# Patient Record
Sex: FEMALE | Race: WHITE
[De-identification: names, ages, dates, MRNs, and addresses within clinical notes are randomized per-mention and may not be internally consistent; named-entity substitution may affect disease eponyms.]

---

## 2017-01-24 ENCOUNTER — HOSPITAL ENCOUNTER (INPATIENT)
Dept: HOSPITAL 45 - C.EDB | Age: 52
LOS: 3 days | Discharge: HOME | DRG: 640 | End: 2017-01-27
Attending: HOSPITALIST | Admitting: HOSPITALIST
Payer: COMMERCIAL

## 2017-01-24 VITALS
HEIGHT: 65 IN | BODY MASS INDEX: 39.12 KG/M2 | BODY MASS INDEX: 39.12 KG/M2 | BODY MASS INDEX: 39.12 KG/M2 | WEIGHT: 234.79 LBS | WEIGHT: 234.79 LBS | HEIGHT: 65 IN | BODY MASS INDEX: 39.12 KG/M2

## 2017-01-24 VITALS — SYSTOLIC BLOOD PRESSURE: 196 MMHG | DIASTOLIC BLOOD PRESSURE: 119 MMHG

## 2017-01-24 VITALS
HEART RATE: 84 BPM | TEMPERATURE: 98.24 F | DIASTOLIC BLOOD PRESSURE: 80 MMHG | OXYGEN SATURATION: 93 % | SYSTOLIC BLOOD PRESSURE: 149 MMHG

## 2017-01-24 VITALS
DIASTOLIC BLOOD PRESSURE: 128 MMHG | OXYGEN SATURATION: 98 % | SYSTOLIC BLOOD PRESSURE: 219 MMHG | HEART RATE: 96 BPM | TEMPERATURE: 97.52 F

## 2017-01-24 DIAGNOSIS — D63.1: ICD-10-CM

## 2017-01-24 DIAGNOSIS — I65.22: ICD-10-CM

## 2017-01-24 DIAGNOSIS — F32.9: ICD-10-CM

## 2017-01-24 DIAGNOSIS — E66.01: ICD-10-CM

## 2017-01-24 DIAGNOSIS — E11.9: ICD-10-CM

## 2017-01-24 DIAGNOSIS — Z87.891: ICD-10-CM

## 2017-01-24 DIAGNOSIS — I96: ICD-10-CM

## 2017-01-24 DIAGNOSIS — Z82.49: ICD-10-CM

## 2017-01-24 DIAGNOSIS — Z79.4: ICD-10-CM

## 2017-01-24 DIAGNOSIS — R06.02: ICD-10-CM

## 2017-01-24 DIAGNOSIS — I12.0: ICD-10-CM

## 2017-01-24 DIAGNOSIS — E78.5: ICD-10-CM

## 2017-01-24 DIAGNOSIS — N18.6: ICD-10-CM

## 2017-01-24 DIAGNOSIS — J30.9: ICD-10-CM

## 2017-01-24 DIAGNOSIS — R79.89: ICD-10-CM

## 2017-01-24 DIAGNOSIS — Z83.3: ICD-10-CM

## 2017-01-24 DIAGNOSIS — Z87.01: ICD-10-CM

## 2017-01-24 DIAGNOSIS — I25.10: ICD-10-CM

## 2017-01-24 DIAGNOSIS — Z79.82: ICD-10-CM

## 2017-01-24 DIAGNOSIS — R19.7: ICD-10-CM

## 2017-01-24 DIAGNOSIS — E87.5: Primary | ICD-10-CM

## 2017-01-24 DIAGNOSIS — I16.0: ICD-10-CM

## 2017-01-24 DIAGNOSIS — R07.9: ICD-10-CM

## 2017-01-24 DIAGNOSIS — K21.9: ICD-10-CM

## 2017-01-24 DIAGNOSIS — Z99.2: ICD-10-CM

## 2017-01-24 LAB
ALBUMIN/GLOB SERPL: 0.6 {RATIO} (ref 0.9–2)
ALP SERPL-CCNC: 75 U/L (ref 45–117)
ALT SERPL-CCNC: 14 U/L (ref 12–78)
ANION GAP SERPL CALC-SCNC: 20 MMOL/L (ref 3–11)
AST SERPL-CCNC: 6 U/L (ref 15–37)
BASOPHILS # BLD: 0.03 K/UL (ref 0–0.2)
BASOPHILS NFR BLD: 0.3 %
BUN SERPL-MCNC: 137 MG/DL (ref 7–18)
BUN/CREAT SERPL: 8.1 (ref 10–20)
CALCIUM SERPL-MCNC: 9 MG/DL (ref 8.5–10.1)
CHLORIDE SERPL-SCNC: 99 MMOL/L (ref 98–107)
CKMB/CK RATIO: 7.2 (ref 0–3)
CO2 SERPL-SCNC: 16 MMOL/L (ref 21–32)
COMPLETE: YES
CREAT CL PREDICTED SERPL C-G-VRATE: 5 ML/MIN
CREAT SERPL-MCNC: 17 MG/DL (ref 0.6–1.2)
EOSINOPHIL NFR BLD AUTO: 338 K/UL (ref 130–400)
GLOBULIN SER-MCNC: 4.9 GM/DL (ref 2.5–4)
GLUCOSE SERPL-MCNC: 117 MG/DL (ref 70–99)
HCT VFR BLD CALC: 29.3 % (ref 37–47)
IG%: 0.3 %
IMM GRANULOCYTES NFR BLD AUTO: 17 %
INR PPP: 1.1 (ref 0.9–1.1)
LYMPHOCYTES # BLD: 1.89 K/UL (ref 1.2–3.4)
MCH RBC QN AUTO: 30.5 PG (ref 25–34)
MCHC RBC AUTO-ENTMCNC: 34.1 G/DL (ref 32–36)
MCV RBC AUTO: 89.3 FL (ref 80–100)
MONOCYTES NFR BLD: 7.8 %
NEUTROPHILS # BLD AUTO: 2.3 %
NEUTROPHILS NFR BLD AUTO: 72.3 %
PARTIAL THROMBOPLASTIN RATIO: 1.2
PMV BLD AUTO: 10.4 FL (ref 7.4–10.4)
POTASSIUM SERPL-SCNC: 5.9 MMOL/L (ref 3.5–5.1)
PROTHROMBIN TIME: 11.3 SECONDS (ref 9–12)
RBC # BLD AUTO: 3.28 M/UL (ref 4.2–5.4)
SODIUM SERPL-SCNC: 135 MMOL/L (ref 136–145)
WBC # BLD AUTO: 11.11 K/UL (ref 4.8–10.8)

## 2017-01-24 RX ADMIN — HEPARIN SODIUM SCH UNIT: 10000 INJECTION, SOLUTION INTRAVENOUS; SUBCUTANEOUS at 22:09

## 2017-01-24 NOTE — HISTORY AND PHYSICAL
History & Physical


Date & Time of Service:


Jan 24, 2017 at 21:18


Chief Complaint:


Sob, Chest Discomfort


Primary Care Physician:


Lurdes Smith D.O.


History of Present Illness


Source:  patient, clinic records, hospital records


Patient seen and examined. 51 year old female with PMHx of ESRD on HD, CAD s/p 

bare metal stent in 8/16, DM2, HTN, HLD, Depression,h/o noncompliance, and 

other problems listed below presents to the ED complaining of SOB prior to 

arrival. Patient reports that she missed her last three dialysis sessions. She 

reports that she stopped going because she was having nausea and diarrhea for 

the past 4-5 days. She states that she has at least 5 episodes of vomiting per 

day. She states she didn't want to have to be unhooked from dialysis to go to 

the bathroom so she just didn't go. She states today she started noticing that 

she was getting SOB with minimal exertion. She also states she had some chest 

discomfort in the left side of her chest that she rated as a 2-3/10 and 

described as dull. She also reports that she feels bloated and has been having 

orthopnea at night. Patient has been taking Keflex for a right second finger 

infection. She states that she was told at the pharmacy that she was not to 

take any of her other medications while on the Keflex so she has been only 

taking aspirin d/t her stents. She then however states she has not been taking 

her medications for at least a month. She denies fevers, chills, URI symptoms, 

palpitations, vomiting, dysuria, calf pain. In the ED patient is hypertensive, 

crea is 17, potassium is 5.9, and bicarb is 16. Troponin is 0.236, and there 

are ST changes on EKG. She was given nitropaste which alleviated her chest 

pain. She will be admitted for further workup and treatment.





Past Medical/Surgical History


Medical Problems:


(1) Allergic rhinitis


Status: Chronic  





(2) DM type 2 (diabetes mellitus, type 2)


Status: Chronic  





(3) Dyslipidemia


Status: Chronic  





(4) ESRD (end stage renal disease) on dialysis


Status: Chronic  





(5) GERD (gastroesophageal reflux disease)


Status: Chronic  





(6) HTN (hypertension)


Status: Chronic  





(7) HX OF PAST NONCOMPLIANCE


Status: Chronic  





(8) Morbid obesity


Status: Chronic  





(9) Tobacco use disorder


Status: Chronic  





Surgical Problems:


(1) Hemodialysis access, AV graft


Permanent Comment: [facility] Dr. Roberts 1/4/13


Status: Chronic  








Family History





Diabetes mellitus


  FATHER


  MOTHER


Heart disease


  FATHER


  MOTHER


Hypertension


  FATHER


  MOTHER


Kidney disease


  GRANDMOTHER





Social History


Smoking Status:  Former Smoker


Alcohol Use:  none


Drug Use:  none


Marital Status:  


Housing status:  lives with family


Occupational Status:  unemployed





Immunizations


History of Influenza Vaccine:  N/A


Influenza Vaccine Date:  Dec 1, 2012


History of Tetanus Vaccine?:  Yes


Tetanus Immunization Date:  Aug 20, 2011


History of Pneumococcal:  Yes


Pneumococcal Date:  Dec 1, 2012


History of Hepatitis B Vaccine:  Yes





Multi-Drug Resistant Organisms


History of MDRO:  No





Allergies


Coded Allergies:  


     Adhesives (Verified  Allergy, Mild, RASH, SORES, 1/24/17)


     Pollen Extract (Unverified  Allergy, Unknown, rash, 1/24/17)





Home Medications


Scheduled


Aspirin (Aspirin Ec), 81 MG PO DAILY


Atorvastatin (Lipitor), 40 MG PO DAILY


Calcium Acetate (Phoslo 667 Mg), 1 CAP PO WM


Cephalexin Monohydrate (Cephalexin), 1 CAP PO BID


Clopidogrel Bisulfate (Plavix), 1 TAB PO DAILY


Insulin Glargine (Lantus Solostar), 10 UNITS SC QPM


Isosorbide Mononitrate (Isosorbide Mononitrate ER), 60 MG PO QAM


Isosorbide Mononitrate Ext Rel (Imdur Ext Rel), 30 MG PO QAM


Metoprolol Tartrate (Lopressor), 50 MG PO BID


Ranitidine Hcl (Zantac), 300 MG PO HS


Sertraline HCl (Sertraline HCl), 2 TAB PO DAILY





Scheduled PRN


Lorazepam (Ativan), 0.5 MG PO DAILY PRN for Anxiety


Methocarbamol (Methocarbamol), 500 MG PO BID PRN for Muscle Spasms


Tramadol (Ultram), 1 TAB PO TID PRN for Pain





Review of Systems


Constitutional:  No chills, No fever


Eyes:  No worsening of vision


ENT:  No nasal symptoms


Respiratory:  + dyspnea on exertion, + shortness of breath, No cough, No 

dyspnea at rest


Cardiovascular:  + chest pain, + edema, + orthopnea, No palpitations


Abdomen:  + diarrhea, + nausea, No constipation, No pain, No vomiting


Musculoskeletal:  + swelling, No calf pain


Genitourinary - Female:  No dysuria


Neurologic:  No numbness/tingling, No vertigo


Psychiatric:  No anxiety


Endocrine:  No excessive thirst


Hematologic / Lymphatic:  No abnormal bleeding/bruising, No clotting problems


Integumentary:  No itch, No rash


Allergic / Immunologic:  No environmental allergies





Physical Exam


Vital Signs











  Date Time  Temp Pulse Resp B/P Pulse Ox O2 Delivery O2 Flow Rate FiO2


 


1/24/17 20:48  97 22 149/102 92 Room Air  


 


1/24/17 19:12  93 22 181/109 95 Room Air  


 


1/24/17 18:54  97      


 


1/24/17 18:53     93 Room Air  


 


1/24/17 18:44     93 Room Air  


 


1/24/17 18:44     93 Room Air  


 


1/24/17 18:13 36.5 98 20 192/113 93 Room Air  








General Appearance:  + pertinent finding (WD/WN 51 year old female lying in bed 

in NAD )


Head:  normocephalic, atraumatic


Eyes:  PERRL, EOMI, sclerae normal


ENT:  hearing grossly normal, pharynx normal


Neck:  supple, no JVD


Respiratory/Chest:  chest non-tender, lungs clear, normal breath sounds, no 

respiratory distress, no accessory muscle use


Cardiovascular:  regular rate, rhythm, no gallop, no JVD, no murmur, normal 

peripheral pulses


Abdomen/GI:  normal bowel sounds, non tender, soft


Back:  normal inspection, no muscle spasm


Extremities/Musculoskelatal:  no calf tenderness, normal capillary refill, + 

pedal edema (trace to +1 BL ), + pertinent finding (dressing to right index 

finger I/C/D )


Neurologic/Psych:  alert, oriented x 3, + pertinent finding (no motor or 

sensory deficits noted on gross exam )


Skin:  normal color, warm/dry, no rash


Lymphatic:  no adenopathy





Diagnostics


Laboratory Results





Results Past 24 Hours








Test


  1/24/17


19:08 Range/Units


 


 


White Blood Count 11.11 4.8-10.8  K/uL


 


Red Blood Count 3.28 4.2-5.4  M/uL


 


Hemoglobin 10.0 12.0-16.0  g/dL


 


Hematocrit 29.3 37-47  %


 


Mean Corpuscular Volume 89.3   fL


 


Mean Corpuscular Hemoglobin 30.5 25-34  pg


 


Mean Corpuscular Hemoglobin


Concent 34.1


  32-36  g/dl


 


 


Platelet Count 338 130-400  K/uL


 


Mean Platelet Volume 10.4 7.4-10.4  fL


 


Neutrophils (%) (Auto) 72.3  %


 


Lymphocytes (%) (Auto) 17.0  %


 


Monocytes (%) (Auto) 7.8  %


 


Eosinophils (%) (Auto) 2.3  %


 


Basophils (%) (Auto) 0.3  %


 


Neutrophils # (Auto) 8.03 1.4-6.5  K/uL


 


Lymphocytes # (Auto) 1.89 1.2-3.4  K/uL


 


Monocytes # (Auto) 0.87 0.11-0.59  K/uL


 


Eosinophils # (Auto) 0.26 0-0.5  K/uL


 


Basophils # (Auto) 0.03 0-0.2  K/uL


 


RDW Standard Deviation 46.9 36.4-46.3  fL


 


RDW Coefficient of Variation 14.3 11.5-14.5  %


 


Immature Granulocyte % (Auto) 0.3  %


 


Immature Granulocyte # (Auto) 0.03 0.00-0.02  K/uL


 


Prothrombin Time


  11.3


  9.0-12.0


SECONDS


 


Prothromb Time International


Ratio 1.1


  0.9-1.1  


 


 


Activated Partial


Thromboplast Time 30.3


  21.0-31.0


SECONDS


 


Partial Thromboplastin Ratio 1.2  


 


Sodium Level 135 136-145  mmol/L


 


Potassium Level 5.9 3.5-5.1  mmol/L


 


Chloride Level 99   mmol/L


 


Carbon Dioxide Level 16 21-32  mmol/L


 


Anion Gap 20.0 3-11  mmol/L


 


Blood Urea Nitrogen 137 7-18  mg/dl


 


Creatinine


  17.00


  0.60-1.20


mg/dl


 


Est Creatinine Clear Calc


Drug Dose 5.0


   ml/min


 


 


Estimated GFR (


American) 2.5


   


 


 


Estimated GFR (Non-


American 2.2


   


 


 


BUN/Creatinine Ratio 8.1 10-20  


 


Random Glucose 117 70-99  mg/dl


 


Calcium Level 9.0 8.5-10.1  mg/dl


 


Total Bilirubin 0.3 0.2-1  mg/dl


 


Aspartate Amino Transf


(AST/SGOT) 6


  15-37  U/L


 


 


Alanine Aminotransferase


(ALT/SGPT) 14


  12-78  U/L


 


 


Alkaline Phosphatase 75   U/L


 


Total Creatine Kinase 88   U/L


 


Creatine Kinase MB 6.3 0.5-3.6  ng/ml


 


Creatine Kinase MB Ratio 7.2 0-3.0  


 


Troponin I 0.236 0-0.045  ng/ml


 


Pro-B-Type Natriuretic Peptide > 04770 0-450  pg/ml


 


Total Protein 7.9 6.4-8.2  gm/dl


 


Albumin 3.0 3.4-5.0  gm/dl


 


Globulin 4.9 2.5-4.0  gm/dl


 


Albumin/Globulin Ratio 0.6 0.9-2  











Diagnostic Radiology


CXR


Per radiologist read:





IMPRESSION: Stable cardiomegaly and pulmonary vascular congestion.





EKG


NSR 90 BPm, QTc 467, mild ST elevated in lead III only





Impression


Assessment and Plan


51 year old female with history of noncompliance presents to the ED complaining 

of SOB. Patient has not gone to Dialysis in 9 days. 





HYPERKALEMIA IN ESRD


-Admit to tele 


-Crea 17, K 5.9 - EKG without peaked T waves


-Patient noncompliant with dialysis and medications 


-Last dialysis was 1/16/17 is scheduled MWF 


-Follows with Dr. Davison


-Nephrology consult placed for dialysis management 


-CBC, PRP, Mg in AM 


-Low potassium renal diet 





SHORTNESS OF BREATH 


-likely secondary to volume overload in setting of ESRD with noncompliance with 

dialysis 


-R/O ACS as below


-resume home meds


-Nephrology consulted for fluid management 





CHEST PAIN/ELEVATED TROPONIN - history of CAD 


-had bare metal stent placed August 2016 


-states she has been noncompliant with all meds except aspirin 


-Troponin 0.236 - however missed multiple days of dialysis 


-Lead III of EKG concerning for ST changes


-Currently chest pain free


-Continue Nitropaste, Aspirin


-Resume, BB, Statin, Plavix 


-Serial Julian, EKGs


-follows with Dr. Duarte as outpatient may need to consider consult in AM 

depending on cardiac testing results 





HYPERTENSIVE URGENCY 


-SBP >190s


-Has been noncompliant with home BP meds for weeks to months


-Improved BP with nitropaste, will continue 


-resume BB


-hold Imdur until nitropaste is discontinued


-monitor closely in tele 





RIGHT 2ND FINGER INFECTION 


-[facility]


-Mild leukocytosis -11K


-Patient reports finger getting better, however appears gangrenous at tip - 

please see Dr. Reese' addendum physical exam 


-Change Keflex to Ancef for now, may need broad spectrum Abx if finger does not 

improve 


-pharmacy onboard for renal dosing 


-Defer additional consultations to attending physicians 


-repeat CBC in AM 





DIARRHEA


-has history of c.diff and recently on Abx 


-check C. diff toxin 





IDDM


-recent A1c 7.2 


-has been noncompliant with insulin however random serum glucose tonight 112


-SSI coverage  


-decrease Lantus to 5 units HS to avoid hypoglycemia


-BSG AC HS 


-consistent carb diet 





GERD


-continue Zantac 





HLD


-continue Statin 





DEPRESSION


-continue Zoloft 





ANEMIA in ESRD


-Hgb stable at 10 


-repeat CBC in AM 





DVT PROPHYLAXIS: Sq heparin 





CODE STATUS: FULL CODE





DISPO:In my clinical judgment this beneficiary meets acute admission criteria, 

established by CMS, that includes being hospitalized through two midnights.





Patient seen in collaboration with Dr. Reese





VTE Prophylaxis


VTE Risk Assessment Done? Y/N:  Yes


Risk Level:  Moderate





Note





ATTENDING ADDENDUM





Record reviewed.


Patient interviewed and examined.


Care coordinated with Jazmyn Torres PA-C.


Please refer to her documentation for patient's history.


Briefly, 50 YO female with history of coronary artery disease s/p PCI, CKD 5 on 

hemodialysis, DM, and other problems as noted.


Has not gone to dialysis unit for > 1 week because she wasn't feeling well.


Apparently has not been taking some / most of her prescribed meds for some time.


Presented to ED with SOB.


No exertional chest pain, but noted some chest pressure when she rolled over in 

bed.


Recent diarrhea. Taking cephalexin for cellulitis right index finger.





EXAM:





General-  no acute distress


VS- as noted


Neck-  + JVD


Lungs-  basilar rales


Heart-  RRR, S4, no rub appreciated


Abdomen-  + BS, soft, nontender


Extremities- trace pretibial edema; no calf tenderness; erythema right index 

finger involving distal phalanx with apparent dry gangrene at tip of finger, no 

drainage


Neuro-  alert, oriented








DATA:





K 5.9.


, creatinine 17.


Troponin 0.236.


Other lab studies as noted.





CXR- cardiomegaly, pulmonary edema.





EKG performed at 18:40 reviewed and demonstrated NSR at 95 / minute, isolated 1 

mm ST elevation III, slight ST depression I, aVL, inverted T-waves III, aVF, V3-

V4.


.











ASSESSMENT AND PLAN:





PULMONARY EDEMA 


Secondary to missed hemodialysis treatments.


Oxygenation adequate on RA.


Nephrology consulted to manage hemodialysis.





HYPERKALEMIA


Serum K 5.9.


Secondary to missed hemodialysis treatments.


No peaked T-waves.


Discussed with Nephrology.


Insulin + D50 tonight, hemodialysis in a.m.





HYPERTENSION


BP elevated in ED.


Fluid overload, noncompliance with meds contributing factors.


Resume antihypertensive meds and hemodialysis.


Follow and titrate Rx.





CAD


Serum troponin slightly elevated, probably nonspecific elevation due to CKD.


EKG changes as noted- nonspecific, does not meet criteria for STEMI.


Doubt acute coronary syndrome- has mild positional chest discomfort, no anginal 

symptoms.


Elevated BP may be contributing to EKG changes.


Follow serial cardiac markers.


Consult Cardiology if markers rise or there are other concerning changes in her 

status.





Please refer to JANEY Torres's documentation for discussion of other issues.





Alfred Reese MD


.

## 2017-01-24 NOTE — DIAGNOSTIC IMAGING REPORT
CHEST ONE VIEW PORTABLE



CLINICAL HISTORY: Respiratory distress. Dyspnea.    



COMPARISON STUDY:  Chest radiograph October 11, 2016.



FINDINGS: Lung volumes are normal. There is no pneumothorax or pleural effusion.

Moderate cardiomegaly is unchanged. There is pulmonary vascular congestion

without overt pulmonary edema. There are findings suggestive of calcific

tendinitis of the right rotator cuff. 



IMPRESSION: Stable cardiomegaly and pulmonary vascular congestion.









Electronically signed by:  Giovanni Clemens M.D.

1/24/2017 6:45 PM



Dictated Date/Time:  1/24/2017 6:42 PM

## 2017-01-24 NOTE — EMERGENCY ROOM VISIT NOTE
History


Report prepared by Eda:  Moose Hendricks


Under the Supervision of:  Dr. Joshua Tobar D.O.


First contact with patient:  18:20


Chief Complaint:  SHORTNESS OF BREATH


Stated Complaint:  SOB, CHEST DISCOMFORT





History of Present Illness


The patient is a 51 year old female who presents to the Emergency Room with 

complaints of persistent shortness of breath that began 8 days prior to 

arrival. Her shortness of breath is worsened when laying flat, and she feels as 

though she cannot take a deep breath. The patient is on routine dialysis, but 

missed her last three appointments due to an upset stomach and diarrhea. The 

patient's last appointment was Monday, 8 days ago. She also complains of fevers

, chills, and coughing. She does feel like she is carrying a lot of extra fluid 

in her abdomen, but denies excess fluid buildup in her lower extremities.





   Source of History:  patient


   Onset:  8 days PTA


   Position:  chest


   Quality:  other (Difficulty breathing)


   Timing:  other (Persistent)


   Associated Symptoms:  + chills, + cough, + fevers


Note:


Patient notes fluid build up in her abdomen.





Review of Systems


See HPI for pertinent positives & negatives. A total of 10 systems reviewed and 

were otherwise negative.





Past Medical & Surgical


Medical Problems:


(1) Allergic rhinitis


(2) Coronary arteriosclerosis in native artery


(3) DM type 2 (diabetes mellitus, type 2)


(4) Dyslipidemia


(5) ESRD (end stage renal disease) on dialysis


(6) ESRD (end stage renal disease) on dialysis


(7) GERD (gastroesophageal reflux disease)


(8) HTN (hypertension)


(9) HX OF PAST NONCOMPLIANCE


(10) Hyperkalemia


(11) Morbid obesity


(12) Tobacco use disorder


Surgical Problems:


(1) Hemodialysis access, AV graft








Family History





Diabetes mellitus


  FATHER


  MOTHER


Heart disease


  FATHER


  MOTHER


Hypertension


  FATHER


  MOTHER


Kidney disease


  GRANDMOTHER





Social History


Smoking Status:  Former Smoker


Alcohol Use:  none


Drug Use:  none


Marital Status:  


Housing Status:  lives with family


Occupation Status:  unemployed





Current/Historical Medications


Scheduled


Aspirin (Aspirin Ec), 81 MG PO DAILY


Atorvastatin (Lipitor), 40 MG PO DAILY


Calcium Acetate (Phoslo 667 Mg), 1 CAP PO WM


Cephalexin Monohydrate (Cephalexin), 1 CAP PO BID


Clopidogrel Bisulfate (Plavix), 1 TAB PO DAILY


Insulin Glargine (Lantus Solostar), 10 UNITS SC QPM


Isosorbide Mononitrate (Isosorbide Mononitrate ER), 60 MG PO QAM


Isosorbide Mononitrate Ext Rel (Imdur Ext Rel), 30 MG PO QAM


Metoprolol Tartrate (Lopressor), 50 MG PO BID


Ranitidine Hcl (Zantac), 300 MG PO HS


Sertraline HCl (Sertraline HCl), 2 TAB PO DAILY





Scheduled PRN


Lorazepam (Ativan), 0.5 MG PO DAILY PRN for Anxiety


Methocarbamol (Methocarbamol), 500 MG PO BID PRN for Muscle Spasms


Tramadol (Ultram), 1 TAB PO TID PRN for Pain





Allergies


Coded Allergies:  


     Adhesives (Verified  Allergy, Mild, RASH, SORES, 1/24/17)


     Pollen Extract (Unverified  Allergy, Unknown, rash, 1/24/17)





Physical Exam


Vital Signs











  Date Time  Temp Pulse Resp B/P Pulse Ox O2 Delivery O2 Flow Rate FiO2


 


1/24/17 19:12  93 22 181/109 95 Room Air  


 


1/24/17 18:54  97      


 


1/24/17 18:53     93 Room Air  


 


1/24/17 18:44     93 Room Air  


 


1/24/17 18:44     93 Room Air  


 


1/24/17 18:13 36.5 98 20 192/113 93 Room Air  











Physical Exam


GENERAL:  Patient is awake, alert, and anxious appearing. She is comfortable, 

does not appear to be in pain. 


EYES: The conjunctivae are clear.  The pupils are round and reactive. 


EARS, NOSE, MOUTH AND THROAT: The nose is without any evidence of any 

deformity. Mucous membranes are moist tongue is midline 


NECK: The neck is nontender and supple.


RESPIRATORY:  Lung sounds are diminished throughout all lung fields. Rales are 

present in all lung fields. Tachypnea and conversational dyspnea are noted 

bilaterally. 


CARDIOVASCULAR:  Regular rate and rhythm noted there no murmurs rubs or gallops 

normal S1 normal S2 


GASTROINTESTINAL: The abdomen is soft. Bowel sounds are present in all 

quadrants. Abdomen is nontender


MUSCULOSKELETAL/EXTREMITIES: Pedal edema bilaterally. There is no evidence of 

gross deformity full range of motion is noted in the hips and shoulders


SKIN: There is no obvious evidence of any rash. There are no petechiae, pallor 

or cyanosis noted. There was a blackened area at the tip of the right index 

finger. This is nontender. There is a dialysis fistula present in the right 

upper extremity. A bruit was noted to osculation as well as a palpable thrill. 


NEUROLOGIC:  Patient is awake alert and oriented x3 strength is symmetric 

patellar reflexes are 2+ bilaterally





Medical Decision & Procedures


ER Provider


Diagnostic Interpretation:


X ray results and stated below per my interpretation and radiology 

interpretation. Other radiology results per my review and radiologist 

interpretation:





CHEST ONE VIEW PORTABLE





CLINICAL HISTORY: Respiratory distress. Dyspnea.    





COMPARISON STUDY:  Chest radiograph October 11, 2016.





FINDINGS: Lung volumes are normal. There is no pneumothorax or pleural effusion.


Moderate cardiomegaly is unchanged. There is pulmonary vascular congestion


without overt pulmonary edema. There are findings suggestive of calcific


tendinitis of the right rotator cuff. 





IMPRESSION: Stable cardiomegaly and pulmonary vascular congestion.














Electronically signed by:  Giovanni Clemens M.D.


1/24/2017 6:45 PM





Dictated Date/Time:  1/24/2017 6:42 PM





Laboratory Results











Test


  1/24/17


19:08


 


Immature Granulocyte % (Auto) 0.3 % 


 


White Blood Count


  11.11 K/uL


(4.8-10.8)


 


Red Blood Count


  3.28 M/uL


(4.2-5.4)


 


Hemoglobin


  10.0 g/dL


(12.0-16.0)


 


Hematocrit 29.3 % (37-47) 


 


Mean Corpuscular Volume


  89.3 fL


()


 


Mean Corpuscular Hemoglobin


  30.5 pg


(25-34)


 


Mean Corpuscular Hemoglobin


Concent 34.1 g/dl


(32-36)


 


Platelet Count


  338 K/uL


(130-400)


 


Mean Platelet Volume


  10.4 fL


(7.4-10.4)


 


Neutrophils (%) (Auto) 72.3 % 


 


Lymphocytes (%) (Auto) 17.0 % 


 


Monocytes (%) (Auto) 7.8 % 


 


Eosinophils (%) (Auto) 2.3 % 


 


Basophils (%) (Auto) 0.3 % 


 


Neutrophils # (Auto)


  8.03 K/uL


(1.4-6.5)


 


Lymphocytes # (Auto)


  1.89 K/uL


(1.2-3.4)


 


Monocytes # (Auto)


  0.87 K/uL


(0.11-0.59)


 


Eosinophils # (Auto)


  0.26 K/uL


(0-0.5)


 


Basophils # (Auto)


  0.03 K/uL


(0-0.2)


 


Immature Granulocyte # (Auto)


  0.03 K/uL


(0.00-0.02)


 


Prothrombin Time


  11.3 SECONDS


(9.0-12.0)


 


Prothromb Time International


Ratio 1.1 (0.9-1.1) 


 


 


Activated Partial


Thromboplast Time 30.3 SECONDS


(21.0-31.0)


 


Partial Thromboplastin Ratio 1.2 


 


Total Bilirubin


  0.3 mg/dl


(0.2-1)


 


Aspartate Amino Transf


(AST/SGOT) 6 U/L (15-37) 


 


 


Alanine Aminotransferase


(ALT/SGPT) 14 U/L (12-78) 


 


 


Alkaline Phosphatase


  75 U/L


()


 


Pro-B-Type Natriuretic Peptide


  > 96893 pg/ml


(0-450)


 


Total Protein


  7.9 gm/dl


(6.4-8.2)


 


Albumin


  3.0 gm/dl


(3.4-5.0)


 


Globulin


  4.9 gm/dl


(2.5-4.0)


 


Albumin/Globulin Ratio 0.6 (0.9-2) 





Laboratory results per my review.





Medications Administered











 Medications


  (Trade)  Dose


 Ordered  Sig/Willy


 Route  Start Time


 Stop Time Status Last Admin


Dose Admin


 


 Nitroglycerin


  (Nitroglycerin


 2% Oint)  1 inch  NOW  ONCE


 EXT  1/24/17 18:30


 1/24/17 18:31 DC 1/24/17 18:45


1 INCH











ECG


Indication:  SOB/dyspnea


Rate (beats per minute):  95


Rhythm:  normal sinus


Findings:  Q waves (Inferior), T-wave inversion (Inferior), no ectopy


Comparison ECG Date:  10/12/2016


Change:


T-wave inversions are new.





ED Course


1821: The patient was evaluated in room C2A. A complete history and physical 

examination were performed. 





1830: Ordered Nitroglycerin 1 inch EXT. 





1921: I reevaluated the patient at this time. She was resting comfortably. 





2010: I discussed the case with Dr. Hugh Jj Hospitalist, he will 

evaluate the patient for further treatment. 





2012: I spoke with the patient at this time and informed her of her admission. 

She agrees to the treatment plan.





Medical Decision


The patient's history was concerning for respiratory difficulties.





Differential diagnosis:


Etiologies such as infections, reactive airway disease, pneumonia, pneumothorax

, COPD, CHF, cardiac ischemia, pulmonary embolism, musculoskeletal, 

gastrointestinal, as well as others were entertained.





Nursing notes reviewed.





The patient is a 51-year-old female who presented to the emergency department 

for an evaluation of shortness of breath. The patient was expressing 

significant shortness of breath and orthopnea. She has a history of renal 

failure and has not gone to dialysis. She missed her last 3 appointments with 

dialysis. The patient's EKG did not show significant abnormality I would 

associate with hyperkalemia however there were some ischemic changes. Her 

troponin was mildly elevated however I think this is secondary to her renal 

failure and not secondary to ischemia. The patient was treated with Nitropaste. 

She was also placed on supplement oxygen. On subsequent reevaluation she was 

feeling much better and had no chest pain. I discussed the patient's laboratory 

and radiographic studies with her. I also discussed her case with the on-call 

[facility] hospitalist group. They have agreed to evaluate the patient in the 

emergency department for further management and disposition.





Consults


Time Called:  2002


Consulting Physician:  Dr. Hugh Lang


Returned Call:  2010


I discussed the case with Dr. Hugh Lang, he will evaluate 

the patient for further treatment.





Impression





 Primary Impression:  


 Pulmonary edema


 Additional Impressions:  


 Renal failure


 Elevated troponin


 Hyperkalemia





Scribe Attestation


The scribe's documentation has been prepared under my direction and personally 

reviewed by me in its entirety. I confirm that the note above accurately 

reflects all work, treatment, procedures, and medical decision making performed 

by me.





Departure Information


Dispostion


Being Evaluated By Hospitalist





Referrals


Lurdes Smith D.O. (PCP)





Patient Instructions


My [facility]





Problem Qualifiers

## 2017-01-25 VITALS
DIASTOLIC BLOOD PRESSURE: 94 MMHG | OXYGEN SATURATION: 95 % | HEART RATE: 85 BPM | TEMPERATURE: 98.78 F | SYSTOLIC BLOOD PRESSURE: 165 MMHG

## 2017-01-25 VITALS — SYSTOLIC BLOOD PRESSURE: 148 MMHG | HEART RATE: 81 BPM | DIASTOLIC BLOOD PRESSURE: 87 MMHG

## 2017-01-25 VITALS — HEART RATE: 82 BPM | DIASTOLIC BLOOD PRESSURE: 102 MMHG | TEMPERATURE: 98.24 F | SYSTOLIC BLOOD PRESSURE: 135 MMHG

## 2017-01-25 VITALS — HEART RATE: 76 BPM | SYSTOLIC BLOOD PRESSURE: 159 MMHG | DIASTOLIC BLOOD PRESSURE: 96 MMHG

## 2017-01-25 VITALS — SYSTOLIC BLOOD PRESSURE: 150 MMHG | DIASTOLIC BLOOD PRESSURE: 96 MMHG | HEART RATE: 78 BPM

## 2017-01-25 VITALS — SYSTOLIC BLOOD PRESSURE: 163 MMHG | DIASTOLIC BLOOD PRESSURE: 97 MMHG | HEART RATE: 73 BPM | TEMPERATURE: 98.78 F

## 2017-01-25 VITALS — SYSTOLIC BLOOD PRESSURE: 150 MMHG | HEART RATE: 77 BPM | DIASTOLIC BLOOD PRESSURE: 77 MMHG

## 2017-01-25 VITALS — DIASTOLIC BLOOD PRESSURE: 100 MMHG | HEART RATE: 79 BPM | SYSTOLIC BLOOD PRESSURE: 159 MMHG

## 2017-01-25 VITALS
HEART RATE: 73 BPM | SYSTOLIC BLOOD PRESSURE: 137 MMHG | TEMPERATURE: 98.42 F | DIASTOLIC BLOOD PRESSURE: 89 MMHG | OXYGEN SATURATION: 97 %

## 2017-01-25 VITALS — HEART RATE: 78 BPM | SYSTOLIC BLOOD PRESSURE: 159 MMHG | DIASTOLIC BLOOD PRESSURE: 94 MMHG

## 2017-01-25 VITALS — DIASTOLIC BLOOD PRESSURE: 85 MMHG | SYSTOLIC BLOOD PRESSURE: 147 MMHG | HEART RATE: 80 BPM

## 2017-01-25 VITALS
DIASTOLIC BLOOD PRESSURE: 97 MMHG | HEART RATE: 79 BPM | TEMPERATURE: 97.52 F | OXYGEN SATURATION: 98 % | SYSTOLIC BLOOD PRESSURE: 163 MMHG

## 2017-01-25 VITALS
TEMPERATURE: 97.52 F | HEART RATE: 73 BPM | DIASTOLIC BLOOD PRESSURE: 109 MMHG | OXYGEN SATURATION: 97 % | SYSTOLIC BLOOD PRESSURE: 176 MMHG

## 2017-01-25 VITALS — DIASTOLIC BLOOD PRESSURE: 101 MMHG | HEART RATE: 78 BPM | SYSTOLIC BLOOD PRESSURE: 159 MMHG

## 2017-01-25 VITALS — DIASTOLIC BLOOD PRESSURE: 82 MMHG | SYSTOLIC BLOOD PRESSURE: 139 MMHG | HEART RATE: 80 BPM

## 2017-01-25 VITALS — SYSTOLIC BLOOD PRESSURE: 142 MMHG | DIASTOLIC BLOOD PRESSURE: 96 MMHG

## 2017-01-25 VITALS — DIASTOLIC BLOOD PRESSURE: 98 MMHG | SYSTOLIC BLOOD PRESSURE: 167 MMHG | HEART RATE: 80 BPM

## 2017-01-25 VITALS
OXYGEN SATURATION: 92 % | DIASTOLIC BLOOD PRESSURE: 69 MMHG | SYSTOLIC BLOOD PRESSURE: 141 MMHG | HEART RATE: 77 BPM | TEMPERATURE: 98.06 F

## 2017-01-25 VITALS — HEART RATE: 75 BPM | SYSTOLIC BLOOD PRESSURE: 154 MMHG | DIASTOLIC BLOOD PRESSURE: 100 MMHG

## 2017-01-25 VITALS — DIASTOLIC BLOOD PRESSURE: 74 MMHG | HEART RATE: 79 BPM | SYSTOLIC BLOOD PRESSURE: 147 MMHG

## 2017-01-25 VITALS — HEART RATE: 79 BPM | DIASTOLIC BLOOD PRESSURE: 76 MMHG | SYSTOLIC BLOOD PRESSURE: 145 MMHG

## 2017-01-25 VITALS — SYSTOLIC BLOOD PRESSURE: 161 MMHG | DIASTOLIC BLOOD PRESSURE: 96 MMHG | HEART RATE: 80 BPM

## 2017-01-25 LAB
ANION GAP SERPL CALC-SCNC: 15 MMOL/L (ref 3–11)
ANION GAP SERPL CALC-SCNC: 20 MMOL/L (ref 3–11)
APPEARANCE UR: CLEAR
BILIRUB UR-MCNC: (no result) MG/DL
BUN SERPL-MCNC: 146 MG/DL (ref 7–18)
BUN SERPL-MCNC: 63 MG/DL (ref 7–18)
BUN/CREAT SERPL: 6.5 (ref 10–20)
BUN/CREAT SERPL: 8.6 (ref 10–20)
CALCIUM SERPL-MCNC: 8.5 MG/DL (ref 8.5–10.1)
CALCIUM SERPL-MCNC: 9.1 MG/DL (ref 8.5–10.1)
CHLORIDE SERPL-SCNC: 94 MMOL/L (ref 98–107)
CHLORIDE SERPL-SCNC: 97 MMOL/L (ref 98–107)
CKMB/CK RATIO: 7.3 (ref 0–3)
CKMB/CK RATIO: 7.6 (ref 0–3)
CO2 SERPL-SCNC: 17 MMOL/L (ref 21–32)
CO2 SERPL-SCNC: 26 MMOL/L (ref 21–32)
COLOR UR: YELLOW
CREAT CL PREDICTED SERPL C-G-VRATE: 5 ML/MIN
CREAT CL PREDICTED SERPL C-G-VRATE: 8.7 ML/MIN
CREAT SERPL-MCNC: 17 MG/DL (ref 0.6–1.2)
CREAT SERPL-MCNC: 9.7 MG/DL (ref 0.6–1.2)
EOSINOPHIL NFR BLD AUTO: 316 K/UL (ref 130–400)
GLUCOSE SERPL-MCNC: 131 MG/DL (ref 70–99)
GLUCOSE SERPL-MCNC: 191 MG/DL (ref 70–99)
HCT VFR BLD CALC: 29.3 % (ref 37–47)
HEPATITIS B AB: (no result)
MAGNESIUM SERPL-MCNC: 2.2 MG/DL (ref 1.8–2.4)
MAGNESIUM SERPL-MCNC: 3 MG/DL (ref 1.8–2.4)
MANUAL MICROSCOPIC REQUIRED?: NO
MCH RBC QN AUTO: 30 PG (ref 25–34)
MCHC RBC AUTO-ENTMCNC: 33.4 G/DL (ref 32–36)
MCV RBC AUTO: 89.6 FL (ref 80–100)
NITRITE UR QL STRIP: (no result)
PH UR STRIP: 5.5 [PH] (ref 4.5–7.5)
PMV BLD AUTO: 10.6 FL (ref 7.4–10.4)
POTASSIUM SERPL-SCNC: 3.7 MMOL/L (ref 3.5–5.1)
POTASSIUM SERPL-SCNC: 6.9 MMOL/L (ref 3.5–5.1)
RBC # BLD AUTO: 3.27 M/UL (ref 4.2–5.4)
REVIEW REQ?: NO
SODIUM SERPL-SCNC: 134 MMOL/L (ref 136–145)
SODIUM SERPL-SCNC: 135 MMOL/L (ref 136–145)
SP GR UR STRIP: 1.01 (ref 1–1.03)
URINE BILL WITH OR WITHOUT MIC: (no result)
URINE EPITHELIAL CELL AUTO: >30 /LPF (ref 0–5)
UROBILINOGEN UR-MCNC: (no result) MG/DL
WBC # BLD AUTO: 9.61 K/UL (ref 4.8–10.8)

## 2017-01-25 RX ADMIN — ATORVASTATIN CALCIUM SCH MG: 40 TABLET, FILM COATED ORAL at 13:17

## 2017-01-25 RX ADMIN — HEPARIN SODIUM SCH UNIT: 1000 INJECTION, SOLUTION INTRAVENOUS; SUBCUTANEOUS at 07:30

## 2017-01-25 RX ADMIN — Medication SCH MG: at 09:00

## 2017-01-25 RX ADMIN — NITROGLYCERIN SCH INCH: 20 OINTMENT TOPICAL at 07:00

## 2017-01-25 RX ADMIN — INSULIN ASPART SCH UNITS: 100 INJECTION, SOLUTION INTRAVENOUS; SUBCUTANEOUS at 08:56

## 2017-01-25 RX ADMIN — Medication SCH MG: at 13:15

## 2017-01-25 RX ADMIN — ONDANSETRON PRN MG: 2 INJECTION INTRAMUSCULAR; INTRAVENOUS at 21:51

## 2017-01-25 RX ADMIN — HEPARIN SODIUM SCH UNIT: 1000 INJECTION, SOLUTION INTRAVENOUS; SUBCUTANEOUS at 09:30

## 2017-01-25 RX ADMIN — INSULIN ASPART SCH UNITS: 100 INJECTION, SOLUTION INTRAVENOUS; SUBCUTANEOUS at 21:37

## 2017-01-25 RX ADMIN — CLOPIDOGREL BISULFATE SCH MG: 75 TABLET, FILM COATED ORAL at 13:16

## 2017-01-25 RX ADMIN — METOPROLOL TARTRATE SCH MG: 50 TABLET, FILM COATED ORAL at 22:07

## 2017-01-25 RX ADMIN — INSULIN GLARGINE SCH UNIT: 100 INJECTION, SOLUTION SUBCUTANEOUS at 21:37

## 2017-01-25 RX ADMIN — HEPARIN SODIUM SCH UNIT: 10000 INJECTION, SOLUTION INTRAVENOUS; SUBCUTANEOUS at 06:23

## 2017-01-25 RX ADMIN — CLOPIDOGREL BISULFATE SCH MG: 75 TABLET, FILM COATED ORAL at 09:00

## 2017-01-25 RX ADMIN — HEPARIN SODIUM SCH UNIT: 1000 INJECTION, SOLUTION INTRAVENOUS; SUBCUTANEOUS at 11:30

## 2017-01-25 RX ADMIN — INSULIN ASPART SCH UNITS: 100 INJECTION, SOLUTION INTRAVENOUS; SUBCUTANEOUS at 11:00

## 2017-01-25 RX ADMIN — ATORVASTATIN CALCIUM SCH MG: 40 TABLET, FILM COATED ORAL at 09:00

## 2017-01-25 RX ADMIN — CALCIUM ACETATE SCH MG: 667 CAPSULE ORAL at 07:30

## 2017-01-25 RX ADMIN — INSULIN ASPART SCH UNITS: 100 INJECTION, SOLUTION INTRAVENOUS; SUBCUTANEOUS at 17:12

## 2017-01-25 RX ADMIN — HEPARIN SODIUM SCH UNIT: 1000 INJECTION, SOLUTION INTRAVENOUS; SUBCUTANEOUS at 10:30

## 2017-01-25 RX ADMIN — Medication ONE MLS/HR: at 09:00

## 2017-01-25 RX ADMIN — HEPARIN SODIUM SCH UNIT: 1000 INJECTION, SOLUTION INTRAVENOUS; SUBCUTANEOUS at 16:30

## 2017-01-25 RX ADMIN — HEPARIN SODIUM SCH UNIT: 1000 INJECTION, SOLUTION INTRAVENOUS; SUBCUTANEOUS at 13:27

## 2017-01-25 RX ADMIN — CEFAZOLIN SCH MLS/HR: 10 INJECTION, POWDER, FOR SOLUTION INTRAVENOUS at 17:56

## 2017-01-25 RX ADMIN — HEPARIN SODIUM SCH UNIT: 1000 INJECTION, SOLUTION INTRAVENOUS; SUBCUTANEOUS at 17:25

## 2017-01-25 RX ADMIN — HEPARIN SODIUM SCH UNIT: 1000 INJECTION, SOLUTION INTRAVENOUS; SUBCUTANEOUS at 08:30

## 2017-01-25 RX ADMIN — METOPROLOL TARTRATE SCH MG: 50 TABLET, FILM COATED ORAL at 13:17

## 2017-01-25 RX ADMIN — HEPARIN SODIUM SCH UNIT: 10000 INJECTION, SOLUTION INTRAVENOUS; SUBCUTANEOUS at 21:38

## 2017-01-25 RX ADMIN — HEPARIN SODIUM SCH UNIT: 10000 INJECTION, SOLUTION INTRAVENOUS; SUBCUTANEOUS at 13:26

## 2017-01-25 RX ADMIN — METOPROLOL TARTRATE SCH MG: 50 TABLET, FILM COATED ORAL at 09:00

## 2017-01-25 RX ADMIN — NITROGLYCERIN SCH INCH: 20 OINTMENT TOPICAL at 13:16

## 2017-01-25 RX ADMIN — SERTRALINE HYDROCHLORIDE SCH MG: 50 TABLET, FILM COATED ORAL at 13:16

## 2017-01-25 RX ADMIN — ONDANSETRON PRN MG: 2 INJECTION INTRAMUSCULAR; INTRAVENOUS at 15:34

## 2017-01-25 RX ADMIN — CALCIUM ACETATE SCH MG: 667 CAPSULE ORAL at 13:15

## 2017-01-25 RX ADMIN — NITROGLYCERIN SCH INCH: 20 OINTMENT TOPICAL at 18:26

## 2017-01-25 RX ADMIN — HEPARIN SODIUM SCH UNIT: 1000 INJECTION, SOLUTION INTRAVENOUS; SUBCUTANEOUS at 13:28

## 2017-01-25 RX ADMIN — HEPARIN SODIUM SCH UNIT: 1000 INJECTION, SOLUTION INTRAVENOUS; SUBCUTANEOUS at 12:30

## 2017-01-25 RX ADMIN — RANITIDINE SCH MG: 150 TABLET ORAL at 22:07

## 2017-01-25 RX ADMIN — CALCIUM ACETATE SCH MG: 667 CAPSULE ORAL at 16:45

## 2017-01-25 RX ADMIN — NITROGLYCERIN SCH INCH: 20 OINTMENT TOPICAL at 01:03

## 2017-01-25 NOTE — DIALYSIS PROGRESS NOTE
Nephrology Dialysis Note


Date of Service:


Jan 25, 2017.


Subjective


no n/v; chest pressure resolved; not dyspneic; no pain; tolerated breakfast; 

states she was not supposed to be on plavix any longer





Objective











  Date Time  Temp Pulse Resp B/P Pulse Ox O2 Delivery O2 Flow Rate FiO2


 


1/25/17 08:00      Room Air  


 


1/25/17 07:39 36.4 73 18 176/109 97 Room Air  


 


1/25/17 04:00      Room Air  


 


1/25/17 03:31 36.9 73 18 137/89 97 Room Air  


 


1/25/17 01:02    142/96    


 


1/24/17 23:59      Room Air  


 


1/24/17 23:26 36.8 84 20 149/80 93 Room Air  


 


1/24/17 22:55  93      


 


1/24/17 22:13    196/119    


 


1/24/17 21:49 36.4 96 18 219/128 98 Room Air  


 


1/24/17 20:48  97 22 149/102 92 Room Air  


 


1/24/17 19:12  93 22 181/109 95 Room Air  


 


1/24/17 18:54  97      


 


1/24/17 18:53     93 Room Air  


 


1/24/17 18:44     93 Room Air  


 


1/24/17 18:44     93 Room Air  


 


1/24/17 18:13 36.5 98 20 192/113 93 Room Air  








Physical Exam:


General-a&o x 3, on ra


Eyes-eomi


ENT-dry mm


Neck-supple


Lungs-diminished but clear


Heart-RRR, no edema


Abdomen-soft NT +BS no park


Extremities-L prox AVF + t/b; no c/c; R second digit tip red/dry gangrenous 

lesion about 1 cm diameter


Neuro-cneteno, fluent speech





 Current Inpatient Medications








 Medications


  (Trade)  Dose


 Ordered  Sig/Willy


 Route  Start Time


 Stop Time Status Last Admin


Dose Admin


 


 Heparin Sodium


  (Porcine)


  (Heparin Sq 5000


 Unit/0.5ml)  5,000 unit  Q8


 SQ  1/24/17 22:00


 2/23/17 21:59  1/25/17 06:23


5,000 UNIT


 


 Acetaminophen


  (Tylenol Tab)  650 mg  Q4H  PRN


 PO  1/24/17 21:00


 2/23/17 20:59   


 


 


 Ondansetron HCl


  (Zofran Inj)  4 mg  Q6H  PRN


 IV  1/24/17 21:00


 2/23/17 20:59   


 


 


 Nitroglycerin


  (Nitroglycerin


 2% Oint)  1 inch  Q6H


 EXT  1/25/17 01:00


 2/24/17 00:59  1/25/17 01:03


1 INCH


 


 Aspirin


  (Ecotrin Tab)  81 mg  DAILY


 PO  1/25/17 09:00


     


 


 


 Atorvastatin


 Calcium


  (Lipitor Tab)  40 mg  DAILY


 PO  1/25/17 09:00


 2/24/17 08:59   


 


 


 Calcium Acetate


  (Phoslo Cap)  667 mg  TIDM


 PO  1/25/17 07:30


 2/24/17 07:59   


 


 


 Clopidogrel


 Bisulfate


  (plAVix TAB)  75 mg  DAILY


 PO  1/25/17 09:00


     


 


 


 Lorazepam


  (Ativan Tab)  0.5 mg  DAILY  PRN


 PO  1/24/17 21:15


 2/23/17 21:14   


 


 


 Methocarbamol


  (Robaxin Tab)  500 mg  BID  PRN


 PO  1/24/17 21:15


 2/23/17 21:14   


 


 


 Metoprolol


 Tartrate


  (Lopressor Tab)  50 mg  BID


 PO  1/25/17 09:00


     


 


 


 Ranitidine HCl


  (zANTac TAB)  300 mg  HS


 PO  1/25/17 21:00


 2/24/17 20:59   


 


 


 Sertraline HCl


  (Zoloft Tab)  100 mg  DAILY


 PO  1/25/17 09:00


 2/24/17 08:59   


 


 


 Tramadol HCl


  (Ultram Tab)  50 mg  TID  PRN


 PO  1/24/17 21:15


 2/23/17 21:14   


 


 


 Insulin Aspart


  (novoLOG ASPART)  **SLIDING


 SCALE**


 **If C...  ACHS


 SC  1/25/17 07:00


 2/24/17 06:59  1/25/17 08:56


3 UNITS


 


 Glucose


  (Glucose 40% Gel)  15-30


 GRAMS 15


 GRAMS...  UD  PRN


 PO  1/24/17 21:30


 2/23/17 21:29   


 


 


 Glucose


  (Glucose Chew


 Tab)  4-8


 Tablets 4


 Tabl...  UD  PRN


 PO  1/24/17 21:30


 2/23/17 21:29   


 


 


 Dextrose


  (Dextrose 50%


 50ML Syringe)  25-50ML OF


 50% DW IV


 FOR...  UD  PRN


 IV  1/24/17 21:30


 2/23/17 21:29   


 


 


 Glucagon


  (Glucagon Inj)  1 mg  UD  PRN


 SQ  1/24/17 21:30


 2/23/17 21:29   


 


 


 Insulin Glargine


  (Lantus Solostar


 Pen)  5 unit  HS


 SC  1/25/17 21:00


 2/24/17 20:59   


 


 


 Miscellaneous


 Information  1 ea  UD  PRN


 N/A  1/24/17 21:45


 2/23/17 21:44   


 


 


 Metoprolol


 Tartrate


  (Lopressor Iv)  5 mg  Q4  PRN


 IV  1/24/17 22:30


 2/23/17 22:29   


 


 


 Heparin Sodium


  (Porcine)


  (Heparin Iv


 Bolus)  1,000 unit  ONE


 IV  1/25/17 07:30


 1/25/17 18:00   


 


 


 Heparin Sodium


  (Porcine) 400 unit  400 unit  Q1H


 IV  1/25/17 07:30


 1/25/17 18:00   


 


 


 Epoetin Narayan/


 Syringe


  (Procrit Inj/


 Syringe)  0.3 ml @ 1


 mls/min  0800


 IV.  1/25/17 08:00


 1/25/17 18:00   


 











Last 24 Hours








Test


  1/24/17


19:08 1/24/17


21:45 1/24/17


23:03 1/25/17


00:01


 


White Blood Count 11.11 K/uL    


 


Red Blood Count 3.28 M/uL    


 


Hemoglobin 10.0 g/dL    


 


Hematocrit 29.3 %    


 


Mean Corpuscular Volume 89.3 fL    


 


Mean Corpuscular Hemoglobin 30.5 pg    


 


Mean Corpuscular Hemoglobin


Concent 34.1 g/dl 


  


  


  


 


 


Platelet Count 338 K/uL    


 


Mean Platelet Volume 10.4 fL    


 


Neutrophils (%) (Auto) 72.3 %    


 


Lymphocytes (%) (Auto) 17.0 %    


 


Monocytes (%) (Auto) 7.8 %    


 


Eosinophils (%) (Auto) 2.3 %    


 


Basophils (%) (Auto) 0.3 %    


 


Neutrophils # (Auto) 8.03 K/uL    


 


Lymphocytes # (Auto) 1.89 K/uL    


 


Monocytes # (Auto) 0.87 K/uL    


 


Eosinophils # (Auto) 0.26 K/uL    


 


Basophils # (Auto) 0.03 K/uL    


 


RDW Standard Deviation 46.9 fL    


 


RDW Coefficient of Variation 14.3 %    


 


Immature Granulocyte % (Auto) 0.3 %    


 


Immature Granulocyte # (Auto) 0.03 K/uL    


 


Prothrombin Time 11.3 SECONDS    


 


Prothromb Time International


Ratio 1.1 


  


  


  


 


 


Activated Partial


Thromboplast Time 30.3 SECONDS 


  


  


  


 


 


Partial Thromboplastin Ratio 1.2    


 


Sodium Level 135 mmol/L    


 


Potassium Level 5.9 mmol/L    


 


Chloride Level 99 mmol/L    


 


Carbon Dioxide Level 16 mmol/L    


 


Anion Gap 20.0 mmol/L    


 


Blood Urea Nitrogen 137 mg/dl    


 


Creatinine 17.00 mg/dl    


 


Est Creatinine Clear Calc


Drug Dose 5.0 ml/min 


  


  


  


 


 


Estimated GFR (


American) 2.5 


  


  


  


 


 


Estimated GFR (Non-


American 2.2 


  


  


  


 


 


BUN/Creatinine Ratio 8.1    


 


Random Glucose 117 mg/dl    


 


Calcium Level 9.0 mg/dl    


 


Total Bilirubin 0.3 mg/dl    


 


Aspartate Amino Transf


(AST/SGOT) 6 U/L 


  


  


  


 


 


Alanine Aminotransferase


(ALT/SGPT) 14 U/L 


  


  


  


 


 


Alkaline Phosphatase 75 U/L    


 


Total Creatine Kinase 88 U/L    


 


Creatine Kinase MB 6.3 ng/ml    


 


Creatine Kinase MB Ratio 7.2    


 


Troponin I 0.236 ng/ml    


 


Pro-B-Type Natriuretic Peptide > 39728 pg/ml    


 


Total Protein 7.9 gm/dl    


 


Albumin 3.0 gm/dl    


 


Globulin 4.9 gm/dl    


 


Albumin/Globulin Ratio 0.6    


 


Bedside Glucose  112 mg/dl  215 mg/dl  63 mg/dl 














Test


  1/25/17


00:28 1/25/17


00:36 1/25/17


01:00 1/25/17


01:30


 


Bedside Glucose 72 mg/dl   90 mg/dl  


 


Total Creatine Kinase  85 U/L   


 


Creatine Kinase MB  6.2 ng/ml   


 


Creatine Kinase MB Ratio  7.3   


 


Troponin I  0.262 ng/ml   


 


Urine Color    YELLOW 


 


Urine Appearance    CLEAR 


 


Urine pH    5.5 


 


Urine Specific Gravity    1.010 


 


Urine Protein    3+ 


 


Urine Glucose (UA)    2+ 


 


Urine Ketones    NEG 


 


Urine Occult Blood    1+ 


 


Urine Nitrite    NEG 


 


Urine Bilirubin    NEG 


 


Urine Urobilinogen    NEG 


 


Urine Leukocyte Esterase    TRACE 


 


Urine WBC (Auto)    10-30 /hpf 


 


Urine RBC (Auto)    0-4 /hpf 


 


Urine Hyaline Casts (Auto)    1-5 /lpf 


 


Urine Epithelial Cells (Auto)    >30 /lpf 


 


Urine Bacteria (Auto)    2+ 














Test


  1/25/17


01:57 1/25/17


03:02 1/25/17


04:09 1/25/17


06:43


 


Bedside Glucose 113 mg/dl  169 mg/dl  186 mg/dl  


 


White Blood Count    9.61 K/uL 


 


Red Blood Count    3.27 M/uL 


 


Hemoglobin    9.8 g/dL 


 


Hematocrit    29.3 % 


 


Mean Corpuscular Volume    89.6 fL 


 


Mean Corpuscular Hemoglobin    30.0 pg 


 


Mean Corpuscular Hemoglobin


Concent 


  


  


  33.4 g/dl 


 


 


RDW Standard Deviation    47.0 fL 


 


RDW Coefficient of Variation    14.3 % 


 


Platelet Count    316 K/uL 


 


Mean Platelet Volume    10.6 fL 


 


Sodium Level    134 mmol/L 


 


Potassium Level    6.9 mmol/L 


 


Chloride Level    97 mmol/L 


 


Carbon Dioxide Level    17 mmol/L 


 


Anion Gap    20.0 mmol/L 


 


Blood Urea Nitrogen    146 mg/dl 


 


Creatinine    17.00 mg/dl 


 


Est Creatinine Clear Calc


Drug Dose 


  


  


  5.0 ml/min 


 


 


Estimated GFR (


American) 


  


  


  2.5 


 


 


Estimated GFR (Non-


American 


  


  


  2.2 


 


 


BUN/Creatinine Ratio    8.6 


 


Random Glucose    131 mg/dl 


 


Calcium Level    9.1 mg/dl 


 


Magnesium Level    3.0 mg/dl 


 


Total Creatine Kinase    76 U/L 


 


Creatine Kinase MB    5.8 ng/ml 


 


Creatine Kinase MB Ratio    7.6 


 


Troponin I    0.283 ng/ml 














Test


  1/25/17


06:51 1/25/17


07:52 


  


 


 


Bedside Glucose 130 mg/dl    








 








 Date/Time


Source Procedure


Growth Status


 


 


 1/25/17 01:30


Stool C.difficile Toxin B Gene (PCR) - Final


No C. difficile toxin B gene detected Complete











Assessment & Plan


50 y/o F w/ ESRD, nonadherence, R 2nd finger lesion admitted for mgt of HTN 

urgency and hyperkalemia after missing several dialysis treatments





ESRD and hyperkalemia


K 6.9 today (nonhemolyzed) > will do HD first 2 hrs on 1 K bath then 2 K for 

rest of tx


as aggressive uf as tolerated


will look to do tx again tomorrow and possibly again 1/27 as clinical status 

dictates





anemia of esrd


will give moderate procrit dose w/ HD and add iron studies





CAD; hx of BM stent 8/2016 and HTN urgency


-pt states was instructed by Dr Duarte/ cardiology to stop plavix adn take 

aspirin only; this goes against guidelines and doubt it is true; defer to 

primary service to reinforce indications w/ pt


-troponins mildly elevated > could well be leak in this pt, though values are 

higher than previous admits recently





finger lesion


per primary service





appreciate c/s; will follow with you.

## 2017-01-25 NOTE — NEPHROLOGY CONSULTATION
Nephrology Consultation


Date of Consultation:


Jan 25, 2017.


Attending Physician:  Dr Merida


Requesting Physician:  Dr Reese


Reason for Consultation:  ESRD w/ HTN, hyperkalemia


History of Present Illness


51 year old female w/ ESRD on MWF HD missed the last 3 outpt dialysis 

treatments due to emesis and diarrhea concerns and came to ER w/ shortness of 

breath, orthopnea, and chest discomfort.  Other PMH as below.  Her presenting 

potassium was 5.9 and was managed medically overnight under my direction; she 

did unfortunately have some symptomatic hypoglycemia (BG 63) w/ therapy despite 

dextrose but BG improved this am.  Her blood pressure and chest pain were 

suitably managed with nitropaste.  She also stated she has not had most of her 

medications for the past month except for aspirin and keflex which was 

prescribed for R 2nd finger infection--so she had a bare metal stent in August 

and has not been taking plavix.  She is being evaluated for acute coronary 

syndrome and troponins are very mildly elevated in mid/high 0.2's.  She also is 

being followed by vascular and ortho as an outpt for L second finger lesion.  

She dialyzes under the care of Dr Davison at [facility] using a RUE AVF.

  this morning her K was 6.9 non hemolyzed > no changes on cardiac monitor and 

she was already on dialysis when this posted.  I adjusted her bath accordingly.





Past Medical/Surgical History


Medical Problems:


(1) Abdominal pain


Status: Acute  





(2) Acute electrocardiogram changes


Status: Acute  





(3) Elevated troponin


Status: Acute  





(4) End stage renal disease


Status: Acute  





(5) Hyperkalemia


Status: Acute  





(6) Joint effusion


Status: Acute  





(7) Left elbow pain


Status: Acute  





(8) Left sided chest pain


Status: Acute  





(9) Medical non-compliance


Status: Acute  





(10) Pneumonia


Status: Acute  





(11) Pulmonary edema


Status: Acute  





(12) Renal failure


Status: Acute  





(13) Sinusitis


Status: Acute  





(14) Substernal chest pain


Status: Acute  





(15) Urinary tract infection


Status: Acute  





(16) Vomiting


Status: Acute  








-ESRD on in center HD via AVF as above


-C diff in past


-CAD s/p BMS 8/2016; follows w/ Dr Gerald POWELLG


-DM2


-HTN


-anemia 


-HL


-GERD


-h/o nonadherence to medical treatment


-R second finger tip lesion


-states she has L carotid artery stenosis which will need stenting at some point


-s/p TDC, AVF placements





Family History





Diabetes mellitus


  FATHER


  MOTHER


Heart disease


  FATHER


  MOTHER


Hypertension


  FATHER


  MOTHER


Kidney disease


  GRANDMOTHER





Social History


Smoking Status:  Former Smoker


Alcohol Use:  none


Drug Use:  none


Marital Status:  


Housing Status:  lives with family


Occupation Status:  unemployed





Allergies


Coded Allergies:  


     Adhesives (Verified  Allergy, Mild, RASH, SORES, 1/24/17)


     Pollen Extract (Unverified  Allergy, Unknown, rash, 1/24/17)





Medications





 Current Inpatient Medications








 Medications


  (Trade)  Dose


 Ordered  Sig/Willy


 Route  Start Time


 Stop Time Status Last Admin


Dose Admin


 


 Heparin Sodium


  (Porcine)


  (Heparin Sq 5000


 Unit/0.5ml)  5,000 unit  Q8


 SQ  1/24/17 22:00


 2/23/17 21:59  1/25/17 06:23


5,000 UNIT


 


 Acetaminophen


  (Tylenol Tab)  650 mg  Q4H  PRN


 PO  1/24/17 21:00


 2/23/17 20:59   


 


 


 Ondansetron HCl


  (Zofran Inj)  4 mg  Q6H  PRN


 IV  1/24/17 21:00


 2/23/17 20:59   


 


 


 Nitroglycerin


  (Nitroglycerin


 2% Oint)  1 inch  Q6H


 EXT  1/25/17 01:00


 2/24/17 00:59  1/25/17 01:03


1 INCH


 


 Aspirin


  (Ecotrin Tab)  81 mg  DAILY


 PO  1/25/17 09:00


     


 


 


 Atorvastatin


 Calcium


  (Lipitor Tab)  40 mg  DAILY


 PO  1/25/17 09:00


 2/24/17 08:59   


 


 


 Calcium Acetate


  (Phoslo Cap)  667 mg  TIDM


 PO  1/25/17 07:30


 2/24/17 07:59   


 


 


 Clopidogrel


 Bisulfate


  (plAVix TAB)  75 mg  DAILY


 PO  1/25/17 09:00


     


 


 


 Lorazepam


  (Ativan Tab)  0.5 mg  DAILY  PRN


 PO  1/24/17 21:15


 2/23/17 21:14   


 


 


 Methocarbamol


  (Robaxin Tab)  500 mg  BID  PRN


 PO  1/24/17 21:15


 2/23/17 21:14   


 


 


 Metoprolol


 Tartrate


  (Lopressor Tab)  50 mg  BID


 PO  1/25/17 09:00


     


 


 


 Ranitidine HCl


  (zANTac TAB)  300 mg  HS


 PO  1/25/17 21:00


 2/24/17 20:59   


 


 


 Sertraline HCl


  (Zoloft Tab)  100 mg  DAILY


 PO  1/25/17 09:00


 2/24/17 08:59   


 


 


 Tramadol HCl


  (Ultram Tab)  50 mg  TID  PRN


 PO  1/24/17 21:15


 2/23/17 21:14   


 


 


 Insulin Aspart


  (novoLOG ASPART)  **SLIDING


 SCALE**


 **If C...  ACHS


 SC  1/25/17 07:00


 2/24/17 06:59   


 


 


 Glucose


  (Glucose 40% Gel)  15-30


 GRAMS 15


 GRAMS...  UD  PRN


 PO  1/24/17 21:30


 2/23/17 21:29   


 


 


 Glucose


  (Glucose Chew


 Tab)  4-8


 Tablets 4


 Tabl...  UD  PRN


 PO  1/24/17 21:30


 2/23/17 21:29   


 


 


 Dextrose


  (Dextrose 50%


 50ML Syringe)  25-50ML OF


 50% DW IV


 FOR...  UD  PRN


 IV  1/24/17 21:30


 2/23/17 21:29   


 


 


 Glucagon


  (Glucagon Inj)  1 mg  UD  PRN


 SQ  1/24/17 21:30


 2/23/17 21:29   


 


 


 Insulin Glargine


  (Lantus Solostar


 Pen)  5 unit  HS


 SC  1/25/17 21:00


 2/24/17 20:59   


 


 


 Miscellaneous


 Information  1 ea  UD  PRN


 N/A  1/24/17 21:45


 2/23/17 21:44   


 


 


 Metoprolol


 Tartrate


  (Lopressor Iv)  5 mg  Q4  PRN


 IV  1/24/17 22:30


 2/23/17 22:29   


 











Home Meds and Scripts








 Medications  Dose


 Route/Sig


 Max Daily Dose Days Date Category Dose


Instructions


 


 Sertraline HCl 50


 Mg Tab  2 Tab


 PO DAILY


   30 1/24/17 Reported 


 


 Phoslo 667 Mg


  (Calcium Acetate)


 667 Mg Cap  1 Cap


 PO WM


    1/24/17 Reported 


 


 Lipitor


  (Atorvastatin) 20


 Mg Tab  40 Mg


 PO DAILY


    1/24/17 Reported 


 


 Lopressor


  (Metoprolol


 Tartrate) 25 Mg


 Tab  50 Mg


 PO BID


    1/24/17 Reported 


 


 Plavix


  (Clopidogrel


 Bisulfate) 75 Mg


 Tab  1 Tab


 PO DAILY


   90 1/24/17 Reported 


 


 Isosorbide


 Mononitrate ER


  (Isosorbide


 Mononitrate) 30


 Mg Tabcr  60 Mg


 PO QAM


    1/24/17 Reported 


 


 Imdur Ext Rel


  (Isosorbide


 Mononitrate) 30


 Mg Ertab  30 Mg


 PO QAM


    1/24/17 Reported 


 


 Lantus Solostar


  (Insulin


 Glargine) 100


 Unit/Ml Inj  10 Units


 SC QPM


    1/24/17 Reported 


 


 Ativan


  (Lorazepam) 1 Mg


 Tab  0.5 Mg


 PO DAILY PRN


    1/24/17 Reported 


 


 Zantac


  (Ranitidine HCl)


 150 Mg Tab  300 Mg


 PO HS


    1/24/17 Reported 


 


 Methocarbamol 500


 Mg Tab  500 Mg


 PO BID PRN


    1/24/17 Reported 


 


 Ultram (Tramadol


 HCl) 50 Mg Tab  1 Tab


 PO TID PRN


   30 1/24/17 Reported 


 


 Cephalexin


  (Cephalexin


 Monohydrate) 1


 Homepack Ea  1 Cap


 PO BID


    1/24/17 Reported  PT IS NOT TAKING THE OTHER MEDS UNTIL SHE FINISHED TAKING 

THIS


 


 Aspirin Ec


  (Aspirin) 81 Mg


 Tab  81 Mg


 PO DAILY


    8/16/16 Reported 











Review of Systems


Constitutional:  + fatigue, No fever, No weakness


Eyes:  No worsening of vision


ENT:  No unusual epistaxis


Respiratory:  + see HPI, No cough, No dyspnea at rest, No shortness of breath


Cardiac:  No chest pain, No edema, No palpitations


Abdomen:  + problem reported (states fluid build up occurs for her in abdomen), 

+ see HPI, No diarrhea, No nausea, No pain, No vomiting


Musculoskeletal:  No joint pain, No muscle pain


Female :  No dysuria


Neuro:  No balance problems, No memory loss, No weakness


Psych:  No anxiety, No depression symptoms


Heme:  No abnormal bleeding/bruising


Endo:  No fatigue


Skin:  No rash





Physical Exam











  Date Time  Temp Pulse Resp B/P Pulse Ox O2 Delivery O2 Flow Rate FiO2


 


1/25/17 04:00      Room Air  


 


1/25/17 03:31 36.9 73 18 137/89 97 Room Air  


 


1/25/17 01:02    142/96    


 


1/24/17 23:59      Room Air  


 


1/24/17 23:26 36.8 84 20 149/80 93 Room Air  


 


1/24/17 22:55  93      


 


1/24/17 22:13    196/119    


 


1/24/17 21:49 36.4 96 18 219/128 98 Room Air  


 


1/24/17 20:48  97 22 149/102 92 Room Air  


 


1/24/17 19:12  93 22 181/109 95 Room Air  


 


1/24/17 18:54  97      


 


1/24/17 18:53     93 Room Air  


 


1/24/17 18:44     93 Room Air  


 


1/24/17 18:44     93 Room Air  


 


1/24/17 18:13 36.5 98 20 192/113 93 Room Air  














 24-Hour Column 


 


 1/25/17





 08:00


 


Intake Total 200 ml


 


Output Total 275 ml


 


Balance -75 ml








General Appearance:  WD/WN, no apparent distress, + obese


Eyes:  EOMI


ENT:  hearing grossly normal


Neck:  supple


Respiratory/Chest:  normal breath sounds, no respiratory distress


Cardiovascular:  regular rate, rhythm, no edema


Abdomen:  normal bowel sounds, non tender, soft (no park)


Extremities:  no pedal edema, + pertinent finding (L AVF + t/b; R 2nd dry 

gangrenous appearing lesion 1 cm diameter)


Neurologic/Psych:  no motor/sensory deficits, alert, normal mood/affect, 

oriented x 3


Skin:  warm/dry, no rash, + pallor





Diagnostics





Last 24 Hours








Test


  1/24/17


19:08 1/24/17


21:45 1/24/17


23:03 1/25/17


00:01


 


White Blood Count 11.11 K/uL    


 


Red Blood Count 3.28 M/uL    


 


Hemoglobin 10.0 g/dL    


 


Hematocrit 29.3 %    


 


Mean Corpuscular Volume 89.3 fL    


 


Mean Corpuscular Hemoglobin 30.5 pg    


 


Mean Corpuscular Hemoglobin


Concent 34.1 g/dl 


  


  


  


 


 


Platelet Count 338 K/uL    


 


Mean Platelet Volume 10.4 fL    


 


Neutrophils (%) (Auto) 72.3 %    


 


Lymphocytes (%) (Auto) 17.0 %    


 


Monocytes (%) (Auto) 7.8 %    


 


Eosinophils (%) (Auto) 2.3 %    


 


Basophils (%) (Auto) 0.3 %    


 


Neutrophils # (Auto) 8.03 K/uL    


 


Lymphocytes # (Auto) 1.89 K/uL    


 


Monocytes # (Auto) 0.87 K/uL    


 


Eosinophils # (Auto) 0.26 K/uL    


 


Basophils # (Auto) 0.03 K/uL    


 


RDW Standard Deviation 46.9 fL    


 


RDW Coefficient of Variation 14.3 %    


 


Immature Granulocyte % (Auto) 0.3 %    


 


Immature Granulocyte # (Auto) 0.03 K/uL    


 


Prothrombin Time 11.3 SECONDS    


 


Prothromb Time International


Ratio 1.1 


  


  


  


 


 


Activated Partial


Thromboplast Time 30.3 SECONDS 


  


  


  


 


 


Partial Thromboplastin Ratio 1.2    


 


Sodium Level 135 mmol/L    


 


Potassium Level 5.9 mmol/L    


 


Chloride Level 99 mmol/L    


 


Carbon Dioxide Level 16 mmol/L    


 


Anion Gap 20.0 mmol/L    


 


Blood Urea Nitrogen 137 mg/dl    


 


Creatinine 17.00 mg/dl    


 


Est Creatinine Clear Calc


Drug Dose 5.0 ml/min 


  


  


  


 


 


Estimated GFR (


American) 2.5 


  


  


  


 


 


Estimated GFR (Non-


American 2.2 


  


  


  


 


 


BUN/Creatinine Ratio 8.1    


 


Random Glucose 117 mg/dl    


 


Calcium Level 9.0 mg/dl    


 


Total Bilirubin 0.3 mg/dl    


 


Aspartate Amino Transf


(AST/SGOT) 6 U/L 


  


  


  


 


 


Alanine Aminotransferase


(ALT/SGPT) 14 U/L 


  


  


  


 


 


Alkaline Phosphatase 75 U/L    


 


Total Creatine Kinase 88 U/L    


 


Creatine Kinase MB 6.3 ng/ml    


 


Creatine Kinase MB Ratio 7.2    


 


Troponin I 0.236 ng/ml    


 


Pro-B-Type Natriuretic Peptide > 48569 pg/ml    


 


Total Protein 7.9 gm/dl    


 


Albumin 3.0 gm/dl    


 


Globulin 4.9 gm/dl    


 


Albumin/Globulin Ratio 0.6    


 


Bedside Glucose  112 mg/dl  215 mg/dl  63 mg/dl 














Test


  1/25/17


00:28 1/25/17


00:36 1/25/17


01:00 1/25/17


01:30


 


Bedside Glucose 72 mg/dl   90 mg/dl  


 


Total Creatine Kinase  85 U/L   


 


Creatine Kinase MB  6.2 ng/ml   


 


Creatine Kinase MB Ratio  7.3   


 


Troponin I  0.262 ng/ml   


 


Urine Color    YELLOW 


 


Urine Appearance    CLEAR 


 


Urine pH    5.5 


 


Urine Specific Gravity    1.010 


 


Urine Protein    3+ 


 


Urine Glucose (UA)    2+ 


 


Urine Ketones    NEG 


 


Urine Occult Blood    1+ 


 


Urine Nitrite    NEG 


 


Urine Bilirubin    NEG 


 


Urine Urobilinogen    NEG 


 


Urine Leukocyte Esterase    TRACE 


 


Urine WBC (Auto)    10-30 /hpf 


 


Urine RBC (Auto)    0-4 /hpf 


 


Urine Hyaline Casts (Auto)    1-5 /lpf 


 


Urine Epithelial Cells (Auto)    >30 /lpf 


 


Urine Bacteria (Auto)    2+ 














Test


  1/25/17


01:57 1/25/17


03:02 1/25/17


04:09 1/25/17


04:44


 


Bedside Glucose 113 mg/dl  169 mg/dl  186 mg/dl  


 


Creatine Kinase MB Ratio     














Test


  1/25/17


06:43 1/25/17


06:51 1/25/17


07:18 


 


 


White Blood Count 9.61 K/uL    


 


Red Blood Count 3.27 M/uL    


 


Hemoglobin 9.8 g/dL    


 


Hematocrit 29.3 %    


 


Mean Corpuscular Volume 89.6 fL    


 


Mean Corpuscular Hemoglobin 30.0 pg    


 


Mean Corpuscular Hemoglobin


Concent 33.4 g/dl 


  


  


  


 


 


RDW Standard Deviation 47.0 fL    


 


RDW Coefficient of Variation 14.3 %    


 


Platelet Count 316 K/uL    


 


Mean Platelet Volume 10.6 fL    


 


Bedside Glucose  130 mg/dl   








Diagnostic Radiology:


CXR last evening which I reviewed personally >> pulmonary vascular congestion


EKG:


NSR; prior MI changes/stable; new/nonspecific T wave inversions inferior leads 

on admission >> this am new right axis and more prominent nonspecific T wave 

anomaly, possible MI


C diff negative





Assessment & Plan


50 y/o F w/ ESRD and HTN and medical nonadherence admitted for management of 

hypertensive urgency after missing several dialysis treatments due to diarrhea; 

also being evaluated for dry gangrene of L finger.





ESRD w/ hypertension and hyperkalemia


Had IV insulin overnight; labs this am pending >> K 6.9 and baths adjusted


-HD one of first txs this am bedside with UF goal 2.5-3.5L fluid removal


-plan HD tomorrow most likely and again 1/27


-follow up troponins, symptoms, serial ecgs





Anemia of ESRD


low dose epo w/ HD





CAD and ECG changes, minimally elevated troponins


-needs education about indications for plavix which she states she was 

instructed by cardiology to stop (cannot imagine this is correct)


-ECG changes noted and if persistent after HD may need further intervention


-agree w/ calcium gluconate





L finger lesion


-ischemia versus dry gangrene versus embolism versus local infection


-vascular following; ortho aware; per these teams and primary service


-not her AVF arm





Diarrhea/ emesis


-no sx currently


-C diff assay negative





Appreciate consult; will follow with you.  Will discuss with Dr. Merida.

## 2017-01-26 VITALS
SYSTOLIC BLOOD PRESSURE: 110 MMHG | HEART RATE: 72 BPM | DIASTOLIC BLOOD PRESSURE: 70 MMHG | OXYGEN SATURATION: 91 % | TEMPERATURE: 98.06 F

## 2017-01-26 VITALS — SYSTOLIC BLOOD PRESSURE: 138 MMHG | DIASTOLIC BLOOD PRESSURE: 74 MMHG | HEART RATE: 81 BPM

## 2017-01-26 VITALS
HEART RATE: 56 BPM | SYSTOLIC BLOOD PRESSURE: 147 MMHG | TEMPERATURE: 98.24 F | OXYGEN SATURATION: 99 % | DIASTOLIC BLOOD PRESSURE: 105 MMHG

## 2017-01-26 VITALS — DIASTOLIC BLOOD PRESSURE: 67 MMHG | HEART RATE: 70 BPM | SYSTOLIC BLOOD PRESSURE: 135 MMHG

## 2017-01-26 VITALS — DIASTOLIC BLOOD PRESSURE: 59 MMHG | SYSTOLIC BLOOD PRESSURE: 109 MMHG | HEART RATE: 80 BPM

## 2017-01-26 VITALS — DIASTOLIC BLOOD PRESSURE: 68 MMHG | OXYGEN SATURATION: 99 % | HEART RATE: 80 BPM | SYSTOLIC BLOOD PRESSURE: 147 MMHG

## 2017-01-26 VITALS
SYSTOLIC BLOOD PRESSURE: 111 MMHG | DIASTOLIC BLOOD PRESSURE: 63 MMHG | OXYGEN SATURATION: 95 % | HEART RATE: 58 BPM | TEMPERATURE: 98.42 F

## 2017-01-26 VITALS — DIASTOLIC BLOOD PRESSURE: 72 MMHG | SYSTOLIC BLOOD PRESSURE: 110 MMHG | HEART RATE: 56 BPM

## 2017-01-26 VITALS — TEMPERATURE: 98.42 F | HEART RATE: 56 BPM | SYSTOLIC BLOOD PRESSURE: 131 MMHG | DIASTOLIC BLOOD PRESSURE: 72 MMHG

## 2017-01-26 VITALS — DIASTOLIC BLOOD PRESSURE: 70 MMHG | SYSTOLIC BLOOD PRESSURE: 113 MMHG | HEART RATE: 71 BPM

## 2017-01-26 VITALS
TEMPERATURE: 98.6 F | SYSTOLIC BLOOD PRESSURE: 114 MMHG | DIASTOLIC BLOOD PRESSURE: 63 MMHG | HEART RATE: 56 BPM | OXYGEN SATURATION: 90 %

## 2017-01-26 VITALS
TEMPERATURE: 98.42 F | SYSTOLIC BLOOD PRESSURE: 118 MMHG | HEART RATE: 66 BPM | OXYGEN SATURATION: 93 % | DIASTOLIC BLOOD PRESSURE: 79 MMHG

## 2017-01-26 VITALS
HEART RATE: 68 BPM | DIASTOLIC BLOOD PRESSURE: 69 MMHG | TEMPERATURE: 97.52 F | OXYGEN SATURATION: 91 % | SYSTOLIC BLOOD PRESSURE: 124 MMHG

## 2017-01-26 VITALS — SYSTOLIC BLOOD PRESSURE: 161 MMHG | HEART RATE: 83 BPM | DIASTOLIC BLOOD PRESSURE: 71 MMHG

## 2017-01-26 VITALS — DIASTOLIC BLOOD PRESSURE: 71 MMHG | HEART RATE: 80 BPM | SYSTOLIC BLOOD PRESSURE: 142 MMHG

## 2017-01-26 VITALS — DIASTOLIC BLOOD PRESSURE: 72 MMHG | HEART RATE: 76 BPM | SYSTOLIC BLOOD PRESSURE: 160 MMHG

## 2017-01-26 VITALS — SYSTOLIC BLOOD PRESSURE: 122 MMHG | DIASTOLIC BLOOD PRESSURE: 62 MMHG | HEART RATE: 80 BPM

## 2017-01-26 VITALS — DIASTOLIC BLOOD PRESSURE: 51 MMHG | SYSTOLIC BLOOD PRESSURE: 116 MMHG | HEART RATE: 76 BPM

## 2017-01-26 VITALS — HEART RATE: 81 BPM | SYSTOLIC BLOOD PRESSURE: 135 MMHG | DIASTOLIC BLOOD PRESSURE: 70 MMHG

## 2017-01-26 VITALS — DIASTOLIC BLOOD PRESSURE: 75 MMHG | HEART RATE: 80 BPM | TEMPERATURE: 97.88 F | SYSTOLIC BLOOD PRESSURE: 145 MMHG

## 2017-01-26 VITALS — HEART RATE: 73 BPM | SYSTOLIC BLOOD PRESSURE: 127 MMHG | DIASTOLIC BLOOD PRESSURE: 58 MMHG

## 2017-01-26 VITALS — DIASTOLIC BLOOD PRESSURE: 68 MMHG | HEART RATE: 72 BPM | SYSTOLIC BLOOD PRESSURE: 135 MMHG

## 2017-01-26 VITALS — DIASTOLIC BLOOD PRESSURE: 74 MMHG | HEART RATE: 80 BPM | SYSTOLIC BLOOD PRESSURE: 141 MMHG

## 2017-01-26 VITALS — DIASTOLIC BLOOD PRESSURE: 66 MMHG | SYSTOLIC BLOOD PRESSURE: 142 MMHG | HEART RATE: 74 BPM

## 2017-01-26 LAB
ANION GAP SERPL CALC-SCNC: 12 MMOL/L (ref 3–11)
BUN SERPL-MCNC: 38 MG/DL (ref 7–18)
BUN/CREAT SERPL: 5.2 (ref 10–20)
CALCIUM SERPL-MCNC: 9.2 MG/DL (ref 8.5–10.1)
CHLORIDE SERPL-SCNC: 97 MMOL/L (ref 98–107)
CO2 SERPL-SCNC: 26 MMOL/L (ref 21–32)
CREAT CL PREDICTED SERPL C-G-VRATE: 11.7 ML/MIN
CREAT SERPL-MCNC: 7.2 MG/DL (ref 0.6–1.2)
EOSINOPHIL NFR BLD AUTO: 331 K/UL (ref 130–400)
GLUCOSE SERPL-MCNC: 123 MG/DL (ref 70–99)
HCT VFR BLD CALC: 37 % (ref 37–47)
MAGNESIUM SERPL-MCNC: 2.2 MG/DL (ref 1.8–2.4)
MCH RBC QN AUTO: 30.5 PG (ref 25–34)
MCHC RBC AUTO-ENTMCNC: 33.5 G/DL (ref 32–36)
MCV RBC AUTO: 90.9 FL (ref 80–100)
PMV BLD AUTO: 10.7 FL (ref 7.4–10.4)
POTASSIUM SERPL-SCNC: 4.2 MMOL/L (ref 3.5–5.1)
RBC # BLD AUTO: 4.07 M/UL (ref 4.2–5.4)
SODIUM SERPL-SCNC: 135 MMOL/L (ref 136–145)
WBC # BLD AUTO: 8.52 K/UL (ref 4.8–10.8)

## 2017-01-26 RX ADMIN — HEPARIN SODIUM SCH UNIT: 1000 INJECTION, SOLUTION INTRAVENOUS; SUBCUTANEOUS at 11:44

## 2017-01-26 RX ADMIN — METOPROLOL TARTRATE SCH MG: 50 TABLET, FILM COATED ORAL at 10:28

## 2017-01-26 RX ADMIN — HEPARIN SODIUM SCH UNIT: 10000 INJECTION, SOLUTION INTRAVENOUS; SUBCUTANEOUS at 05:34

## 2017-01-26 RX ADMIN — SERTRALINE HYDROCHLORIDE SCH MG: 50 TABLET, FILM COATED ORAL at 10:30

## 2017-01-26 RX ADMIN — NITROGLYCERIN SCH INCH: 20 OINTMENT TOPICAL at 20:15

## 2017-01-26 RX ADMIN — CLOPIDOGREL BISULFATE SCH MG: 75 TABLET, FILM COATED ORAL at 10:29

## 2017-01-26 RX ADMIN — CALCIUM ACETATE SCH MG: 667 CAPSULE ORAL at 16:43

## 2017-01-26 RX ADMIN — HEPARIN SODIUM SCH UNIT: 10000 INJECTION, SOLUTION INTRAVENOUS; SUBCUTANEOUS at 13:48

## 2017-01-26 RX ADMIN — RANITIDINE SCH MG: 150 TABLET ORAL at 20:16

## 2017-01-26 RX ADMIN — NITROGLYCERIN SCH INCH: 20 OINTMENT TOPICAL at 07:00

## 2017-01-26 RX ADMIN — METOPROLOL TARTRATE SCH MG: 50 TABLET, FILM COATED ORAL at 20:14

## 2017-01-26 RX ADMIN — CALCIUM ACETATE SCH MG: 667 CAPSULE ORAL at 11:30

## 2017-01-26 RX ADMIN — NITROGLYCERIN SCH INCH: 20 OINTMENT TOPICAL at 00:51

## 2017-01-26 RX ADMIN — ATORVASTATIN CALCIUM SCH MG: 40 TABLET, FILM COATED ORAL at 10:29

## 2017-01-26 RX ADMIN — INSULIN ASPART SCH UNITS: 100 INJECTION, SOLUTION INTRAVENOUS; SUBCUTANEOUS at 20:09

## 2017-01-26 RX ADMIN — HEPARIN SODIUM SCH UNIT: 10000 INJECTION, SOLUTION INTRAVENOUS; SUBCUTANEOUS at 20:50

## 2017-01-26 RX ADMIN — INSULIN ASPART SCH UNITS: 100 INJECTION, SOLUTION INTRAVENOUS; SUBCUTANEOUS at 07:00

## 2017-01-26 RX ADMIN — ONDANSETRON PRN MG: 2 INJECTION INTRAMUSCULAR; INTRAVENOUS at 10:58

## 2017-01-26 RX ADMIN — CALCIUM ACETATE SCH MG: 667 CAPSULE ORAL at 07:12

## 2017-01-26 RX ADMIN — INSULIN GLARGINE SCH UNIT: 100 INJECTION, SOLUTION SUBCUTANEOUS at 20:50

## 2017-01-26 RX ADMIN — INSULIN ASPART SCH UNITS: 100 INJECTION, SOLUTION INTRAVENOUS; SUBCUTANEOUS at 16:47

## 2017-01-26 RX ADMIN — CEFAZOLIN SCH MLS/HR: 10 INJECTION, POWDER, FOR SOLUTION INTRAVENOUS at 16:44

## 2017-01-26 RX ADMIN — NITROGLYCERIN SCH INCH: 20 OINTMENT TOPICAL at 13:00

## 2017-01-26 RX ADMIN — INSULIN ASPART SCH UNITS: 100 INJECTION, SOLUTION INTRAVENOUS; SUBCUTANEOUS at 12:04

## 2017-01-26 RX ADMIN — Medication SCH MG: at 10:29

## 2017-01-26 NOTE — SURGERY CONSULTATION
Consultation


Date of Service


Jan 26, 2017.





Chief Complaint


Right index finger gangrene





History of Present Illness


The patient is a 51 year old female who was admitted with SOB due to missing 

dialysis treatments.  She did not go to dialysis because of diarrhea and 

vomiting.  She also has cellulitis of the right index finger and gangrene of 

the tip.  She was seen by U for this problem.  We had seen her in the past 

for carotid disease and for her dialysis access.  She was to follow up with 

[employer] [facility] [employer] for her carotid being she had [employer] insurance at that time.





Vitals





 Vital Signs Past 12 Hours








  Date Time  Temp Pulse Resp B/P Pulse Ox O2 Delivery O2 Flow Rate FiO2


 


1/26/17 12:00      Room Air  


 


1/26/17 11:32 36.4 68 22 124/69 91 Room Air  


 


1/26/17 10:28  71  113/70    


 


1/26/17 10:12 36.9 56  131/72    


 


1/26/17 10:00  56  110/72    


 


1/26/17 09:45  80  109/59    


 


1/26/17 09:30  76  116/51    


 


1/26/17 09:15  80  122/62    


 


1/26/17 09:00  73  127/58    


 


1/26/17 08:45  70  135/67    


 


1/26/17 08:30  74  142/66    


 


1/26/17 08:15  76  160/72    


 


1/26/17 08:00     99 Room Air  


 


1/26/17 08:00  80  147/68    


 


1/26/17 07:48 36.8 56 22 147/105 99 Room Air  


 


1/26/17 07:45  81  138/74    


 


1/26/17 07:30  83  161/71    


 


1/26/17 07:15  80  141/74    


 


1/26/17 07:00  72  135/68    


 


1/26/17 06:45  80  142/71    


 


1/26/17 06:30  81  135/70    


 


1/26/17 06:15 36.6 80  145/75    


 


1/26/17 04:37 36.9 66 18 118/79 93 Room Air  


 


1/26/17 04:00      Room Air  











Allergies


Coded Allergies:  


     Adhesives (Verified  Allergy, Mild, RASH, SORES, 1/24/17)


     Pollen Extract (Unverified  Allergy, Unknown, rash, 1/24/17)





Home Medications


Scheduled


Aspirin (Aspirin Ec), 81 MG PO DAILY


Atorvastatin (Lipitor), 40 MG PO DAILY


Calcium Acetate (Phoslo 667 Mg), 1 CAP PO WM


Cephalexin Monohydrate (Cephalexin), 1 CAP PO BID


Clopidogrel Bisulfate (Plavix), 1 TAB PO DAILY


Insulin Glargine (Lantus Solostar), 10 UNITS SC QPM


Isosorbide Mononitrate (Isosorbide Mononitrate ER), 60 MG PO QAM


Isosorbide Mononitrate Ext Rel (Imdur Ext Rel), 30 MG PO QAM


Metoprolol Tartrate (Lopressor), 50 MG PO BID


Ranitidine Hcl (Zantac), 300 MG PO HS


Sertraline HCl (Sertraline HCl), 2 TAB PO DAILY





Scheduled PRN


Lorazepam (Ativan), 0.5 MG PO DAILY PRN for Anxiety


Methocarbamol (Methocarbamol), 500 MG PO BID PRN for Muscle Spasms


Tramadol (Ultram), 1 TAB PO TID PRN for Pain





Problem List


Medical Problems:


(1) Allergic rhinitis


(2) Coronary arteriosclerosis in native artery


(3) DM type 2 (diabetes mellitus, type 2)


(4) Dyslipidemia


(5) ESRD (end stage renal disease) on dialysis


(6) ESRD (end stage renal disease) on dialysis


(7) GERD (gastroesophageal reflux disease)


(8) HTN (hypertension)


(9) HX OF PAST NONCOMPLIANCE


(10) Hyperkalemia


(11) Morbid obesity


(12) Tobacco use disorder


Surgical Problems:


(1) Hemodialysis access, AV graft








Surgical / Medical History


Hx Cardiac Surgery:  Yes (angioplasty with stenting)


Hx Abdominal Surgery:  No


Hx Cancer Surgery:  No


Hx Thoracic Surgery:  No


Hx Orthopedic:  No


Hx Urinary Tract Surgery:  No


HX Other Surgery:  No (oral surgeries)


Past Medical/Surgical History:  Diabetes, Hypertension, Kidney Disease





Family History





Diabetes mellitus


  FATHER


  MOTHER


Heart disease


  FATHER


  MOTHER


Hypertension


  FATHER


  MOTHER


Kidney disease


  GRANDMOTHER





Social History


Smoking Status:  Former Smoker


Hx Tobacco Use In Past Year?:  No


Hx Alcohol Use - Type & Amnt:  No


Hx Substance Use -Type & Amnt:  No





Review of Systems


Constitutional:  No chills, No diaphoresis, No fatigue, No fever, No malaise, 

No problem reported, No sweats, No weakness, No weight gain, No weight loss


Respiratory:  No SOUSA, No PND, No cough, No cyanosis, No dyspnea, No hemoptysis, 

No orthopnea, No problem reported, No short of breath, No sputum production, No 

stridor, No wheezing


Cardiovascular:  No chest pain, No chest pressure, No chest tightness, No 

cyanosis, No diaphoresis, No edema, No intermittent claudication, No 

lightheadedness, No mumur, No orthopnea, No palpitations, No paroxysmal 

nocturnal dyspnea, No problem reported, No syncope


Gastrointestinal:  No abdominal pain, No anorexia, No appetite changes, No 

belching, No constipation, No diarrhea, No dysphagia, No flatulence, No food 

intolerance, No heartburn, No hematemesis, No hematochezia, No hemorrhoids, No 

indigestion, No nausea, No problem reported, No rectal bleeding, No stool 

changes, No vomiting


Musculoskeletal:  No back pain, No gout, No joint pain, No joint swelling, No 

muscle pain, No muscle stiffness, No muscle weakness, No neck pain, No problem 

reported


Neurologic:  No LOC, No dizziness, No headache, No lethargy, No memory loss, No 

numbness, No paresthesia, No pre-existing deficit, No problem reported, No 

seizures, No tics, No tingling, No tremors, No vertigo, No weakness


Psychiatric:  No alcohol abuse, No anxiety, No auditory hallucinations, No 

depression, No drug abuse, No homicidal ideation, No mood changes, No problem 

reported, No suicidal ideation, No visual hallucinations





Physical Exam


Constitutional:  


   General Apperance:  overweight


   Level of Distress:  NAD


   Ambulation:  ambulating normally


Psychiatric:  


   Mental Status:  active & alert, normal mood, normal affect


   Orientation:  oriented except where noted, to time, to place, to person


   Memory:  recent memory normal, remote memory normal


Neck:  supple, pertinent finding (bilat bruits worse on left)


Lungs:  


   Auscultation:  breath sounds normal


Cardiovascular:  


   Heart Auscultation:  RRR


Peripheral Pulses:  


   Bruits:  carotid bruit on the left, carotid bruit on the right


   Carotid Pulse:  normal on the left, normal on the right


   Radial Pulse:  normal on the left, normal on the right


   Ulnar Pulse:  normal on the left, normal on the right


   Femoral Pulse:  normal on the left, normal on the right


Abdomen:  


   Inspection & Palpation:  soft, non-distended, no tenderness, guarding & 

rebound


Musculoskeletal:  normal


Extremities:  


   Upper Right:  gangrene (tip of right index finger)


   Upper Left:  no cyanosis, no edema, no palpable cord, no clubbing, no ulcers

, no mottling, pertinent finding (good thrill in fistula)


   Lower Right:  no cyanosis, no edema, no varicosities, no palpable cord, no 

clubbing, no ulcers, no mottling


   Lower Left:  no cyanosis, no edema, no varicosities, no palpable cord, no 

clubbing, no ulcers, no mottling


Neurologic:  


   Cranial Nerves:  grossly intact


   Sensation:  grossly intact





Assessment and Plan


Imp:


   Severe left internal carotid artery stenosis


   Gangrene tip of right index finger





Plan:


   This patient had arterial non invasives done in our office prior to her 

admission here.  She had no evidence of arterial occlusions of her right upper 

extremity.  The palmar arch and digital arteries to the index finger are patent.


   She does however have an 80 - 90% left internal carotid artery stenosis.  

She is asx from this lesion.  Would recommend CEA for this lesion.  She is 

agreeable to have it done now that she has changed her insurance.  We will see 

her two weeks after d/c being that she is asymptomatic and schedule her for a 

left CEA with patch.  I have discussed the risks options and benefits of the 

procedure with the patient.  The patient understands the risks options and 

benefits and agrees to the procedure.





Thank you very much for letting me participate in the care of this patient.

## 2017-01-26 NOTE — PROGRESS NOTE
DATE: 01/26/2017

 

The patient was seen during dialysis, she tolerated it very well.  She had

3.5 kilos of ultrafiltration done.  Her blood pressure remained good.  At the

end of dialysis, her blood pressure was 130s systolic.  Did not have any

cramp.  AV fistula work well.

 

PHYSICAL EXAMINATION:

VITAL SIGNS:  Most recent vital signs:  Blood pressure 113/70, 71 per minute

pulse rate, temperature 36.9, oxygen 99% on room air.

CHEST:  Bilateral decreased breath sounds, but clear.

CARDIOVASCULAR:  Regular rate and rhythm, trace edema.

ABDOMEN:  Soft, nontender.

EXTREMITIES:  Shows left upper arm proximal AV fistula with good bruit and

thrill.  Trace edema noted.

NEUROLOGIC:  Awake, alert, oriented x3, moving all 4 extremities.

 

Laboratory tests from this morning was pending at the time of my examination.

 

ASSESSMENT AND PLAN:  A 51-year-old female with end-stage renal disease on

hemodialysis Monday, Wednesday, Friday.  Has history of nonadherence.  Was

admitted for management of hypertension urgency, hyperkalemia and fluid

overload after missing dialysis for a week.  At this time she appears

significantly better, after 2 back-to-back dialyses her blood pressure is

normal.  She has had significant amount of fluid removal in the last 2

dialyses, and with that her volume status is better.  Her potassium is

expected to be within normal range by now.  Her normal dialysis days are

Monday, Wednesday, Friday, so we will do dialysis again tomorrow as per her

schedule.  Tomorrow will do for 3 hours on a 2K bath and take about 2-3 kilo

off as tolerated by her blood pressure.  She was again instructed not to miss

dialysis for any reason.

 

 

 

 

LOUISE

## 2017-01-26 NOTE — PROGRESS NOTE
Internal Med Progress Note


Date of Service:


Jan 26, 2017.


Provider Documentation:





SUBJECTIVE:





The patient was sen and examined 


Did not receive any dialysis since Monday the 16th Weak and SOB 


Has had nausea last night


Much better today 











OBJECTIVE:





Vital Signs-as noted below





Exam:


General-Generally weak and lethargic


Eyes-Normal


ENT-normal


Neck-Supple


Lungs-Decreased breath sound bilaterally 


Crackles bilaterally at the bases


Heart-Regular,no murmur


Abdomen-Benign,no masses 


Extremities-Trace edema bilaterally 


very small dry gangrene like lesion right index tip and left big toe tip


No cellulitis


Neuro-AAox3





Lab data as noted below.


ASSESSMENT & PLAN:





HYPERKALEMIA  with ESRD 


Missed Dialysis since 16th Jan


Generally weak and lethargic


EKG changes-resolved  


Received Insulin and Dextrose


Ongoing Dialysis


Appreciate Nephrology input 


Hyperkalemia resolved 


Will have Dialysis tomorrow








SHORTNESS OF BREATH 


-likely secondary to volume overload in setting of ESRD with noncompliance with 

dialysis 


-Will need dialysis 


-resume home meds


-Nephrology consulted for fluid management 


-Denies any symptoms





CHEST PAIN- history of CAD 


Has had bare metal stent placed August 2016 


ELEVATED TROPONIN -renal failure contributing 


EKG changes secondary to Increased K


Currently chest pain free


Continue Nitropaste, Aspirin


Resume, BB, Statin, Plavix 


No more episode of CP 





HYPERTENSIVE URGENCY 


-SBP >190s


-Has been noncompliant with home BP meds for weeks to months


-Improved BP with nitro paste, will continue 


BP is controlled 








RIGHT 2ND FINGER INFECTION 


-has [facility] and [facility]


-no spreading cellulitis 


-continue current antibiotic


-appreciate Vascular surgery input





DIARRHEA


-has history of c.diff and recently on Abx 


-check C. diff toxin -Negative 





IDDM


-recent A1c 7.2 


-has been noncompliant with insulin however random serum glucose tonight 112


-SSI coverage  











GERD


-continue Zantac 





HLD


-continue Statin 





DEPRESSION


-continue Zoloft 








DVT PROPHYLAXIS: Sq heparin 





CODE STATUS: FULL CODE





DISPO:


Likely Discharge tomorrow after dialysis


Vital Signs:











  Date Time  Temp Pulse Resp B/P Pulse Ox O2 Delivery O2 Flow Rate FiO2


 


1/26/17 16:00      Room Air  


 


1/26/17 12:00      Room Air  


 


1/26/17 11:32 36.4 68 22 124/69 91 Room Air  


 


1/26/17 10:28  71  113/70    


 


1/26/17 10:12 36.9 56  131/72    


 


1/26/17 10:00  56  110/72    


 


1/26/17 09:45  80  109/59    


 


1/26/17 09:30  76  116/51    


 


1/26/17 09:15  80  122/62    


 


1/26/17 09:00  73  127/58    


 


1/26/17 08:45  70  135/67    


 


1/26/17 08:30  74  142/66    


 


1/26/17 08:15  76  160/72    


 


1/26/17 08:00     99 Room Air  


 


1/26/17 08:00  80  147/68    


 


1/26/17 07:48 36.8 56 22 147/105 99 Room Air  


 


1/26/17 07:45  81  138/74    


 


1/26/17 07:30  83  161/71    


 


1/26/17 07:15  80  141/74    


 


1/26/17 07:00  72  135/68    


 


1/26/17 06:45  80  142/71    


 


1/26/17 06:30  81  135/70    


 


1/26/17 06:15 36.6 80  145/75    


 


1/26/17 04:37 36.9 66 18 118/79 93 Room Air  


 


1/26/17 04:00      Room Air  


 


1/26/17 00:21 36.7 72 18 110/70 91 Room Air  


 


1/25/17 23:59      Room Air  


 


1/25/17 20:00      Room Air  


 


1/25/17 19:44 36.7 77 18 141/69 92 Room Air  


 


1/25/17 16:47      Room Air  








Lab Results:





Results Past 24 Hours








Test


  1/25/17


20:38 1/26/17


06:56 1/26/17


11:28 1/26/17


11:30 Range/Units


 


 


Bedside Glucose 142 89 112  70-90  mg/dl


 


White Blood Count    8.52 4.8-10.8  K/uL


 


Red Blood Count    4.07 4.2-5.4  M/uL


 


Hemoglobin    12.4 12.0-16.0  g/dL


 


Hematocrit    37.0 37-47  %


 


Mean Corpuscular Volume    90.9   fL


 


Mean Corpuscular Hemoglobin    30.5 25-34  pg


 


Mean Corpuscular Hemoglobin


Concent 


  


  


  33.5


  32-36  g/dl


 


 


RDW Standard Deviation    48.2 36.4-46.3  fL


 


RDW Coefficient of Variation    14.4 11.5-14.5  %


 


Platelet Count    331 130-400  K/uL


 


Mean Platelet Volume    10.7 7.4-10.4  fL


 


Sodium Level    135 136-145  mmol/L


 


Potassium Level    4.2 3.5-5.1  mmol/L


 


Chloride Level    97   mmol/L


 


Carbon Dioxide Level    26 21-32  mmol/L


 


Anion Gap    12.0 3-11  mmol/L


 


Blood Urea Nitrogen    38 7-18  mg/dl


 


Creatinine


  


  


  


  7.20


  0.60-1.20


mg/dl


 


Est Creatinine Clear Calc


Drug Dose 


  


  


  11.7


   ml/min


 


 


Estimated GFR (


American) 


  


  


  7.0


   


 


 


Estimated GFR (Non-


American 


  


  


  6.1


   


 


 


BUN/Creatinine Ratio    5.2 10-20  


 


Random Glucose    123 70-99  mg/dl


 


Calcium Level    9.2 8.5-10.1  mg/dl


 


Magnesium Level    2.2 1.8-2.4  mg/dl

## 2017-01-27 VITALS — HEART RATE: 72 BPM | SYSTOLIC BLOOD PRESSURE: 131 MMHG | DIASTOLIC BLOOD PRESSURE: 84 MMHG

## 2017-01-27 VITALS
HEART RATE: 55 BPM | SYSTOLIC BLOOD PRESSURE: 97 MMHG | DIASTOLIC BLOOD PRESSURE: 39 MMHG | OXYGEN SATURATION: 96 % | TEMPERATURE: 98.42 F

## 2017-01-27 VITALS
OXYGEN SATURATION: 98 % | DIASTOLIC BLOOD PRESSURE: 58 MMHG | HEART RATE: 76 BPM | SYSTOLIC BLOOD PRESSURE: 94 MMHG | TEMPERATURE: 98.24 F

## 2017-01-27 VITALS — DIASTOLIC BLOOD PRESSURE: 59 MMHG | SYSTOLIC BLOOD PRESSURE: 141 MMHG | HEART RATE: 80 BPM

## 2017-01-27 VITALS — HEART RATE: 71 BPM | SYSTOLIC BLOOD PRESSURE: 116 MMHG | DIASTOLIC BLOOD PRESSURE: 62 MMHG

## 2017-01-27 VITALS — HEART RATE: 69 BPM | SYSTOLIC BLOOD PRESSURE: 119 MMHG | DIASTOLIC BLOOD PRESSURE: 68 MMHG

## 2017-01-27 VITALS — SYSTOLIC BLOOD PRESSURE: 152 MMHG | HEART RATE: 69 BPM | TEMPERATURE: 97.7 F | DIASTOLIC BLOOD PRESSURE: 68 MMHG

## 2017-01-27 VITALS — DIASTOLIC BLOOD PRESSURE: 79 MMHG | HEART RATE: 69 BPM | SYSTOLIC BLOOD PRESSURE: 121 MMHG

## 2017-01-27 VITALS
DIASTOLIC BLOOD PRESSURE: 60 MMHG | SYSTOLIC BLOOD PRESSURE: 93 MMHG | TEMPERATURE: 98.24 F | OXYGEN SATURATION: 95 % | HEART RATE: 68 BPM

## 2017-01-27 VITALS — HEART RATE: 81 BPM | SYSTOLIC BLOOD PRESSURE: 124 MMHG | TEMPERATURE: 98.06 F | DIASTOLIC BLOOD PRESSURE: 72 MMHG

## 2017-01-27 VITALS — SYSTOLIC BLOOD PRESSURE: 119 MMHG | DIASTOLIC BLOOD PRESSURE: 60 MMHG | HEART RATE: 76 BPM

## 2017-01-27 VITALS — DIASTOLIC BLOOD PRESSURE: 71 MMHG | HEART RATE: 82 BPM | SYSTOLIC BLOOD PRESSURE: 124 MMHG

## 2017-01-27 VITALS — SYSTOLIC BLOOD PRESSURE: 116 MMHG | DIASTOLIC BLOOD PRESSURE: 71 MMHG | HEART RATE: 74 BPM

## 2017-01-27 VITALS
TEMPERATURE: 98.42 F | DIASTOLIC BLOOD PRESSURE: 53 MMHG | SYSTOLIC BLOOD PRESSURE: 93 MMHG | HEART RATE: 56 BPM | OXYGEN SATURATION: 94 %

## 2017-01-27 VITALS
HEART RATE: 68 BPM | TEMPERATURE: 98.24 F | SYSTOLIC BLOOD PRESSURE: 93 MMHG | OXYGEN SATURATION: 95 % | DIASTOLIC BLOOD PRESSURE: 60 MMHG

## 2017-01-27 VITALS — DIASTOLIC BLOOD PRESSURE: 61 MMHG | SYSTOLIC BLOOD PRESSURE: 106 MMHG | HEART RATE: 69 BPM

## 2017-01-27 VITALS — DIASTOLIC BLOOD PRESSURE: 59 MMHG | HEART RATE: 68 BPM | SYSTOLIC BLOOD PRESSURE: 112 MMHG

## 2017-01-27 VITALS — DIASTOLIC BLOOD PRESSURE: 61 MMHG | SYSTOLIC BLOOD PRESSURE: 124 MMHG | HEART RATE: 67 BPM

## 2017-01-27 VITALS — HEART RATE: 68 BPM | DIASTOLIC BLOOD PRESSURE: 61 MMHG | SYSTOLIC BLOOD PRESSURE: 110 MMHG

## 2017-01-27 VITALS — DIASTOLIC BLOOD PRESSURE: 40 MMHG | HEART RATE: 68 BPM | SYSTOLIC BLOOD PRESSURE: 82 MMHG

## 2017-01-27 LAB
ANION GAP SERPL CALC-SCNC: 11 MMOL/L (ref 3–11)
BUN SERPL-MCNC: 50 MG/DL (ref 7–18)
BUN/CREAT SERPL: 5.4 (ref 10–20)
CALCIUM SERPL-MCNC: 9.1 MG/DL (ref 8.5–10.1)
CHLORIDE SERPL-SCNC: 98 MMOL/L (ref 98–107)
CO2 SERPL-SCNC: 25 MMOL/L (ref 21–32)
CREAT CL PREDICTED SERPL C-G-VRATE: 8.9 ML/MIN
CREAT SERPL-MCNC: 9.2 MG/DL (ref 0.6–1.2)
EOSINOPHIL NFR BLD AUTO: 329 K/UL (ref 130–400)
GLUCOSE SERPL-MCNC: 178 MG/DL (ref 70–99)
HCT VFR BLD CALC: 35.9 % (ref 37–47)
MCH RBC QN AUTO: 30 PG (ref 25–34)
MCHC RBC AUTO-ENTMCNC: 32.6 G/DL (ref 32–36)
MCV RBC AUTO: 92.1 FL (ref 80–100)
PMV BLD AUTO: 10.9 FL (ref 7.4–10.4)
POTASSIUM SERPL-SCNC: 4.5 MMOL/L (ref 3.5–5.1)
RBC # BLD AUTO: 3.9 M/UL (ref 4.2–5.4)
SODIUM SERPL-SCNC: 134 MMOL/L (ref 136–145)
WBC # BLD AUTO: 8.21 K/UL (ref 4.8–10.8)

## 2017-01-27 RX ADMIN — CALCIUM ACETATE SCH MG: 667 CAPSULE ORAL at 11:32

## 2017-01-27 RX ADMIN — CLOPIDOGREL BISULFATE SCH MG: 75 TABLET, FILM COATED ORAL at 10:14

## 2017-01-27 RX ADMIN — HEPARIN SODIUM SCH UNIT: 10000 INJECTION, SOLUTION INTRAVENOUS; SUBCUTANEOUS at 05:45

## 2017-01-27 RX ADMIN — NITROGLYCERIN SCH INCH: 20 OINTMENT TOPICAL at 13:00

## 2017-01-27 RX ADMIN — NITROGLYCERIN SCH INCH: 20 OINTMENT TOPICAL at 01:30

## 2017-01-27 RX ADMIN — Medication SCH MG: at 10:13

## 2017-01-27 RX ADMIN — INSULIN ASPART SCH UNITS: 100 INJECTION, SOLUTION INTRAVENOUS; SUBCUTANEOUS at 11:03

## 2017-01-27 RX ADMIN — CALCIUM ACETATE SCH MG: 667 CAPSULE ORAL at 10:13

## 2017-01-27 RX ADMIN — METOPROLOL TARTRATE SCH MG: 50 TABLET, FILM COATED ORAL at 10:14

## 2017-01-27 RX ADMIN — ATORVASTATIN CALCIUM SCH MG: 40 TABLET, FILM COATED ORAL at 10:13

## 2017-01-27 RX ADMIN — INSULIN ASPART SCH UNITS: 100 INJECTION, SOLUTION INTRAVENOUS; SUBCUTANEOUS at 07:00

## 2017-01-27 RX ADMIN — NITROGLYCERIN SCH INCH: 20 OINTMENT TOPICAL at 07:00

## 2017-01-27 NOTE — DIALYSIS PROGRESS NOTE
Nephrology Dialysis Note


Date of Service:


Jan 27, 2017.


Subjective


52 yo female who came in with volume overload after not have dialysis for over 

a week. pt missed dialysis secondary to n/v/d.  had dialysis for the last two 

days.  volume status much improved.  pt seen on dialysis today and bp started 

to drop with uf removal.  had to stop uf and give fluids back. pt beginning to 

feel better.





Objective











  Date Time  Temp Pulse Resp B/P Pulse Ox O2 Delivery O2 Flow Rate FiO2


 


1/27/17 08:30  72  131/84    


 


1/27/17 08:15  67  124/61    


 


1/27/17 08:00  76  119/60    


 


1/27/17 08:00      Room Air  


 


1/27/17 07:45  71  116/62    


 


1/27/17 07:43 36.8 76 18 94/58 98 Room Air  


 


1/27/17 07:30  68  110/61    


 


1/27/17 07:15  69  121/79    


 


1/27/17 07:00  69  119/68    


 


1/27/17 06:45  82  124/71    


 


1/27/17 06:30  80  141/59    


 


1/27/17 06:15 36.5 69  152/68    


 


1/27/17 04:00      Room Air  


 


1/27/17 03:55 36.9 55 18 97/39 96 Room Air  


 


1/27/17 00:04 36.9 56 18 93/53 94 Room Air  


 


1/26/17 23:59      Room Air  


 


1/26/17 20:00      Room Air  


 


1/26/17 19:20 36.9 58 16 111/63 95   


 


1/26/17 16:31 37.0 56 18 114/63 90 Room Air  


 


1/26/17 16:00      Room Air  


 


1/26/17 12:00      Room Air  


 


1/26/17 11:32 36.4 68 22 124/69 91 Room Air  


 


1/26/17 10:28  71  113/70    


 


1/26/17 10:12 36.9 56  131/72    


 


1/26/17 10:00  56  110/72    


 


1/26/17 09:45  80  109/59    








Physical Exam:


General-aaox3, obese


Eyes-no scleral icterus


ENT-mmm


Neck-supple


Lungs-cta


Heart-rrr


Abdomen-bs+ s/nt/nd


Extremities-no edema


Neuro-nonfocal





 Current Inpatient Medications








 Medications


  (Trade)  Dose


 Ordered  Sig/Willy


 Route  Start Time


 Stop Time Status Last Admin


Dose Admin


 


 Heparin Sodium


  (Porcine)


  (Heparin Sq 5000


 Unit/0.5ml)  5,000 unit  Q8


 SQ  1/24/17 22:00


 2/23/17 21:59  1/26/17 20:50


5,000 UNIT


 


 Acetaminophen


  (Tylenol Tab)  650 mg  Q4H  PRN


 PO  1/24/17 21:00


 2/23/17 20:59   


 


 


 Ondansetron HCl


  (Zofran Inj)  4 mg  Q6H  PRN


 IV  1/24/17 21:00


 2/23/17 20:59  1/26/17 10:58


4 MG


 


 Nitroglycerin


  (Nitroglycerin


 2% Oint)  1 inch  Q6H


 EXT  1/25/17 01:00


 2/24/17 00:59  1/27/17 01:30


1 INCH


 


 Aspirin


  (Ecotrin Tab)  81 mg  DAILY


 PO  1/25/17 09:00


    1/26/17 10:29


81 MG


 


 Atorvastatin


 Calcium


  (Lipitor Tab)  40 mg  DAILY


 PO  1/25/17 09:00


 2/24/17 08:59  1/26/17 10:29


40 MG


 


 Calcium Acetate


  (Phoslo Cap)  667 mg  TIDM


 PO  1/25/17 07:30


 2/24/17 07:59  1/26/17 16:43


667 MG


 


 Clopidogrel


 Bisulfate


  (plAVix TAB)  75 mg  DAILY


 PO  1/25/17 09:00


    1/26/17 10:29


75 MG


 


 Lorazepam


  (Ativan Tab)  0.5 mg  DAILY  PRN


 PO  1/24/17 21:15


 2/23/17 21:14   


 


 


 Methocarbamol


  (Robaxin Tab)  500 mg  BID  PRN


 PO  1/24/17 21:15


 2/23/17 21:14  1/25/17 22:07


500 MG


 


 Metoprolol


 Tartrate


  (Lopressor Tab)  50 mg  BID


 PO  1/25/17 09:00


    1/26/17 20:14


50 MG


 


 Ranitidine HCl


  (zANTac TAB)  300 mg  HS


 PO  1/25/17 21:00


 2/24/17 20:59  1/26/17 20:16


300 MG


 


 Tramadol HCl


  (Ultram Tab)  50 mg  TID  PRN


 PO  1/24/17 21:15


 2/23/17 21:14  1/25/17 22:07


50 MG


 


 Insulin Aspart


  (novoLOG ASPART)  **SLIDING


 SCALE**


 **If C...  ACHS


 SC  1/25/17 07:00


 2/24/17 06:59  1/26/17 16:47


6 UNITS


 


 Glucose


  (Glucose 40% Gel)  15-30


 GRAMS 15


 GRAMS...  UD  PRN


 PO  1/24/17 21:30


 2/23/17 21:29   


 


 


 Glucose


  (Glucose Chew


 Tab)  4-8


 Tablets 4


 Tabl...  UD  PRN


 PO  1/24/17 21:30


 2/23/17 21:29   


 


 


 Dextrose


  (Dextrose 50%


 50ML Syringe)  25-50ML OF


 50% DW IV


 FOR...  UD  PRN


 IV  1/24/17 21:30


 2/23/17 21:29   


 


 


 Glucagon


  (Glucagon Inj)  1 mg  UD  PRN


 SQ  1/24/17 21:30


 2/23/17 21:29   


 


 


 Insulin Glargine


  (Lantus Solostar


 Pen)  5 unit  HS


 SC  1/25/17 21:00


 2/24/17 20:59  1/26/17 20:50


5 UNIT


 


 Miscellaneous


 Information  1 ea  UD  PRN


 N/A  1/24/17 21:45


 2/23/17 21:44   


 


 


 Metoprolol


 Tartrate 5 mg  5 mg  Q4  PRN


 IV  1/24/17 22:30


 2/23/17 22:29  1/25/17 15:34


5 MG


 


 Cefazolin Sodium


 2000 mg/Dextrose  60 ml @ 


 110 mls/hr  DAILY@1700


 IV  1/25/17 17:00


 2/2/17 17:33  1/26/17 16:44


110 MLS/HR


 


 Promethazine HCl/


 Sodium Chloride


  (Phenergan Inj/


 Nss 50ml)  50.5 ml @ 


 204 mls/hr  Q6H  PRN


 IV  1/25/17 18:45


 2/24/17 18:44   


 


 


 Sertraline HCl


  (Zoloft Tab)  100 mg  HS


 PO  1/26/17 21:00


 2/24/17 08:59  1/26/17 20:15


100 MG











Last 24 Hours








Test


  1/26/17


11:28 1/26/17


11:30 1/26/17


16:14 1/26/17


20:05


 


Bedside Glucose 112 mg/dl   159 mg/dl  114 mg/dl 


 


White Blood Count  8.52 K/uL   


 


Red Blood Count  4.07 M/uL   


 


Hemoglobin  12.4 g/dL   


 


Hematocrit  37.0 %   


 


Mean Corpuscular Volume  90.9 fL   


 


Mean Corpuscular Hemoglobin  30.5 pg   


 


Mean Corpuscular Hemoglobin


Concent 


  33.5 g/dl 


  


  


 


 


RDW Standard Deviation  48.2 fL   


 


RDW Coefficient of Variation  14.4 %   


 


Platelet Count  331 K/uL   


 


Mean Platelet Volume  10.7 fL   


 


Sodium Level  135 mmol/L   


 


Potassium Level  4.2 mmol/L   


 


Chloride Level  97 mmol/L   


 


Carbon Dioxide Level  26 mmol/L   


 


Anion Gap  12.0 mmol/L   


 


Blood Urea Nitrogen  38 mg/dl   


 


Creatinine  7.20 mg/dl   


 


Est Creatinine Clear Calc


Drug Dose 


  11.7 ml/min 


  


  


 


 


Estimated GFR (


American) 


  7.0 


  


  


 


 


Estimated GFR (Non-


American 


  6.1 


  


  


 


 


BUN/Creatinine Ratio  5.2   


 


Random Glucose  123 mg/dl   


 


Calcium Level  9.2 mg/dl   


 


Magnesium Level  2.2 mg/dl   














Test


  1/27/17


05:12 1/27/17


06:34 


  


 


 


White Blood Count 8.21 K/uL    


 


Red Blood Count 3.90 M/uL    


 


Hemoglobin 11.7 g/dL    


 


Hematocrit 35.9 %    


 


Mean Corpuscular Volume 92.1 fL    


 


Mean Corpuscular Hemoglobin 30.0 pg    


 


Mean Corpuscular Hemoglobin


Concent 32.6 g/dl 


  


  


  


 


 


RDW Standard Deviation 48.1 fL    


 


RDW Coefficient of Variation 14.4 %    


 


Platelet Count 329 K/uL    


 


Mean Platelet Volume 10.9 fL    


 


Sodium Level 134 mmol/L    


 


Potassium Level 4.5 mmol/L    


 


Chloride Level 98 mmol/L    


 


Carbon Dioxide Level 25 mmol/L    


 


Anion Gap 11.0 mmol/L    


 


Blood Urea Nitrogen 50 mg/dl    


 


Creatinine 9.20 mg/dl    


 


Est Creatinine Clear Calc


Drug Dose 8.9 ml/min 


  


  


  


 


 


Estimated GFR (


American) 5.2 


  


  


  


 


 


Estimated GFR (Non-


American 4.5 


  


  


  


 


 


BUN/Creatinine Ratio 5.4    


 


Random Glucose 178 mg/dl    


 


Calcium Level 9.1 mg/dl    


 


Bedside Glucose  126 mg/dl   











Assessment & Plan


ESRD-seen on dialysis. appropriate fluid removal.  volume status much improved. 

access working well. bp is low and stopped uf and giving fluids back. pt 

clotted with twenty minutes left and took off early.  ok from renal perspective 

to go home once medically cleared by primary hospitalist. 





Anemia of renal failure-hg of 11.7 and no procrit given today. 





IMANI-given zemplar today since missed hectoral dosing last week.

## 2017-01-27 NOTE — DISCHARGE INSTRUCTIONS
Discharge Instructions


Admission


Reason for Admission:  Esrd, Hyperkalemia





Discharge


Discharge Diagnosis / Problem:  Hyperkalemia





Discharge Goals


Goal(s):  Decrease discomfort, Improve function





Activity Recommendations


Activity Limitations:  resume your previous activity





.





Instructions / Follow-Up


Instructions / Follow-Up


FOLLOWUP WITH FAMILY DOCTOR  ON JAN 31ST AT 10:50AM





FOLLOWUP WITH ORTHOPEDICS AS SCHEDULED.





FOLLOWUP WITH VASCULAR SURGERY  IN 2 WEEKS.





Current Hospital Diet


Patient's current hospital diet: AHA Diet (Heart Healthy), Diabetes Type 2 Diet

, Renal Diet, Low Potassium Diet (2g K)





Discharge Diet


Recommended Diet:  Diabetes Type 2 Diet, Renal Diet





Pending Studies


Studies pending at discharge:  no





Medical Emergencies








.


Who to Call and When:





Medical Emergencies:  If at any time you feel your situation is an emergency, 

please call 911 immediately.





.





Non-Emergent Contact


Non-Emergency issues call your:  Primary Care Provider





.


.





"Provider Documentation" section prepared by Abhishek Drake.





VTE Core Measure


Inpt VTE Proph given/why not?:  Unfractionated heparin SQ (DECLINED)

## 2017-01-27 NOTE — DISCHARGE SUMMARY
Discharge Summary


Admission Date:


Jan 24, 2017 at 20:25


Discharge Date:  Jan 27, 2017


Discharge Disposition:  Home


Principal Diagnosis:


hyperkalemia


sob-from volume overload


Secondary Diagnoses/Problems:


(1) Allergic rhinitis


Status: Chronic  





(2) DM type 2 (diabetes mellitus, type 2)


Status: Chronic  





(3) Dyslipidemia


Status: Chronic  





(4) ESRD (end stage renal disease) on dialysis


Status: Chronic  





(5) GERD (gastroesophageal reflux disease)


Status: Chronic  





(6) HTN (hypertension)


Status: Chronic  





(7) HX OF PAST NONCOMPLIANCE


Status: Chronic  





(8) Morbid obesity


Status: Chronic  





(9) Tobacco use disorder


Status: Chronic


Procedures:


CXR:Stable cardiomegaly and pulmonary vascular congestion.


Consultations:


NEPHROLOGY


Medication Reconciliation


Continued Medications:  


Aspirin (Aspirin Ec) 81 Mg Tab


81 MG PO DAILY





Atorvastatin (Lipitor) 20 Mg Tab


40 MG PO DAILY, TAB





Calcium Acetate (Phoslo 667 Mg) 667 Mg Cap


1 CAP PO WM, CAP





Cephalexin Monohydrate (Cephalexin) 1 Homepack Ea


1 CAP PO BID, #20


PT IS NOT TAKING THE OTHER MEDS UNTIL SHE FINISHED TAKING THIS


Clopidogrel Bisulfate (Plavix) 75 Mg Tab


1 TAB PO DAILY for 90 Days, #90 TAB 1 Refill





Insulin Glargine (Lantus Solostar) 100 Unit/Ml Inj


10 UNITS SC QPM, PEN





Isosorbide Mononitrate (Isosorbide Mononitrate ER) 30 Mg Tabcr


60 MG PO QAM





Isosorbide Mononitrate Ext Rel (Imdur Ext Rel) 30 Mg Ertab


30 MG PO QAM, TAB





Lorazepam (Ativan) 1 Mg Tab


0.5 MG PO DAILY PRN for Anxiety, TAB





Methocarbamol (Methocarbamol) 500 Mg Tab


500 MG PO BID PRN for Muscle Spasms





Metoprolol Tartrate (Lopressor) 25 Mg Tab


50 MG PO BID, TAB





Ranitidine Hcl (Zantac) 150 Mg Tab


300 MG PO HS, TAB





Sertraline HCl (Sertraline HCl) 50 Mg Tab


2 TAB PO DAILY for 30 Days, #60 TAB 5 Refills





Tramadol (Ultram) 50 Mg Tab


1 TAB PO TID PRN for Pain for 30 Days, #90 TAB











Admission Information


HPI (per Admitting provider):


Patient seen and examined. 51 year old female with PMHx of ESRD on HD, CAD s/p 

bare metal stent in 8/16, DM2, HTN, HLD, Depression,h/o noncompliance, and 

other problems listed below presents to the ED complaining of SOB prior to 

arrival. Patient reports that she missed her last three dialysis sessions. She 

reports that she stopped going because she was having nausea and diarrhea for 

the past 4-5 days. She states that she has at least 5 episodes of vomiting per 

day. She states she didn't want to have to be unhooked from dialysis to go to 

the bathroom so she just didn't go. She states today she started noticing that 

she was getting SOB with minimal exertion. She also states she had some chest 

discomfort in the left side of her chest that she rated as a 2-3/10 and 

described as dull. She also reports that she feels bloated and has been having 

orthopnea at night. Patient has been taking Keflex for a right second finger 

infection. She states that she was told at the pharmacy that she was not to 

take any of her other medications while on the Keflex so she has been only 

taking aspirin d/t her stents. She then however states she has not been taking 

her medications for at least a month. She denies fevers, chills, URI symptoms, 

palpitations, vomiting, dysuria, calf pain. In the ED patient is hypertensive, 

crea is 17, potassium is 5.9, and bicarb is 16. Troponin is 0.236, and there 

are ST changes on EKG. She was given nitropaste which alleviated her chest 

pain. She will be admitted for further workup and treatment.


Physical Exam (per Admitting):  


   General Appearance:  + pertinent finding (WD/WN 51 year old female lying in 

bed in NAD )


   Head:  normocephalic, atraumatic


   Eyes:  PERRL, EOMI, sclerae normal


   ENT:  hearing grossly normal, pharynx normal


   Neck:  supple, no JVD


   Respiratory/Chest:  chest non-tender, lungs clear, normal breath sounds, no 

respiratory distress, no accessory muscle use


   Cardiovascular:  regular rate, rhythm, no gallop, no JVD, no murmur, normal 

peripheral pulses


   Abdomen/GI:  normal bowel sounds, non tender, soft


   Back:  normal inspection, no muscle spasm


   Extremities/Musculoskelatal:  no calf tenderness, normal capillary refill, + 

pedal edema (trace to +1 BL ), + pertinent finding (dressing to right index 

finger I/C/D )


   Neurologic/Psych:  alert, oriented x 3, + pertinent finding (no motor or 

sensory deficits noted on gross exam )


   Skin:  normal color, warm/dry, no rash


   Lymphatic:  no adenopathy


Physical Exam (per Admitting):


General Appearance:  + pertinent finding (WD/WN 51 year old female lying in bed 

in NAD )


Head:  normocephalic, atraumatic


Eyes:  PERRL, EOMI, sclerae normal


ENT:  hearing grossly normal, pharynx normal


Neck:  supple, no JVD


Respiratory/Chest:  chest non-tender, lungs clear, normal breath sounds, no 

respiratory distress, no accessory muscle use


Cardiovascular:  regular rate, rhythm, no gallop, no JVD, no murmur, normal 

peripheral pulses


Abdomen/GI:  normal bowel sounds, non tender, soft


Back:  normal inspection, no muscle spasm


Extremities/Musculoskelatal:  no calf tenderness, normal capillary refill, + 

pedal edema (trace to +1 BL ), + pertinent finding (dressing to right index 

finger I/C/D )


Neurologic/Psych:  alert, oriented x 3, + pertinent finding (no motor or 

sensory deficits noted on gross exam )


Skin:  normal color, warm/dry, no rash


Lymphatic:  no adenopathy





Hospital Course


HYPERKALEMIA  with ESRD 


Missed Dialysis since 16th Jan


Generally weak and lethargic


EKG changes-resolved  


Received Insulin and Dextrose


had dialysis


resolved


f/u with nephrology








SHORTNESS OF BREATH 


Most likely secondary to volume overload in setting of ESRD with noncompliance 

with dialysis 


s/p dialysis


improved





Left Internal Carotid artery stenosis


vascular recommends surgery


f/u with vascular surgery in 2 weeks





CHEST PAIN- history of CAD 


Has had bare metal stent placed August 2016 


ELEVATED TROPONIN -renal failure contributing 


EKG changes secondary to Increased K


Currently chest pain free


continuing home meds


stable





HYPERTENSIVE URGENCY 


-SBP >190s


Has been noncompliant with home BP meds for weeks to months


Improved BP with nitro paste,and home meds


BP is controlled 


discharged on home meds


followup with PCP.








RIGHT 2ND FINGER INFECTION 


has seen by [facility] and [facility]


no spreading cellulitis 


to continue current antibiotic


appreciate Vascular surgery input


has appointment with orthopedics





DIARRHEA


c diff negative





IDDM


recent A1c 7.2 


d/c on home meds











GERD


on  Zantac 





HLD


 Statin 





DEPRESSION


on Zoloft 








discharged home


Total time spent on discharge = 35MINUTES


This includes examination of the patient, discharge planning, medication 

reconciliation, and communication with other providers.





Discharge Instructions


Discharge Instructions


Admission


Reason for Admission:  Esrd, Hyperkalemia





Discharge


Discharge Diagnosis / Problem:  Hyperkalemia





Discharge Goals


Goal(s):  Decrease discomfort, Improve function





Activity Recommendations


Activity Limitations:  resume your previous activity





.





Instructions / Follow-Up


Instructions / Follow-Up


FOLLOWUP WITH FAMILY DOCTOR  ON JAN 31ST AT 10:50AM





FOLLOWUP WITH ORTHOPEDICS AS SCHEDULED.





FOLLOWUP WITH VASCULAR SURGERY  IN 2 WEEKS.





Current Hospital Diet


Patient's current hospital diet: AHA Diet (Heart Healthy), Diabetes Type 2 Diet

, Renal Diet, Low Potassium Diet (2g K)





Discharge Diet


Recommended Diet:  Diabetes Type 2 Diet, Renal Diet





Pending Studies


Studies pending at discharge:  no





Medical Emergencies








.


Who to Call and When:





Medical Emergencies:  If at any time you feel your situation is an emergency, 

please call 911 immediately.





.





Non-Emergent Contact


Non-Emergency issues call your:  Primary Care Provider





.


.





"Provider Documentation" section prepared by Abhishek Drake.





VTE Core Measure


Inpt VTE Proph given/why not?:  Unfractionated heparin SQ (DECLINED)

## 2017-01-31 ENCOUNTER — HOSPITAL ENCOUNTER (INPATIENT)
Dept: HOSPITAL 45 - C.EDC | Age: 52
LOS: 2 days | Discharge: HOME | DRG: 314 | End: 2017-02-02
Attending: HOSPITALIST | Admitting: HOSPITALIST
Payer: COMMERCIAL

## 2017-01-31 VITALS — HEART RATE: 59 BPM | SYSTOLIC BLOOD PRESSURE: 142 MMHG | DIASTOLIC BLOOD PRESSURE: 73 MMHG

## 2017-01-31 VITALS
TEMPERATURE: 97.7 F | SYSTOLIC BLOOD PRESSURE: 119 MMHG | DIASTOLIC BLOOD PRESSURE: 63 MMHG | OXYGEN SATURATION: 92 % | HEART RATE: 55 BPM

## 2017-01-31 VITALS
WEIGHT: 230.82 LBS | HEIGHT: 66 IN | WEIGHT: 230.82 LBS | HEIGHT: 66 IN | BODY MASS INDEX: 37.1 KG/M2 | BODY MASS INDEX: 37.1 KG/M2

## 2017-01-31 VITALS — SYSTOLIC BLOOD PRESSURE: 121 MMHG | DIASTOLIC BLOOD PRESSURE: 47 MMHG | HEART RATE: 55 BPM

## 2017-01-31 VITALS — DIASTOLIC BLOOD PRESSURE: 56 MMHG | SYSTOLIC BLOOD PRESSURE: 194 MMHG | HEART RATE: 62 BPM

## 2017-01-31 VITALS — TEMPERATURE: 97.7 F | DIASTOLIC BLOOD PRESSURE: 111 MMHG | HEART RATE: 73 BPM | SYSTOLIC BLOOD PRESSURE: 219 MMHG

## 2017-01-31 VITALS — SYSTOLIC BLOOD PRESSURE: 97 MMHG | DIASTOLIC BLOOD PRESSURE: 81 MMHG | HEART RATE: 62 BPM

## 2017-01-31 VITALS — SYSTOLIC BLOOD PRESSURE: 156 MMHG | HEART RATE: 66 BPM | DIASTOLIC BLOOD PRESSURE: 118 MMHG

## 2017-01-31 VITALS — SYSTOLIC BLOOD PRESSURE: 144 MMHG | HEART RATE: 56 BPM | DIASTOLIC BLOOD PRESSURE: 123 MMHG | TEMPERATURE: 97.7 F

## 2017-01-31 VITALS — DIASTOLIC BLOOD PRESSURE: 82 MMHG | SYSTOLIC BLOOD PRESSURE: 177 MMHG | HEART RATE: 61 BPM

## 2017-01-31 VITALS — SYSTOLIC BLOOD PRESSURE: 131 MMHG | HEART RATE: 53 BPM | DIASTOLIC BLOOD PRESSURE: 107 MMHG

## 2017-01-31 VITALS — DIASTOLIC BLOOD PRESSURE: 63 MMHG | SYSTOLIC BLOOD PRESSURE: 119 MMHG | HEART RATE: 55 BPM

## 2017-01-31 VITALS — HEART RATE: 58 BPM | SYSTOLIC BLOOD PRESSURE: 155 MMHG | DIASTOLIC BLOOD PRESSURE: 80 MMHG

## 2017-01-31 VITALS — DIASTOLIC BLOOD PRESSURE: 92 MMHG | SYSTOLIC BLOOD PRESSURE: 180 MMHG | HEART RATE: 76 BPM

## 2017-01-31 DIAGNOSIS — E78.5: ICD-10-CM

## 2017-01-31 DIAGNOSIS — I25.10: ICD-10-CM

## 2017-01-31 DIAGNOSIS — I25.5: ICD-10-CM

## 2017-01-31 DIAGNOSIS — R20.2: ICD-10-CM

## 2017-01-31 DIAGNOSIS — R79.89: ICD-10-CM

## 2017-01-31 DIAGNOSIS — R11.2: ICD-10-CM

## 2017-01-31 DIAGNOSIS — Z79.4: ICD-10-CM

## 2017-01-31 DIAGNOSIS — I65.22: ICD-10-CM

## 2017-01-31 DIAGNOSIS — Z79.02: ICD-10-CM

## 2017-01-31 DIAGNOSIS — E11.22: ICD-10-CM

## 2017-01-31 DIAGNOSIS — I25.2: ICD-10-CM

## 2017-01-31 DIAGNOSIS — Z79.82: ICD-10-CM

## 2017-01-31 DIAGNOSIS — Z79.891: ICD-10-CM

## 2017-01-31 DIAGNOSIS — K21.9: ICD-10-CM

## 2017-01-31 DIAGNOSIS — Z87.891: ICD-10-CM

## 2017-01-31 DIAGNOSIS — I16.0: ICD-10-CM

## 2017-01-31 DIAGNOSIS — D72.829: ICD-10-CM

## 2017-01-31 DIAGNOSIS — E78.00: ICD-10-CM
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
## 2017-01-31 DIAGNOSIS — M79.642: ICD-10-CM

## 2017-01-31 DIAGNOSIS — I73.9: ICD-10-CM

## 2017-01-31 DIAGNOSIS — N18.6: ICD-10-CM

## 2017-01-31 DIAGNOSIS — I12.0: ICD-10-CM

## 2017-01-31 DIAGNOSIS — E87.5: ICD-10-CM

## 2017-01-31 DIAGNOSIS — Z95.5: ICD-10-CM

## 2017-01-31 DIAGNOSIS — Z99.2: ICD-10-CM

## 2017-01-31 DIAGNOSIS — R07.9: ICD-10-CM

## 2017-01-31 DIAGNOSIS — E11.52: ICD-10-CM

## 2017-01-31 DIAGNOSIS — E66.01: ICD-10-CM

## 2017-01-31 DIAGNOSIS — T82.858A: Primary | ICD-10-CM

## 2017-01-31 DIAGNOSIS — Z79.899: ICD-10-CM

## 2017-01-31 DIAGNOSIS — Z91.19: ICD-10-CM

## 2017-01-31 DIAGNOSIS — D63.1: ICD-10-CM

## 2017-01-31 DIAGNOSIS — Z91.15: ICD-10-CM

## 2017-01-31 LAB
ALP SERPL-CCNC: 81 U/L (ref 45–117)
ALT SERPL-CCNC: 6 U/L (ref 12–78)
ANION GAP SERPL CALC-SCNC: 16 MMOL/L (ref 3–11)
AST SERPL-CCNC: 11 U/L (ref 15–37)
BASOPHILS # BLD: 0.03 K/UL (ref 0–0.2)
BASOPHILS NFR BLD: 0.2 %
BUN SERPL-MCNC: 71 MG/DL (ref 7–18)
BUN/CREAT SERPL: 5 (ref 10–20)
CALCIUM SERPL-MCNC: 9.6 MG/DL (ref 8.5–10.1)
CHLORIDE SERPL-SCNC: 96 MMOL/L (ref 98–107)
CKMB/CK RATIO: 5.5 (ref 0–3)
CO2 SERPL-SCNC: 21 MMOL/L (ref 21–32)
COMPLETE: YES
CREAT CL PREDICTED SERPL C-G-VRATE: 6.1 ML/MIN
CREAT SERPL-MCNC: 14 MG/DL (ref 0.6–1.2)
EOSINOPHIL NFR BLD AUTO: 344 K/UL (ref 130–400)
GLUCOSE SERPL-MCNC: 259 MG/DL (ref 70–99)
HCT VFR BLD CALC: 35.8 % (ref 37–47)
IG%: 0.3 %
IMM GRANULOCYTES NFR BLD AUTO: 8.3 %
INR PPP: 1.1 (ref 0.9–1.1)
LYMPHOCYTES # BLD: 1.43 K/UL (ref 1.2–3.4)
MAGNESIUM SERPL-MCNC: 2.6 MG/DL (ref 1.8–2.4)
MCH RBC QN AUTO: 30.4 PG (ref 25–34)
MCHC RBC AUTO-ENTMCNC: 33.5 G/DL (ref 32–36)
MCV RBC AUTO: 90.6 FL (ref 80–100)
MONOCYTES NFR BLD: 4.5 %
NEUTROPHILS # BLD AUTO: 0.9 %
NEUTROPHILS NFR BLD AUTO: 85.8 %
PARTIAL THROMBOPLASTIN RATIO: 1.2
PMV BLD AUTO: 11.1 FL (ref 7.4–10.4)
POTASSIUM SERPL-SCNC: 5.5 MMOL/L (ref 3.5–5.1)
PROTHROMBIN TIME: 12.1 SECONDS (ref 9–12)
RBC # BLD AUTO: 3.95 M/UL (ref 4.2–5.4)
SODIUM SERPL-SCNC: 133 MMOL/L (ref 136–145)
WBC # BLD AUTO: 17.26 K/UL (ref 4.8–10.8)

## 2017-01-31 RX ADMIN — METOPROLOL TARTRATE SCH MG: 50 TABLET, FILM COATED ORAL at 20:16

## 2017-01-31 RX ADMIN — INSULIN ASPART SCH UNITS: 100 INJECTION, SOLUTION INTRAVENOUS; SUBCUTANEOUS at 20:46

## 2017-01-31 RX ADMIN — RANITIDINE SCH MG: 150 TABLET ORAL at 20:15

## 2017-01-31 RX ADMIN — HEPARIN SODIUM SCH UNIT: 10000 INJECTION, SOLUTION INTRAVENOUS; SUBCUTANEOUS at 20:46

## 2017-01-31 RX ADMIN — CEPHALEXIN SCH MG: 500 CAPSULE ORAL at 20:16

## 2017-01-31 RX ADMIN — CALCIUM ACETATE SCH MG: 667 CAPSULE ORAL at 20:15

## 2017-01-31 NOTE — PROGRESS NOTE
Progress Note


ATTENDING ADDENDUM





care coordinated with ASHLEY Rodríguez


please refer to her notes for full details, I agree with her notes


patient seen and examined, records reviewed by myself as well


on exam, patient seen resting in bed, comfortable


states her left hand pain is improving, pain mostly on the hypothenar area, and 

lateral aspect of the hand


chest pain has resolved, no dyspnea/dizziness/nausea





no other symptoms








VS noted and reviewed


oriented x 3, not in distress, speaks in sentences with no effort nor 


               accessory muscle use


normal rate, regular rhythm, no murmurs


clear breath sounds bilaterally


non distended, soft, nontender


left hand: no swelling, (+) scattered areas of erythema on the dorsal and 

palmar aspect of the hand (MTP joint areas, hypothenar areas)


   warm to touch, good radial pulses


left arm fistula: good thrill


right index finger: (+)eschar on the tip, mild erythema on the distal aspect


no other neuro deficits





WBC 17k


Crea 14


K 5.5





ASSESSMENT/PLAN>\


51/F withDM, ESRD, CAD, HTN presenting with left hand numbness and chest pain 

which started this AM.





LEFT HAND PARESTHESIAS


- related to moderate stenosis of brachial artery, radial artery distribution


- symptoms improving


- ED discussed with Vascular Sx, will be consulted 





CHEST PAIN R/O ACS


serial cardiac markers


echo





ESRD


due for HD today


will consult Nephro





other diagnoses and plan of care as per ASHLEY Rodríguez's notes





Bridger Barrera MD

## 2017-01-31 NOTE — DIAGNOSTIC IMAGING REPORT
LEFT FOOT MIN 3 VIEWS ROUTINE



CLINICAL HISTORY: evaluate left great toe for possible osteomyelitis pain.

Infection.



COMPARISON: None.



DISCUSSION: Moderate degenerative change all major osseous structures. No lytic

or blastic process. No bony destructive process. Extensive soft tissue vascular

calcification. Heel spur. Old healed fracture base fifth metatarsal.    



IMPRESSION: 

1. No evidence for osteomyelitis.

2. Extensive soft tissue vascular calcification.







Electronically signed by:  Dipak Lopez M.D.

1/31/2017 3:01 PM



Dictated Date/Time:  1/31/2017 3:00 PM

## 2017-01-31 NOTE — DIAGNOSTIC IMAGING REPORT
Arterial Doppler left arm LEFT ART DOP DUP UPPER EXT UNI



CLINICAL HISTORY: cyanotic L middle finger, painful.  ESRD dialysis cyanotic and

painful fingers



TECHNIQUE: Arterial Doppler



COMPARISON STUDY:  None



FINDINGS: Patient is status post left brachial arterial fistula. The proximal

arterial structures of the a left arm appear unremarkable in terms of arterial

flow



Appear to be somewhat dampened flow within the fistula with velocities

decreasing to  43 cm/s.

Arterial flow within the arterial structures of the forearm appear somewhat

dampened.. Distal radial arterial velocities diminished to 16 cm/s.

IMPRESSION:  

1. No significant stenotic process of the arm.





2. The patient's left brachial arterial fistula shows normal antegrade flow,

although velocity characteristics are diminished consistent with at least a

moderate stenotic process.





3. Dampened flow within the arterial structures of the distal forearm and palm,

suggesting the possibility of significant stenotic change at the level of the

fistula, as well as within the distal radial distribution at the level of the

palm and wrist









Electronically signed by:  Dipak Lopez M.D.

1/31/2017 1:12 PM



Dictated Date/Time:  1/31/2017 1:08 PM

## 2017-01-31 NOTE — HISTORY AND PHYSICAL
History & Physical


Date & Time of Service:


Jan 31, 2017 at 16:53


Chief Complaint:


Chest Pain


Primary Care Physician:


Lurdes Smith D.O.


History of Present Illness


51 year old female who presents to the ER with chest pain, left hand pain, and 

left hand numbness. Patient was recently admitted to [facility] 1/24 - 1/27 for 

volume overload which was felt to be due to missing dialysis treatments. 

Patient reports that while she was in the shower today she developed left hand 

pain and her fingers were numb. She also noted her left middle finger was 

white. She then developed mid sternal chest pain which she describes as a 

pressure. She also had associated nausea and one episode of vomiting. She 

reports the hand pain was more severe than the chest pain. She then called EMS. 

She reports the chest pain started to ease up after she burped a couple of 

times. She reports complete resolution of the chest pain after receiving Zofran 

by EMS. She denies shortness of breath. No lightheadedness, dizziness, or 

diaphoresis. She denies abdominal pain or diarrhea. No fever or chills. She 

continues to make urine. She denies any urinary symptoms. Patient has necrotic 

tissue noted to right index finger and left great toe. She reports both are 

unchanged. In the ER, patient's left hand symptoms are improving. She is 

currently chest pain free. Trop is 0.180, EKG is unchanged from prior. She had 

an arterial doppler of the LUE that did not show any acute thrombosis. BP on 

presentation noted to be 217/97.





Past Medical/Surgical History


Medical Problems:


(1) Allergic rhinitis


Status: Chronic  





(2) CAD (coronary artery disease)


Permanent Comment: s/p PCI and BMS to left circumflex 8/2016


Status: Chronic  





(3) Carotid stenosis


Permanent Comment: left ICA


Status: Chronic  





(4) DM type 2 (diabetes mellitus, type 2)


Status: Chronic  





(5) Dyslipidemia


Status: Chronic  





(6) ESRD (end stage renal disease) on dialysis


Status: Chronic  





(7) GERD (gastroesophageal reflux disease)


Status: Chronic  





(8) HTN (hypertension)


Status: Chronic  





(9) HX OF PAST NONCOMPLIANCE


Status: Chronic  





(10) Ischemic cardiomyopathy


Permanent Comment: echo 10/2016 - EF 40-45%, grade I diastolic dysfunction


Status: Chronic  





(11) Morbid obesity


Status: Chronic  





(12) Tobacco use disorder


Status: Chronic  





Surgical Problems:


(1) Hemodialysis access, AV graft


Permanent Comment: [facility] Dr. Roberts 1/4/13


Status: Chronic  











Family History





Diabetes mellitus


  FATHER


  MOTHER


Heart disease


  FATHER


  MOTHER


Hypertension


  FATHER


  MOTHER


Kidney disease


  GRANDMOTHER





Social History


Smoking Status:  Former Smoker


Alcohol Use:  occasionally





Immunizations


History of Influenza Vaccine:  Yes


Influenza Vaccine Date:  Sep 1, 2016


History of Tetanus Vaccine?:  Yes


History of Pneumococcal:  Yes


Pneumococcal Date:  Jul 24, 2013


History of Hepatitis B Vaccine:  Yes


Hepatitis Immunization Date:  Dec 11, 2013





Multi-Drug Resistant Organisms


History of MDRO:  No





Allergies


Coded Allergies:  


     Adhesives (Verified  Allergy, Mild, RASH, SORES, 1/31/17)


     Pollen Extract (Unverified  Allergy, Unknown, rash, 1/31/17)





Home Medications


Scheduled


Aspirin (Aspirin Ec), 81 MG PO DAILY


Atorvastatin (Lipitor), 40 MG PO DAILY


Calcium Acetate (Phoslo 667 Mg), 1 CAP PO WM


Cephalexin Monohydrate (Cephalexin), 1 CAP PO BID


Clopidogrel Bisulfate (Plavix), 1 TAB PO DAILY


Insulin Glargine (Lantus Solostar), 10 UNITS SC QPM


Isosorbide Mononitrate (Isosorbide Mononitrate ER), 60 MG PO QAM


Isosorbide Mononitrate Ext Rel (Imdur Ext Rel), 30 MG PO QAM


Metoprolol Tartrate (Lopressor), 50 MG PO BID


Ranitidine Hcl (Zantac), 300 MG PO HS


Sertraline HCl (Sertraline HCl), 2 TAB PO DAILY





Scheduled PRN


Lorazepam (Ativan), 0.5 MG PO DAILY PRN for Anxiety


Methocarbamol (Methocarbamol), 500 MG PO BID PRN for Muscle Spasms


Tramadol (Ultram), 1 TAB PO TID PRN for Pain





Review of Systems


10 point review of systems was completed with the pertinent positives and 

negatives noted per the HPI





Physical Exam


Vital Signs











  Date Time  Temp Pulse Resp B/P Pulse Ox O2 Delivery O2 Flow Rate FiO2


 


1/31/17 15:25  77 20 168/69 92 Room Air  


 


1/31/17 14:53  75 20 153/135 99 Room Air  


 


1/31/17 14:29    /87    


 


1/31/17 14:23  78 21  100   


 


1/31/17 13:59    105/63    


 


1/31/17 13:53  75 17  95   


 


1/31/17 13:29    141/105    


 


1/31/17 13:23  76 16  99   


 


1/31/17 13:04    163/92    


 


1/31/17 13:04  76 20 163/92 100   


 


1/31/17 13:03    /51    


 


1/31/17 13:00    130/111    


 


1/31/17 12:00     100 Room Air  


 


1/31/17 11:59    146/66    


 


1/31/17 11:53  74 16  96   


 


1/31/17 11:48    170/78    


 


1/31/17 11:41  76      


 


1/31/17 11:29     100 Room Air  


 


1/31/17 11:26    216/97    


 


1/31/17 11:23 36.5 77 20 216/97 100 Room Air  








General Appearance:  no apparent distress


Head:  normocephalic


Eyes:  normal inspection


ENT:  hearing grossly normal


Neck:  supple, no JVD


Respiratory/Chest:  lungs clear, normal breath sounds, no respiratory distress


Cardiovascular:  regular rate, rhythm, no edema


Abdomen/GI:  normal bowel sounds, non tender, soft


Extremities/Musculoskelatal:  + pertinent finding (posterior aspect of left 

hand noted to be mild mottling, sensation intact, + radial pulse; dialysis 

fistula notes to BG, + thrill)


Neurologic/Psych:  no motor/sensory deficits, alert, normal mood/affect, 

oriented x 3


Skin:  + pertinent finding (right index finger necrotic tissue noted, left 

great toe necrotic tissue noted)





Diagnostics


Laboratory Results





Results Past 24 Hours








Test


  1/31/17


11:30 1/31/17


11:40 1/31/17


12:07 1/31/17


14:20 Range/Units


 


 


White Blood Count 17.26    4.8-10.8  K/uL


 


Red Blood Count 3.95    4.2-5.4  M/uL


 


Hemoglobin 12.0    12.0-16.0  g/dL


 


Hematocrit 35.8    37-47  %


 


Mean Corpuscular Volume 90.6      fL


 


Mean Corpuscular Hemoglobin 30.4    25-34  pg


 


Mean Corpuscular Hemoglobin


Concent 33.5


  


  


  


  32-36  g/dl


 


 


Platelet Count 344    130-400  K/uL


 


Mean Platelet Volume 11.1    7.4-10.4  fL


 


Neutrophils (%) (Auto) 85.8     %


 


Lymphocytes (%) (Auto) 8.3     %


 


Monocytes (%) (Auto) 4.5     %


 


Eosinophils (%) (Auto) 0.9     %


 


Basophils (%) (Auto) 0.2     %


 


Neutrophils # (Auto) 14.82    1.4-6.5  K/uL


 


Lymphocytes # (Auto) 1.43    1.2-3.4  K/uL


 


Monocytes # (Auto) 0.77    0.11-0.59  K/uL


 


Eosinophils # (Auto) 0.16    0-0.5  K/uL


 


Basophils # (Auto) 0.03    0-0.2  K/uL


 


RDW Standard Deviation 46.9    36.4-46.3  fL


 


RDW Coefficient of Variation 14.3    11.5-14.5  %


 


Immature Granulocyte % (Auto) 0.3     %


 


Immature Granulocyte # (Auto) 0.05    0.00-0.02  K/uL


 


Prothrombin Time


  12.1


  


  


  


  9.0-12.0


SECONDS


 


Prothromb Time International


Ratio 1.1


  


  


  


  0.9-1.1  


 


 


Activated Partial


Thromboplast Time 30.2


  


  


  


  21.0-31.0


SECONDS


 


Partial Thromboplastin Ratio 1.2     


 


Sodium Level 133    136-145  mmol/L


 


Potassium Level 5.5    3.5-5.1  mmol/L


 


Chloride Level 96      mmol/L


 


Carbon Dioxide Level 21    21-32  mmol/L


 


Anion Gap 16.0    3-11  mmol/L


 


Blood Urea Nitrogen 71    7-18  mg/dl


 


Creatinine


  14.00


  


  


  


  0.60-1.20


mg/dl


 


Est Creatinine Clear Calc


Drug Dose 6.1


  


  


  


   ml/min


 


 


Estimated GFR (


American) 3.2


  


  


  


   


 


 


Estimated GFR (Non-


American 2.7


  


  


  


   


 


 


BUN/Creatinine Ratio 5.0    10-20  


 


Random Glucose 259    70-99  mg/dl


 


Calcium Level 9.6    8.5-10.1  mg/dl


 


Magnesium Level 2.6    1.8-2.4  mg/dl


 


Total Bilirubin 0.4    0.2-1  mg/dl


 


Direct Bilirubin 0.1    0-0.2  mg/dl


 


Aspartate Amino Transf


(AST/SGOT) 11


  


  


  


  15-37  U/L


 


 


Alanine Aminotransferase


(ALT/SGPT) 6


  


  


  


  12-78  U/L


 


 


Alkaline Phosphatase 81      U/L


 


Total Creatine Kinase 44      U/L


 


Creatine Kinase MB 2.4    0.5-3.6  ng/ml


 


Creatine Kinase MB Ratio 5.5    0-3.0  


 


Total Protein 8.5    6.4-8.2  gm/dl


 


Albumin 3.2    3.4-5.0  gm/dl


 


Lipase 237      U/L


 


Bedside Lactic Acid Venous


  


  2.67


  


  


  0.90-1.70


mmol/L


 


Bedside Troponin I   0.180  0-0.045  ng/ml


 


Lactic Acid Level    1.7 0.4-2.0  mmol/L








 Microbiology Results


1/31/17 Blood Culture, Received


          Pending


1/31/17 Blood Culture, Received


          Pending





Diagnostic Radiology


BG ARTERIAL DOPPLER IMPRESSION:  


1. No significant stenotic process of the arm.


2. The patient's left brachial arterial fistula shows normal antegrade flow,


although velocity characteristics are diminished consistent with at least a


moderate stenotic process.


3. Dampened flow within the arterial structures of the distal forearm and palm,


suggesting the possibility of significant stenotic change at the level of the


fistula, as well as within the distal radial distribution at the level of the


palm and wrist





CXR IMPRESSION:  Negative chest. 





LEFT FOOT XR IMPRESSION: 


1. No evidence for osteomyelitis.


2. Extensive soft tissue vascular calcification.





Impression


Assessment and Plan


CHEST PAIN, MILDLY ELEVATED TROP, HX CAD


- admit to tele


- s/p BMS to circumflex 8/2016


- initial trop mildly elevated, will continue to trend; EKG unchanged from prior


- check resting echo 


- ESRD likely contributing to troponin elevation 


- ? due to hypertensive urgency and/or GERD


- continue ASA, Plavix, beta blocker, statin, and nitrate





LEFT HAND PAIN/PARESTHESIA


- ? due to moderate stenosis of brachial artery, radial artery distribution 

noted on arterial US


- symptoms improving


- vascular checks q4h


- vascular surgery consult - notified by ED





HYPERTENSIVE URGENCY


- patient did not take home BP meds today


- will give doses now and reassess BP





LEUKOCYTOSIS


- ? stress response from pain/vomiting


- no obvious source of infection currently


- blood cultures, U/A, urine culture pending


- no infiltrate on CXR


- has necrotic tissue to right index finger/left great toe - improving/

unchanged per patient


- afebrile 


- POC lactic acid elevated, however serum WNL


- patient currently taking cephalexin for right index finger - will continue 

with for now





ISCHEMIC CARDIOMYOPATHY


- EF 40-45% 


- volume managed with dialysis





HYPERKALEMIA, ESRD ON HD


- no EKG changes


- nephro consulted for HD orders





DM 


- on Lantus at home however well known to our service and due to forced diet 

compliance while hospitalized usually only requires SSI





DVT PROPHYLAXIS


- SQ Heparin 





DISPO


- In my clinical judgment this beneficiary meets acute admission criteria, 

established by CMS, that includes being hospitalized through two midnights.





VTE Prophylaxis


VTE Risk Assessment Done? Y/N:  Yes


Risk Level:  Moderate

## 2017-01-31 NOTE — NEPHROLOGY CONSULTATION
Nephrology Consultation


Date of Consultation:


Jan 31, 2017.


Attending Physician:  Dr Bolanos


Requesting Physician:  Dr Barrera


Reason for Consultation:  ESRD


History of Present Illness


51 year old female w/ esrd admitted to hospital today after presenting with 

chest pain and htn; also noted to have new cyanotic/white L middle finger lesion

/ pain.  Other medical hx as below.  Missed her dialysis treatment yesterday.  

Recently admitted here 1/24-1/27 for hyperkalemia and volume overload after 

missing 3 outpt dialysis treatments d/t GI symptoms per her report; had also 

stopped taking her meds for about a month before that admission.  Seen today 

just after dialysis which she had at my orders and chest pain improved w/ 2L 

fluid removal.  Admitting team notes necrotic tissue on L great toe, L 

midfinger white/cyanotic on eval this am; R index finger w/ necrotic 1.5 cm 

lesion.  Vascular surgery aware of these lesions, has been following pt and 

will reassess her in am.





Past Medical/Surgical History


Medical Problems:


(1) Abdominal pain


Status: Acute  





(2) Acute electrocardiogram changes


Status: Acute  





(3) Elevated troponin


Status: Acute  





(4) End stage renal disease


Status: Acute  





(5) Hyperkalemia


Status: Acute  





(6) Joint effusion


Status: Acute  





(7) Left elbow pain


Status: Acute  





(8) Left sided chest pain


Status: Acute  





(9) Medical non-compliance


Status: Acute  





(10) Pneumonia


Status: Acute  





(11) Pulmonary edema


Status: Acute  





(12) Renal failure


Status: Acute  





(13) Sinusitis


Status: Acute  





(14) Substernal chest pain


Status: Acute  





(15) Urinary tract infection


Status: Acute  





(16) Vomiting


Status: Acute  








-ESRD on in center HD via AVF as above


-C diff in past


-CAD s/p BMS 8/2016; follows w/ Dr Gerald POWELLG


-DM2


-HTN


-anemia 


-HL


-GERD


-h/o nonadherence to medical treatment


-1/24-1/27 [facility] admission for volume overload and hyperkalemia


-R second finger tip lesion


-states she has L carotid artery stenosis which will need stenting at some point


-s/p TDC, AVF placements





Family History





Diabetes mellitus


  FATHER


  MOTHER


Heart disease


  FATHER


  MOTHER


Hypertension


  FATHER


  MOTHER


Kidney disease


  GRANDMOTHER





Social History


Smoking Status:  Former Smoker


Alcohol Use:  none


Housing Status:  lives with family





Allergies


Coded Allergies:  


     Adhesives (Verified  Allergy, Mild, RASH, SORES, 1/31/17)


     Pollen Extract (Unverified  Allergy, Unknown, rash, 1/31/17)





Medications





 Current Inpatient Medications








 Medications


  (Trade)  Dose


 Ordered  Sig/Willy


 Route  Start Time


 Stop Time Status Last Admin


Dose Admin


 


 Heparin Sodium


  (Porcine)


  (Heparin Sq 5000


 Unit/0.5ml)  5,000 unit  Q12


 SQ  1/31/17 21:00


 3/2/17 20:59   


 


 


 Acetaminophen


  (Tylenol Tab)  650 mg  Q4H  PRN


 PO  1/31/17 14:30


 3/2/17 14:29   


 


 


 Ondansetron HCl


  (Zofran Inj)  4 mg  Q6H  PRN


 IV  1/31/17 14:30


 3/2/17 14:29   


 


 


 Aspirin


  (Ecotrin Tab)  81 mg  DAILY


 PO  2/1/17 09:00


 3/3/17 08:59   


 


 


 Atorvastatin


 Calcium


  (Lipitor Tab)  40 mg  DAILY


 PO  2/1/17 09:00


 3/3/17 08:59   


 


 


 Calcium Acetate


  (Phoslo Cap)  667 mg  TIDM


 PO  1/31/17 18:00


 3/2/17 17:59   


 


 


 Cephalexin


 Monohydrate


  (Keflex Cap)  500 mg  BID


 PO  1/31/17 21:00


 2/10/17 20:59   


 


 


 Clopidogrel


 Bisulfate


  (plAVix TAB)  75 mg  DAILY


 PO  2/1/17 09:00


 3/3/17 08:59   


 


 


 Isosorbide


 Mononitrate


  (Imdur Ext Rel


 Tab)  60 mg  QAM


 PO  2/1/17 09:00


 3/3/17 08:59   


 


 


 Isosorbide


 Mononitrate


  (Imdur Ext Rel


 Tab)  30 mg  QAM


 PO  2/1/17 09:00


 3/3/17 08:59   


 


 


 Lorazepam


  (Ativan Tab)  0.5 mg  DAILY  PRN


 PO  1/31/17 14:45


 3/2/17 14:44   


 


 


 Metoprolol


 Tartrate


  (Lopressor Tab)  50 mg  BID


 PO  1/31/17 21:00


 3/2/17 20:59   


 


 


 Ranitidine HCl


  (zANTac TAB)  300 mg  HS


 PO  1/31/17 21:00


 3/2/17 20:59   


 


 


 Sertraline HCl


  (Zoloft Tab)  100 mg  DAILY


 PO  2/1/17 09:00


 3/3/17 08:59   


 


 


 Tramadol HCl


  (Ultram Tab)  50 mg  TID  PRN


 PO  1/31/17 14:45


 3/2/17 14:44   


 


 


 Insulin Aspart


  (novoLOG ASPART)  SLIDING


 SCALE  ACHS


 SC  1/31/17 21:00


 3/2/17 20:59   


 











Home Meds and Scripts








 Medications  Dose


 Route/Sig


 Max Daily Dose Days Date Category


 


 Sertraline HCl 50


 Mg Tab  2 Tab


 PO DAILY


   30 1/24/17 Reported


 


 Phoslo 667 Mg


  (Calcium Acetate)


 667 Mg Cap  1 Cap


 PO WM


    1/24/17 Reported


 


 Lipitor


  (Atorvastatin) 20


 Mg Tab  40 Mg


 PO DAILY


    1/24/17 Reported


 


 Lopressor


  (Metoprolol


 Tartrate) 25 Mg


 Tab  50 Mg


 PO BID


    1/24/17 Reported


 


 Plavix


  (Clopidogrel


 Bisulfate) 75 Mg


 Tab  1 Tab


 PO DAILY


   90 1/24/17 Reported


 


 Isosorbide


 Mononitrate ER


  (Isosorbide


 Mononitrate) 30


 Mg Tabcr  60 Mg


 PO QAM


    1/24/17 Reported


 


 Imdur Ext Rel


  (Isosorbide


 Mononitrate) 30


 Mg Ertab  30 Mg


 PO QAM


    1/24/17 Reported


 


 Lantus Solostar


  (Insulin


 Glargine) 100


 Unit/Ml Inj  10 Units


 SC QPM


    1/24/17 Reported


 


 Ativan


  (Lorazepam) 1 Mg


 Tab  0.5 Mg


 PO DAILY PRN


    1/24/17 Reported


 


 Zantac


  (Ranitidine HCl)


 150 Mg Tab  300 Mg


 PO HS


    1/24/17 Reported


 


 Methocarbamol 500


 Mg Tab  500 Mg


 PO BID PRN


    1/24/17 Reported


 


 Ultram (Tramadol


 HCl) 50 Mg Tab  1 Tab


 PO TID PRN


   30 1/24/17 Reported


 


 Cephalexin


  (Cephalexin


 Monohydrate) 1


 Homepack Ea  1 Cap


 PO BID


    1/24/17 Reported


 


 Aspirin Ec


  (Aspirin) 81 Mg


 Tab  81 Mg


 PO DAILY


    8/16/16 Reported











Review of Systems


Constitutional:  No fatigue, No fever, No weakness


Eyes:  No worsening of vision


ENT:  No hearing loss


Respiratory:  No shortness of breath


Cardiac:  + see HPI, No edema, No orthopnea


Abdomen:  No diarrhea, No nausea, No pain, No vomiting


Musculoskeletal:  + joint pain, + see HPI


Female :  + problem reported (no change in voiding habits)


Psych:  No anxiety, No depression symptoms


Heme:  No abnormal bleeding/bruising


Endo:  No fatigue


Skin:  + new/changing skin lesions, + see HPI





Physical Exam











  Date Time  Temp Pulse Resp B/P Pulse Ox O2 Delivery O2 Flow Rate FiO2


 


1/31/17 18:15  55  119/63    


 


1/31/17 18:00  55  121/47    


 


1/31/17 17:45  76  180/92    


 


1/31/17 17:30  58  155/80    


 


1/31/17 17:15  53  131/107    


 


1/31/17 17:00  61  177/82    


 


1/31/17 16:55  62  194/56    


 


1/31/17 16:31 36.5 73  219/111    


 


1/31/17 15:25  77 20 168/69 92 Room Air  


 


1/31/17 14:53  75 20 153/135 99 Room Air  


 


1/31/17 14:29    /87    


 


1/31/17 14:23  78 21  100   


 


1/31/17 13:59    105/63    


 


1/31/17 13:53  75 17  95   


 


1/31/17 13:29    141/105    


 


1/31/17 13:23  76 16  99   


 


1/31/17 13:04    163/92    


 


1/31/17 13:04  76 20 163/92 100   


 


1/31/17 13:03    /51    


 


1/31/17 13:00    130/111    


 


1/31/17 12:00     100 Room Air  


 


1/31/17 11:59    146/66    


 


1/31/17 11:53  74 16  96   


 


1/31/17 11:48    170/78    


 


1/31/17 11:41  76      


 


1/31/17 11:29     100 Room Air  


 


1/31/17 11:26    216/97    


 


1/31/17 11:23 36.5 77 20 216/97 100 Room Air  








General Appearance:  WD/WN, no apparent distress (on ra in nad)


Eyes:  EOMI


ENT:  hearing grossly normal


Neck:  supple


Respiratory/Chest:  lungs clear, no respiratory distress


Cardiovascular:  regular rate, rhythm, no edema


Abdomen:  normal bowel sounds, non tender, soft


Extremities:  + pertinent finding (avf L prox + t/b; L hand faint lacy purple 

cyanotic lesions; L middle finger not obviously injured; large necrotic lesion 

tip of R index)


Neurologic/Psych:  alert, normal mood/affect, oriented x 3


Skin:  no jaundice, warm/dry, no rash, + pertinent finding (see extremity pe)





Diagnostics





Last 24 Hours








Test


  1/31/17


11:30 1/31/17


11:40 1/31/17


12:07 1/31/17


14:20


 


White Blood Count 17.26 K/uL    


 


Red Blood Count 3.95 M/uL    


 


Hemoglobin 12.0 g/dL    


 


Hematocrit 35.8 %    


 


Mean Corpuscular Volume 90.6 fL    


 


Mean Corpuscular Hemoglobin 30.4 pg    


 


Mean Corpuscular Hemoglobin


Concent 33.5 g/dl 


  


  


  


 


 


Platelet Count 344 K/uL    


 


Mean Platelet Volume 11.1 fL    


 


Neutrophils (%) (Auto) 85.8 %    


 


Lymphocytes (%) (Auto) 8.3 %    


 


Monocytes (%) (Auto) 4.5 %    


 


Eosinophils (%) (Auto) 0.9 %    


 


Basophils (%) (Auto) 0.2 %    


 


Neutrophils # (Auto) 14.82 K/uL    


 


Lymphocytes # (Auto) 1.43 K/uL    


 


Monocytes # (Auto) 0.77 K/uL    


 


Eosinophils # (Auto) 0.16 K/uL    


 


Basophils # (Auto) 0.03 K/uL    


 


RDW Standard Deviation 46.9 fL    


 


RDW Coefficient of Variation 14.3 %    


 


Immature Granulocyte % (Auto) 0.3 %    


 


Immature Granulocyte # (Auto) 0.05 K/uL    


 


Prothrombin Time 12.1 SECONDS    


 


Prothromb Time International


Ratio 1.1 


  


  


  


 


 


Activated Partial


Thromboplast Time 30.2 SECONDS 


  


  


  


 


 


Partial Thromboplastin Ratio 1.2    


 


Sodium Level 133 mmol/L    


 


Potassium Level 5.5 mmol/L    


 


Chloride Level 96 mmol/L    


 


Carbon Dioxide Level 21 mmol/L    


 


Anion Gap 16.0 mmol/L    


 


Blood Urea Nitrogen 71 mg/dl    


 


Creatinine 14.00 mg/dl    


 


Est Creatinine Clear Calc


Drug Dose 6.1 ml/min 


  


  


  


 


 


Estimated GFR (


American) 3.2 


  


  


  


 


 


Estimated GFR (Non-


American 2.7 


  


  


  


 


 


BUN/Creatinine Ratio 5.0    


 


Random Glucose 259 mg/dl    


 


Calcium Level 9.6 mg/dl    


 


Magnesium Level 2.6 mg/dl    


 


Total Bilirubin 0.4 mg/dl    


 


Direct Bilirubin 0.1 mg/dl    


 


Aspartate Amino Transf


(AST/SGOT) 11 U/L 


  


  


  


 


 


Alanine Aminotransferase


(ALT/SGPT) 6 U/L 


  


  


  


 


 


Alkaline Phosphatase 81 U/L    


 


Total Creatine Kinase 44 U/L    


 


Creatine Kinase MB 2.4 ng/ml    


 


Creatine Kinase MB Ratio 5.5    


 


Total Protein 8.5 gm/dl    


 


Albumin 3.2 gm/dl    


 


Lipase 237 U/L    


 


Bedside Lactic Acid Venous  2.67 mmol/L   


 


Bedside Troponin I   0.180 ng/ml  


 


Lactic Acid Level    1.7 mmol/L 














Test


  1/31/17


17:00 


  


  


 


 


Creatine Kinase MB Ratio     








Diagnostic Radiology:


DOPPLER LUE 


-at least moderate AVF stenosis; no stenosis in arm





Foot XR


 no acute process





CXR


no acute cardiopulmonary process


EKG:


ECG > NSR unchanged since 1/24 ECG





Assessment & Plan


52 y/o F w/ ESRD and missed dialysis admitted 1/31 w/ chest pain





ESRD w/ hyperkalemia, HTN 


-2hr HD today


-plan 4 hr tx tomorrow w/ aggressive UF as tolerated





possible AV fistula malfunction on imaging


-vascular to evaluate pt in am; ran ok today





Anemia of ESRD


-no meds indicated currently; monitor daily





Digital Lesions


-vascular and orthopedics following





Appreciate consultation; will follow with you

## 2017-01-31 NOTE — EMERGENCY ROOM VISIT NOTE
History


Report prepared by Eda:  Fide Reyes


Under the Supervision of:  Dr. Donna Cheng M.D.


First contact with patient:  11:45


Chief Complaint:  CHEST PAIN


Stated Complaint:  CHEST PAIN





History of Present Illness


The patient is a 51 year old female who presents to the Emergency Room with 

complaints of persistent left hand numbness and pain that began prior to 

arrival.  She currently rates her discomfort as a 10/10 in severity.  The 

patient states that while she was taking a shower this morning she developed 

severe left hand numbness and pain.  She states that after that she developed 

the chest pain.  The patient states that she vomited after all the pain.  She 

notes an improving area on her right hand that was purple.  The patient states 

that she had a previous MI and had a stent placed.  The patient states that she 

had a fistula placed by Dr. Roberts, Vascular Surgery.  She states that he is 

supposed to do a surgery on her carotid artery.





   Source of History:  patient


   Onset:  prior to arrival


   Position:  hand (left)


   Symptom Intensity:  10/10


   Quality:  numbness


   Timing:  other (persistent)


   Associated Symptoms:  + chest pain, + vomiting





Review of Systems


See HPI for pertinent positives & negatives. A total of 10 systems reviewed and 

were otherwise negative.





Past Medical & Surgical


Medical Problems:


(1) Allergic rhinitis


(2) CAD (coronary artery disease)


(3) Carotid stenosis


(4) DM type 2 (diabetes mellitus, type 2)


(5) Dyslipidemia


(6) ESRD (end stage renal disease) on dialysis


(7) GERD (gastroesophageal reflux disease)


(8) HTN (hypertension)


(9) HX OF PAST NONCOMPLIANCE


(10) Ischemic cardiomyopathy


(11) Morbid obesity


(12) Tobacco use disorder


Surgical Problems:


(1) Hemodialysis access, AV graft








Family History





Diabetes mellitus


  FATHER


  MOTHER


Heart disease


  FATHER


  MOTHER


Hypertension


  FATHER


  MOTHER


Kidney disease


  GRANDMOTHER





Social History


Smoking Status:  Former Smoker


Alcohol Use:  none


Drug Use:  none


Marital Status:  


Housing Status:  lives with family


Occupation Status:  unemployed





Current/Historical Medications


Scheduled


Aspirin (Aspirin Ec), 81 MG PO DAILY


Atorvastatin (Lipitor), 40 MG PO DAILY


Calcium Acetate (Phoslo 667 Mg), 1 CAP PO WM


Cephalexin Monohydrate (Cephalexin), 1 CAP PO BID


Clopidogrel Bisulfate (Plavix), 1 TAB PO DAILY


Insulin Glargine (Lantus Solostar), 10 UNITS SC QPM


Isosorbide Mononitrate (Isosorbide Mononitrate ER), 60 MG PO QAM


Isosorbide Mononitrate Ext Rel (Imdur Ext Rel), 30 MG PO QAM


Metoprolol Tartrate (Lopressor), 50 MG PO BID


Ranitidine Hcl (Zantac), 300 MG PO HS


Sertraline HCl (Sertraline HCl), 2 TAB PO DAILY





Scheduled PRN


Lorazepam (Ativan), 0.5 MG PO DAILY PRN for Anxiety


Methocarbamol (Methocarbamol), 500 MG PO BID PRN for Muscle Spasms


Tramadol (Ultram), 1 TAB PO TID PRN for Pain





Allergies


Coded Allergies:  


     Adhesives (Verified  Allergy, Mild, RASH, SORES, 1/31/17)


     Pollen Extract (Unverified  Allergy, Unknown, rash, 1/31/17)





Physical Exam


Vital Signs











  Date Time  Temp Pulse Resp B/P Pulse Ox O2 Delivery O2 Flow Rate FiO2


 


1/31/17 14:23  78 21  100   


 


1/31/17 13:59    105/63    


 


1/31/17 13:53  75 17  95   


 


1/31/17 13:29    141/105    


 


1/31/17 13:23  76 16  99   


 


1/31/17 13:04    163/92    


 


1/31/17 13:04  76 20 163/92 100   


 


1/31/17 13:03    /51    


 


1/31/17 13:00    130/111    


 


1/31/17 12:00     100 Room Air  


 


1/31/17 11:59    146/66    


 


1/31/17 11:53  74 16  96   


 


1/31/17 11:48    170/78    


 


1/31/17 11:41  76      


 


1/31/17 11:29     100 Room Air  


 


1/31/17 11:26    216/97    


 


1/31/17 11:23 36.5 77 20 216/97 100 Room Air  











Physical Exam


Vital signs reviewed.





General: Chronically ill-appearing female, in no significant distress.





HEENT: No scleral icterus, PERRLA, neck supple.  Atraumatic.





Cardiovascular: Regular rate and rhythm, no extra sounds.





Pulmonary: Clear to auscultation bilaterally, normal work of breathing.





Abdomen: Soft, nontender, nondistended, positive bowel sounds.





Musculoskeletal: Pale and cool middle finger on the left hand from the MCP 

joint more distally.  Necrotic area to the tip of the right index finger with 

no sign of infection





Neurologic: Patient awake alert and oriented x 3, full strength in all 4 

extremities.  Cranial nerves 2 through 12 grossly intact.





Skin: Warm, dry, no rash





Medical Decision & Procedures


ER Provider


Diagnostic Interpretation:


X-ray results as stated below per my interpretation and radiologist 

interpretation. Other radiology results as stated below per my review and 

radiologist interpretation:





Arterial Doppler left arm LEFT ART DOP DUP UPPER EXT UNI





CLINICAL HISTORY: cyanotic L middle finger, painful.  ESRD dialysis cyanotic and


painful fingers





TECHNIQUE: Arterial Doppler





COMPARISON STUDY:  None





FINDINGS: Patient is status post left brachial arterial fistula. The proximal


arterial structures of the a left arm appear unremarkable in terms of arterial


flow





Appear to be somewhat dampened flow within the fistula with velocities


decreasing to  43 cm/s.


Arterial flow within the arterial structures of the forearm appear somewhat


dampened.. Distal radial arterial velocities diminished to 16 cm/s.


IMPRESSION:  


1. No significant stenotic process of the arm.








2. The patient's left brachial arterial fistula shows normal antegrade flow,


although velocity characteristics are diminished consistent with at least a


moderate stenotic process.








3. Dampened flow within the arterial structures of the distal forearm and palm,


suggesting the possibility of significant stenotic change at the level of the


fistula, as well as within the distal radial distribution at the level of the


palm and wrist





Electronically signed by:  Dipak Lopez M.D.


1/31/2017 1:12 PM





Dictated Date/Time:  1/31/2017 1:08 PM





Laboratory Results











Test


  1/31/17


11:30 1/31/17


11:40 1/31/17


12:07


 


Prothrombin Time


  12.1 SECONDS


(9.0-12.0) 


  


 


 


Prothromb Time International


Ratio 1.1 (0.9-1.1) 


  


  


 


 


Activated Partial


Thromboplast Time 30.2 SECONDS


(21.0-31.0) 


  


 


 


Partial Thromboplastin Ratio 1.2   


 


Total Bilirubin


  0.4 mg/dl


(0.2-1) 


  


 


 


Direct Bilirubin


  0.1 mg/dl


(0-0.2) 


  


 


 


Aspartate Amino Transf


(AST/SGOT) 11 U/L (15-37) 


  


  


 


 


Alanine Aminotransferase


(ALT/SGPT) 6 U/L (12-78) 


  


  


 


 


Alkaline Phosphatase


  81 U/L


() 


  


 


 


Total Creatine Kinase


  44 U/L


() 


  


 


 


Total Protein


  8.5 gm/dl


(6.4-8.2) 


  


 


 


Albumin


  3.2 gm/dl


(3.4-5.0) 


  


 


 


Lipase


  237 U/L


() 


  


 


 


Bedside Lactic Acid Venous


  


  2.67 mmol/L


(0.90-1.70) 


 


 


Bedside Troponin I


  


  


  0.180 ng/ml


(0-0.045)








Laboratory results per my review.





ECG


Indication:  chest pain


Rate (beats per minute):  78


Rhythm:  normal sinus


Findings:  no acute ischemic change, no ectopy





ED Course


1156: Past medical records reviewed. The patient was evaluated in room C5. A 

complete history and physical examination was performed. 





1320: I reevaluated the patient and she is resting comfortably.  I discussed 

the exam findings with her and I discussed the treatment plan.  She verbalized 

complete understanding and agreement.  She is going to be evaluated for further 

treatment.





1330: I discussed the patients case with Trang Lizarraga PA-C.  

She states that they will see the patient tomorrow and consult.





1344: I discussed the patient's case with Анна Guzmán.  She is 

going to evaluate the patient for further treatment.





Medical Decision


The patient is a 51 year old female who presents to the ED with complaints of 

left hand pain and numbness.  Differentials include arterial clot, cellulitis, 

DVT, Raynaud's, vasculitis.





This patient was evaluated and appeared to be in no distress.  IV access was 

obtained and laboratory work was drawn.  The patient was placed on the cardiac 

monitor.  Laboratory work reveals an elevated troponin of 0.2 however the 

patient's troponin was elevated on her last visit as well.  This may be 

secondary to her end-stage renal disease.  EKG reveals a sinus rhythm with 

previous inferior infarct and no evidence of acute ischemia.  Ultrasound of the 

left upper extremity reveals stenosis of the fistula however no evidence of 

arterial emboli.  Case was discussed with the vascular surgery staff, Stephanie Tse PA-C.  Due to the patient's elevated troponin, chest pain and 

vomiting, she will be admitted to the hospitalist service and vascular surgery 

will consult.  Patient was made aware of the plan and agrees.





Consults


Time Called:  1319


Consulting Physician:  Trang Lizarraga PA-C


Returned Call:  1330


I discussed the patients case with Trang Lizarraga PA-C.  She 

states that they will see the patient tomorrow and consult.


Additional Consults:  


   Time Called:  1327


   Consulted Physician:  Анна Guzmán


   Returned Call:  1344


Additional Comments:


I discussed the patient's case with Анна Guzmán.  She is going 

to evaluate the patient for further treatment.





Impression





 Primary Impression:  


 Left sided chest pain


 Additional Impressions:  


 Vomiting


 Limb ischemia


 Elevated troponin





Scribe Attestation


The scribe's documentation has been prepared under my direction and personally 

reviewed by me in its entirety. I confirm that the note above accurately 

reflects all work, treatment, procedures, and medical decision making performed 

by me.





Departure Information


Dispostion


Being Evaluated By Hospitalist





Referrals


Lurdes Smith D.O. (PCP)





Problem Qualifiers








 Additional Impressions:  


 Vomiting


 Vomiting type:  unspecified  Vomiting Intractability:  non-intractable  Nausea 

presence:  with nausea  Qualified Codes:  R11.2 - Nausea with vomiting, 

unspecified

## 2017-01-31 NOTE — DIAGNOSTIC IMAGING REPORT
CHEST ONE VIEW PORTABLE



CLINICAL HISTORY: chest pain dyspnea



COMPARISON STUDY:  1/24/2017



FINDINGS: The bones soft tissues and hemidiaphragms are normal. The

cardiomediastinal silhouette is normal. The lungs are clear. The pulmonary

vasculature is normal. 



IMPRESSION:  Negative chest. 









Electronically signed by:  Dipak Lopez M.D.

1/31/2017 2:39 PM



Dictated Date/Time:  1/31/2017 2:39 PM

## 2017-02-01 VITALS
OXYGEN SATURATION: 99 % | DIASTOLIC BLOOD PRESSURE: 72 MMHG | TEMPERATURE: 97.88 F | SYSTOLIC BLOOD PRESSURE: 145 MMHG | HEART RATE: 66 BPM

## 2017-02-01 VITALS
OXYGEN SATURATION: 95 % | DIASTOLIC BLOOD PRESSURE: 40 MMHG | SYSTOLIC BLOOD PRESSURE: 81 MMHG | TEMPERATURE: 97.88 F | HEART RATE: 55 BPM

## 2017-02-01 VITALS — SYSTOLIC BLOOD PRESSURE: 104 MMHG | DIASTOLIC BLOOD PRESSURE: 47 MMHG | HEART RATE: 66 BPM

## 2017-02-01 VITALS — SYSTOLIC BLOOD PRESSURE: 92 MMHG | DIASTOLIC BLOOD PRESSURE: 44 MMHG | HEART RATE: 60 BPM

## 2017-02-01 VITALS — DIASTOLIC BLOOD PRESSURE: 62 MMHG | HEART RATE: 58 BPM | SYSTOLIC BLOOD PRESSURE: 106 MMHG

## 2017-02-01 VITALS
TEMPERATURE: 97.7 F | DIASTOLIC BLOOD PRESSURE: 33 MMHG | SYSTOLIC BLOOD PRESSURE: 72 MMHG | OXYGEN SATURATION: 98 % | HEART RATE: 76 BPM

## 2017-02-01 VITALS — TEMPERATURE: 97.88 F | HEART RATE: 71 BPM | DIASTOLIC BLOOD PRESSURE: 26 MMHG | SYSTOLIC BLOOD PRESSURE: 104 MMHG

## 2017-02-01 VITALS — HEART RATE: 63 BPM | SYSTOLIC BLOOD PRESSURE: 106 MMHG | DIASTOLIC BLOOD PRESSURE: 48 MMHG

## 2017-02-01 VITALS — HEART RATE: 63 BPM | DIASTOLIC BLOOD PRESSURE: 36 MMHG | SYSTOLIC BLOOD PRESSURE: 108 MMHG

## 2017-02-01 VITALS — HEART RATE: 78 BPM | SYSTOLIC BLOOD PRESSURE: 99 MMHG | DIASTOLIC BLOOD PRESSURE: 80 MMHG

## 2017-02-01 VITALS — HEART RATE: 63 BPM | DIASTOLIC BLOOD PRESSURE: 47 MMHG | SYSTOLIC BLOOD PRESSURE: 112 MMHG

## 2017-02-01 VITALS — OXYGEN SATURATION: 99 %

## 2017-02-01 VITALS — HEART RATE: 71 BPM | SYSTOLIC BLOOD PRESSURE: 105 MMHG | DIASTOLIC BLOOD PRESSURE: 74 MMHG

## 2017-02-01 VITALS
TEMPERATURE: 98.06 F | HEART RATE: 58 BPM | OXYGEN SATURATION: 97 % | DIASTOLIC BLOOD PRESSURE: 65 MMHG | SYSTOLIC BLOOD PRESSURE: 99 MMHG

## 2017-02-01 VITALS — HEART RATE: 60 BPM | SYSTOLIC BLOOD PRESSURE: 116 MMHG | DIASTOLIC BLOOD PRESSURE: 53 MMHG

## 2017-02-01 VITALS — DIASTOLIC BLOOD PRESSURE: 50 MMHG | HEART RATE: 61 BPM | SYSTOLIC BLOOD PRESSURE: 119 MMHG

## 2017-02-01 VITALS
OXYGEN SATURATION: 97 % | DIASTOLIC BLOOD PRESSURE: 62 MMHG | HEART RATE: 68 BPM | SYSTOLIC BLOOD PRESSURE: 102 MMHG | TEMPERATURE: 97.88 F

## 2017-02-01 VITALS
HEART RATE: 73 BPM | TEMPERATURE: 98.42 F | SYSTOLIC BLOOD PRESSURE: 108 MMHG | OXYGEN SATURATION: 97 % | DIASTOLIC BLOOD PRESSURE: 54 MMHG

## 2017-02-01 VITALS — HEART RATE: 61 BPM | SYSTOLIC BLOOD PRESSURE: 107 MMHG | DIASTOLIC BLOOD PRESSURE: 40 MMHG

## 2017-02-01 VITALS — DIASTOLIC BLOOD PRESSURE: 56 MMHG | HEART RATE: 64 BPM | SYSTOLIC BLOOD PRESSURE: 98 MMHG

## 2017-02-01 VITALS — SYSTOLIC BLOOD PRESSURE: 133 MMHG | HEART RATE: 65 BPM | DIASTOLIC BLOOD PRESSURE: 43 MMHG

## 2017-02-01 VITALS — SYSTOLIC BLOOD PRESSURE: 118 MMHG | HEART RATE: 66 BPM | DIASTOLIC BLOOD PRESSURE: 47 MMHG

## 2017-02-01 LAB
ANION GAP SERPL CALC-SCNC: 14 MMOL/L (ref 3–11)
APPEARANCE UR: (no result)
BASOPHILS # BLD: 0.03 K/UL (ref 0–0.2)
BASOPHILS NFR BLD: 0.2 %
BILIRUB UR-MCNC: (no result) MG/DL
BUN SERPL-MCNC: 49 MG/DL (ref 7–18)
BUN/CREAT SERPL: 4.1 (ref 10–20)
CALCIUM SERPL-MCNC: 9.2 MG/DL (ref 8.5–10.1)
CHLORIDE SERPL-SCNC: 97 MMOL/L (ref 98–107)
CO2 SERPL-SCNC: 25 MMOL/L (ref 21–32)
COLOR UR: YELLOW
COMPLETE: YES
CREAT CL PREDICTED SERPL C-G-VRATE: 7 ML/MIN
CREAT SERPL-MCNC: 12 MG/DL (ref 0.6–1.2)
EOSINOPHIL NFR BLD AUTO: 318 K/UL (ref 130–400)
GLUCOSE SERPL-MCNC: 97 MG/DL (ref 70–99)
HCT VFR BLD CALC: 32.6 % (ref 37–47)
HCT VFR BLD CALC: 35.8 % (ref 37–47)
IG%: 0.3 %
IMM GRANULOCYTES NFR BLD AUTO: 23.9 %
LYMPHOCYTES # BLD: 3.2 K/UL (ref 1.2–3.4)
MAGNESIUM SERPL-MCNC: 2.5 MG/DL (ref 1.8–2.4)
MANUAL MICROSCOPIC REQUIRED?: NO
MCH RBC QN AUTO: 30.2 PG (ref 25–34)
MCHC RBC AUTO-ENTMCNC: 32.2 G/DL (ref 32–36)
MCV RBC AUTO: 93.7 FL (ref 80–100)
MONOCYTES NFR BLD: 8.9 %
NEUTROPHILS # BLD AUTO: 2.1 %
NEUTROPHILS NFR BLD AUTO: 64.6 %
NITRITE UR QL STRIP: (no result)
PH UR STRIP: 6.5 [PH] (ref 4.5–7.5)
PMV BLD AUTO: 10.8 FL (ref 7.4–10.4)
POTASSIUM SERPL-SCNC: 4.7 MMOL/L (ref 3.5–5.1)
RBC # BLD AUTO: 3.48 M/UL (ref 4.2–5.4)
REVIEW REQ?: YES
SODIUM SERPL-SCNC: 136 MMOL/L (ref 136–145)
SP GR UR STRIP: 1.01 (ref 1–1.03)
URINE BILL WITH OR WITHOUT MIC: (no result)
URINE EPITHELIAL CELL AUTO: >30 /LPF (ref 0–5)
UROBILINOGEN UR-MCNC: (no result) MG/DL
WBC # BLD AUTO: 13.38 K/UL (ref 4.8–10.8)

## 2017-02-01 RX ADMIN — ISOSORBIDE MONONITRATE SCH MG: 30 TABLET, EXTENDED RELEASE ORAL at 15:07

## 2017-02-01 RX ADMIN — SERTRALINE HYDROCHLORIDE SCH MG: 100 TABLET, FILM COATED ORAL at 15:07

## 2017-02-01 RX ADMIN — METOPROLOL TARTRATE SCH MG: 50 TABLET, FILM COATED ORAL at 08:38

## 2017-02-01 RX ADMIN — INSULIN ASPART SCH UNITS: 100 INJECTION, SOLUTION INTRAVENOUS; SUBCUTANEOUS at 06:30

## 2017-02-01 RX ADMIN — SERTRALINE HYDROCHLORIDE SCH MG: 100 TABLET, FILM COATED ORAL at 08:39

## 2017-02-01 RX ADMIN — ISOSORBIDE MONONITRATE SCH MG: 60 TABLET ORAL at 08:38

## 2017-02-01 RX ADMIN — INSULIN ASPART SCH UNITS: 100 INJECTION, SOLUTION INTRAVENOUS; SUBCUTANEOUS at 17:44

## 2017-02-01 RX ADMIN — CLOPIDOGREL BISULFATE SCH MG: 75 TABLET, FILM COATED ORAL at 08:37

## 2017-02-01 RX ADMIN — CALCIUM ACETATE SCH MG: 667 CAPSULE ORAL at 08:39

## 2017-02-01 RX ADMIN — INSULIN ASPART SCH UNITS: 100 INJECTION, SOLUTION INTRAVENOUS; SUBCUTANEOUS at 11:00

## 2017-02-01 RX ADMIN — CALCIUM ACETATE SCH MG: 667 CAPSULE ORAL at 13:35

## 2017-02-01 RX ADMIN — ISOSORBIDE MONONITRATE SCH MG: 60 TABLET ORAL at 15:07

## 2017-02-01 RX ADMIN — HEPARIN SODIUM SCH UNIT: 10000 INJECTION, SOLUTION INTRAVENOUS; SUBCUTANEOUS at 08:38

## 2017-02-01 RX ADMIN — RANITIDINE SCH MG: 150 TABLET ORAL at 20:49

## 2017-02-01 RX ADMIN — METOPROLOL TARTRATE SCH MG: 50 TABLET, FILM COATED ORAL at 15:06

## 2017-02-01 RX ADMIN — Medication SCH MG: at 08:39

## 2017-02-01 RX ADMIN — ATORVASTATIN CALCIUM SCH MG: 40 TABLET, FILM COATED ORAL at 08:39

## 2017-02-01 RX ADMIN — CEPHALEXIN SCH MG: 500 CAPSULE ORAL at 20:49

## 2017-02-01 RX ADMIN — ISOSORBIDE MONONITRATE SCH MG: 30 TABLET, EXTENDED RELEASE ORAL at 08:39

## 2017-02-01 RX ADMIN — SERTRALINE HYDROCHLORIDE SCH MG: 100 TABLET, FILM COATED ORAL at 20:53

## 2017-02-01 RX ADMIN — HEPARIN SODIUM SCH UNIT: 10000 INJECTION, SOLUTION INTRAVENOUS; SUBCUTANEOUS at 20:53

## 2017-02-01 RX ADMIN — CEPHALEXIN SCH MG: 500 CAPSULE ORAL at 08:39

## 2017-02-01 RX ADMIN — CALCIUM ACETATE SCH MG: 667 CAPSULE ORAL at 17:42

## 2017-02-01 RX ADMIN — INSULIN ASPART SCH UNITS: 100 INJECTION, SOLUTION INTRAVENOUS; SUBCUTANEOUS at 20:50

## 2017-02-01 NOTE — HOSPITALIST PROGRESS NOTE
Hospitalist Progress Note


Date of Service


Feb 1, 2017.





Subjective


Patient seen and examined after dialysis. 


According to RN, patient became disconnected during treatment and had an EBL of 

300ml 


Patient currently reports feeling tired and "wiped out"


She reports a brief episode of chest pain when she became disconnected from 

dialysis. She denies shortness of breath. 


No lightheadedness or dizziness. 


Left hand pain and numbness is improving. 


No fever or chills. Denies abdominal pain, nausea, and vomiting.





Objective


Vital Signs











  Date Time  Temp Pulse Resp B/P Pulse Ox O2 Delivery O2 Flow Rate FiO2


 


2/1/17 11:45  71  97/37    


 


2/1/17 11:30  63  108/36    


 


2/1/17 11:15  78  99/80    


 


2/1/17 11:00  66  104/47    


 


2/1/17 10:45  71  105/74    


 


2/1/17 10:30  63  112/47    


 


2/1/17 10:15  63  106/48    


 


2/1/17 10:00  64  98/56    


 


2/1/17 09:45  61  119/50    


 


2/1/17 09:30  61  107/40    


 


2/1/17 09:15  58  106/62    


 


2/1/17 09:00  60  92/44    


 


2/1/17 08:45  66  118/47    


 


2/1/17 08:35  65  133/43    


 


2/1/17 08:28 36.6 71  104/26    


 


2/1/17 08:14 36.6 66 20 145/72 99 Room Air  


 


2/1/17 08:00     99 Room Air  


 


2/1/17 04:48 36.6 55 20 81/40 95 Room Air  


 


2/1/17 04:00      Room Air  


 


2/1/17 00:27 36.7 58 18 99/65 97 Room Air  


 


2/1/17 00:00      Room Air  


 


1/31/17 21:51  59  142/73    


 


1/31/17 19:00 36.5 56  144/123    


 


1/31/17 18:46 36.5 55 20 119/63 92 Room Air  


 


1/31/17 18:45  62  97/81    


 


1/31/17 18:30  66  156/118    


 


1/31/17 18:15  55  119/63    


 


1/31/17 18:00  55  121/47    


 


1/31/17 17:45  76  180/92    


 


1/31/17 17:30  58  155/80    


 


1/31/17 17:15  53  131/107    


 


1/31/17 17:00  61  177/82    


 


1/31/17 16:55  62  194/56    


 


1/31/17 16:31 36.5 73  219/111    


 


1/31/17 15:25  77 20 168/69 92 Room Air  


 


1/31/17 14:53  75 20 153/135 99 Room Air  


 


1/31/17 14:29    /87    


 


1/31/17 14:23  78 21  100   


 


1/31/17 13:59    105/63    


 


1/31/17 13:53  75 17  95   











Physical Exam


General Appearance:  no apparent distress


Eyes:  normal inspection


ENT:  hearing grossly normal


Neck:  supple, no JVD


Respiratory/Chest:  lungs clear, normal breath sounds, no respiratory distress


Cardiovascular:  regular rate, rhythm, no edema


Abdomen:  normal bowel sounds, non tender, soft


Extremities:  + pertinent finding (left hand color improved from yesterday, + 

left radial pulse; dialysis fistula noted to LUE, + thrill )


Neurologic/Psychiatric:  no motor/sensory deficits, alert, normal mood/affect, 

oriented x 3


Skin:  normal color, warm/dry





Laboratory Results





Last 24 Hours








Test


  1/31/17


14:20 1/31/17


17:00 1/31/17


20:06 1/31/17


20:32


 


Lactic Acid Level 1.7 mmol/L    


 


Creatine Kinase MB Ratio     


 


Creatine Kinase MB   2.8 ng/ml  


 


Troponin I   0.268 ng/ml  


 


Bedside Glucose    151 mg/dl 














Test


  1/31/17


23:00 1/31/17


23:13 2/1/17


00:00 2/1/17


05:52


 


Creatine Kinase MB Ratio     


 


Creatine Kinase MB  3.2 ng/ml   


 


Troponin I  0.249 ng/ml   


 


Urine Color   YELLOW  


 


Urine Appearance   CLOUDY  


 


Urine pH   6.5  


 


Urine Specific Gravity   1.012  


 


Urine Protein   3+  


 


Urine Glucose (UA)   2+  


 


Urine Ketones   NEG  


 


Urine Occult Blood   1+  


 


Urine Nitrite   NEG  


 


Urine Bilirubin   NEG  


 


Urine Urobilinogen   NEG  


 


Urine Leukocyte Esterase   SMALL  


 


Urine WBC (Auto)   >30 /hpf  


 


Urine RBC (Auto)   0-4 /hpf  


 


Urine Hyaline Casts (Auto)   0 /lpf  


 


Urine Epithelial Cells (Auto)   >30 /lpf  


 


Urine Bacteria (Auto)   NEG  


 


Urine Pathogenic Casts    /lpf  


 


White Blood Count    13.38 K/uL 


 


Red Blood Count    3.48 M/uL 


 


Hemoglobin    10.5 g/dL 


 


Hematocrit    32.6 % 


 


Mean Corpuscular Volume    93.7 fL 


 


Mean Corpuscular Hemoglobin    30.2 pg 


 


Mean Corpuscular Hemoglobin


Concent 


  


  


  32.2 g/dl 


 


 


Platelet Count    318 K/uL 


 


Mean Platelet Volume    10.8 fL 


 


Neutrophils (%) (Auto)    64.6 % 


 


Lymphocytes (%) (Auto)    23.9 % 


 


Monocytes (%) (Auto)    8.9 % 


 


Eosinophils (%) (Auto)    2.1 % 


 


Basophils (%) (Auto)    0.2 % 


 


Neutrophils # (Auto)    8.64 K/uL 


 


Lymphocytes # (Auto)    3.20 K/uL 


 


Monocytes # (Auto)    1.19 K/uL 


 


Eosinophils # (Auto)    0.28 K/uL 


 


Basophils # (Auto)    0.03 K/uL 


 


RDW Standard Deviation    49.3 fL 


 


RDW Coefficient of Variation    14.8 % 


 


Immature Granulocyte % (Auto)    0.3 % 


 


Immature Granulocyte # (Auto)    0.04 K/uL 


 


Sodium Level    136 mmol/L 


 


Potassium Level    4.7 mmol/L 


 


Chloride Level    97 mmol/L 


 


Carbon Dioxide Level    25 mmol/L 


 


Anion Gap    14.0 mmol/L 


 


Blood Urea Nitrogen    49 mg/dl 


 


Creatinine    12.00 mg/dl 


 


Est Creatinine Clear Calc


Drug Dose 


  


  


  7.0 ml/min 


 


 


Estimated GFR (


American) 


  


  


  3.8 


 


 


Estimated GFR (Non-


American 


  


  


  3.3 


 


 


BUN/Creatinine Ratio    4.1 


 


Random Glucose    97 mg/dl 


 


Calcium Level    9.2 mg/dl 


 


Magnesium Level    2.5 mg/dl 














Test


  2/1/17


06:28 2/1/17


07:44 2/1/17


12:55 2/1/17


13:18


 


Lactic Acid Level 1.0 mmol/L    


 


Bedside Glucose  124 mg/dl  128 mg/dl  135 mg/dl 














Test


  2/1/17


13:42 


  


  


 











Assessment and Plan


CHEST PAIN, MILDLY ELEVATED TROP, HX CAD


- admit to tele


- s/p BMS to circumflex 8/2016


- trop 0.180 -> 0.268 -> 0.249


- resting echo pending


- ESRD likely contributing to troponin elevation 


- ? due to hypertensive urgency and/or GERD


- continue ASA, Plavix, beta blocker, statin, and nitrate


- cardio consult placed with Dr. Duarte - case discussed with him 





LEFT HAND PAIN/PARESTHESIA


- improving


- ? due to moderate stenosis of brachial artery, radial artery distribution 

noted on arterial US


- vascular checks q4h


- vascular surgery consult 





HYPERTENSIVE URGENCY


- resolved


- BP improved with home doses of BP meds





LEUKOCYTOSIS


- WBC 17K -> 13K today


- ? stress response from pain/vomiting


- no obvious source of infection currently


- blood and urine cultures pending


- no infiltrate on CXR


- has necrotic tissue to right index finger/left great toe - improving/

unchanged per patient


- afebrile 


- POC lactic acid elevated, however serum WNL


- patient currently taking cephalexin for right index finger - will continue 

with for now





ISCHEMIC CARDIOMYOPATHY


- EF 40-45% 


- volume managed with dialysis





HYPERKALEMIA, ESRD ON HD


- K+ normalized


- no EKG changes


- nephro consulted for HD orders


- dialysis needle became dislodged during treatment and patient had EBL 300ml, 

will check H/H





DM 


- on Lantus at home however well known to our service and due to forced diet 

compliance while hospitalized usually only requires SSI





DVT PROPHYLAXIS


- SQ Heparin

## 2017-02-01 NOTE — DIALYSIS PROGRESS NOTE
Nephrology Dialysis Note


Date of Service:


Feb 1, 2017.


Subjective


no acute interval events.  vascular evaluating pt.  some low abd distension/

malaise.  still hand pain.





Objective











  Date Time  Temp Pulse Resp B/P Pulse Ox O2 Delivery O2 Flow Rate FiO2


 


2/1/17 10:00  64  98/56    


 


2/1/17 09:45  61  119/50    


 


2/1/17 09:30  61  107/40    


 


2/1/17 09:15  58  106/62    


 


2/1/17 09:00  60  92/44    


 


2/1/17 08:45  66  118/47    


 


2/1/17 08:35  65  133/43    


 


2/1/17 08:28 36.6 71  104/26    


 


2/1/17 08:14 36.6 66 20 145/72 99 Room Air  


 


2/1/17 04:48 36.6 55 20 81/40 95 Room Air  


 


2/1/17 04:00      Room Air  


 


2/1/17 00:27 36.7 58 18 99/65 97 Room Air  


 


2/1/17 00:00      Room Air  


 


1/31/17 21:51  59  142/73    


 


1/31/17 19:00 36.5 56  144/123    


 


1/31/17 18:46 36.5 55 20 119/63 92 Room Air  


 


1/31/17 18:45  62  97/81    


 


1/31/17 18:30  66  156/118    


 


1/31/17 18:15  55  119/63    


 


1/31/17 18:00  55  121/47    


 


1/31/17 17:45  76  180/92    


 


1/31/17 17:30  58  155/80    


 


1/31/17 17:15  53  131/107    


 


1/31/17 17:00  61  177/82    


 


1/31/17 16:55  62  194/56    


 


1/31/17 16:31 36.5 73  219/111    


 


1/31/17 15:25  77 20 168/69 92 Room Air  


 


1/31/17 14:53  75 20 153/135 99 Room Air  


 


1/31/17 14:29    /87    


 


1/31/17 14:23  78 21  100   


 


1/31/17 13:59    105/63    


 


1/31/17 13:53  75 17  95   


 


1/31/17 13:29    141/105    


 


1/31/17 13:23  76 16  99   


 


1/31/17 13:04    163/92    


 


1/31/17 13:04  76 20 163/92 100   


 


1/31/17 13:03    /51    


 


1/31/17 13:00    130/111    


 


1/31/17 12:00     100 Room Air  


 


1/31/17 11:59    146/66    


 


1/31/17 11:53  74 16  96   


 


1/31/17 11:48    170/78    


 


1/31/17 11:41  76      


 


1/31/17 11:29     100 Room Air  


 


1/31/17 11:26    216/97    


 


1/31/17 11:23 36.5 77 20 216/97 100 Room Air  








Physical Exam:


General-on ra, oriented x 3


Eyes-eomi


ENT-mmm


Neck-supple


Lungs-diminished, clear


Heart- RRR no edema


Abdomen- nt + bs soft


Extremities-R index black eschar lesion; no obvious lesion L hand w/ faint 

pulse and slight coolness and less lacy 


Neuro-centeno, fluent speech





 Current Inpatient Medications








 Medications


  (Trade)  Dose


 Ordered  Sig/Willy


 Route  Start Time


 Stop Time Status Last Admin


Dose Admin


 


 Heparin Sodium


  (Porcine)


  (Heparin Sq 5000


 Unit/0.5ml)  5,000 unit  Q12


 SQ  1/31/17 21:00


 3/2/17 20:59  2/1/17 08:38


5,000 UNIT


 


 Acetaminophen


  (Tylenol Tab)  650 mg  Q4H  PRN


 PO  1/31/17 14:30


 3/2/17 14:29   


 


 


 Ondansetron HCl


  (Zofran Inj)  4 mg  Q6H  PRN


 IV  1/31/17 14:30


 3/2/17 14:29   


 


 


 Aspirin


  (Ecotrin Tab)  81 mg  DAILY


 PO  2/1/17 09:00


 3/3/17 08:59  2/1/17 08:39


81 MG


 


 Atorvastatin


 Calcium


  (Lipitor Tab)  40 mg  DAILY


 PO  2/1/17 09:00


 3/3/17 08:59  2/1/17 08:39


40 MG


 


 Calcium Acetate


  (Phoslo Cap)  667 mg  TIDM


 PO  1/31/17 18:00


 3/2/17 17:59  2/1/17 08:39


667 MG


 


 Cephalexin


 Monohydrate


  (Keflex Cap)  500 mg  BID


 PO  1/31/17 21:00


 2/10/17 20:59  2/1/17 08:39


500 MG


 


 Clopidogrel


 Bisulfate


  (plAVix TAB)  75 mg  DAILY


 PO  2/1/17 09:00


 3/3/17 08:59  2/1/17 08:37


75 MG


 


 Isosorbide


 Mononitrate


  (Imdur Ext Rel


 Tab)  60 mg  QAM


 PO  2/1/17 09:00


 3/3/17 08:59  2/1/17 08:38


60 MG


 


 Isosorbide


 Mononitrate


  (Imdur Ext Rel


 Tab)  30 mg  QAM


 PO  2/1/17 09:00


 3/3/17 08:59  2/1/17 08:39


30 MG


 


 Lorazepam


  (Ativan Tab)  0.5 mg  DAILY  PRN


 PO  1/31/17 14:45


 3/2/17 14:44   


 


 


 Metoprolol


 Tartrate


  (Lopressor Tab)  50 mg  BID


 PO  1/31/17 21:00


 3/2/17 20:59  2/1/17 08:38


50 MG


 


 Ranitidine HCl


  (zANTac TAB)  300 mg  HS


 PO  1/31/17 21:00


 3/2/17 20:59  1/31/17 20:15


300 MG


 


 Sertraline HCl


  (Zoloft Tab)  100 mg  DAILY


 PO  2/1/17 09:00


 3/3/17 08:59  2/1/17 08:39


100 MG


 


 Tramadol HCl


  (Ultram Tab)  50 mg  TID  PRN


 PO  1/31/17 14:45


 3/2/17 14:44   


 


 


 Insulin Aspart


  (novoLOG ASPART)  SLIDING


 SCALE  ACHS


 SC  1/31/17 21:00


 3/2/17 20:59  1/31/17 20:46


2 UNITS


 


 Glucose


  (Glucose 40% Gel)  15-30


 GRAMS 15


 GRAMS...  UD  PRN


 PO  1/31/17 21:00


 3/2/17 20:59   


 


 


 Glucose


  (Glucose Chew


 Tab)  4-8


 Tablets 4


 Tabl...  UD  PRN


 PO  1/31/17 21:00


 3/2/17 20:59   


 


 


 Dextrose


  (Dextrose 50%


 50ML Syringe)  25-50ML OF


 50% DW IV


 FOR...  UD  PRN


 IV  1/31/17 21:00


 3/2/17 20:59   


 


 


 Glucagon


  (Glucagon Inj)  1 mg  UD  PRN


 SQ  1/31/17 21:00


 3/2/17 20:59   


 











Last 24 Hours








Test


  1/31/17


11:30 1/31/17


11:40 1/31/17


12:07 1/31/17


14:20


 


White Blood Count 17.26 K/uL    


 


Red Blood Count 3.95 M/uL    


 


Hemoglobin 12.0 g/dL    


 


Hematocrit 35.8 %    


 


Mean Corpuscular Volume 90.6 fL    


 


Mean Corpuscular Hemoglobin 30.4 pg    


 


Mean Corpuscular Hemoglobin


Concent 33.5 g/dl 


  


  


  


 


 


Platelet Count 344 K/uL    


 


Mean Platelet Volume 11.1 fL    


 


Neutrophils (%) (Auto) 85.8 %    


 


Lymphocytes (%) (Auto) 8.3 %    


 


Monocytes (%) (Auto) 4.5 %    


 


Eosinophils (%) (Auto) 0.9 %    


 


Basophils (%) (Auto) 0.2 %    


 


Neutrophils # (Auto) 14.82 K/uL    


 


Lymphocytes # (Auto) 1.43 K/uL    


 


Monocytes # (Auto) 0.77 K/uL    


 


Eosinophils # (Auto) 0.16 K/uL    


 


Basophils # (Auto) 0.03 K/uL    


 


RDW Standard Deviation 46.9 fL    


 


RDW Coefficient of Variation 14.3 %    


 


Immature Granulocyte % (Auto) 0.3 %    


 


Immature Granulocyte # (Auto) 0.05 K/uL    


 


Prothrombin Time 12.1 SECONDS    


 


Prothromb Time International


Ratio 1.1 


  


  


  


 


 


Activated Partial


Thromboplast Time 30.2 SECONDS 


  


  


  


 


 


Partial Thromboplastin Ratio 1.2    


 


Sodium Level 133 mmol/L    


 


Potassium Level 5.5 mmol/L    


 


Chloride Level 96 mmol/L    


 


Carbon Dioxide Level 21 mmol/L    


 


Anion Gap 16.0 mmol/L    


 


Blood Urea Nitrogen 71 mg/dl    


 


Creatinine 14.00 mg/dl    


 


Est Creatinine Clear Calc


Drug Dose 6.1 ml/min 


  


  


  


 


 


Estimated GFR (


American) 3.2 


  


  


  


 


 


Estimated GFR (Non-


American 2.7 


  


  


  


 


 


BUN/Creatinine Ratio 5.0    


 


Random Glucose 259 mg/dl    


 


Calcium Level 9.6 mg/dl    


 


Magnesium Level 2.6 mg/dl    


 


Total Bilirubin 0.4 mg/dl    


 


Direct Bilirubin 0.1 mg/dl    


 


Aspartate Amino Transf


(AST/SGOT) 11 U/L 


  


  


  


 


 


Alanine Aminotransferase


(ALT/SGPT) 6 U/L 


  


  


  


 


 


Alkaline Phosphatase 81 U/L    


 


Total Creatine Kinase 44 U/L    


 


Creatine Kinase MB 2.4 ng/ml    


 


Creatine Kinase MB Ratio 5.5    


 


Total Protein 8.5 gm/dl    


 


Albumin 3.2 gm/dl    


 


Lipase 237 U/L    


 


Bedside Lactic Acid Venous  2.67 mmol/L   


 


Bedside Troponin I   0.180 ng/ml  


 


Lactic Acid Level    1.7 mmol/L 














Test


  1/31/17


17:00 1/31/17


20:06 1/31/17


20:32 1/31/17


23:00


 


Creatine Kinase MB Ratio      


 


Creatine Kinase MB  2.8 ng/ml   


 


Troponin I  0.268 ng/ml   


 


Bedside Glucose   151 mg/dl  














Test


  1/31/17


23:13 2/1/17


00:00 2/1/17


05:52 2/1/17


06:28


 


Creatine Kinase MB 3.2 ng/ml    


 


Troponin I 0.249 ng/ml    


 


Urine Color  YELLOW   


 


Urine Appearance  CLOUDY   


 


Urine pH  6.5   


 


Urine Specific Gravity  1.012   


 


Urine Protein  3+   


 


Urine Glucose (UA)  2+   


 


Urine Ketones  NEG   


 


Urine Occult Blood  1+   


 


Urine Nitrite  NEG   


 


Urine Bilirubin  NEG   


 


Urine Urobilinogen  NEG   


 


Urine Leukocyte Esterase  SMALL   


 


Urine WBC (Auto)  >30 /hpf   


 


Urine RBC (Auto)  0-4 /hpf   


 


Urine Hyaline Casts (Auto)  0 /lpf   


 


Urine Epithelial Cells (Auto)  >30 /lpf   


 


Urine Bacteria (Auto)  NEG   


 


Urine Pathogenic Casts   /lpf   


 


White Blood Count   13.38 K/uL  


 


Red Blood Count   3.48 M/uL  


 


Hemoglobin   10.5 g/dL  


 


Hematocrit   32.6 %  


 


Mean Corpuscular Volume   93.7 fL  


 


Mean Corpuscular Hemoglobin   30.2 pg  


 


Mean Corpuscular Hemoglobin


Concent 


  


  32.2 g/dl 


  


 


 


Platelet Count   318 K/uL  


 


Mean Platelet Volume   10.8 fL  


 


Neutrophils (%) (Auto)   64.6 %  


 


Lymphocytes (%) (Auto)   23.9 %  


 


Monocytes (%) (Auto)   8.9 %  


 


Eosinophils (%) (Auto)   2.1 %  


 


Basophils (%) (Auto)   0.2 %  


 


Neutrophils # (Auto)   8.64 K/uL  


 


Lymphocytes # (Auto)   3.20 K/uL  


 


Monocytes # (Auto)   1.19 K/uL  


 


Eosinophils # (Auto)   0.28 K/uL  


 


Basophils # (Auto)   0.03 K/uL  


 


RDW Standard Deviation   49.3 fL  


 


RDW Coefficient of Variation   14.8 %  


 


Immature Granulocyte % (Auto)   0.3 %  


 


Immature Granulocyte # (Auto)   0.04 K/uL  


 


Sodium Level   136 mmol/L  


 


Potassium Level   4.7 mmol/L  


 


Chloride Level   97 mmol/L  


 


Carbon Dioxide Level   25 mmol/L  


 


Anion Gap   14.0 mmol/L  


 


Blood Urea Nitrogen   49 mg/dl  


 


Creatinine   12.00 mg/dl  


 


Est Creatinine Clear Calc


Drug Dose 


  


  7.0 ml/min 


  


 


 


Estimated GFR (


American) 


  


  3.8 


  


 


 


Estimated GFR (Non-


American 


  


  3.3 


  


 


 


BUN/Creatinine Ratio   4.1  


 


Random Glucose   97 mg/dl  


 


Calcium Level   9.2 mg/dl  


 


Magnesium Level   2.5 mg/dl  


 


Lactic Acid Level    1.0 mmol/L 














Test


  2/1/17


07:44 


  


  


 


 


Bedside Glucose 124 mg/dl    








 








 Date/Time


Source Procedure


Growth Status


 


 


 1/31/17 14:49


Blood Blood Culture


Pending Received


 


 1/31/17 14:20


Blood Blood Culture


Pending Received


 


 1/31/17 20:00


Nasal MRSA DNA Surveillance Screen - Final


Specimen Negative for MRSA by DNA Probe Complete











Assessment & Plan


50 y/o F w/ ESRD and missed dialysis admitted 1/31 w/ chest pain





ESRD w/ hyperkalemia, HTN 


-4 hr tx today w/ aggressive UF as tolerated; next HD tentatively for 2/3 as 

inpt/outpt





possible AV fistula malfunction on imaging


-vascular evaluating pt now





Anemia of ESRD


-no meds indicated currently; monitor daily





Digital Lesions


-vascular and orthopedics following





Appreciate consultation; will follow with you

## 2017-02-01 NOTE — ECHOCARDIOGRAM REPORT
*NOTICE TO RECEIVING PARTY AGENCY**  This information is strictly Confidential and protected under 
[place] law.  [place] law prohibits you from making any further disclosure of this 
information unless further disclosure is expressly permitted by the written consent of the person to 
whom it pertains or is authorized by law.  A general authorization for the release of medical or 
other information is not sufficient for this purpose.  Hospital accepts no responsibility if the 
information is made available to any other person, INCLUDING THE PATIENT.



Interpretation Summary

  *  Name: RHEA MCLEAN  Study Date: 2017 02:19 PM  BP: 81/40 mmHg

  *  MRN: [id number]  Patient Location: .[facility]\S\N287\S\[id number]  HR: 55

  *  : 1965 (M/d/yyyy)  Gender: Female  Height: 66 in

  *  Age: 51 yrs  Ethnicity: [place]  Weight: 240 lb

  *  Ordering Physician: Gaby Rodríguez

  *  Performed By: Kellie Klein RDCS

  *  Accession# [id number]  Account# [id number]

  *  Reason For Study: Chest pain

  *  BSA: 2.2 m2

  *  -- Conclusions --

  *  1. Normal LV size, borderline concentric LVH.

  *  2. Normal LV systolic function.  LVEF 55-60%.  Base to mid inferior. inferoseptal severe 
hypokinesis.

  *  3. Grade I diastolic dysfunction

  *  4. Normal RV size, mild RV dysfunction.

  *  5. Aortic valve sclerosis without stenosis.

  *  6. Compared with prior study on 10/12/2016: No significant changes.

Procedure Details

  *  A complete two-dimensional transthoracic echocardiogram was performed (2D, M-mode, Doppler and 
color flow Doppler).

Left Ventricle

  *  The left ventricle is grossly normal size.

  *  There is borderline concentric left ventricular hypertrophy.

  *  Ejection Fraction = 55-60%.

  *  There is severe inferior wall hypokinesis.

Right Ventricle

  *  The right ventricle is grossly normal size.

  *  The right ventricular systolic function is mildly reduced.

Atria

  *  Borderline left atrial enlargement.

  *  Right atrial size is normal.

  *  No ASD detected; PFO is not assessed.

Mitral Valve

  *  The mitral valve is grossly normal.

  *  There is mild mitral annular calcification.

  *  There is no mitral valve stenosis.

  *  Significant mitral regurgitation is absent.

Tricuspid Valve

  *  The tricuspid valve is not well visualized, but is grossly normal.

  *  There is no tricuspid stenosis.

  *  Significant tricuspid regurgitation is absent.

Aortic Valve

  *  The aortic valve opens well.

  *  Aortic valve sclerosis mild, without significant aortic valvular stenosis.

  *  The aortic valve is trileaflet.

  *  No hemodynamically significant valvular aortic stenosis.

  *  There is no significant aortic regurgitation.

Pulmonic Valve

  *  The pulmonary valve is inadequately visualized, but the Doppler data is adequate for 
interpretation.

  *  Pulmonic stenosis is absent.

  *  There is no significant pulmonary regurgitation.

Great Vessels

  *  The aortic root and proximal ascending aorta are normal sized.

Pericardium/Pleural

  *  There is no pericardial effusion.

Great Vessels

  *  IVC < 2.1 cm, minimal change with respiration.

Left Ventricular Diastolic Function

  *  Grade I diastolic dysfunction, (abnormal relaxation pattern).



MMode 2D Measurements and Calculations

IVSd 1.1 cm



LVIDd 5.2 cm

LVIDs 3.7 cm

LVPWd 1.0 cm



IVS/LVPW 1.1 

FS 28.2 %

EDV(Teich) 127.5 ml

ESV(Teich) 58.4 ml

EF(Teich) 54.2 %



EDV(cubed) 137.8 ml

ESV(cubed) 50.9 ml

EF(cubed) 63.1 %





LV mass(C)d 208.0 grams

LV mass(C)dI 96.2 grams/m\S\2



CO(Teich) 4.7 l/min

CI(Teich) 2.2 l/min/m\S\2

SV(Teich) 69.2 ml

SI(Teich) 32.0 ml/m\S\2

CO(cubed) 5.9 l/min

CI(cubed) 2.7 l/min/m\S\2

SV(cubed) 86.9 ml

SI(cubed) 40.2 ml/m\S\2



Ao root diam 2.6 cm

Ao root area 5.2 cm\S\2

ACS 1.9 cm

LA dimension 4.0 cm



asc Aorta Diam 3.2 cm





LA/Ao 1.5 

LVOT diam 2.0 cm

LVOT area 3.0 cm\S\2



LVAd ap4 30.3 cm\S\2

LVLd ap4 8.3 cm

EDV(MOD-sp4) 91.0 ml

LVAs ap4 18.1 cm\S\2

LVLs ap4 7.3 cm

ESV(MOD-sp4) 39.0 ml

EF(MOD-sp4) 57.1 %



LVAd ap2 16.1 cm\S\2

LVLd ap2 5.8 cm

EDV(MOD-sp2) 39.0 ml

LVAs ap2 10.4 cm\S\2

LVLs ap2 5.2 cm

ESV(MOD-sp2) 18.0 ml

EF(MOD-sp2) 53.8 %



CO(MOD-sp4) 3.5 l/min

CI(MOD-sp4) 1.6 l/min/m\S\2

SV(MOD-sp4) 52.0 ml

SI(MOD-sp4) 24.1 ml/m\S\2





CO(MOD-sp2) 1.4 l/min

CI(MOD-sp2) 0.66 l/min/m\S\2

SV(MOD-sp2) 21.0 ml

SI(MOD-sp2) 9.7 ml/m\S\2













Doppler Measurements and Calculations

MV E max conchis 46.3 cm/sec

MV A max conchis 59.0 cm/sec



MV E/A 0.79 



MV dec time 0.32 sec



Ao V2 max 133.3 cm/sec

Ao max PG 7.1 mmHg

Ao max PG (full) 4.0 mmHg

JENNIFER(V,A) 2.0 cm\S\2

JENNIFER(V,D) 2.0 cm\S\2





LV V1 max PG 3.1 mmHg



LV V1 max 88.4 cm/sec



PA V2 max 116.7 cm/sec

PA max PG 5.5 mmHg

PA acc slope 668.9 cm/sec\S\2

PA acc time 0.12 sec



PA pr(Accel) 23.5 mmHg

## 2017-02-01 NOTE — CARDIOLOGY CONSULTATION
DATE OF CONSULTATION:  02/01/2017

 

CONSULTATION REQUESTED BY:  Dr. Bolanos

 

REASON FOR CONSULTATION:  Chest pain, mildly elevated troponin.

 

HISTORY OF PRESENT ILLNESS:  Ms. Mckeon is a very pleasant 51-year-old woman

with a history of coronary artery disease status post prior bare metal stent

to her obtuse marginal, known to me from prior hospital and outpatient care,

who was admitted in the setting of left arm/hand numbness and pain with

associated mild chest discomfort.  Cardiology was consulted for chest

discomfort and mildly elevated troponin.

 

The patient had recently been admitted one week prior in the setting of

shortness of breath and chest fullness after missing dialysis.  The symptoms

resolved after receiving dialysis while admitted.  She returned home and the day

of admission noticed that the third digit on her left hand was

cool, white and numb.  She also noticed pain in her left hand.  While

experiencing that pain, she then developed some mild chest tightness.  She

states that after belching several times the pain began to resolve.  She then

received some Zofran by EMS and the pain completely resolved.  Since that

time, her hand numbness and tingling has also improved.  

Upon arrival here, she was initially hypertensive with systolic blood pressures 
in the

200s.  She had initial point-of-care troponin that was positive at 0.1. 

Subsequent troponins went up to 0.27 and down to 0.25.  Initial EKG showed

sinus rhythm with inferior Q-waves, and new lateral

T-wave inversions in I and aVL.  Since admission, she has had no events on

telemetry and has remained chest pain free.  She is being evaluated by

vascular surgery for potential vascular steal or issues with her current AV

fistula in her left upper extremity.

 

PAST MEDICAL HISTORY:

1.  Coronary artery disease status post bare metal stent to OM in August 2016; at that time was noted to have a completely occluded right coronary

artery.

2.  End-stage renal disease, on hemodialysis.

3.  Diabetes.

4.  Hyperlipidemia.

5.  Peripheral artery disease with known severe carotid artery disease,

awaiting carotid endarterectomy.

6.  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

 

FAMILY HISTORY:  Denies any history of premature coronary artery disease or

sudden cardiac death.

 

SOCIAL HISTORY:  She is a former smoker.  She is  and currently lives

with her family.  She is currently on disability.

 

ALLERGIES:  TO ADHESIVES AND POLLEN EXTRACT.

 

HOME MEDICATIONS:  Include aspirin 81, atorvastatin 20, PhosLo, Keflex,

Plavix 75, insulin Glargine, isosorbide mononitrate 90 mg total, lorazepam,

methocarbamol, metoprolol 25 mg b.i.d., ranitidine, sertraline and tramadol.

 

REVIEW OF SYSTEMS:  Complete and otherwise negative as listed in the HPI.

 

PHYSICAL EXAMINATION:

VITAL SIGNS:  Temperature 36.5, pulse 76, blood pressure 116/53, she is

satting 99% on room air.

GENERAL:  The patient appears well, in no acute distress.

HEENT:  Sclerae anicteric.  Oropharynx is clear.  Mucous membranes are moist.

NECK:  Supple.  She has no lymphadenopathy.  She has no palpable carotid

pulse on the left.

LUNGS:  She has normal breath sounds with few crackles at the right base.

CARDIAC:  She has distant heart sounds, but is regular with no appreciable

murmurs, rubs or gallops.

ABDOMEN:  Soft, obese, nontender with positive bowel sounds.

EXTREMITIES:  Cool bilaterally.  She has barely palpable DP pulses

bilaterally.  PT pulses were not palpable.  She has a 1+ radial pulse on the

right, has an intact fistula on the left with palpable thrill.

SKIN:  Shows no significant rashes or lesions.

NEUROLOGIC:  Cranial nerves II-XII grossly intact.  Remainder of exam

nonfocal.

PSYCHIATRIC:  She is alert and oriented x3 with appropriate affect.

 

LABORATORY DATA:  Sodium 133, potassium 5.5, BUN 71, creatinine of 14.  She

had troponins of 0.18, 0.27 and 0.25.  INR of 1.2.  Hemoglobin 10.5,

platelets of 318.  White blood cell count was 13.4.

 

IMAGING:  Upper extremity ultrasound showed normal airway flow through the

brachial arterial fistula with diminished velocity characteristics consistent

with least moderate stenosis.  There was dampened flow within the arterial

structures of the distal forearm and palm suggestive of possible stenosis. 

Chest x-ray showed no acute cardiopulmonary process.

 

EKG showed a sinus rhythm with inferior Q-waves and lateral T-wave inversions

in I and aVL.  Telemetry sinus rhythm, occasional PACs.  No complex

arrhythmias.

 

Echo from today reviewed shows low normal LV function with inferior base to

mid wall motion abnormality, unchanged from prior.  No significant new

valvular abnormalities.  No significant regional wall motion abnormalities

other than those previously noted.

 

IMPRESSION AND PLAN:

1.  Mild troponin elevation.

2.  End-stage renal disease, on dialysis.

3.  Possible upper extremity arterial insufficiency.

4.  History of coronary artery disease status post prior stent to the obtuse

marginal.

5.  Anemia.

6.  Ischemic cardiomyopathy with ejection fraction of approximately 45%.

 

The patient is here in the setting of upper extremity pain and numbness,

potentially secondary to arterial insufficiency being evaluated by vascular

surgery.  In the setting of this, she was noted to have some mild chest

tightness.  This was associated with minimally elevated troponin, unchanged

significantly from prior admission in the setting of missed dialysis.  My

suspicion that this represents a true ACS event is low and I do not feel that

needs full dose anticoagulation or more urgent risk stratification at this

time.  The patient does have a history of CAD and does have some new

T-wave inversions on ECG and as such would consider stress test as an 

outpatient prior to planned possible carotid endarterectomy.  



Otherwise, would continue on her home medications.  From a cardiac standpoint, 

does not need dual antiplatelet therapy, but it being used for peripheral 
artery 

disease can continue.  Otherwise, continue

on home statin and blood pressure medicines per nephrology.

 

We will continue to follow while patient is in the hospital.

 

Thank you for allowing us to participate in the care of this patient.  Please

contact with any questions.

 

 

 

LOUISE

## 2017-02-01 NOTE — SURGERY CONSULTATION
Consultation


Date of Service


Feb 1, 2017.





Chief Complaint


L hand pain, numbness.  Also malfunctioning AVF





History of Present Illness


The patient is a 51 year old female with multiple medical problems, including 

ESRD on HD, HTN, DMII, CAD, morbid obesity, known to vascular service for HD 

access and carotid stenosis, seen today d/t sudden onset L hand pain and pallor 

associated with chest pains that started while in the shower yesterday.  Pt 

states the pain is much better today, but her hand is still sensitive.  No 

lesions to L hand/fingers.  US performed in ED yesterday demonstrates diffuse 

arterial disease.  R 2nd finger with distal gangrene, unchanged from previous.  

Denies any other new complaints, although dialysis staff state she has been 

difficult to run d/t low arterial flows.  Denies HA, fever, chills, chest pain 

today, SOB, abd pain, N/V, rest pain, claudication, other complaints.





Vitals





 Vital Signs Past 12 Hours








  Date Time  Temp Pulse Resp B/P Pulse Ox O2 Delivery O2 Flow Rate FiO2


 


2/1/17 12:00     99 Room Air  


 


2/1/17 11:45  60  116/53    


 


2/1/17 11:45  71  97/37    


 


2/1/17 11:30  63  108/36    


 


2/1/17 11:15  78  99/80    


 


2/1/17 11:00  66  104/47    


 


2/1/17 10:45  71  105/74    


 


2/1/17 10:30  63  112/47    


 


2/1/17 10:15  63  106/48    


 


2/1/17 10:00  64  98/56    


 


2/1/17 09:45  61  119/50    


 


2/1/17 09:30  61  107/40    


 


2/1/17 09:15  58  106/62    


 


2/1/17 09:00  60  92/44    


 


2/1/17 08:45  66  118/47    


 


2/1/17 08:35  65  133/43    


 


2/1/17 08:28 36.6 71  104/26    


 


2/1/17 08:14 36.6 66 20 145/72 99 Room Air  


 


2/1/17 08:00     99 Room Air  


 


2/1/17 04:48 36.6 55 20 81/40 95 Room Air  


 


2/1/17 04:00      Room Air  











Allergies


Coded Allergies:  


     Adhesives (Verified  Allergy, Mild, RASH, SORES, 1/31/17)


     Pollen Extract (Unverified  Allergy, Unknown, rash, 1/31/17)





Home Medications


Scheduled


Aspirin (Aspirin Ec), 81 MG PO DAILY


Atorvastatin (Lipitor), 40 MG PO DAILY


Calcium Acetate (Phoslo 667 Mg), 1 CAP PO WM


Cephalexin Monohydrate (Cephalexin), 1 CAP PO BID


Clopidogrel Bisulfate (Plavix), 1 TAB PO DAILY


Insulin Glargine (Lantus Solostar), 10 UNITS SC QPM


Isosorbide Mononitrate (Isosorbide Mononitrate ER), 60 MG PO QAM


Isosorbide Mononitrate Ext Rel (Imdur Ext Rel), 30 MG PO QAM


Metoprolol Tartrate (Lopressor), 50 MG PO BID


Ranitidine Hcl (Zantac), 300 MG PO HS


Sertraline HCl (Sertraline HCl), 2 TAB PO DAILY





Scheduled PRN


Lorazepam (Ativan), 0.5 MG PO DAILY PRN for Anxiety


Methocarbamol (Methocarbamol), 500 MG PO BID PRN for Muscle Spasms


Tramadol (Ultram), 1 TAB PO TID PRN for Pain





Problem List


Medical Problems:


(1) Allergic rhinitis


(2) CAD (coronary artery disease)


(3) Carotid stenosis


(4) DM type 2 (diabetes mellitus, type 2)


(5) Dyslipidemia


(6) ESRD (end stage renal disease) on dialysis


(7) GERD (gastroesophageal reflux disease)


(8) HTN (hypertension)


(9) HX OF PAST NONCOMPLIANCE


(10) Ischemic cardiomyopathy


(11) Morbid obesity


(12) Tobacco use disorder


Surgical Problems:


(1) Hemodialysis access, AV graft








Surgical / Medical History


Hx Cardiac Surgery:  Yes (angioplasty with stenting)


Hx Abdominal Surgery:  No


Hx Cancer Surgery:  No


Hx Thoracic Surgery:  No


Hx Orthopedic:  No


Hx Urinary Tract Surgery:  No


Past Medical/Surgical History:  Diabetes, Heart Disease, High Cholesterol, 

Hypertension, Kidney Disease





Family History





Diabetes mellitus


  FATHER


  MOTHER


Heart disease


  FATHER


  MOTHER


Hypertension


  FATHER


  MOTHER


Kidney disease


  GRANDMOTHER





Social History


Smoking Status:  Former Smoker


Hx Tobacco Use In Past Year?:  No


Hx Alcohol Use - Type & Amnt:  No


Hx Substance Use -Type & Amnt:  No





Review of Systems


Constitutional:  No chills, No malaise


Eyes:  No visual changes


ENMT:  No sore throat


Respiratory:  No SOUSA, No cough, No hemoptysis, No short of breath


Cardiovascular:  + edema (occasional, chronic), No chest pain, No intermittent 

claudication, No palpitations, No syncope


Gastrointestinal:  No abdominal pain, No nausea, No vomiting


Neurologic:  + numbness (L hand), No dizziness, No headache, No lethargy, No 

tingling





Physical Exam


Constitutional:  


   General Apperance:  well-nourished, well-developed, obese


   Level of Distress:  NAD, chronically ill


Psychiatric:  


   Mental Status:  active & alert, normal mood, normal affect


   Orientation:  oriented except where noted, to time, to place, to person


   Memory:  recent memory normal, remote memory normal


Head:  normocephalic, atraumatic


Eyes:  


   EOM:  EOMI


ENMT:  normal ENT inspection, hearing grossly normal


Neck:  supple, trachea midline


Lungs:  


   Respiratory effort:  no dyspnea


   Auscultation:  no rales/crackles, no rhonchi, decreased breath sounds


Cardiovascular:  


   Apical Impulse:  not displaced


   Heart Auscultation:  RRR, no murmurs, no rubs, no gallops


Peripheral Pulses:  


   Pulses:  full and equal, in all extremities except if noted


   Bruits:  none appreciated


   Carotid Pulse:  normal on the left, normal on the right


   Brachial Pulses:  normal on the left, normal on the right


   Radial Pulse:  decreased on the left, decreased on the right


   Ulnar Pulse:  decreased on the left, decreased on the right


   Femoral Pulse:  normal on the left, normal on the right


   Posterior Tibialis Pulse:  decreased on the left, decreased on the right


   Dorsalis Pedis Pulse:  decreased on the left, decreased on the right


Abdomen:  


   Bowel Sounds:  normal


   Inspection & Palpation:  soft, non-distended, no tenderness, guarding & 

rebound


Musculoskeletal:  normal strength (5/5 throughout), normal tone


Extremities:  


   Upper Right:  no edema, no varicosities, gangrene (2nd finger tip)


   Upper Left:  no cyanosis, no edema, no varicosities, no mottling, pertinent 

finding (Both hands cool, + cap refill at 4sec BUE)


   Lower Right:  no edema, no varicosities


   Lower Left:  no cyanosis, no edema, no varicosities, gangrene (toe)


Neurologic:  


   Cranial Nerves:  grossly intact


   Sensation:  grossly intact





Assessment and Plan


ASSESSMENT and PLAN:


   L hand numbness


   Malfunctioning LUE AVF


      Discussed with Dr Simoni.  Will obtain US of LUE HD AVF for flow rate and 

possible stenosis, as well as rule out vascular steal syndrome.  Per outpt HD 

unit, they have had some flow rate problems as well.  Will consider fistulagram 

if US demonstrates stenotic area.  L hand pain, pallor, and numbness resolving.

  Likely related to PAD and small vessel disease, as no arterial occlusion 

noted on US.  


   Also planning on possible oupt L CEA d/t severe stenosis, may order CTA of 

neck while inpt.

## 2017-02-02 VITALS
HEART RATE: 56 BPM | SYSTOLIC BLOOD PRESSURE: 122 MMHG | TEMPERATURE: 97.88 F | DIASTOLIC BLOOD PRESSURE: 73 MMHG | OXYGEN SATURATION: 98 %

## 2017-02-02 VITALS
OXYGEN SATURATION: 96 % | HEART RATE: 60 BPM | SYSTOLIC BLOOD PRESSURE: 124 MMHG | DIASTOLIC BLOOD PRESSURE: 81 MMHG | TEMPERATURE: 98.24 F

## 2017-02-02 VITALS
HEART RATE: 66 BPM | DIASTOLIC BLOOD PRESSURE: 83 MMHG | OXYGEN SATURATION: 95 % | SYSTOLIC BLOOD PRESSURE: 132 MMHG | TEMPERATURE: 97.52 F

## 2017-02-02 VITALS
TEMPERATURE: 98.24 F | HEART RATE: 73 BPM | DIASTOLIC BLOOD PRESSURE: 84 MMHG | SYSTOLIC BLOOD PRESSURE: 144 MMHG | OXYGEN SATURATION: 98 %

## 2017-02-02 VITALS
DIASTOLIC BLOOD PRESSURE: 81 MMHG | OXYGEN SATURATION: 96 % | HEART RATE: 60 BPM | TEMPERATURE: 98.24 F | SYSTOLIC BLOOD PRESSURE: 124 MMHG

## 2017-02-02 VITALS — OXYGEN SATURATION: 98 %

## 2017-02-02 LAB
ANION GAP SERPL CALC-SCNC: 12 MMOL/L (ref 3–11)
BUN SERPL-MCNC: 39 MG/DL (ref 7–18)
BUN/CREAT SERPL: 4.4 (ref 10–20)
CALCIUM SERPL-MCNC: 9.4 MG/DL (ref 8.5–10.1)
CHLORIDE SERPL-SCNC: 97 MMOL/L (ref 98–107)
CO2 SERPL-SCNC: 26 MMOL/L (ref 21–32)
CREAT CL PREDICTED SERPL C-G-VRATE: 9.4 ML/MIN
CREAT SERPL-MCNC: 8.9 MG/DL (ref 0.6–1.2)
EOSINOPHIL NFR BLD AUTO: 282 K/UL (ref 130–400)
GLUCOSE SERPL-MCNC: 121 MG/DL (ref 70–99)
HCT VFR BLD CALC: 35.6 % (ref 37–47)
MCH RBC QN AUTO: 30.2 PG (ref 25–34)
MCHC RBC AUTO-ENTMCNC: 32 G/DL (ref 32–36)
MCV RBC AUTO: 94.2 FL (ref 80–100)
PMV BLD AUTO: 11 FL (ref 7.4–10.4)
POTASSIUM SERPL-SCNC: 4.5 MMOL/L (ref 3.5–5.1)
RBC # BLD AUTO: 3.78 M/UL (ref 4.2–5.4)
SODIUM SERPL-SCNC: 135 MMOL/L (ref 136–145)
WBC # BLD AUTO: 8.79 K/UL (ref 4.8–10.8)

## 2017-02-02 RX ADMIN — CEPHALEXIN SCH MG: 500 CAPSULE ORAL at 08:26

## 2017-02-02 RX ADMIN — ATORVASTATIN CALCIUM SCH MG: 40 TABLET, FILM COATED ORAL at 08:26

## 2017-02-02 RX ADMIN — INSULIN ASPART SCH UNITS: 100 INJECTION, SOLUTION INTRAVENOUS; SUBCUTANEOUS at 12:18

## 2017-02-02 RX ADMIN — Medication SCH MG: at 08:26

## 2017-02-02 RX ADMIN — METOPROLOL TARTRATE SCH MG: 50 TABLET, FILM COATED ORAL at 08:26

## 2017-02-02 RX ADMIN — CALCIUM ACETATE SCH MG: 667 CAPSULE ORAL at 12:16

## 2017-02-02 RX ADMIN — INSULIN ASPART SCH UNITS: 100 INJECTION, SOLUTION INTRAVENOUS; SUBCUTANEOUS at 08:30

## 2017-02-02 RX ADMIN — CALCIUM ACETATE SCH MG: 667 CAPSULE ORAL at 08:25

## 2017-02-02 RX ADMIN — HEPARIN SODIUM SCH UNIT: 10000 INJECTION, SOLUTION INTRAVENOUS; SUBCUTANEOUS at 08:30

## 2017-02-02 RX ADMIN — CLOPIDOGREL BISULFATE SCH MG: 75 TABLET, FILM COATED ORAL at 08:26

## 2017-02-02 NOTE — DIAGNOSTIC IMAGING REPORT
CT ANGIOGRAM OF THE NECK COMBO



CLINICAL HISTORY: Carotid stenosis.



COMPARISON STUDY:  CT scan of the neck dated 3/6/2016.



TECHNIQUE: Before and following the IV administration of 119 of Optiray 320, CT

angiogram of the neck was performed from the aortic arch to the skull base.

Images are reviewed in the axial, sagittal, and coronal planes. 3-D MIPS images

are created and assessed. IV contrast was administered without complication. All

measurements were calculated based on NASCET criteria.



CT DOSE: 777.08 mGy.cm



FINDINGS:



Thoracic aorta: There is mild atherosclerotic calcification of the thoracic

aorta. Visualized portions of the thoracic aorta are normal in caliber. The

aortic arch demonstrates bovine variant anatomy.



Subclavian arteries: Widely patent bilaterally.



Right carotid arterial system: The right common carotid artery is widely patent.

There is atherosclerotic plaque and irregularity in the carotid bulb. This

causes only mild (less than 50%) narrowing at the origin of the right internal

carotid artery. The remainder of the internal carotid artery as well as the

right external carotid artery are widely patent.



Left carotid arterial system: The left common carotid artery is widely patent.

There is extensive atherosclerotic plaque and calcification seen in the left

carotid bulb. There is high-grade stenosis at the origin of the left internal

carotid artery (greater than 90%). The minimum patent luminal diameter measures

1 mm. The length of stenosis extends approximately 5 mm. The remainder of the

left internal carotid artery as well as the left external carotid artery are

widely patent.



Vertebral arteries: The vertebral arteries are widely patent and codominant.



Intracranial vasculature: There is advanced atherosclerotic calcification

present in the carotid bulbs. The visualized intracranial vessels appear patent.

There is fetal origin of the right posterior cerebral artery.



Jugular veins: Widely patent bilaterally.



Brain parenchyma: The visualized brain parenchyma the skull base is within

normal limits.



Lung apices: Mild emphysematous change is noted. Partially visualized upper lobe

lung parenchyma is otherwise clear.



Soft tissues: The visualized pharyngeal soft tissues are normal in appearance

noting angiographic phase technique. The oropharyngeal airway appears widely

patent. Subcentimeter thyroid nodules are present in both lobes. The thyroid

gland is otherwise normal in appearance. The salivary glands are within normal

limits. No cervical lymphadenopathy is seen. Small calcified tonsilliths are

observed.



Orbits: The bony orbits appear intact. Orbital contents are within normal

limits.



Sinuses and mastoids: Clear.



Skeletal structures: The visualized calvarium at the skull base appears intact.

The imaged cervical spine is within normal limits.





IMPRESSION:



1. There is focal high-grade stenosis (greater than 90%) at the origin of the

left internal carotid artery.



2. The remainder of the left carotid arterial system is widely patent.



3. There is atherosclerotic plaque with mild luminal narrowing (less than 50%)

at the origin of the right internal carotid artery. The right carotid arterial

system is otherwise widely patent.



4. The vertebral arteries are widely patent.



5. Mild emphysema.



6. Additional changes as above.







Electronically signed by:  Nguyễn Nielson M.D.

2/2/2017 9:41 AM



Dictated Date/Time:  2/2/2017 9:31 AM

## 2017-02-02 NOTE — DISCHARGE SUMMARY
Discharge Summary


Admission Date:


Jan 31, 2017 at 14:26


Discharge Date:  Feb 2, 2017


Discharge Disposition:  Home


Principal Diagnosis:


CHEST PAIN


LEFT HAND PAIN/PARESTHESIA


HYPERTENSIVE URGENCY


Secondary Diagnoses/Problems:


LEUKOCYTOSIS


LEFT CAROTID STENOSIS


ISCHEMIC CARDIOMYOPATHY


HYPERKALEMIA, ESRD ON HD


DM


Procedures:


LUE ARTERIAL US IMPRESSION:  


1. No significant stenotic process of the arm.


2. The patient's left brachial arterial fistula shows normal antegrade flow,


although velocity characteristics are diminished consistent with at least a


moderate stenotic process.


3. Dampened flow within the arterial structures of the distal forearm and palm,


suggesting the possibility of significant stenotic change at the level of the


fistula, as well as within the distal radial distribution at the level of the


palm and wrist





CXR IMPRESSION:  Negative chest. 





LEFT FOOT XR IMPRESSION: 


1. No evidence for osteomyelitis.


2. Extensive soft tissue vascular calcification.





CTA NECK IMPRESSION:


1. There is focal high-grade stenosis (greater than 90%) at the origin of the


left internal carotid artery.


2. The remainder of the left carotid arterial system is widely patent.


3. There is atherosclerotic plaque with mild luminal narrowing (less than 50%)


at the origin of the right internal carotid artery. The right carotid arterial


system is otherwise widely patent.


4. The vertebral arteries are widely patent.


5. Mild emphysema.


6. Additional changes as above.





ECHO:


1. Normal LV size, borderline concentric LVH.


2. Normal LV systolic function.  LVEF 55-60%.  Base to mid inferior. 

inferoseptal severe hypokinesis.


3. Grade I diastolic dysfunction


4. Normal RV size, mild RV dysfunction.


5. Aortic valve sclerosis without stenosis.


6. Compared with prior study on 10/12/2016: No significant changes.


Consultations:


Dr. Wing, nephrology


Dr. Duarte, cardiology


Dr. Roberts, vascular surgery


Pending Studies/Follow-Up:


Patient needs to follow up with Dr. Roberts for US of her dialysis fistula - 

pending the results of that, she may need a fistulogram; also following with 

Dr. Roberts for possible left CEA.


If patient does undergo CEA, will need stress test prior to per cardiology.


Medication Reconciliation


Continued Medications:  


Aspirin (Aspirin Ec) 81 Mg Tab


81 MG PO DAILY





Atorvastatin (Lipitor) 20 Mg Tab


40 MG PO DAILY, TAB





Calcium Acetate (Phoslo 667 Mg) 667 Mg Cap


1 CAP PO WM, CAP





Clopidogrel Bisulfate (Plavix) 75 Mg Tab


1 TAB PO DAILY for 90 Days, #90 TAB 1 Refill





Insulin Glargine (Lantus Solostar) 100 Unit/Ml Inj


10 UNITS SC QPM, PEN





Isosorbide Mononitrate (Isosorbide Mononitrate ER) 30 Mg Tabcr


60 MG PO QAM





Isosorbide Mononitrate Ext Rel (Imdur Ext Rel) 30 Mg Ertab


30 MG PO QAM, TAB





Lorazepam (Ativan) 1 Mg Tab


0.5 MG PO DAILY PRN for Anxiety, TAB





Methocarbamol (Methocarbamol) 500 Mg Tab


500 MG PO BID PRN for Muscle Spasms





Metoprolol Tartrate (Lopressor) 25 Mg Tab


50 MG PO BID, TAB





Ranitidine Hcl (Zantac) 150 Mg Tab


300 MG PO HS, TAB





Sertraline HCl (Sertraline HCl) 50 Mg Tab


2 TAB PO DAILY for 30 Days, #60 TAB 5 Refills





Tramadol (Ultram) 50 Mg Tab


1 TAB PO TID PRN for Pain for 30 Days, #90 TAB





 


Discontinued Medications:  


Cephalexin Monohydrate (Cephalexin) 1 Homepack Ea


1 CAP PO BID, #20











Admission Information


HPI (per Admitting provider):


51 year old female who presents to the ER with chest pain, left hand pain, and 

left hand numbness. Patient was recently admitted to [facility] 1/24 - 1/27 for 

volume overload which was felt to be due to missing dialysis treatments. 

Patient reports that while she was in the shower today she developed left hand 

pain and her fingers were numb. She also noted her left middle finger was 

white. She then developed mid sternal chest pain which she describes as a 

pressure. She also had associated nausea and one episode of vomiting. She 

reports the hand pain was more severe than the chest pain. She then called EMS. 

She reports the chest pain started to ease up after she burped a couple of 

times. She reports complete resolution of the chest pain after receiving Zofran 

by EMS. She denies shortness of breath. No lightheadedness, dizziness, or 

diaphoresis. She denies abdominal pain or diarrhea. No fever or chills. She 

continues to make urine. She denies any urinary symptoms. Patient has necrotic 

tissue noted to right index finger and left great toe. She reports both are 

unchanged. In the ER, patient's left hand symptoms are improving. She is 

currently chest pain free. Trop is 0.180, EKG is unchanged from prior. She had 

an arterial doppler of the LUE that did not show any acute thrombosis. BP on 

presentation noted to be 217/97.


Physical Exam (per Admitting):  


   General Appearance:  no apparent distress


   Head:  normocephalic


   Eyes:  normal inspection


   ENT:  hearing grossly normal


   Neck:  supple, no JVD


   Respiratory/Chest:  lungs clear, normal breath sounds, no respiratory 

distress


   Cardiovascular:  regular rate, rhythm, no edema


   Abdomen/GI:  normal bowel sounds, non tender, soft


   Extremities/Musculoskelatal:  + pertinent finding (posterior aspect of left 

hand noted to be mild mottling, sensation intact, + radial pulse; dialysis 

fistula notes to E, + thrill)


   Neurologic/Psych:  no motor/sensory deficits, alert, normal mood/affect, 

oriented x 3


   Skin:  + pertinent finding (right index finger necrotic tissue noted, left 

great toe necrotic tissue noted)





Hospital Course


CHEST PAIN, MILDLY ELEVATED TROP, HX CAD


- admitted to tele


- trop 0.180 -> 0.268 -> 0.249


- resting echo unchanged from prior


- ESRD and hypertensive urgency likely contributing to troponin elevation 


- continue ASA, Plavix, beta blocker, statin, and nitrate


- cardio consult placed with Dr. Duarte - low suspicion of ACS  





LEFT HAND PAIN/PARESTHESIA


- improving


- ? due to moderate stenosis of brachial artery, radial artery distribution 

noted on arterial US


- vascular checks q4h


- vascular surgery consult - will need outpatient follow up at vascular office 

to have US of fistula completed 





HYPERTENSIVE URGENCY


- resolved


- BP improved with home doses of BP meds





LEUKOCYTOSIS


- WBC 17K -> 13K -> 8K


- ? stress response from pain/vomiting


- no obvious source of infection identified


- blood and urine cultures negative


- no infiltrate on CXR


- has necrotic tissue to right index finger/left great toe - improving/

unchanged per patient


- afebrile 


- POC lactic acid elevated, however serum WNL


- patient currently taking cephalexin for right index finger which was 

prescribed on 1/19 by PCP - patient has completed course therefore will d/c at 

discharge





LEFT CAROTID STENOSIS


- had CTA neck done - shows 90% left ICA stenosis


- following with vascular for possible repair


- per cardiology, will need stress test before surgery





ISCHEMIC CARDIOMYOPATHY


- EF normalized on echo 


- volume managed with dialysis





HYPERKALEMIA, ESRD ON HD


- K+ normalized


- no EKG changes


- nephro consulted for HD orders





DM 


- on Lantus at home however well known to our service and due to forced diet 

compliance while hospitalized usually only requires SSI; resume home dose of 

Lantus at discharge


Total time spent on discharge = 


This includes examination of the patient, discharge planning, medication 

reconciliation, and communication with other providers.





Discharge Instructions


Discharge Instructions


Admission


Reason for Admission:  Chest Pain, Left Hand Pain and Numbness





Discharge


Discharge Diagnosis / Problem:  Chest Pain, Left Hand Pain and Numbness





Discharge Goals


Goal(s):  Decrease discomfort, Improve function





Activity Recommendations


Activity Limitations:  resume your previous activity





.





Instructions / Follow-Up


Instructions / Follow-Up


IT IS VERY IMPORTANT FOR YOU TO KEEP YOUR DIALYSIS SCHEDULE - YOU ARE SCHEDULED 

TO GO TOMORROW (FRIDAY 2/3) AT YOUR REGULAR CLINIC 





Follow up with Dr. Smith Tuesday February 7th at 10:50


Call and make an appointment with Dr. Roberts for next week


Follow up with orthopedics as previously scheduled 





You do not need to take the anabiotic anymore - Keflex (cephalexin)





Continue to take your other medicines as prescribed





Current Hospital Diet


Patient's current hospital diet: Renal Diet





Discharge Diet


Recommended Diet:  AHA Diet (Heart Healthy), Diabetes Type 2 Diet, Renal Diet





Pending Studies


Studies pending at discharge:  no





Medical Emergencies








.


Who to Call and When:





Medical Emergencies:  If at any time you feel your situation is an emergency, 

please call 911 immediately.





.





Non-Emergent Contact


Non-Emergency issues call your:  Primary Care Provider





.


.





"Provider Documentation" section prepared by Gaby Rodríguez.





VTE Core Measure


Inpt VTE Proph given/why not?:  Unfractionated heparin SQ





Additional Copies To


Lurdes Smith D.O.

## 2017-02-02 NOTE — PROGRESS NOTE
Progress Note


Vascular lab here not able to do blood flow volumes.


May need to send her to my office to have the study done when she is stable 

enough per her primary service.


Blood flow volume may determine whether we need to revise the fistula.

## 2017-02-02 NOTE — HOSPITALIST PROGRESS NOTE
Hospitalist Progress Note


Date of Service


Feb 2, 2017.





Subjective


Patient seen and examined. 


Feeling little better today. 


No further episodes of chest pain or left hand numbness/cyanosis. Pain 

continues to improve. 


Reports constipation. 


No abdominal pain or nausea. 


Denies lightheadedness, dizziness, and shortness of breath.





Objective


Vital Signs











  Date Time  Temp Pulse Resp B/P Pulse Ox O2 Delivery O2 Flow Rate FiO2


 


2/2/17 16:28 36.8 60 20  96 Room Air  


 


2/2/17 15:19 36.8 60 20 124/81 96 Room Air  


 


2/2/17 12:15      Room Air  


 


2/2/17 12:12 36.6 56 20 122/73 98 Room Air  


 


2/2/17 08:10      Room Air  


 


2/2/17 08:10     98 Room Air  


 


2/2/17 07:42 36.8 73 20 144/84 98 Room Air  


 


2/2/17 04:15 36.4 66 18 132/83 95 Room Air  


 


2/2/17 04:00      Room Air  


 


2/2/17 00:00      Room Air  


 


2/1/17 23:35 36.6 68 18 102/62 97 Room Air  


 


2/1/17 20:25 36.9 73 18 108/54 97 Room Air  


 


2/1/17 20:00      Room Air  











Physical Exam


General Appearance:  no apparent distress


Eyes:  normal inspection


ENT:  hearing grossly normal


Neck:  supple, no JVD


Respiratory/Chest:  lungs clear, normal breath sounds, no respiratory distress


Cardiovascular:  regular rate, rhythm, no edema


Abdomen:  normal bowel sounds, non tender, soft


Extremities:  + pertinent finding (dialysis fistula noted to LUE, + thrill; CSM 

checks intact to LUE)


Neurologic/Psychiatric:  no motor/sensory deficits, alert, normal mood/affect, 

oriented x 3


Skin:  normal color, warm/dry





Laboratory Results





Last 24 Hours








Test


  2/1/17


20:41 2/2/17


06:50 2/2/17


07:56 2/2/17


11:48


 


Bedside Glucose 94 mg/dl   110 mg/dl  163 mg/dl 


 


White Blood Count  8.79 K/uL   


 


Red Blood Count  3.78 M/uL   


 


Hemoglobin  11.4 g/dL   


 


Hematocrit  35.6 %   


 


Mean Corpuscular Volume  94.2 fL   


 


Mean Corpuscular Hemoglobin  30.2 pg   


 


Mean Corpuscular Hemoglobin


Concent 


  32.0 g/dl 


  


  


 


 


RDW Standard Deviation  50.3 fL   


 


RDW Coefficient of Variation  14.6 %   


 


Platelet Count  282 K/uL   


 


Mean Platelet Volume  11.0 fL   


 


Sodium Level  135 mmol/L   


 


Potassium Level  4.5 mmol/L   


 


Chloride Level  97 mmol/L   


 


Carbon Dioxide Level  26 mmol/L   


 


Anion Gap  12.0 mmol/L   


 


Blood Urea Nitrogen  39 mg/dl   


 


Creatinine  8.90 mg/dl   


 


Est Creatinine Clear Calc


Drug Dose 


  9.4 ml/min 


  


  


 


 


Estimated GFR (


American) 


  5.5 


  


  


 


 


Estimated GFR (Non-


American 


  4.7 


  


  


 


 


BUN/Creatinine Ratio  4.4   


 


Random Glucose  121 mg/dl   


 


Calcium Level  9.4 mg/dl   











Diagnostic Results


NECK CTA IMPRESSION:


1. There is focal high-grade stenosis (greater than 90%) at the origin of the


left internal carotid artery.


2. The remainder of the left carotid arterial system is widely patent.


3. There is atherosclerotic plaque with mild luminal narrowing (less than 50%)


at the origin of the right internal carotid artery. The right carotid arterial


system is otherwise widely patent.


4. The vertebral arteries are widely patent.


5. Mild emphysema.


6. Additional changes as above.





Assessment and Plan


CHEST PAIN, MILDLY ELEVATED TROP, HX CAD


- admit to tele


- s/p BMS to circumflex 8/2016


- trop 0.180 -> 0.268 -> 0.249


- resting echo unchanged from prior


- ESRD and hypertensive urgency likely contributing to troponin elevation 


- continue ASA, Plavix, beta blocker, statin, and nitrate


- cardio consult placed with Dr. Duarte - low suspicion of ACS  





LEFT HAND PAIN/PARESTHESIA


- improving


- ? due to moderate stenosis of brachial artery, radial artery distribution 

noted on arterial US


- vascular checks q4h


- vascular surgery consult - will need outpatient follow up at vascular office 

to have US of fistula completed 





HYPERTENSIVE URGENCY


- resolved


- BP improved with home doses of BP meds





LEUKOCYTOSIS


- WBC 17K -> 13K -> 8K


- ? stress response from pain/vomiting


- no obvious source of infection currently


- blood and urine cultures negative


- no infiltrate on CXR


- has necrotic tissue to right index finger/left great toe - improving/

unchanged per patient


- afebrile 


- POC lactic acid elevated, however serum WNL


- patient currently taking cephalexin for right index finger which was 

prescribed on 1/19 by PCP - patient has completed course therefore will d/c at 

discharge





LEFT CAROTID STENOSIS


- had CTA neck done - shows 90% left ICA stenosis


- following with vascular for possible repair


- per cardiology, will  need stress test before surgery





ISCHEMIC CARDIOMYOPATHY


- EF normalized on echo 


- volume managed with dialysis





HYPERKALEMIA, ESRD ON HD


- K+ normalized


- no EKG changes


- nephro consulted for HD orders





DM 


- on Lantus at home however well known to our service and due to forced diet 

compliance while hospitalized usually only requires SSI





DVT PROPHYLAXIS


- SQ Heparin

## 2017-02-02 NOTE — DISCHARGE INSTRUCTIONS
Discharge Instructions


Admission


Reason for Admission:  Chest Pain, Left Hand Pain and Numbness





Discharge


Discharge Diagnosis / Problem:  Chest Pain, Left Hand Pain and Numbness





Discharge Goals


Goal(s):  Decrease discomfort, Improve function





Activity Recommendations


Activity Limitations:  resume your previous activity





.





Instructions / Follow-Up


Instructions / Follow-Up


IT IS VERY IMPORTANT FOR YOU TO KEEP YOUR DIALYSIS SCHEDULE - YOU ARE SCHEDULED 

TO GO TOMORROW (FRIDAY 2/3) AT YOUR REGULAR CLINIC 





Follow up with Dr. Smith Tuesday February 7th at 10:50


Call and make an appointment with Dr. Roberts for next week


Follow up with orthopedics as previously scheduled 





You do not need to take the anabiotic anymore - Keflex (cephalexin)





Continue to take your other medicines as prescribed





Current Hospital Diet


Patient's current hospital diet: Renal Diet





Discharge Diet


Recommended Diet:  AHA Diet (Heart Healthy), Diabetes Type 2 Diet, Renal Diet





Pending Studies


Studies pending at discharge:  no





Medical Emergencies








.


Who to Call and When:





Medical Emergencies:  If at any time you feel your situation is an emergency, 

please call 911 immediately.





.





Non-Emergent Contact


Non-Emergency issues call your:  Primary Care Provider





.


.





"Provider Documentation" section prepared by Gaby Rodríguez.





VTE Core Measure


Inpt VTE Proph given/why not?:  Unfractionated heparin SQ

## 2017-02-06 ENCOUNTER — HOSPITAL ENCOUNTER (OUTPATIENT)
Dept: HOSPITAL 45 - C.EDB | Age: 52
Setting detail: OBSERVATION
LOS: 4 days | Discharge: HOME | End: 2017-02-10
Attending: HOSPITALIST | Admitting: HOSPITALIST
Payer: COMMERCIAL

## 2017-02-06 VITALS
HEIGHT: 66 IN | WEIGHT: 243.61 LBS | BODY MASS INDEX: 39.15 KG/M2 | HEIGHT: 66 IN | WEIGHT: 243.61 LBS | BODY MASS INDEX: 39.15 KG/M2

## 2017-02-06 DIAGNOSIS — E78.5: ICD-10-CM

## 2017-02-06 DIAGNOSIS — Z79.4: ICD-10-CM

## 2017-02-06 DIAGNOSIS — I13.2: ICD-10-CM

## 2017-02-06 DIAGNOSIS — N25.81: ICD-10-CM

## 2017-02-06 DIAGNOSIS — K92.0: ICD-10-CM

## 2017-02-06 DIAGNOSIS — Z79.82: ICD-10-CM

## 2017-02-06 DIAGNOSIS — Z87.891: ICD-10-CM

## 2017-02-06 DIAGNOSIS — E11.51: ICD-10-CM

## 2017-02-06 DIAGNOSIS — Z82.49: ICD-10-CM

## 2017-02-06 DIAGNOSIS — Z83.3: ICD-10-CM

## 2017-02-06 DIAGNOSIS — K92.2: Primary | ICD-10-CM

## 2017-02-06 DIAGNOSIS — I25.5: ICD-10-CM

## 2017-02-06 DIAGNOSIS — Z91.19: ICD-10-CM

## 2017-02-06 DIAGNOSIS — I50.32: ICD-10-CM

## 2017-02-06 DIAGNOSIS — Z84.1: ICD-10-CM

## 2017-02-06 DIAGNOSIS — E11.9: ICD-10-CM

## 2017-02-06 DIAGNOSIS — E66.01: ICD-10-CM

## 2017-02-06 DIAGNOSIS — K21.9: ICD-10-CM

## 2017-02-06 DIAGNOSIS — Z79.899: ICD-10-CM

## 2017-02-06 DIAGNOSIS — Z99.2: ICD-10-CM

## 2017-02-06 DIAGNOSIS — D63.1: ICD-10-CM

## 2017-02-06 DIAGNOSIS — N18.6: ICD-10-CM

## 2017-02-06 DIAGNOSIS — I25.10: ICD-10-CM

## 2017-02-06 LAB
ALP SERPL-CCNC: 100 U/L (ref 45–117)
ALT SERPL-CCNC: 7 U/L (ref 12–78)
ANION GAP SERPL CALC-SCNC: 15 MMOL/L (ref 3–11)
AST SERPL-CCNC: 17 U/L (ref 15–37)
BASOPHILS # BLD: 0.06 K/UL (ref 0–0.2)
BASOPHILS NFR BLD: 0.4 %
BUN SERPL-MCNC: 20 MG/DL (ref 7–18)
BUN/CREAT SERPL: 3.3 (ref 10–20)
CALCIUM SERPL-MCNC: 9.8 MG/DL (ref 8.5–10.1)
CHLORIDE SERPL-SCNC: 88 MMOL/L (ref 98–107)
CO2 SERPL-SCNC: 28 MMOL/L (ref 21–32)
COMPLETE: YES
CREAT CL PREDICTED SERPL C-G-VRATE: 13.7 ML/MIN
CREAT SERPL-MCNC: 6.1 MG/DL (ref 0.6–1.2)
EOSINOPHIL NFR BLD AUTO: 372 K/UL (ref 130–400)
GLUCOSE SERPL-MCNC: 195 MG/DL (ref 70–99)
HCT VFR BLD CALC: 38.3 % (ref 37–47)
IG%: 0.3 %
IMM GRANULOCYTES NFR BLD AUTO: 11.9 %
INR PPP: 1 (ref 0.9–1.1)
LYMPHOCYTES # BLD: 1.84 K/UL (ref 1.2–3.4)
MCH RBC QN AUTO: 30.5 PG (ref 25–34)
MCHC RBC AUTO-ENTMCNC: 32.6 G/DL (ref 32–36)
MCV RBC AUTO: 93.4 FL (ref 80–100)
MONOCYTES NFR BLD: 8.8 %
NEUTROPHILS # BLD AUTO: 0.8 %
NEUTROPHILS NFR BLD AUTO: 77.8 %
PARTIAL THROMBOPLASTIN RATIO: 1
PMV BLD AUTO: 10.6 FL (ref 7.4–10.4)
POTASSIUM SERPL-SCNC: 3.5 MMOL/L (ref 3.5–5.1)
PREG INTERNAL NEGATIVE QC: (no result)
PREG INTERNAL POSITIVE QC: (no result)
PROTHROMBIN TIME: 10.9 SECONDS (ref 9–12)
RBC # BLD AUTO: 4.1 M/UL (ref 4.2–5.4)
SODIUM SERPL-SCNC: 131 MMOL/L (ref 136–145)
WBC # BLD AUTO: 15.41 K/UL (ref 4.8–10.8)

## 2017-02-06 NOTE — DIAGNOSTIC IMAGING REPORT
CHEST ONE VIEW PORTABLE



CLINICAL HISTORY: Hematemesis    



COMPARISON STUDY:  1/31/2017



FINDINGS: The heart is borderline enlarged. There is no failure. There is no

focal pulmonary consolidation. There is no pneumothorax. There is no free

intraperitoneal air. There is no pneumomediastinum. No pleural effusions are

visualized.[ 



IMPRESSION: No active disease in the chest.







Electronically signed by:  Won Richey M.D.

2/6/2017 9:47 PM



Dictated Date/Time:  2/6/2017 9:46 PM

## 2017-02-06 NOTE — DIAGNOSTIC IMAGING REPORT
CT SCAN OF THE ABDOMEN AND PELVIS WITHOUT CONTRAST



CLINICAL HISTORY: Upper abdominal pain, pancreatitis, vomiting. History of

dialysis.    



COMPARISON STUDY:  1/18/2016 



TECHNIQUE: CT scan of the abdomen and pelvis was performed from the lung bases

to the proximal femurs. Images are reviewed in the axial, sagittal, and coronal

planes. IV contrast was not administered for this examination.



CT DOSE: 1440.82 mGy.cm

FINDINGS:



Lower chest: There are coronary artery calcifications present. There are no

pleural effusions. There is a small hiatal hernia.



Liver: The unenhanced liver is normal in size, contour, and attenuation. There

is no intrahepatic biliary ductal dilatation.



Gallbladder: The bowel is slightly hyperdense. Calculi cannot be excluded.



Spleen: Normal in size and attenuation.



Pancreas: Unremarkable.



Adrenal glands: There is mild adrenal gland thickening similar to the prior

study



Kidneys: There are extensive vascular calcifications. There is no

hydronephrosis. No calculi are visualized.



Bowel: There are no transition zones indicate bowel obstruction. There is

colonic diverticulosis. There are no acute peridiverticular inflammatory

changes. There is no evidence of acute appendicitis.



Peritoneum: There is no intraperitoneal free air or abdominal ascites.



Vasculature: The abdominal aorta is normal in course and caliber.



Adenopathy: None.



Pelvic viscera: The bladder, and pelvic viscera are unremarkable.



Skeletal structures: No destructive osseous lesions are seen.



IMPRESSION:  

1. No evidence of bowel obstruction. No evidence of free air

2. Diverticulosis. No evidence of acute diverticulitis

3. No evidence of acute appendicitis

4. Small hiatal hernia

5. Hyperdense bile within the gallbladder versus calculi

6. Extensive vascular calcifications







Electronically signed by:  Won Richey M.D.

2/6/2017 11:00 PM



Dictated Date/Time:  2/6/2017 10:57 PM

## 2017-02-07 VITALS — SYSTOLIC BLOOD PRESSURE: 126 MMHG | DIASTOLIC BLOOD PRESSURE: 73 MMHG

## 2017-02-07 VITALS
TEMPERATURE: 98.06 F | DIASTOLIC BLOOD PRESSURE: 68 MMHG | OXYGEN SATURATION: 100 % | SYSTOLIC BLOOD PRESSURE: 125 MMHG | HEART RATE: 73 BPM

## 2017-02-07 VITALS
SYSTOLIC BLOOD PRESSURE: 140 MMHG | TEMPERATURE: 98.06 F | HEART RATE: 70 BPM | DIASTOLIC BLOOD PRESSURE: 71 MMHG | OXYGEN SATURATION: 98 %

## 2017-02-07 VITALS
TEMPERATURE: 97.88 F | SYSTOLIC BLOOD PRESSURE: 117 MMHG | OXYGEN SATURATION: 98 % | DIASTOLIC BLOOD PRESSURE: 73 MMHG | HEART RATE: 70 BPM

## 2017-02-07 VITALS
DIASTOLIC BLOOD PRESSURE: 63 MMHG | OXYGEN SATURATION: 96 % | HEART RATE: 64 BPM | TEMPERATURE: 97.88 F | SYSTOLIC BLOOD PRESSURE: 110 MMHG

## 2017-02-07 VITALS
SYSTOLIC BLOOD PRESSURE: 160 MMHG | TEMPERATURE: 97.7 F | OXYGEN SATURATION: 100 % | HEART RATE: 80 BPM | DIASTOLIC BLOOD PRESSURE: 91 MMHG

## 2017-02-07 VITALS
OXYGEN SATURATION: 99 % | DIASTOLIC BLOOD PRESSURE: 80 MMHG | TEMPERATURE: 98.06 F | HEART RATE: 67 BPM | SYSTOLIC BLOOD PRESSURE: 114 MMHG

## 2017-02-07 LAB
ANION GAP SERPL CALC-SCNC: 13 MMOL/L (ref 3–11)
BASOPHILS # BLD: 0.04 K/UL (ref 0–0.2)
BASOPHILS NFR BLD: 0.3 %
BUN SERPL-MCNC: 24 MG/DL (ref 7–18)
BUN/CREAT SERPL: 3.4 (ref 10–20)
CALCIUM SERPL-MCNC: 9.1 MG/DL (ref 8.5–10.1)
CHLORIDE SERPL-SCNC: 89 MMOL/L (ref 98–107)
CO2 SERPL-SCNC: 30 MMOL/L (ref 21–32)
COMPLETE: YES
CREAT CL PREDICTED SERPL C-G-VRATE: 12 ML/MIN
CREAT SERPL-MCNC: 7 MG/DL (ref 0.6–1.2)
EOSINOPHIL NFR BLD AUTO: 374 K/UL (ref 130–400)
GASTRIC OCCULT BLOOD PH: 2
GASTROCULT GAST QL: (no result)
GLUCOSE SERPL-MCNC: 132 MG/DL (ref 70–99)
HCT VFR BLD CALC: 31.4 % (ref 37–47)
HCT VFR BLD CALC: 32.2 % (ref 37–47)
HCT VFR BLD CALC: 34.4 % (ref 37–47)
HCT VFR BLD CALC: 35.2 % (ref 37–47)
IG%: 0.2 %
IMM GRANULOCYTES NFR BLD AUTO: 19.3 %
LYMPHOCYTES # BLD: 2.69 K/UL (ref 1.2–3.4)
MAGNESIUM SERPL-MCNC: 2 MG/DL (ref 1.8–2.4)
MCH RBC QN AUTO: 31 PG (ref 25–34)
MCHC RBC AUTO-ENTMCNC: 32.8 G/DL (ref 32–36)
MCV RBC AUTO: 94.2 FL (ref 80–100)
MONOCYTES NFR BLD: 6.2 %
NEUTROPHILS # BLD AUTO: 0.5 %
NEUTROPHILS NFR BLD AUTO: 73.5 %
PMV BLD AUTO: 10.6 FL (ref 7.4–10.4)
POTASSIUM SERPL-SCNC: 3.6 MMOL/L (ref 3.5–5.1)
RBC # BLD AUTO: 3.65 M/UL (ref 4.2–5.4)
SODIUM SERPL-SCNC: 132 MMOL/L (ref 136–145)
SODIUM SERPL-SCNC: 132 MMOL/L (ref 136–145)
TSH SERPL-ACNC: 3.65 UIU/ML (ref 0.3–4.5)
WBC # BLD AUTO: 13.97 K/UL (ref 4.8–10.8)

## 2017-02-07 RX ADMIN — INSULIN ASPART SCH UNITS: 100 INJECTION, SOLUTION INTRAVENOUS; SUBCUTANEOUS at 07:58

## 2017-02-07 RX ADMIN — TRAMADOL HYDROCHLORIDE PRN MG: 50 TABLET, COATED ORAL at 09:11

## 2017-02-07 RX ADMIN — PANTOPRAZOLE SODIUM SCH MLS/HR: 40 INJECTION, POWDER, FOR SOLUTION INTRAVENOUS at 08:00

## 2017-02-07 RX ADMIN — SODIUM CHLORIDE AND POTASSIUM CHLORIDE SCH MLS/HR: 9; 1.49 INJECTION, SOLUTION INTRAVENOUS at 03:40

## 2017-02-07 RX ADMIN — PANTOPRAZOLE SODIUM SCH MLS/HR: 40 INJECTION, POWDER, FOR SOLUTION INTRAVENOUS at 13:30

## 2017-02-07 RX ADMIN — HYDROMORPHONE HYDROCHLORIDE PRN MG: 1 INJECTION, SOLUTION INTRAMUSCULAR; INTRAVENOUS; SUBCUTANEOUS at 13:08

## 2017-02-07 RX ADMIN — PANTOPRAZOLE SODIUM SCH MLS/HR: 40 INJECTION, POWDER, FOR SOLUTION INTRAVENOUS at 03:40

## 2017-02-07 RX ADMIN — PANTOPRAZOLE SODIUM SCH MLS/HR: 40 INJECTION, POWDER, FOR SOLUTION INTRAVENOUS at 23:26

## 2017-02-07 RX ADMIN — ISOSORBIDE MONONITRATE SCH MG: 30 TABLET, EXTENDED RELEASE ORAL at 09:11

## 2017-02-07 RX ADMIN — PANTOPRAZOLE SODIUM SCH MLS/HR: 40 INJECTION, POWDER, FOR SOLUTION INTRAVENOUS at 18:37

## 2017-02-07 RX ADMIN — INSULIN ASPART SCH UNITS: 100 INJECTION, SOLUTION INTRAVENOUS; SUBCUTANEOUS at 17:06

## 2017-02-07 RX ADMIN — INSULIN ASPART SCH UNITS: 100 INJECTION, SOLUTION INTRAVENOUS; SUBCUTANEOUS at 21:00

## 2017-02-07 RX ADMIN — CALCIUM ACETATE SCH MG: 667 CAPSULE ORAL at 17:02

## 2017-02-07 RX ADMIN — METOPROLOL TARTRATE SCH MG: 25 TABLET, FILM COATED ORAL at 09:10

## 2017-02-07 RX ADMIN — ONDANSETRON PRN MG: 2 INJECTION INTRAMUSCULAR; INTRAVENOUS at 09:10

## 2017-02-07 RX ADMIN — CALCIUM ACETATE SCH MG: 667 CAPSULE ORAL at 13:08

## 2017-02-07 RX ADMIN — ONDANSETRON PRN MG: 2 INJECTION INTRAMUSCULAR; INTRAVENOUS at 17:03

## 2017-02-07 RX ADMIN — CALCIUM ACETATE SCH MG: 667 CAPSULE ORAL at 07:30

## 2017-02-07 RX ADMIN — METOPROLOL TARTRATE SCH MG: 25 TABLET, FILM COATED ORAL at 21:28

## 2017-02-07 RX ADMIN — SERTRALINE HYDROCHLORIDE SCH MG: 50 TABLET, FILM COATED ORAL at 21:27

## 2017-02-07 RX ADMIN — HYDROMORPHONE HYDROCHLORIDE PRN MG: 1 INJECTION, SOLUTION INTRAMUSCULAR; INTRAVENOUS; SUBCUTANEOUS at 23:26

## 2017-02-07 RX ADMIN — INSULIN ASPART SCH UNITS: 100 INJECTION, SOLUTION INTRAVENOUS; SUBCUTANEOUS at 13:03

## 2017-02-07 NOTE — DIAGNOSTIC IMAGING REPORT
ABDOMINAL ULTRASOUND, RIGHT UPPER QUADRANT



HISTORY:     Abdominal pain and vomiting..



COMPARISON:  CT of the abdomen and pelvis February 6, 2017 and right upper

quadrant ultrasound December 26, 2014.



FINDINGS: Liver is sonographically normal. There is no biliary ductal

dilatation. No gallstones are identified. Minimal gallbladder wall thickening is

noted. There is sludge within the gallbladder. No sonographic Duarte sign was

elicited. The pancreatic body is normal. The head and tail are obscured. There

is no right hydronephrosis. There is marked right renal atrophy.



IMPRESSION: 





1. No gallstones or biliary ductal dilatation. Mild gallbladder wall thickening

and sludge within the gallbladder.



2. Right renal atrophy.







Electronically signed by:  Giovanni Clemens M.D.

2/7/2017 7:55 AM



Dictated Date/Time:  2/7/2017 7:53 AM

## 2017-02-07 NOTE — HISTORY & PHYSICAL EXAMINATION
DATE OF ADMISSION:  02/07/2017

 

PRIMARY CARE PHYSICIAN:  Dr. Smith.

 

CHIEF COMPLAINT:  Hematemesis.

 

HISTORY OF PRESENT ILLNESS:  



Medical history significant for chronic diastolic heart failure, EF 55%, 

CAD sp stenting, PVD as per records, past tobacco abuse, 

DM2, insulin requiring, end-stage renal disease on hemodialysis, 

chronic anemia (baseline hemoglobin 10-11). 



Recent confinement last week for chest pain, possibly from

hypertensive urgency. 



Last night, after arriving home from dialysis, px felt sick after eating

a  sandwich. Bilious emesis followed by 3 episodes of

dark brown/bloody emesis.  

No chest pain, no shortness of breath, no fever, no chills.  No black, no 
bloody stools.

Patient noted some epigastric pain.

Good BM.



MEDICAL HISTORY:  As above.

 

SURGERIES:  She has had vascular procedures

 

FAMILY HISTORY:  Heart disease, diabetes.

 

PERSONAL AND SOCIAL HISTORY:  Past tobacco abuse.  No chronic intake of

alcoholic beverages.  Disabled.

 

REVIEW OF SYSTEMS:  As per HPI.  all other ROS negative.

 

PHYSICAL EXAMINATION:

VITAL SIGNS:  Blood pressure was noted to be 115/79, pulse rate 92, RR 20,

temperature 36.6, sats 98 on room air.

GENERAL:  Noted to be slightly anxious, obese, no respiratory distress. 

Uncomfortable.Looks older than stated age.

SKIN:  Pallor.

HEENT:  Pale palpebral conjunctivae.  Dry mucosa.

NECK:  Short neck.

LUNGS:  Decreased breath sounds.

HEART:  Regular rate and rhythm.

ABDOMEN:  Epigastric tenderness.

EXTREMITIES:  No edema. no tenderness

NEUROLOGIC:  No gross focality.

 

LABS:  Hemoglobin was 12.5,  white cell count 15.4, platelets

272.  Sodium 131, potassium 3.5, chloride 88, CO2 28, BUN 20, creatinine

7, glucose 195.  Troponin 0.69. Lipase 227. 



CT of the abdomen and pelvis initial read showed hyperdense bile in the 
gallbladder, 

small hiatal hernia, extensive vascular calcifications.  



Chest x-ray showed no active disease.  



EKG showed sinus rhythm, 80 with PVCs, Q-waves in inferior leads

 

ASSESSMENT:

1.  Upper gastrointestinal bleed/hematemesis

ddx include esophagitis, Velia-Talley tear, gastritis, gastric ulcer

px currently hemodynamically stable

2.  Hypertension, slightly elevated.

3.  hx chronic diastolic HF (EF 55%), px on the dry side 

4.  History of coronary artery disease sp stenting/ PVD as per records

5.  DM2, insulin requiring

reasonable control as of recent HgA1c, 7.2 as of November 2016.

6.  Past tobacco abuse.

7.  ESRD on HD

 

PLAN:  

Observation

PCU. 

Follow HH, transfuse prbc if Hg less than 8. 

Appropriate to hold home antiplatelet tx

IV PPI

GI consult.  RE UGIB

Nephrology consult if px still admitted by tomorrow 2/8 (px known to [facility]; 
dialysis sked MW)

ISS BG goal 140-180

DVT prophylaxis, SCDs RE GI bleed.  

Full code.

 

 

 

MTDD

## 2017-02-07 NOTE — GASTROINTESTINAL CONSULTATION
Gastrointestinal Consultation


Date of Consultation:  Feb 7, 2017


Attending Physician:  Dr. Bautista


Consulting Physician:  Dr. Escalona


Reason for Consultation:  Hematemesis


History of Present Illness


Patient is a 51 year old female patient of Dr. Smith with a hx of ERSD on 

dialysis, CAD, PVD, DM-2 insulin requiring, chronic anemia with Hb 10 - 11. She 

presented to the ED yesterday for dark brown emesis. GI is consulted for 

hematemesis.





Yesterday, after dialysis, she ate a 6 inch hoagie. Soon after she had a sudden 

onset of nausea/vomiting, initially the food, followed by dark brown liquid 

that she though may have been blood tinged. She cycled through episodes of 

nausea, vomiting, then slight improvement in symptoms then nausea/vomiting 

again 4-5 times yesterday and presented to the ED because a subsequent emesis 

appeared to be a dark brown liquid. She does not believe that it appears like 

coffee grounds or fresh blood. It was not black. Her vomiting and dry heaving 

were somewhat forceful. 





On arrival, Hb at 12, today 11 both of which are above her baseline. She had 

one episode of emesis in the ED, then no further. US on arrival with mild 

gallbladder wall thickening and sludge in the gallbladder. Non contrast CT 

without any acute changes though there was hyperdense bile in the gallbladder. 


She currently denies any abdominal pain, nausea. She asks to eat lunch. She 

denies any previous similar episodes of pain or vomiting.





Past Medical/Surgical History


Medical Problems:


(1) Abdominal pain


Status: Acute  





(2) Acute electrocardiogram changes


Status: Acute  





(3) Elevated troponin


Status: Acute  





(4) Elevated troponin


Status: Acute  





(5) End stage renal disease


Status: Acute  





(6) GI bleed


Status: Acute  





(7) Hyperkalemia


Status: Acute  





(8) Joint effusion


Status: Acute  





(9) Left elbow pain


Status: Acute  





(10) Left sided chest pain


Status: Acute  





(11) Left sided chest pain


Status: Acute  





(12) Limb ischemia


Status: Acute  





(13) Medical non-compliance


Status: Acute  





(14) Pneumonia


Status: Acute  





(15) Pulmonary edema


Status: Acute  





(16) Renal failure


Status: Acute  





(17) Sinusitis


Status: Acute  





(18) Substernal chest pain


Status: Acute  





(19) Upper abdominal pain


Status: Acute  





(20) Urinary tract infection


Status: Acute  





(21) Vomiting


Status: Acute  





(22) Vomiting


Status: Acute  





(23) Vomiting


Status: Acute  





Past Medical History:


1. ESRD, on dialysis


2. CAD


3. PVD


4. DM-2


5. Chronic anemia


Past Surgical History:


Surgical placements of TDC and AVF. 


Pt denies any prior endoscopy.





Family History





Diabetes mellitus


  FATHER


  MOTHER


Heart disease


  FATHER


  MOTHER


Hypertension


  FATHER


  MOTHER


Kidney disease


  GRANDMOTHER





Social History


Smoking Status:  Former Smoker


Alcohol Use:  none


Housing Status:  lives with family





Allergies


Coded Allergies:  


     Adhesives (Verified  Allergy, Mild, RASH, SORES, 1/31/17)


     Pollen Extract (Unverified  Allergy, Unknown, rash, 1/31/17)





Current Medications





Home Meds and Scripts








 Medications  Dose


 Route/Sig


 Max Daily Dose Days Date Category


 


 Sertraline HCl 50


 Mg Tab  2 Tab


 PO DAILY


   30 1/24/17 Reported


 


 Phoslo 667 Mg


  (Calcium Acetate)


 667 Mg Cap  1 Cap


 PO WM


    1/24/17 Reported


 


 Lipitor


  (Atorvastatin) 20


 Mg Tab  40 Mg


 PO DAILY


    1/24/17 Reported


 


 Lopressor


  (Metoprolol


 Tartrate) 25 Mg


 Tab  50 Mg


 PO BID


    1/24/17 Reported


 


 Plavix


  (Clopidogrel


 Bisulfate) 75 Mg


 Tab  1 Tab


 PO DAILY


   90 1/24/17 Reported


 


 Isosorbide


 Mononitrate ER


  (Isosorbide


 Mononitrate) 30


 Mg Tabcr  60 Mg


 PO QAM


    1/24/17 Reported


 


 Imdur Ext Rel


  (Isosorbide


 Mononitrate) 30


 Mg Ertab  30 Mg


 PO QAM


    1/24/17 Reported


 


 Lantus Solostar


  (Insulin


 Glargine) 100


 Unit/Ml Inj  10 Units


 SC QPM


    1/24/17 Reported


 


 Ativan


  (Lorazepam) 1 Mg


 Tab  0.5 Mg


 PO DAILY PRN


    1/24/17 Reported


 


 Zantac


  (Ranitidine HCl)


 150 Mg Tab  300 Mg


 PO HS


    1/24/17 Reported


 


 Methocarbamol 500


 Mg Tab  500 Mg


 PO BID PRN


    1/24/17 Reported


 


 Ultram (Tramadol


 HCl) 50 Mg Tab  1 Tab


 PO TID PRN


   30 1/24/17 Reported


 


 Aspirin Ec


  (Aspirin) 81 Mg


 Tab  81 Mg


 PO DAILY


    8/16/16 Reported











Review of Systems


Constitutional:  No chills, No fever, No sweats, No weakness, No weight loss


Eyes:  No eye pain, No redness


ENT:  No pain on swallowing, No sore throat, No trouble swallowing


Respiratory:  No cough, No dyspnea on exertion, No shortness of breath, No 

wheezing


Cardiac:  No chest pain, No edema, No palpitations


Abdomen:  + see HPI


Neuro:  No balance problems, No memory loss, No numbness/tingling, No vertigo, 

No weakness


Psych:  No anxiety, No depression symptoms, No insomnia


Heme:  No abnormal bleeding/bruising, No night sweats


Endo:  No excessive thirst, No excessive urination


Skin:  No itch, No jaundice, No new/changing skin lesions, No rash





Physical Exam











  Date Time  Temp Pulse Resp B/P Pulse Ox O2 Delivery O2 Flow Rate FiO2


 


2/7/17 12:05 36.7 67 16 114/80 99 Room Air  


 


2/7/17 08:12 36.7 73 16 125/68 100   


 


2/7/17 08:00      Room Air  


 


2/7/17 04:42    126/73    


 


2/7/17 04:00      Room Air  


 


2/7/17 02:20 36.5 80 18 160/91 100 Room Air  


 


2/7/17 02:13  82 18 151/84 100 Nasal Cannula 2.0 


 


2/7/17 00:19  78 16 166/90 99 Room Air  


 


2/6/17 22:15  78 18 174/89 100 Nasal Cannula 2.0 


 


2/6/17 20:02 36.0 92 20 115/79 100 Room Air  








General Appearance:  no apparent distress


Eyes:  normal inspection, EOMI


Neck:  supple, no adenopathy, thyroid normal


Respiratory/Chest:  chest non-tender, lungs clear, normal breath sounds, no 

accessory muscle use


Cardiovascular:  regular rate, rhythm, no JVD, no murmur


Abdomen:  normal bowel sounds, non tender (non tender on entire abdomen at the 

time of the exam), soft, no organomegaly


Extremities:  normal inspection, no pedal edema, normal capillary refill


Neurologic/Psych:  alert, normal mood/affect, oriented x 3


Skin:  normal color, no jaundice, warm/dry, no rash





Laboratory Results





Last 24 Hours








Test


  2/6/17


21:00 2/7/17


00:58 2/7/17


01:55 2/7/17


07:09


 


White Blood Count 15.41 K/uL    


 


Red Blood Count 4.10 M/uL    


 


Hemoglobin 12.5 g/dL   11.7 g/dL  


 


Hematocrit 38.3 %   35.2 %  


 


Mean Corpuscular Volume 93.4 fL    


 


Mean Corpuscular Hemoglobin 30.5 pg    


 


Mean Corpuscular Hemoglobin


Concent 32.6 g/dl 


  


  


  


 


 


Platelet Count 372 K/uL    


 


Mean Platelet Volume 10.6 fL    


 


Neutrophils (%) (Auto) 77.8 %    


 


Lymphocytes (%) (Auto) 11.9 %    


 


Monocytes (%) (Auto) 8.8 %    


 


Eosinophils (%) (Auto) 0.8 %    


 


Basophils (%) (Auto) 0.4 %    


 


Neutrophils # (Auto) 11.98 K/uL    


 


Lymphocytes # (Auto) 1.84 K/uL    


 


Monocytes # (Auto) 1.36 K/uL    


 


Eosinophils # (Auto) 0.12 K/uL    


 


Basophils # (Auto) 0.06 K/uL    


 


RDW Standard Deviation 48.3 fL    


 


RDW Coefficient of Variation 14.4 %    


 


Immature Granulocyte % (Auto) 0.3 %    


 


Immature Granulocyte # (Auto) 0.05 K/uL    


 


Prothrombin Time 10.9 SECONDS    


 


Prothromb Time International


Ratio 1.0 


  


  


  


 


 


Activated Partial


Thromboplast Time 25.3 SECONDS 


  


  


  


 


 


Partial Thromboplastin Ratio 1.0    


 


Sodium Level 131 mmol/L   132 mmol/L  


 


Potassium Level 3.5 mmol/L    


 


Chloride Level 88 mmol/L    


 


Carbon Dioxide Level 28 mmol/L    


 


Anion Gap 15.0 mmol/L    


 


Blood Urea Nitrogen 20 mg/dl    


 


Creatinine 6.10 mg/dl    


 


Est Creatinine Clear Calc


Drug Dose 13.7 ml/min 


  


  


  


 


 


Estimated GFR (


American) 8.6 


  


  


  


 


 


Estimated GFR (Non-


American 7.4 


  


  


  


 


 


BUN/Creatinine Ratio 3.3    


 


Random Glucose 195 mg/dl    


 


Calcium Level 9.8 mg/dl    


 


Total Bilirubin 0.3 mg/dl    


 


Direct Bilirubin < 0.1 mg/dl    


 


Aspartate Amino Transf


(AST/SGOT) 17 U/L 


  


  


  


 


 


Alanine Aminotransferase


(ALT/SGPT) 7 U/L 


  


  


  


 


 


Alkaline Phosphatase 100 U/L    


 


Troponin I 0.699 ng/ml   0.679 ng/ml  


 


Total Protein 9.0 gm/dl    


 


Albumin 3.3 gm/dl    


 


Lipase 227 U/L    


 


Human Chorionic Gonadotropin,


Qual NEG 


  


  


  


 


 


Gastric Fluid pH  2   


 


Gastric Fluid Occult Blood  POS   


 


Osmolality   296 mOsm/kg  


 


Magnesium Level   2.0 mg/dl  


 


Thyroid Stimulating Hormone


(TSH) 


  


  3.650 uIu/ml 


  


 


 


Bedside Glucose    142 mg/dl 














Test


  2/7/17


08:25 2/7/17


12:00 


  


 


 


White Blood Count 13.97 K/uL    


 


Red Blood Count 3.65 M/uL    


 


Hemoglobin 11.3 g/dL    


 


Hematocrit 34.4 %    


 


Mean Corpuscular Volume 94.2 fL    


 


Mean Corpuscular Hemoglobin 31.0 pg    


 


Mean Corpuscular Hemoglobin


Concent 32.8 g/dl 


  


  


  


 


 


Platelet Count 374 K/uL    


 


Mean Platelet Volume 10.6 fL    


 


Neutrophils (%) (Auto) 73.5 %    


 


Lymphocytes (%) (Auto) 19.3 %    


 


Monocytes (%) (Auto) 6.2 %    


 


Eosinophils (%) (Auto) 0.5 %    


 


Basophils (%) (Auto) 0.3 %    


 


Neutrophils # (Auto) 10.27 K/uL    


 


Lymphocytes # (Auto) 2.69 K/uL    


 


Monocytes # (Auto) 0.87 K/uL    


 


Eosinophils # (Auto) 0.07 K/uL    


 


Basophils # (Auto) 0.04 K/uL    


 


RDW Standard Deviation 49.3 fL    


 


RDW Coefficient of Variation 14.6 %    


 


Immature Granulocyte % (Auto) 0.2 %    


 


Immature Granulocyte # (Auto) 0.03 K/uL    


 


Sodium Level 132 mmol/L    


 


Potassium Level 3.6 mmol/L    


 


Chloride Level 89 mmol/L    


 


Carbon Dioxide Level 30 mmol/L    


 


Anion Gap 13.0 mmol/L    


 


Blood Urea Nitrogen 24 mg/dl    


 


Creatinine 7.00 mg/dl    


 


Est Creatinine Clear Calc


Drug Dose 12.0 ml/min 


  


  


  


 


 


Estimated GFR (


American) 7.3 


  


  


  


 


 


Estimated GFR (Non-


American 6.3 


  


  


  


 


 


BUN/Creatinine Ratio 3.4    


 


Random Glucose 132 mg/dl    


 


Calcium Level 9.1 mg/dl    











Impression


Patient is a 51 year old female ESRD patient who experienced several episodes 

of post prandial nausea/vomiting with upper abdomen pain yesterday. She has not 

had further episodes since soon after arrival in the ED. Differentials 

considered are:gastritis, duodenitis, ulcer disease, gallbladder disease. Her 

symptoms seem a bit more consistent with biliary colic than an upper GI bleed.





Plan








Attg addendum: Pt admit with abrupt onset of intractable n/v.  Vomitus appeared 

dark.  She has a h/o occasional mild nausea and rare vomiting in the past, 

which she has attributed to her ESRD or dialysis.  Admission labs unremarkable.

  Imaging shows GB sludge without juan carlos dil.  Her exam at present is without abd 

tenderness.





DDx includes gastroenteritis, acid-peptic disease, biliary disease, 

gastroparesis.


She does not appear to have had evdience of significant GI blood loss, as Hgb 

and BUN have remained stable.  


We offered pt EGD today, but she refused.  Her symptoms appear resolved - She 

ate her entire dinner tray with only mild recurrent nausea.


Will defer any further in pt GI w/u.  Please ask pt to f/u with PCP, and refer 

for endoscopy or GI clinic apptment if symptoms are persistent or recurrent.


Will sign off.

## 2017-02-07 NOTE — EMERGENCY ROOM VISIT NOTE
History


Report prepared by Eda:  Essence Matias


Under the Supervision of:  Dr. Alfrde Matta M.D.


First contact with patient:  20:47


Chief Complaint:  VOMITING


Stated Complaint:  THROWING UP BLOOD


Nursing Triage Summary:  


pt c/o vomiting since tonight. "I was throwing up regular, then foamy stuff and 


now blood. My throat is on fire. they told me it was my pancreas before".





History of Present Illness


The patient is a 51 year old female who presents to the Emergency Room with 

complaints of intermittent vomiting beginning PTA. The patient states that she 

came home after dialysis today and ate a hoagie. She reports that she threw it 

up and has been continuing to throw up since. She notes that her vomit is now 

bloody. The patient complains of shortness of breath, chest pain and abdominal 

pain that she rates as a 10/10 in severity. She reports that she has had this 

before and was told that it was her pancreas. The patient states that she is on 

Percocet and tramadol for finger and toe because she wasn't sleeping. Pt denies 

LOC, headache, fevers, chills, diaphoresis, visual changes, neck pain, back pain

, melena, hematochezia, urinary symptoms, numbness, weakness, lymphadenopathy, 

rash, or other complaints.





   Source of History:  patient


   Onset:  PTA


   Position:  other (global)


   Symptom Intensity:  10/10


   Timing:  intermittent





Review of Systems


See HPI for pertinent positives and negatives.  A total of ten systems were 

reviewed and were otherwise negative.





Past Medical & Surgical


Medical Problems:


(1) Allergic rhinitis


(2) CAD (coronary artery disease)


(3) Carotid stenosis


(4) DM type 2 (diabetes mellitus, type 2)


(5) Dyslipidemia


(6) ESRD (end stage renal disease) on dialysis


(7) GERD (gastroesophageal reflux disease)


(8) HTN (hypertension)


(9) HX OF PAST NONCOMPLIANCE


(10) Ischemic cardiomyopathy


(11) Morbid obesity


(12) Tobacco use disorder


(13) UGIB (upper gastrointestinal bleed)


Surgical Problems:


(1) Hemodialysis access, AV graft








Family History





Diabetes mellitus


  FATHER


  MOTHER


Heart disease


  FATHER


  MOTHER


Hypertension


  FATHER


  MOTHER


Kidney disease


  GRANDMOTHER





Social History


Smoking Status:  Former Smoker


Alcohol Use:  none


Housing Status:  lives with family





Current/Historical Medications


Scheduled


Aspirin (Aspirin Ec), 81 MG PO DAILY


Atorvastatin (Lipitor), 40 MG PO DAILY


Calcium Acetate (Phoslo 667 Mg), 1 CAP PO WM


Clopidogrel Bisulfate (Plavix), 1 TAB PO DAILY


Insulin Glargine (Lantus Solostar), 10 UNITS SC QPM


Isosorbide Mononitrate (Isosorbide Mononitrate ER), 60 MG PO QAM


Isosorbide Mononitrate Ext Rel (Imdur Ext Rel), 30 MG PO QAM


Metoprolol Tartrate (Lopressor), 50 MG PO BID


Ranitidine Hcl (Zantac), 300 MG PO HS


Sertraline HCl (Sertraline HCl), 2 TAB PO DAILY





Scheduled PRN


Lorazepam (Ativan), 0.5 MG PO DAILY PRN for Anxiety


Methocarbamol (Methocarbamol), 500 MG PO BID PRN for Muscle Spasms


Tramadol (Ultram), 1 TAB PO TID PRN for Pain





Allergies


Coded Allergies:  


     Adhesives (Verified  Allergy, Mild, RASH, SORES, 1/31/17)


     Pollen Extract (Unverified  Allergy, Unknown, rash, 1/31/17)





Physical Exam


Vital Signs











  Date Time  Temp Pulse Resp B/P Pulse Ox O2 Delivery O2 Flow Rate FiO2


 


2/7/17 00:19  78 16 166/90 99 Room Air  


 


2/6/17 22:15  78 18 174/89 100 Nasal Cannula 2.0 


 


2/6/17 20:02 36.0 92 20 115/79 100 Room Air  











Physical Exam


GENERAL: Awake, alert, uncomfortable, actively vomiting coffee ground emesis.


HENT: Normocephalic, atraumatic. Oropharynx unremarkable.


EYES: Normal conjunctiva. Sclera non-icteric.


NECK: Supple. No nuchal rigidity. FROM. No JVD.


RESPIRATORY: Clear to auscultation.


CARDIAC: Borderline tachycardic rate, normal rhythm. Extremities warm and well 

perfused. Pulses equal.


ABDOMEN: Soft, non-distended. Tenderness in the epigastrium. No rebound or 

guarding. No masses.


RECTAL: Deferred.


MUSCULOSKELETAL: Chest examination reveals no tenderness. The back is 

symmetrical on inspection without obvious abnormality. There is no CVA 

tenderness to palpation. No joint edema. 


LOWER EXTREMITIES: Calves are equal size bilaterally and non-tender. No edema. 

No discoloration. 


NEURO: Normal sensorium. No sensory or motor deficits noted. 


SKIN: No rash or jaundice noted.





Medical Decision & Procedures


ER Provider


Diagnostic Interpretation:


X ray results as stated below per my interpretation and radiologist 

interpretation. Other radiology results as stated below per my review and 

radiologist interpretation





CT SCAN OF THE ABDOMEN AND PELVIS WITHOUT CONTRAST





CT DOSE: 1440.82 mGy.cm


FINDINGS:





Lower chest: There are coronary artery calcifications present. There are no


pleural effusions. There is a small hiatal hernia.





Liver: The unenhanced liver is normal in size, contour, and attenuation. There


is no intrahepatic biliary ductal dilatation.





Gallbladder: The bowel is slightly hyperdense. Calculi cannot be excluded.





Spleen: Normal in size and attenuation.





Pancreas: Unremarkable.





Adrenal glands: There is mild adrenal gland thickening similar to the prior


study





Kidneys: There are extensive vascular calcifications. There is no


hydronephrosis. No calculi are visualized.





Bowel: There are no transition zones indicate bowel obstruction. There is


colonic diverticulosis. There are no acute peridiverticular inflammatory


changes. There is no evidence of acute appendicitis.





Peritoneum: There is no intraperitoneal free air or abdominal ascites.





Vasculature: The abdominal aorta is normal in course and caliber.





Adenopathy: None.





Pelvic viscera: The bladder, and pelvic viscera are unremarkable.





Skeletal structures: No destructive osseous lesions are seen.





IMPRESSION:  


1. No evidence of bowel obstruction. No evidence of free air


2. Diverticulosis. No evidence of acute diverticulitis


3. No evidence of acute appendicitis


4. Small hiatal hernia


5. Hyperdense bile within the gallbladder versus calculi


6. Extensive vascular calcifications





Electronically signed by:  Won Richey M.D.


2/6/2017 11:00 PM





Dictated Date/Time:  2/6/2017 10:57 PM





CHEST ONE VIEW PORTABLE





FINDINGS: The heart is borderline enlarged. There is no failure. There is no


focal pulmonary consolidation. There is no pneumothorax. There is no free


intraperitoneal air. There is no pneumomediastinum. No pleural effusions are


visualized.[ 





IMPRESSION: No active disease in the chest.





Electronically signed by:  Won Richey M.D.


2/6/2017 9:47 PM





Dictated Date/Time:  2/6/2017 9:46 PM





Laboratory Results


2/6/17 21:00








Red Blood Count 4.10, Mean Corpuscular Volume 93.4, Mean Corpuscular Hemoglobin 

30.5, Mean Corpuscular Hemoglobin Concent 32.6, Mean Platelet Volume 10.6, 

Neutrophils (%) (Auto) 77.8, Lymphocytes (%) (Auto) 11.9, Monocytes (%) (Auto) 

8.8, Eosinophils (%) (Auto) 0.8, Basophils (%) (Auto) 0.4, Neutrophils # (Auto) 

11.98, Lymphocytes # (Auto) 1.84, Monocytes # (Auto) 1.36, Eosinophils # (Auto) 

0.12, Basophils # (Auto) 0.06





2/6/17 21:00

















Test


  2/6/17


21:00 2/7/17


00:58


 


White Blood Count


  15.41 K/uL


(4.8-10.8) 


 


 


Red Blood Count


  4.10 M/uL


(4.2-5.4) 


 


 


Hemoglobin


  12.5 g/dL


(12.0-16.0) 


 


 


Hematocrit 38.3 % (37-47)  


 


Mean Corpuscular Volume


  93.4 fL


() 


 


 


Mean Corpuscular Hemoglobin


  30.5 pg


(25-34) 


 


 


Mean Corpuscular Hemoglobin


Concent 32.6 g/dl


(32-36) 


 


 


Platelet Count


  372 K/uL


(130-400) 


 


 


Mean Platelet Volume


  10.6 fL


(7.4-10.4) 


 


 


Neutrophils (%) (Auto) 77.8 %  


 


Lymphocytes (%) (Auto) 11.9 %  


 


Monocytes (%) (Auto) 8.8 %  


 


Eosinophils (%) (Auto) 0.8 %  


 


Basophils (%) (Auto) 0.4 %  


 


Neutrophils # (Auto)


  11.98 K/uL


(1.4-6.5) 


 


 


Lymphocytes # (Auto)


  1.84 K/uL


(1.2-3.4) 


 


 


Monocytes # (Auto)


  1.36 K/uL


(0.11-0.59) 


 


 


Eosinophils # (Auto)


  0.12 K/uL


(0-0.5) 


 


 


Basophils # (Auto)


  0.06 K/uL


(0-0.2) 


 


 


RDW Standard Deviation


  48.3 fL


(36.4-46.3) 


 


 


RDW Coefficient of Variation


  14.4 %


(11.5-14.5) 


 


 


Immature Granulocyte % (Auto) 0.3 %  


 


Immature Granulocyte # (Auto)


  0.05 K/uL


(0.00-0.02) 


 


 


Prothrombin Time


  10.9 SECONDS


(9.0-12.0) 


 


 


Prothromb Time International


Ratio 1.0 (0.9-1.1) 


  


 


 


Activated Partial


Thromboplast Time 25.3 SECONDS


(21.0-31.0) 


 


 


Partial Thromboplastin Ratio 1.0  


 


Anion Gap


  15.0 mmol/L


(3-11) 


 


 


Est Creatinine Clear Calc


Drug Dose 13.7 ml/min 


  


 


 


Estimated GFR (


American) 8.6 


  


 


 


Estimated GFR (Non-


American 7.4 


  


 


 


BUN/Creatinine Ratio 3.3 (10-20)  


 


Calcium Level


  9.8 mg/dl


(8.5-10.1) 


 


 


Total Bilirubin


  0.3 mg/dl


(0.2-1) 


 


 


Direct Bilirubin


  < 0.1 mg/dl


(0-0.2) 


 


 


Aspartate Amino Transf


(AST/SGOT) 17 U/L (15-37) 


  


 


 


Alanine Aminotransferase


(ALT/SGPT) 7 U/L (12-78) 


  


 


 


Alkaline Phosphatase


  100 U/L


() 


 


 


Total Protein


  9.0 gm/dl


(6.4-8.2) 


 


 


Albumin


  3.3 gm/dl


(3.4-5.0) 


 


 


Lipase


  227 U/L


() 


 


 


Human Chorionic Gonadotropin,


Qual NEG (NEG) 


  


 


 


Gastric Fluid pH  2 


 


Gastric Fluid Occult Blood  POS (NEG) 





Laboratory results reviewed by me





Medications Administered











 Medications


  (Trade)  Dose


 Ordered  Sig/Willy


 Route  Start Time


 Stop Time Status Last Admin


Dose Admin


 


 Sodium Chloride  1,000 ml @ 


 125 mls/hr  Q8H STAT


 IV  2/6/17 20:47


 2/7/17 03:14 DC 2/6/17 20:47


125 MLS/HR


 


 Sodium Chloride  1,000 ml @ 


 999 mls/hr  Q1H1M STAT


 IV  2/6/17 20:47


 2/6/17 21:29 DC 2/6/17 20:47


999 MLS/HR


 


 Sodium Chloride


  (Nss 500ml)  500 ml @ 


 999 mls/hr  Q31M STAT


 IV  2/6/17 21:25


 2/6/17 21:55 DC 2/7/17 00:14


999 MLS/HR


 


 Ondansetron HCl


  (Zofran 8mg Iv)  8 mg  NOW  ONCE


 IV  2/6/17 21:30


 2/6/17 21:31 DC 2/6/17 21:44


8 MG


 


 Famotidine


  (Pepcid 20mg/100


 ml)  20 mg  ONE  STAT


 IV  2/6/17 21:25


 2/6/17 21:29 DC 2/6/17 21:45


20 MG


 


 Hydromorphone HCl


 1 mg  1 mg  NOW  STAT


 IV  2/6/17 21:25


 2/6/17 21:29 DC 2/6/17 21:45


1 MG


 


 Pantoprazole


 Sodium 80 mg/


 Dextrose  120 ml @ 


 480 mls/hr  NOW  ONCE


 IV  2/6/17 21:45


 2/6/17 21:59 DC 2/6/17 21:45


480 MLS/HR


 


 Pantoprazole


 Sodium/Dextrose


  (Protonix Inj/D5


 100ml)  100 ml @ 


 20 mls/hr  Q5H  ONCE


 IV  2/6/17 22:00


 2/7/17 02:59 DC 2/6/17 22:00


20 MLS/HR


 


 Hydromorphone HCl


  (Dilaudid Inj)  1 mg  Q15M  PRN


 IV  2/7/17 00:00


 2/7/17 01:39 DC 2/7/17 00:36


1 MG


 


 Metoclopramide HCl


  (Reglan Inj)  10 mg  NOW  STAT


 IV  2/6/17 23:46


 2/6/17 23:47 DC 2/7/17 00:14


10 MG











ECG


Indication:  vomiting


Rate (beats per minute):  80


Rhythm:  sinus rhythm


Findings:  PVC, Q waves (Inferior), no acute ischemic change





ED Course


2047: Sodium Chloride 1000 ml @ 999 mls/hr IV, Sodium Chloride 1000 ml @ 125 mls

/hr IV.





2125: Dilaudid Inj 1mg IV, Famotidine 20mg IV, Pantoprazole Sodium 1ea IV, 

Sodium Chloride 500 ml @ 999 mls/hr IV. 





2127: The patient was evaluated in room C7. A complete history and physical 

exam was performed.





2130: Zofran 8mg IV. 





2145: Pantoprazole Sodium 80mg/Dextrose 120ml @ 480mls/hr IV. 





2200: Pantoprazole Sodium 40mg/Dextrose 100ml @ 20mls/hr IV. 





2346: Reglan Inj 10mg IV. 





0000: Dilaudid Inj 1mg PRN IV pain. 





0011: I reevaluated the patient. She is getting additional pain and nausea 

medication. 





0059: Discussed the patient's case with Dr. Bautista of [facility]. The patient 

will be evaluated for further treatment and disposition.





0112: Upon reexamination, the patient was hemodynamically stable. I discussed 

the test results and treatment plan with the patient. The patient will be 

evaluated for further management.





Medical Decision


Triage Nursing notes reviewed.


The patient's presentation and history were concerning for vomiting and 

abdominal pain.  





Etiologies such as GI bleed, diverticulitis,  obstruction, inflammatory bowel 

disease, renal colic, PUD, biliary pathology, pancreatitis, mesenteric ischemia

, aortic pathology, infections, genitourinary, UTI, perforated viscus, 

appendicitis cardiac sources, as well as others were entertained.   





The patient was evaluated.  She had an IV established and was hydrated.  She 

had blood work obtained which revealed an elevated creatinine consistent with 

her renal disease.  Patient had a slight elevation of her troponin.  HCG and 

chemistry panels were unremarkable otherwise.  The patient did have a slight 

leukocytosis of 15,000.  She was vomiting actively during the initial 

evaluation.  This was coffee-ground in nature.  It was sent for Gastroccult.  

The patient was started on IV Protonix and IV Pepcid.  She did require several 

doses of Dilaudid.  The patient was also given IV Reglan.  On reassessment the 

patient was doing better.  CT imaging was performed.  This did not show any 

evidence of bowel obstruction.  There was some hyperdense material in the 

gallbladder.  Ultrasound was ordered.  Consultation was made with internal 

medicine.  The patient was evaluated at the [facility] for further 

treatment.








Th  E chart was completed utilizing Dragon Speech voice recognition software.   

Grammatical errors, random word insertions, pronoun errors, and incomplete 

sentences are an occasional consequence of this system due to software 

limitations, ambient noise, and hardware issues.  Any formal questions or 

concerns about the content, text, or information contained within the body of 

this dictation should be directly addressed to the physician for clarification.





Consults


Time Called:  0050


Consulting Physician:  Dr. Bautista - [facility]


Returned Call:  [id number]


Discussed the patient's case with Dr. Bautista of [facility]. The patient will be 

evaluated for further treatment and disposition.





Impression





 Primary Impression:  


 Upper abdominal pain


 Additional Impressions:  


 Vomiting


 GI bleed





Scribe Attestation


The scribe's documentation has been prepared under my direction and personally 

reviewed by me in its entirety. I confirm that the note above accurately 

reflects all work, treatment, procedures, and medical decision making performed 

by me.





Departure Information


Dispostion


Being Evaluated By Hospitalist





Referrals


No Doctor, Assigned (PCP)





Patient Instructions


My [facility]





Problem Qualifiers

## 2017-02-07 NOTE — PROGRESS NOTE
Internal Med Progress Note


Date of Service:


Feb 7, 2017.


Provider Documentation:





SUBJECTIVE:





The patient was seen and examined 


No more hematemesis 


No Melena 


Denies any other symptoms








OBJECTIVE:





Vital Signs-as noted below





Exam:


General-No distress at rest


Eyes-normal


ENT-normal


Neck-supple


Lungs-Clear to auscultate bilaterally 


Heart-Regular


Abdomen-Benign,no masses 


Extremities-Trace edema bilaterally 


Neuro-AAOx3





Lab data as noted below.


ASSESSMENT & PLAN:





Upper gastrointestinal bleed/hematemesis


Differential includes esophagitis, Velia-Talley tear, gastritis, gastric ulcer


Remained hemodynamically stable


Has been on IV Protonix


Follow HH, transfuse prbc if Hg less than 8. 


No more episodes


Hb stable 


Appreciate GI input-no EGD if remains stable  





ESRD on HD


Nephrology consult





Hypertension, slightly elevated.








Chronic diastolic HF (EF 55%), px on the dry side 


History of coronary artery disease sp stenting/ PVD as per records








DM2, insulin requiring


Reasonable control as of recent HgA1c, 7.2 as of November 2016.


ISS BG goal 140-180





Past tobacco abuse.








DVT prophylaxis, SCDs RE GI bleed.  





Full code.





Vital Signs:











  Date Time  Temp Pulse Resp B/P Pulse Ox O2 Delivery O2 Flow Rate FiO2


 


2/7/17 16:38 36.6 70 16 117/73 98 Room Air  


 


2/7/17 12:05 36.7 67 16 114/80 99 Room Air  


 


2/7/17 12:00      Room Air  


 


2/7/17 08:12 36.7 73 16 125/68 100   


 


2/7/17 08:00      Room Air  


 


2/7/17 04:42    126/73    


 


2/7/17 04:00      Room Air  


 


2/7/17 02:20 36.5 80 18 160/91 100 Room Air  


 


2/7/17 02:13  82 18 151/84 100 Nasal Cannula 2.0 


 


2/7/17 00:19  78 16 166/90 99 Room Air  


 


2/6/17 22:15  78 18 174/89 100 Nasal Cannula 2.0 


 


2/6/17 20:02 36.0 92 20 115/79 100 Room Air  








Lab Results:





Results Past 24 Hours








Test


  2/6/17


21:00 2/7/17


00:58 2/7/17


01:55 2/7/17


07:09 Range/Units


 


 


White Blood Count 15.41    4.8-10.8  K/uL


 


Red Blood Count 4.10    4.2-5.4  M/uL


 


Hemoglobin 12.5  11.7  12.0-16.0  g/dL


 


Hematocrit 38.3  35.2  37-47  %


 


Mean Corpuscular Volume 93.4      fL


 


Mean Corpuscular Hemoglobin 30.5    25-34  pg


 


Mean Corpuscular Hemoglobin


Concent 32.6


  


  


  


  32-36  g/dl


 


 


Platelet Count 372    130-400  K/uL


 


Mean Platelet Volume 10.6    7.4-10.4  fL


 


Neutrophils (%) (Auto) 77.8     %


 


Lymphocytes (%) (Auto) 11.9     %


 


Monocytes (%) (Auto) 8.8     %


 


Eosinophils (%) (Auto) 0.8     %


 


Basophils (%) (Auto) 0.4     %


 


Neutrophils # (Auto) 11.98    1.4-6.5  K/uL


 


Lymphocytes # (Auto) 1.84    1.2-3.4  K/uL


 


Monocytes # (Auto) 1.36    0.11-0.59  K/uL


 


Eosinophils # (Auto) 0.12    0-0.5  K/uL


 


Basophils # (Auto) 0.06    0-0.2  K/uL


 


RDW Standard Deviation 48.3    36.4-46.3  fL


 


RDW Coefficient of Variation 14.4    11.5-14.5  %


 


Immature Granulocyte % (Auto) 0.3     %


 


Immature Granulocyte # (Auto) 0.05    0.00-0.02  K/uL


 


Prothrombin Time


  10.9


  


  


  


  9.0-12.0


SECONDS


 


Prothromb Time International


Ratio 1.0


  


  


  


  0.9-1.1  


 


 


Activated Partial


Thromboplast Time 25.3


  


  


  


  21.0-31.0


SECONDS


 


Partial Thromboplastin Ratio 1.0     


 


Sodium Level 131  132  136-145  mmol/L


 


Potassium Level 3.5    3.5-5.1  mmol/L


 


Chloride Level 88      mmol/L


 


Carbon Dioxide Level 28    21-32  mmol/L


 


Anion Gap 15.0    3-11  mmol/L


 


Blood Urea Nitrogen 20    7-18  mg/dl


 


Creatinine


  6.10


  


  


  


  0.60-1.20


mg/dl


 


Est Creatinine Clear Calc


Drug Dose 13.7


  


  


  


   ml/min


 


 


Estimated GFR (


American) 8.6


  


  


  


   


 


 


Estimated GFR (Non-


American 7.4


  


  


  


   


 


 


BUN/Creatinine Ratio 3.3    10-20  


 


Random Glucose 195    70-99  mg/dl


 


Calcium Level 9.8    8.5-10.1  mg/dl


 


Total Bilirubin 0.3    0.2-1  mg/dl


 


Direct Bilirubin < 0.1    0-0.2  mg/dl


 


Aspartate Amino Transf


(AST/SGOT) 17


  


  


  


  15-37  U/L


 


 


Alanine Aminotransferase


(ALT/SGPT) 7


  


  


  


  12-78  U/L


 


 


Alkaline Phosphatase 100      U/L


 


Troponin I 0.699  0.679  0-0.045  ng/ml


 


Total Protein 9.0    6.4-8.2  gm/dl


 


Albumin 3.3    3.4-5.0  gm/dl


 


Lipase 227      U/L


 


Human Chorionic Gonadotropin,


Qual NEG


  


  


  


  NEG  


 


 


Gastric Fluid pH  2    


 


Gastric Fluid Occult Blood  POS   NEG  


 


Osmolality


  


  


  296


  


  280-300


mOsm/kg


 


Magnesium Level   2.0  1.8-2.4  mg/dl


 


Thyroid Stimulating Hormone


(TSH) 


  


  3.650


  


  0.300-4.500


uIu/ml


 


Bedside Glucose    142 70-90  mg/dl














Test


  2/7/17


08:25 2/7/17


12:31 2/7/17


16:17 


  Range/Units


 


 


White Blood Count 13.97    4.8-10.8  K/uL


 


Red Blood Count 3.65    4.2-5.4  M/uL


 


Hemoglobin 11.3 10.3   12.0-16.0  g/dL


 


Hematocrit 34.4 32.2   37-47  %


 


Mean Corpuscular Volume 94.2      fL


 


Mean Corpuscular Hemoglobin 31.0    25-34  pg


 


Mean Corpuscular Hemoglobin


Concent 32.8


  


  


  


  32-36  g/dl


 


 


Platelet Count 374    130-400  K/uL


 


Mean Platelet Volume 10.6    7.4-10.4  fL


 


Neutrophils (%) (Auto) 73.5     %


 


Lymphocytes (%) (Auto) 19.3     %


 


Monocytes (%) (Auto) 6.2     %


 


Eosinophils (%) (Auto) 0.5     %


 


Basophils (%) (Auto) 0.3     %


 


Neutrophils # (Auto) 10.27    1.4-6.5  K/uL


 


Lymphocytes # (Auto) 2.69    1.2-3.4  K/uL


 


Monocytes # (Auto) 0.87    0.11-0.59  K/uL


 


Eosinophils # (Auto) 0.07    0-0.5  K/uL


 


Basophils # (Auto) 0.04    0-0.2  K/uL


 


RDW Standard Deviation 49.3    36.4-46.3  fL


 


RDW Coefficient of Variation 14.6    11.5-14.5  %


 


Immature Granulocyte % (Auto) 0.2     %


 


Immature Granulocyte # (Auto) 0.03    0.00-0.02  K/uL


 


Sodium Level 132    136-145  mmol/L


 


Potassium Level 3.6    3.5-5.1  mmol/L


 


Chloride Level 89      mmol/L


 


Carbon Dioxide Level 30    21-32  mmol/L


 


Anion Gap 13.0    3-11  mmol/L


 


Blood Urea Nitrogen 24    7-18  mg/dl


 


Creatinine


  7.00


  


  


  


  0.60-1.20


mg/dl


 


Est Creatinine Clear Calc


Drug Dose 12.0


  


  


  


   ml/min


 


 


Estimated GFR (


American) 7.3


  


  


  


   


 


 


Estimated GFR (Non-


American 6.3


  


  


  


   


 


 


BUN/Creatinine Ratio 3.4    10-20  


 


Random Glucose 132    70-99  mg/dl


 


Calcium Level 9.1    8.5-10.1  mg/dl


 


Bedside Glucose   175  70-90  mg/dl

## 2017-02-08 VITALS
HEART RATE: 68 BPM | OXYGEN SATURATION: 92 % | SYSTOLIC BLOOD PRESSURE: 125 MMHG | DIASTOLIC BLOOD PRESSURE: 73 MMHG | TEMPERATURE: 98.42 F

## 2017-02-08 VITALS
OXYGEN SATURATION: 96 % | TEMPERATURE: 97.88 F | DIASTOLIC BLOOD PRESSURE: 80 MMHG | HEART RATE: 64 BPM | SYSTOLIC BLOOD PRESSURE: 143 MMHG

## 2017-02-08 VITALS
SYSTOLIC BLOOD PRESSURE: 121 MMHG | TEMPERATURE: 98.06 F | OXYGEN SATURATION: 93 % | HEART RATE: 72 BPM | DIASTOLIC BLOOD PRESSURE: 75 MMHG

## 2017-02-08 VITALS — HEART RATE: 64 BPM | DIASTOLIC BLOOD PRESSURE: 77 MMHG | SYSTOLIC BLOOD PRESSURE: 151 MMHG

## 2017-02-08 VITALS
OXYGEN SATURATION: 97 % | SYSTOLIC BLOOD PRESSURE: 143 MMHG | HEART RATE: 66 BPM | DIASTOLIC BLOOD PRESSURE: 77 MMHG | TEMPERATURE: 97.88 F

## 2017-02-08 VITALS — SYSTOLIC BLOOD PRESSURE: 109 MMHG | DIASTOLIC BLOOD PRESSURE: 93 MMHG | HEART RATE: 74 BPM

## 2017-02-08 VITALS
DIASTOLIC BLOOD PRESSURE: 62 MMHG | SYSTOLIC BLOOD PRESSURE: 108 MMHG | HEART RATE: 61 BPM | TEMPERATURE: 98.42 F | OXYGEN SATURATION: 92 %

## 2017-02-08 LAB
ANION GAP SERPL CALC-SCNC: 12 MMOL/L (ref 3–11)
BASOPHILS # BLD: 0.05 K/UL (ref 0–0.2)
BASOPHILS NFR BLD: 0.5 %
BUN SERPL-MCNC: 39 MG/DL (ref 7–18)
BUN/CREAT SERPL: 4.6 (ref 10–20)
CALCIUM SERPL-MCNC: 8.8 MG/DL (ref 8.5–10.1)
CHLORIDE SERPL-SCNC: 91 MMOL/L (ref 98–107)
CO2 SERPL-SCNC: 30 MMOL/L (ref 21–32)
COMPLETE: YES
CREAT CL PREDICTED SERPL C-G-VRATE: 9.9 ML/MIN
CREAT SERPL-MCNC: 8.5 MG/DL (ref 0.6–1.2)
EOSINOPHIL NFR BLD AUTO: 346 K/UL (ref 130–400)
GLUCOSE SERPL-MCNC: 96 MG/DL (ref 70–99)
HCT VFR BLD CALC: 31.8 % (ref 37–47)
IG%: 0.2 %
IMM GRANULOCYTES NFR BLD AUTO: 35.1 %
LYMPHOCYTES # BLD: 3.3 K/UL (ref 1.2–3.4)
MCH RBC QN AUTO: 30 PG (ref 25–34)
MCHC RBC AUTO-ENTMCNC: 31.4 G/DL (ref 32–36)
MCV RBC AUTO: 95.5 FL (ref 80–100)
MONOCYTES NFR BLD: 13.6 %
NEUTROPHILS # BLD AUTO: 3.3 %
NEUTROPHILS NFR BLD AUTO: 47.3 %
PMV BLD AUTO: 10.4 FL (ref 7.4–10.4)
POTASSIUM SERPL-SCNC: 4.5 MMOL/L (ref 3.5–5.1)
RBC # BLD AUTO: 3.33 M/UL (ref 4.2–5.4)
SODIUM SERPL-SCNC: 133 MMOL/L (ref 136–145)
WBC # BLD AUTO: 9.4 K/UL (ref 4.8–10.8)

## 2017-02-08 RX ADMIN — ISOSORBIDE MONONITRATE SCH MG: 30 TABLET, EXTENDED RELEASE ORAL at 08:47

## 2017-02-08 RX ADMIN — CALCIUM ACETATE SCH MG: 667 CAPSULE ORAL at 08:46

## 2017-02-08 RX ADMIN — SODIUM CHLORIDE AND POTASSIUM CHLORIDE SCH MLS/HR: 9; 1.49 INJECTION, SOLUTION INTRAVENOUS at 02:57

## 2017-02-08 RX ADMIN — INSULIN ASPART SCH UNITS: 100 INJECTION, SOLUTION INTRAVENOUS; SUBCUTANEOUS at 16:15

## 2017-02-08 RX ADMIN — INSULIN ASPART SCH UNITS: 100 INJECTION, SOLUTION INTRAVENOUS; SUBCUTANEOUS at 20:25

## 2017-02-08 RX ADMIN — CALCIUM ACETATE SCH MG: 667 CAPSULE ORAL at 17:16

## 2017-02-08 RX ADMIN — HYDROMORPHONE HYDROCHLORIDE PRN MG: 1 INJECTION, SOLUTION INTRAMUSCULAR; INTRAVENOUS; SUBCUTANEOUS at 20:34

## 2017-02-08 RX ADMIN — HYDROMORPHONE HYDROCHLORIDE PRN MG: 1 INJECTION, SOLUTION INTRAMUSCULAR; INTRAVENOUS; SUBCUTANEOUS at 14:56

## 2017-02-08 RX ADMIN — PANTOPRAZOLE SCH MG: 40 TABLET, DELAYED RELEASE ORAL at 20:22

## 2017-02-08 RX ADMIN — INSULIN GLARGINE SCH UNIT: 100 INJECTION, SOLUTION SUBCUTANEOUS at 08:59

## 2017-02-08 RX ADMIN — SERTRALINE HYDROCHLORIDE SCH MG: 50 TABLET, FILM COATED ORAL at 20:23

## 2017-02-08 RX ADMIN — METOPROLOL TARTRATE SCH MG: 25 TABLET, FILM COATED ORAL at 08:47

## 2017-02-08 RX ADMIN — ONDANSETRON PRN MG: 2 INJECTION INTRAMUSCULAR; INTRAVENOUS at 14:57

## 2017-02-08 RX ADMIN — INSULIN ASPART SCH UNITS: 100 INJECTION, SOLUTION INTRAVENOUS; SUBCUTANEOUS at 07:00

## 2017-02-08 RX ADMIN — INSULIN ASPART SCH UNITS: 100 INJECTION, SOLUTION INTRAVENOUS; SUBCUTANEOUS at 11:00

## 2017-02-08 RX ADMIN — PANTOPRAZOLE SODIUM SCH MLS/HR: 40 INJECTION, POWDER, FOR SOLUTION INTRAVENOUS at 08:58

## 2017-02-08 RX ADMIN — METOPROLOL TARTRATE SCH MG: 25 TABLET, FILM COATED ORAL at 20:23

## 2017-02-08 NOTE — DIAGNOSTIC IMAGING REPORT
LEFT HAND 3 VIEWS



CLINICAL HISTORY: Left hand pain.



FINDINGS: 3 views of the left hand are obtained. No prior studies are available

for comparison at the time of dictation. The skeletal structures are well

mineralized. No fracture is seen. There is mild degenerative narrowing at the

radiocarpal articulation. Mild osteoarthritic change is also seen involving the

first metacarpophalangeal joint and the interphalangeal joints. Erosive change

is present in the head of the fifth middle phalanx. Large soft tissues

calcifications are identified in the second and fourth fingers. Advanced

atherosclerotic calcification is present in the regional arteries..



IMPRESSION:



1. No acute bony abnormality is seen in the left hand.



2. Osteopenia with mild osteoarthritic change as above. A bony erosion is

present in the head of the fifth middle phalanx.



3. Large and nonspecific soft tissue calcifications are present within the

second and fourth digits. This could be seen in setting of an autoimmune

process, gouty arthropathy, or possible tumoral calcinosis. Clinical correlation

will be essential.







Electronically signed by:  Nguyễn Nielson M.D.

2/8/2017 2:46 PM



Dictated Date/Time:  2/8/2017 2:40 PM

## 2017-02-08 NOTE — PROGRESS NOTE
Internal Med Progress Note


Date of Service:


Feb 8, 2017.


Provider Documentation:





SUBJECTIVE:





The patient was seen and examined 


No more hematemesis 


No Melena 


Minimal Epigastric discomfort 


Complains left hand pain with swelling and bruising








OBJECTIVE:





Vital Signs-as noted below





Exam:


General-No distress at rest


Eyes-normal


ENT-normal


Neck-supple


Lungs-Clear to auscultate bilaterally 


Heart-Regular


Abdomen-Benign,no masses 


Extremities-Trace edema bilaterally 


Left hand is swollen,and bruised over the 2nd Metacarpal area


Neuro-AAOx3





Lab data as noted below.


ASSESSMENT & PLAN:





Upper gastrointestinal bleed/hematemesis


Differential includes esophagitis, Velia-Talley tear, gastritis, gastric ulcer


Remained hemodynamically stable


Has been on IV Protonix


Follow HH, transfuse prbc if Hg less than 8. 


No more episodes


Hb stable >10


Appreciate GI input-no EGD if remains stable  


Change Protonix to oral 





ESRD on HD


Nephrology consult-appreciate input


Dialysis tomorrow





Hypertension, slightly elevated.








Chronic diastolic HF (EF 55%), px on the dry side 


History of coronary artery disease sp stenting/ PVD as per records








DM2, insulin requiring


Reasonable control as of recent HgA1c, 7.2 as of November 2016.


ISS BG goal 140-180





Past tobacco abuse.








DVT prophylaxis, SCDs RE GI bleed.  





Full code.


Likely discharge home tomorrow





Vital Signs:











  Date Time  Temp Pulse Resp B/P Pulse Ox O2 Delivery O2 Flow Rate FiO2


 


2/8/17 10:17  64  151/77    


 


2/8/17 10:08  74  109/93    


 


2/8/17 07:27 36.6 66 18 143/77 97 Room Air  


 


2/8/17 04:00      Room Air  


 


2/8/17 03:52 36.6 64 17 143/80 96 Room Air  


 


2/8/17 00:00      Room Air  


 


2/7/17 23:53 36.6 64 20 110/63 96 Room Air  


 


2/7/17 20:15 36.7 70 20 140/71 98 Room Air  


 


2/7/17 20:00      Room Air  


 


2/7/17 16:38 36.6 70 16 117/73 98 Room Air  


 


2/7/17 16:00      Room Air  








Lab Results:





Results Past 24 Hours








Test


  2/7/17


16:17 2/7/17


18:35 2/7/17


20:36 2/8/17


05:23 Range/Units


 


 


Bedside Glucose 175  156  70-90  mg/dl


 


Hemoglobin  10.1  10.0 12.0-16.0  g/dL


 


Hematocrit  31.4  31.8 37-47  %


 


White Blood Count    9.40 4.8-10.8  K/uL


 


Red Blood Count    3.33 4.2-5.4  M/uL


 


Mean Corpuscular Volume    95.5   fL


 


Mean Corpuscular Hemoglobin    30.0 25-34  pg


 


Mean Corpuscular Hemoglobin


Concent 


  


  


  31.4


  32-36  g/dl


 


 


Platelet Count    346 130-400  K/uL


 


Mean Platelet Volume    10.4 7.4-10.4  fL


 


Neutrophils (%) (Auto)    47.3  %


 


Lymphocytes (%) (Auto)    35.1  %


 


Monocytes (%) (Auto)    13.6  %


 


Eosinophils (%) (Auto)    3.3  %


 


Basophils (%) (Auto)    0.5  %


 


Neutrophils # (Auto)    4.44 1.4-6.5  K/uL


 


Lymphocytes # (Auto)    3.30 1.2-3.4  K/uL


 


Monocytes # (Auto)    1.28 0.11-0.59  K/uL


 


Eosinophils # (Auto)    0.31 0-0.5  K/uL


 


Basophils # (Auto)    0.05 0-0.2  K/uL


 


RDW Standard Deviation    50.6 36.4-46.3  fL


 


RDW Coefficient of Variation    14.7 11.5-14.5  %


 


Immature Granulocyte % (Auto)    0.2  %


 


Immature Granulocyte # (Auto)    0.02 0.00-0.02  K/uL


 


Sodium Level    133 136-145  mmol/L


 


Potassium Level    4.5 3.5-5.1  mmol/L


 


Chloride Level    91   mmol/L


 


Carbon Dioxide Level    30 21-32  mmol/L


 


Anion Gap    12.0 3-11  mmol/L


 


Blood Urea Nitrogen    39 7-18  mg/dl


 


Creatinine


  


  


  


  8.50


  0.60-1.20


mg/dl


 


Est Creatinine Clear Calc


Drug Dose 


  


  


  9.9


   ml/min


 


 


Estimated GFR (


American) 


  


  


  5.8


   


 


 


Estimated GFR (Non-


American 


  


  


  5.0


   


 


 


BUN/Creatinine Ratio    4.6 10-20  


 


Random Glucose    96 70-99  mg/dl


 


Calcium Level    8.8 8.5-10.1  mg/dl














Test


  2/8/17


07:01 2/8/17


10:59 


  


  Range/Units


 


 


Bedside Glucose 102 147   70-90  mg/dl

## 2017-02-08 NOTE — NEPHROLOGY CONSULTATION
DATE OF CONSULTATION:  02/08/2017

 

DATE OF CONSULTATION:  02/08/2017.  

 

ATTENDING OF RECORD:  Dr. Merida.  

 

REASON FOR CONSULTATION:  ESRD.  

 

HISTORY OF PRESENT ILLNESS:  This is a 51-year-old female who dialyses

Monday, Wednesday, Fridays at the [facility].  She has a history of

noncompliance and misses quite frequently.  The patient also does not take

phosphate binders despite working with her and encouraging her the last

several years to make sure that she has appropriate insurance and adequate

medications as she continues to not take appropriate medications.  She

routinely does not show up to dialysis on a periodic basis missing weeks on

end.  

GI evaluated the patient for the nausea and vomiting with positive blood. 

They thought differential included gastroenteritis, peptic acid disease,

biliary disease, gastroparesis.  No significant GI blood loss.  They did

offer the patient EGD but patient refused and her symptoms have appeared to

resolve.  The patient states that she ate 2 bits of her pork chop and then

threw it up last night, however was able to eat breakfast this morning and is

feeling better.  Abdominal pelvis CT shows no evidence of bowel obstruction,

positive diverticulosis, small hiatal hernia, extensive vascular

calcifications, hyperdense bile within the gallbladder.  Gallbladder

ultrasound showed no gallstones or biliary ductal dilatation.  Chest x-ray

shows no active disease in the chest.  



 

REVIEW OF SYSTEMS:  Positive fatigue, positive nausea and vomiting.  No

diarrhea.  No shortness of breath.  No chest pain.  No headaches.  No blurry

vision.  Does have some mild abdominal pain.  No rash or itching.  All other

review of systems otherwise negative. 

 

PAST MEDICAL HISTORY:  End-stage renal disease, dialyzing Monday, Wednesday,

and Fridays at the [facility], coronary artery disease with history

of MI and bare-metal stent placed in August 2016, type 2 diabetes,

hypertension, anemia of end stage renal failure, GERD.  

 

PAST SURGICAL HISTORY:  AV fistula placement, bare-metal stent to the heart,

tunneled dialysis catheter placements. 

 

SOCIAL HISTORY:  Former smoker.  No alcohol.  No drugs.  Is  and lives

alone.

 

FAMILY HISTORY:  Significant for diabetes in both parents. 

 

CURRENT MEDICATIONS:  Lantus 5 units subQ daily, Zoloft 100 mg at night,

Imdur 90 mg daily, Lopressor 50 mg p.o. b.i.d., PhosLo 1 p.o. t.i.d. with

meals, insulin with meals and at night, Protonix drip, normal saline with

potassium at 40 mL/hour.  

 

PHYSICAL EXAMINATION: 

VITAL SIGNS:  Temperature 36.6, pulse 64, respiratory rate 17, blood pressure

143/80, satting 96% on room air. 

GENERAL:  Awake, alert, and oriented x3, obese. 

EYES:  No scleral icterus. 

EARS, NOSE, THROAT:  Moist mucous membranes. 

NECK:  Supple. 

PULMONARY:  Clear to auscultation. 

CARDIAC:  Regular rate and rhythm. 

ABDOMEN:  Mild tenderness, soft, nondistended.  

EXTREMITIES:  No significant clubbing, cyanosis or edema. 

NEUROLOGICALLY:  Nonfocal. 

DERMATOLOGIC:  No rash or ulcers noted. 

 

LABORATORY DATA:  White count 9.4, H\T\H 10 and 32, platelet count is 346. 

Sodium level is 133, potassium 4.5, chloride is 91, bicarb is 30, BUN at last

check was 24, creatinine is 8.5, glucose 96, calcium is 8.8.  INR is 1. 

Gastric occult blood is positive.  Gastric fluid pH of 2.   

 



 

ASSESSMENT AND PLAN:  

1.  End-stage renal disease:  The patient's blood pressure is stable.  She

appears to have tolerated her breakfast.  Will stop the IV fluids and plan on

dialysis today with no fluid removal.  

2.  Hypokalemia.  Potassium levels initially were 3.5, now have trended up to

4.5.  I have stopped the IV fluids with potassium in it and we will dialyze

her on a 2K bath to help lower the potassium.  

3.  Anemia of renal failure.  Hemoglobin levels are in the 10 range and will

dose Procrit again today.  

4.  Renal osteodystrophy.  The patient states that she has not been taking her

phosphate binders at home.    We will go ahead and give her 3

phosphate binders with each meal to try to help control the phosphorus

levels.  She has severe secondary hyperparathyroidism from noncompliance with

binders and dialysis and has noted extensive vascular calcifications on CAT

scan like as a consequence of her noncompliance stressing the importance of

taking binders and going to dialysis religiously.  

 

I appreciate consultation.

 

 

 

LOUISE

## 2017-02-09 VITALS — HEART RATE: 63 BPM | DIASTOLIC BLOOD PRESSURE: 61 MMHG | SYSTOLIC BLOOD PRESSURE: 111 MMHG

## 2017-02-09 VITALS — DIASTOLIC BLOOD PRESSURE: 61 MMHG | SYSTOLIC BLOOD PRESSURE: 114 MMHG | HEART RATE: 63 BPM

## 2017-02-09 VITALS — OXYGEN SATURATION: 95 %

## 2017-02-09 VITALS — DIASTOLIC BLOOD PRESSURE: 74 MMHG | SYSTOLIC BLOOD PRESSURE: 118 MMHG | HEART RATE: 63 BPM

## 2017-02-09 VITALS — DIASTOLIC BLOOD PRESSURE: 57 MMHG | SYSTOLIC BLOOD PRESSURE: 110 MMHG | HEART RATE: 63 BPM

## 2017-02-09 VITALS — SYSTOLIC BLOOD PRESSURE: 121 MMHG | HEART RATE: 64 BPM | DIASTOLIC BLOOD PRESSURE: 66 MMHG

## 2017-02-09 VITALS
HEART RATE: 72 BPM | TEMPERATURE: 98.24 F | DIASTOLIC BLOOD PRESSURE: 76 MMHG | SYSTOLIC BLOOD PRESSURE: 123 MMHG | OXYGEN SATURATION: 95 %

## 2017-02-09 VITALS — HEART RATE: 67 BPM | DIASTOLIC BLOOD PRESSURE: 72 MMHG | SYSTOLIC BLOOD PRESSURE: 132 MMHG

## 2017-02-09 VITALS
SYSTOLIC BLOOD PRESSURE: 188 MMHG | TEMPERATURE: 99.14 F | HEART RATE: 75 BPM | DIASTOLIC BLOOD PRESSURE: 88 MMHG | OXYGEN SATURATION: 96 %

## 2017-02-09 VITALS
DIASTOLIC BLOOD PRESSURE: 73 MMHG | TEMPERATURE: 99.5 F | OXYGEN SATURATION: 94 % | HEART RATE: 75 BPM | SYSTOLIC BLOOD PRESSURE: 153 MMHG

## 2017-02-09 VITALS — SYSTOLIC BLOOD PRESSURE: 104 MMHG | DIASTOLIC BLOOD PRESSURE: 70 MMHG | HEART RATE: 75 BPM

## 2017-02-09 VITALS
SYSTOLIC BLOOD PRESSURE: 136 MMHG | HEART RATE: 64 BPM | DIASTOLIC BLOOD PRESSURE: 79 MMHG | TEMPERATURE: 98.6 F | OXYGEN SATURATION: 94 %

## 2017-02-09 VITALS — TEMPERATURE: 98.42 F | DIASTOLIC BLOOD PRESSURE: 79 MMHG | SYSTOLIC BLOOD PRESSURE: 149 MMHG | HEART RATE: 69 BPM

## 2017-02-09 VITALS — HEART RATE: 63 BPM | DIASTOLIC BLOOD PRESSURE: 68 MMHG | SYSTOLIC BLOOD PRESSURE: 125 MMHG

## 2017-02-09 VITALS — SYSTOLIC BLOOD PRESSURE: 120 MMHG | DIASTOLIC BLOOD PRESSURE: 69 MMHG | HEART RATE: 64 BPM

## 2017-02-09 VITALS — DIASTOLIC BLOOD PRESSURE: 67 MMHG | SYSTOLIC BLOOD PRESSURE: 126 MMHG | HEART RATE: 63 BPM

## 2017-02-09 VITALS
SYSTOLIC BLOOD PRESSURE: 104 MMHG | OXYGEN SATURATION: 96 % | HEART RATE: 75 BPM | TEMPERATURE: 99.14 F | DIASTOLIC BLOOD PRESSURE: 70 MMHG

## 2017-02-09 VITALS
SYSTOLIC BLOOD PRESSURE: 132 MMHG | HEART RATE: 72 BPM | DIASTOLIC BLOOD PRESSURE: 74 MMHG | OXYGEN SATURATION: 92 % | TEMPERATURE: 98.06 F

## 2017-02-09 VITALS
HEART RATE: 65 BPM | SYSTOLIC BLOOD PRESSURE: 133 MMHG | DIASTOLIC BLOOD PRESSURE: 70 MMHG | OXYGEN SATURATION: 96 % | TEMPERATURE: 98.42 F

## 2017-02-09 VITALS — DIASTOLIC BLOOD PRESSURE: 71 MMHG | SYSTOLIC BLOOD PRESSURE: 130 MMHG | TEMPERATURE: 98.06 F | HEART RATE: 66 BPM

## 2017-02-09 VITALS — DIASTOLIC BLOOD PRESSURE: 59 MMHG | SYSTOLIC BLOOD PRESSURE: 100 MMHG | HEART RATE: 63 BPM

## 2017-02-09 VITALS — DIASTOLIC BLOOD PRESSURE: 65 MMHG | SYSTOLIC BLOOD PRESSURE: 124 MMHG | HEART RATE: 66 BPM

## 2017-02-09 VITALS — DIASTOLIC BLOOD PRESSURE: 62 MMHG | HEART RATE: 64 BPM | SYSTOLIC BLOOD PRESSURE: 113 MMHG

## 2017-02-09 VITALS — OXYGEN SATURATION: 96 %

## 2017-02-09 VITALS — DIASTOLIC BLOOD PRESSURE: 60 MMHG | SYSTOLIC BLOOD PRESSURE: 127 MMHG | HEART RATE: 63 BPM

## 2017-02-09 LAB
ANION GAP SERPL CALC-SCNC: 13 MMOL/L (ref 3–11)
BASOPHILS # BLD: 0.04 K/UL (ref 0–0.2)
BASOPHILS NFR BLD: 0.4 %
BUN SERPL-MCNC: 56 MG/DL (ref 7–18)
BUN/CREAT SERPL: 5.6 (ref 10–20)
CALCIUM SERPL-MCNC: 8.4 MG/DL (ref 8.5–10.1)
CHLORIDE SERPL-SCNC: 91 MMOL/L (ref 98–107)
CO2 SERPL-SCNC: 29 MMOL/L (ref 21–32)
COMPLETE: YES
CREAT CL PREDICTED SERPL C-G-VRATE: 8.5 ML/MIN
CREAT SERPL-MCNC: 10 MG/DL (ref 0.6–1.2)
EOSINOPHIL NFR BLD AUTO: 302 K/UL (ref 130–400)
GLUCOSE SERPL-MCNC: 106 MG/DL (ref 70–99)
HCT VFR BLD CALC: 28.9 % (ref 37–47)
HCT VFR BLD CALC: 29.2 % (ref 37–47)
IG%: 0.1 %
IMM GRANULOCYTES NFR BLD AUTO: 26.5 %
LYMPHOCYTES # BLD: 2.47 K/UL (ref 1.2–3.4)
MCH RBC QN AUTO: 30.3 PG (ref 25–34)
MCHC RBC AUTO-ENTMCNC: 31.8 G/DL (ref 32–36)
MCV RBC AUTO: 95.1 FL (ref 80–100)
MONOCYTES NFR BLD: 8.2 %
NEUTROPHILS # BLD AUTO: 4.6 %
NEUTROPHILS NFR BLD AUTO: 60.2 %
PMV BLD AUTO: 9.9 FL (ref 7.4–10.4)
POTASSIUM SERPL-SCNC: 4.3 MMOL/L (ref 3.5–5.1)
RBC # BLD AUTO: 3.04 M/UL (ref 4.2–5.4)
SODIUM SERPL-SCNC: 133 MMOL/L (ref 136–145)
WBC # BLD AUTO: 9.31 K/UL (ref 4.8–10.8)

## 2017-02-09 RX ADMIN — PANTOPRAZOLE SCH MG: 40 TABLET, DELAYED RELEASE ORAL at 20:28

## 2017-02-09 RX ADMIN — SERTRALINE HYDROCHLORIDE SCH MG: 50 TABLET, FILM COATED ORAL at 20:28

## 2017-02-09 RX ADMIN — CALCIUM ACETATE SCH MG: 667 CAPSULE ORAL at 16:54

## 2017-02-09 RX ADMIN — INSULIN ASPART SCH UNITS: 100 INJECTION, SOLUTION INTRAVENOUS; SUBCUTANEOUS at 16:15

## 2017-02-09 RX ADMIN — HYDROMORPHONE HYDROCHLORIDE PRN MG: 1 INJECTION, SOLUTION INTRAMUSCULAR; INTRAVENOUS; SUBCUTANEOUS at 22:01

## 2017-02-09 RX ADMIN — TRAMADOL HYDROCHLORIDE PRN MG: 50 TABLET, COATED ORAL at 00:40

## 2017-02-09 RX ADMIN — INSULIN GLARGINE SCH UNIT: 100 INJECTION, SOLUTION SUBCUTANEOUS at 07:47

## 2017-02-09 RX ADMIN — METOPROLOL TARTRATE SCH MG: 25 TABLET, FILM COATED ORAL at 09:00

## 2017-02-09 RX ADMIN — HYDROMORPHONE HYDROCHLORIDE PRN MG: 1 INJECTION, SOLUTION INTRAMUSCULAR; INTRAVENOUS; SUBCUTANEOUS at 00:41

## 2017-02-09 RX ADMIN — METOPROLOL TARTRATE SCH MG: 25 TABLET, FILM COATED ORAL at 20:28

## 2017-02-09 RX ADMIN — CALCIUM ACETATE SCH MG: 667 CAPSULE ORAL at 07:56

## 2017-02-09 RX ADMIN — INSULIN ASPART SCH UNITS: 100 INJECTION, SOLUTION INTRAVENOUS; SUBCUTANEOUS at 11:00

## 2017-02-09 RX ADMIN — PANTOPRAZOLE SCH MG: 40 TABLET, DELAYED RELEASE ORAL at 07:56

## 2017-02-09 RX ADMIN — CALCIUM ACETATE SCH MG: 667 CAPSULE ORAL at 14:05

## 2017-02-09 RX ADMIN — ISOSORBIDE MONONITRATE SCH MG: 30 TABLET, EXTENDED RELEASE ORAL at 09:00

## 2017-02-09 RX ADMIN — HYDROMORPHONE HYDROCHLORIDE PRN MG: 1 INJECTION, SOLUTION INTRAMUSCULAR; INTRAVENOUS; SUBCUTANEOUS at 16:54

## 2017-02-09 RX ADMIN — ONDANSETRON PRN MG: 2 INJECTION INTRAMUSCULAR; INTRAVENOUS at 14:05

## 2017-02-09 RX ADMIN — INSULIN ASPART SCH UNITS: 100 INJECTION, SOLUTION INTRAVENOUS; SUBCUTANEOUS at 07:45

## 2017-02-09 RX ADMIN — INSULIN ASPART SCH UNITS: 100 INJECTION, SOLUTION INTRAVENOUS; SUBCUTANEOUS at 21:00

## 2017-02-09 NOTE — NEPHROLOGY PROGRESS NOTE
Nephrology Progress Note


Date of Service:


Feb 9, 2017.


Subjective


50 yo female who underwent dialysis today. pt had unsuccessful cannulation 

yesterday so was unable to do dialysis.  pt continues to have nausea. no 

vomiting. no diarrhea.





Objective











  Date Time  Temp Pulse Resp B/P Pulse Ox O2 Delivery O2 Flow Rate FiO2


 


2/9/17 12:35 36.9 69  149/79    


 


2/9/17 12:30  64  113/62    


 


2/9/17 12:15  66  124/65    


 


2/9/17 12:00      Room Air  


 


2/9/17 12:00  67  132/72    


 


2/9/17 11:45  63  127/60    


 


2/9/17 11:30  63  125/68    


 


2/9/17 11:15  64  120/69    


 


2/9/17 11:01  64  121/66    


 


2/9/17 10:45  63  126/67    


 


2/9/17 10:30  63  114/61    


 


2/9/17 10:15  63  111/61    


 


2/9/17 10:00  63  100/59    


 


2/9/17 09:45  63  110/57    


 


2/9/17 09:30  63  118/74    


 


2/9/17 09:20 36.7 66  130/71    


 


2/9/17 08:00      Room Air  


 


2/9/17 07:59 37.0 64 16 136/79 94 Room Air  


 


2/9/17 04:00      Room Air  


 


2/9/17 04:00 36.7 72 18 132/74 92 Room Air  


 


2/8/17 23:59      Room Air  


 


2/8/17 23:51 36.9 68 18 125/73 92 Room Air  


 


2/8/17 20:15      Room Air  


 


2/8/17 19:36 36.7 72 16 121/75 93 Room Air  


 


2/8/17 16:00      Room Air  


 


2/8/17 15:44 36.9 61 16 108/62 92 Room Air  








Physical Exam:


General-aaox3, obese


Eyes-no scleral icterus


ENT-mmm


Neck-supple


Lungs-cta


Heart-rrr


Abdomen-bs+ s/nt


Extremities-no c/c/e


Neuro-nonfocal





 Current Inpatient Medications








 Medications


  (Trade)  Dose


 Ordered  Sig/Willy


 Route  Start Time


 Stop Time Status Last Admin


Dose Admin


 


 Hydromorphone HCl


  (Dilaudid Inj)  0.5 mg  Q3H  PRN


 IV  2/7/17 01:45


 2/21/17 01:44  2/9/17 00:41


0.5 MG


 


 Tramadol HCl


  (Ultram Tab)  25 mg  Q6H  PRN


 PO  2/7/17 01:45


 3/9/17 01:44  2/9/17 00:40


25 MG


 


 Ondansetron HCl 4


 mg  4 mg  Q6H  PRN


 IV  2/7/17 01:45


 3/9/17 01:44  2/9/17 14:05


4 MG


 


 Promethazine HCl/


 Sodium Chloride


  (Phenergan Inj/


 Nss 50ml)  50.5 ml @ 


 204 mls/hr  Q6H  PRN


 IV  2/7/17 01:45


 3/9/17 01:44   


 


 


 Lorazepam


  (Ativan Inj)  0.5 mg  Q4H  PRN


 IV  2/7/17 01:45


 3/9/17 01:44   


 


 


 Isosorbide


 Mononitrate


  (Imdur Ext Rel


 Tab)  90 mg  QAM


 PO  2/7/17 09:00


 3/9/17 08:59  2/8/17 08:47


90 MG


 


 Metoprolol


 Tartrate


  (Lopressor Tab)  50 mg  BID


 PO  2/7/17 09:00


 3/9/17 08:59  2/8/17 20:23


50 MG


 


 Insulin Aspart


  (novoLOG ASPART)  **SLIDING


 SCALE**


 **If C...  ACHS


 SC  2/7/17 07:00


 3/9/17 06:59  2/8/17 20:25


1 UNITS


 


 Glucose


  (Glucose 40% Gel)  15-30


 GRAMS 15


 GRAMS...  UD  PRN


 PO  2/7/17 01:45


 3/9/17 01:44   


 


 


 Glucose


  (Glucose Chew


 Tab)  4-8


 Tablets 4


 Tabl...  UD  PRN


 PO  2/7/17 01:45


 3/9/17 01:44   


 


 


 Dextrose


  (Dextrose 50%


 50ML Syringe)  25-50ML OF


 50% DW IV


 FOR...  UD  PRN


 IV  2/7/17 01:45


 3/9/17 01:44   


 


 


 Glucagon


  (Glucagon Inj)  1 mg  UD  PRN


 SQ  2/7/17 01:45


 3/9/17 01:44   


 


 


 Miscellaneous


  (Iv Fluids


 Completed)  1 ea  PRN  PRN


 N/A  2/7/17 02:30


 2/7/18 02:29   


 


 


 Insulin Glargine


  (Lantus Solostar


 Pen)  5 unit  DAILY


 SC  2/8/17 09:00


 3/10/17 08:59  2/9/17 07:47


5 UNIT


 


 Sertraline HCl


  (Zoloft Tab)  100 mg  HS


 PO  2/7/17 21:00


 3/9/17 20:59  2/8/17 20:23


100 MG


 


 Calcium Acetate


  (Phoslo Cap)  2,001 mg  TIDM


 PO  2/8/17 11:30


 3/10/17 11:29  2/9/17 14:05


2,001 MG


 


 Pantoprazole


 Sodium


  (Protonix Tab)  40 mg  BID


 PO  2/8/17 21:00


 3/10/17 20:59  2/9/17 07:56


40 MG


 


 Epoetin Narayan


  (Procrit Inj)  10,000 units  TODAY@0800


 IV.  2/9/17 08:00


 2/9/17 16:00  2/9/17 10:42


10,000 UNITS











Last 24 Hours








Test


  2/8/17


16:14 2/8/17


20:15 2/9/17


05:35 2/9/17


06:56


 


Bedside Glucose 114 mg/dl  193 mg/dl   112 mg/dl 


 


White Blood Count   9.31 K/uL  


 


Red Blood Count   3.04 M/uL  


 


Hemoglobin   9.2 g/dL  


 


Hematocrit   28.9 %  


 


Mean Corpuscular Volume   95.1 fL  


 


Mean Corpuscular Hemoglobin   30.3 pg  


 


Mean Corpuscular Hemoglobin


Concent 


  


  31.8 g/dl 


  


 


 


Platelet Count   302 K/uL  


 


Mean Platelet Volume   9.9 fL  


 


Neutrophils (%) (Auto)   60.2 %  


 


Lymphocytes (%) (Auto)   26.5 %  


 


Monocytes (%) (Auto)   8.2 %  


 


Eosinophils (%) (Auto)   4.6 %  


 


Basophils (%) (Auto)   0.4 %  


 


Neutrophils # (Auto)   5.60 K/uL  


 


Lymphocytes # (Auto)   2.47 K/uL  


 


Monocytes # (Auto)   0.76 K/uL  


 


Eosinophils # (Auto)   0.43 K/uL  


 


Basophils # (Auto)   0.04 K/uL  


 


RDW Standard Deviation   50.0 fL  


 


RDW Coefficient of Variation   14.7 %  


 


Immature Granulocyte % (Auto)   0.1 %  


 


Immature Granulocyte # (Auto)   0.01 K/uL  


 


Sodium Level   133 mmol/L  


 


Potassium Level   4.3 mmol/L  


 


Chloride Level   91 mmol/L  


 


Carbon Dioxide Level   29 mmol/L  


 


Anion Gap   13.0 mmol/L  


 


Blood Urea Nitrogen   56 mg/dl  


 


Creatinine   10.00 mg/dl  


 


Est Creatinine Clear Calc


Drug Dose 


  


  8.5 ml/min 


  


 


 


Estimated GFR (


American) 


  


  4.7 


  


 


 


Estimated GFR (Non-


American 


  


  4.1 


  


 


 


BUN/Creatinine Ratio   5.6  


 


Random Glucose   106 mg/dl  


 


Calcium Level   8.4 mg/dl  














Test


  2/9/17


11:13 


  


  


 


 


Bedside Glucose 137 mg/dl    











Assessment & Plan


ESRD-pt underwent dialysis today and will dialyze again tomorrow to keep on m-w-

f schedule. no uf secondary to poor appetite. 





Anemia of renal failure-continue procrit to keep hg levels between 10 to 11





IMANI-phos levels chronically leevated and has severe secondary 

hyperparathyroidism-on phoslo.

## 2017-02-09 NOTE — PROGRESS NOTE
Internal Med Progress Note


Date of Service:


Feb 9, 2017.


Provider Documentation:





SUBJECTIVE:





The patient was seen and examined 


No more hematemesis ,No Melena 


Minimal Epigastric discomfort 


Complains left hand pain with swelling and bruising-no fracture 


Remains stable 








OBJECTIVE:





Vital Signs-as noted below





Exam:


General-No distress at rest


Eyes-normal


ENT-normal


Neck-supple


Lungs-Clear to auscultate bilaterally 


Heart-Regular


Abdomen-Benign,no masses 


Extremities-Trace edema bilaterally 


Left hand is swollen,and bruised over the 2nd Metacarpal area


Neuro-AAOx3





Lab data as noted below.


ASSESSMENT & PLAN:





Upper gastrointestinal bleed/hematemesis


Differential includes esophagitis, Velia-Talley tear, gastritis, gastric ulcer


Remained hemodynamically stable


Has been on IV Protonix


Follow HH, transfuse prbc if Hg less than 8. 


Hb stable >9


Appreciate GI input-no EGD if remains stable  


Change IV Protonix to oral 


Recheck Hb after Dialysis -d/c if stable 





ESRD on HD


Nephrology consult-appreciate input


Dialysis today 





Hypertension, slightly elevated.


No acute issue





Chronic diastolic HF (EF 55%), px on the dry side 


History of coronary artery disease sp stenting/ PVD as per records








DM2, insulin requiring


Reasonable control as of recent HgA1c, 7.2 as of November 2016.


ISS BG goal 140-180





Past tobacco abuse.








DVT prophylaxis, SCDs RE GI bleed.  





Full code.


Likely discharge home today after HD and if Hb remains stakle





Vital Signs:











  Date Time  Temp Pulse Resp B/P Pulse Ox O2 Delivery O2 Flow Rate FiO2


 


2/9/17 11:45  63  127/60    


 


2/9/17 11:30  63  125/68    


 


2/9/17 11:15  64  120/69    


 


2/9/17 11:01  64  121/66    


 


2/9/17 10:45  63  126/67    


 


2/9/17 10:30  63  114/61    


 


2/9/17 10:15  63  111/61    


 


2/9/17 10:00  63  100/59    


 


2/9/17 09:45  63  110/57    


 


2/9/17 09:30  63  118/74    


 


2/9/17 09:20 36.7 66  130/71    


 


2/9/17 08:00      Room Air  


 


2/9/17 07:59 37.0 64 16 136/79 94 Room Air  


 


2/9/17 04:00      Room Air  


 


2/9/17 04:00 36.7 72 18 132/74 92 Room Air  


 


2/8/17 23:59      Room Air  


 


2/8/17 23:51 36.9 68 18 125/73 92 Room Air  


 


2/8/17 20:15      Room Air  


 


2/8/17 19:36 36.7 72 16 121/75 93 Room Air  


 


2/8/17 16:00      Room Air  


 


2/8/17 15:44 36.9 61 16 108/62 92 Room Air  








Lab Results:





Results Past 24 Hours








Test


  2/8/17


16:14 2/8/17


20:15 2/9/17


05:35 2/9/17


06:56 Range/Units


 


 


Bedside Glucose 114 193  112 70-90  mg/dl


 


White Blood Count   9.31  4.8-10.8  K/uL


 


Red Blood Count   3.04  4.2-5.4  M/uL


 


Hemoglobin   9.2  12.0-16.0  g/dL


 


Hematocrit   28.9  37-47  %


 


Mean Corpuscular Volume   95.1    fL


 


Mean Corpuscular Hemoglobin   30.3  25-34  pg


 


Mean Corpuscular Hemoglobin


Concent 


  


  31.8


  


  32-36  g/dl


 


 


Platelet Count   302  130-400  K/uL


 


Mean Platelet Volume   9.9  7.4-10.4  fL


 


Neutrophils (%) (Auto)   60.2   %


 


Lymphocytes (%) (Auto)   26.5   %


 


Monocytes (%) (Auto)   8.2   %


 


Eosinophils (%) (Auto)   4.6   %


 


Basophils (%) (Auto)   0.4   %


 


Neutrophils # (Auto)   5.60  1.4-6.5  K/uL


 


Lymphocytes # (Auto)   2.47  1.2-3.4  K/uL


 


Monocytes # (Auto)   0.76  0.11-0.59  K/uL


 


Eosinophils # (Auto)   0.43  0-0.5  K/uL


 


Basophils # (Auto)   0.04  0-0.2  K/uL


 


RDW Standard Deviation   50.0  36.4-46.3  fL


 


RDW Coefficient of Variation   14.7  11.5-14.5  %


 


Immature Granulocyte % (Auto)   0.1   %


 


Immature Granulocyte # (Auto)   0.01  0.00-0.02  K/uL


 


Sodium Level   133  136-145  mmol/L


 


Potassium Level   4.3  3.5-5.1  mmol/L


 


Chloride Level   91    mmol/L


 


Carbon Dioxide Level   29  21-32  mmol/L


 


Anion Gap   13.0  3-11  mmol/L


 


Blood Urea Nitrogen   56  7-18  mg/dl


 


Creatinine


  


  


  10.00


  


  0.60-1.20


mg/dl


 


Est Creatinine Clear Calc


Drug Dose 


  


  8.5


  


   ml/min


 


 


Estimated GFR (


American) 


  


  4.7


  


   


 


 


Estimated GFR (Non-


American 


  


  4.1


  


   


 


 


BUN/Creatinine Ratio   5.6  10-20  


 


Random Glucose   106  70-99  mg/dl


 


Calcium Level   8.4  8.5-10.1  mg/dl














Test


  2/9/17


11:13 


  


  


  Range/Units


 


 


Bedside Glucose 137    70-90  mg/dl

## 2017-02-10 VITALS — TEMPERATURE: 98.78 F | HEART RATE: 67 BPM | DIASTOLIC BLOOD PRESSURE: 57 MMHG | SYSTOLIC BLOOD PRESSURE: 117 MMHG

## 2017-02-10 VITALS — SYSTOLIC BLOOD PRESSURE: 147 MMHG | DIASTOLIC BLOOD PRESSURE: 84 MMHG | HEART RATE: 66 BPM

## 2017-02-10 VITALS — HEART RATE: 69 BPM | SYSTOLIC BLOOD PRESSURE: 145 MMHG | DIASTOLIC BLOOD PRESSURE: 78 MMHG

## 2017-02-10 VITALS — DIASTOLIC BLOOD PRESSURE: 74 MMHG | SYSTOLIC BLOOD PRESSURE: 141 MMHG | HEART RATE: 67 BPM

## 2017-02-10 VITALS — SYSTOLIC BLOOD PRESSURE: 136 MMHG | HEART RATE: 65 BPM | DIASTOLIC BLOOD PRESSURE: 69 MMHG

## 2017-02-10 VITALS
SYSTOLIC BLOOD PRESSURE: 135 MMHG | HEART RATE: 72 BPM | DIASTOLIC BLOOD PRESSURE: 83 MMHG | TEMPERATURE: 98.78 F | OXYGEN SATURATION: 98 %

## 2017-02-10 VITALS — SYSTOLIC BLOOD PRESSURE: 136 MMHG | HEART RATE: 70 BPM | DIASTOLIC BLOOD PRESSURE: 78 MMHG

## 2017-02-10 VITALS
HEART RATE: 69 BPM | DIASTOLIC BLOOD PRESSURE: 77 MMHG | SYSTOLIC BLOOD PRESSURE: 145 MMHG | OXYGEN SATURATION: 98 % | TEMPERATURE: 97.88 F

## 2017-02-10 VITALS
HEART RATE: 72 BPM | TEMPERATURE: 98.78 F | DIASTOLIC BLOOD PRESSURE: 83 MMHG | OXYGEN SATURATION: 98 % | SYSTOLIC BLOOD PRESSURE: 135 MMHG

## 2017-02-10 VITALS — HEART RATE: 69 BPM | SYSTOLIC BLOOD PRESSURE: 133 MMHG | DIASTOLIC BLOOD PRESSURE: 69 MMHG

## 2017-02-10 VITALS — SYSTOLIC BLOOD PRESSURE: 128 MMHG | DIASTOLIC BLOOD PRESSURE: 75 MMHG | HEART RATE: 65 BPM

## 2017-02-10 VITALS — SYSTOLIC BLOOD PRESSURE: 134 MMHG | DIASTOLIC BLOOD PRESSURE: 62 MMHG | HEART RATE: 69 BPM

## 2017-02-10 VITALS — SYSTOLIC BLOOD PRESSURE: 144 MMHG | HEART RATE: 65 BPM | DIASTOLIC BLOOD PRESSURE: 75 MMHG

## 2017-02-10 VITALS — HEART RATE: 68 BPM | DIASTOLIC BLOOD PRESSURE: 64 MMHG | SYSTOLIC BLOOD PRESSURE: 138 MMHG

## 2017-02-10 VITALS — TEMPERATURE: 98.6 F | SYSTOLIC BLOOD PRESSURE: 140 MMHG | HEART RATE: 65 BPM | DIASTOLIC BLOOD PRESSURE: 73 MMHG

## 2017-02-10 VITALS — HEART RATE: 65 BPM | SYSTOLIC BLOOD PRESSURE: 141 MMHG | DIASTOLIC BLOOD PRESSURE: 77 MMHG

## 2017-02-10 VITALS — HEART RATE: 68 BPM | SYSTOLIC BLOOD PRESSURE: 97 MMHG | DIASTOLIC BLOOD PRESSURE: 63 MMHG

## 2017-02-10 VITALS — OXYGEN SATURATION: 95 %

## 2017-02-10 LAB
ANION GAP SERPL CALC-SCNC: 10 MMOL/L (ref 3–11)
BASOPHILS # BLD: 0.02 K/UL (ref 0–0.2)
BASOPHILS NFR BLD: 0.2 %
BUN SERPL-MCNC: 38 MG/DL (ref 7–18)
BUN/CREAT SERPL: 5 (ref 10–20)
CALCIUM SERPL-MCNC: 8.9 MG/DL (ref 8.5–10.1)
CHLORIDE SERPL-SCNC: 96 MMOL/L (ref 98–107)
CO2 SERPL-SCNC: 26 MMOL/L (ref 21–32)
COMPLETE: YES
CREAT CL PREDICTED SERPL C-G-VRATE: 11 ML/MIN
CREAT SERPL-MCNC: 7.7 MG/DL (ref 0.6–1.2)
EOSINOPHIL NFR BLD AUTO: 336 K/UL (ref 130–400)
GLUCOSE SERPL-MCNC: 173 MG/DL (ref 70–99)
HCT VFR BLD CALC: 33.6 % (ref 37–47)
IG%: 0.3 %
IMM GRANULOCYTES NFR BLD AUTO: 7.1 %
LYMPHOCYTES # BLD: 0.84 K/UL (ref 1.2–3.4)
MCH RBC QN AUTO: 30.1 PG (ref 25–34)
MCHC RBC AUTO-ENTMCNC: 31.8 G/DL (ref 32–36)
MCV RBC AUTO: 94.6 FL (ref 80–100)
MONOCYTES NFR BLD: 1.6 %
NEUTROPHILS # BLD AUTO: 0.2 %
NEUTROPHILS NFR BLD AUTO: 90.6 %
PMV BLD AUTO: 10.5 FL (ref 7.4–10.4)
POTASSIUM SERPL-SCNC: 5.2 MMOL/L (ref 3.5–5.1)
RBC # BLD AUTO: 3.55 M/UL (ref 4.2–5.4)
SODIUM SERPL-SCNC: 132 MMOL/L (ref 136–145)
WBC # BLD AUTO: 11.9 K/UL (ref 4.8–10.8)

## 2017-02-10 RX ADMIN — METOPROLOL TARTRATE SCH MG: 25 TABLET, FILM COATED ORAL at 08:00

## 2017-02-10 RX ADMIN — CALCIUM ACETATE SCH MG: 667 CAPSULE ORAL at 08:16

## 2017-02-10 RX ADMIN — CALCIUM ACETATE SCH MG: 667 CAPSULE ORAL at 16:48

## 2017-02-10 RX ADMIN — INSULIN GLARGINE SCH UNIT: 100 INJECTION, SOLUTION SUBCUTANEOUS at 08:23

## 2017-02-10 RX ADMIN — INSULIN ASPART SCH UNITS: 100 INJECTION, SOLUTION INTRAVENOUS; SUBCUTANEOUS at 13:17

## 2017-02-10 RX ADMIN — ISOSORBIDE MONONITRATE SCH MG: 30 TABLET, EXTENDED RELEASE ORAL at 08:00

## 2017-02-10 RX ADMIN — INSULIN ASPART SCH UNITS: 100 INJECTION, SOLUTION INTRAVENOUS; SUBCUTANEOUS at 16:30

## 2017-02-10 RX ADMIN — CALCIUM ACETATE SCH MG: 667 CAPSULE ORAL at 13:18

## 2017-02-10 RX ADMIN — PANTOPRAZOLE SCH MG: 40 TABLET, DELAYED RELEASE ORAL at 08:16

## 2017-02-10 RX ADMIN — INSULIN ASPART SCH UNITS: 100 INJECTION, SOLUTION INTRAVENOUS; SUBCUTANEOUS at 08:24

## 2017-02-10 NOTE — DISCHARGE SUMMARY
Discharge Summary


Admission Date:


Feb 7, 2017 at 01:19


Discharge Date:  Feb 10, 2017


Discharge Disposition:  Home


Principal Diagnosis:


UGI bleed-No drop in H/H,ESRD on HD


Secondary Diagnoses/Problems:


Please see H&P


Consultations:


Nephrology


Medication Reconciliation


New Medications:  


Prednisone Tab (Prednisone) 10 Mg Tab


10 MG PO UD for 7 Days, #10 TAB


2 po daily for 3 days and then 1 po daily for 4


 


Continued Medications:  


Aspirin (Aspirin Ec) 81 Mg Tab


81 MG PO DAILY





Atorvastatin (Lipitor) 20 Mg Tab


40 MG PO DAILY, TAB





Calcium Acetate (Phoslo 667 Mg) 667 Mg Cap


1 CAP PO WM, CAP





Clopidogrel Bisulfate (Plavix) 75 Mg Tab


1 TAB PO DAILY for 90 Days, #90 TAB 1 Refill





Insulin Glargine (Lantus Solostar) 100 Unit/Ml Inj


10 UNITS SC QPM, PEN





Isosorbide Mononitrate (Isosorbide Mononitrate ER) 30 Mg Tabcr


60 MG PO QAM





Isosorbide Mononitrate Ext Rel (Imdur Ext Rel) 30 Mg Ertab


30 MG PO QAM, TAB





Lorazepam (Ativan) 1 Mg Tab


0.5 MG PO DAILY PRN for Anxiety, TAB





Methocarbamol (Methocarbamol) 500 Mg Tab


500 MG PO BID PRN for Muscle Spasms





Metoprolol Tartrate (Lopressor) 25 Mg Tab


50 MG PO BID, TAB





Ranitidine Hcl (Zantac) 150 Mg Tab


300 MG PO HS, TAB





Sertraline HCl (Sertraline HCl) 50 Mg Tab


2 TAB PO DAILY for 30 Days, #60 TAB 5 Refills





Tramadol (Ultram) 50 Mg Tab


1 TAB PO TID PRN for Pain for 30 Days, #90 TAB











Admission Information


HPI (per Admitting provider):


DATE OF ADMISSION:  02/07/2017


 


PRIMARY CARE PHYSICIAN:  Dr. Smith.


 


CHIEF COMPLAINT:  Hematemesis.


 


HISTORY OF PRESENT ILLNESS:  





Medical history significant for chronic diastolic heart failure, EF 55%, 


CAD sp stenting, PVD as per records, past tobacco abuse, 


DM2, insulin requiring, end-stage renal disease on hemodialysis, 


chronic anemia (baseline hemoglobin 10-11). 





Recent confinement last week for chest pain, possibly from


hypertensive urgency. 





Last night, after arriving home from dialysis, px felt sick after eating


a  sandwich. Bilious emesis followed by 3 episodes of


dark brown/bloody emesis.  


No chest pain, no shortness of breath, no fever, no chills.  No black, no 

bloody stools.


Patient noted some epigastric pain.


Good BM.





MEDICAL HISTORY:  As above.


 


SURGERIES:  She has had vascular procedures


 


FAMILY HISTORY:  Heart disease, diabetes.


 


PERSONAL AND SOCIAL HISTORY:  Past tobacco abuse.  No chronic intake of


alcoholic beverages.  Disabled.


 


REVIEW OF SYSTEMS:  As per HPI.  all other ROS negative.


 


PHYSICAL EXAMINATION:


VITAL SIGNS:  Blood pressure was noted to be 115/79, pulse rate 92, RR 20,


temperature 36.6, sats 98 on room air.


GENERAL:  Noted to be slightly anxious, obese, no respiratory distress. 


Uncomfortable.Looks older than stated age.


SKIN:  Pallor.


HEENT:  Pale palpebral conjunctivae.  Dry mucosa.


NECK:  Short neck.


LUNGS:  Decreased breath sounds.


HEART:  Regular rate and rhythm.


ABDOMEN:  Epigastric tenderness.


EXTREMITIES:  No edema. no tenderness


NEUROLOGIC:  No gross focality.


 


LABS:  Hemoglobin was 12.5,  white cell count 15.4, platelets


272.  Sodium 131, potassium 3.5, chloride 88, CO2 28, BUN 20, creatinine


7, glucose 195.  Troponin 0.69. Lipase 227. 





CT of the abdomen and pelvis initial read showed hyperdense bile in the 

gallbladder, 


small hiatal hernia, extensive vascular calcifications.  





Chest x-ray showed no active disease.  





EKG showed sinus rhythm, 80 with PVCs, Q-waves in inferior leads


 


ASSESSMENT:


1.  Upper gastrointestinal bleed/hematemesis


ddx include esophagitis, Velia-Talley tear, gastritis, gastric ulcer


px currently hemodynamically stable


2.  Hypertension, slightly elevated.


3.  hx chronic diastolic HF (EF 55%), px on the dry side 


4.  History of coronary artery disease sp stenting/ PVD as per records


5.  DM2, insulin requiring


reasonable control as of recent HgA1c, 7.2 as of November 2016.


6.  Past tobacco abuse.


7.  ESRD on HD


 


PLAN:  


Observation


PCU. 


Follow HH, transfuse prbc if Hg less than 8. 


Appropriate to hold home antiplatelet tx


IV PPI


GI consult.  RE UGIB


Nephrology consult if px still admitted by tomorrow 2/8 (px known to [facility]; 

dialysis sked MW)


ISS BG goal 140-180


DVT prophylaxis, SCDs RE GI bleed.  


Full code.


 


 


 











 Dictated:  02/07/17 0157 


 


Transcribed:  02/07/17 0506 <Electronically signed by Lui Bautista M.D.>


 


Signed:  02/07/17 1455 _________________________________________











Hospital Course


Left Hand Pain


May have Gout


Will give short course of Prednisone








Upper gastrointestinal bleed/hematemesis


Differential includes esophagitis, Velia-Talley tear, gastritis, gastric ulcer


Remained hemodynamically stable


Has been on IV Protonix


Follow HH, transfuse prbc if Hg less than 8. 


Appreciate GI input-no EGD if remains stable  


Change IV Protonix to oral 


Hb >10  on 2/10/17


Discharge home today 





ESRD on HD


Nephrology consult-appreciate input


Dialysis done today 2/10/17





Hypertension, slightly elevated.


No acute issue





Chronic diastolic HF (EF 55%), px on the dry side 


History of coronary artery disease sp stenting/ PVD as per records








DM2, insulin requiring


Reasonable control as of recent HgA1c, 7.2 as of November 2016.


ISS BG goal 140-180





Past tobacco abuse.








DVT prophylaxis, SCDs RE GI bleed.  





Full code.


[id number] today 





Total time spent on discharge = 35 minutes


This includes examination of the patient, discharge planning, medication 

reconciliation, and communication with other providers.





Discharge Instructions


Admission


Reason for Admission:  UGIB





Discharge


Discharge Diagnosis / Problem:  UGI bleed-No drop in H/H,ESRD on HD





Discharge Goals


Goal(s):  Prevent Disease Progression





Activity Recommendations


Activity Limitations:  resume your previous activity





.





Instructions / Follow-Up


Instructions / Follow-Up


Dr Jiang on 2/14/17 at 10:50AM.Keep appointment with Dialysis





Current Hospital Diet


Patient's current hospital diet: Diabetes Type 2 Diet, Renal Diet





Discharge Diet


Recommended Diet:  Diabetes Type 2 Diet, Renal Diet





Pending Studies


Studies pending at discharge:  no





Medical Emergencies








.


Who to Call and When:





Medical Emergencies:  If at any time you feel your situation is an emergency, 

please call 911 immediately.





.





Non-Emergent Contact


Non-Emergency issues call your:  Primary Care Provider





.





Past History


Medical & Surgical History:  


(1) UGIB (upper gastrointestinal bleed)


(2) Vomiting


(3) CAD (coronary artery disease)


(4) Carotid stenosis


(5) Ischemic cardiomyopathy


(6) ESRD (end stage renal disease) on dialysis


(7) DM type 2 (diabetes mellitus, type 2)


(8) Morbid obesity


(9) Dyslipidemia


(10) HTN (hypertension)


.





"Provider Documentation" section prepared by Radha Merida.





VTE Core Measure


Inpt VTE Proph given/why not?:  SCD's (UGIB)











<Electronically signed by Radha Merida M.D.>





Additional Copies To


Lurdes Smith D.O.

## 2017-02-10 NOTE — PROGRESS NOTE
Internal Med Progress Note


Date of Service:


Feb 10, 2017.


Provider Documentation:





SUBJECTIVE:





The patient was seen and examined 


No more hematemesis ,No Melena 


Minimal Epigastric discomfort 


Complains left hand pain with swelling and bruising-no fracture 


Left hand pain is better  








OBJECTIVE:





Vital Signs-as noted below





Exam:


General-No distress at rest


Eyes-normal


ENT-normal


Neck-supple


Lungs-Clear to auscultate bilaterally 


Heart-Regular


Abdomen-Benign,no masses 


Extremities-Trace edema bilaterally 


Left hand is swollen,and bruised over the 2nd Metacarpal area


Neuro-AAOx3





Lab data as noted below.


ASSESSMENT & PLAN:


Left Hand Pain


May have Gout


Will give short course of Prednisone








Upper gastrointestinal bleed/hematemesis


Differential includes esophagitis, Velia-Talley tear, gastritis, gastric ulcer


Remained hemodynamically stable


Has been on IV Protonix


Follow HH, transfuse prbc if Hg less than 8. 


Appreciate GI input-no EGD if remains stable  


Change IV Protonix to oral 


Hb >10  on 2/10/17


Discharge home today 





ESRD on HD


Nephrology consult-appreciate input


Dialysis done today 2/10/17





Hypertension, slightly elevated.


No acute issue





Chronic diastolic HF (EF 55%), px on the dry side 


History of coronary artery disease sp stenting/ PVD as per records








DM2, insulin requiring


Reasonable control as of recent HgA1c, 7.2 as of November 2016.


ISS BG goal 140-180





Past tobacco abuse.








DVT prophylaxis, SCDs RE GI bleed.  





Full code.


[id number] today 





Vital Signs:











  Date Time  Temp Pulse Resp B/P Pulse Ox O2 Delivery O2 Flow Rate FiO2


 


2/10/17 15:29 37.1 72 18 135/83 98 Room Air  





  72      


 


2/10/17 12:50 37.1 67  117/57    


 


2/10/17 12:15  67  141/74    


 


2/10/17 12:00  68  97/63    


 


2/10/17 11:45  65  136/69    


 


2/10/17 11:30  70  136/78    


 


2/10/17 11:15  68  138/64    


 


2/10/17 11:00  69  133/69    


 


2/10/17 10:45  69  145/78    


 


2/10/17 10:30  69  134/62    


 


2/10/17 10:15  66  147/84    


 


2/10/17 10:00  65  128/75    


 


2/10/17 09:45  65  141/77    


 


2/10/17 09:30  65  144/75    


 


2/10/17 09:25 37.0 65  140/73    


 


2/10/17 08:22 36.6 69 25 145/77 98 Room Air  


 


2/10/17 08:00      Room Air  


 


2/10/17 00:00     95 Room Air 0.0 


 


2/9/17 23:59 36.9 65 20 133/70 96 Room Air  


 


2/9/17 21:06     95 Room Air  


 


2/9/17 21:00 36.8 72 20 123/76 95 Room Air 0.0 


 


2/9/17 20:43 37.3 75 16  96  2.0 


 


2/9/17 20:30  75  104/70    


 


2/9/17 20:00     96 Room Air  


 


2/9/17 19:52 37.3 75 16 188/88 96   


 


2/9/17 16:00     95 Room Air  








Lab Results:





Results Past 24 Hours








Test


  2/9/17


16:27 2/9/17


18:12 2/9/17


21:14 2/10/17


06:00 Range/Units


 


 


Bedside Glucose 131  121  70-90  mg/dl


 


Hemoglobin  9.4  10.7 12.0-16.0  g/dL


 


Hematocrit  29.2  33.6 37-47  %


 


White Blood Count    11.90 4.8-10.8  K/uL


 


Red Blood Count    3.55 4.2-5.4  M/uL


 


Mean Corpuscular Volume    94.6   fL


 


Mean Corpuscular Hemoglobin    30.1 25-34  pg


 


Mean Corpuscular Hemoglobin


Concent 


  


  


  31.8


  32-36  g/dl


 


 


Platelet Count    336 130-400  K/uL


 


Mean Platelet Volume    10.5 7.4-10.4  fL


 


Neutrophils (%) (Auto)    90.6  %


 


Lymphocytes (%) (Auto)    7.1  %


 


Monocytes (%) (Auto)    1.6  %


 


Eosinophils (%) (Auto)    0.2  %


 


Basophils (%) (Auto)    0.2  %


 


Neutrophils # (Auto)    10.79 1.4-6.5  K/uL


 


Lymphocytes # (Auto)    0.84 1.2-3.4  K/uL


 


Monocytes # (Auto)    0.19 0.11-0.59  K/uL


 


Eosinophils # (Auto)    0.02 0-0.5  K/uL


 


Basophils # (Auto)    0.02 0-0.2  K/uL


 


RDW Standard Deviation    49.5 36.4-46.3  fL


 


RDW Coefficient of Variation    14.5 11.5-14.5  %


 


Immature Granulocyte % (Auto)    0.3  %


 


Immature Granulocyte # (Auto)    0.04 0.00-0.02  K/uL


 


Sodium Level    132 136-145  mmol/L


 


Potassium Level    5.2 3.5-5.1  mmol/L


 


Chloride Level    96   mmol/L


 


Carbon Dioxide Level    26 21-32  mmol/L


 


Anion Gap    10.0 3-11  mmol/L


 


Blood Urea Nitrogen    38 7-18  mg/dl


 


Creatinine


  


  


  


  7.70


  0.60-1.20


mg/dl


 


Est Creatinine Clear Calc


Drug Dose 


  


  


  11.0


   ml/min


 


 


Estimated GFR (


American) 


  


  


  6.5


   


 


 


Estimated GFR (Non-


American 


  


  


  5.6


   


 


 


BUN/Creatinine Ratio    5.0 10-20  


 


Random Glucose    173 70-99  mg/dl


 


Calcium Level    8.9 8.5-10.1  mg/dl














Test


  2/10/17


08:23 2/10/17


11:53 


  


  Range/Units


 


 


Bedside Glucose 208 135   70-90  mg/dl

## 2017-02-10 NOTE — DISCHARGE INSTRUCTIONS
Discharge Instructions


Admission


Reason for Admission:  UGIB





Discharge


Discharge Diagnosis / Problem:  UGI bleed-No drop in H/H,ESRD on HD





Discharge Goals


Goal(s):  Prevent Disease Progression





Activity Recommendations


Activity Limitations:  resume your previous activity





.





Instructions / Follow-Up


Instructions / Follow-Up


Dr Jiang on 2/14/17 at 10:50AM.Keep appointment with Dialysis





Current Hospital Diet


Patient's current hospital diet: Diabetes Type 2 Diet, Renal Diet





Discharge Diet


Recommended Diet:  Diabetes Type 2 Diet, Renal Diet





Pending Studies


Studies pending at discharge:  no





Medical Emergencies








.


Who to Call and When:





Medical Emergencies:  If at any time you feel your situation is an emergency, 

please call 911 immediately.





.





Non-Emergent Contact


Non-Emergency issues call your:  Primary Care Provider





.





Past History


Medical & Surgical History:  


(1) UGIB (upper gastrointestinal bleed)


(2) Vomiting


(3) CAD (coronary artery disease)


(4) Carotid stenosis


(5) Ischemic cardiomyopathy


(6) ESRD (end stage renal disease) on dialysis


(7) DM type 2 (diabetes mellitus, type 2)


(8) Morbid obesity


(9) Dyslipidemia


(10) HTN (hypertension)


.





"Provider Documentation" section prepared by Radha Merida.





VTE Core Measure


Inpt VTE Proph given/why not?:  SCD's (UGIB)

## 2017-02-10 NOTE — NEPHROLOGY PROGRESS NOTE
Nephrology Progress Note


Date of Service:


Feb 10, 2017.


Subjective


52 yo female with esrd with n/v.  pt moving bowels now. will stop the metamucil 

and dulcolax.  was able to eat breakfast well today.  pt would like to leave 

this afternoon if possible.





Objective











  Date Time  Temp Pulse Resp B/P Pulse Ox O2 Delivery O2 Flow Rate FiO2


 


2/10/17 08:22 36.6 69 25 145/77 98 Room Air  


 


2/10/17 00:00     95 Room Air 0.0 


 


2/9/17 23:59 36.9 65 20 133/70 96 Room Air  


 


2/9/17 21:06     95 Room Air  


 


2/9/17 21:00 36.8 72 20 123/76 95 Room Air 0.0 


 


2/9/17 20:43 37.3 75 16  96  2.0 


 


2/9/17 20:30  75  104/70    


 


2/9/17 20:00     96 Room Air  


 


2/9/17 19:52 37.3 75 16 188/88 96   


 


2/9/17 16:00     95 Room Air  


 


2/9/17 15:47 37.5 75 16 153/73 94   


 


2/9/17 12:35 36.9 69  149/79    


 


2/9/17 12:30  64  113/62    


 


2/9/17 12:15  66  124/65    


 


2/9/17 12:00      Room Air  


 


2/9/17 12:00  67  132/72    


 


2/9/17 11:45  63  127/60    


 


2/9/17 11:30  63  125/68    


 


2/9/17 11:15  64  120/69    


 


2/9/17 11:01  64  121/66    


 


2/9/17 10:45  63  126/67    


 


2/9/17 10:30  63  114/61    


 


2/9/17 10:15  63  111/61    


 


2/9/17 10:00  63  100/59    


 


2/9/17 09:45  63  110/57    








Physical Exam:


General-aaox3, obese


Eyes-no scleral icterus


ENT-mmm


Neck-supple


Lungs-clear


Heart-regular


Abdomen-bs+ s/nt


Extremities-no c/c/e


Neuro-nonfocal





 Current Inpatient Medications








 Medications


  (Trade)  Dose


 Ordered  Sig/Willy


 Route  Start Time


 Stop Time Status Last Admin


Dose Admin


 


 Hydromorphone HCl


  (Dilaudid Inj)  0.5 mg  Q3H  PRN


 IV  2/7/17 01:45


 2/21/17 01:44  2/9/17 22:01


0.5 MG


 


 Tramadol HCl


  (Ultram Tab)  25 mg  Q6H  PRN


 PO  2/7/17 01:45


 3/9/17 01:44  2/9/17 00:40


25 MG


 


 Ondansetron HCl 4


 mg  4 mg  Q6H  PRN


 IV  2/7/17 01:45


 3/9/17 01:44  2/9/17 14:05


4 MG


 


 Promethazine HCl/


 Sodium Chloride


  (Phenergan Inj/


 Nss 50ml)  50.5 ml @ 


 204 mls/hr  Q6H  PRN


 IV  2/7/17 01:45


 3/9/17 01:44   


 


 


 Lorazepam


  (Ativan Inj)  0.5 mg  Q4H  PRN


 IV  2/7/17 01:45


 3/9/17 01:44   


 


 


 Isosorbide


 Mononitrate


  (Imdur Ext Rel


 Tab)  90 mg  QAM


 PO  2/7/17 09:00


 3/9/17 08:59  2/8/17 08:47


90 MG


 


 Metoprolol


 Tartrate


  (Lopressor Tab)  50 mg  BID


 PO  2/7/17 09:00


 3/9/17 08:59  2/9/17 20:28


50 MG


 


 Insulin Aspart


  (novoLOG ASPART)  **SLIDING


 SCALE**


 **If C...  ACHS


 SC  2/7/17 07:00


 3/9/17 06:59  2/10/17 08:24


1 UNITS


 


 Glucose


  (Glucose 40% Gel)  15-30


 GRAMS 15


 GRAMS...  UD  PRN


 PO  2/7/17 01:45


 3/9/17 01:44   


 


 


 Glucose


  (Glucose Chew


 Tab)  4-8


 Tablets 4


 Tabl...  UD  PRN


 PO  2/7/17 01:45


 3/9/17 01:44   


 


 


 Dextrose


  (Dextrose 50%


 50ML Syringe)  25-50ML OF


 50% DW IV


 FOR...  UD  PRN


 IV  2/7/17 01:45


 3/9/17 01:44   


 


 


 Glucagon


  (Glucagon Inj)  1 mg  UD  PRN


 SQ  2/7/17 01:45


 3/9/17 01:44   


 


 


 Miscellaneous


  (Iv Fluids


 Completed)  1 ea  PRN  PRN


 N/A  2/7/17 02:30


 2/7/18 02:29   


 


 


 Insulin Glargine


  (Lantus Solostar


 Pen)  5 unit  DAILY


 SC  2/8/17 09:00


 3/10/17 08:59  2/10/17 08:23


5 UNIT


 


 Sertraline HCl


  (Zoloft Tab)  100 mg  HS


 PO  2/7/17 21:00


 3/9/17 20:59  2/9/17 20:28


100 MG


 


 Calcium Acetate


  (Phoslo Cap)  2,001 mg  TIDM


 PO  2/8/17 11:30


 3/10/17 11:29  2/10/17 08:16


2,001 MG


 


 Pantoprazole


 Sodium


  (Protonix Tab)  40 mg  BID


 PO  2/8/17 21:00


 3/10/17 20:59  2/10/17 08:16


40 MG


 


 Psyllium


 Hydrophilic


 Mucilloid


  (Metamucil


 Powder)  1 pkt  QAM


 PO  2/10/17 08:00


 3/12/17 08:59  2/10/17 08:16


1 PKT


 


 Epoetin Anrayan


  (Procrit Inj)  10,000 units  TODAY@0900


 IV.  2/10/17 09:00


 2/10/17 18:00   


 











Last 24 Hours








Test


  2/9/17


11:13 2/9/17


16:27 2/9/17


18:12 2/9/17


21:14


 


Bedside Glucose 137 mg/dl  131 mg/dl   121 mg/dl 


 


Hemoglobin   9.4 g/dL  


 


Hematocrit   29.2 %  














Test


  2/10/17


06:00 


  


  


 


 


White Blood Count 11.90 K/uL    


 


Red Blood Count 3.55 M/uL    


 


Hemoglobin 10.7 g/dL    


 


Hematocrit 33.6 %    


 


Mean Corpuscular Volume 94.6 fL    


 


Mean Corpuscular Hemoglobin 30.1 pg    


 


Mean Corpuscular Hemoglobin


Concent 31.8 g/dl 


  


  


  


 


 


Platelet Count 336 K/uL    


 


Mean Platelet Volume 10.5 fL    


 


Neutrophils (%) (Auto) 90.6 %    


 


Lymphocytes (%) (Auto) 7.1 %    


 


Monocytes (%) (Auto) 1.6 %    


 


Eosinophils (%) (Auto) 0.2 %    


 


Basophils (%) (Auto) 0.2 %    


 


Neutrophils # (Auto) 10.79 K/uL    


 


Lymphocytes # (Auto) 0.84 K/uL    


 


Monocytes # (Auto) 0.19 K/uL    


 


Eosinophils # (Auto) 0.02 K/uL    


 


Basophils # (Auto) 0.02 K/uL    


 


RDW Standard Deviation 49.5 fL    


 


RDW Coefficient of Variation 14.5 %    


 


Immature Granulocyte % (Auto) 0.3 %    


 


Immature Granulocyte # (Auto) 0.04 K/uL    


 


Sodium Level 132 mmol/L    


 


Potassium Level 5.2 mmol/L    


 


Chloride Level 96 mmol/L    


 


Carbon Dioxide Level 26 mmol/L    


 


Anion Gap 10.0 mmol/L    


 


Blood Urea Nitrogen 38 mg/dl    


 


Creatinine 7.70 mg/dl    


 


Est Creatinine Clear Calc


Drug Dose 11.0 ml/min 


  


  


  


 


 


Estimated GFR (


American) 6.5 


  


  


  


 


 


Estimated GFR (Non-


American 5.6 


  


  


  


 


 


BUN/Creatinine Ratio 5.0    


 


Random Glucose 173 mg/dl    


 


Calcium Level 8.9 mg/dl    











Assessment & Plan


ESRD-pt for dialysis today. did have issues with cannulation on wednesday. 

tolerated dialysis well yesterday. today is her regular dialysis treatment.  

plan on one liter uf since starting to eat better. 





Anemia of renal failure-continue procrit to keep hg levels between 10 to 11. hg 

of 10.7.  





IMANI-continue phosphate binders and encourage compliance when she goes home. 





hyperkalemia-will do her on a 2k bath to help with her rising k levels.

## 2017-02-14 ENCOUNTER — HOSPITAL ENCOUNTER (INPATIENT)
Dept: HOSPITAL 45 - C.EDB | Age: 52
LOS: 10 days | Discharge: HOME | DRG: 377 | End: 2017-02-24
Attending: HOSPITALIST | Admitting: HOSPITALIST
Payer: COMMERCIAL

## 2017-02-14 VITALS
BODY MASS INDEX: 41.44 KG/M2 | BODY MASS INDEX: 41.44 KG/M2 | WEIGHT: 242.73 LBS | WEIGHT: 242.73 LBS | HEIGHT: 64 IN | HEIGHT: 64 IN

## 2017-02-14 DIAGNOSIS — I65.22: ICD-10-CM

## 2017-02-14 DIAGNOSIS — Z99.2: ICD-10-CM

## 2017-02-14 DIAGNOSIS — Z87.891: ICD-10-CM

## 2017-02-14 DIAGNOSIS — E66.01: ICD-10-CM

## 2017-02-14 DIAGNOSIS — I12.0: ICD-10-CM

## 2017-02-14 DIAGNOSIS — Z79.899: ICD-10-CM

## 2017-02-14 DIAGNOSIS — D63.1: ICD-10-CM

## 2017-02-14 DIAGNOSIS — I25.5: ICD-10-CM
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
## 2017-02-14 DIAGNOSIS — I50.32: ICD-10-CM

## 2017-02-14 DIAGNOSIS — T82.590A: ICD-10-CM

## 2017-02-14 DIAGNOSIS — Z95.5: ICD-10-CM

## 2017-02-14 DIAGNOSIS — N18.6: ICD-10-CM

## 2017-02-14 DIAGNOSIS — K92.2: Primary | ICD-10-CM

## 2017-02-14 DIAGNOSIS — Z79.4: ICD-10-CM

## 2017-02-14 DIAGNOSIS — K92.0: ICD-10-CM

## 2017-02-14 DIAGNOSIS — Z79.82: ICD-10-CM

## 2017-02-14 DIAGNOSIS — N25.0: ICD-10-CM

## 2017-02-14 DIAGNOSIS — I96: ICD-10-CM

## 2017-02-14 DIAGNOSIS — E11.22: ICD-10-CM

## 2017-02-14 DIAGNOSIS — K21.9: ICD-10-CM

## 2017-02-14 DIAGNOSIS — Z82.49: ICD-10-CM

## 2017-02-14 DIAGNOSIS — I25.10: ICD-10-CM

## 2017-02-14 DIAGNOSIS — Z83.3: ICD-10-CM

## 2017-02-14 DIAGNOSIS — M70.61: ICD-10-CM

## 2017-02-14 DIAGNOSIS — Z91.19: ICD-10-CM

## 2017-02-14 LAB
ALBUMIN/GLOB SERPL: 0.6 {RATIO} (ref 0.9–2)
ALP SERPL-CCNC: 96 U/L (ref 45–117)
ALT SERPL-CCNC: 14 U/L (ref 12–78)
ANION GAP SERPL CALC-SCNC: 15 MMOL/L (ref 3–11)
AST SERPL-CCNC: 13 U/L (ref 15–37)
BASOPHILS # BLD: 0.03 K/UL (ref 0–0.2)
BASOPHILS NFR BLD: 0.2 %
BUN SERPL-MCNC: 48 MG/DL (ref 7–18)
BUN/CREAT SERPL: 6 (ref 10–20)
CALCIUM SERPL-MCNC: 9.8 MG/DL (ref 8.5–10.1)
CHLORIDE SERPL-SCNC: 96 MMOL/L (ref 98–107)
CO2 SERPL-SCNC: 22 MMOL/L (ref 21–32)
COMPLETE: YES
CREAT SERPL-MCNC: 7.8 MG/DL (ref 0.6–1.2)
EOSINOPHIL NFR BLD AUTO: 383 K/UL (ref 130–400)
GASTRIC OCCULT BLOOD PH: 3
GASTROCULT GAST QL: (no result)
GLOBULIN SER-MCNC: 5.2 GM/DL (ref 2.5–4)
GLUCOSE SERPL-MCNC: 231 MG/DL (ref 70–99)
HCT VFR BLD CALC: 37.8 % (ref 37–47)
IG%: 0.4 %
IMM GRANULOCYTES NFR BLD AUTO: 13.9 %
INR PPP: 1 (ref 0.9–1.1)
LYMPHOCYTES # BLD: 1.67 K/UL (ref 1.2–3.4)
MCH RBC QN AUTO: 31.2 PG (ref 25–34)
MCHC RBC AUTO-ENTMCNC: 32.8 G/DL (ref 32–36)
MCV RBC AUTO: 95 FL (ref 80–100)
MONOCYTES NFR BLD: 8.6 %
NEUTROPHILS # BLD AUTO: 0.4 %
NEUTROPHILS NFR BLD AUTO: 76.5 %
PARTIAL THROMBOPLASTIN RATIO: 0.9
PMV BLD AUTO: 10.5 FL (ref 7.4–10.4)
POTASSIUM SERPL-SCNC: 5.1 MMOL/L (ref 3.5–5.1)
PROTHROMBIN TIME: 10.7 SECONDS (ref 9–12)
RBC # BLD AUTO: 3.98 M/UL (ref 4.2–5.4)
SODIUM SERPL-SCNC: 133 MMOL/L (ref 136–145)
WBC # BLD AUTO: 12.05 K/UL (ref 4.8–10.8)

## 2017-02-14 NOTE — EMERGENCY ROOM VISIT NOTE
History


Report prepared by Eda:  Kamlesh Kaur


Under the Supervision of:  Dr. Nguyễn Bethea M.D.


First contact with patient:  20:17


Chief Complaint:  NAUSEA


Stated Complaint:  NAUSEA, VOMITING


Nursing Triage Summary:  


arrived via amb to triage with c/o n/v bloody enesis. hx of acis reflux.





History of Present Illness


The patient is a 51 year old female with acid reflux who presents to the 

Emergency Room via ambulance with complaints of persistent vomiting beginning 

three hours prior to arrival. She currently rates her discomfort as a 10/10 in 

severity. The patient associates nausea, chest pain, burning in the throat, and 

upper abdominal pain with today's symptoms. She notes her vomiting started 

without blood and then later, she had blood in the emesis. The chest pain and 

upper abdominal pain began after the vomiting. The patient states she was 

discharged from the hospital four days ago for similar symptoms but did not 

have an endoscopy. She notes she was eating popsicles over the past few days 

and tried to eat macaroni and cheese with fish sticks and ketchup today. The 

patient states her symptoms began following this meal. She notes she receives 

dialysis on Monday, Wednesday, and Friday but still produces some of her own 

urine. The patient denies tarry black stool.





   Source of History:  patient


   Onset:  three hours PTA


   Position:  other (global)


   Symptom Intensity:  10/10


   Quality:  other (vomiting)


   Timing:  other (persistent)


   Associated Symptoms:  + abdominal pain, + chest pain, + nausea, + vomiting, 

No melena


Note:


Associated symptoms: burning in the throat.





Review of Systems


See HPI for pertinent positives & negatives. A total of 10 systems reviewed and 

were otherwise negative.





Past Medical & Surgical


Medical Problems:


(1) Allergic rhinitis


(2) CAD (coronary artery disease)


(3) Carotid stenosis


(4) DM type 2 (diabetes mellitus, type 2)


(5) Dyslipidemia


(6) ESRD (end stage renal disease) on dialysis


(7) GERD (gastroesophageal reflux disease)


(8) HTN (hypertension)


(9) HX OF PAST NONCOMPLIANCE


(10) Ischemic cardiomyopathy


(11) Morbid obesity


(12) Tobacco use disorder


(13) UGIB (upper gastrointestinal bleed)


Surgical Problems:


(1) Hemodialysis access, AV graft








Family History





Diabetes mellitus


  FATHER


  MOTHER


Heart disease


  FATHER


  MOTHER


Hypertension


  FATHER


  MOTHER


Kidney disease


  GRANDMOTHER





Social History


Smoking Status:  Former Smoker


Alcohol Use:  none


Housing Status:  lives with family





Current/Historical Medications


Scheduled


Aspirin (Aspirin Ec), 81 MG PO DAILY


Atorvastatin (Lipitor), 40 MG PO DAILY


Calcium Acetate (Phoslo 667 Mg), 1 CAP PO WM


Clopidogrel Bisulfate (Plavix), 1 TAB PO DAILY


Insulin Glargine (Lantus Solostar), 10 UNITS SC QPM


Isosorbide Mononitrate (Isosorbide Mononitrate ER), 60 MG PO QAM


Isosorbide Mononitrate Ext Rel (Imdur Ext Rel), 30 MG PO QAM


Metoprolol Tartrate (Lopressor), 50 MG PO BID


Prednisone Tab (Prednisone), 10 MG PO UD


Ranitidine Hcl (Zantac), 300 MG PO HS


Sertraline HCl (Sertraline HCl), 2 TAB PO DAILY





Scheduled PRN


Lorazepam (Ativan), 0.5 MG PO DAILY PRN for Anxiety


Methocarbamol (Methocarbamol), 500 MG PO BID PRN for Muscle Spasms


Tramadol (Ultram), 1 TAB PO TID PRN for Pain





Allergies


Coded Allergies:  


     Adhesives (Verified  Allergy, Mild, RASH, SORES, 1/31/17)


     Pollen Extract (Unverified  Allergy, Unknown, rash, 1/31/17)





Physical Exam


Vital Signs











  Date Time  Temp Pulse Resp B/P Pulse Ox O2 Delivery O2 Flow Rate FiO2


 


2/14/17 22:10    166/102    


 


2/14/17 22:06  83      


 


2/14/17 22:04    190/101    


 


2/14/17 21:45  107 19 224/145 98 Room Air  


 


2/14/17 20:39      Room Air  


 


2/14/17 20:12 36.9 109 20 199/106 100 Room Air  











Physical Exam


GENERAL: Patient is in mild distress secondary to pain. Coffee ground emesis in 

vomit bag at bed side.


HEENT: No acute trauma, normocephalic atraumatic, mucous membranes moist, no 

nasal congestion, no scleral icterus.


NECK: No stridor, no adenopathy, no meningismus, trachea is midline.


LUNGS: Clear to auscultation bilaterally, no wheeze, no rhonchi, breath sounds 

equal.


HEART: Without murmurs gallops or rubs, regular rate and rhythm.


ABDOMEN: Tenderness in the epigastrium. Soft, bowel sounds positive, no hernias

, no peritonitis.


EXTREMITIES: No cyanosis or edema, full range of motion of all the joints 

without pain or difficulty, no signs for acute trauma.


NEUROLOGIC: Oriented x 3, no acute motor or sensory deficits, no focal weakness.


SKIN: No rash, no jaundice, no diaphoresis.





Medical Decision & Procedures


ER Provider


Diagnostic Interpretation:


X-ray results as stated below per interpretation by me and the radiologist: 





CHEST ONE VIEW PORTABLE





CLINICAL HISTORY: GI bleed.    





COMPARISON STUDY:  Chest radiograph February 6, 2017.





FINDINGS: No pneumothorax or pleural effusion is present. There is no evidence


of pulmonary edema. Mild cardiomegaly is unchanged. No consolidation is


identified. There are suspected surgical clips within the left arm.





IMPRESSION:  





1. No acute findings.





2. Stable cardiomegaly. 








Electronically signed by:  Giovanni Clemens M.D.


2/14/2017 8:49 PM





Laboratory Results


2/14/17 21:20








Red Blood Count 3.98, Mean Corpuscular Volume 95.0, Mean Corpuscular Hemoglobin 

31.2, Mean Corpuscular Hemoglobin Concent 32.8, Mean Platelet Volume 10.5, 

Neutrophils (%) (Auto) 76.5, Lymphocytes (%) (Auto) 13.9, Monocytes (%) (Auto) 

8.6, Eosinophils (%) (Auto) 0.4, Basophils (%) (Auto) 0.2, Neutrophils # (Auto) 

9.21, Lymphocytes # (Auto) 1.67, Monocytes # (Auto) 1.04, Eosinophils # (Auto) 

0.05, Basophils # (Auto) 0.03





2/14/17 21:20

















Test


  2/14/17


20:50 2/14/17


21:20 2/14/17


22:14


 


Gastric Fluid pH 3   


 


Gastric Fluid Occult Blood POS (NEG)   


 


White Blood Count


  


  12.05 K/uL


(4.8-10.8) 


 


 


Red Blood Count


  


  3.98 M/uL


(4.2-5.4) 


 


 


Hemoglobin


  


  12.4 g/dL


(12.0-16.0) 


 


 


Hematocrit  37.8 % (37-47)  


 


Mean Corpuscular Volume


  


  95.0 fL


() 


 


 


Mean Corpuscular Hemoglobin


  


  31.2 pg


(25-34) 


 


 


Mean Corpuscular Hemoglobin


Concent 


  32.8 g/dl


(32-36) 


 


 


Platelet Count


  


  383 K/uL


(130-400) 


 


 


Mean Platelet Volume


  


  10.5 fL


(7.4-10.4) 


 


 


Neutrophils (%) (Auto)  76.5 %  


 


Lymphocytes (%) (Auto)  13.9 %  


 


Monocytes (%) (Auto)  8.6 %  


 


Eosinophils (%) (Auto)  0.4 %  


 


Basophils (%) (Auto)  0.2 %  


 


Neutrophils # (Auto)


  


  9.21 K/uL


(1.4-6.5) 


 


 


Lymphocytes # (Auto)


  


  1.67 K/uL


(1.2-3.4) 


 


 


Monocytes # (Auto)


  


  1.04 K/uL


(0.11-0.59) 


 


 


Eosinophils # (Auto)


  


  0.05 K/uL


(0-0.5) 


 


 


Basophils # (Auto)


  


  0.03 K/uL


(0-0.2) 


 


 


RDW Standard Deviation


  


  53.0 fL


(36.4-46.3) 


 


 


RDW Coefficient of Variation


  


  15.5 %


(11.5-14.5) 


 


 


Immature Granulocyte % (Auto)  0.4 %  


 


Immature Granulocyte # (Auto)


  


  0.05 K/uL


(0.00-0.02) 


 


 


Prothrombin Time


  


  10.7 SECONDS


(9.0-12.0) 


 


 


Prothromb Time International


Ratio 


  1.0 (0.9-1.1) 


  


 


 


Activated Partial


Thromboplast Time 


  24.3 SECONDS


(21.0-31.0) 


 


 


Partial Thromboplastin Ratio  0.9  


 


Anion Gap


  


  15.0 mmol/L


(3-11) 


 


 


Estimated GFR (


American) 


  6.4 


  


 


 


Estimated GFR (Non-


American 


  5.5 


  


 


 


BUN/Creatinine Ratio  6.0 (10-20)  


 


Calcium Level


  


  9.8 mg/dl


(8.5-10.1) 


 


 


Total Bilirubin


  


  0.4 mg/dl


(0.2-1) 


 


 


Aspartate Amino Transf


(AST/SGOT) 


  13 U/L (15-37) 


  


 


 


Alanine Aminotransferase


(ALT/SGPT) 


  14 U/L (12-78) 


  


 


 


Alkaline Phosphatase


  


  96 U/L


() 


 


 


Total Protein


  


  8.4 gm/dl


(6.4-8.2) 


 


 


Albumin


  


  3.2 gm/dl


(3.4-5.0) 


 


 


Globulin


  


  5.2 gm/dl


(2.5-4.0) 


 


 


Albumin/Globulin Ratio  0.6 (0.9-2)  


 


Lipase


  


  172 U/L


() 


 





Laboratory results reviewed by me.





Medications Administered











 Medications


  (Trade)  Dose


 Ordered  Sig/Willy


 Route  Start Time


 Stop Time Status Last Admin


Dose Admin


 


 Ondansetron HCl


  (Zofran Inj)  4 mg  NOW  STAT


 IV  2/14/17 20:21


 2/14/17 20:25 DC 2/14/17 21:39


4 MG


 


 Morphine Sulfate


 4 mg  4 mg  Q15M  PRN


 IV  2/14/17 20:30


 2/28/17 20:29  2/14/17 21:40


4 MG


 


 Promethazine HCl


 6.25 mg/Sodium


 Chloride  50.25 ml @ 


 204 mls/hr  NOW  STAT


 IV  2/14/17 20:21


 2/14/17 20:35 DC 2/14/17 21:44


204 MLS/HR


 


 Sodium Chloride


  (Nss 250ml)  250 ml @ 


 999 mls/hr  Q16M STAT


 IV  2/14/17 20:21


 2/14/17 20:36 DC 2/14/17 21:39


999 MLS/HR


 


 Labetalol HCl


  (Normodyne IV)  20 mg  NOW  STAT


 IV  2/14/17 21:50


 2/14/17 21:51 DC 2/14/17 22:05


20 MG











ECG


Indication:  vomiting


Rate (beats per minute):  102


Rhythm:  sinus tachycardia


Findings:  nonspecific-ST abn, no acute ischemic change





ED Course


2020: The patient was evaluated in room B3B. A complete history and physical 

exam was performed.





2021: Ordered Sodium Chloride 250 ml @ 999 mls/hr IV, Promethazine HCl 6.25 mg/

Sodium Chloride 50.25 ml @ 204 mls/hr IV, Pantoprazole Sodium 80 mg/Dextrose 

120 ml @ 400 mls/hr IV, Zofran Inj 4 mg IV.





2030: Ordered Morphine Sulfate 4 mg IV.





2150: Ordered Labetalol HCl 20 mg IV.





2215: Reevaluated the patient at this time, and she is feeling somewhat better.





2217: I spoke to Анна Caro (Hospitalist) about the patient's case, 

and he will follow the patient for further evaluation.





Medical Decision


The differential diagnoses include but are not limited to: upper GI bleeding, 

ulcer, gastritis, Velia-Talley tear, anemia, electrolyte imbalance, esophageal 

rupture, coagulopathy.





There is a mild leukocytosis at 12,000, this is likely consistent with her 

vomiting and pain.  No concerning anemia.  Renal panel testing shows renal 

failure and the need for dialysis.  No significant electrolyte abnormality that 

required correction.  There was no hepatitis or coagulopathy.  No evidence for 

pancreatitis.  Chest x-ray shows no free air, mediastinal widening or 

pneumonia.  EKG shows a sinus rhythm, there was no acute ischemia.  The cardiac 

troponin was elevated but looking back at previous testing, this appears 

baseline for the patient.  Gastroccult did confirm heme positive vomitus.





The patient was aggressively managed.  She was vomiting coffee ground material, 

she was quite hypertensive, she was uncomfortable.





The patient received IV Protonix, IV Phenergan, IV Zofran.  She was given a 

small amount of IV saline.  She did receive IV labetalol for the elevated blood 

pressure.  With this treatment, she feels improved and is much more 

comfortable.  Her blood pressure is improved.





The patient requires admission/observation.  She was just in the hospital for a 

very similar presentation.  I did speak with the patient and with case 

management.  The on-call hospitalist was consulted.





Consults


Time Called:  2209


Consulting Physician:  Анна Caro (Hospitalist)


Returned Call:  2217


I spoke to Анна Caro (Hospitalist) about the patient's case, and he 

will follow the patient for further evaluation.





Impression





 Primary Impression:  


 Upper GI bleed


 Additional Impressions:  


 Vomiting


 HTN (hypertension)





Critical Care


I have personally spent greater than 30 minutes of critical care time in the 

direct management of this patient.  This includes bedside care, interpretation 

of diagnostic studies and testing, discussion with consultants, the patient, 

and family members, and other required patient management activities.  This 30 

minutes is in excess of all separately billable procedures.





Scribe Attestation


The scribe's documentation has been prepared under my direction and personally 

reviewed by me in its entirety. I confirm that the note above accurately 

reflects all work, treatment, procedures, and medical decision making performed 

by me.





Departure Information


Dispostion


Being Evaluated By Hospitalist (Анна Caro (Hospitalist))





Referrals


No Doctor, Assigned (PCP)





Problem Qualifiers

## 2017-02-14 NOTE — DIAGNOSTIC IMAGING REPORT
CHEST ONE VIEW PORTABLE



CLINICAL HISTORY: GI bleed.    



COMPARISON STUDY:  Chest radiograph February 6, 2017.



FINDINGS: No pneumothorax or pleural effusion is present. There is no evidence

of pulmonary edema. Mild cardiomegaly is unchanged. No consolidation is

identified. There are suspected surgical clips within the left arm.



IMPRESSION:  



1. No acute findings.



2. Stable cardiomegaly. 









Electronically signed by:  Giovanni Clemens M.D.

2/14/2017 8:49 PM



Dictated Date/Time:  2/14/2017 8:48 PM

## 2017-02-15 VITALS
TEMPERATURE: 98.06 F | SYSTOLIC BLOOD PRESSURE: 135 MMHG | DIASTOLIC BLOOD PRESSURE: 51 MMHG | HEART RATE: 63 BPM | OXYGEN SATURATION: 98 %

## 2017-02-15 VITALS
SYSTOLIC BLOOD PRESSURE: 160 MMHG | TEMPERATURE: 97.52 F | OXYGEN SATURATION: 100 % | HEART RATE: 62 BPM | DIASTOLIC BLOOD PRESSURE: 93 MMHG

## 2017-02-15 VITALS — DIASTOLIC BLOOD PRESSURE: 84 MMHG | HEART RATE: 55 BPM | SYSTOLIC BLOOD PRESSURE: 125 MMHG

## 2017-02-15 VITALS — SYSTOLIC BLOOD PRESSURE: 83 MMHG | DIASTOLIC BLOOD PRESSURE: 51 MMHG | HEART RATE: 61 BPM

## 2017-02-15 VITALS — OXYGEN SATURATION: 100 %

## 2017-02-15 VITALS — SYSTOLIC BLOOD PRESSURE: 102 MMHG | HEART RATE: 51 BPM | DIASTOLIC BLOOD PRESSURE: 56 MMHG

## 2017-02-15 VITALS — HEART RATE: 54 BPM | DIASTOLIC BLOOD PRESSURE: 55 MMHG | SYSTOLIC BLOOD PRESSURE: 112 MMHG

## 2017-02-15 VITALS
SYSTOLIC BLOOD PRESSURE: 149 MMHG | TEMPERATURE: 98.6 F | DIASTOLIC BLOOD PRESSURE: 82 MMHG | HEART RATE: 72 BPM | OXYGEN SATURATION: 100 %

## 2017-02-15 VITALS — HEART RATE: 59 BPM | DIASTOLIC BLOOD PRESSURE: 72 MMHG | TEMPERATURE: 98.06 F | SYSTOLIC BLOOD PRESSURE: 135 MMHG

## 2017-02-15 VITALS — DIASTOLIC BLOOD PRESSURE: 58 MMHG | SYSTOLIC BLOOD PRESSURE: 132 MMHG | HEART RATE: 62 BPM

## 2017-02-15 VITALS — HEART RATE: 52 BPM | SYSTOLIC BLOOD PRESSURE: 108 MMHG | DIASTOLIC BLOOD PRESSURE: 40 MMHG

## 2017-02-15 VITALS — HEART RATE: 53 BPM | SYSTOLIC BLOOD PRESSURE: 120 MMHG | DIASTOLIC BLOOD PRESSURE: 71 MMHG

## 2017-02-15 VITALS
SYSTOLIC BLOOD PRESSURE: 132 MMHG | OXYGEN SATURATION: 100 % | HEART RATE: 60 BPM | TEMPERATURE: 98.06 F | DIASTOLIC BLOOD PRESSURE: 84 MMHG

## 2017-02-15 VITALS
HEART RATE: 82 BPM | TEMPERATURE: 97.7 F | SYSTOLIC BLOOD PRESSURE: 182 MMHG | DIASTOLIC BLOOD PRESSURE: 99 MMHG | OXYGEN SATURATION: 100 %

## 2017-02-15 VITALS — DIASTOLIC BLOOD PRESSURE: 52 MMHG | SYSTOLIC BLOOD PRESSURE: 128 MMHG | HEART RATE: 55 BPM

## 2017-02-15 VITALS — DIASTOLIC BLOOD PRESSURE: 65 MMHG | TEMPERATURE: 98.78 F | SYSTOLIC BLOOD PRESSURE: 142 MMHG | HEART RATE: 62 BPM

## 2017-02-15 VITALS — DIASTOLIC BLOOD PRESSURE: 87 MMHG | HEART RATE: 53 BPM | SYSTOLIC BLOOD PRESSURE: 86 MMHG

## 2017-02-15 VITALS
HEART RATE: 73 BPM | TEMPERATURE: 97.7 F | DIASTOLIC BLOOD PRESSURE: 93 MMHG | SYSTOLIC BLOOD PRESSURE: 179 MMHG | OXYGEN SATURATION: 100 %

## 2017-02-15 VITALS — DIASTOLIC BLOOD PRESSURE: 45 MMHG | SYSTOLIC BLOOD PRESSURE: 90 MMHG | HEART RATE: 64 BPM

## 2017-02-15 VITALS — SYSTOLIC BLOOD PRESSURE: 97 MMHG | HEART RATE: 52 BPM | DIASTOLIC BLOOD PRESSURE: 47 MMHG

## 2017-02-15 VITALS — DIASTOLIC BLOOD PRESSURE: 56 MMHG | SYSTOLIC BLOOD PRESSURE: 99 MMHG | HEART RATE: 59 BPM

## 2017-02-15 VITALS — HEART RATE: 52 BPM | DIASTOLIC BLOOD PRESSURE: 55 MMHG | SYSTOLIC BLOOD PRESSURE: 96 MMHG

## 2017-02-15 LAB
ANION GAP SERPL CALC-SCNC: 9 MMOL/L (ref 3–11)
BASOPHILS # BLD: 0.02 K/UL (ref 0–0.2)
BASOPHILS NFR BLD: 0.2 %
BUN SERPL-MCNC: 51 MG/DL (ref 7–18)
BUN/CREAT SERPL: 6.3 (ref 10–20)
CALCIUM SERPL-MCNC: 8.8 MG/DL (ref 8.5–10.1)
CHLORIDE SERPL-SCNC: 99 MMOL/L (ref 98–107)
CKMB/CK RATIO: 11.1 (ref 0–3)
CO2 SERPL-SCNC: 27 MMOL/L (ref 21–32)
COMPLETE: YES
CREAT CL PREDICTED SERPL C-G-VRATE: 10 ML/MIN
CREAT SERPL-MCNC: 8.2 MG/DL (ref 0.6–1.2)
EOSINOPHIL NFR BLD AUTO: 350 K/UL (ref 130–400)
EST. AVERAGE GLUCOSE BLD GHB EST-MCNC: 157 MG/DL
GLUCOSE SERPL-MCNC: 159 MG/DL (ref 70–99)
HCT VFR BLD CALC: 32.1 % (ref 37–47)
HCT VFR BLD CALC: 33.1 % (ref 37–47)
IG%: 0.4 %
IMM GRANULOCYTES NFR BLD AUTO: 15.2 %
LYMPHOCYTES # BLD: 1.95 K/UL (ref 1.2–3.4)
MAGNESIUM SERPL-MCNC: 2.4 MG/DL (ref 1.8–2.4)
MCH RBC QN AUTO: 30.2 PG (ref 25–34)
MCHC RBC AUTO-ENTMCNC: 32.3 G/DL (ref 32–36)
MCV RBC AUTO: 93.5 FL (ref 80–100)
MONOCYTES NFR BLD: 8.3 %
NEUTROPHILS # BLD AUTO: 0.5 %
NEUTROPHILS NFR BLD AUTO: 75.4 %
PMV BLD AUTO: 10.1 FL (ref 7.4–10.4)
POTASSIUM SERPL-SCNC: 5.4 MMOL/L (ref 3.5–5.1)
RBC # BLD AUTO: 3.54 M/UL (ref 4.2–5.4)
SODIUM SERPL-SCNC: 135 MMOL/L (ref 136–145)
WBC # BLD AUTO: 12.87 K/UL (ref 4.8–10.8)

## 2017-02-15 RX ADMIN — INSULIN ASPART SCH UNITS: 100 INJECTION, SOLUTION INTRAVENOUS; SUBCUTANEOUS at 16:15

## 2017-02-15 RX ADMIN — CALCIUM ACETATE SCH MG: 667 CAPSULE ORAL at 16:45

## 2017-02-15 RX ADMIN — PANTOPRAZOLE SODIUM SCH MLS/MIN: 40 INJECTION, POWDER, FOR SOLUTION INTRAVENOUS at 22:00

## 2017-02-15 RX ADMIN — INSULIN ASPART SCH UNITS: 100 INJECTION, SOLUTION INTRAVENOUS; SUBCUTANEOUS at 07:00

## 2017-02-15 RX ADMIN — INSULIN GLARGINE SCH UNIT: 100 INJECTION, SOLUTION SUBCUTANEOUS at 22:03

## 2017-02-15 RX ADMIN — CALCIUM ACETATE SCH MG: 667 CAPSULE ORAL at 09:50

## 2017-02-15 RX ADMIN — ISOSORBIDE MONONITRATE SCH MG: 60 TABLET ORAL at 09:52

## 2017-02-15 RX ADMIN — INSULIN GLARGINE SCH UNIT: 100 INJECTION, SOLUTION SUBCUTANEOUS at 09:56

## 2017-02-15 RX ADMIN — ONDANSETRON PRN MG: 2 INJECTION INTRAMUSCULAR; INTRAVENOUS at 10:43

## 2017-02-15 RX ADMIN — ISOSORBIDE MONONITRATE SCH MG: 30 TABLET, EXTENDED RELEASE ORAL at 09:51

## 2017-02-15 RX ADMIN — CALCIUM ACETATE SCH MG: 667 CAPSULE ORAL at 10:48

## 2017-02-15 RX ADMIN — METOPROLOL TARTRATE SCH MG: 25 TABLET, FILM COATED ORAL at 09:53

## 2017-02-15 RX ADMIN — ATORVASTATIN CALCIUM SCH MG: 40 TABLET, FILM COATED ORAL at 09:52

## 2017-02-15 RX ADMIN — INSULIN ASPART SCH UNITS: 100 INJECTION, SOLUTION INTRAVENOUS; SUBCUTANEOUS at 10:49

## 2017-02-15 RX ADMIN — PANTOPRAZOLE SODIUM SCH MLS/MIN: 40 INJECTION, POWDER, FOR SOLUTION INTRAVENOUS at 09:51

## 2017-02-15 RX ADMIN — ONDANSETRON PRN MG: 2 INJECTION INTRAMUSCULAR; INTRAVENOUS at 03:57

## 2017-02-15 RX ADMIN — INSULIN ASPART SCH UNITS: 100 INJECTION, SOLUTION INTRAVENOUS; SUBCUTANEOUS at 21:00

## 2017-02-15 RX ADMIN — METOPROLOL TARTRATE SCH MG: 25 TABLET, FILM COATED ORAL at 21:58

## 2017-02-15 NOTE — DIAGNOSTIC IMAGING REPORT
ULTRASOUND RIGHT UPPER QUADRANT ABDOMEN



CLINICAL HISTORY: Right upper quadrant abdominal pain.



COMPARISON STUDY: Abdominal ultrasound dated 2/7/17 and correlated with

abdominal CT dated 2/6/17.



TECHNIQUE: Real-time, grayscale, and color flow sonography of the right upper

quadrant of the abdomen was performed. Images are reviewed in the transverse and

longitudinal planes.



FINDINGS:



Liver: The liver is normal in size and echotexture. There is no intrahepatic

biliary ductal dilatation. The main portal vein is patent.



Gallbladder: The gallbladder is mildly distended. The gallbladder wall is top

normal to slightly thickened measuring up to 4 mm. Minimal sludge is noted. No

shadowing gallstones are identified. There is no pericholecystic fluid. A

sonographic Duarte's sign is reportedly absent. The common bile duct measures up

to 0.4 cm in diameter.



Pancreas: Visualized portions of the pancreatic head and body are normal in

appearance. The splenic vein is patent.



Right kidney: Survey images of the right kidney demonstrate cortical atrophy.

There is no hydronephrosis.



Ascites: None.





IMPRESSION: 



1. The gallbladder is mildly distended and there is trace sludge. There is

minimal nonspecific bladder wall thickening. No shadowing gallstones are

identified and there is no convincing sonographic evidence of acute

cholecystitis. These findings are unchanged from the study performed 8 days

previously. If there is strong clinical concern for acute cholecystitis then a

nuclear hepatobiliary scan would be appropriate.



2. Atrophic right kidney.







Electronically signed by:  Nguyễn Nielson M.D.

2/15/2017 7:14 AM



Dictated Date/Time:  2/15/2017 7:11 AM

## 2017-02-15 NOTE — PROGRESS NOTE
Medicine Progress Note


Date & Time of Visit:


Feb 15, 2017 at 11:40.


Subjective


patient seen resting in bed


states she had nausea with her pills and water this morning


no recurrence of hematemesis, no BM today


has occasional epigastric sharp pain


denies chest pain, dyspnea, palpitations, dizziness


no other symptoms





Objective





Last 8 Hrs








  Date Time  Temp Pulse Resp B/P Pulse Ox O2 Delivery O2 Flow Rate FiO2


 


2/15/17 08:00 36.4 62 18 160/93 100 Nasal Cannula 2.0 


 


2/15/17 08:00     100 Nasal Cannula 2.0 


 


2/15/17 04:00 37.0 72 16 149/82 100 Nasal Cannula 2.0 


 


2/15/17 04:00     100 Nasal Cannula 2.0 








Physical Exam:


General- oriented x 3, not in distress





Head-  atraumatic





Eyes- PERRL, EOMI, anicteric





ENT- oropharynx clear





Neck- supple, no JVD, no adenopathy, no thyromegaly





Lungs- clear to auscultation b/l





Heart- normal rate, regular rhythm; no murmurs 





Abdomen- normal bowel sounds, soft, nontender





Extremities- no pretibial edema, no calf tenderness





Neuro- alert, oriented x 3;no gross focal deficits





Skin- warm & dry


Laboratory Results:





Last 24 Hours








Test


  2/14/17


20:50 2/14/17


21:20 2/14/17


22:38 2/15/17


00:23


 


Gastric Fluid pH 3    


 


Gastric Fluid Occult Blood POS    


 


White Blood Count  12.05 K/uL   


 


Red Blood Count  3.98 M/uL   


 


Hemoglobin  12.4 g/dL   


 


Hematocrit  37.8 %   


 


Mean Corpuscular Volume  95.0 fL   


 


Mean Corpuscular Hemoglobin  31.2 pg   


 


Mean Corpuscular Hemoglobin


Concent 


  32.8 g/dl 


  


  


 


 


Platelet Count  383 K/uL   


 


Mean Platelet Volume  10.5 fL   


 


Neutrophils (%) (Auto)  76.5 %   


 


Lymphocytes (%) (Auto)  13.9 %   


 


Monocytes (%) (Auto)  8.6 %   


 


Eosinophils (%) (Auto)  0.4 %   


 


Basophils (%) (Auto)  0.2 %   


 


Neutrophils # (Auto)  9.21 K/uL   


 


Lymphocytes # (Auto)  1.67 K/uL   


 


Monocytes # (Auto)  1.04 K/uL   


 


Eosinophils # (Auto)  0.05 K/uL   


 


Basophils # (Auto)  0.03 K/uL   


 


RDW Standard Deviation  53.0 fL   


 


RDW Coefficient of Variation  15.5 %   


 


Immature Granulocyte % (Auto)  0.4 %   


 


Immature Granulocyte # (Auto)  0.05 K/uL   


 


Prothrombin Time  10.7 SECONDS   


 


Prothromb Time International


Ratio 


  1.0 


  


  


 


 


Activated Partial


Thromboplast Time 


  24.3 SECONDS 


  


  


 


 


Partial Thromboplastin Ratio  0.9   


 


Sodium Level  133 mmol/L   


 


Potassium Level  5.1 mmol/L   


 


Chloride Level  96 mmol/L   


 


Carbon Dioxide Level  22 mmol/L   


 


Anion Gap  15.0 mmol/L   


 


Blood Urea Nitrogen  48 mg/dl   


 


Creatinine  7.80 mg/dl   


 


Estimated GFR (


American) 


  6.4 


  


  


 


 


Estimated GFR (Non-


American 


  5.5 


  


  


 


 


BUN/Creatinine Ratio  6.0   


 


Random Glucose  231 mg/dl   


 


Estimated Average Glucose  157 mg/dl   


 


Hemoglobin A1c  7.1 %   


 


Calcium Level  9.8 mg/dl   


 


Total Bilirubin  0.4 mg/dl   


 


Aspartate Amino Transf


(AST/SGOT) 


  13 U/L 


  


  


 


 


Alanine Aminotransferase


(ALT/SGPT) 


  14 U/L 


  


  


 


 


Alkaline Phosphatase  96 U/L   


 


Troponin I  0.433 ng/ml   


 


Total Protein  8.4 gm/dl   


 


Albumin  3.2 gm/dl   


 


Globulin  5.2 gm/dl   


 


Albumin/Globulin Ratio  0.6   


 


Lipase  172 U/L   


 


Lactic Acid Level   2.3 mmol/L  


 


Bedside Glucose    231 mg/dl 














Test


  2/15/17


04:48 2/15/17


10:48 


  


 


 


White Blood Count 12.87 K/uL    


 


Red Blood Count 3.54 M/uL    


 


Hemoglobin 10.7 g/dL    


 


Hematocrit 33.1 %    


 


Mean Corpuscular Volume 93.5 fL    


 


Mean Corpuscular Hemoglobin 30.2 pg    


 


Mean Corpuscular Hemoglobin


Concent 32.3 g/dl 


  


  


  


 


 


Platelet Count 350 K/uL    


 


Mean Platelet Volume 10.1 fL    


 


Neutrophils (%) (Auto) 75.4 %    


 


Lymphocytes (%) (Auto) 15.2 %    


 


Monocytes (%) (Auto) 8.3 %    


 


Eosinophils (%) (Auto) 0.5 %    


 


Basophils (%) (Auto) 0.2 %    


 


Neutrophils # (Auto) 9.72 K/uL    


 


Lymphocytes # (Auto) 1.95 K/uL    


 


Monocytes # (Auto) 1.07 K/uL    


 


Eosinophils # (Auto) 0.06 K/uL    


 


Basophils # (Auto) 0.02 K/uL    


 


RDW Standard Deviation 53.2 fL    


 


RDW Coefficient of Variation 15.6 %    


 


Immature Granulocyte % (Auto) 0.4 %    


 


Immature Granulocyte # (Auto) 0.05 K/uL    


 


Sodium Level 135 mmol/L    


 


Potassium Level 5.4 mmol/L    


 


Chloride Level 99 mmol/L    


 


Carbon Dioxide Level 27 mmol/L    


 


Anion Gap 9.0 mmol/L    


 


Blood Urea Nitrogen 51 mg/dl    


 


Creatinine 8.20 mg/dl    


 


Est Creatinine Clear Calc


Drug Dose 10.0 ml/min 


  


  


  


 


 


Estimated GFR (


American) 6.0 


  


  


  


 


 


Estimated GFR (Non-


American 5.2 


  


  


  


 


 


BUN/Creatinine Ratio 6.3    


 


Random Glucose 159 mg/dl    


 


Lactic Acid Level 2.2 mmol/L    


 


Calcium Level 8.8 mg/dl    


 


Magnesium Level 2.4 mg/dl    


 


Total Creatine Kinase 81 U/L    


 


Creatine Kinase MB 9.0 ng/ml    


 


Creatine Kinase MB Ratio 11.1    


 


Troponin I 1.840 ng/ml    


 


Bedside Glucose  98 mg/dl   








 








 Date/Time


Source Procedure


Growth Status


 


 


 2/14/17 22:38


Blood Blood Culture


Pending Received


 


 2/14/17 22:30


Blood Blood Culture


Pending Received


 


 2/15/17 00:00


Nasal MRSA DNA Surveillance Screen - Final


Specimen Negative for MRSA by DNA Probe Complete











Assessment & Plan


51 year old female with history of CHF Diastolic type, CAD s/p Stent, 


DM 2, ESRD presenting with hematemesis.





HEMATEMESIS, R/O UPPER GI BLEED


- no recurrence so far


- Hg 10.7


  repeat Hg today


- continue Protonix 20mg BID


  clears today


  EGD tomorrow


  GI on board


- hold Aspirin,Plavix





ELEVATED CARDIAC MARKERS


HISTORY OF CAD, STENT


- no cardiac symptoms, but levels higher than baseline


- EKG no signs of acute ischemia


- Cardiology consulted


- Aspirin, Plavix on hold


  continue Imdur, Metoprolol





ELEVATED LACTIC ACID


- re check at noon





CHF DIASTOLIC TYPE


euvolemic





DM 2


decrease Lantus


ISS





ESRD


Dr. Davison consulted for HD








DVT prophylaxis


SCDS





Disposition


pending


Current Inpatient Medications:





 Current Inpatient Medications








 Medications


  (Trade)  Dose


 Ordered  Sig/Willy


 Route  Start Time


 Stop Time Status Last Admin


Dose Admin


 


 Insulin Glargine


  (Lantus Solostar


 Pen)  5 unit  BID


 SC  2/15/17 09:00


    2/15/17 09:56


5 UNIT


 


 Glucose


  (Glucose 40% Gel)  15-30


 GRAMS 15


 GRAMS...  UD  PRN


 PO  2/14/17 23:45


 3/16/17 23:44   


 


 


 Glucose


  (Glucose Chew


 Tab)  4-8


 Tablets 4


 Tabl...  UD  PRN


 PO  2/14/17 23:45


 3/16/17 23:44   


 


 


 Dextrose


  (Dextrose 50%


 50ML Syringe)  25-50ML OF


 50% DW IV


 FOR...  UD  PRN


 IV  2/14/17 23:45


 3/16/17 23:44   


 


 


 Glucagon


  (Glucagon Inj)  1 mg  UD  PRN


 SQ  2/14/17 23:45


 3/16/17 23:44   


 


 


 Hydromorphone HCl


  (Dilaudid Inj)  0.5 mg  Q3H  PRN


 IV  2/15/17 00:15


 3/1/17 00:14  2/15/17 00:20


0.5 MG


 


 Ondansetron HCl 4


 mg  4 mg  Q6H  PRN


 IV  2/15/17 00:15


 3/17/17 00:14  2/15/17 10:43


4 MG


 


 Promethazine HCl/


 Sodium Chloride


  (Phenergan Inj/


 Nss 50ml)  50.5 ml @ 


 204 mls/hr  Q6H  PRN


 IV  2/15/17 00:15


 3/17/17 00:14   


 


 


 Lorazepam


  (Ativan Inj)  0.5 mg  Q4H  PRN


 IV  2/15/17 00:15


 3/17/17 00:14   


 


 


 Miscellaneous


  (Iv Fluids


 Completed)  1 ea  PRN  PRN


 N/A  2/15/17 00:15


 2/15/18 00:14   


 


 


 Acetaminophen


  (Tylenol Tab)  650 mg  Q4H  PRN


 PO  2/15/17 00:15


 3/17/17 00:14   


 


 


 Nitroglycerin


  (Nitrostat Tab)  0.4 mg  UD  PRN


 SL  2/15/17 00:15


 3/17/17 00:14   


 


 


 Insulin Aspart


  (novoLOG ASPART)  **SLIDING


 SCALE**


 **If C...  ACHS


 SC  2/15/17 07:00


 3/17/17 06:59   


 


 


 Atorvastatin


 Calcium


  (Lipitor Tab)  40 mg  DAILY


 PO  2/15/17 09:00


 3/17/17 08:59  2/15/17 09:52


40 MG


 


 Calcium Acetate


  (Phoslo Cap)  667 mg  TIDM


 PO  2/15/17 07:30


 3/17/17 07:29  2/15/17 10:48


667 MG


 


 Isosorbide


 Mononitrate


  (Imdur Ext Rel


 Tab)  60 mg  QAM


 PO  2/15/17 09:00


 3/17/17 08:59  2/15/17 09:52


60 MG


 


 Isosorbide


 Mononitrate


  (Imdur Ext Rel


 Tab)  30 mg  QAM


 PO  2/15/17 09:00


 3/17/17 08:59  2/15/17 09:51


30 MG


 


 Prednisone


  (PredniSONE TAB)  10 mg  DAILY


 PO  2/15/17 09:00


 2/17/17 09:01  2/15/17 09:53


10 MG


 


 Sertraline HCl


  (Zoloft Tab)  100 mg  DAILY


 PO  2/15/17 09:00


 3/17/17 08:59  2/15/17 09:54


100 MG


 


 Tramadol HCl   not


 relieved


 by tylenol @   Q6H  PRN


 PO  2/15/17 00:15


 3/17/17 00:14   


 


 


 Pantoprazole


 Sodium/Syringe


  (Protonix Inj/


 Syringe)  20 ml @ 5


 mls/min  BID@0900,2100


 IV  2/15/17 09:00


 3/17/17 08:59  2/15/17 09:51


5 MLS/MIN


 


 Metoprolol


 Tartrate


  (Lopressor Tab)  50 mg  BID


 PO  2/15/17 09:00


 3/17/17 08:59  2/15/17 09:53


50 MG


 


 Menthol


  (Nice Jerad)  1 jerad  PRN  PRN


 PO  2/15/17 05:15


 3/17/17 05:14   


 


 


 Epoetin Narayan


  (Procrit Inj)  10,000 units  0900


 IV.  2/15/17 09:00


 2/15/17 17:00

## 2017-02-15 NOTE — GASTROINTESTINAL CONSULTATION
Gastrointestinal Consultation


Date of Consultation:  Feb 15, 2017


Attending Physician:  Dr. Barrera


Consulting Physician:  Dr. Hein


Reason for Consultation:  UGI bleed


History of Present Illness


Patient is a 51 year old female with CAD s/p stent, ESRD on HD MWF, DM2, 

hyperlipidemia, ischemic cardiomyopathy, obesity, GERD and HTN who presents 

with repeated episodes of N/V (approx 20 times) since yesterday after she ate 

her luch meal consisting of 6 fish sticks and macaroni and cheese.  Pt 

developed abdominal cramping, took a nap and it resolved initially, only to 

return later in the evening with epigastric and RUQ pain and vomiting.  ER 

notes gastroccult heme +.  Pt describes some brown/bilious emesis no jaye BRB.

  Pt denies long hx of heartburn or GERD sx.  She's on ASA 81mg but tells me she

's not currently taking Plavix.  Also on short course of prednisone for gout.  

Pt evaluated last week for similar sx after she ate a 6 inch sub, sx thought to 

be secondary to biliary colic, she did not undergo EGD last admission.  US at 

that time showed sludge and mild gb wall thickening.  US repeated this 

admission shows a mildly distended gb with wall thickening and sludge, no duct 

dilation.  


Pt notes bowels are at baseline, she denies other GI c/o.  Pt still c/o pain 

this am but no further vomiting.  Pt on ice chips, asking for something to eat.

  Hgb at 10.7 currently which is her baseline. WBC mildly elevated but she has 

also been on prednisone.





Past Medical/Surgical History


Medical Problems:


(1) Abdominal pain


Status: Acute  





(2) Acute electrocardiogram changes


Status: Acute  





(3) Elevated troponin


Status: Acute  





(4) Elevated troponin


Status: Acute  





(5) End stage renal disease


Status: Acute  





(6) GI bleed


Status: Acute  





(7) HTN (hypertension)


Status: Chronic  





(8) Hyperkalemia


Status: Acute  





(9) Joint effusion


Status: Acute  





(10) Left elbow pain


Status: Acute  





(11) Left sided chest pain


Status: Acute  





(12) Left sided chest pain


Status: Acute  





(13) Limb ischemia


Status: Acute  





(14) Medical non-compliance


Status: Acute  





(15) Pneumonia


Status: Acute  





(16) Pulmonary edema


Status: Acute  





(17) Renal failure


Status: Acute  





(18) Sinusitis


Status: Acute  





(19) Substernal chest pain


Status: Acute  





(20) Upper abdominal pain


Status: Acute  





(21) Upper GI bleed


Status: Acute  





(22) Urinary tract infection


Status: Acute  





(23) Vomiting


Status: Acute  





(24) Vomiting


Status: Acute  





(25) Vomiting


Status: Acute  





(26) Vomiting


Status: Acute  





Past Medical History:


CAD


hx MI with stent 


ESRD on HD MWF


DM 2


hyperlipidemia


obesity


HTN


GERD


Past Surgical History:


cardiac stent placement


dialysis AV graft placement





Family History





Diabetes mellitus


  FATHER


  MOTHER


Heart disease


  FATHER


  MOTHER


Hypertension


  FATHER


  MOTHER


Kidney disease


  GRANDMOTHER


father  from pancreatic cancer





Social History


Smoking Status:  Former Smoker


Alcohol Use:  none


Housing Status:  lives with family





Allergies


Coded Allergies:  


     Adhesives (Verified  Allergy, Mild, RASH, SORES, 17)


     Pollen Extract (Unverified  Allergy, Unknown, rash, 17)





Current Medications





Home Meds and Scripts








 Medications  Dose


 Route/Sig


 Max Daily Dose Days Date Category Dose


Instructions


 


 Prednisone 10 Mg


 Tab  10 Mg


 PO UD


   7 2/10/17 Rx  2 po daily for 3 days and then 1 po daily for 4


 


 Sertraline HCl 50


 Mg Tab  2 Tab


 PO DAILY


   30 17 Reported 


 


 Phoslo 667 Mg


  (Calcium Acetate)


 667 Mg Cap  1 Cap


 PO WM


    17 Reported 


 


 Lipitor


  (Atorvastatin) 20


 Mg Tab  40 Mg


 PO DAILY


    17 Reported 


 


 Lopressor


  (Metoprolol


 Tartrate) 25 Mg


 Tab  50 Mg


 PO BID


    17 Reported 


 


 Plavix


  (Clopidogrel


 Bisulfate) 75 Mg


 Tab  1 Tab


 PO DAILY


   90 17 Reported 


 


 Isosorbide


 Mononitrate ER


  (Isosorbide


 Mononitrate) 30


 Mg Tabcr  60 Mg


 PO QAM


    17 Reported 


 


 Imdur Ext Rel


  (Isosorbide


 Mononitrate) 30


 Mg Ertab  30 Mg


 PO QAM


    17 Reported 


 


 Lantus Solostar


  (Insulin


 Glargine) 100


 Unit/Ml Inj  10 Units


 SC QPM


    17 Reported 


 


 Ativan


  (Lorazepam) 1 Mg


 Tab  0.5 Mg


 PO DAILY PRN


    17 Reported 


 


 Zantac


  (Ranitidine HCl)


 150 Mg Tab  300 Mg


 PO HS


    17 Reported 


 


 Methocarbamol 500


 Mg Tab  500 Mg


 PO BID PRN


    17 Reported 


 


 Ultram (Tramadol


 HCl) 50 Mg Tab  1 Tab


 PO TID PRN


   30 17 Reported 


 


 Aspirin Ec


  (Aspirin) 81 Mg


 Tab  81 Mg


 PO DAILY


    16 Reported 











Review of Systems


Constitutional:  No chills, No fever


Eyes:  No problem reported


ENT:  No problem reported


Respiratory:  No problem reported


Cardiac:  No chest pain


Abdomen:  + see HPI


Musculoskeletal:  No problem reported


Female :  + problem reported (ESRD on HD)


Neuro:  No problem reported


Psych:  No depression symptoms


Heme:  No abnormal bleeding/bruising


Endo:  No problem reported


Skin:  No rash





Physical Exam











  Date Time  Temp Pulse Resp B/P Pulse Ox O2 Delivery O2 Flow Rate FiO2


 


2/15/17 08:00 36.4 62 18 160/93 100 Nasal Cannula 2.0 


 


2/15/17 08:00     100 Nasal Cannula 2.0 


 


2/15/17 04:00 37.0 72 16 149/82 100 Nasal Cannula 2.0 


 


2/15/17 04:00     100 Nasal Cannula 2.0 


 


2/15/17 01:48 36.5 82 18 182/99 100 Nasal Cannula 1.5 


 


2/15/17 00:53  64 19 150/88 100 Nasal Cannula 2.0 


 


2/15/17 00:25  79 19 178/106 91   


 


17 22:10    166/102    


 


17 22:06  83      


 


17 22:04    190/101    


 


17 21:45  107 19 224/145 98 Room Air  


 


17 20:39      Room Air  


 


17 20:12 36.9 109 20 199/106 100 Room Air  








General Appearance:  no apparent distress, + obese


Eyes:  PERRL


ENT:  normal ENT inspection, hearing grossly normal


Neck:  supple


Respiratory/Chest:  lungs clear, normal breath sounds, no respiratory distress


Cardiovascular:  regular rate, rhythm


Abdomen:  normal bowel sounds, soft, + tenderness (epigastric and RUQ 

tenderness to palpation, no rebound or guarding, no other tenderness noted)


Extremities:  non-tender


Neurologic/Psych:  alert, normal mood/affect, oriented x 3


Skin:  normal color





Laboratory Results





Last 24 Hours








Test


  17


20:50 17


21:20 17


22:38 2/15/17


00:23


 


Gastric Fluid pH 3    


 


Gastric Fluid Occult Blood POS    


 


White Blood Count  12.05 K/uL   


 


Red Blood Count  3.98 M/uL   


 


Hemoglobin  12.4 g/dL   


 


Hematocrit  37.8 %   


 


Mean Corpuscular Volume  95.0 fL   


 


Mean Corpuscular Hemoglobin  31.2 pg   


 


Mean Corpuscular Hemoglobin


Concent 


  32.8 g/dl 


  


  


 


 


Platelet Count  383 K/uL   


 


Mean Platelet Volume  10.5 fL   


 


Neutrophils (%) (Auto)  76.5 %   


 


Lymphocytes (%) (Auto)  13.9 %   


 


Monocytes (%) (Auto)  8.6 %   


 


Eosinophils (%) (Auto)  0.4 %   


 


Basophils (%) (Auto)  0.2 %   


 


Neutrophils # (Auto)  9.21 K/uL   


 


Lymphocytes # (Auto)  1.67 K/uL   


 


Monocytes # (Auto)  1.04 K/uL   


 


Eosinophils # (Auto)  0.05 K/uL   


 


Basophils # (Auto)  0.03 K/uL   


 


RDW Standard Deviation  53.0 fL   


 


RDW Coefficient of Variation  15.5 %   


 


Immature Granulocyte % (Auto)  0.4 %   


 


Immature Granulocyte # (Auto)  0.05 K/uL   


 


Prothrombin Time  10.7 SECONDS   


 


Prothromb Time International


Ratio 


  1.0 


  


  


 


 


Activated Partial


Thromboplast Time 


  24.3 SECONDS 


  


  


 


 


Partial Thromboplastin Ratio  0.9   


 


Sodium Level  133 mmol/L   


 


Potassium Level  5.1 mmol/L   


 


Chloride Level  96 mmol/L   


 


Carbon Dioxide Level  22 mmol/L   


 


Anion Gap  15.0 mmol/L   


 


Blood Urea Nitrogen  48 mg/dl   


 


Creatinine  7.80 mg/dl   


 


Estimated GFR (


American) 


  6.4 


  


  


 


 


Estimated GFR (Non-


American 


  5.5 


  


  


 


 


BUN/Creatinine Ratio  6.0   


 


Random Glucose  231 mg/dl   


 


Estimated Average Glucose  157 mg/dl   


 


Hemoglobin A1c  7.1 %   


 


Calcium Level  9.8 mg/dl   


 


Total Bilirubin  0.4 mg/dl   


 


Aspartate Amino Transf


(AST/SGOT) 


  13 U/L 


  


  


 


 


Alanine Aminotransferase


(ALT/SGPT) 


  14 U/L 


  


  


 


 


Alkaline Phosphatase  96 U/L   


 


Troponin I  0.433 ng/ml   


 


Total Protein  8.4 gm/dl   


 


Albumin  3.2 gm/dl   


 


Globulin  5.2 gm/dl   


 


Albumin/Globulin Ratio  0.6   


 


Lipase  172 U/L   


 


Lactic Acid Level   2.3 mmol/L  


 


Bedside Glucose    231 mg/dl 














Test


  2/15/17


04:48 2/15/17


10:48 


  


 


 


White Blood Count 12.87 K/uL    


 


Red Blood Count 3.54 M/uL    


 


Hemoglobin 10.7 g/dL    


 


Hematocrit 33.1 %    


 


Mean Corpuscular Volume 93.5 fL    


 


Mean Corpuscular Hemoglobin 30.2 pg    


 


Mean Corpuscular Hemoglobin


Concent 32.3 g/dl 


  


  


  


 


 


Platelet Count 350 K/uL    


 


Mean Platelet Volume 10.1 fL    


 


Neutrophils (%) (Auto) 75.4 %    


 


Lymphocytes (%) (Auto) 15.2 %    


 


Monocytes (%) (Auto) 8.3 %    


 


Eosinophils (%) (Auto) 0.5 %    


 


Basophils (%) (Auto) 0.2 %    


 


Neutrophils # (Auto) 9.72 K/uL    


 


Lymphocytes # (Auto) 1.95 K/uL    


 


Monocytes # (Auto) 1.07 K/uL    


 


Eosinophils # (Auto) 0.06 K/uL    


 


Basophils # (Auto) 0.02 K/uL    


 


RDW Standard Deviation 53.2 fL    


 


RDW Coefficient of Variation 15.6 %    


 


Immature Granulocyte % (Auto) 0.4 %    


 


Immature Granulocyte # (Auto) 0.05 K/uL    


 


Sodium Level 135 mmol/L    


 


Potassium Level 5.4 mmol/L    


 


Chloride Level 99 mmol/L    


 


Carbon Dioxide Level 27 mmol/L    


 


Anion Gap 9.0 mmol/L    


 


Blood Urea Nitrogen 51 mg/dl    


 


Creatinine 8.20 mg/dl    


 


Est Creatinine Clear Calc


Drug Dose 10.0 ml/min 


  


  


  


 


 


Estimated GFR (


American) 6.0 


  


  


  


 


 


Estimated GFR (Non-


American 5.2 


  


  


  


 


 


BUN/Creatinine Ratio 6.3    


 


Random Glucose 159 mg/dl    


 


Lactic Acid Level 2.2 mmol/L    


 


Calcium Level 8.8 mg/dl    


 


Magnesium Level 2.4 mg/dl    


 


Total Creatine Kinase 81 U/L    


 


Creatine Kinase MB 9.0 ng/ml    


 


Creatine Kinase MB Ratio 11.1    


 


Troponin I 1.840 ng/ml    


 


Bedside Glucose  98 mg/dl   











Impression


Patient is a 51 year old female with recurrent emesis after eating macaroni and 

cheese and fish sticks





Plan


Ddx: PUD, biliary colic, gastroparesis, gastritis


Hgb and BUN/Creat. at baseline


continue PPI - may convert to IV daily or BID after drip complete


Pt on ASA and short course of prednisone - agrees to EGD to r/o PUD as a cause 

of her sx.


given her hx, suspect this is secondary to gallbladder disease and she may 

require surgical evaluation to consider cholecystectomy.  Meanwhile, encouraged 

to eat bland, low fat diet.


Pt may have clears-low residue diet as tolerated today


EGD tomorrow - additional recommendations after EGD.





ATTESTATION:


I have performed a history and physical examination of this patient and 

reviewed the electronic record. Specifically, on physical examination there is 

no tenderness presently. I have discussed the case with Zee Perez PA-C. The 

above note reflects my findings, conclusions, and recommendations.


Joshua Hein MD

## 2017-02-15 NOTE — CARDIOLOGY CONSULTATION
Cardiology Consultation


Date of Consultation:


Feb 15, 2017.


Requesting Physician:


Dr. Barrera


Reason for Consultation:


Chest pain, elevated troponin


Pt evaluation today including:  conversation w/ patient, physical exam, lab 

review, review of studies, review of inpatient medication list


History of Present Illness


This is a 51-year-old patient with diabetes mellitus, end-stage renal disease 

on hemodialysis, hypertension and hypercholesterolemia, followed by Dr. Duarte 

for her coronary artery disease, who presented with recurrent upper GI 

bleeding. She also has peripheral artery disease and carotid disease. Her 

cardiac history is notable for a bare metal stent to her obtuse marginal in 

August 2016 at which time she had an occluded right coronary artery as well. 

She was admitted about 2 weeks ago with an upper GI bleed but refused endoscopy 

at that time. She now returns with ongoing hematemesis.





She also describes some chest discomfort, she describes it as a sharp sensation 

which occurred while she was vomiting followed by a dull sensation lasting an 

hour or 2 afterwards. She has not been having exertional symptoms, does not 

have shortness of breath or palpitations. Of note it is not clear that she had 

angina prior to the documentation of her coronary artery disease. Her troponin 

has risen this admission (as it had last, although they are higher this time at 

1.8 so far).





Past Medical/Surgical History


Past medical history:


Diabetes mellitus


Hypertension


Hypercholesterolemia


Coronary artery disease


Chronic kidney disease on dialysis


Peripheral vascular disease


Cerebrovascular disease





Family History





Diabetes mellitus


  FATHER


  MOTHER


Heart disease


  FATHER


  MOTHER


Hypertension


  FATHER


  MOTHER


Kidney disease


  GRANDMOTHER





Social History


Smoking Status:  Former Smoker


History of Alcohol Use:  No





Review of Systems


Constitutional:  No fever, No weakness, No weight loss


Respiratory:  No problem reported


Cardiac:  + chest pain, + see HPI


Abdomen:  + nausea, + see HPI, + vomiting, No GI bleeding, No diarrhea, No pain


Female :  No problem reported


Neurologic:  No balance problems, No numbness/tingling, No paralysis, No 

weakness


Heme:  No abnormal bleeding/bruising, No clotting problems


Endo:  No fatigue


Skin:  No problem reported


All Other Systems:  Reviewed and Negative





Allergies


Coded Allergies:  


     Adhesives (Verified  Allergy, Mild, RASH, SORES, 1/31/17)


     Pollen Extract (Unverified  Allergy, Unknown, rash, 1/31/17)





Medications





 Current Inpatient Medications








 Medications


  (Trade)  Dose


 Ordered  Sig/Willy


 Route  Start Time


 Stop Time Status Last Admin


Dose Admin


 


 Insulin Glargine


  (Lantus Solostar


 Pen)  5 unit  BID


 SC  2/15/17 09:00


    2/15/17 09:56


5 UNIT


 


 Glucose


  (Glucose 40% Gel)  15-30


 GRAMS 15


 GRAMS...  UD  PRN


 PO  2/14/17 23:45


 3/16/17 23:44   


 


 


 Glucose


  (Glucose Chew


 Tab)  4-8


 Tablets 4


 Tabl...  UD  PRN


 PO  2/14/17 23:45


 3/16/17 23:44   


 


 


 Dextrose


  (Dextrose 50%


 50ML Syringe)  25-50ML OF


 50% DW IV


 FOR...  UD  PRN


 IV  2/14/17 23:45


 3/16/17 23:44   


 


 


 Glucagon


  (Glucagon Inj)  1 mg  UD  PRN


 SQ  2/14/17 23:45


 3/16/17 23:44   


 


 


 Hydromorphone HCl


  (Dilaudid Inj)  0.5 mg  Q3H  PRN


 IV  2/15/17 00:15


 3/1/17 00:14  2/15/17 00:20


0.5 MG


 


 Ondansetron HCl 4


 mg  4 mg  Q6H  PRN


 IV  2/15/17 00:15


 3/17/17 00:14  2/15/17 10:43


4 MG


 


 Promethazine HCl/


 Sodium Chloride


  (Phenergan Inj/


 Nss 50ml)  50.5 ml @ 


 204 mls/hr  Q6H  PRN


 IV  2/15/17 00:15


 3/17/17 00:14   


 


 


 Lorazepam


  (Ativan Inj)  0.5 mg  Q4H  PRN


 IV  2/15/17 00:15


 3/17/17 00:14   


 


 


 Miscellaneous


  (Iv Fluids


 Completed)  1 ea  PRN  PRN


 N/A  2/15/17 00:15


 2/15/18 00:14   


 


 


 Acetaminophen


  (Tylenol Tab)  650 mg  Q4H  PRN


 PO  2/15/17 00:15


 3/17/17 00:14   


 


 


 Nitroglycerin


  (Nitrostat Tab)  0.4 mg  UD  PRN


 SL  2/15/17 00:15


 3/17/17 00:14   


 


 


 Insulin Aspart


  (novoLOG ASPART)  **SLIDING


 SCALE**


 **If C...  ACHS


 SC  2/15/17 07:00


 3/17/17 06:59   


 


 


 Atorvastatin


 Calcium


  (Lipitor Tab)  40 mg  DAILY


 PO  2/15/17 09:00


 3/17/17 08:59  2/15/17 09:52


40 MG


 


 Calcium Acetate


  (Phoslo Cap)  667 mg  TIDM


 PO  2/15/17 07:30


 3/17/17 07:29  2/15/17 10:48


667 MG


 


 Isosorbide


 Mononitrate


  (Imdur Ext Rel


 Tab)  60 mg  QAM


 PO  2/15/17 09:00


 3/17/17 08:59  2/15/17 09:52


60 MG


 


 Isosorbide


 Mononitrate


  (Imdur Ext Rel


 Tab)  30 mg  QAM


 PO  2/15/17 09:00


 3/17/17 08:59  2/15/17 09:51


30 MG


 


 Prednisone


  (PredniSONE TAB)  10 mg  DAILY


 PO  2/15/17 09:00


 2/17/17 09:01  2/15/17 09:53


10 MG


 


 Sertraline HCl


  (Zoloft Tab)  100 mg  DAILY


 PO  2/15/17 09:00


 3/17/17 08:59  2/15/17 09:54


100 MG


 


 Tramadol HCl   not


 relieved


 by tylenol @   Q6H  PRN


 PO  2/15/17 00:15


 3/17/17 00:14   


 


 


 Pantoprazole


 Sodium/Syringe


  (Protonix Inj/


 Syringe)  20 ml @ 5


 mls/min  BID@0900,2100


 IV  2/15/17 09:00


 3/17/17 08:59  2/15/17 09:51


5 MLS/MIN


 


 Metoprolol


 Tartrate


  (Lopressor Tab)  50 mg  BID


 PO  2/15/17 09:00


 3/17/17 08:59  2/15/17 09:53


50 MG


 


 Menthol


  (Nice Jerad)  1 jerad  PRN  PRN


 PO  2/15/17 05:15


 3/17/17 05:14   


 


 


 Epoetin Narayan


  (Procrit Inj)  10,000 units  0900


 IV.  2/15/17 09:00


 2/15/17 17:00   


 











Physical Exam





 Vital Signs Past 12 Hours








  Date Time  Temp Pulse Resp B/P Pulse Ox O2 Delivery O2 Flow Rate FiO2


 


2/15/17 10:50 36.5 73 20 179/93 100 Nasal Cannula 2.0 


 


2/15/17 08:00 36.4 62 18 160/93 100 Nasal Cannula 2.0 


 


2/15/17 08:00     100 Nasal Cannula 2.0 


 


2/15/17 04:00 37.0 72 16 149/82 100 Nasal Cannula 2.0 


 


2/15/17 04:00     100 Nasal Cannula 2.0 


 


2/15/17 01:48 36.5 82 18 182/99 100 Nasal Cannula 1.5 


 


2/15/17 00:53  64 19 150/88 100 Nasal Cannula 2.0 








Constitutional:  


   General Apperance:  overweight


   Level of Distress:  mild distress


Psychiatric:  


   Mental Status:  active & alert


Head:  normocephalic


Eyes:  


   EOM:  EOMI


ENMT:  normal ENT inspection, hearing grossly normal


Neck:  supple, no masses


Lungs:  


   Respiratory effort:  no dyspnea, good air movement


   Auscultation:  breath sounds normal, no wheezing


Cardiovascular:  


   Heart Auscultation:  RRR, no murmurs, no rubs, no gallops


Peripheral Pulses:  


   Bruits:  none appreciated


Abdomen:  


   Bowel Sounds:  normal


   Inspection & Palpation:  soft, no tenderness, guarding & rebound, no masses


Musculoskeletal:  normal strength (5/5 throughout)


Extremities:  no edema


Neurologic:  


   Cranial Nerves:  grossly intact


   Sensation:  grossly intact





Data


Laboratory Results:





Last 24 Hours








Test


  2/14/17


20:50 2/14/17


21:20 2/14/17


22:38 2/15/17


00:23


 


Gastric Fluid pH 3    


 


Gastric Fluid Occult Blood POS    


 


White Blood Count  12.05 K/uL   


 


Red Blood Count  3.98 M/uL   


 


Hemoglobin  12.4 g/dL   


 


Hematocrit  37.8 %   


 


Mean Corpuscular Volume  95.0 fL   


 


Mean Corpuscular Hemoglobin  31.2 pg   


 


Mean Corpuscular Hemoglobin


Concent 


  32.8 g/dl 


  


  


 


 


Platelet Count  383 K/uL   


 


Mean Platelet Volume  10.5 fL   


 


Neutrophils (%) (Auto)  76.5 %   


 


Lymphocytes (%) (Auto)  13.9 %   


 


Monocytes (%) (Auto)  8.6 %   


 


Eosinophils (%) (Auto)  0.4 %   


 


Basophils (%) (Auto)  0.2 %   


 


Neutrophils # (Auto)  9.21 K/uL   


 


Lymphocytes # (Auto)  1.67 K/uL   


 


Monocytes # (Auto)  1.04 K/uL   


 


Eosinophils # (Auto)  0.05 K/uL   


 


Basophils # (Auto)  0.03 K/uL   


 


RDW Standard Deviation  53.0 fL   


 


RDW Coefficient of Variation  15.5 %   


 


Immature Granulocyte % (Auto)  0.4 %   


 


Immature Granulocyte # (Auto)  0.05 K/uL   


 


Prothrombin Time  10.7 SECONDS   


 


Prothromb Time International


Ratio 


  1.0 


  


  


 


 


Activated Partial


Thromboplast Time 


  24.3 SECONDS 


  


  


 


 


Partial Thromboplastin Ratio  0.9   


 


Sodium Level  133 mmol/L   


 


Potassium Level  5.1 mmol/L   


 


Chloride Level  96 mmol/L   


 


Carbon Dioxide Level  22 mmol/L   


 


Anion Gap  15.0 mmol/L   


 


Blood Urea Nitrogen  48 mg/dl   


 


Creatinine  7.80 mg/dl   


 


Estimated GFR (


American) 


  6.4 


  


  


 


 


Estimated GFR (Non-


American 


  5.5 


  


  


 


 


BUN/Creatinine Ratio  6.0   


 


Random Glucose  231 mg/dl   


 


Estimated Average Glucose  157 mg/dl   


 


Hemoglobin A1c  7.1 %   


 


Calcium Level  9.8 mg/dl   


 


Total Bilirubin  0.4 mg/dl   


 


Aspartate Amino Transf


(AST/SGOT) 


  13 U/L 


  


  


 


 


Alanine Aminotransferase


(ALT/SGPT) 


  14 U/L 


  


  


 


 


Alkaline Phosphatase  96 U/L   


 


Troponin I  0.433 ng/ml   


 


Total Protein  8.4 gm/dl   


 


Albumin  3.2 gm/dl   


 


Globulin  5.2 gm/dl   


 


Albumin/Globulin Ratio  0.6   


 


Lipase  172 U/L   


 


Lactic Acid Level   2.3 mmol/L  


 


Bedside Glucose    231 mg/dl 














Test


  2/15/17


04:48 2/15/17


10:48 2/15/17


12:20 


 


 


White Blood Count 12.87 K/uL    


 


Red Blood Count 3.54 M/uL    


 


Hemoglobin 10.7 g/dL   10.3 g/dL  


 


Hematocrit 33.1 %   32.1 %  


 


Mean Corpuscular Volume 93.5 fL    


 


Mean Corpuscular Hemoglobin 30.2 pg    


 


Mean Corpuscular Hemoglobin


Concent 32.3 g/dl 


  


  


  


 


 


Platelet Count 350 K/uL    


 


Mean Platelet Volume 10.1 fL    


 


Neutrophils (%) (Auto) 75.4 %    


 


Lymphocytes (%) (Auto) 15.2 %    


 


Monocytes (%) (Auto) 8.3 %    


 


Eosinophils (%) (Auto) 0.5 %    


 


Basophils (%) (Auto) 0.2 %    


 


Neutrophils # (Auto) 9.72 K/uL    


 


Lymphocytes # (Auto) 1.95 K/uL    


 


Monocytes # (Auto) 1.07 K/uL    


 


Eosinophils # (Auto) 0.06 K/uL    


 


Basophils # (Auto) 0.02 K/uL    


 


RDW Standard Deviation 53.2 fL    


 


RDW Coefficient of Variation 15.6 %    


 


Immature Granulocyte % (Auto) 0.4 %    


 


Immature Granulocyte # (Auto) 0.05 K/uL    


 


Sodium Level 135 mmol/L    


 


Potassium Level 5.4 mmol/L    


 


Chloride Level 99 mmol/L    


 


Carbon Dioxide Level 27 mmol/L    


 


Anion Gap 9.0 mmol/L    


 


Blood Urea Nitrogen 51 mg/dl    


 


Creatinine 8.20 mg/dl    


 


Est Creatinine Clear Calc


Drug Dose 10.0 ml/min 


  


  


  


 


 


Estimated GFR (


American) 6.0 


  


  


  


 


 


Estimated GFR (Non-


American 5.2 


  


  


  


 


 


BUN/Creatinine Ratio 6.3    


 


Random Glucose 159 mg/dl    


 


Lactic Acid Level 2.2 mmol/L   2.0 mmol/L  


 


Calcium Level 8.8 mg/dl    


 


Magnesium Level 2.4 mg/dl    


 


Total Creatine Kinase 81 U/L    


 


Creatine Kinase MB 9.0 ng/ml    


 


Creatine Kinase MB Ratio 11.1    


 


Troponin I 1.840 ng/ml    


 


Bedside Glucose  98 mg/dl   








Imaging: Echo pending





EKG: On arrival sinus tachycardia rate 102 with slight ST elevation in lead 3 

and slight ST depression in 1 and aVL without T-wave inversion. An 

electrocardiogram this morning and prior electrocardiograms show sinus rhythm 

with slight inferior ST elevation, T-wave inversion in 1 and aVL. Her 

electrocardiogram on arrival could represent pseudonormalization.





Telemetry reviewed:  Sinus rhythm, no significant arrhythmia.





Assessment & Plan


#1. Chest discomfort: The character of her chest discomfort does not suggest 

myocardial ischemia and in the past she has not had chest discomfort despite 

significant coronary artery disease. I suspect it is not due to coronary artery 

disease, however she has other evidence of having possible myocardial injury. 

This may be due to her long-standing coronary disease not in acute event 

however.





#2. Elevated troponin: She has an elevated troponin to 1.8, with others 

pending. This is higher than on her last visit. This is still consistent with 

demand ischemia, her initial electrocardiogram suggested pseudonormalization in 

the lateral leads and would go along with demand ischemia as those changes have 

now resolved. Although she has slight inferior ST elevation does not look like 

an acute infarction. At this point I doubt invasive evaluation is warranted but 

I will discuss with Dr. uDarte. With her acute GI bleed it seems unlikely that 

we could intervene with the requirement for antiplatelet agents if we did.





Thank you for allowing me to participate in her care.

## 2017-02-15 NOTE — NEPHROLOGY CONSULTATION
DATE OF CONSULTATION:  02/15/2017

 

ATTENDING OF RECORD:  Dr. Bautista.

 

REASON FOR CONSULTATION:  ESRD.

 

This is a 51-year-old female who dialyzes Mondays, Wednesdays, and Fridays at

the [facility], last dialysis treatment was Monday. 

The patient was recently discharged on Friday with resolving nausea,

vomiting, abdominal pain.  The patient was feeling better.  Tolerated

dialysis well on Monday.  The patient was technically 7 kilos above her dry

weight on Monday and we managed to get off 5.5 kilos without difficulty on

Monday.  Tuesday morning, the patient was feeling very good, ate well;

however, Tuesday night with dinner the patient had some ketchup, which she

states causes her belly to feel uncomfortable and then started having nausea

and vomiting which would not improve.  The patient had bloody vomitus and is

currently comfortable on IV fluids.  Currently n.p.o.

 

PAST MEDICAL HISTORY:  End-stage renal disease, diastolic heart failure, type

2 diabetes, anemia of renal failure, GERD, coronary artery disease with

history of a bare metal stents.

 

PAST SURGICAL HISTORY:  AV fistula placement, bare metal stent to the heart,

tunneled dialysis catheter placements.

 

SOCIAL HISTORY:  Former smoker, no alcohol, no drugs.   and lives

alone.

 

FAMILY HISTORY:  Significant for diabetes in both parents.

 

REVIEW OF SYSTEMS:  Positive nausea, vomiting.  No shortness of breath.  No

chest pain.  Decreased appetite.  No blurry vision.  No headaches.  No rash. 

No itching.  All other review of systems otherwise negative.



MEDS: REVIEWED

 

PHYSICAL EXAMINATION:

VITAL SIGNS:  Temperature 36.4, pulse 62, respiratory rate 18, blood pressure

160/93, satting 100% on 2 liters.

GENERAL:  Awake, alert, oriented x3, obese.

EYES:  No scleral icterus.

ENT:  Moist mucous membranes.

NECK:  Supple.

PULMONARY:  Clear to auscultation.

CARDIAC:  Regular rate and rhythm.

ABDOMEN:  Bowel sounds positive, mild tenderness diffusely, nondistended.

EXTREMITIES:  No significant clubbing, cyanosis or edema.

NEUROLOGICALLY:  Nonfocal.

DERMATOLOGIC:  No rash or ulcers noted.

 

LABORATORIES:  White count is 12, H\T\H 10.7 and 33.1, platelet count is 350. 

Sodium level is 135, potassium 5.4, chloride is 99, bicarbonate is 27, BUN is

51, creatinine is 8.2, glucose 159.  Lactic acid was elevated at 2.3 and was

2.2 this morning.  Troponin of 1.840 and trending up.  Calcium is 8.8.  INR

is 1.

 

Gallbladder ultrasound shows gallbladder mildly distended, trace sludge,

minimal nonspecific bladder wall thickening, atrophic right kidney.

 

Chest x-ray shows no acute findings.  Stable cardiomegaly.

 

IMPRESSION:

1.  End-stage renal disease:  Plan on dialysis today with no fluid removal. 

The patient's blood pressures are good and patient is technically still

several kilos above her dry weight.  Will stop the IV fluids for now. 

However, no fluid removal currently on dialysis.

2.  Anemia of renal failure:  Hemoglobin levels are at the 10.7 range and

will continue Procrit on dialysis.

3.  Renal osteodystrophy:  The patient is on PhosLo 1 p.o. t.i.d. with meals;

however, not eating well at this point.

4.  Cardiac:  The patient's troponin is trending up and appears to be having

a non-STEMI event with elevated lactic acid as well.  Defer to primary

hospitalist, as well as Cardiology.

 

Appreciate consultation.

 

 

 

LOUISE

## 2017-02-16 VITALS — OXYGEN SATURATION: 100 %

## 2017-02-16 VITALS
SYSTOLIC BLOOD PRESSURE: 138 MMHG | HEART RATE: 61 BPM | OXYGEN SATURATION: 99 % | DIASTOLIC BLOOD PRESSURE: 63 MMHG | TEMPERATURE: 97.52 F

## 2017-02-16 VITALS
TEMPERATURE: 97.88 F | OXYGEN SATURATION: 95 % | HEART RATE: 70 BPM | SYSTOLIC BLOOD PRESSURE: 150 MMHG | DIASTOLIC BLOOD PRESSURE: 77 MMHG

## 2017-02-16 VITALS — OXYGEN SATURATION: 100 % | DIASTOLIC BLOOD PRESSURE: 72 MMHG | SYSTOLIC BLOOD PRESSURE: 161 MMHG | TEMPERATURE: 97.7 F

## 2017-02-16 VITALS
DIASTOLIC BLOOD PRESSURE: 72 MMHG | OXYGEN SATURATION: 100 % | SYSTOLIC BLOOD PRESSURE: 161 MMHG | HEART RATE: 59 BPM | TEMPERATURE: 97.7 F

## 2017-02-16 VITALS
OXYGEN SATURATION: 96 % | DIASTOLIC BLOOD PRESSURE: 68 MMHG | SYSTOLIC BLOOD PRESSURE: 136 MMHG | HEART RATE: 69 BPM | TEMPERATURE: 98.42 F

## 2017-02-16 LAB
ANION GAP SERPL CALC-SCNC: 9 MMOL/L (ref 3–11)
BASOPHILS # BLD: 0.03 K/UL (ref 0–0.2)
BASOPHILS NFR BLD: 0.3 %
BUN SERPL-MCNC: 29 MG/DL (ref 7–18)
BUN/CREAT SERPL: 5 (ref 10–20)
CALCIUM SERPL-MCNC: 8.1 MG/DL (ref 8.5–10.1)
CHLORIDE SERPL-SCNC: 98 MMOL/L (ref 98–107)
CO2 SERPL-SCNC: 30 MMOL/L (ref 21–32)
COMPLETE: YES
CREAT CL PREDICTED SERPL C-G-VRATE: 14 ML/MIN
CREAT SERPL-MCNC: 5.9 MG/DL (ref 0.6–1.2)
EOSINOPHIL NFR BLD AUTO: 266 K/UL (ref 130–400)
GLUCOSE SERPL-MCNC: 80 MG/DL (ref 70–99)
HCT VFR BLD CALC: 30.2 % (ref 37–47)
IG%: 0.4 %
IMM GRANULOCYTES NFR BLD AUTO: 34.8 %
LYMPHOCYTES # BLD: 3.68 K/UL (ref 1.2–3.4)
MCH RBC QN AUTO: 31.1 PG (ref 25–34)
MCHC RBC AUTO-ENTMCNC: 31.8 G/DL (ref 32–36)
MCV RBC AUTO: 97.7 FL (ref 80–100)
MONOCYTES NFR BLD: 8.1 %
NEUTROPHILS # BLD AUTO: 4 %
NEUTROPHILS NFR BLD AUTO: 52.4 %
PMV BLD AUTO: 10.3 FL (ref 7.4–10.4)
POTASSIUM SERPL-SCNC: 4.8 MMOL/L (ref 3.5–5.1)
RBC # BLD AUTO: 3.09 M/UL (ref 4.2–5.4)
SODIUM SERPL-SCNC: 137 MMOL/L (ref 136–145)
WBC # BLD AUTO: 10.58 K/UL (ref 4.8–10.8)

## 2017-02-16 PROCEDURE — 0DB98ZX EXCISION OF DUODENUM, VIA NATURAL OR ARTIFICIAL OPENING ENDOSCOPIC, DIAGNOSTIC: ICD-10-PCS | Performed by: INTERNAL MEDICINE

## 2017-02-16 RX ADMIN — ISOSORBIDE MONONITRATE SCH MG: 30 TABLET, EXTENDED RELEASE ORAL at 08:21

## 2017-02-16 RX ADMIN — INSULIN ASPART SCH UNITS: 100 INJECTION, SOLUTION INTRAVENOUS; SUBCUTANEOUS at 11:00

## 2017-02-16 RX ADMIN — INSULIN ASPART SCH UNITS: 100 INJECTION, SOLUTION INTRAVENOUS; SUBCUTANEOUS at 07:00

## 2017-02-16 RX ADMIN — METOPROLOL TARTRATE SCH MG: 25 TABLET, FILM COATED ORAL at 08:21

## 2017-02-16 RX ADMIN — INSULIN GLARGINE SCH UNIT: 100 INJECTION, SOLUTION SUBCUTANEOUS at 22:05

## 2017-02-16 RX ADMIN — PANTOPRAZOLE SODIUM SCH MLS/MIN: 40 INJECTION, POWDER, FOR SOLUTION INTRAVENOUS at 08:21

## 2017-02-16 RX ADMIN — ATORVASTATIN CALCIUM SCH MG: 40 TABLET, FILM COATED ORAL at 08:22

## 2017-02-16 RX ADMIN — METOPROLOL TARTRATE SCH MG: 25 TABLET, FILM COATED ORAL at 22:14

## 2017-02-16 RX ADMIN — INSULIN ASPART SCH UNITS: 100 INJECTION, SOLUTION INTRAVENOUS; SUBCUTANEOUS at 21:00

## 2017-02-16 RX ADMIN — CALCIUM ACETATE SCH MG: 667 CAPSULE ORAL at 07:23

## 2017-02-16 RX ADMIN — CALCIUM ACETATE SCH MG: 667 CAPSULE ORAL at 11:19

## 2017-02-16 RX ADMIN — PANTOPRAZOLE SODIUM SCH MLS/MIN: 40 INJECTION, POWDER, FOR SOLUTION INTRAVENOUS at 22:13

## 2017-02-16 RX ADMIN — CALCIUM ACETATE SCH MG: 667 CAPSULE ORAL at 17:37

## 2017-02-16 RX ADMIN — INSULIN ASPART SCH UNITS: 100 INJECTION, SOLUTION INTRAVENOUS; SUBCUTANEOUS at 17:39

## 2017-02-16 RX ADMIN — INSULIN GLARGINE SCH UNIT: 100 INJECTION, SOLUTION SUBCUTANEOUS at 08:25

## 2017-02-16 RX ADMIN — ISOSORBIDE MONONITRATE SCH MG: 60 TABLET ORAL at 08:22

## 2017-02-16 RX ADMIN — SERTRALINE HYDROCHLORIDE SCH MG: 100 TABLET, FILM COATED ORAL at 22:13

## 2017-02-16 NOTE — GI REPORT
Procedure Date: 2/16/2017 9:57 AM

Procedure:            Upper GI endoscopy

Indications:          Nausea with vomiting

Medicines:            Monitored Anesthesia Care

Complications:        No immediate complications. Estimated blood loss: None.

Estimated Blood Loss: Estimated blood loss: none.

Procedure:            Pre-Anesthesia Assessment:

                      - Prior to the procedure, a History and Physical was 

                      performed, and patient medications, allergies and 

                      sensitivities were reviewed. The patient's tolerance of 

                      previous anesthesia was reviewed.

                      - ASA Grade Assessment: III - A patient with severe 

                      systemic disease.

                      After obtaining informed consent, the endoscope was 

                      passed under direct vision. Throughout the procedure, 

                      the patient's blood pressure, pulse, and oxygen 

                      saturations were monitored continuously. The On-site 

                      loaner was introduced through the mouth, and advanced 

                      to the third part of the duodenum. Small bowel 

                      enteroscopy was deemed necessary. The upper GI 

                      endoscopy was accomplished with ease. The patient 

                      tolerated the procedure well.

Findings:

     The upper third of the esophagus, middle third of the esophagus and 

     lower third of the esophagus were normal.

     The Z-line was regular and was found 40 cm from the incisors. Biopsies 

     were taken with a cold forceps for histology.

     The entire examined stomach was normal. Biopsies were taken with a cold 

     forceps for Helicobacter pylori testing.

     The examined duodenum was normal. Biopsies for histology were taken with 

     a cold forceps for evaluation of celiac disease.

     Verification of patient identification for the specimens was done by the 

     physician and nurse using the patient's name, birth date and medical 

     record number.

Impression:           - Normal upper third of esophagus, middle third of 

                      esophagus and lower third of esophagus.

                      - Z-line regular, 40 cm from the incisors. Biopsied.

                      - Normal stomach. Biopsied.

                      - Normal examined duodenum. Biopsied.

Recommendation:       - Await pathology results.

                      - Return patient to hospital bill for ongoing care.

Joshua Hein M.D. 

Joshua Hein MD

2/16/2017 10:12:44 AM

This report has been signed electronically.

Note Initiated On: 2/16/2017 9:57 AM

     I attest to the content of the Intraoperative Record and orders 

     documented therein, exceptions below

## 2017-02-16 NOTE — PROGRESS NOTE
Medicine Progress Note


Date & Time of Visit:


Feb 16, 2017 at 20:15.


Subjective


s/p EG today


denies nausea, vomiting


tolerating diet well


reports left thumb numbness and discoloration- erythema, since last night


no other symptoms





Objective





Last 8 Hrs








  Date Time  Temp Pulse Resp B/P Pulse Ox O2 Delivery O2 Flow Rate FiO2


 


2/16/17 19:56 36.6 70 18 150/77 95 Room Air  


 


2/16/17 16:00     100 Nasal Cannula 2.0 


 


2/16/17 15:54 36.9 69 18 136/68 96 Room Air  








Physical Exam:


General- oriented x 3, not in distress





Head-  atraumatic





Eyes- anicteric





Neck- supple, no JVD





Lungs- clear to auscultation bilaterally, no rales/wheezes





Heart- normal rate, regular rhythm; no murmurs 





Abdomen- normal bowel sounds, soft, nontender





Extremities- right great toe: (+) scaling, eschar on the nail border and dorsal 

aspect, no tenderness 


no pretibial edema, no calf tenderness





Neuro- alert, oriented x 3;no gross focal deficits





Skin- warm & dry


Laboratory Results:





Last 24 Hours








Test


  2/15/17


20:19 2/16/17


05:42 2/16/17


06:42 2/16/17


11:16


 


Bedside Glucose 127 mg/dl   83 mg/dl  101 mg/dl 


 


White Blood Count  10.58 K/uL   


 


Red Blood Count  3.09 M/uL   


 


Hemoglobin  9.6 g/dL   


 


Hematocrit  30.2 %   


 


Mean Corpuscular Volume  97.7 fL   


 


Mean Corpuscular Hemoglobin  31.1 pg   


 


Mean Corpuscular Hemoglobin


Concent 


  31.8 g/dl 


  


  


 


 


Platelet Count  266 K/uL   


 


Mean Platelet Volume  10.3 fL   


 


Neutrophils (%) (Auto)  52.4 %   


 


Lymphocytes (%) (Auto)  34.8 %   


 


Monocytes (%) (Auto)  8.1 %   


 


Eosinophils (%) (Auto)  4.0 %   


 


Basophils (%) (Auto)  0.3 %   


 


Neutrophils # (Auto)  5.55 K/uL   


 


Lymphocytes # (Auto)  3.68 K/uL   


 


Monocytes # (Auto)  0.86 K/uL   


 


Eosinophils # (Auto)  0.42 K/uL   


 


Basophils # (Auto)  0.03 K/uL   


 


RDW Standard Deviation  57.2 fL   


 


RDW Coefficient of Variation  16.1 %   


 


Immature Granulocyte % (Auto)  0.4 %   


 


Immature Granulocyte # (Auto)  0.04 K/uL   


 


Sodium Level  137 mmol/L   


 


Potassium Level  4.8 mmol/L   


 


Chloride Level  98 mmol/L   


 


Carbon Dioxide Level  30 mmol/L   


 


Anion Gap  9.0 mmol/L   


 


Blood Urea Nitrogen  29 mg/dl   


 


Creatinine  5.90 mg/dl   


 


Est Creatinine Clear Calc


Drug Dose 


  14.0 ml/min 


  


  


 


 


Estimated GFR (


American) 


  9.0 


  


  


 


 


Estimated GFR (Non-


American 


  7.7 


  


  


 


 


BUN/Creatinine Ratio  5.0   


 


Random Glucose  80 mg/dl   


 


Calcium Level  8.1 mg/dl   














Test


  2/16/17


17:35 


  


  


 


 


Bedside Glucose 205 mg/dl    











Assessment & Plan


51 year old female with history of CHF Diastolic type, CAD s/p Stent, 


DM 2, ESRD presenting with hematemesis.





HEMATEMESIS, R/O UPPER GI BLEED


- no recurrence so far


- Hg 12--> 10.7--> 9.8


- s/p EGD: no source of bleeding found


- continue Protonix BID


  diet resumed


- hold Aspirin,Plavix for now--> possible resumption tomorrow if no signs of gi 

bleed





ELEVATED CARDIAC MARKERS


HISTORY OF CAD, STENT


- no cardiac symptoms, but levels higher than baseline


- EKG no signs of acute ischemia


- Cardiology consulted


- Aspirin, Plavix on hold--> possible re-start tomorrow


  continue Imdur, Metoprolol





ELEVATED LACTIC ACID


- resolved





CHF DIASTOLIC TYPE


euvolemic





DM 2


Lantus


ISS


- monitor





ESRD


Dr. Davison consulted for HD





LEFT THUMB NUMBNESS, DISCOLORATION


will re-consult vascular surgery





RIGHT GREAT TOE ESCHAR, SCALING


Wound care consulted





DVT prophylaxis


SCDS





Disposition


pending


Current Inpatient Medications:





 Current Inpatient Medications








 Medications


  (Trade)  Dose


 Ordered  Sig/Willy


 Route  Start Time


 Stop Time Status Last Admin


Dose Admin


 


 Insulin Glargine


  (Lantus Solostar


 Pen)  5 unit  BID


 SC  2/15/17 09:00


    2/15/17 22:03


5 UNIT


 


 Glucose


  (Glucose 40% Gel)  15-30


 GRAMS 15


 GRAMS...  UD  PRN


 PO  2/14/17 23:45


 3/16/17 23:44   


 


 


 Glucose


  (Glucose Chew


 Tab)  4-8


 Tablets 4


 Tabl...  UD  PRN


 PO  2/14/17 23:45


 3/16/17 23:44   


 


 


 Dextrose


  (Dextrose 50%


 50ML Syringe)  25-50ML OF


 50% DW IV


 FOR...  UD  PRN


 IV  2/14/17 23:45


 3/16/17 23:44   


 


 


 Glucagon


  (Glucagon Inj)  1 mg  UD  PRN


 SQ  2/14/17 23:45


 3/16/17 23:44   


 


 


 Hydromorphone HCl


  (Dilaudid Inj)  0.5 mg  Q3H  PRN


 IV  2/15/17 00:15


 3/1/17 00:14  2/15/17 00:20


0.5 MG


 


 Ondansetron HCl 4


 mg  4 mg  Q6H  PRN


 IV  2/15/17 00:15


 3/17/17 00:14  2/15/17 10:43


4 MG


 


 Promethazine HCl/


 Sodium Chloride


  (Phenergan Inj/


 Nss 50ml)  50.5 ml @ 


 204 mls/hr  Q6H  PRN


 IV  2/15/17 00:15


 3/17/17 00:14   


 


 


 Lorazepam


  (Ativan Inj)  0.5 mg  Q4H  PRN


 IV  2/15/17 00:15


 3/17/17 00:14   


 


 


 Miscellaneous


  (Iv Fluids


 Completed)  1 ea  PRN  PRN


 N/A  2/15/17 00:15


 2/15/18 00:14   


 


 


 Acetaminophen


  (Tylenol Tab)  650 mg  Q4H  PRN


 PO  2/15/17 00:15


 3/17/17 00:14   


 


 


 Nitroglycerin


  (Nitrostat Tab)  0.4 mg  UD  PRN


 SL  2/15/17 00:15


 3/17/17 00:14   


 


 


 Insulin Aspart


  (novoLOG ASPART)  **SLIDING


 SCALE**


 **If C...  ACHS


 SC  2/15/17 07:00


 3/17/17 06:59  2/16/17 17:39


6 UNITS


 


 Atorvastatin


 Calcium


  (Lipitor Tab)  40 mg  DAILY


 PO  2/15/17 09:00


 3/17/17 08:59  2/16/17 08:22


40 MG


 


 Calcium Acetate


  (Phoslo Cap)  667 mg  TIDM


 PO  2/15/17 07:30


 3/17/17 07:29  2/16/17 17:37


667 MG


 


 Isosorbide


 Mononitrate


  (Imdur Ext Rel


 Tab)  60 mg  QAM


 PO  2/15/17 09:00


 3/17/17 08:59  2/16/17 08:22


60 MG


 


 Isosorbide


 Mononitrate


  (Imdur Ext Rel


 Tab)  30 mg  QAM


 PO  2/15/17 09:00


 3/17/17 08:59  2/16/17 08:21


30 MG


 


 Prednisone


  (PredniSONE TAB)  10 mg  DAILY


 PO  2/15/17 09:00


 2/17/17 09:01  2/16/17 08:21


10 MG


 


 Tramadol HCl   not


 relieved


 by tylenol @   Q6H  PRN


 PO  2/15/17 00:15


 3/17/17 00:14   


 


 


 Pantoprazole


 Sodium/Syringe


  (Protonix Inj/


 Syringe)  20 ml @ 5


 mls/min  BID@0900,2100


 IV  2/15/17 09:00


 3/17/17 08:59  2/16/17 08:21


5 MLS/MIN


 


 Metoprolol


 Tartrate


  (Lopressor Tab)  50 mg  BID


 PO  2/15/17 09:00


 3/17/17 08:59  2/16/17 08:21


50 MG


 


 Menthol


  (Nice Jerad)  1 jerad  PRN  PRN


 PO  2/15/17 05:15


 3/17/17 05:14   


 


 


 Sertraline HCl


  (Zoloft Tab)  100 mg  HS


 PO  2/16/17 21:00


 3/18/17 20:59

## 2017-02-16 NOTE — ECHOCARDIOGRAM REPORT
*NOTICE TO RECEIVING PARTY AGENCY**  This information is strictly Confidential and protected under 
[place] law.  [place] law prohibits you from making any further disclosure of this 
information unless further disclosure is expressly permitted by the written consent of the person to 
whom it pertains or is authorized by law.  A general authorization for the release of medical or 
other information is not sufficient for this purpose.  Hospital accepts no responsibility if the 
information is made available to any other person, INCLUDING THE PATIENT.



Interpretation Summary

  *  Name: RHEA MCLEAN  Study Date: 2017 09:40 AM  BP: 161/72 mmHg

  *  MRN: [id number]  Patient Location: C.2T\S\S234\S\1  HR: 58

  *  : 1965 (M/d/yyyy)  Gender: Female  Height: 64 in

  *  Age: 51 yrs  Ethnicity: [place]  Weight: 237 lb

  *  Ordering Physician: Lui Bautista

  *  Referring Physician: Self, Referred

  *  Performed By: Marie Luque RCS

  *  Accession# [id number]  Account# [id number]

  *  Reason For Study: TROPONINEMIA

  *  BSA: 2.1 m2

  *  -- Conclusions --

  *  1. Normal left ventricular size with low-normal systolic function. Estimated EF 50%.  
Hypokinesis of the inferolateral, mid inferior, base to mid septal wall.  Akinesis of the inferior 
base.  No left ventricular hypertrophy.  Type 2 diastolic dysfunction.

  *  2. The left atrium is mildly dilated.

  *  3. There is mild mitral regurgitation.

  *  4. Technically difficult study; IV echo-contrast may enhance image quality to better evaluate 
LV systolic function.

  *  5. Compared to prior study on 2017, LV systolic function has declined.

Procedure Details

  *  A complete two-dimensional transthoracic echocardiogram was performed (2D, M-mode, Doppler and 
color flow Doppler).

Left Ventricle

  *  Normal left ventricular size with low-normal systolic function. Estimated EF 50%.  Hypokinesis 
of the inferolateral, mid inferior, base to mid septal wall.  Akinesis of the inferior base.  No 
left ventricular hypertrophy.  Type 2 diastolic dysfunction.

Right Ventricle

  *  The right ventricle is normal in size and function.

Atria

  *  The left atrium is mildly dilated.

  *  Right atrial size is normal.

  *  There is no evidence of atrial septal defect, but resolution does not allow assessment for a 
patent foramen ovale.

Mitral Valve

  *  There is mild to moderate mitral annular calcification.

  *  There is no mitral valve stenosis.

  *  There is mild mitral regurgitation.

Tricuspid Valve

  *  The tricuspid valve is not well visualized, but is grossly normal.

  *  There is no tricuspid stenosis.

  *  Significant tricuspid regurgitation is absent.

Aortic Valve

  *  The aortic valve is trileaflet.

  *  No hemodynamically significant valvular aortic stenosis.

  *  There is no significant aortic regurgitation.

Pulmonic Valve

  *  The pulmonary valve is inadequately visualized, but the Doppler data is adequate for 
interpretation.

  *  There is no pulmonic valvular stenosis.

  *  Trace pulmonic valvular regurgitation.

Great Vessels

  *  The aortic root is normal size.

  *  Normal pulmonary venous flow pattern.

Pericardium/Pleural

  *  There is no pericardial effusion.

Great Vessels

  *  Normal inferior vena cava size and collapsability with sniff indicates a normal right atrial 
pressure of 3 mmHg



MMode 2D Measurements and Calculations

IVSd 1.1 cm

IVSs 1.6 cm



LVIDd 5.6 cm

LVIDs 4.2 cm

LVPWd 1.1 cm

LVPWs 1.5 cm



IVS/LVPW 1.0 

FS 26.2 %

EDV(Teich) 156.0 ml

ESV(Teich) 76.8 ml

EF(Teich) 50.8 %



EDV(cubed) 179.2 ml

ESV(cubed) 71.9 ml

EF(cubed) 59.9 %

% IVS thick 45.0 %

% LVPW thick 37.0 %





LV mass(C)d 248.6 grams

LV mass(C)dI 118.3 grams/m\S\2

LV mass(C)s 255.2 grams

LV mass(C)sI 121.4 grams/m\S\2



SV(Teich) 79.3 ml

SI(Teich) 37.7 ml/m\S\2

SV(cubed) 107.2 ml

SI(cubed) 51.0 ml/m\S\2



Ao root diam 3.1 cm

Ao root area 7.7 cm\S\2

LA dimension 4.6 cm



LA/Ao 1.5 

LVOT diam 2.0 cm

LVOT area 3.1 cm\S\2







Doppler Measurements and Calculations

MV E max conchis 103.5 cm/sec

MV A max conchis 72.3 cm/sec



MV E/A 1.4 



MV P1/2t max conchis 109.5 cm/sec

MV P1/2t 126.1 msec

MVA(P1/2t) 1.7 cm\S\2

MV dec slope 254.2 cm/sec\S\2

MV dec time 0.22 sec



Ao V2 max 152.8 cm/sec

Ao max PG 9.3 mmHg

Ao max PG (full) 5.1 mmHg

JENNIFER(V,A) 2.1 cm\S\2

JENNIFER(V,D) 2.1 cm\S\2





LV V1 max PG 4.2 mmHg



LV V1 max 103.0 cm/sec

## 2017-02-17 VITALS — SYSTOLIC BLOOD PRESSURE: 104 MMHG | HEART RATE: 54 BPM | DIASTOLIC BLOOD PRESSURE: 68 MMHG

## 2017-02-17 VITALS — DIASTOLIC BLOOD PRESSURE: 67 MMHG | SYSTOLIC BLOOD PRESSURE: 125 MMHG | OXYGEN SATURATION: 98 % | HEART RATE: 55 BPM

## 2017-02-17 VITALS — HEART RATE: 56 BPM | SYSTOLIC BLOOD PRESSURE: 102 MMHG | DIASTOLIC BLOOD PRESSURE: 56 MMHG

## 2017-02-17 VITALS
SYSTOLIC BLOOD PRESSURE: 158 MMHG | DIASTOLIC BLOOD PRESSURE: 79 MMHG | HEART RATE: 72 BPM | OXYGEN SATURATION: 96 % | TEMPERATURE: 98.42 F

## 2017-02-17 VITALS
TEMPERATURE: 99.86 F | DIASTOLIC BLOOD PRESSURE: 79 MMHG | SYSTOLIC BLOOD PRESSURE: 145 MMHG | HEART RATE: 72 BPM | OXYGEN SATURATION: 97 %

## 2017-02-17 VITALS — SYSTOLIC BLOOD PRESSURE: 97 MMHG | DIASTOLIC BLOOD PRESSURE: 44 MMHG | HEART RATE: 52 BPM

## 2017-02-17 VITALS — SYSTOLIC BLOOD PRESSURE: 120 MMHG | DIASTOLIC BLOOD PRESSURE: 65 MMHG | HEART RATE: 57 BPM

## 2017-02-17 VITALS — HEART RATE: 56 BPM | DIASTOLIC BLOOD PRESSURE: 63 MMHG | SYSTOLIC BLOOD PRESSURE: 114 MMHG

## 2017-02-17 VITALS — DIASTOLIC BLOOD PRESSURE: 52 MMHG | SYSTOLIC BLOOD PRESSURE: 108 MMHG | HEART RATE: 57 BPM

## 2017-02-17 VITALS
DIASTOLIC BLOOD PRESSURE: 80 MMHG | TEMPERATURE: 98.42 F | SYSTOLIC BLOOD PRESSURE: 143 MMHG | HEART RATE: 57 BPM | OXYGEN SATURATION: 99 %

## 2017-02-17 VITALS
DIASTOLIC BLOOD PRESSURE: 63 MMHG | OXYGEN SATURATION: 97 % | SYSTOLIC BLOOD PRESSURE: 119 MMHG | TEMPERATURE: 98.96 F | HEART RATE: 71 BPM

## 2017-02-17 VITALS — SYSTOLIC BLOOD PRESSURE: 108 MMHG | DIASTOLIC BLOOD PRESSURE: 62 MMHG | HEART RATE: 61 BPM

## 2017-02-17 VITALS — DIASTOLIC BLOOD PRESSURE: 66 MMHG | HEART RATE: 60 BPM | SYSTOLIC BLOOD PRESSURE: 116 MMHG

## 2017-02-17 VITALS
OXYGEN SATURATION: 100 % | TEMPERATURE: 97.88 F | HEART RATE: 60 BPM | SYSTOLIC BLOOD PRESSURE: 148 MMHG | DIASTOLIC BLOOD PRESSURE: 83 MMHG

## 2017-02-17 VITALS — SYSTOLIC BLOOD PRESSURE: 97 MMHG | DIASTOLIC BLOOD PRESSURE: 51 MMHG | HEART RATE: 54 BPM

## 2017-02-17 VITALS — HEART RATE: 66 BPM | SYSTOLIC BLOOD PRESSURE: 130 MMHG | DIASTOLIC BLOOD PRESSURE: 64 MMHG | TEMPERATURE: 98.42 F

## 2017-02-17 VITALS — HEART RATE: 57 BPM | DIASTOLIC BLOOD PRESSURE: 66 MMHG | SYSTOLIC BLOOD PRESSURE: 118 MMHG

## 2017-02-17 VITALS — TEMPERATURE: 97.52 F | DIASTOLIC BLOOD PRESSURE: 52 MMHG | SYSTOLIC BLOOD PRESSURE: 106 MMHG | HEART RATE: 54 BPM

## 2017-02-17 VITALS — OXYGEN SATURATION: 98 %

## 2017-02-17 VITALS — SYSTOLIC BLOOD PRESSURE: 126 MMHG | HEART RATE: 61 BPM | DIASTOLIC BLOOD PRESSURE: 64 MMHG

## 2017-02-17 LAB
ANION GAP SERPL CALC-SCNC: 10 MMOL/L (ref 3–11)
BASOPHILS # BLD: 0.02 K/UL (ref 0–0.2)
BASOPHILS NFR BLD: 0.2 %
BUN SERPL-MCNC: 43 MG/DL (ref 7–18)
BUN/CREAT SERPL: 5.3 (ref 10–20)
CALCIUM SERPL-MCNC: 7.9 MG/DL (ref 8.5–10.1)
CHLORIDE SERPL-SCNC: 98 MMOL/L (ref 98–107)
CO2 SERPL-SCNC: 28 MMOL/L (ref 21–32)
COMPLETE: YES
CREAT CL PREDICTED SERPL C-G-VRATE: 10.2 ML/MIN
CREAT SERPL-MCNC: 8.1 MG/DL (ref 0.6–1.2)
EOSINOPHIL NFR BLD AUTO: 265 K/UL (ref 130–400)
GLUCOSE SERPL-MCNC: 139 MG/DL (ref 70–99)
HCT VFR BLD CALC: 28.3 % (ref 37–47)
IG%: 0.5 %
IMM GRANULOCYTES NFR BLD AUTO: 39.9 %
LYMPHOCYTES # BLD: 3.73 K/UL (ref 1.2–3.4)
MCH RBC QN AUTO: 30.2 PG (ref 25–34)
MCHC RBC AUTO-ENTMCNC: 31.4 G/DL (ref 32–36)
MCV RBC AUTO: 95.9 FL (ref 80–100)
MONOCYTES NFR BLD: 9 %
NEUTROPHILS # BLD AUTO: 3.3 %
NEUTROPHILS NFR BLD AUTO: 47.1 %
PMV BLD AUTO: 10.2 FL (ref 7.4–10.4)
POTASSIUM SERPL-SCNC: 4.2 MMOL/L (ref 3.5–5.1)
RBC # BLD AUTO: 2.95 M/UL (ref 4.2–5.4)
SODIUM SERPL-SCNC: 136 MMOL/L (ref 136–145)
WBC # BLD AUTO: 9.35 K/UL (ref 4.8–10.8)

## 2017-02-17 RX ADMIN — INSULIN ASPART SCH UNITS: 100 INJECTION, SOLUTION INTRAVENOUS; SUBCUTANEOUS at 21:00

## 2017-02-17 RX ADMIN — ISOSORBIDE MONONITRATE SCH MG: 60 TABLET ORAL at 08:12

## 2017-02-17 RX ADMIN — PANTOPRAZOLE SODIUM SCH MLS/MIN: 40 INJECTION, POWDER, FOR SOLUTION INTRAVENOUS at 21:09

## 2017-02-17 RX ADMIN — PANTOPRAZOLE SODIUM SCH MLS/MIN: 40 INJECTION, POWDER, FOR SOLUTION INTRAVENOUS at 08:15

## 2017-02-17 RX ADMIN — INSULIN GLARGINE SCH UNIT: 100 INJECTION, SOLUTION SUBCUTANEOUS at 08:10

## 2017-02-17 RX ADMIN — SERTRALINE HYDROCHLORIDE SCH MG: 100 TABLET, FILM COATED ORAL at 21:10

## 2017-02-17 RX ADMIN — METOPROLOL TARTRATE SCH MG: 25 TABLET, FILM COATED ORAL at 21:10

## 2017-02-17 RX ADMIN — ATORVASTATIN CALCIUM SCH MG: 40 TABLET, FILM COATED ORAL at 08:11

## 2017-02-17 RX ADMIN — INSULIN ASPART SCH UNITS: 100 INJECTION, SOLUTION INTRAVENOUS; SUBCUTANEOUS at 08:09

## 2017-02-17 RX ADMIN — INSULIN ASPART SCH UNITS: 100 INJECTION, SOLUTION INTRAVENOUS; SUBCUTANEOUS at 11:00

## 2017-02-17 RX ADMIN — CALCIUM ACETATE SCH MG: 667 CAPSULE ORAL at 17:04

## 2017-02-17 RX ADMIN — CALCIUM ACETATE SCH MG: 667 CAPSULE ORAL at 11:30

## 2017-02-17 RX ADMIN — METOPROLOL TARTRATE SCH MG: 25 TABLET, FILM COATED ORAL at 08:11

## 2017-02-17 RX ADMIN — INSULIN ASPART SCH UNITS: 100 INJECTION, SOLUTION INTRAVENOUS; SUBCUTANEOUS at 17:04

## 2017-02-17 RX ADMIN — CALCIUM ACETATE SCH MG: 667 CAPSULE ORAL at 08:11

## 2017-02-17 RX ADMIN — INSULIN GLARGINE SCH UNIT: 100 INJECTION, SOLUTION SUBCUTANEOUS at 21:16

## 2017-02-17 RX ADMIN — ISOSORBIDE MONONITRATE SCH MG: 30 TABLET, EXTENDED RELEASE ORAL at 08:12

## 2017-02-17 NOTE — NEPHROLOGY PROGRESS NOTE
Nephrology Progress Note


Date of Service:


Feb 17, 2017.


Subjective


pt underwent EGD yesterday.  52 yo female with esrd who is now eating better. 

did have significant nausea and vomiting and vomited up blood on presentation.  

pt starting to feel fluid overloaded.





Objective











  Date Time  Temp Pulse Resp B/P Pulse Ox O2 Delivery O2 Flow Rate FiO2


 


2/17/17 07:43 36.6 60 18 148/83 100 Nasal Cannula 2.0 


 


2/17/17 04:18 36.9 57 18 143/80 99 Room Air  


 


2/17/17 04:00      Room Air  


 


2/17/17 00:12 36.9 72 22 158/79 96 Room Air  


 


2/16/17 23:59      Room Air  


 


2/16/17 20:00      Room Air  


 


2/16/17 19:56 36.6 70 18 150/77 95 Room Air  


 


2/16/17 16:00     100 Nasal Cannula 2.0 


 


2/16/17 15:54 36.9 69 18 136/68 96 Room Air  


 


2/16/17 12:00     100 Nasal Cannula 2.0 


 


2/16/17 10:44  59 18 145/70 100 Room Air  


 


2/16/17 10:29  58 18 105/55 97 Room Air  


 


2/16/17 10:14  55 16 101/49 95 Room Air  


 


2/16/17 09:35 36.3 60 18 178/81 100 Room Air  


 


2/16/17 08:36 36.5  18 161/72 100 Nasal Cannula 2.0 








Physical Exam:


General-aaox3, obese


Eyes-no scleral icterus, puffy


ENT-mmm


Neck-supple


Lungs-cta


Heart-rrr


Abdomen-bs+ s/nt/nd


Extremities-no c/c/e


Neuro-nonfocal





 Current Inpatient Medications








 Medications


  (Trade)  Dose


 Ordered  Sig/Willy


 Route  Start Time


 Stop Time Status Last Admin


Dose Admin


 


 Insulin Glargine


  (Lantus Solostar


 Pen)  5 unit  BID


 SC  2/15/17 09:00


    2/16/17 22:05


5 UNIT


 


 Glucose


  (Glucose 40% Gel)  15-30


 GRAMS 15


 GRAMS...  UD  PRN


 PO  2/14/17 23:45


 3/16/17 23:44   


 


 


 Glucose


  (Glucose Chew


 Tab)  4-8


 Tablets 4


 Tabl...  UD  PRN


 PO  2/14/17 23:45


 3/16/17 23:44   


 


 


 Dextrose


  (Dextrose 50%


 50ML Syringe)  25-50ML OF


 50% DW IV


 FOR...  UD  PRN


 IV  2/14/17 23:45


 3/16/17 23:44   


 


 


 Glucagon


  (Glucagon Inj)  1 mg  UD  PRN


 SQ  2/14/17 23:45


 3/16/17 23:44   


 


 


 Hydromorphone HCl


  (Dilaudid Inj)  0.5 mg  Q3H  PRN


 IV  2/15/17 00:15


 3/1/17 00:14  2/15/17 00:20


0.5 MG


 


 Ondansetron HCl 4


 mg  4 mg  Q6H  PRN


 IV  2/15/17 00:15


 3/17/17 00:14  2/15/17 10:43


4 MG


 


 Promethazine HCl/


 Sodium Chloride


  (Phenergan Inj/


 Nss 50ml)  50.5 ml @ 


 204 mls/hr  Q6H  PRN


 IV  2/15/17 00:15


 3/17/17 00:14   


 


 


 Lorazepam


  (Ativan Inj)  0.5 mg  Q4H  PRN


 IV  2/15/17 00:15


 3/17/17 00:14   


 


 


 Miscellaneous


  (Iv Fluids


 Completed)  1 ea  PRN  PRN


 N/A  2/15/17 00:15


 2/15/18 00:14   


 


 


 Acetaminophen


  (Tylenol Tab)  650 mg  Q4H  PRN


 PO  2/15/17 00:15


 3/17/17 00:14   


 


 


 Nitroglycerin


  (Nitrostat Tab)  0.4 mg  UD  PRN


 SL  2/15/17 00:15


 3/17/17 00:14   


 


 


 Insulin Aspart


  (novoLOG ASPART)  **SLIDING


 SCALE**


 **If C...  ACHS


 SC  2/15/17 07:00


 3/17/17 06:59  2/16/17 17:39


6 UNITS


 


 Atorvastatin


 Calcium


  (Lipitor Tab)  40 mg  DAILY


 PO  2/15/17 09:00


 3/17/17 08:59  2/16/17 08:22


40 MG


 


 Calcium Acetate


  (Phoslo Cap)  667 mg  TIDM


 PO  2/15/17 07:30


 3/17/17 07:29  2/16/17 17:37


667 MG


 


 Isosorbide


 Mononitrate


  (Imdur Ext Rel


 Tab)  60 mg  QAM


 PO  2/15/17 09:00


 3/17/17 08:59  2/16/17 08:22


60 MG


 


 Isosorbide


 Mononitrate


  (Imdur Ext Rel


 Tab)  30 mg  QAM


 PO  2/15/17 09:00


 3/17/17 08:59  2/16/17 08:21


30 MG


 


 Prednisone


  (PredniSONE TAB)  10 mg  DAILY


 PO  2/15/17 09:00


 2/17/17 09:01  2/16/17 08:21


10 MG


 


 Tramadol HCl   not


 relieved


 by tylenol @   Q6H  PRN


 PO  2/15/17 00:15


 3/17/17 00:14   


 


 


 Pantoprazole


 Sodium/Syringe


  (Protonix Inj/


 Syringe)  20 ml @ 5


 mls/min  BID@0900,2100


 IV  2/15/17 09:00


 3/17/17 08:59  2/16/17 22:13


5 MLS/MIN


 


 Metoprolol


 Tartrate


  (Lopressor Tab)  50 mg  BID


 PO  2/15/17 09:00


 3/17/17 08:59  2/16/17 22:14


50 MG


 


 Menthol


  (Nice Jerad)  1 jerad  PRN  PRN


 PO  2/15/17 05:15


 3/17/17 05:14   


 


 


 Sertraline HCl


  (Zoloft Tab)  100 mg  HS


 PO  2/16/17 21:00


 3/18/17 20:59  2/16/17 22:13


100 MG











Last 24 Hours








Test


  2/16/17


11:16 2/16/17


17:35 2/16/17


20:24 2/17/17


06:51


 


Bedside Glucose 101 mg/dl  205 mg/dl  162 mg/dl  


 


White Blood Count    9.35 K/uL 


 


Red Blood Count    2.95 M/uL 


 


Hemoglobin    8.9 g/dL 


 


Hematocrit    28.3 % 


 


Mean Corpuscular Volume    95.9 fL 


 


Mean Corpuscular Hemoglobin    30.2 pg 


 


Mean Corpuscular Hemoglobin


Concent 


  


  


  31.4 g/dl 


 


 


Platelet Count    265 K/uL 


 


Mean Platelet Volume    10.2 fL 


 


Neutrophils (%) (Auto)    47.1 % 


 


Lymphocytes (%) (Auto)    39.9 % 


 


Monocytes (%) (Auto)    9.0 % 


 


Eosinophils (%) (Auto)    3.3 % 


 


Basophils (%) (Auto)    0.2 % 


 


Neutrophils # (Auto)    4.40 K/uL 


 


Lymphocytes # (Auto)    3.73 K/uL 


 


Monocytes # (Auto)    0.84 K/uL 


 


Eosinophils # (Auto)    0.31 K/uL 


 


Basophils # (Auto)    0.02 K/uL 


 


RDW Standard Deviation    54.4 fL 


 


RDW Coefficient of Variation    15.7 % 


 


Immature Granulocyte % (Auto)    0.5 % 


 


Immature Granulocyte # (Auto)    0.05 K/uL 


 


Red Blood Cell Morphology    Unremarkable 


 


Sodium Level    136 mmol/L 


 


Potassium Level    4.2 mmol/L 


 


Chloride Level    98 mmol/L 


 


Carbon Dioxide Level    28 mmol/L 


 


Anion Gap    10.0 mmol/L 


 


Blood Urea Nitrogen    43 mg/dl 


 


Creatinine    8.10 mg/dl 


 


Est Creatinine Clear Calc


Drug Dose 


  


  


  10.2 ml/min 


 


 


Estimated GFR (


American) 


  


  


  6.1 


 


 


Estimated GFR (Non-


American 


  


  


  5.3 


 


 


BUN/Creatinine Ratio    5.3 


 


Random Glucose    139 mg/dl 


 


Calcium Level    7.9 mg/dl 











Assessment & Plan


ESRD-for dialysis today.  last dialysis treatment-no fluid removal with low 

blood pressures. bp is better today and pt feels puffy. will plan on 3 liters 

uf.  





Anemia of renal Failure-hg levels are trending down. on procrit and will redose 

again today. perhaps may have a dilutional effect as well.

## 2017-02-17 NOTE — PROGRESS NOTE
Medicine Progress Note


Date & Time of Visit:


Feb 17, 2017 at 14:47.


Subjective


s/p HD today


feels tired after


denies nausea/vomiting, hematemesis


no melena


left thumb and right great toe about the same


no chest pain, dyspnea, palpitations, dizziness


no other symptoms





Objective





Last 8 Hrs








  Date Time  Temp Pulse Resp B/P Pulse Ox O2 Delivery O2 Flow Rate FiO2


 


2/17/17 14:24 36.9 66  130/64    


 


2/17/17 12:00     98 Room Air  


 


2/17/17 11:15  56  114/63    


 


2/17/17 11:00  61  108/62    


 


2/17/17 10:45  54  97/51    


 


2/17/17 10:30  52  97/44    


 


2/17/17 10:15  54  104/68    


 


2/17/17 10:00  57  108/52    


 


2/17/17 09:45  56  102/56    


 


2/17/17 09:30  61  126/64    


 


2/17/17 09:20 36.4 54  106/52    


 


2/17/17 08:00     98 Room Air  


 


2/17/17 07:43 36.6 60 18 148/83 100 Nasal Cannula 2.0 








Physical Exam:


General- oriented x 3, not in distress





Eyes- anicteric





Neck- no JVD





Lungs- clear breath sounds bilaterally, no rales/wheezes





Heart- normal rate, regular rhythm; no murmurs 





Abdomen- normal bowel sounds, soft, nontender





Extremities- right great toe: (+) scaling, eschar on the nail border and dorsal 

aspect, no tenderness 


left thumb: mild erythema on the distal aspect


no pretibial edema, no calf tenderness





Neuro- alert, oriented x 3;no gross focal deficits





Skin- warm & dry


Laboratory Results:





Last 24 Hours








Test


  2/16/17


17:35 2/16/17


20:24 2/17/17


06:51 2/17/17


08:06


 


Bedside Glucose 205 mg/dl  162 mg/dl   157 mg/dl 


 


White Blood Count   9.35 K/uL  


 


Red Blood Count   2.95 M/uL  


 


Hemoglobin   8.9 g/dL  


 


Hematocrit   28.3 %  


 


Mean Corpuscular Volume   95.9 fL  


 


Mean Corpuscular Hemoglobin   30.2 pg  


 


Mean Corpuscular Hemoglobin


Concent 


  


  31.4 g/dl 


  


 


 


Platelet Count   265 K/uL  


 


Mean Platelet Volume   10.2 fL  


 


Neutrophils (%) (Auto)   47.1 %  


 


Lymphocytes (%) (Auto)   39.9 %  


 


Monocytes (%) (Auto)   9.0 %  


 


Eosinophils (%) (Auto)   3.3 %  


 


Basophils (%) (Auto)   0.2 %  


 


Neutrophils # (Auto)   4.40 K/uL  


 


Lymphocytes # (Auto)   3.73 K/uL  


 


Monocytes # (Auto)   0.84 K/uL  


 


Eosinophils # (Auto)   0.31 K/uL  


 


Basophils # (Auto)   0.02 K/uL  


 


RDW Standard Deviation   54.4 fL  


 


RDW Coefficient of Variation   15.7 %  


 


Immature Granulocyte % (Auto)   0.5 %  


 


Immature Granulocyte # (Auto)   0.05 K/uL  


 


Red Blood Cell Morphology   Unremarkable  


 


Sodium Level   136 mmol/L  


 


Potassium Level   4.2 mmol/L  


 


Chloride Level   98 mmol/L  


 


Carbon Dioxide Level   28 mmol/L  


 


Anion Gap   10.0 mmol/L  


 


Blood Urea Nitrogen   43 mg/dl  


 


Creatinine   8.10 mg/dl  


 


Est Creatinine Clear Calc


Drug Dose 


  


  10.2 ml/min 


  


 


 


Estimated GFR (


American) 


  


  6.1 


  


 


 


Estimated GFR (Non-


American 


  


  5.3 


  


 


 


BUN/Creatinine Ratio   5.3  


 


Random Glucose   139 mg/dl  


 


Calcium Level   7.9 mg/dl  














Test


  2/17/17


13:24 


  


  


 


 


Bedside Glucose 111 mg/dl    











Assessment & Plan


51 year old female with history of CHF Diastolic type, CAD s/p Stent, 


DM 2, ESRD presenting with hematemesis.





HEMATEMESIS


EGD UNREVEALING


- no recurrence so far


- Hg 12--> 10.7--> 9.6--> 8.9


- s/p EGD: no source of bleeding found


- continue Protonix BID


  diet resume


- no recurrence


  resume Aspirin


  monitor Hg 





ELEVATED CARDIAC MARKERS


HISTORY OF CAD, STENT


- no cardiac symptoms, but levels higher than baseline


- EKG no signs of acute ischemia


- Cardiology consulted


- resume Aspirin


  continue Imdur, Metoprolol





ELEVATED LACTIC ACID


- resolved





CHF DIASTOLIC TYPE


euvolemic





DM 2


Lantus


ISS


- monitor





ESRD


Dr. Davison consulted for HD





LEFT THUMB NUMBNESS, DISCOLORATION


appreciate Dr. Virgen's recommendations





RIGHT GREAT TOE ESCHAR, SCALING


Wound care consulted








DVT prophylaxis


SCDs 


ambulation





Disposition


pending


possible dc in AM


Current Inpatient Medications:





 Current Inpatient Medications








 Medications


  (Trade)  Dose


 Ordered  Sig/Willy


 Route  Start Time


 Stop Time Status Last Admin


Dose Admin


 


 Insulin Glargine


  (Lantus Solostar


 Pen)  5 unit  BID


 SC  2/15/17 09:00


    2/17/17 08:10


5 UNIT


 


 Glucose


  (Glucose 40% Gel)  15-30


 GRAMS 15


 GRAMS...  UD  PRN


 PO  2/14/17 23:45


 3/16/17 23:44   


 


 


 Glucose


  (Glucose Chew


 Tab)  4-8


 Tablets 4


 Tabl...  UD  PRN


 PO  2/14/17 23:45


 3/16/17 23:44   


 


 


 Dextrose


  (Dextrose 50%


 50ML Syringe)  25-50ML OF


 50% DW IV


 FOR...  UD  PRN


 IV  2/14/17 23:45


 3/16/17 23:44   


 


 


 Glucagon


  (Glucagon Inj)  1 mg  UD  PRN


 SQ  2/14/17 23:45


 3/16/17 23:44   


 


 


 Hydromorphone HCl


  (Dilaudid Inj)  0.5 mg  Q3H  PRN


 IV  2/15/17 00:15


 3/1/17 00:14  2/15/17 00:20


0.5 MG


 


 Ondansetron HCl 4


 mg  4 mg  Q6H  PRN


 IV  2/15/17 00:15


 3/17/17 00:14  2/15/17 10:43


4 MG


 


 Promethazine HCl/


 Sodium Chloride


  (Phenergan Inj/


 Nss 50ml)  50.5 ml @ 


 204 mls/hr  Q6H  PRN


 IV  2/15/17 00:15


 3/17/17 00:14   


 


 


 Lorazepam


  (Ativan Inj)  0.5 mg  Q4H  PRN


 IV  2/15/17 00:15


 3/17/17 00:14   


 


 


 Miscellaneous


  (Iv Fluids


 Completed)  1 ea  PRN  PRN


 N/A  2/15/17 00:15


 2/15/18 00:14   


 


 


 Acetaminophen


  (Tylenol Tab)  650 mg  Q4H  PRN


 PO  2/15/17 00:15


 3/17/17 00:14   


 


 


 Nitroglycerin


  (Nitrostat Tab)  0.4 mg  UD  PRN


 SL  2/15/17 00:15


 3/17/17 00:14   


 


 


 Insulin Aspart


  (novoLOG ASPART)  **SLIDING


 SCALE**


 **If C...  ACHS


 SC  2/15/17 07:00


 3/17/17 06:59  2/17/17 08:09


6 UNITS


 


 Atorvastatin


 Calcium


  (Lipitor Tab)  40 mg  DAILY


 PO  2/15/17 09:00


 3/17/17 08:59  2/17/17 08:11


40 MG


 


 Calcium Acetate


  (Phoslo Cap)  667 mg  TIDM


 PO  2/15/17 07:30


 3/17/17 07:29  2/17/17 08:11


667 MG


 


 Isosorbide


 Mononitrate


  (Imdur Ext Rel


 Tab)  60 mg  QAM


 PO  2/15/17 09:00


 3/17/17 08:59  2/17/17 08:12


60 MG


 


 Isosorbide


 Mononitrate


  (Imdur Ext Rel


 Tab)  30 mg  QAM


 PO  2/15/17 09:00


 3/17/17 08:59  2/17/17 08:12


30 MG


 


 Tramadol HCl   not


 relieved


 by tylenol @   Q6H  PRN


 PO  2/15/17 00:15


 3/17/17 00:14   


 


 


 Pantoprazole


 Sodium/Syringe


  (Protonix Inj/


 Syringe)  20 ml @ 5


 mls/min  BID@0900,2100


 IV  2/15/17 09:00


 3/17/17 08:59  2/17/17 08:15


5 MLS/MIN


 


 Metoprolol


 Tartrate


  (Lopressor Tab)  50 mg  BID


 PO  2/15/17 09:00


 3/17/17 08:59  2/17/17 08:11


50 MG


 


 Menthol


  (Nice Jerad)  1 jerad  PRN  PRN


 PO  2/15/17 05:15


 3/17/17 05:14   


 


 


 Sertraline HCl


  (Zoloft Tab)  100 mg  HS


 PO  2/16/17 21:00


 3/18/17 20:59  2/16/17 22:13


100 MG


 


 Epoetin Narayan


  (Procrit Inj)  10,000 units  TODAY@0900


 IV.  2/17/17 09:00


 2/17/17 18:00  2/17/17 11:08


10,000 UNITS


 


 Aspirin


  (Ecotrin Tab)  81 mg  DAILY


 PO  2/18/17 09:00


 3/20/17 08:59 UNV  


 


 


 Aspirin


  (Ecotrin Tab)  81 mg  1440  ONCE


 PO  2/17/17 14:40


 2/17/17 14:41 UNV

## 2017-02-17 NOTE — MEDICAL CONSULT
Consultation Note


Consultation Note


Chief Complaint


L hand pain, numbness.  Also malfunctioning AVF





History of Present Illness


The patient is a 51 year old female with multiple medical problems, including 

ESRD on HD, HTN, DMII, CAD, morbid obesity, known to vascular service for HD 

access and carotid stenosis, seen today d/t sudden onset L hand pain and pallor 

associated with chest pains that started while in the shower yesterday.  Pt 

states the pain is much better today, but her hand is still sensitive.  

Complains of discoloration of the tip of the left thumb.  US performed recently 

demonstrates diffuse arterial disease.  R 2nd finger with distal gangrene.  

Denies any other new complaints, although dialysis staff state she has been 

difficult to run d/t low arterial flows.  Denies HA, fever, chills, chest pain 

today, SOB, abd pain, N/V, rest pain, claudication, other complaints.








Allergies


Coded Allergies:  


     Adhesives (Verified  Allergy, Mild, RASH, SORES, 1/31/17)


     Pollen Extract (Unverified  Allergy, Unknown, rash, 1/31/17)





Home Medications


Scheduled


Aspirin (Aspirin Ec), 81 MG PO DAILY


Atorvastatin (Lipitor), 40 MG PO DAILY


Calcium Acetate (Phoslo 667 Mg), 1 CAP PO WM


Cephalexin Monohydrate (Cephalexin), 1 CAP PO BID


Clopidogrel Bisulfate (Plavix), 1 TAB PO DAILY


Insulin Glargine (Lantus Solostar), 10 UNITS SC QPM


Isosorbide Mononitrate (Isosorbide Mononitrate ER), 60 MG PO QAM


Isosorbide Mononitrate Ext Rel (Imdur Ext Rel), 30 MG PO QAM


Metoprolol Tartrate (Lopressor), 50 MG PO BID


Ranitidine Hcl (Zantac), 300 MG PO HS


Sertraline HCl (Sertraline HCl), 2 TAB PO DAILY





Scheduled PRN


Lorazepam (Ativan), 0.5 MG PO DAILY PRN for Anxiety


Methocarbamol (Methocarbamol), 500 MG PO BID PRN for Muscle Spasms


Tramadol (Ultram), 1 TAB PO TID PRN for Pain





Problem List


Medical Problems:


(1) Allergic rhinitis


(2) CAD (coronary artery disease)


(3) Carotid stenosis


(4) DM type 2 (diabetes mellitus, type 2)


(5) Dyslipidemia


(6) ESRD (end stage renal disease) on dialysis


(7) GERD (gastroesophageal reflux disease)


(8) HTN (hypertension)


(9) HX OF PAST NONCOMPLIANCE


(10) Ischemic cardiomyopathy


(11) Morbid obesity


(12) Tobacco use disorder


Surgical Problems:


(1) Hemodialysis access, AV graft








Surgical / Medical History


Hx Cardiac Surgery:  Yes (angioplasty with stenting)


Hx Abdominal Surgery:  No


Hx Cancer Surgery:  No


Hx Thoracic Surgery:  No


Hx Orthopedic:  No


Hx Urinary Tract Surgery:  No


Past Medical/Surgical History:  Diabetes, Heart Disease, High Cholesterol, 

Hypertension, Kidney Disease





Family History





Diabetes mellitus


  FATHER


  MOTHER


Heart disease


  FATHER


  MOTHER


Hypertension


  FATHER


  MOTHER


Kidney disease


  GRANDMOTHER





Social History


Smoking Status:  Former Smoker


Hx Tobacco Use In Past Year?:  No


Hx Alcohol Use - Type & Amnt:  No


Hx Substance Use -Type & Amnt:  No








Review of Systems


Constitutional:  No chills, No malaise


Eyes:  No visual changes


ENMT:  No sore throat


Respiratory:  No SOUSA, No cough, No hemoptysis, No short of breath


Cardiovascular:  + edema (occasional, chronic), No chest pain, No intermittent 

claudication, No palpitations, No syncope


Gastrointestinal:  No abdominal pain, No nausea, No vomiting


Neurologic:  + numbness (L hand), No dizziness, No headache, No lethargy, No 

tingling








Physical Exam


Constitutional:  


   General Apperance:  well-nourished, well-developed, obese


   Level of Distress:  NAD, chronically ill


Psychiatric:  


   Mental Status:  active & alert, normal mood, normal affect


   Orientation:  oriented except where noted, to time, to place, to person


   Memory:  recent memory normal, remote memory normal


Head:  normocephalic, atraumatic


Eyes:  


   EOM:  EOMI


ENMT:  normal ENT inspection, hearing grossly normal


Neck:  supple, trachea midline


Lungs:  


   Respiratory effort:  no dyspnea


   Auscultation:  no rales/crackles, no rhonchi, decreased breath sounds


Cardiovascular:  


   Apical Impulse:  not displaced


   Heart Auscultation:  RRR, no murmurs, no rubs, no gallops


Peripheral Pulses:  


   Pulses:  full and equal, in all extremities except if noted


   Bruits:  none appreciated


   Carotid Pulse:  normal on the left, normal on the right


   Brachial Pulses:  normal on the left, normal on the right


   Radial Pulse:  decreased on the left, decreased on the right


   Ulnar Pulse:  decreased on the left, decreased on the right


   Femoral Pulse:  normal on the left, normal on the right


   Posterior Tibialis Pulse:  decreased on the left, decreased on the right


   Dorsalis Pedis Pulse:  decreased on the left, decreased on the right


Abdomen:  


   Bowel Sounds:  normal


   Inspection & Palpation:  soft, non-distended, no tenderness, guarding & 

rebound


Musculoskeletal:  normal strength (5/5 throughout), normal tone


Extremities:  


   Upper Right:  no edema, no varicosities, gangrene (2nd finger tip improving)


   Upper Left:  no cyanosis, no edema, no varicosities, no mottling, pertinent 

finding (Both hands cool, + cap refill at 4sec BUE)(slight discoloration of the 

tip of left thumb


   Lower Right:  no edema, no varicosities


   Lower Left:  no cyanosis, no edema, no varicosities, gangrene ( great toe)


Neurologic:  


   Cranial Nerves:  grossly intact


   Sensation:  grossly intact








Assessment and Plan


ASSESSMENT and PLAN:


   L hand numbness


   Malfunctioning LUE AVF


   Gangrene tip of right index finger and left great toe


   Discoloration of left thumb





Plan:


   Would treat conservatively at this time.  Keep fingers protected from cold 

and trauma.


   Recent left upper extremity arterial duplex showed no occlusions.  Will need 

a repeat study with flow volumes through the fistula which can not be done 

here.  This will be done this week in my office.





Thank you very much for letting me participate in the care of this patient.

## 2017-02-18 VITALS
OXYGEN SATURATION: 97 % | TEMPERATURE: 97.88 F | HEART RATE: 54 BPM | SYSTOLIC BLOOD PRESSURE: 123 MMHG | DIASTOLIC BLOOD PRESSURE: 73 MMHG

## 2017-02-18 VITALS
HEART RATE: 59 BPM | SYSTOLIC BLOOD PRESSURE: 139 MMHG | DIASTOLIC BLOOD PRESSURE: 84 MMHG | OXYGEN SATURATION: 99 % | TEMPERATURE: 98.24 F

## 2017-02-18 VITALS
HEART RATE: 59 BPM | SYSTOLIC BLOOD PRESSURE: 161 MMHG | OXYGEN SATURATION: 98 % | DIASTOLIC BLOOD PRESSURE: 88 MMHG | TEMPERATURE: 98.24 F

## 2017-02-18 VITALS
OXYGEN SATURATION: 98 % | HEART RATE: 65 BPM | SYSTOLIC BLOOD PRESSURE: 143 MMHG | TEMPERATURE: 98.78 F | DIASTOLIC BLOOD PRESSURE: 76 MMHG

## 2017-02-18 VITALS
DIASTOLIC BLOOD PRESSURE: 73 MMHG | OXYGEN SATURATION: 97 % | SYSTOLIC BLOOD PRESSURE: 118 MMHG | TEMPERATURE: 97.7 F | HEART RATE: 63 BPM

## 2017-02-18 VITALS — OXYGEN SATURATION: 98 %

## 2017-02-18 VITALS — OXYGEN SATURATION: 97 %

## 2017-02-18 VITALS — SYSTOLIC BLOOD PRESSURE: 167 MMHG | HEART RATE: 66 BPM | OXYGEN SATURATION: 97 % | DIASTOLIC BLOOD PRESSURE: 83 MMHG

## 2017-02-18 LAB
ANION GAP SERPL CALC-SCNC: 10 MMOL/L (ref 3–11)
BASOPHILS # BLD: 0.07 K/UL (ref 0–0.2)
BASOPHILS NFR BLD: 0.4 %
BUN SERPL-MCNC: 41 MG/DL (ref 7–18)
BUN/CREAT SERPL: 6 (ref 10–20)
CALCIUM SERPL-MCNC: 8.4 MG/DL (ref 8.5–10.1)
CHLORIDE SERPL-SCNC: 98 MMOL/L (ref 98–107)
CO2 SERPL-SCNC: 26 MMOL/L (ref 21–32)
COMPLETE: YES
CREAT CL PREDICTED SERPL C-G-VRATE: 11.9 ML/MIN
CREAT SERPL-MCNC: 6.9 MG/DL (ref 0.6–1.2)
EOSINOPHIL NFR BLD AUTO: 259 K/UL (ref 130–400)
FERRITIN SERPL-MCNC: 892.8 NG/ML (ref 8–388)
GLUCOSE SERPL-MCNC: 149 MG/DL (ref 70–99)
HCT VFR BLD CALC: 29.5 % (ref 37–47)
IG%: 0.6 %
IMM GRANULOCYTES NFR BLD AUTO: 21.3 %
LYMPHOCYTES # BLD: 3.45 K/UL (ref 1.2–3.4)
MCH RBC QN AUTO: 30.8 PG (ref 25–34)
MCHC RBC AUTO-ENTMCNC: 31.2 G/DL (ref 32–36)
MCV RBC AUTO: 98.7 FL (ref 80–100)
MONOCYTES NFR BLD: 6.1 %
NEUTROPHILS # BLD AUTO: 1.7 %
NEUTROPHILS NFR BLD AUTO: 69.9 %
PMV BLD AUTO: 10.2 FL (ref 7.4–10.4)
POTASSIUM SERPL-SCNC: 4.9 MMOL/L (ref 3.5–5.1)
RBC # BLD AUTO: 2.99 M/UL (ref 4.2–5.4)
SODIUM SERPL-SCNC: 134 MMOL/L (ref 136–145)
TIBC SERPL-MCNC: 194 MCG/DL (ref 250–450)
WBC # BLD AUTO: 16.21 K/UL (ref 4.8–10.8)

## 2017-02-18 RX ADMIN — INSULIN ASPART SCH UNITS: 100 INJECTION, SOLUTION INTRAVENOUS; SUBCUTANEOUS at 08:06

## 2017-02-18 RX ADMIN — CALCIUM ACETATE SCH MG: 667 CAPSULE ORAL at 12:12

## 2017-02-18 RX ADMIN — CALCIUM ACETATE SCH MG: 667 CAPSULE ORAL at 16:51

## 2017-02-18 RX ADMIN — ISOSORBIDE MONONITRATE SCH MG: 60 TABLET ORAL at 08:00

## 2017-02-18 RX ADMIN — INSULIN ASPART SCH UNITS: 100 INJECTION, SOLUTION INTRAVENOUS; SUBCUTANEOUS at 12:15

## 2017-02-18 RX ADMIN — INSULIN ASPART SCH UNITS: 100 INJECTION, SOLUTION INTRAVENOUS; SUBCUTANEOUS at 16:53

## 2017-02-18 RX ADMIN — INSULIN GLARGINE SCH UNIT: 100 INJECTION, SOLUTION SUBCUTANEOUS at 21:30

## 2017-02-18 RX ADMIN — ATORVASTATIN CALCIUM SCH MG: 40 TABLET, FILM COATED ORAL at 07:59

## 2017-02-18 RX ADMIN — Medication SCH MG: at 07:58

## 2017-02-18 RX ADMIN — PANTOPRAZOLE SODIUM SCH MLS/MIN: 40 INJECTION, POWDER, FOR SOLUTION INTRAVENOUS at 09:59

## 2017-02-18 RX ADMIN — PANTOPRAZOLE SODIUM SCH MLS/MIN: 40 INJECTION, POWDER, FOR SOLUTION INTRAVENOUS at 21:27

## 2017-02-18 RX ADMIN — METOPROLOL TARTRATE SCH MG: 25 TABLET, FILM COATED ORAL at 07:59

## 2017-02-18 RX ADMIN — CALCIUM ACETATE SCH MG: 667 CAPSULE ORAL at 07:57

## 2017-02-18 RX ADMIN — INSULIN ASPART SCH UNITS: 100 INJECTION, SOLUTION INTRAVENOUS; SUBCUTANEOUS at 21:00

## 2017-02-18 RX ADMIN — ISOSORBIDE MONONITRATE SCH MG: 30 TABLET, EXTENDED RELEASE ORAL at 07:59

## 2017-02-18 RX ADMIN — INSULIN GLARGINE SCH UNIT: 100 INJECTION, SOLUTION SUBCUTANEOUS at 08:07

## 2017-02-18 RX ADMIN — ACETAMINOPHEN PRN MG: 325 TABLET ORAL at 19:23

## 2017-02-18 RX ADMIN — METOPROLOL TARTRATE SCH MG: 25 TABLET, FILM COATED ORAL at 21:27

## 2017-02-18 RX ADMIN — SERTRALINE HYDROCHLORIDE SCH MG: 100 TABLET, FILM COATED ORAL at 21:27

## 2017-02-18 NOTE — PROGRESS NOTE
Medicine Progress Note


Date & Time of Visit:


Feb 18, 2017 at 19:40.


Subjective


patient states she feels ok overall


has right hip pain 


no nausea, hematemesis, abdominal pain


denies chest pain dyspnea


finger and toe pain improved





Objective





Last 8 Hrs








  Date Time  Temp Pulse Resp B/P Pulse Ox O2 Delivery O2 Flow Rate FiO2


 


2/18/17 16:07 36.5 63 20 118/73 97 Room Air  


 


2/18/17 16:00      Room Air  


 


2/18/17 12:00     97 Room Air  








Physical Exam:


General- oriented x 3, not in distress





Eyes- anicteric





Lungs- clear breath sounds bilaterally, no rales/wheezes





Heart- normal rate, regular rhythm; no murmurs





Abdomen- normal bowel sounds, soft, nontender





Extremities- right great toe: (+) scaling, eschar on the nail border and dorsal 

aspect, no tenderness 


   Left thumb: mild erythema on the distal aspect


   no pretibial edema, no calf tenderness





Neuro- alert, oriented x 3;no gross focal deficits





Skin- warm & dry


Laboratory Results:





Last 24 Hours








Test


  2/17/17


20:12 2/18/17


06:59 2/18/17


07:25 2/18/17


11:50


 


Bedside Glucose 144 mg/dl  145 mg/dl   175 mg/dl 


 


White Blood Count   16.21 K/uL  


 


Red Blood Count   2.99 M/uL  


 


Hemoglobin   9.2 g/dL  


 


Hematocrit   29.5 %  


 


Mean Corpuscular Volume   98.7 fL  


 


Mean Corpuscular Hemoglobin   30.8 pg  


 


Mean Corpuscular Hemoglobin


Concent 


  


  31.2 g/dl 


  


 


 


Platelet Count   259 K/uL  


 


Mean Platelet Volume   10.2 fL  


 


Neutrophils (%) (Auto)   69.9 %  


 


Lymphocytes (%) (Auto)   21.3 %  


 


Monocytes (%) (Auto)   6.1 %  


 


Eosinophils (%) (Auto)   1.7 %  


 


Basophils (%) (Auto)   0.4 %  


 


Neutrophils # (Auto)   11.33 K/uL  


 


Lymphocytes # (Auto)   3.45 K/uL  


 


Monocytes # (Auto)   0.99 K/uL  


 


Eosinophils # (Auto)   0.28 K/uL  


 


Basophils # (Auto)   0.07 K/uL  


 


RDW Standard Deviation   56.7 fL  


 


RDW Coefficient of Variation   16.0 %  


 


Immature Granulocyte % (Auto)   0.6 %  


 


Immature Granulocyte # (Auto)   0.09 K/uL  


 


Sodium Level   134 mmol/L  


 


Potassium Level   4.9 mmol/L  


 


Chloride Level   98 mmol/L  


 


Carbon Dioxide Level   26 mmol/L  


 


Anion Gap   10.0 mmol/L  


 


Blood Urea Nitrogen   41 mg/dl  


 


Creatinine   6.90 mg/dl  


 


Est Creatinine Clear Calc


Drug Dose 


  


  11.9 ml/min 


  


 


 


Estimated GFR (


American) 


  


  7.4 


  


 


 


Estimated GFR (Non-


American 


  


  6.4 


  


 


 


BUN/Creatinine Ratio   6.0  


 


Random Glucose   149 mg/dl  


 


Calcium Level   8.4 mg/dl  


 


Iron Level   36 mcg/dl  


 


Total Iron Binding Capacity   194 mcg/dl  


 


Transferrin   156 mg/dl  


 


Transferrin % Saturation   16 %  


 


Ferritin   892.8 ng/ml  


 


Vitamin B12 Level   394 pg/mL  


 


Folate   5.04 ng/mL  














Test


  2/18/17


16:09 


  


  


 


 


Bedside Glucose 175 mg/dl    











Assessment & Plan


51 year old female with history of CHF Diastolic type, CAD s/p Stent, 


DM 2, ESRD presenting with hematemesis.





HEMATEMESIS


EGD UNREVEALING


- no recurrence so far


- Hg 12--> 10.7--> 9.6--> 8.9- stable


- s/p EGD: no source of bleeding found


- continue Protonix BID


  diet resumed


- no recurrence


  resumed Aspirin





ELEVATED CARDIAC MARKERS


HISTORY OF CAD, STENT


- no cardiac symptoms, but levels higher than baseline


- EKG no signs of acute ischemia


- Cardiology consulted


- resume Aspirin


  continue Imdur, Metoprolol





ELEVATED LACTIC ACID


- resolved





CHF DIASTOLIC TYPE


euvolemic





DM 2


Lantus


ISS


- monitor





ESRD


Dr. Davison consulted for HD





LEFT THUMB NUMBNESS, DISCOLORATION


appreciate Dr. Virgen's recommendations





RIGHT GREAT TOE ESCHAR, SCALING


Wound care consulted








DVT prophylaxis


SCDs 


ambulation





Disposition


pending


possible dc in AM


Current Inpatient Medications:





 Current Inpatient Medications








 Medications


  (Trade)  Dose


 Ordered  Sig/Willy


 Route  Start Time


 Stop Time Status Last Admin


Dose Admin


 


 Insulin Glargine


  (Lantus Solostar


 Pen)  5 unit  BID


 SC  2/15/17 09:00


    2/18/17 08:07


5 UNIT


 


 Glucose


  (Glucose 40% Gel)  15-30


 GRAMS 15


 GRAMS...  UD  PRN


 PO  2/14/17 23:45


 3/16/17 23:44   


 


 


 Glucose


  (Glucose Chew


 Tab)  4-8


 Tablets 4


 Tabl...  UD  PRN


 PO  2/14/17 23:45


 3/16/17 23:44   


 


 


 Dextrose


  (Dextrose 50%


 50ML Syringe)  25-50ML OF


 50% DW IV


 FOR...  UD  PRN


 IV  2/14/17 23:45


 3/16/17 23:44   


 


 


 Glucagon


  (Glucagon Inj)  1 mg  UD  PRN


 SQ  2/14/17 23:45


 3/16/17 23:44   


 


 


 Hydromorphone HCl


  (Dilaudid Inj)  0.5 mg  Q3H  PRN


 IV  2/15/17 00:15


 3/1/17 00:14  2/15/17 00:20


0.5 MG


 


 Ondansetron HCl 4


 mg  4 mg  Q6H  PRN


 IV  2/15/17 00:15


 3/17/17 00:14  2/15/17 10:43


4 MG


 


 Promethazine HCl/


 Sodium Chloride


  (Phenergan Inj/


 Nss 50ml)  50.5 ml @ 


 204 mls/hr  Q6H  PRN


 IV  2/15/17 00:15


 3/17/17 00:14   


 


 


 Lorazepam


  (Ativan Inj)  0.5 mg  Q4H  PRN


 IV  2/15/17 00:15


 3/17/17 00:14   


 


 


 Miscellaneous


  (Iv Fluids


 Completed)  1 ea  PRN  PRN


 N/A  2/15/17 00:15


 2/15/18 00:14   


 


 


 Acetaminophen


  (Tylenol Tab)  650 mg  Q4H  PRN


 PO  2/15/17 00:15


 3/17/17 00:14  2/18/17 19:23


650 MG


 


 Nitroglycerin


  (Nitrostat Tab)  0.4 mg  UD  PRN


 SL  2/15/17 00:15


 3/17/17 00:14   


 


 


 Insulin Aspart


  (novoLOG ASPART)  **SLIDING


 SCALE**


 **If C...  ACHS


 SC  2/15/17 07:00


 3/17/17 06:59  2/18/17 16:53


5 UNITS


 


 Atorvastatin


 Calcium


  (Lipitor Tab)  40 mg  DAILY


 PO  2/15/17 09:00


 3/17/17 08:59  2/18/17 07:59


40 MG


 


 Calcium Acetate


  (Phoslo Cap)  667 mg  TIDM


 PO  2/15/17 07:30


 3/17/17 07:29  2/18/17 16:51


667 MG


 


 Isosorbide


 Mononitrate


  (Imdur Ext Rel


 Tab)  60 mg  QAM


 PO  2/15/17 09:00


 3/17/17 08:59  2/18/17 08:00


60 MG


 


 Isosorbide


 Mononitrate


  (Imdur Ext Rel


 Tab)  30 mg  QAM


 PO  2/15/17 09:00


 3/17/17 08:59  2/18/17 07:59


30 MG


 


 Tramadol HCl


  (Ultram Tab)  not


 relieved


 by tylenol @   Q6H  PRN


 PO  2/15/17 00:15


 3/17/17 00:14   


 


 


 Metoprolol


 Tartrate


  (Lopressor Tab)  50 mg  BID


 PO  2/15/17 09:00


 3/17/17 08:59  2/18/17 07:59


50 MG


 


 Menthol


  (Nice Jerad)  1 jerad  PRN  PRN


 PO  2/15/17 05:15


 3/17/17 05:14   


 


 


 Sertraline HCl


  (Zoloft Tab)  100 mg  HS


 PO  2/16/17 21:00


 3/18/17 20:59  2/17/17 21:10


100 MG


 


 Aspirin 81 mg  81 mg  DAILY


 PO  2/18/17 09:00


 3/20/17 08:59  2/18/17 07:58


81 MG


 


 Pantoprazole


 Sodium/Syringe


  (Protonix Inj/


 Syringe)  10 ml @ 5


 mls/min  DAILY@09,21


 IV  2/17/17 21:00


 3/19/17 20:59  2/18/17 09:59


5 MLS/MIN


 


 Benzocaine


  (Orajel 2% Oral


 Gel)  1 appln  TID  PRN


 MT  2/17/17 17:30


 3/19/17 17:29

## 2017-02-18 NOTE — CARDIOLOGY FOLLOW-UP
Subjective


Subjective


Date of Service:


Feb 18, 2017.


Pt evaluation today including:  conversation w/ patient, physical exam, chart 

review, lab review, review of studies, conversation w/ consultant, review of 

inpatient medication list


Additional Details:


Feels tired, but denies chest pain, shortness of breath.


No other new complaints.





Problem List


Medical Problems:


(1) Abdominal pain


Status: Acute  





(2) Acute electrocardiogram changes


Status: Acute  





(3) Elevated troponin


Status: Acute  





(4) Elevated troponin


Status: Acute  





(5) End stage renal disease


Status: Acute  





(6) GI bleed


Status: Acute  





(7) HTN (hypertension)


Status: Chronic  





(8) Hyperkalemia


Status: Acute  





(9) Joint effusion


Status: Acute  





(10) Left elbow pain


Status: Acute  





(11) Left sided chest pain


Status: Acute  





(12) Left sided chest pain


Status: Acute  





(13) Limb ischemia


Status: Acute  





(14) Medical non-compliance


Status: Acute  





(15) Pneumonia


Status: Acute  





(16) Pulmonary edema


Status: Acute  





(17) Renal failure


Status: Acute  





(18) Sinusitis


Status: Acute  





(19) Substernal chest pain


Status: Acute  





(20) Upper abdominal pain


Status: Acute  





(21) Upper GI bleed


Status: Acute  





(22) Urinary tract infection


Status: Acute  





(23) Vomiting


Status: Acute  





(24) Vomiting


Status: Acute  





(25) Vomiting


Status: Acute  





(26) Vomiting


Status: Acute  











Review of Systems


Constitutional:  No chills, No fever


Eyes:  No problem reported


Respiratory:  No problem reported


Cardiac:  No chest pain


Abdomen:  + nausea, No pain


Musculoskeletal:  No problem reported


Female :  + problem reported (ESRD on HD)


Neurologic:  No problem reported


Psychiatric:  No depression symptoms


Heme:  No abnormal bleeding/bruising


Endo:  No problem reported


Skin:  No problem reported





Objective


Vital Signs





Last Vital Signs Documentation








  Date Time  Temp Pulse Resp B/P Pulse Ox O2 Delivery O2 Flow Rate FiO2


 


2/18/17 08:04 36.8 59 20 161/88 98 Room Air  


 


2/18/17 04:00       2.0 











Physical Exam:


General Appearance:  no apparent distress


ENT:  hearing grossly normal


Respiratory/Chest:  lungs clear, normal breath sounds, no respiratory distress


Cardiovascular:  regular rate, rhythm, no edema, + systolic murmur


Abdomen:  normal bowel sounds, non tender, soft


Extremities:  + pertinent finding (extremities cool, right 2nd digit with dry 

gangrene, discoloration left thumb)


Neurologic/Psychiatric:  alert, normal mood/affect, oriented x 3


Skin:  normal color, warm/dry


Lymphatic:  no adenopathy





Assessment and Plan


1. Troponin elevation/nstemi  thought demand related in setting of anemia/

renal dysfunction. 


2. CAD s/p BMS to circumflex 6 months ago  on ASA, statin, imdur/metoprolol 


3. GI bleed  no clear source on EGD 


4. Anemia  Hgb stable this AM 


5. ESRD on HD  last HD yesterday 


6. UE/LE digit gangrene  thought to be small vessel disease 


7. Malfunctioning fistula 


8. Severe carotid artery disease. 





Patient remains chest pain free, and overall LV function unchanged on Echo 


In the setting of significant recurrent Troponin elevations, and fact that was 

largely asymptomatic with initial CAD presentation -- recommend further CAD 

risk stratification at some point as an outpatient with stress test.  Will have 

my office schedule for sometime next week prior to any vascular procedures.





Otherwise from a cardiac standpoint OK for discharge when medical issues 

resolved.


Medications:





 Current Inpatient Medications








 Medications


  (Trade)  Dose


 Ordered  Sig/Willy


 Route  Start Time


 Stop Time Status Last Admin


Dose Admin


 


 Insulin Glargine


  (Lantus Solostar


 Pen)  5 unit  BID


 SC  2/15/17 09:00


    2/18/17 08:07


5 UNIT


 


 Glucose


  (Glucose 40% Gel)  15-30


 GRAMS 15


 GRAMS...  UD  PRN


 PO  2/14/17 23:45


 3/16/17 23:44   


 


 


 Glucose


  (Glucose Chew


 Tab)  4-8


 Tablets 4


 Tabl...  UD  PRN


 PO  2/14/17 23:45


 3/16/17 23:44   


 


 


 Dextrose


  (Dextrose 50%


 50ML Syringe)  25-50ML OF


 50% DW IV


 FOR...  UD  PRN


 IV  2/14/17 23:45


 3/16/17 23:44   


 


 


 Glucagon


  (Glucagon Inj)  1 mg  UD  PRN


 SQ  2/14/17 23:45


 3/16/17 23:44   


 


 


 Hydromorphone HCl


  (Dilaudid Inj)  0.5 mg  Q3H  PRN


 IV  2/15/17 00:15


 3/1/17 00:14  2/15/17 00:20


0.5 MG


 


 Ondansetron HCl 4


 mg  4 mg  Q6H  PRN


 IV  2/15/17 00:15


 3/17/17 00:14  2/15/17 10:43


4 MG


 


 Promethazine HCl/


 Sodium Chloride


  (Phenergan Inj/


 Nss 50ml)  50.5 ml @ 


 204 mls/hr  Q6H  PRN


 IV  2/15/17 00:15


 3/17/17 00:14   


 


 


 Lorazepam


  (Ativan Inj)  0.5 mg  Q4H  PRN


 IV  2/15/17 00:15


 3/17/17 00:14   


 


 


 Miscellaneous


  (Iv Fluids


 Completed)  1 ea  PRN  PRN


 N/A  2/15/17 00:15


 2/15/18 00:14   


 


 


 Acetaminophen


  (Tylenol Tab)  650 mg  Q4H  PRN


 PO  2/15/17 00:15


 3/17/17 00:14   


 


 


 Nitroglycerin


  (Nitrostat Tab)  0.4 mg  UD  PRN


 SL  2/15/17 00:15


 3/17/17 00:14   


 


 


 Insulin Aspart


  (novoLOG ASPART)  **SLIDING


 SCALE**


 **If C...  ACHS


 SC  2/15/17 07:00


 3/17/17 06:59  2/18/17 08:06


9 UNITS


 


 Atorvastatin


 Calcium


  (Lipitor Tab)  40 mg  DAILY


 PO  2/15/17 09:00


 3/17/17 08:59  2/18/17 07:59


40 MG


 


 Calcium Acetate


  (Phoslo Cap)  667 mg  TIDM


 PO  2/15/17 07:30


 3/17/17 07:29  2/18/17 07:57


667 MG


 


 Isosorbide


 Mononitrate


  (Imdur Ext Rel


 Tab)  60 mg  QAM


 PO  2/15/17 09:00


 3/17/17 08:59  2/18/17 08:00


60 MG


 


 Isosorbide


 Mononitrate


  (Imdur Ext Rel


 Tab)  30 mg  QAM


 PO  2/15/17 09:00


 3/17/17 08:59  2/18/17 07:59


30 MG


 


 Tramadol HCl


  (Ultram Tab)  not


 relieved


 by tylenol @   Q6H  PRN


 PO  2/15/17 00:15


 3/17/17 00:14   


 


 


 Metoprolol


 Tartrate


  (Lopressor Tab)  50 mg  BID


 PO  2/15/17 09:00


 3/17/17 08:59  2/18/17 07:59


50 MG


 


 Menthol


  (Nice Jerad)  1 jerad  PRN  PRN


 PO  2/15/17 05:15


 3/17/17 05:14   


 


 


 Sertraline HCl


  (Zoloft Tab)  100 mg  HS


 PO  2/16/17 21:00


 3/18/17 20:59  2/17/17 21:10


100 MG


 


 Aspirin 81 mg  81 mg  DAILY


 PO  2/18/17 09:00


 3/20/17 08:59  2/18/17 07:58


81 MG


 


 Pantoprazole


 Sodium/Syringe


  (Protonix Inj/


 Syringe)  10 ml @ 5


 mls/min  DAILY@09,21


 IV  2/17/17 21:00


 3/19/17 20:59  2/17/17 21:09


5 MLS/MIN


 


 Benzocaine


  (Orajel 2% Oral


 Gel)  1 appln  TID  PRN


 MT  2/17/17 17:30


 3/19/17 17:29   


 








Lab Results:


2/18/17 07:25








Red Blood Count 2.99, Mean Corpuscular Volume 98.7, Mean Corpuscular Hemoglobin 

30.8, Mean Corpuscular Hemoglobin Concent 31.2, Mean Platelet Volume 10.2, 

Neutrophils (%) (Auto) 69.9, Lymphocytes (%) (Auto) 21.3, Monocytes (%) (Auto) 

6.1, Eosinophils (%) (Auto) 1.7, Basophils (%) (Auto) 0.4, Neutrophils # (Auto) 

11.33, Lymphocytes # (Auto) 3.45, Monocytes # (Auto) 0.99, Eosinophils # (Auto) 

0.28, Basophils # (Auto) 0.07





2/18/17 07:25

















Test


  2/18/17


06:59 2/18/17


07:25


 


Bedside Glucose


  145 mg/dl


(70-90) 


 


 


White Blood Count


  


  16.21 K/uL


(4.8-10.8)


 


Red Blood Count


  


  2.99 M/uL


(4.2-5.4)


 


Hemoglobin


  


  9.2 g/dL


(12.0-16.0)


 


Hematocrit  29.5 % (37-47) 


 


Mean Corpuscular Volume


  


  98.7 fL


()


 


Mean Corpuscular Hemoglobin


  


  30.8 pg


(25-34)


 


Mean Corpuscular Hemoglobin


Concent 


  31.2 g/dl


(32-36)


 


Platelet Count


  


  259 K/uL


(130-400)


 


Mean Platelet Volume


  


  10.2 fL


(7.4-10.4)


 


Neutrophils (%) (Auto)  69.9 % 


 


Lymphocytes (%) (Auto)  21.3 % 


 


Monocytes (%) (Auto)  6.1 % 


 


Eosinophils (%) (Auto)  1.7 % 


 


Basophils (%) (Auto)  0.4 % 


 


Neutrophils # (Auto)


  


  11.33 K/uL


(1.4-6.5)


 


Lymphocytes # (Auto)


  


  3.45 K/uL


(1.2-3.4)


 


Monocytes # (Auto)


  


  0.99 K/uL


(0.11-0.59)


 


Eosinophils # (Auto)


  


  0.28 K/uL


(0-0.5)


 


Basophils # (Auto)


  


  0.07 K/uL


(0-0.2)


 


RDW Standard Deviation


  


  56.7 fL


(36.4-46.3)


 


RDW Coefficient of Variation


  


  16.0 %


(11.5-14.5)


 


Immature Granulocyte % (Auto)  0.6 % 


 


Immature Granulocyte # (Auto)


  


  0.09 K/uL


(0.00-0.02)


 


Anion Gap


  


  10.0 mmol/L


(3-11)


 


Est Creatinine Clear Calc


Drug Dose 


  11.9 ml/min 


 


 


Estimated GFR (


American) 


  7.4 


 


 


Estimated GFR (Non-


American 


  6.4 


 


 


BUN/Creatinine Ratio  6.0 (10-20) 


 


Calcium Level


  


  8.4 mg/dl


(8.5-10.1)


 


Iron Level


  


  36 mcg/dl


()


 


Total Iron Binding Capacity


  


  194 mcg/dl


(250-450)


 


Transferrin


  


  156 mg/dl


(200-360)


 


Transferrin % Saturation  16 % (15-50) 


 


Ferritin


  


  892.8 ng/ml


(8.0-388.0)


 


Vitamin B12 Level


  


  394 pg/mL


(211-911)


 


Folate


  


  5.04 ng/mL


(>5.38)

## 2017-02-18 NOTE — DIAGNOSTIC IMAGING REPORT
RIGHT HIP UNILATERAL 2 VIEWS



CLINICAL HISTORY: Right hip pain.   



COMPARISON STUDY:  None.



FINDINGS: Mild osteoarthritis within the right hip. No fracture or dislocation

within the right hip. The visualized pelvic bones are intact. Chronic

calcifications at the distal insertion of the gluteus medius tendon. Vascular

calcifications are noted.



IMPRESSION:  No fracture or dislocation within the right hip. 









Electronically signed by:  Ismael Pascual M.D.

2/18/2017 9:30 PM



Dictated Date/Time:  2/18/2017 9:29 PM

## 2017-02-19 VITALS
SYSTOLIC BLOOD PRESSURE: 145 MMHG | HEART RATE: 56 BPM | DIASTOLIC BLOOD PRESSURE: 76 MMHG | TEMPERATURE: 98.24 F | OXYGEN SATURATION: 96 %

## 2017-02-19 VITALS
TEMPERATURE: 97.52 F | OXYGEN SATURATION: 99 % | SYSTOLIC BLOOD PRESSURE: 155 MMHG | DIASTOLIC BLOOD PRESSURE: 78 MMHG | HEART RATE: 65 BPM

## 2017-02-19 VITALS — HEART RATE: 63 BPM | DIASTOLIC BLOOD PRESSURE: 74 MMHG | SYSTOLIC BLOOD PRESSURE: 155 MMHG

## 2017-02-19 VITALS
OXYGEN SATURATION: 100 % | SYSTOLIC BLOOD PRESSURE: 136 MMHG | HEART RATE: 57 BPM | DIASTOLIC BLOOD PRESSURE: 74 MMHG | TEMPERATURE: 97.88 F

## 2017-02-19 VITALS
SYSTOLIC BLOOD PRESSURE: 158 MMHG | HEART RATE: 63 BPM | DIASTOLIC BLOOD PRESSURE: 81 MMHG | OXYGEN SATURATION: 99 % | TEMPERATURE: 97.88 F

## 2017-02-19 VITALS — OXYGEN SATURATION: 96 %

## 2017-02-19 LAB
ANION GAP SERPL CALC-SCNC: 11 MMOL/L (ref 3–11)
ANISOCYTOSIS BLD QL SMEAR: PRESENT
BASO STIPL BLD QL SMEAR: (no result)
BASOPHILS # BLD: 0.03 K/UL (ref 0–0.2)
BASOPHILS NFR BLD: 0.2 %
BUN SERPL-MCNC: 63 MG/DL (ref 7–18)
BUN/CREAT SERPL: 7.3 (ref 10–20)
CALCIUM SERPL-MCNC: 8.3 MG/DL (ref 8.5–10.1)
CHLORIDE SERPL-SCNC: 101 MMOL/L (ref 98–107)
CO2 SERPL-SCNC: 25 MMOL/L (ref 21–32)
COMPLETE: YES
CREAT CL PREDICTED SERPL C-G-VRATE: 9.5 ML/MIN
CREAT SERPL-MCNC: 8.7 MG/DL (ref 0.6–1.2)
EOSINOPHIL NFR BLD AUTO: 285 K/UL (ref 130–400)
GLUCOSE SERPL-MCNC: 125 MG/DL (ref 70–99)
HCT VFR BLD CALC: 27.3 % (ref 37–47)
IG%: 0.5 %
IMM GRANULOCYTES NFR BLD AUTO: 21.7 %
LYMPHOCYTES # BLD: 2.84 K/UL (ref 1.2–3.4)
MCH RBC QN AUTO: 31 PG (ref 25–34)
MCHC RBC AUTO-ENTMCNC: 32.2 G/DL (ref 32–36)
MCV RBC AUTO: 96.1 FL (ref 80–100)
MONOCYTES NFR BLD: 8.7 %
NEUTROPHILS # BLD AUTO: 2.6 %
NEUTROPHILS NFR BLD AUTO: 66.3 %
PMV BLD AUTO: 10.4 FL (ref 7.4–10.4)
POTASSIUM SERPL-SCNC: 5 MMOL/L (ref 3.5–5.1)
RBC # BLD AUTO: 2.84 M/UL (ref 4.2–5.4)
SODIUM SERPL-SCNC: 137 MMOL/L (ref 136–145)
WBC # BLD AUTO: 13.06 K/UL (ref 4.8–10.8)

## 2017-02-19 RX ADMIN — METOPROLOL TARTRATE SCH MG: 25 TABLET, FILM COATED ORAL at 21:23

## 2017-02-19 RX ADMIN — INSULIN ASPART SCH UNITS: 100 INJECTION, SOLUTION INTRAVENOUS; SUBCUTANEOUS at 21:00

## 2017-02-19 RX ADMIN — CALCIUM ACETATE SCH MG: 667 CAPSULE ORAL at 17:05

## 2017-02-19 RX ADMIN — ONDANSETRON PRN MG: 2 INJECTION INTRAMUSCULAR; INTRAVENOUS at 02:21

## 2017-02-19 RX ADMIN — ATORVASTATIN CALCIUM SCH MG: 40 TABLET, FILM COATED ORAL at 08:13

## 2017-02-19 RX ADMIN — INSULIN ASPART SCH UNITS: 100 INJECTION, SOLUTION INTRAVENOUS; SUBCUTANEOUS at 08:24

## 2017-02-19 RX ADMIN — ISOSORBIDE MONONITRATE SCH MG: 30 TABLET, EXTENDED RELEASE ORAL at 08:14

## 2017-02-19 RX ADMIN — PANTOPRAZOLE SODIUM SCH MLS/MIN: 40 INJECTION, POWDER, FOR SOLUTION INTRAVENOUS at 08:53

## 2017-02-19 RX ADMIN — Medication SCH MG: at 08:13

## 2017-02-19 RX ADMIN — INSULIN ASPART SCH UNITS: 100 INJECTION, SOLUTION INTRAVENOUS; SUBCUTANEOUS at 17:11

## 2017-02-19 RX ADMIN — INSULIN GLARGINE SCH UNIT: 100 INJECTION, SOLUTION SUBCUTANEOUS at 21:25

## 2017-02-19 RX ADMIN — SERTRALINE HYDROCHLORIDE SCH MG: 100 TABLET, FILM COATED ORAL at 21:22

## 2017-02-19 RX ADMIN — CALCIUM ACETATE SCH MG: 667 CAPSULE ORAL at 08:12

## 2017-02-19 RX ADMIN — ACETAMINOPHEN PRN MG: 325 TABLET ORAL at 22:22

## 2017-02-19 RX ADMIN — INSULIN ASPART SCH UNITS: 100 INJECTION, SOLUTION INTRAVENOUS; SUBCUTANEOUS at 12:11

## 2017-02-19 RX ADMIN — ISOSORBIDE MONONITRATE SCH MG: 60 TABLET ORAL at 08:14

## 2017-02-19 RX ADMIN — INSULIN GLARGINE SCH UNIT: 100 INJECTION, SOLUTION SUBCUTANEOUS at 08:24

## 2017-02-19 RX ADMIN — METOPROLOL TARTRATE SCH MG: 25 TABLET, FILM COATED ORAL at 08:13

## 2017-02-19 RX ADMIN — PANTOPRAZOLE SCH MG: 40 TABLET, DELAYED RELEASE ORAL at 21:22

## 2017-02-19 RX ADMIN — CALCIUM ACETATE SCH MG: 667 CAPSULE ORAL at 12:10

## 2017-02-19 NOTE — PROGRESS NOTE
Medicine Progress Note


Date & Time of Visit:


Feb 19, 2017 at 15:19.


Subjective


patient seen resting in bed


states she feels weak today, like she just had dialysis


also has persistent right hip pain, worse with movement


denies other symptoms





Objective





Last 8 Hrs








  Date Time  Temp Pulse Resp B/P Pulse Ox O2 Delivery O2 Flow Rate FiO2


 


2/19/17 08:30      Room Air  


 


2/19/17 07:56 36.6 57 18 136/74 100 Nasal Cannula 3.0 








Physical Exam:


General- oriented x 3, not in distress





Eyes- anicteric





Lungs- clear breath sounds bilaterally, no rales/wheezes





Heart- normal rate, regular rhythm; no murmurs





Abdomen- normal bowel sounds, soft, nontender





Extremities- right great toe: (+) scaling, eschar on the nail border and dorsal 

aspect, no tenderness 


   Left thumb: mild erythema on the distal aspect


   no pretibial edema, no calf tenderness





Neuro- alert, oriented x 3;no gross focal deficits





Skin- warm & dry





Right Hip-


no swelling, warmth, hematoma


mild tenderness on the  upper inguinal region


Laboratory Results:





Last 24 Hours








Test


  2/18/17


16:09 2/18/17


20:33 2/19/17


05:07 2/19/17


07:33


 


Bedside Glucose 175 mg/dl  141 mg/dl   124 mg/dl 


 


White Blood Count   13.06 K/uL  


 


Red Blood Count   2.84 M/uL  


 


Hemoglobin   8.8 g/dL  


 


Hematocrit   27.3 %  


 


Mean Corpuscular Volume   96.1 fL  


 


Mean Corpuscular Hemoglobin   31.0 pg  


 


Mean Corpuscular Hemoglobin


Concent 


  


  32.2 g/dl 


  


 


 


Platelet Count   285 K/uL  


 


Mean Platelet Volume   10.4 fL  


 


Neutrophils (%) (Auto)   66.3 %  


 


Lymphocytes (%) (Auto)   21.7 %  


 


Monocytes (%) (Auto)   8.7 %  


 


Eosinophils (%) (Auto)   2.6 %  


 


Basophils (%) (Auto)   0.2 %  


 


Neutrophils # (Auto)   8.65 K/uL  


 


Lymphocytes # (Auto)   2.84 K/uL  


 


Monocytes # (Auto)   1.13 K/uL  


 


Eosinophils # (Auto)   0.34 K/uL  


 


Basophils # (Auto)   0.03 K/uL  


 


RDW Standard Deviation   55.2 fL  


 


RDW Coefficient of Variation   15.7 %  


 


Immature Granulocyte % (Auto)   0.5 %  


 


Immature Granulocyte # (Auto)   0.07 K/uL  


 


Basophilic Stippling   1+  


 


Anisocytosis   PRESENT  


 


Sodium Level   137 mmol/L  


 


Potassium Level   5.0 mmol/L  


 


Chloride Level   101 mmol/L  


 


Carbon Dioxide Level   25 mmol/L  


 


Anion Gap   11.0 mmol/L  


 


Blood Urea Nitrogen   63 mg/dl  


 


Creatinine   8.70 mg/dl  


 


Est Creatinine Clear Calc


Drug Dose 


  


  9.5 ml/min 


  


 


 


Estimated GFR (


American) 


  


  5.6 


  


 


 


Estimated GFR (Non-


American 


  


  4.8 


  


 


 


BUN/Creatinine Ratio   7.3  


 


Random Glucose   125 mg/dl  


 


Calcium Level   8.3 mg/dl  














Test


  2/19/17


11:41 


  


  


 


 


Bedside Glucose 167 mg/dl    











Assessment & Plan


51 year old female with history of CHF Diastolic type, CAD s/p Stent, 


DM 2, ESRD presenting with hematemesis.





HEMATEMESIS


EGD UNREVEALING


- no recurrence so far


- Hg 12--> 10.7--> 9.6--> 8.9- stable


- s/p EGD: no source of bleeding found


  pathology:   


   A. DUODENUM, BIOPSY: NO DIAGNOSTIC ABNORMALITY. 





   B. STOMACH, BIOPSY: 


   1. MILD CHRONIC GASTRITIS. 


   2. NO SIGNIFICANT ACTIVITY (ACUTE INFLAMMATION) IDENTIFIED. 


   3. IMMUNOSTAIN FOR HELICOBACTER PYLORI: NEGATIVE. 





   C. GE JUNCTION, BIOPSY: 


   1. ULCERATED, IRREGULAR SQUAMOUS MUCOSA. 


   2. ORGANISM STAINS ARE PENDING. 


   3. SEE COMMENT. 


- continue Protonix BID


  diet resumed


- no recurrence


  resumed Aspirin





ELEVATED CARDIAC MARKERS


HISTORY OF CAD, STENT


- no cardiac symptoms, but levels higher than baseline


- EKG no signs of acute ischemia


- Cardiology consulted


- resume Aspirin


  continue Imdur, Metoprolol





ELEVATED LACTIC ACID


- resolved








RIGHT HIP PAIN


xray: no fractures or dislocation


affecting mobility


consult Ortho





CHF DIASTOLIC TYPE


euvolemic





DM 2


Lantus


ISS


- monitor





ESRD


Dr. Davison consulted for HD





LEFT THUMB NUMBNESS, DISCOLORATION


appreciate Dr. Virgen's recommendations





RIGHT GREAT TOE ESCHAR, SCALING


Wound care consulted








DVT prophylaxis


SCDs 


ambulation





Disposition


pending


possible dc in AM


Current Inpatient Medications:





 Current Inpatient Medications








 Medications


  (Trade)  Dose


 Ordered  Sig/Willy


 Route  Start Time


 Stop Time Status Last Admin


Dose Admin


 


 Insulin Glargine


  (Lantus Solostar


 Pen)  5 unit  BID


 SC  2/15/17 09:00


    2/19/17 08:24


5 UNIT


 


 Glucose


  (Glucose 40% Gel)  15-30


 GRAMS 15


 GRAMS...  UD  PRN


 PO  2/14/17 23:45


 3/16/17 23:44   


 


 


 Glucose


  (Glucose Chew


 Tab)  4-8


 Tablets 4


 Tabl...  UD  PRN


 PO  2/14/17 23:45


 3/16/17 23:44   


 


 


 Dextrose


  (Dextrose 50%


 50ML Syringe)  25-50ML OF


 50% DW IV


 FOR...  UD  PRN


 IV  2/14/17 23:45


 3/16/17 23:44   


 


 


 Glucagon


  (Glucagon Inj)  1 mg  UD  PRN


 SQ  2/14/17 23:45


 3/16/17 23:44   


 


 


 Hydromorphone HCl


  (Dilaudid Inj)  0.5 mg  Q3H  PRN


 IV  2/15/17 00:15


 3/1/17 00:14  2/15/17 00:20


0.5 MG


 


 Ondansetron HCl 4


 mg  4 mg  Q6H  PRN


 IV  2/15/17 00:15


 3/17/17 00:14  2/19/17 02:21


4 MG


 


 Promethazine HCl/


 Sodium Chloride


  (Phenergan Inj/


 Nss 50ml)  50.5 ml @ 


 204 mls/hr  Q6H  PRN


 IV  2/15/17 00:15


 3/17/17 00:14   


 


 


 Lorazepam


  (Ativan Inj)  0.5 mg  Q4H  PRN


 IV  2/15/17 00:15


 3/17/17 00:14   


 


 


 Miscellaneous


  (Iv Fluids


 Completed)  1 ea  PRN  PRN


 N/A  2/15/17 00:15


 2/15/18 00:14   


 


 


 Acetaminophen


  (Tylenol Tab)  650 mg  Q4H  PRN


 PO  2/15/17 00:15


 3/17/17 00:14  2/18/17 19:23


650 MG


 


 Nitroglycerin


  (Nitrostat Tab)  0.4 mg  UD  PRN


 SL  2/15/17 00:15


 3/17/17 00:14   


 


 


 Insulin Aspart


  (novoLOG ASPART)  **SLIDING


 SCALE**


 **If C...  ACHS


 SC  2/15/17 07:00


 3/17/17 06:59  2/19/17 12:11


6 UNITS


 


 Atorvastatin


 Calcium


  (Lipitor Tab)  40 mg  DAILY


 PO  2/15/17 09:00


 3/17/17 08:59  2/19/17 08:13


40 MG


 


 Calcium Acetate


  (Phoslo Cap)  667 mg  TIDM


 PO  2/15/17 07:30


 3/17/17 07:29  2/19/17 12:10


667 MG


 


 Isosorbide


 Mononitrate


  (Imdur Ext Rel


 Tab)  60 mg  QAM


 PO  2/15/17 09:00


 3/17/17 08:59  2/19/17 08:14


60 MG


 


 Isosorbide


 Mononitrate


  (Imdur Ext Rel


 Tab)  30 mg  QAM


 PO  2/15/17 09:00


 3/17/17 08:59  2/19/17 08:14


30 MG


 


 Tramadol HCl


  (Ultram Tab)  not


 relieved


 by tylenol @   Q6H  PRN


 PO  2/15/17 00:15


 3/17/17 00:14   


 


 


 Metoprolol


 Tartrate


  (Lopressor Tab)  50 mg  BID


 PO  2/15/17 09:00


 3/17/17 08:59  2/19/17 08:13


50 MG


 


 Menthol


  (Nice Jerad)  1 jerad  PRN  PRN


 PO  2/15/17 05:15


 3/17/17 05:14   


 


 


 Sertraline HCl


  (Zoloft Tab)  100 mg  HS


 PO  2/16/17 21:00


 3/18/17 20:59  2/18/17 21:27


100 MG


 


 Aspirin 81 mg  81 mg  DAILY


 PO  2/18/17 09:00


 3/20/17 08:59  2/19/17 08:13


81 MG


 


 Pantoprazole


 Sodium/Syringe


  (Protonix Inj/


 Syringe)  10 ml @ 5


 mls/min  DAILY@09,21


 IV  2/17/17 21:00


 3/19/17 20:59  2/19/17 08:53


5 MLS/MIN


 


 Benzocaine


  (Orajel 2% Oral


 Gel)  1 appln  TID  PRN


 MT  2/17/17 17:30


 3/19/17 17:29

## 2017-02-20 VITALS — SYSTOLIC BLOOD PRESSURE: 175 MMHG | TEMPERATURE: 97.52 F | HEART RATE: 65 BPM | DIASTOLIC BLOOD PRESSURE: 92 MMHG

## 2017-02-20 VITALS — HEART RATE: 59 BPM | DIASTOLIC BLOOD PRESSURE: 82 MMHG | SYSTOLIC BLOOD PRESSURE: 150 MMHG

## 2017-02-20 VITALS — SYSTOLIC BLOOD PRESSURE: 170 MMHG | DIASTOLIC BLOOD PRESSURE: 87 MMHG | HEART RATE: 62 BPM

## 2017-02-20 VITALS — OXYGEN SATURATION: 96 %

## 2017-02-20 VITALS — HEART RATE: 56 BPM | SYSTOLIC BLOOD PRESSURE: 150 MMHG | DIASTOLIC BLOOD PRESSURE: 86 MMHG

## 2017-02-20 VITALS — SYSTOLIC BLOOD PRESSURE: 169 MMHG | DIASTOLIC BLOOD PRESSURE: 87 MMHG | HEART RATE: 59 BPM

## 2017-02-20 VITALS — SYSTOLIC BLOOD PRESSURE: 146 MMHG | HEART RATE: 55 BPM | DIASTOLIC BLOOD PRESSURE: 71 MMHG

## 2017-02-20 VITALS — DIASTOLIC BLOOD PRESSURE: 79 MMHG | SYSTOLIC BLOOD PRESSURE: 159 MMHG | HEART RATE: 60 BPM

## 2017-02-20 VITALS — SYSTOLIC BLOOD PRESSURE: 158 MMHG | HEART RATE: 59 BPM | DIASTOLIC BLOOD PRESSURE: 84 MMHG

## 2017-02-20 VITALS
DIASTOLIC BLOOD PRESSURE: 96 MMHG | HEART RATE: 64 BPM | TEMPERATURE: 98.78 F | SYSTOLIC BLOOD PRESSURE: 179 MMHG | OXYGEN SATURATION: 100 %

## 2017-02-20 VITALS — DIASTOLIC BLOOD PRESSURE: 77 MMHG | SYSTOLIC BLOOD PRESSURE: 155 MMHG | HEART RATE: 59 BPM

## 2017-02-20 VITALS — DIASTOLIC BLOOD PRESSURE: 84 MMHG | SYSTOLIC BLOOD PRESSURE: 158 MMHG | HEART RATE: 58 BPM

## 2017-02-20 VITALS — SYSTOLIC BLOOD PRESSURE: 142 MMHG | DIASTOLIC BLOOD PRESSURE: 79 MMHG | HEART RATE: 58 BPM

## 2017-02-20 VITALS — SYSTOLIC BLOOD PRESSURE: 146 MMHG | HEART RATE: 57 BPM | DIASTOLIC BLOOD PRESSURE: 85 MMHG

## 2017-02-20 VITALS
TEMPERATURE: 99.86 F | DIASTOLIC BLOOD PRESSURE: 84 MMHG | OXYGEN SATURATION: 97 % | SYSTOLIC BLOOD PRESSURE: 151 MMHG | HEART RATE: 59 BPM

## 2017-02-20 VITALS — HEART RATE: 63 BPM | DIASTOLIC BLOOD PRESSURE: 81 MMHG | SYSTOLIC BLOOD PRESSURE: 146 MMHG

## 2017-02-20 VITALS — SYSTOLIC BLOOD PRESSURE: 158 MMHG | DIASTOLIC BLOOD PRESSURE: 82 MMHG | HEART RATE: 55 BPM

## 2017-02-20 VITALS — DIASTOLIC BLOOD PRESSURE: 87 MMHG | SYSTOLIC BLOOD PRESSURE: 157 MMHG | HEART RATE: 61 BPM

## 2017-02-20 VITALS — TEMPERATURE: 97.7 F | SYSTOLIC BLOOD PRESSURE: 156 MMHG | DIASTOLIC BLOOD PRESSURE: 86 MMHG | HEART RATE: 64 BPM

## 2017-02-20 LAB
ANION GAP SERPL CALC-SCNC: 15 MMOL/L (ref 3–11)
BASOPHILS # BLD: 0.02 K/UL (ref 0–0.2)
BASOPHILS NFR BLD: 0.2 %
BUN SERPL-MCNC: 75 MG/DL (ref 7–18)
BUN/CREAT SERPL: 6.8 (ref 10–20)
CALCIUM SERPL-MCNC: 8.5 MG/DL (ref 8.5–10.1)
CHLORIDE SERPL-SCNC: 100 MMOL/L (ref 98–107)
CO2 SERPL-SCNC: 22 MMOL/L (ref 21–32)
COMPLETE: YES
CREAT CL PREDICTED SERPL C-G-VRATE: 7.5 ML/MIN
CREAT SERPL-MCNC: 11 MG/DL (ref 0.6–1.2)
EOSINOPHIL NFR BLD AUTO: 305 K/UL (ref 130–400)
GLUCOSE SERPL-MCNC: 88 MG/DL (ref 70–99)
HCT VFR BLD CALC: 26.4 % (ref 37–47)
IG%: 0.4 %
IMM GRANULOCYTES NFR BLD AUTO: 24.6 %
LYMPHOCYTES # BLD: 2.95 K/UL (ref 1.2–3.4)
MCH RBC QN AUTO: 31 PG (ref 25–34)
MCHC RBC AUTO-ENTMCNC: 31.8 G/DL (ref 32–36)
MCV RBC AUTO: 97.4 FL (ref 80–100)
MONOCYTES NFR BLD: 9.5 %
NEUTROPHILS # BLD AUTO: 2.4 %
NEUTROPHILS NFR BLD AUTO: 62.9 %
PMV BLD AUTO: 10.3 FL (ref 7.4–10.4)
POTASSIUM SERPL-SCNC: 5.1 MMOL/L (ref 3.5–5.1)
RBC # BLD AUTO: 2.71 M/UL (ref 4.2–5.4)
SODIUM SERPL-SCNC: 137 MMOL/L (ref 136–145)
WBC # BLD AUTO: 11.99 K/UL (ref 4.8–10.8)

## 2017-02-20 RX ADMIN — METOPROLOL TARTRATE SCH MG: 25 TABLET, FILM COATED ORAL at 20:08

## 2017-02-20 RX ADMIN — HEPARIN SODIUM SCH UNIT: 1000 INJECTION, SOLUTION INTRAVENOUS; SUBCUTANEOUS at 15:15

## 2017-02-20 RX ADMIN — INSULIN ASPART SCH UNITS: 100 INJECTION, SOLUTION INTRAVENOUS; SUBCUTANEOUS at 20:09

## 2017-02-20 RX ADMIN — INSULIN GLARGINE SCH UNIT: 100 INJECTION, SOLUTION SUBCUTANEOUS at 20:11

## 2017-02-20 RX ADMIN — CALCIUM ACETATE SCH MG: 667 CAPSULE ORAL at 12:55

## 2017-02-20 RX ADMIN — SERTRALINE HYDROCHLORIDE SCH MG: 100 TABLET, FILM COATED ORAL at 20:09

## 2017-02-20 RX ADMIN — INSULIN ASPART SCH UNITS: 100 INJECTION, SOLUTION INTRAVENOUS; SUBCUTANEOUS at 09:01

## 2017-02-20 RX ADMIN — PANTOPRAZOLE SCH MG: 40 TABLET, DELAYED RELEASE ORAL at 20:09

## 2017-02-20 RX ADMIN — INSULIN ASPART SCH UNITS: 100 INJECTION, SOLUTION INTRAVENOUS; SUBCUTANEOUS at 12:57

## 2017-02-20 RX ADMIN — CALCIUM ACETATE SCH MG: 667 CAPSULE ORAL at 08:52

## 2017-02-20 RX ADMIN — HEPARIN SODIUM SCH UNIT: 1000 INJECTION, SOLUTION INTRAVENOUS; SUBCUTANEOUS at 14:15

## 2017-02-20 RX ADMIN — CALCIUM ACETATE SCH MG: 667 CAPSULE ORAL at 17:32

## 2017-02-20 RX ADMIN — INSULIN ASPART SCH UNITS: 100 INJECTION, SOLUTION INTRAVENOUS; SUBCUTANEOUS at 18:33

## 2017-02-20 RX ADMIN — TRAMADOL HYDROCHLORIDE PRN MG: 50 TABLET, COATED ORAL at 20:08

## 2017-02-20 NOTE — PROGRESS NOTE
Medicine Progress Note


Date & Time of Visit:


Feb 20, 2017 at 20:12.


Subjective


patient states she feels tired today


s/p HD today


still has right hip pain worse with movement, somewhat less today


has left thumb pain


no other symptoms





Objective





Last 8 Hrs








  Date Time  Temp Pulse Resp B/P Pulse Ox O2 Delivery O2 Flow Rate FiO2


 


2/20/17 17:26 37.1 64 20 179/96 100 Room Air  


 


2/20/17 17:18      Nasal Cannula  


 


2/20/17 17:08 36.5 64  156/86    


 


2/20/17 16:30  59  150/82    


 


2/20/17 16:15  58  158/84    


 


2/20/17 16:00  60  159/79    


 


2/20/17 15:45  56  150/86    


 


2/20/17 15:30  55  146/71    


 


2/20/17 15:15  55  158/82    


 


2/20/17 15:00  59  158/84    


 


2/20/17 14:45  58  142/79    


 


2/20/17 14:30  57  146/85    


 


2/20/17 14:15  59  155/77    


 


2/20/17 14:00  63  146/81    


 


2/20/17 13:45  61  157/87    


 


2/20/17 13:30  59  169/87    


 


2/20/17 13:18  62  170/87    


 


2/20/17 13:10 36.4 65  175/92    








Physical Exam:


General- oriented x 3, not in distress





Lungs- clear breath sounds bilaterally, no rales/wheeze





Heart- normal rate, regular rhythm; no murmurs





Abdomen- normal bowel sounds, soft, nontender





Extremities- right great toe: (+) scaling, eschar on the nail border and dorsal 

aspect, no tenderness 


   Left thumb: mild erythema on the distal aspect


   no pretibial edema, no calf tenderness





Neuro- alert, oriented x 3;no gross focal deficits





Skin- warm & dry


Laboratory Results:





Last 24 Hours








Test


  2/19/17


20:47 2/20/17


05:19 2/20/17


07:38 2/20/17


11:36


 


Bedside Glucose 153 mg/dl   90 mg/dl  156 mg/dl 


 


White Blood Count  11.99 K/uL   


 


Red Blood Count  2.71 M/uL   


 


Hemoglobin  8.4 g/dL   


 


Hematocrit  26.4 %   


 


Mean Corpuscular Volume  97.4 fL   


 


Mean Corpuscular Hemoglobin  31.0 pg   


 


Mean Corpuscular Hemoglobin


Concent 


  31.8 g/dl 


  


  


 


 


Platelet Count  305 K/uL   


 


Mean Platelet Volume  10.3 fL   


 


Neutrophils (%) (Auto)  62.9 %   


 


Lymphocytes (%) (Auto)  24.6 %   


 


Monocytes (%) (Auto)  9.5 %   


 


Eosinophils (%) (Auto)  2.4 %   


 


Basophils (%) (Auto)  0.2 %   


 


Neutrophils # (Auto)  7.54 K/uL   


 


Lymphocytes # (Auto)  2.95 K/uL   


 


Monocytes # (Auto)  1.14 K/uL   


 


Eosinophils # (Auto)  0.29 K/uL   


 


Basophils # (Auto)  0.02 K/uL   


 


RDW Standard Deviation  55.3 fL   


 


RDW Coefficient of Variation  16.1 %   


 


Immature Granulocyte % (Auto)  0.4 %   


 


Immature Granulocyte # (Auto)  0.05 K/uL   


 


Red Blood Cell Morphology  Unremarkable   


 


Sodium Level  137 mmol/L   


 


Potassium Level  5.1 mmol/L   


 


Chloride Level  100 mmol/L   


 


Carbon Dioxide Level  22 mmol/L   


 


Anion Gap  15.0 mmol/L   


 


Blood Urea Nitrogen  75 mg/dl   


 


Creatinine  11.00 mg/dl   


 


Est Creatinine Clear Calc


Drug Dose 


  7.5 ml/min 


  


  


 


 


Estimated GFR (


American) 


  4.2 


  


  


 


 


Estimated GFR (Non-


American 


  3.6 


  


  


 


 


BUN/Creatinine Ratio  6.8   


 


Random Glucose  88 mg/dl   


 


Calcium Level  8.5 mg/dl   














Test


  2/20/17


17:19 


  


  


 


 


Bedside Glucose 118 mg/dl    











Assessment & Plan


51 year old female with history of CHF Diastolic type, CAD s/p Stent, 


DM 2, ESRD presenting with hematemesis.





HEMATEMESIS


- EGD UNREVEALING


- no recurrence so far


- Hg 12--> 10.7--> 9.6--> 8.9/8.4


- s/p EGD: no source of bleeding found


  pathology:   


   A. DUODENUM, BIOPSY: NO DIAGNOSTIC ABNORMALITY. 





   B. STOMACH, BIOPSY: 


   1. MILD CHRONIC GASTRITIS. 


   2. NO SIGNIFICANT ACTIVITY (ACUTE INFLAMMATION) IDENTIFIED. 


   3. IMMUNOSTAIN FOR HELICOBACTER PYLORI: NEGATIVE. 





   C. GE JUNCTION, BIOPSY: 


   1. ULCERATED, IRREGULAR SQUAMOUS MUCOSA. 


   2. ORGANISM STAINS ARE PENDING. 


   3. SEE COMMENT. 


-no recurrence of hematemesis


- continue Protonix BID


  diet resumed


  resumed Aspirin





ELEVATED CARDIAC MARKERS


HISTORY OF CAD, STENT


- no cardiac symptoms, but levels higher than baseline


- EKG no signs of acute ischemia


- Cardiology consulted


- resume Aspirin


  continue Imdur, Metoprolol





ELEVATED LACTIC ACID


- resolved








RIGHT HIP PAIN


xray: no fractures or dislocation


affecting mobility


consulted Ortho, awaiting recommendations


PT/OT eval





CHF DIASTOLIC TYPE


euvolemic





DM 2


Lantus


ISS


- monitor





ESRD


Dr. Davison consulted for HD





LEFT THUMB NUMBNESS, DISCOLORATION


appreciate Dr. Virgen's recommendations





RIGHT GREAT TOE ESCHAR, SCALING


Wound care consulted








DVT prophylaxis


SCDs 


ambulation





Disposition


pending


lives at home 


possible dc in 1-2 days


Current Inpatient Medications:





 Current Inpatient Medications








 Medications


  (Trade)  Dose


 Ordered  Sig/Willy


 Route  Start Time


 Stop Time Status Last Admin


Dose Admin


 


 Insulin Glargine


  (Lantus Solostar


 Pen)  5 unit  BID


 SC  2/15/17 09:00


 3/21/17 08:59  2/19/17 21:25


5 UNIT


 


 Glucose


  (Glucose 40% Gel)  15-30


 GRAMS 15


 GRAMS...  UD  PRN


 PO  2/14/17 23:45


 3/16/17 23:44   


 


 


 Glucose


  (Glucose Chew


 Tab)  4-8


 Tablets 4


 Tabl...  UD  PRN


 PO  2/14/17 23:45


 3/16/17 23:44   


 


 


 Dextrose


  (Dextrose 50%


 50ML Syringe)  25-50ML OF


 50% DW IV


 FOR...  UD  PRN


 IV  2/14/17 23:45


 3/16/17 23:44   


 


 


 Glucagon


  (Glucagon Inj)  1 mg  UD  PRN


 SQ  2/14/17 23:45


 3/16/17 23:44   


 


 


 Hydromorphone HCl


  (Dilaudid Inj)  0.5 mg  Q3H  PRN


 IV  2/15/17 00:15


 3/1/17 00:14  2/15/17 00:20


0.5 MG


 


 Ondansetron HCl 4


 mg  4 mg  Q6H  PRN


 IV  2/15/17 00:15


 3/17/17 00:14  2/19/17 02:21


4 MG


 


 Promethazine HCl/


 Sodium Chloride


  (Phenergan Inj/


 Nss 50ml)  50.5 ml @ 


 204 mls/hr  Q6H  PRN


 IV  2/15/17 00:15


 3/17/17 00:14   


 


 


 Lorazepam


  (Ativan Inj)  0.5 mg  Q4H  PRN


 IV  2/15/17 00:15


 3/17/17 00:14   


 


 


 Miscellaneous


  (Iv Fluids


 Completed)  1 ea  PRN  PRN


 N/A  2/15/17 00:15


 2/15/18 00:14   


 


 


 Acetaminophen


  (Tylenol Tab)  650 mg  Q4H  PRN


 PO  2/15/17 00:15


 3/17/17 00:14  2/19/17 22:22


650 MG


 


 Nitroglycerin


  (Nitrostat Tab)  0.4 mg  UD  PRN


 SL  2/15/17 00:15


 3/17/17 00:14   


 


 


 Insulin Aspart


  (novoLOG ASPART)  **SLIDING


 SCALE**


 **If C...  ACHS


 SC  2/15/17 07:00


 3/17/17 06:59  2/20/17 18:33


1 UNITS


 


 Atorvastatin


 Calcium


  (Lipitor Tab)  40 mg  DAILY


 PO  2/15/17 09:00


 3/17/17 08:59  2/19/17 08:13


40 MG


 


 Calcium Acetate


  (Phoslo Cap)  667 mg  TIDM


 PO  2/15/17 07:30


 3/17/17 07:29  2/20/17 17:32


667 MG


 


 Isosorbide


 Mononitrate


  (Imdur Ext Rel


 Tab)  60 mg  QAM


 PO  2/15/17 09:00


 3/17/17 08:59  2/19/17 08:14


60 MG


 


 Isosorbide


 Mononitrate


  (Imdur Ext Rel


 Tab)  30 mg  QAM


 PO  2/15/17 09:00


 3/17/17 08:59  2/19/17 08:14


30 MG


 


 Tramadol HCl


  (Ultram Tab)  not


 relieved


 by tylenol @   Q6H  PRN


 PO  2/15/17 00:15


 3/17/17 00:14   


 


 


 Metoprolol


 Tartrate


  (Lopressor Tab)  50 mg  BID


 PO  2/15/17 09:00


 3/17/17 08:59  2/19/17 21:23


50 MG


 


 Menthol


  (Nice Jerad)  1 jerad  PRN  PRN


 PO  2/15/17 05:15


 3/17/17 05:14   


 


 


 Sertraline HCl


  (Zoloft Tab)  100 mg  HS


 PO  2/16/17 21:00


 3/18/17 20:59  2/19/17 21:22


100 MG


 


 Aspirin


  (Ecotrin Tab)  81 mg  DAILY


 PO  2/18/17 09:00


 3/20/17 08:59  2/19/17 08:13


81 MG


 


 Benzocaine


  (Orajel 2% Oral


 Gel)  1 appln  TID  PRN


 MT  2/17/17 17:30


 3/19/17 17:29   


 


 


 Pantoprazole


 Sodium


  (Protonix Tab)  40 mg  BID


 PO  2/19/17 21:00


 3/21/17 20:59  2/19/17 21:22


40 MG

## 2017-02-20 NOTE — ORTHOPEDIC PROGRESS NOTE
Orthopedic Progress Note


Date of Service


Feb 20, 2017.





Subjective


Reports: chest pain


Additional Notes:


PT NOT ROOM WILL CHECK IN AM





Objective











  Date Time  Temp Pulse Resp B/P Pulse Ox O2 Delivery O2 Flow Rate FiO2


 


2/20/17 13:10 36.4 65  175/92    


 


2/20/17 08:30     96 Nasal Cannula  


 


2/20/17 00:00      Nasal Cannula 2.0 


 


2/19/17 22:54 36.4 65 18 155/78 99 Room Air 2.0 


 


2/19/17 21:30  63  155/74    


 


2/19/17 20:00      Room Air 2.0 








Laboratory Results 24 Hours:











Test


  2/20/17


05:19


 


White Blood Count 11.99 K/uL 


 


Red Blood Count 2.71 M/uL 


 


Hemoglobin 8.4 g/dL 


 


Hematocrit 26.4 % 


 


Mean Corpuscular Volume 97.4 fL 


 


Mean Corpuscular Hemoglobin 31.0 pg 


 


Mean Corpuscular Hemoglobin


Concent 31.8 g/dl 


 


 


Platelet Count 305 K/uL 


 


Mean Platelet Volume 10.3 fL 


 


Neutrophils (%) (Auto) 62.9 % 


 


Lymphocytes (%) (Auto) 24.6 % 


 


Monocytes (%) (Auto) 9.5 % 


 


Eosinophils (%) (Auto) 2.4 % 


 


Basophils (%) (Auto) 0.2 % 


 


Neutrophils # (Auto) 7.54 K/uL 


 


Lymphocytes # (Auto) 2.95 K/uL 


 


Monocytes # (Auto) 1.14 K/uL 


 


Eosinophils # (Auto) 0.29 K/uL 


 


Basophils # (Auto) 0.02 K/uL

## 2017-02-20 NOTE — NEPHROLOGY PROGRESS NOTE
Nephrology Progress Note


Date of Service:


Feb 20, 2017.


Subjective


50 yo female with esrd with frequent hospitilizations.  pt admitted with n/v 

with heme positive emesis. EGD was negative. pt was doing well with no more n/v 

but had right hip pain. xrays are good. ortho consult pending.





Objective











  Date Time  Temp Pulse Resp B/P Pulse Ox O2 Delivery O2 Flow Rate FiO2


 


2/20/17 00:00      Nasal Cannula 2.0 


 


2/19/17 22:54 36.4 65 18 155/78 99 Room Air 2.0 


 


2/19/17 21:30  63  155/74    


 


2/19/17 20:00      Room Air 2.0 


 


2/19/17 16:00     96 Room Air  


 


2/19/17 15:45 36.8 56 18 145/76 96 Room Air  


 


2/19/17 08:30      Room Air  


 


2/19/17 07:56 36.6 57 18 136/74 100 Nasal Cannula 3.0 








Physical Exam:


General-aaox3, obese


Eyes-no scleral icterus


ENT-mmm


Neck-supple


Lungs-clear


Heart-regular


Abdomen-bs+ s/nt/nd


Extremities-no c/c/e


Neuro-nonfocal





 Current Inpatient Medications








 Medications


  (Trade)  Dose


 Ordered  Sig/Willy


 Route  Start Time


 Stop Time Status Last Admin


Dose Admin


 


 Insulin Glargine


  (Lantus Solostar


 Pen)  5 unit  BID


 SC  2/15/17 09:00


 3/21/17 08:59  2/19/17 21:25


5 UNIT


 


 Glucose


  (Glucose 40% Gel)  15-30


 GRAMS 15


 GRAMS...  UD  PRN


 PO  2/14/17 23:45


 3/16/17 23:44   


 


 


 Glucose


  (Glucose Chew


 Tab)  4-8


 Tablets 4


 Tabl...  UD  PRN


 PO  2/14/17 23:45


 3/16/17 23:44   


 


 


 Dextrose


  (Dextrose 50%


 50ML Syringe)  25-50ML OF


 50% DW IV


 FOR...  UD  PRN


 IV  2/14/17 23:45


 3/16/17 23:44   


 


 


 Glucagon


  (Glucagon Inj)  1 mg  UD  PRN


 SQ  2/14/17 23:45


 3/16/17 23:44   


 


 


 Hydromorphone HCl


  (Dilaudid Inj)  0.5 mg  Q3H  PRN


 IV  2/15/17 00:15


 3/1/17 00:14  2/15/17 00:20


0.5 MG


 


 Ondansetron HCl 4


 mg  4 mg  Q6H  PRN


 IV  2/15/17 00:15


 3/17/17 00:14  2/19/17 02:21


4 MG


 


 Promethazine HCl/


 Sodium Chloride


  (Phenergan Inj/


 Nss 50ml)  50.5 ml @ 


 204 mls/hr  Q6H  PRN


 IV  2/15/17 00:15


 3/17/17 00:14   


 


 


 Lorazepam


  (Ativan Inj)  0.5 mg  Q4H  PRN


 IV  2/15/17 00:15


 3/17/17 00:14   


 


 


 Miscellaneous


  (Iv Fluids


 Completed)  1 ea  PRN  PRN


 N/A  2/15/17 00:15


 2/15/18 00:14   


 


 


 Acetaminophen


  (Tylenol Tab)  650 mg  Q4H  PRN


 PO  2/15/17 00:15


 3/17/17 00:14  2/19/17 22:22


650 MG


 


 Nitroglycerin


  (Nitrostat Tab)  0.4 mg  UD  PRN


 SL  2/15/17 00:15


 3/17/17 00:14   


 


 


 Insulin Aspart


  (novoLOG ASPART)  **SLIDING


 SCALE**


 **If C...  ACHS


 SC  2/15/17 07:00


 3/17/17 06:59  2/19/17 17:11


4 UNITS


 


 Atorvastatin


 Calcium


  (Lipitor Tab)  40 mg  DAILY


 PO  2/15/17 09:00


 3/17/17 08:59  2/19/17 08:13


40 MG


 


 Calcium Acetate


  (Phoslo Cap)  667 mg  TIDM


 PO  2/15/17 07:30


 3/17/17 07:29  2/19/17 17:05


667 MG


 


 Isosorbide


 Mononitrate


  (Imdur Ext Rel


 Tab)  60 mg  QAM


 PO  2/15/17 09:00


 3/17/17 08:59  2/19/17 08:14


60 MG


 


 Isosorbide


 Mononitrate


  (Imdur Ext Rel


 Tab)  30 mg  QAM


 PO  2/15/17 09:00


 3/17/17 08:59  2/19/17 08:14


30 MG


 


 Tramadol HCl


  (Ultram Tab)  not


 relieved


 by tylenol @   Q6H  PRN


 PO  2/15/17 00:15


 3/17/17 00:14   


 


 


 Metoprolol


 Tartrate


  (Lopressor Tab)  50 mg  BID


 PO  2/15/17 09:00


 3/17/17 08:59  2/19/17 21:23


50 MG


 


 Menthol


  (Nice Jerad)  1 jerad  PRN  PRN


 PO  2/15/17 05:15


 3/17/17 05:14   


 


 


 Sertraline HCl


  (Zoloft Tab)  100 mg  HS


 PO  2/16/17 21:00


 3/18/17 20:59  2/19/17 21:22


100 MG


 


 Aspirin


  (Ecotrin Tab)  81 mg  DAILY


 PO  2/18/17 09:00


 3/20/17 08:59  2/19/17 08:13


81 MG


 


 Benzocaine


  (Orajel 2% Oral


 Gel)  1 appln  TID  PRN


 MT  2/17/17 17:30


 3/19/17 17:29   


 


 


 Pantoprazole


 Sodium


  (Protonix Tab)  40 mg  BID


 PO  2/19/17 21:00


 3/21/17 20:59  2/19/17 21:22


40 MG


 


 Heparin Sodium


  (Porcine)


  (Heparin Iv


 Bolus)  1,000 unit  ONE


 IV  2/20/17 08:00


 2/20/17 08:01   


 


 


 Heparin Sodium


  (Porcine)


  (Heparin Iv


 Bolus)  400 unit  Q1H


 IV  2/20/17 08:00


 2/20/17 10:01   


 


 


 Epoetin Narayan


  (Procrit Inj)  10,000 units  0800


 IV.  2/20/17 08:00


 2/20/17 14:00   


 











Last 24 Hours








Test


  2/19/17


07:33 2/19/17


11:41 2/19/17


16:46 2/19/17


20:47


 


Bedside Glucose 124 mg/dl  167 mg/dl  152 mg/dl  153 mg/dl 














Test


  2/20/17


05:19 


  


  


 


 


White Blood Count 11.99 K/uL    


 


Red Blood Count 2.71 M/uL    


 


Hemoglobin 8.4 g/dL    


 


Hematocrit 26.4 %    


 


Mean Corpuscular Volume 97.4 fL    


 


Mean Corpuscular Hemoglobin 31.0 pg    


 


Mean Corpuscular Hemoglobin


Concent 31.8 g/dl 


  


  


  


 


 


Platelet Count 305 K/uL    


 


Mean Platelet Volume 10.3 fL    


 


Neutrophils (%) (Auto) 62.9 %    


 


Lymphocytes (%) (Auto) 24.6 %    


 


Monocytes (%) (Auto) 9.5 %    


 


Eosinophils (%) (Auto) 2.4 %    


 


Basophils (%) (Auto) 0.2 %    


 


Neutrophils # (Auto) 7.54 K/uL    


 


Lymphocytes # (Auto) 2.95 K/uL    


 


Monocytes # (Auto) 1.14 K/uL    


 


Eosinophils # (Auto) 0.29 K/uL    


 


Basophils # (Auto) 0.02 K/uL    


 


RDW Standard Deviation 55.3 fL    


 


RDW Coefficient of Variation 16.1 %    


 


Immature Granulocyte % (Auto) 0.4 %    


 


Immature Granulocyte # (Auto) 0.05 K/uL    


 


Red Blood Cell Morphology Unremarkable    


 


Sodium Level 137 mmol/L    


 


Potassium Level 5.1 mmol/L    


 


Chloride Level 100 mmol/L    


 


Carbon Dioxide Level 22 mmol/L    


 


Anion Gap 15.0 mmol/L    


 


Blood Urea Nitrogen 75 mg/dl    


 


Creatinine 11.00 mg/dl    


 


Est Creatinine Clear Calc


Drug Dose 7.5 ml/min 


  


  


  


 


 


Estimated GFR (


American) 4.2 


  


  


  


 


 


Estimated GFR (Non-


American 3.6 


  


  


  


 


 


BUN/Creatinine Ratio 6.8    


 


Random Glucose 88 mg/dl    


 


Calcium Level 8.5 mg/dl    











Assessment & Plan


ESRD-for dialysis today. continue m-w-f dialysis. lungs cta and no edema but pt 

feels fluid overloaded.  will diurese on dialysis today since eating and 

drinking well. 





Anemia of renal Failure-will continue procrit for goal hg of 10 to 11.

## 2017-02-20 NOTE — DIAGNOSTIC IMAGING REPORT
PELVIS 1 OR 2 VIEW ROUTINE



CLINICAL HISTORY: Right Iliac Crest pain/groin pain   



COMPARISON STUDY:  Right hip 2/18/2017.



FINDINGS: No fracture or dislocation within the pelvis or hips. The sacrum is

intact. Vascular calcifications are noted. Mild cartilage space narrowing within

the bilateral hips consistent with degenerative change. There is also mild

osteoarthritis within the sacroiliac joints and symphysis pubis. Calcifications

at the distal insertion of the right gluteus medius tendon.



IMPRESSION:  

1. Mild osteoarthritis within the pelvis and hips.

2. No fractures. 









Electronically signed by:  Ismael Pascual M.D.

2/20/2017 9:17 AM



Dictated Date/Time:  2/20/2017 9:15 AM

## 2017-02-21 VITALS
HEART RATE: 56 BPM | OXYGEN SATURATION: 91 % | SYSTOLIC BLOOD PRESSURE: 100 MMHG | TEMPERATURE: 97.7 F | DIASTOLIC BLOOD PRESSURE: 65 MMHG

## 2017-02-21 VITALS
SYSTOLIC BLOOD PRESSURE: 173 MMHG | HEART RATE: 69 BPM | DIASTOLIC BLOOD PRESSURE: 84 MMHG | OXYGEN SATURATION: 93 % | TEMPERATURE: 98.78 F

## 2017-02-21 VITALS
DIASTOLIC BLOOD PRESSURE: 95 MMHG | OXYGEN SATURATION: 98 % | SYSTOLIC BLOOD PRESSURE: 147 MMHG | HEART RATE: 61 BPM | TEMPERATURE: 97.52 F

## 2017-02-21 LAB
ANION GAP SERPL CALC-SCNC: 10 MMOL/L (ref 3–11)
BASOPHILS # BLD: 0.03 K/UL (ref 0–0.2)
BASOPHILS NFR BLD: 0.3 %
BUN SERPL-MCNC: 41 MG/DL (ref 7–18)
BUN/CREAT SERPL: 5.7 (ref 10–20)
CALCIUM SERPL-MCNC: 8.7 MG/DL (ref 8.5–10.1)
CHLORIDE SERPL-SCNC: 99 MMOL/L (ref 98–107)
CO2 SERPL-SCNC: 28 MMOL/L (ref 21–32)
COMPLETE: YES
CREAT CL PREDICTED SERPL C-G-VRATE: 11.5 ML/MIN
CREAT SERPL-MCNC: 7.2 MG/DL (ref 0.6–1.2)
EOSINOPHIL NFR BLD AUTO: 343 K/UL (ref 130–400)
GLUCOSE SERPL-MCNC: 112 MG/DL (ref 70–99)
HCT VFR BLD CALC: 28.7 % (ref 37–47)
IG%: 0.3 %
IMM GRANULOCYTES NFR BLD AUTO: 25.8 %
LYMPHOCYTES # BLD: 2.43 K/UL (ref 1.2–3.4)
MCH RBC QN AUTO: 30 PG (ref 25–34)
MCHC RBC AUTO-ENTMCNC: 31.4 G/DL (ref 32–36)
MCV RBC AUTO: 95.7 FL (ref 80–100)
MONOCYTES NFR BLD: 8.7 %
NEUTROPHILS # BLD AUTO: 2.7 %
NEUTROPHILS NFR BLD AUTO: 62.2 %
PMV BLD AUTO: 10.1 FL (ref 7.4–10.4)
POTASSIUM SERPL-SCNC: 4.6 MMOL/L (ref 3.5–5.1)
RBC # BLD AUTO: 3 M/UL (ref 4.2–5.4)
SODIUM SERPL-SCNC: 137 MMOL/L (ref 136–145)
WBC # BLD AUTO: 9.42 K/UL (ref 4.8–10.8)

## 2017-02-21 RX ADMIN — CALCIUM ACETATE SCH MG: 667 CAPSULE ORAL at 08:44

## 2017-02-21 RX ADMIN — ATORVASTATIN CALCIUM SCH MG: 40 TABLET, FILM COATED ORAL at 08:44

## 2017-02-21 RX ADMIN — INSULIN GLARGINE SCH UNIT: 100 INJECTION, SOLUTION SUBCUTANEOUS at 08:48

## 2017-02-21 RX ADMIN — INSULIN ASPART SCH UNITS: 100 INJECTION, SOLUTION INTRAVENOUS; SUBCUTANEOUS at 17:50

## 2017-02-21 RX ADMIN — INSULIN GLARGINE SCH UNIT: 100 INJECTION, SOLUTION SUBCUTANEOUS at 20:38

## 2017-02-21 RX ADMIN — ACETAMINOPHEN PRN MG: 325 TABLET ORAL at 10:46

## 2017-02-21 RX ADMIN — INSULIN ASPART SCH UNITS: 100 INJECTION, SOLUTION INTRAVENOUS; SUBCUTANEOUS at 08:57

## 2017-02-21 RX ADMIN — INSULIN ASPART SCH UNITS: 100 INJECTION, SOLUTION INTRAVENOUS; SUBCUTANEOUS at 14:00

## 2017-02-21 RX ADMIN — PANTOPRAZOLE SCH MG: 40 TABLET, DELAYED RELEASE ORAL at 20:41

## 2017-02-21 RX ADMIN — ISOSORBIDE MONONITRATE SCH MG: 60 TABLET ORAL at 08:43

## 2017-02-21 RX ADMIN — CALCIUM ACETATE SCH MG: 667 CAPSULE ORAL at 16:43

## 2017-02-21 RX ADMIN — CALCIUM ACETATE SCH MG: 667 CAPSULE ORAL at 11:56

## 2017-02-21 RX ADMIN — INSULIN ASPART SCH UNITS: 100 INJECTION, SOLUTION INTRAVENOUS; SUBCUTANEOUS at 20:07

## 2017-02-21 RX ADMIN — Medication SCH MG: at 08:42

## 2017-02-21 RX ADMIN — SERTRALINE HYDROCHLORIDE SCH MG: 100 TABLET, FILM COATED ORAL at 20:39

## 2017-02-21 RX ADMIN — PANTOPRAZOLE SCH MG: 40 TABLET, DELAYED RELEASE ORAL at 08:45

## 2017-02-21 RX ADMIN — ISOSORBIDE MONONITRATE SCH MG: 30 TABLET, EXTENDED RELEASE ORAL at 08:43

## 2017-02-21 RX ADMIN — METOPROLOL TARTRATE SCH MG: 25 TABLET, FILM COATED ORAL at 20:41

## 2017-02-21 RX ADMIN — METOPROLOL TARTRATE SCH MG: 25 TABLET, FILM COATED ORAL at 08:44

## 2017-02-21 NOTE — ORTHOPEDIC CONSULTATION
DATE OF CONSULTATION:  02/15/2017

 

DATE OF CONSULTATION:  02/21/2017.  

 

HISTORY OF PRESENT ILLNESS:  The patient is a 51-year-old white female

diabetic, renal disease on dialysis who presents with new onset of right hip

and groin pain that occurred 2 days prior while she was stretching in bed. 

She felt a pop in her hip and groin area.

 

X-rays reviewed revealed evidence of bony or osseous abnormalities.  No

evidence of fractures noted.  No evidence of any type of pathologic fractures

noted.  Clinically she has pain, tenderness over the region of the greater

trochanter consistent with that of mild trochanteric bursitis.  I do not find

any other  physical findings or instability issues noted.  Due to her

diabetes and renal status, I am somewhat reluctant to inject her with 

corticosteroids as she relates this is somewhat better than what it was

yesterday.  She was able to ambulate and did physical therapy today.  We will

follow her for other day or so.  If it worsens we discussed consideration for

trochanteric bursa injection should this become necessary.  Will follow with

you.

 

ASSESSMENT:  New onset trochanteric bursitis, right hip.  Followup as noted.

## 2017-02-21 NOTE — PROGRESS NOTE
Medicine Progress Note


Date & Time of Visit:


Feb 21, 2017 at 14:40.


Subjective


patient seen resting in bed


states right hip pain is worse today with ambulating


no leg weakness or numbness


denies nausea, melena


other symptoms





Objective





Last 8 Hrs








  Date Time  Temp Pulse Resp B/P Pulse Ox O2 Delivery O2 Flow Rate FiO2


 


2/21/17 11:00      Room Air  


 


2/21/17 09:30      Room Air  


 


2/21/17 07:15 36.4 61 18 147/95 98 Room Air  








Physical Exam:


General- oriented x 3, not in distress





Lungs- clear breath sounds bilaterally, no rales/wheeze





Heart- normal rate, regular rhythm; no murmurs





Abdomen- normal bowel sounds, non distended, soft, nontender





Extremities- right great toe: (+) scaling, eschar on the nail border and dorsal 

aspect, no tenderness 


   Left thumb: erythema on the distal aspect


   right index finger: small eschar on the distal aspect, non tender


   no pretibial edema, no calf tenderness





Neuro- alert, oriented x 3;no gross focal deficits





Skin- warm & dry


Laboratory Results:





Last 24 Hours








Test


  2/20/17


17:19 2/20/17


20:09 2/21/17


05:44 2/21/17


07:47


 


Bedside Glucose 118 mg/dl  134 mg/dl   113 mg/dl 


 


White Blood Count   9.42 K/uL  


 


Red Blood Count   3.00 M/uL  


 


Hemoglobin   9.0 g/dL  


 


Hematocrit   28.7 %  


 


Mean Corpuscular Volume   95.7 fL  


 


Mean Corpuscular Hemoglobin   30.0 pg  


 


Mean Corpuscular Hemoglobin


Concent 


  


  31.4 g/dl 


  


 


 


Platelet Count   343 K/uL  


 


Mean Platelet Volume   10.1 fL  


 


Neutrophils (%) (Auto)   62.2 %  


 


Lymphocytes (%) (Auto)   25.8 %  


 


Monocytes (%) (Auto)   8.7 %  


 


Eosinophils (%) (Auto)   2.7 %  


 


Basophils (%) (Auto)   0.3 %  


 


Neutrophils # (Auto)   5.86 K/uL  


 


Lymphocytes # (Auto)   2.43 K/uL  


 


Monocytes # (Auto)   0.82 K/uL  


 


Eosinophils # (Auto)   0.25 K/uL  


 


Basophils # (Auto)   0.03 K/uL  


 


RDW Standard Deviation   55.8 fL  


 


RDW Coefficient of Variation   16.1 %  


 


Immature Granulocyte % (Auto)   0.3 %  


 


Immature Granulocyte # (Auto)   0.03 K/uL  


 


Sodium Level   137 mmol/L  


 


Potassium Level   4.6 mmol/L  


 


Chloride Level   99 mmol/L  


 


Carbon Dioxide Level   28 mmol/L  


 


Anion Gap   10.0 mmol/L  


 


Blood Urea Nitrogen   41 mg/dl  


 


Creatinine   7.20 mg/dl  


 


Est Creatinine Clear Calc


Drug Dose 


  


  11.5 ml/min 


  


 


 


Estimated GFR (


American) 


  


  7.0 


  


 


 


Estimated GFR (Non-


American 


  


  6.1 


  


 


 


BUN/Creatinine Ratio   5.7  


 


Random Glucose   112 mg/dl  


 


Calcium Level   8.7 mg/dl  














Test


  2/21/17


11:13 


  


  


 


 


Bedside Glucose 203 mg/dl    











Assessment & Plan


51 year old female with history of CHF Diastolic type, CAD s/p Stent, 


DM 2, ESRD presenting with hematemesis.





HEMATEMESIS


- EGD UNREVEALING


- no recurrence so far


- Hg 12--> 10.7--> remained stable around 9





- 2/16/17 


  s/p EGD: no source of bleeding found


  pathology:   


   A. DUODENUM, BIOPSY: NO DIAGNOSTIC ABNORMALITY. 


   B. STOMACH, BIOPSY: 


   1. MILD CHRONIC GASTRITIS. 


   2. NO SIGNIFICANT ACTIVITY (ACUTE INFLAMMATION) IDENTIFIED. 


   3. IMMUNOSTAIN FOR HELICOBACTER PYLORI: NEGATIVE. 


   C. GE JUNCTION, BIOPSY: 


   1. ULCERATED, IRREGULAR SQUAMOUS MUCOSA. 


   2. ORGANISM STAINS ARE PENDING. 


   3. SEE COMMENT. 


- continue Protonix BID


  diet resumed


  resumed Aspirin


- no recurrence of hematemesis since admission


  Hg stable





ELEVATED CARDIAC MARKERS


HISTORY OF CAD, STENT


- no cardiac symptoms, but levels higher than baseline


- EKG no signs of acute ischemia


- Cardiology consulted


- resume Aspirin


  continue Imdur, Metoprolol





ELEVATED LACTIC ACID


- resolved





RIGHT HIP PAIN


xray: no fractures or dislocation


increased pain today


consulted Ortho, awaiting recommendations


PT evals





CHF DIASTOLIC TYPE


euvolemic


stable





DM 2


Lantus


ISS


- monitor





ESRD


Dr. Davison consulted for HD- m/w/f





LEFT THUMB NUMBNESS, DISCOLORATION


likely small vessel disease related


consulted Dr. Virgen


outpatient ff up scheduled for 2/23/17 for Left arm AV fistula US





RIGHT GREAT TOE ESCHAR, SCALING


Wound care consulted





DVT prophylaxis


SCDs 


ambulation





Disposition


pending


lives at home 


will need home health services


possible dc in 1-2 days


Current Inpatient Medications:





 Current Inpatient Medications








 Medications


  (Trade)  Dose


 Ordered  Sig/Willy


 Route  Start Time


 Stop Time Status Last Admin


Dose Admin


 


 Insulin Glargine


  (Lantus Solostar


 Pen)  5 unit  BID


 SC  2/15/17 09:00


 3/21/17 08:59  2/21/17 08:48


5 UNIT


 


 Glucose


  (Glucose 40% Gel)  15-30


 GRAMS 15


 GRAMS...  UD  PRN


 PO  2/14/17 23:45


 3/16/17 23:44   


 


 


 Glucose


  (Glucose Chew


 Tab)  4-8


 Tablets 4


 Tabl...  UD  PRN


 PO  2/14/17 23:45


 3/16/17 23:44   


 


 


 Dextrose


  (Dextrose 50%


 50ML Syringe)  25-50ML OF


 50% DW IV


 FOR...  UD  PRN


 IV  2/14/17 23:45


 3/16/17 23:44   


 


 


 Glucagon


  (Glucagon Inj)  1 mg  UD  PRN


 SQ  2/14/17 23:45


 3/16/17 23:44   


 


 


 Hydromorphone HCl


  (Dilaudid Inj)  0.5 mg  Q3H  PRN


 IV  2/15/17 00:15


 3/1/17 00:14  2/15/17 00:20


0.5 MG


 


 Ondansetron HCl 4


 mg  4 mg  Q6H  PRN


 IV  2/15/17 00:15


 3/17/17 00:14  2/19/17 02:21


4 MG


 


 Promethazine HCl/


 Sodium Chloride


  (Phenergan Inj/


 Nss 50ml)  50.5 ml @ 


 204 mls/hr  Q6H  PRN


 IV  2/15/17 00:15


 3/17/17 00:14   


 


 


 Lorazepam


  (Ativan Inj)  0.5 mg  Q4H  PRN


 IV  2/15/17 00:15


 3/17/17 00:14   


 


 


 Miscellaneous


  (Iv Fluids


 Completed)  1 ea  PRN  PRN


 N/A  2/15/17 00:15


 2/15/18 00:14   


 


 


 Acetaminophen


  (Tylenol Tab)  650 mg  Q4H  PRN


 PO  2/15/17 00:15


 3/17/17 00:14  2/21/17 10:46


650 MG


 


 Nitroglycerin


  (Nitrostat Tab)  0.4 mg  UD  PRN


 SL  2/15/17 00:15


 3/17/17 00:14   


 


 


 Insulin Aspart


  (novoLOG ASPART)  **SLIDING


 SCALE**


 **If C...  ACHS


 SC  2/15/17 07:00


 3/17/17 06:59  2/21/17 14:00


4 UNITS


 


 Atorvastatin


 Calcium


  (Lipitor Tab)  40 mg  DAILY


 PO  2/15/17 09:00


 3/17/17 08:59  2/21/17 08:44


40 MG


 


 Calcium Acetate


  (Phoslo Cap)  667 mg  TIDM


 PO  2/15/17 07:30


 3/17/17 07:29  2/21/17 11:56


667 MG


 


 Isosorbide


 Mononitrate


  (Imdur Ext Rel


 Tab)  60 mg  QAM


 PO  2/15/17 09:00


 3/17/17 08:59  2/21/17 08:43


60 MG


 


 Isosorbide


 Mononitrate


  (Imdur Ext Rel


 Tab)  30 mg  QAM


 PO  2/15/17 09:00


 3/17/17 08:59  2/21/17 08:43


30 MG


 


 Tramadol HCl


  (Ultram Tab)  not


 relieved


 by tylenol @   Q6H  PRN


 PO  2/15/17 00:15


 3/17/17 00:14  2/20/17 20:08


50 MG


 


 Metoprolol


 Tartrate


  (Lopressor Tab)  50 mg  BID


 PO  2/15/17 09:00


 3/17/17 08:59  2/21/17 08:44


50 MG


 


 Menthol


  (Nice Jerad)  1 jerad  PRN  PRN


 PO  2/15/17 05:15


 3/17/17 05:14   


 


 


 Sertraline HCl


  (Zoloft Tab)  100 mg  HS


 PO  2/16/17 21:00


 3/18/17 20:59  2/20/17 20:09


100 MG


 


 Aspirin


  (Ecotrin Tab)  81 mg  DAILY


 PO  2/18/17 09:00


 3/20/17 08:59  2/21/17 08:42


81 MG


 


 Benzocaine


  (Orajel 2% Oral


 Gel)  1 appln  TID  PRN


 MT  2/17/17 17:30


 3/19/17 17:29   


 


 


 Pantoprazole


 Sodium


  (Protonix Tab)  40 mg  BID


 PO  2/19/17 21:00


 3/21/17 20:59  2/21/17 08:45


40 MG

## 2017-02-22 VITALS — HEART RATE: 58 BPM | DIASTOLIC BLOOD PRESSURE: 74 MMHG | SYSTOLIC BLOOD PRESSURE: 141 MMHG

## 2017-02-22 VITALS
HEART RATE: 70 BPM | DIASTOLIC BLOOD PRESSURE: 76 MMHG | OXYGEN SATURATION: 98 % | SYSTOLIC BLOOD PRESSURE: 157 MMHG | TEMPERATURE: 98.42 F

## 2017-02-22 VITALS — SYSTOLIC BLOOD PRESSURE: 116 MMHG | HEART RATE: 54 BPM | DIASTOLIC BLOOD PRESSURE: 68 MMHG

## 2017-02-22 VITALS — DIASTOLIC BLOOD PRESSURE: 61 MMHG | SYSTOLIC BLOOD PRESSURE: 133 MMHG | HEART RATE: 54 BPM

## 2017-02-22 VITALS — DIASTOLIC BLOOD PRESSURE: 86 MMHG | SYSTOLIC BLOOD PRESSURE: 156 MMHG | HEART RATE: 59 BPM | TEMPERATURE: 98.6 F

## 2017-02-22 VITALS — SYSTOLIC BLOOD PRESSURE: 182 MMHG | HEART RATE: 60 BPM | DIASTOLIC BLOOD PRESSURE: 106 MMHG

## 2017-02-22 VITALS — DIASTOLIC BLOOD PRESSURE: 92 MMHG | SYSTOLIC BLOOD PRESSURE: 173 MMHG | HEART RATE: 59 BPM

## 2017-02-22 VITALS
OXYGEN SATURATION: 95 % | HEART RATE: 65 BPM | DIASTOLIC BLOOD PRESSURE: 51 MMHG | TEMPERATURE: 98.42 F | SYSTOLIC BLOOD PRESSURE: 153 MMHG

## 2017-02-22 VITALS — DIASTOLIC BLOOD PRESSURE: 96 MMHG | HEART RATE: 62 BPM | SYSTOLIC BLOOD PRESSURE: 186 MMHG

## 2017-02-22 VITALS — SYSTOLIC BLOOD PRESSURE: 184 MMHG | DIASTOLIC BLOOD PRESSURE: 99 MMHG | HEART RATE: 65 BPM

## 2017-02-22 VITALS — HEART RATE: 53 BPM | DIASTOLIC BLOOD PRESSURE: 76 MMHG | SYSTOLIC BLOOD PRESSURE: 142 MMHG

## 2017-02-22 VITALS — DIASTOLIC BLOOD PRESSURE: 84 MMHG | HEART RATE: 54 BPM | SYSTOLIC BLOOD PRESSURE: 150 MMHG

## 2017-02-22 VITALS — SYSTOLIC BLOOD PRESSURE: 136 MMHG | HEART RATE: 58 BPM | DIASTOLIC BLOOD PRESSURE: 77 MMHG

## 2017-02-22 VITALS — SYSTOLIC BLOOD PRESSURE: 128 MMHG | HEART RATE: 67 BPM | DIASTOLIC BLOOD PRESSURE: 63 MMHG

## 2017-02-22 VITALS — SYSTOLIC BLOOD PRESSURE: 161 MMHG | HEART RATE: 60 BPM | DIASTOLIC BLOOD PRESSURE: 87 MMHG

## 2017-02-22 VITALS — DIASTOLIC BLOOD PRESSURE: 92 MMHG | SYSTOLIC BLOOD PRESSURE: 201 MMHG | HEART RATE: 63 BPM | TEMPERATURE: 97.52 F

## 2017-02-22 VITALS — HEART RATE: 59 BPM | SYSTOLIC BLOOD PRESSURE: 158 MMHG | DIASTOLIC BLOOD PRESSURE: 83 MMHG

## 2017-02-22 VITALS — HEART RATE: 76 BPM | SYSTOLIC BLOOD PRESSURE: 176 MMHG | DIASTOLIC BLOOD PRESSURE: 64 MMHG

## 2017-02-22 LAB
ANION GAP SERPL CALC-SCNC: 14 MMOL/L (ref 3–11)
BASOPHILS # BLD: 0.05 K/UL (ref 0–0.2)
BASOPHILS NFR BLD: 0.5 %
BUN SERPL-MCNC: 62 MG/DL (ref 7–18)
BUN/CREAT SERPL: 6.5 (ref 10–20)
CALCIUM SERPL-MCNC: 9.1 MG/DL (ref 8.5–10.1)
CHLORIDE SERPL-SCNC: 98 MMOL/L (ref 98–107)
CO2 SERPL-SCNC: 25 MMOL/L (ref 21–32)
COMPLETE: YES
CREAT CL PREDICTED SERPL C-G-VRATE: 8.7 ML/MIN
CREAT SERPL-MCNC: 9.5 MG/DL (ref 0.6–1.2)
EOSINOPHIL NFR BLD AUTO: 346 K/UL (ref 130–400)
GLUCOSE SERPL-MCNC: 125 MG/DL (ref 70–99)
HCT VFR BLD CALC: 28.2 % (ref 37–47)
IG%: 0.3 %
IMM GRANULOCYTES NFR BLD AUTO: 24.7 %
LYMPHOCYTES # BLD: 2.4 K/UL (ref 1.2–3.4)
MCH RBC QN AUTO: 31.4 PG (ref 25–34)
MCHC RBC AUTO-ENTMCNC: 32.3 G/DL (ref 32–36)
MCV RBC AUTO: 97.2 FL (ref 80–100)
MONOCYTES NFR BLD: 10.2 %
NEUTROPHILS # BLD AUTO: 3.9 %
NEUTROPHILS NFR BLD AUTO: 60.4 %
PMV BLD AUTO: 10.5 FL (ref 7.4–10.4)
POTASSIUM SERPL-SCNC: 4.9 MMOL/L (ref 3.5–5.1)
RBC # BLD AUTO: 2.9 M/UL (ref 4.2–5.4)
SODIUM SERPL-SCNC: 137 MMOL/L (ref 136–145)
WBC # BLD AUTO: 9.7 K/UL (ref 4.8–10.8)

## 2017-02-22 RX ADMIN — METOPROLOL TARTRATE SCH MG: 25 TABLET, FILM COATED ORAL at 13:48

## 2017-02-22 RX ADMIN — ISOSORBIDE MONONITRATE SCH MG: 30 TABLET, EXTENDED RELEASE ORAL at 13:47

## 2017-02-22 RX ADMIN — METOPROLOL TARTRATE SCH MG: 25 TABLET, FILM COATED ORAL at 20:30

## 2017-02-22 RX ADMIN — TRAMADOL HYDROCHLORIDE PRN MG: 50 TABLET, COATED ORAL at 19:34

## 2017-02-22 RX ADMIN — SERTRALINE HYDROCHLORIDE SCH MG: 100 TABLET, FILM COATED ORAL at 21:53

## 2017-02-22 RX ADMIN — INSULIN GLARGINE SCH UNIT: 100 INJECTION, SOLUTION SUBCUTANEOUS at 20:29

## 2017-02-22 RX ADMIN — ONDANSETRON PRN MG: 2 INJECTION INTRAMUSCULAR; INTRAVENOUS at 23:14

## 2017-02-22 RX ADMIN — INSULIN ASPART SCH UNITS: 100 INJECTION, SOLUTION INTRAVENOUS; SUBCUTANEOUS at 21:52

## 2017-02-22 RX ADMIN — INSULIN ASPART SCH UNITS: 100 INJECTION, SOLUTION INTRAVENOUS; SUBCUTANEOUS at 13:52

## 2017-02-22 RX ADMIN — Medication SCH MG: at 07:57

## 2017-02-22 RX ADMIN — ATORVASTATIN CALCIUM SCH MG: 40 TABLET, FILM COATED ORAL at 13:48

## 2017-02-22 RX ADMIN — INSULIN ASPART SCH UNITS: 100 INJECTION, SOLUTION INTRAVENOUS; SUBCUTANEOUS at 17:59

## 2017-02-22 RX ADMIN — CALCIUM ACETATE SCH MG: 667 CAPSULE ORAL at 17:25

## 2017-02-22 RX ADMIN — INSULIN GLARGINE SCH UNIT: 100 INJECTION, SOLUTION SUBCUTANEOUS at 08:06

## 2017-02-22 RX ADMIN — CALCIUM ACETATE SCH MG: 667 CAPSULE ORAL at 08:02

## 2017-02-22 RX ADMIN — PANTOPRAZOLE SCH MG: 40 TABLET, DELAYED RELEASE ORAL at 13:48

## 2017-02-22 RX ADMIN — CALCIUM ACETATE SCH MG: 667 CAPSULE ORAL at 13:46

## 2017-02-22 RX ADMIN — INSULIN ASPART SCH UNITS: 100 INJECTION, SOLUTION INTRAVENOUS; SUBCUTANEOUS at 08:52

## 2017-02-22 RX ADMIN — ISOSORBIDE MONONITRATE SCH MG: 60 TABLET ORAL at 13:47

## 2017-02-22 RX ADMIN — PANTOPRAZOLE SCH MG: 40 TABLET, DELAYED RELEASE ORAL at 20:29

## 2017-02-22 NOTE — PROGRESS NOTE
Internal Med Progress Note


Date of Service:


Feb 22, 2017.


Provider Documentation:





SUBJECTIVE:





The patient was seen and examined


Very tired following dialysis


Leg and finger hurt a lot 








OBJECTIVE:





Vital Signs-as noted below





Exam:


General-no distress at rest


Eyes-normal


ENT-normal


Neck-Supple


Lungs-Clear to ausucltate bilaterally 


Heart-Regular,no murmur


Abdomen-Benign,no masses,bowel sound present 


Extremities-No edema 


Neuro-AAOx3





Lab data as noted below.


ASSESSMENT & PLAN:








HEMATEMESIS


- EGD UNREVEALING


- no recurrence so far


- Hg 12--> 10.7--> remained stable around 9


- 2/16/17 


  s/p EGD: no source of bleeding found


  pathology:   


   A. DUODENUM, BIOPSY: NO DIAGNOSTIC ABNORMALITY. 


   B. STOMACH, BIOPSY: 


   1. MILD CHRONIC GASTRITIS. 


   2. NO SIGNIFICANT ACTIVITY (ACUTE INFLAMMATION) IDENTIFIED. 


   3. IMMUNOSTAIN FOR HELICOBACTER PYLORI: NEGATIVE. 


   C. GE JUNCTION, BIOPSY: 


   1. ULCERATED, IRREGULAR SQUAMOUS MUCOSA. 


   2. ORGANISM STAINS ARE PENDING. 


   3. SEE COMMENT. 


- continue Protonix BID


-diet resumed


-resumed Aspirin


- no recurrence of hematemesis since admission


-Hg stable and denies any symptoms





ELEVATED CARDIAC MARKERS


HISTORY OF CAD, STENT


- no cardiac symptoms, but levels higher than baseline


- EKG no signs of acute ischemia


- Cardiology consulted-appreciate input 


- resume Aspirin


-continue Imdur, Metoprolol








RIGHT HIP PAIN


xray: no fractures or dislocation


increased pain today


consulted Ortho -appreciate recommendation


pain is much improved 





CHF DIASTOLIC TYPE


euvolemic


no acute symptoms





DM 2


Lantus


ISS


Blood sugars stable





ESRD


Dr. Davison consulted for HD- m/w/f


Likely discharge tomorrow





LEFT THUMB NUMBNESS, DISCOLORATION


likely small vessel disease related


consulted Dr. Virgen


outpatient ff up scheduled for 2/23/17 for Left arm AV fistula US


Not any worse since admission





RIGHT GREAT TOE ESCHAR, SCALING


Wound care consulted





DVT prophylaxis


SCDs 


ambulation





Disposition


pending


lives at home 


will need home health services


possible dc in 1-2 days


Vital Signs:











  Date Time  Temp Pulse Resp B/P Pulse Ox O2 Delivery O2 Flow Rate FiO2


 


2/22/17 16:00      Nasal Cannula 2.0 


 


2/22/17 14:35 36.9 70 24 157/76 98 Room Air  


 


2/22/17 13:10 37.0 59  156/86    


 


2/22/17 12:15  60  161/87    


 


2/22/17 12:00  54  150/84    


 


2/22/17 11:45  59  158/83    


 


2/22/17 11:30  54  133/61    


 


2/22/17 11:15  58  136/77    


 


2/22/17 11:00  67  128/63    


 


2/22/17 10:45  54  116/68    


 


2/22/17 10:30  53  142/76    


 


2/22/17 10:15  58  141/74    


 


2/22/17 10:00  60  182/106    


 


2/22/17 09:45  59  173/92    


 


2/22/17 09:30  62  186/96    


 


2/22/17 09:23  65  184/99    


 


2/22/17 09:16 36.4 63  201/92    


 


2/22/17 08:44      Room Air  


 


2/22/17 07:30 36.9 65 18 153/51 95 Room Air  


 


2/22/17 00:00      Room Air  


 


2/21/17 22:58 37.1 69 18 173/84 93 Room Air  


 


2/21/17 20:00      Room Air  








Lab Results:





Results Past 24 Hours








Test


  2/21/17


19:44 2/22/17


05:23 2/22/17


07:05 2/22/17


13:34 Range/Units


 


 


Bedside Glucose 150  117 108 70-90  mg/dl


 


White Blood Count  9.70   4.8-10.8  K/uL


 


Red Blood Count  2.90   4.2-5.4  M/uL


 


Hemoglobin  9.1   12.0-16.0  g/dL


 


Hematocrit  28.2   37-47  %


 


Mean Corpuscular Volume  97.2     fL


 


Mean Corpuscular Hemoglobin  31.4   25-34  pg


 


Mean Corpuscular Hemoglobin


Concent 


  32.3


  


  


  32-36  g/dl


 


 


Platelet Count  346   130-400  K/uL


 


Mean Platelet Volume  10.5   7.4-10.4  fL


 


Neutrophils (%) (Auto)  60.4    %


 


Lymphocytes (%) (Auto)  24.7    %


 


Monocytes (%) (Auto)  10.2    %


 


Eosinophils (%) (Auto)  3.9    %


 


Basophils (%) (Auto)  0.5    %


 


Neutrophils # (Auto)  5.85   1.4-6.5  K/uL


 


Lymphocytes # (Auto)  2.40   1.2-3.4  K/uL


 


Monocytes # (Auto)  0.99   0.11-0.59  K/uL


 


Eosinophils # (Auto)  0.38   0-0.5  K/uL


 


Basophils # (Auto)  0.05   0-0.2  K/uL


 


RDW Standard Deviation  56.4   36.4-46.3  fL


 


RDW Coefficient of Variation  16.0   11.5-14.5  %


 


Immature Granulocyte % (Auto)  0.3    %


 


Immature Granulocyte # (Auto)  0.03   0.00-0.02  K/uL


 


Sodium Level  137   136-145  mmol/L


 


Potassium Level  4.9   3.5-5.1  mmol/L


 


Chloride Level  98     mmol/L


 


Carbon Dioxide Level  25   21-32  mmol/L


 


Anion Gap  14.0   3-11  mmol/L


 


Blood Urea Nitrogen  62   7-18  mg/dl


 


Creatinine


  


  9.50


  


  


  0.60-1.20


mg/dl


 


Est Creatinine Clear Calc


Drug Dose 


  8.7


  


  


   ml/min


 


 


Estimated GFR (


American) 


  5.0


  


  


   


 


 


Estimated GFR (Non-


American 


  4.3


  


  


   


 


 


BUN/Creatinine Ratio  6.5   10-20  


 


Random Glucose  125   70-99  mg/dl


 


Calcium Level  9.1   8.5-10.1  mg/dl














Test


  2/22/17


16:02 


  


  


  Range/Units


 


 


Bedside Glucose 161    70-90  mg/dl

## 2017-02-22 NOTE — NEPHROLOGY PROGRESS NOTE
Nephrology Progress Note


Date of Service:


Feb 22, 2017.


Subjective


50 yo female with esrd with frequent hospitilizations.  pt admitted with n/v 

with heme positive emesis. EGD was negative. pt eating and drinking well now 

however has right hip bursitis being followed by ortho. considering possible 

injection if pt does not improve.





Objective











  Date Time  Temp Pulse Resp B/P Pulse Ox O2 Delivery O2 Flow Rate FiO2


 


2/22/17 00:00      Room Air  


 


2/21/17 22:58 37.1 69 18 173/84 93 Room Air  


 


2/21/17 20:00      Room Air  


 


2/21/17 16:14 37.0 56 18 137/81 97 Room Air  


 


2/21/17 11:00      Room Air  


 


2/21/17 09:30      Room Air  


 


2/21/17 07:15 36.4 61 18 147/95 98 Room Air  








Physical Exam:


General-aaox3, obese


Eyes-no scleral icterus


ENT-mmm


Neck-supple


Lungs-cta


Heart-rrr


Abdomen-bs+ s/nt/nd


Extremities-no c/c/e


Neuro-nonfocal





 Current Inpatient Medications








 Medications


  (Trade)  Dose


 Ordered  Sig/Willy


 Route  Start Time


 Stop Time Status Last Admin


Dose Admin


 


 Insulin Glargine


  (Lantus Solostar


 Pen)  5 unit  BID


 SC  2/15/17 09:00


 3/21/17 08:59  2/21/17 20:38


5 UNIT


 


 Glucose


  (Glucose 40% Gel)  15-30


 GRAMS 15


 GRAMS...  UD  PRN


 PO  2/14/17 23:45


 3/16/17 23:44   


 


 


 Glucose


  (Glucose Chew


 Tab)  4-8


 Tablets 4


 Tabl...  UD  PRN


 PO  2/14/17 23:45


 3/16/17 23:44   


 


 


 Dextrose


  (Dextrose 50%


 50ML Syringe)  25-50ML OF


 50% DW IV


 FOR...  UD  PRN


 IV  2/14/17 23:45


 3/16/17 23:44   


 


 


 Glucagon


  (Glucagon Inj)  1 mg  UD  PRN


 SQ  2/14/17 23:45


 3/16/17 23:44   


 


 


 Hydromorphone HCl


  (Dilaudid Inj)  0.5 mg  Q3H  PRN


 IV  2/15/17 00:15


 3/1/17 00:14  2/15/17 00:20


0.5 MG


 


 Ondansetron HCl 4


 mg  4 mg  Q6H  PRN


 IV  2/15/17 00:15


 3/17/17 00:14  2/19/17 02:21


4 MG


 


 Promethazine HCl/


 Sodium Chloride


  (Phenergan Inj/


 Nss 50ml)  50.5 ml @ 


 204 mls/hr  Q6H  PRN


 IV  2/15/17 00:15


 3/17/17 00:14   


 


 


 Lorazepam


  (Ativan Inj)  0.5 mg  Q4H  PRN


 IV  2/15/17 00:15


 3/17/17 00:14   


 


 


 Miscellaneous


  (Iv Fluids


 Completed)  1 ea  PRN  PRN


 N/A  2/15/17 00:15


 2/15/18 00:14   


 


 


 Acetaminophen


  (Tylenol Tab)  650 mg  Q4H  PRN


 PO  2/15/17 00:15


 3/17/17 00:14  2/21/17 10:46


650 MG


 


 Nitroglycerin


  (Nitrostat Tab)  0.4 mg  UD  PRN


 SL  2/15/17 00:15


 3/17/17 00:14   


 


 


 Insulin Aspart


  (novoLOG ASPART)  **SLIDING


 SCALE**


 **If C...  ACHS


 SC  2/15/17 07:00


 3/17/17 06:59  2/21/17 17:50


4 UNITS


 


 Atorvastatin


 Calcium


  (Lipitor Tab)  40 mg  DAILY


 PO  2/15/17 09:00


 3/17/17 08:59  2/21/17 08:44


40 MG


 


 Calcium Acetate


  (Phoslo Cap)  667 mg  TIDM


 PO  2/15/17 07:30


 3/17/17 07:29  2/21/17 16:43


667 MG


 


 Isosorbide


 Mononitrate


  (Imdur Ext Rel


 Tab)  60 mg  QAM


 PO  2/15/17 09:00


 3/17/17 08:59  2/21/17 08:43


60 MG


 


 Isosorbide


 Mononitrate


  (Imdur Ext Rel


 Tab)  30 mg  QAM


 PO  2/15/17 09:00


 3/17/17 08:59  2/21/17 08:43


30 MG


 


 Tramadol HCl


  (Ultram Tab)  not


 relieved


 by tylenol @   Q6H  PRN


 PO  2/15/17 00:15


 3/17/17 00:14  2/20/17 20:08


50 MG


 


 Metoprolol


 Tartrate


  (Lopressor Tab)  50 mg  BID


 PO  2/15/17 09:00


 3/17/17 08:59  2/21/17 20:41


50 MG


 


 Menthol


  (Nice Jerad)  1 jerad  PRN  PRN


 PO  2/15/17 05:15


 3/17/17 05:14   


 


 


 Sertraline HCl


  (Zoloft Tab)  100 mg  HS


 PO  2/16/17 21:00


 3/18/17 20:59  2/21/17 20:39


100 MG


 


 Aspirin


  (Ecotrin Tab)  81 mg  DAILY


 PO  2/18/17 09:00


 3/20/17 08:59  2/21/17 08:42


81 MG


 


 Benzocaine


  (Orajel 2% Oral


 Gel)  1 appln  TID  PRN


 MT  2/17/17 17:30


 3/19/17 17:29   


 


 


 Pantoprazole


 Sodium


  (Protonix Tab)  40 mg  BID


 PO  2/19/17 21:00


 3/21/17 20:59  2/21/17 20:41


40 MG











Last 24 Hours








Test


  2/21/17


07:47 2/21/17


11:13 2/21/17


16:51 2/22/17


05:23


 


Bedside Glucose 113 mg/dl  203 mg/dl  116 mg/dl  


 


White Blood Count    9.70 K/uL 


 


Red Blood Count    2.90 M/uL 


 


Hemoglobin    9.1 g/dL 


 


Hematocrit    28.2 % 


 


Mean Corpuscular Volume    97.2 fL 


 


Mean Corpuscular Hemoglobin    31.4 pg 


 


Mean Corpuscular Hemoglobin


Concent 


  


  


  32.3 g/dl 


 


 


Platelet Count    346 K/uL 


 


Mean Platelet Volume    10.5 fL 


 


Neutrophils (%) (Auto)    60.4 % 


 


Lymphocytes (%) (Auto)    24.7 % 


 


Monocytes (%) (Auto)    10.2 % 


 


Eosinophils (%) (Auto)    3.9 % 


 


Basophils (%) (Auto)    0.5 % 


 


Neutrophils # (Auto)    5.85 K/uL 


 


Lymphocytes # (Auto)    2.40 K/uL 


 


Monocytes # (Auto)    0.99 K/uL 


 


Eosinophils # (Auto)    0.38 K/uL 


 


Basophils # (Auto)    0.05 K/uL 


 


RDW Standard Deviation    56.4 fL 


 


RDW Coefficient of Variation    16.0 % 


 


Immature Granulocyte % (Auto)    0.3 % 


 


Immature Granulocyte # (Auto)    0.03 K/uL 


 


Sodium Level    137 mmol/L 


 


Potassium Level    4.9 mmol/L 


 


Chloride Level    98 mmol/L 


 


Carbon Dioxide Level    25 mmol/L 


 


Anion Gap    14.0 mmol/L 


 


Blood Urea Nitrogen    62 mg/dl 


 


Creatinine    9.50 mg/dl 


 


Est Creatinine Clear Calc


Drug Dose 


  


  


  8.7 ml/min 


 


 


Estimated GFR (


American) 


  


  


  5.0 


 


 


Estimated GFR (Non-


American 


  


  


  4.3 


 


 


BUN/Creatinine Ratio    6.5 


 


Random Glucose    125 mg/dl 


 


Calcium Level    9.1 mg/dl 











Assessment & Plan


ESRD-for dialysis today. continue m-w-f dialysis. pt is over her dry weight and 

will try to be more aggressive with uf removal as tolerated. will go for 3 

liters today. 





Anemia of renal Failure-will continue procrit for goal hg of 10 to 11. hg 

levels are below goal and will redose 12k procrit today. 





IMANI-increased phosphate binders to 3 po tid with meals to help control phos 

levels better.  will also give dose of vitamin d analogue to help with her 

secondary hyperparathyroidism

## 2017-02-23 VITALS
HEART RATE: 59 BPM | DIASTOLIC BLOOD PRESSURE: 74 MMHG | SYSTOLIC BLOOD PRESSURE: 130 MMHG | OXYGEN SATURATION: 98 % | TEMPERATURE: 97.52 F

## 2017-02-23 VITALS
OXYGEN SATURATION: 96 % | DIASTOLIC BLOOD PRESSURE: 87 MMHG | HEART RATE: 62 BPM | SYSTOLIC BLOOD PRESSURE: 159 MMHG | TEMPERATURE: 98.06 F

## 2017-02-23 VITALS
DIASTOLIC BLOOD PRESSURE: 68 MMHG | TEMPERATURE: 98.24 F | HEART RATE: 56 BPM | OXYGEN SATURATION: 97 % | SYSTOLIC BLOOD PRESSURE: 121 MMHG

## 2017-02-23 RX ADMIN — INSULIN GLARGINE SCH UNIT: 100 INJECTION, SOLUTION SUBCUTANEOUS at 08:41

## 2017-02-23 RX ADMIN — INSULIN GLARGINE SCH UNIT: 100 INJECTION, SOLUTION SUBCUTANEOUS at 21:08

## 2017-02-23 RX ADMIN — METOPROLOL TARTRATE SCH MG: 25 TABLET, FILM COATED ORAL at 07:46

## 2017-02-23 RX ADMIN — METOPROLOL TARTRATE SCH MG: 25 TABLET, FILM COATED ORAL at 21:14

## 2017-02-23 RX ADMIN — TRAMADOL HYDROCHLORIDE PRN MG: 50 TABLET, COATED ORAL at 01:39

## 2017-02-23 RX ADMIN — ATORVASTATIN CALCIUM SCH MG: 40 TABLET, FILM COATED ORAL at 07:46

## 2017-02-23 RX ADMIN — SERTRALINE HYDROCHLORIDE SCH MG: 100 TABLET, FILM COATED ORAL at 21:09

## 2017-02-23 RX ADMIN — PANTOPRAZOLE SCH MG: 40 TABLET, DELAYED RELEASE ORAL at 07:46

## 2017-02-23 RX ADMIN — PANTOPRAZOLE SCH MG: 40 TABLET, DELAYED RELEASE ORAL at 21:07

## 2017-02-23 RX ADMIN — ISOSORBIDE MONONITRATE SCH MG: 30 TABLET, EXTENDED RELEASE ORAL at 07:46

## 2017-02-23 RX ADMIN — ISOSORBIDE MONONITRATE SCH MG: 60 TABLET ORAL at 07:47

## 2017-02-23 RX ADMIN — CALCIUM ACETATE SCH MG: 667 CAPSULE ORAL at 11:56

## 2017-02-23 RX ADMIN — INSULIN ASPART SCH UNITS: 100 INJECTION, SOLUTION INTRAVENOUS; SUBCUTANEOUS at 12:39

## 2017-02-23 RX ADMIN — INSULIN ASPART SCH UNITS: 100 INJECTION, SOLUTION INTRAVENOUS; SUBCUTANEOUS at 21:10

## 2017-02-23 RX ADMIN — INSULIN ASPART SCH UNITS: 100 INJECTION, SOLUTION INTRAVENOUS; SUBCUTANEOUS at 18:01

## 2017-02-23 RX ADMIN — CALCIUM ACETATE SCH MG: 667 CAPSULE ORAL at 17:30

## 2017-02-23 RX ADMIN — Medication SCH MG: at 07:47

## 2017-02-23 RX ADMIN — CALCIUM ACETATE SCH MG: 667 CAPSULE ORAL at 07:46

## 2017-02-23 RX ADMIN — INSULIN ASPART SCH UNITS: 100 INJECTION, SOLUTION INTRAVENOUS; SUBCUTANEOUS at 08:40

## 2017-02-23 NOTE — PROGRESS NOTE
Internal Med Progress Note


Date of Service:


Feb 23, 2017.


Provider Documentation:





SUBJECTIVE:





The patient was seen and examined


Very tired following dialysis


Not any better today 


Pain in finger and legs


Nausea and abdominal discomfort








OBJECTIVE:





Vital Signs-as noted below





Exam:


General-no distress at rest


Eyes-normal


ENT-normal


Neck-Supple


Lungs-Clear to ausucltate bilaterally 


Heart-Regular,no murmur


Abdomen-Benign,no masses,bowel sound present 


Extremities-No edema 


Neuro-AAOx3





Lab data as noted below.


ASSESSMENT & PLAN:


Nausea and Abdominal; Discomfort 


No distension


No vomiting


Received Zofran 


Will keep in Hospital 





HEMATEMESIS


- EGD UNREVEALING


- no recurrence so far


- Hg 12--> 10.7--> remained stable around 9


- 2/16/17 


  s/p EGD: no source of bleeding found


  pathology:   


   A. DUODENUM, BIOPSY: NO DIAGNOSTIC ABNORMALITY. 


   B. STOMACH, BIOPSY: 


   1. MILD CHRONIC GASTRITIS. 


   2. NO SIGNIFICANT ACTIVITY (ACUTE INFLAMMATION) IDENTIFIED. 


   3. IMMUNOSTAIN FOR HELICOBACTER PYLORI: NEGATIVE. 


   C. GE JUNCTION, BIOPSY: 


   1. ULCERATED, IRREGULAR SQUAMOUS MUCOSA. 


   2. ORGANISM STAINS ARE PENDING. 


   3. SEE COMMENT. 


- continue Protonix BID


-diet resumed


-resumed Aspirin


- no recurrence of hematemesis since admission


-Hb remains stable 





ELEVATED CARDIAC MARKERS


HISTORY OF CAD, STENT


- no cardiac symptoms, but levels higher than baseline


- EKG no signs of acute ischemia


- Cardiology consulted-appreciate input 


- resume Aspirin


-continue Imdur, Metoprolol








RIGHT HIP PAIN


xray: no fractures or dislocation


increased pain today


consulted Ortho -appreciate recommendation


pain is much improved 


Denies any more pain





CHF DIASTOLIC TYPE


euvolemic


no acute symptoms





DM 2


Lantus


ISS


Blood sugars stable





ESRD


Dr. Davison consulted for HD- m/w/f


Likely discharge tomorrow after HD 





LEFT THUMB NUMBNESS, DISCOLORATION


likely small vessel disease related


consulted Dr. Virgen


outpatient ff up scheduled for 2/23/17 for Left arm AV fistula US


Not any worse since admission





RIGHT GREAT TOE ESCHAR, SCALING


Wound care consulted





DVT prophylaxis


SCDs 


ambulation





Disposition


pending


lives at home 


will need home health services


possible discharge tomorrow


Vital Signs:











  Date Time  Temp Pulse Resp B/P Pulse Ox O2 Delivery O2 Flow Rate FiO2


 


2/23/17 08:00      Room Air  


 


2/23/17 07:23 36.4 59 20 130/74 98 Room Air  


 


2/23/17 00:22 36.7 62 20 159/87 96 Room Air  


 


2/23/17 00:00      Room Air  


 


2/22/17 20:31  76  176/64    


 


2/22/17 16:00      Nasal Cannula 2.0 








Lab Results:





Results Past 24 Hours








Test


  2/22/17


16:02 2/22/17


20:04 2/22/17


21:41 2/23/17


07:39 Range/Units


 


 


Bedside Glucose 161 104 133 97 70-90  mg/dl














Test


  2/23/17


11:22 


  


  


  Range/Units


 


 


Bedside Glucose 172    70-90  mg/dl

## 2017-02-24 VITALS — DIASTOLIC BLOOD PRESSURE: 87 MMHG | HEART RATE: 47 BPM | SYSTOLIC BLOOD PRESSURE: 130 MMHG

## 2017-02-24 VITALS — SYSTOLIC BLOOD PRESSURE: 211 MMHG | TEMPERATURE: 98.06 F | DIASTOLIC BLOOD PRESSURE: 109 MMHG | HEART RATE: 63 BPM

## 2017-02-24 VITALS — DIASTOLIC BLOOD PRESSURE: 105 MMHG | HEART RATE: 64 BPM | SYSTOLIC BLOOD PRESSURE: 212 MMHG

## 2017-02-24 VITALS — SYSTOLIC BLOOD PRESSURE: 196 MMHG | HEART RATE: 65 BPM | DIASTOLIC BLOOD PRESSURE: 108 MMHG

## 2017-02-24 VITALS — HEART RATE: 47 BPM | DIASTOLIC BLOOD PRESSURE: 74 MMHG | SYSTOLIC BLOOD PRESSURE: 138 MMHG

## 2017-02-24 VITALS — DIASTOLIC BLOOD PRESSURE: 101 MMHG | HEART RATE: 59 BPM | SYSTOLIC BLOOD PRESSURE: 165 MMHG

## 2017-02-24 VITALS — DIASTOLIC BLOOD PRESSURE: 78 MMHG | SYSTOLIC BLOOD PRESSURE: 127 MMHG | HEART RATE: 53 BPM

## 2017-02-24 VITALS — SYSTOLIC BLOOD PRESSURE: 158 MMHG | DIASTOLIC BLOOD PRESSURE: 88 MMHG | HEART RATE: 54 BPM | TEMPERATURE: 98.6 F

## 2017-02-24 VITALS — DIASTOLIC BLOOD PRESSURE: 96 MMHG | HEART RATE: 62 BPM | SYSTOLIC BLOOD PRESSURE: 171 MMHG

## 2017-02-24 VITALS — SYSTOLIC BLOOD PRESSURE: 144 MMHG | DIASTOLIC BLOOD PRESSURE: 80 MMHG | HEART RATE: 53 BPM

## 2017-02-24 VITALS — HEART RATE: 50 BPM | SYSTOLIC BLOOD PRESSURE: 159 MMHG | DIASTOLIC BLOOD PRESSURE: 95 MMHG

## 2017-02-24 VITALS — DIASTOLIC BLOOD PRESSURE: 72 MMHG | HEART RATE: 51 BPM | SYSTOLIC BLOOD PRESSURE: 127 MMHG

## 2017-02-24 VITALS
TEMPERATURE: 98.06 F | DIASTOLIC BLOOD PRESSURE: 80 MMHG | OXYGEN SATURATION: 98 % | SYSTOLIC BLOOD PRESSURE: 144 MMHG | HEART RATE: 53 BPM

## 2017-02-24 VITALS
HEART RATE: 65 BPM | TEMPERATURE: 98.06 F | DIASTOLIC BLOOD PRESSURE: 71 MMHG | SYSTOLIC BLOOD PRESSURE: 151 MMHG | OXYGEN SATURATION: 94 %

## 2017-02-24 VITALS
SYSTOLIC BLOOD PRESSURE: 163 MMHG | OXYGEN SATURATION: 98 % | TEMPERATURE: 97.7 F | HEART RATE: 55 BPM | DIASTOLIC BLOOD PRESSURE: 89 MMHG

## 2017-02-24 VITALS — HEART RATE: 53 BPM | DIASTOLIC BLOOD PRESSURE: 93 MMHG | SYSTOLIC BLOOD PRESSURE: 165 MMHG

## 2017-02-24 VITALS — HEART RATE: 50 BPM | DIASTOLIC BLOOD PRESSURE: 76 MMHG | SYSTOLIC BLOOD PRESSURE: 139 MMHG

## 2017-02-24 VITALS — SYSTOLIC BLOOD PRESSURE: 134 MMHG | DIASTOLIC BLOOD PRESSURE: 80 MMHG | HEART RATE: 50 BPM

## 2017-02-24 VITALS — SYSTOLIC BLOOD PRESSURE: 138 MMHG | DIASTOLIC BLOOD PRESSURE: 74 MMHG | HEART RATE: 47 BPM

## 2017-02-24 RX ADMIN — CALCIUM ACETATE SCH MG: 667 CAPSULE ORAL at 17:40

## 2017-02-24 RX ADMIN — METOPROLOL TARTRATE SCH MG: 25 TABLET, FILM COATED ORAL at 13:04

## 2017-02-24 RX ADMIN — ISOSORBIDE MONONITRATE SCH MG: 60 TABLET ORAL at 13:08

## 2017-02-24 RX ADMIN — ATORVASTATIN CALCIUM SCH MG: 40 TABLET, FILM COATED ORAL at 07:44

## 2017-02-24 RX ADMIN — INSULIN ASPART SCH UNITS: 100 INJECTION, SOLUTION INTRAVENOUS; SUBCUTANEOUS at 18:24

## 2017-02-24 RX ADMIN — Medication SCH MG: at 07:44

## 2017-02-24 RX ADMIN — INSULIN ASPART SCH UNITS: 100 INJECTION, SOLUTION INTRAVENOUS; SUBCUTANEOUS at 08:53

## 2017-02-24 RX ADMIN — HEPARIN SODIUM SCH UNIT: 1000 INJECTION, SOLUTION INTRAVENOUS; SUBCUTANEOUS at 13:11

## 2017-02-24 RX ADMIN — ISOSORBIDE MONONITRATE SCH MG: 30 TABLET, EXTENDED RELEASE ORAL at 13:05

## 2017-02-24 RX ADMIN — PANTOPRAZOLE SCH MG: 40 TABLET, DELAYED RELEASE ORAL at 07:44

## 2017-02-24 RX ADMIN — INSULIN ASPART SCH UNITS: 100 INJECTION, SOLUTION INTRAVENOUS; SUBCUTANEOUS at 13:08

## 2017-02-24 RX ADMIN — CALCIUM ACETATE SCH MG: 667 CAPSULE ORAL at 13:04

## 2017-02-24 RX ADMIN — CALCIUM ACETATE SCH MG: 667 CAPSULE ORAL at 07:44

## 2017-02-24 RX ADMIN — HEPARIN SODIUM SCH UNIT: 1000 INJECTION, SOLUTION INTRAVENOUS; SUBCUTANEOUS at 13:10

## 2017-02-24 RX ADMIN — INSULIN GLARGINE SCH UNIT: 100 INJECTION, SOLUTION SUBCUTANEOUS at 08:54

## 2017-02-24 NOTE — PROGRESS NOTE
Internal Med Progress Note


Date of Service:


Feb 24, 2017.


Provider Documentation:





SUBJECTIVE:





The patient was seen and examined


A little tired today following dialysis 


No other symptoms 








OBJECTIVE:





Vital Signs-as noted below





Exam:


General-no distress at rest


Eyes-normal


ENT-normal


Neck-Supple


Lungs-Clear to ausucltate bilaterally 


Heart-Regular,no murmur


Abdomen-Benign,no masses,bowel sound present 


Extremities-No edema 


Neuro-AAOx3





Lab data as noted below.


ASSESSMENT & PLAN:


Nausea and Abdominal; Discomfort 


No more nausea and or vomiting


Feels better following Dialysis 





HEMATEMESIS


- EGD UNREVEALING


- no recurrence so far


- Hg 12--> 10.7--> remained stable around 9


- 2/16/17 


  s/p EGD: no source of bleeding found


  pathology:   


   A. DUODENUM, BIOPSY: NO DIAGNOSTIC ABNORMALITY. 


   B. STOMACH, BIOPSY: 


   1. MILD CHRONIC GASTRITIS. 


   2. NO SIGNIFICANT ACTIVITY (ACUTE INFLAMMATION) IDENTIFIED. 


   3. IMMUNOSTAIN FOR HELICOBACTER PYLORI: NEGATIVE. 


   C. GE JUNCTION, BIOPSY: 


   1. ULCERATED, IRREGULAR SQUAMOUS MUCOSA. 


   2. ORGANISM STAINS ARE PENDING. 


   3. SEE COMMENT. 


- continue Protonix BID


-diet resumed


-resumed Aspirin


- no recurrence of hematemesis since admission


-Hb remains stable


-will discharge home today  





ELEVATED CARDIAC MARKERS


HISTORY OF CAD, STENT


- no cardiac symptoms, but levels higher than baseline


- EKG no signs of acute ischemia


- Cardiology consulted-appreciate input 


- resume Aspirin


-continue Imdur, Metoprolol








RIGHT HIP PAIN


xray: no fractures or dislocation


increased pain today


consulted Ortho -appreciate recommendation


pain is much improved 


Denies any more pain now 





CHF DIASTOLIC TYPE


euvolemic


no acute symptoms





DM 2


Lantus


ISS


Blood sugars stable





ESRD


Dr. Davison consulted for HD- m/w/f


Discharge home today 


Continue OP HD 





LEFT THUMB NUMBNESS, DISCOLORATION


likely small vessel disease related


consulted Dr. Virgen


outpatient ff up scheduled for 2/23/17 for Left arm AV fistula US


Not any worse since admission


Remains stable 





RIGHT GREAT TOE ESCHAR, SCALING


Wound care consulted


No issue with it now





DVT prophylaxis


SCDs 


ambulation





Disposition


pending


lives at home 


Discharge home today


Vital Signs:











  Date Time  Temp Pulse Resp B/P Pulse Ox O2 Delivery O2 Flow Rate FiO2


 


2/24/17 11:45  53  144/80    


 


2/24/17 11:30  50  159/95    


 


2/24/17 11:15  50  139/76    


 


2/24/17 11:01  47  138/74    


 


2/24/17 10:45  47  138/74    


 


2/24/17 10:30  50  134/80    


 


2/24/17 10:15  51  127/72    


 


2/24/17 10:00  47  130/87    


 


2/24/17 09:45  53  127/78    


 


2/24/17 09:30  53  165/93    


 


2/24/17 09:15  59  165/101    


 


2/24/17 09:00  62  171/96    


 


2/24/17 08:45  65  196/108    


 


2/24/17 08:40  64  212/105    


 


2/24/17 08:30 36.7 63  211/109    


 


2/24/17 08:30      Room Air  


 


2/24/17 07:49 36.5 55 20 163/89 98 Room Air  


 


2/24/17 01:06 36.7 65 20 151/71 94 Room Air  


 


2/24/17 00:00      Room Air  


 


2/23/17 20:00      Room Air  


 


2/23/17 16:30      Room Air  


 


2/23/17 16:22 36.8 56 18 121/68 97 Room Air  








Lab Results:





Results Past 24 Hours








Test


  2/23/17


16:48 2/23/17


20:21 2/24/17


07:52 2/24/17


11:42 Range/Units


 


 


Bedside Glucose 109 140 130 121 70-90  mg/dl

## 2017-02-24 NOTE — DIALYSIS PROGRESS NOTE
Nephrology Dialysis Note


Date of Service:


Feb 24, 2017.


Subjective


eating well; no n/v; not dyspneic and no cough; no chest pain; no edema; 

vascular lesions improving





Objective











  Date Time  Temp Pulse Resp B/P Pulse Ox O2 Delivery O2 Flow Rate FiO2


 


2/24/17 08:30 36.7 63  211/109    


 


2/24/17 07:49 36.5 55 20 163/89 98 Room Air  


 


2/24/17 01:06 36.7 65 20 151/71 94 Room Air  


 


2/24/17 00:00      Room Air  


 


2/23/17 20:00      Room Air  


 


2/23/17 16:30      Room Air  


 


2/23/17 16:22 36.8 56 18 121/68 97 Room Air  








Physical Exam:


General-aaox3, obese, RA


Eyes-no scleral icterus


ENT-mmm


Neck-supple


Lungs-ctab, diminished


Heart-rrr


Abdomen-bs+ s/nt/nd


Extremities-no c/c/e; L AVF; lesions on BLUE digits all look improved


Neuro-centeno, fluent speech





 Current Inpatient Medications








 Medications


  (Trade)  Dose


 Ordered  Sig/Willy


 Route  Start Time


 Stop Time Status Last Admin


Dose Admin


 


 Insulin Glargine


  (Lantus Solostar


 Pen)  5 unit  BID


 SC  2/15/17 09:00


 3/21/17 08:59  2/24/17 08:54


5 UNIT


 


 Glucose


  (Glucose 40% Gel)  15-30


 GRAMS 15


 GRAMS...  UD  PRN


 PO  2/14/17 23:45


 3/16/17 23:44   


 


 


 Glucose


  (Glucose Chew


 Tab)  4-8


 Tablets 4


 Tabl...  UD  PRN


 PO  2/14/17 23:45


 3/16/17 23:44   


 


 


 Dextrose


  (Dextrose 50%


 50ML Syringe)  25-50ML OF


 50% DW IV


 FOR...  UD  PRN


 IV  2/14/17 23:45


 3/16/17 23:44   


 


 


 Glucagon


  (Glucagon Inj)  1 mg  UD  PRN


 SQ  2/14/17 23:45


 3/16/17 23:44   


 


 


 Hydromorphone HCl


  (Dilaudid Inj)  0.5 mg  Q3H  PRN


 IV  2/15/17 00:15


 3/1/17 00:14  2/15/17 00:20


0.5 MG


 


 Ondansetron HCl 4


 mg  4 mg  Q6H  PRN


 IV  2/15/17 00:15


 3/17/17 00:14  2/22/17 23:14


4 MG


 


 Promethazine HCl/


 Sodium Chloride


  (Phenergan Inj/


 Nss 50ml)  50.5 ml @ 


 204 mls/hr  Q6H  PRN


 IV  2/15/17 00:15


 3/17/17 00:14   


 


 


 Lorazepam


  (Ativan Inj)  0.5 mg  Q4H  PRN


 IV  2/15/17 00:15


 3/17/17 00:14   


 


 


 Miscellaneous


  (Iv Fluids


 Completed)  1 ea  PRN  PRN


 N/A  2/15/17 00:15


 2/15/18 00:14   


 


 


 Acetaminophen


  (Tylenol Tab)  650 mg  Q4H  PRN


 PO  2/15/17 00:15


 3/17/17 00:14  2/21/17 10:46


650 MG


 


 Nitroglycerin


  (Nitrostat Tab)  0.4 mg  UD  PRN


 SL  2/15/17 00:15


 3/17/17 00:14   


 


 


 Insulin Aspart


  (novoLOG ASPART)  **SLIDING


 SCALE**


 **If C...  ACHS


 SC  2/15/17 07:00


 3/17/17 06:59  2/24/17 08:53


5 UNITS


 


 Atorvastatin


 Calcium


  (Lipitor Tab)  40 mg  DAILY


 PO  2/15/17 09:00


 3/17/17 08:59  2/24/17 07:44


40 MG


 


 Isosorbide


 Mononitrate


  (Imdur Ext Rel


 Tab)  60 mg  QAM


 PO  2/15/17 09:00


 3/17/17 08:59  2/23/17 07:47


60 MG


 


 Isosorbide


 Mononitrate


  (Imdur Ext Rel


 Tab)  30 mg  QAM


 PO  2/15/17 09:00


 3/17/17 08:59  2/23/17 07:46


30 MG


 


 Tramadol HCl


  (Ultram Tab)  not


 relieved


 by tylenol @   Q6H  PRN


 PO  2/15/17 00:15


 3/17/17 00:14  2/23/17 01:39


50 MG


 


 Metoprolol


 Tartrate


  (Lopressor Tab)  50 mg  BID


 PO  2/15/17 09:00


 3/17/17 08:59  2/23/17 21:14


50 MG


 


 Menthol


  (Nice Jerad)  1 jerad  PRN  PRN


 PO  2/15/17 05:15


 3/17/17 05:14   


 


 


 Sertraline HCl


  (Zoloft Tab)  100 mg  HS


 PO  2/16/17 21:00


 3/18/17 20:59  2/23/17 21:09


100 MG


 


 Aspirin


  (Ecotrin Tab)  81 mg  DAILY


 PO  2/18/17 09:00


 3/20/17 08:59  2/24/17 07:44


81 MG


 


 Benzocaine


  (Orajel 2% Oral


 Gel)  1 appln  TID  PRN


 MT  2/17/17 17:30


 3/19/17 17:29   


 


 


 Pantoprazole


 Sodium


  (Protonix Tab)  40 mg  BID


 PO  2/19/17 21:00


 3/21/17 20:59  2/24/17 07:44


40 MG


 


 Calcium Acetate


  (Phoslo Cap)  2,001 mg  TIDM


 PO  2/22/17 08:00


 3/24/17 07:59  2/24/17 07:44


2,001 MG


 


 Heparin Sodium


  (Porcine)


  (Heparin Iv


 Bolus)  1,000 unit  TODAY@0800


 IV  2/24/17 08:00


 2/24/17 23:59   


 


 


 Heparin Sodium


  (Porcine) 400 unit  400 unit  TODAY@0800,0900,1000


 IV  2/24/17 08:00


 2/24/17 23:59   


 


 


 Iron Sucrose 100


 mg/Syringe  5 ml @ 1


 mls/min  TODAY@0800


 IV  2/24/17 08:00


 2/24/17 23:59   


 


 


 Epoetin Narayan/


 Syringe


  (Procrit Inj/


 Syringe)  0.6 ml @ 1


 mls/min  TODAY@0800


 IV.  2/24/17 08:00


 2/24/17 23:59   


 











Last 24 Hours








Test


  2/23/17


11:22 2/23/17


16:48 2/23/17


20:21 2/24/17


07:52


 


Bedside Glucose 172 mg/dl  109 mg/dl  140 mg/dl  130 mg/dl 











Assessment & Plan


50 yo female with esrd with frequent hospitilizations.  pt admitted with n/v 

with heme positive emesis. EGD was negative. pt eating and drinking well; right 

hip bursitis improving





ESRD-for dialysis today. continue m-w-f dialysis. will go for 4 liters today d/

t HTN, wt





Anemia of renal Failure-will continue procrit for goal hg of 10 to 11. redose 

12k procrit today. 





IMANI- cont increased phosphate binders to 3 po tid with meals

## 2017-02-24 NOTE — DISCHARGE INSTRUCTIONS
Discharge Instructions


Admission


Reason for Admission:  Upper Gi Bleed





Discharge


Discharge Diagnosis / Problem:  Hematemesis-Resolved and Negative EGD,ESRF on HD





Discharge Goals


Goal(s):  Prevent Disease Progression





Activity Recommendations


Activity Limitations:  resume your previous activity





.





Instructions / Follow-Up


Instructions / Follow-Up


Dr Jiang on 2/28/17 at 10:10AM.Keep doing OP Hemodialysis





Current Hospital Diet


Patient's current hospital diet: Low Fat Diet, Diabetes Type 1 Diet





Discharge Diet


Recommended Diet:  Diabetes Type 2 Diet, Renal Diet


Fluid Restriction:  1500 ml (6 cups)





Pending Studies


Studies pending at discharge:  no





Laboratory Results





 Hemoglobin A1c








Test


  2/14/17


21:20 Range/Units


 


 


Estimated Average Glucose 157   mg/dl


 


Hemoglobin A1c 7.1 H 4.5-5.6  %











Medical Emergencies








.


Who to Call and When:





Medical Emergencies:  If at any time you feel your situation is an emergency, 

please call 911 immediately.





.





Non-Emergent Contact


Non-Emergency issues call your:  Primary Care Provider





.


.





"Provider Documentation" section prepared by Radha Merida.





VTE Core Measure


Inpt VTE Proph given/why not?:  Unfractionated heparin SQ

## 2017-02-25 NOTE — DISCHARGE SUMMARY
Discharge Summary


Date of Service


Feb 25, 2017.





Discharge Summary


Admission Date:


Feb 15, 2017 at 07:03


Discharge Date:  Feb 24, 2017


Discharge Disposition:  Home


Principal Diagnosis:


 Hematemesis-Resolved and Negative EGD,ESRD on HD


Secondary Diagnoses/Problems:


Please see H&P


Consultations:


Nephrology





Medication Reconciliation


Continued Medications:  


Aspirin (Aspirin Ec) 81 Mg Tab


81 MG PO DAILY





Atorvastatin (Lipitor) 20 Mg Tab


40 MG PO DAILY, TAB





Calcium Acetate (Phoslo 667 Mg) 667 Mg Cap


1 CAP PO WM, CAP





Clopidogrel Bisulfate (Plavix) 75 Mg Tab


1 TAB PO DAILY for 90 Days, #90 TAB 1 Refill





Insulin Glargine (Lantus Solostar) 100 Unit/Ml Inj


10 UNITS SC QPM, PEN





Isosorbide Mononitrate (Isosorbide Mononitrate ER) 30 Mg Tabcr


60 MG PO QAM





Isosorbide Mononitrate Ext Rel (Imdur Ext Rel) 30 Mg Ertab


30 MG PO QAM, TAB





Lorazepam (Ativan) 1 Mg Tab


0.5 MG PO DAILY PRN for Anxiety, TAB





Methocarbamol (Methocarbamol) 500 Mg Tab


500 MG PO BID PRN for Muscle Spasms





Metoprolol Tartrate (Lopressor) 25 Mg Tab


50 MG PO BID, TAB





Ranitidine Hcl (Zantac) 150 Mg Tab


300 MG PO HS, TAB





Sertraline HCl (Sertraline HCl) 50 Mg Tab


2 TAB PO DAILY for 30 Days, #60 TAB 5 Refills





Tramadol (Ultram) 50 Mg Tab


1 TAB PO TID PRN for Pain for 30 Days, #90 TAB











Admission Information


HPI (per Admitting provider):


DATE OF ADMISSION:  02/14/2017


 


PRIMARY CARE PHYSICIAN:  Dr. Smith.


 


CHIEF COMPLAINT:  Hematemesis.


 


HISTORY OF PRESENT ILLNESS:  


Medical history significant for chronic diastolic heart failure, EF 65%, 


CAD status post stenting, past tobacco abuse, DM2 insulin requiring, 


end-stage renal disease on hemodialysis, chronic anemia, baseline hemoglobin 10-

11,


GERD as per records.  





Recent confinement last week for hematemesis.  


As per records, patient refused EGD.  No endoscopy done as hemoglobin stable 

and 


GI bleed resolved.


Patient subsequently discharged.  


Patient also discharged on prednisone taper for right hand pain, possible gout.


  


Yesterday, patient had epigastric discomfort, achy going to her chest.  


with some shortness of breath.  


Patient subsequently had bloody emesis.


Denies black/bloody stools. 





At the Emergency Room, the patient received Protonix bolus for GI bleed.


 


MEDICAL HISTORY:  As above.


Hemodialysis Monday, Wednesday, Friday.  





SURGERIES:  Vascular procedures.


 


HOME MEDICATIONS:  Include aspirin, Lipitor, Plavix, PhosLo, Lantus, Imdur


ER, isosorbide mononitrate, Ativan, Lopressor, Zantac, sertraline, tramadol.


 


ALLERGIES:  ADHESIVE, POLLEN EXTRACT.


 


FAMILY HISTORY:  There is a family history of heart disease, diabetes.


 


PERSONAL AND SOCIAL HISTORY:  Past tobacco abuse.  No chronic ETOH intake. 


Disabled.


 


REVIEW OF SYSTEMS:  As per HPI. all other ROS negative.


 


PHYSICAL EXAMINATION:


VITAL SIGNS:  Blood pressure was noted to be initially SBP 220s later 178/106, 

pulse


rate 79, RR 19, sats 102 liters.


GENERAL:  Noted to obese, chronically ill.  No respiratory distress.


SKIN:  Sallow.


HEENT: Pale palpebral conjunctivae. dry buccal mucosa


NECK:  Short neck.


LUNGS:  Decreased breath sounds.


HEART:  Regular rate and rhythm.


ABDOMEN:  Epigastric tenderness.


EXTREMITIES:  Minimal LE edema. minimal R hand edema w/ dressing


NEUROLOGIC:  No gross focality.


 


LABS:  Hemoglobin was noted to be 12.4, hematocrit 37.1, white cell count was


noted to be 12, platelets 389.  Sodium 135, potassium 5.1, chloride 96, CO2


22, BUN 48, creatinine 7.8, glucose was noted to be 231.  Troponin 0.444. 





EKG rate 105, sinus tachycardia, nonspecific T-wave abnormalities in the 

inferior leads.





Chest x-ray showed stable cardiomegaly.  





Gallbladder ultrasound initial read cholelithiasis, no jaye


cholecystitis. 





Hemoglobin A1c was 7.4 last November 2016. 





ASSESSMENT:


1.  Recurrent upper gastrointestinal bleeding.  


possible etiologies include ; esophagitis, gastritis, MWT, PUD, tumor


hemodynamically stable.


2.  hx chronic diastolic HF, px on the dry side


3.  hx CAD sp stenting


4.  troponinemia secondary to hypertensive urgency secondary to illness, CKD


5.  DM2 insulin requiring, reasonable control of recent HgA1c.  


elevated likely 2 to stress and outpx steroid course.


6.  ESRD on HD


7.  Rt hand pain (possible gout as per records of recent confinement), improved 

on outpx steroid treatment.


8.  Past tobacco abuse 


 


PLAN: 


Observation 


PCU.  


ff HH, transfuse PRBC if less than 8. (hx CAD)


appropriate to hold home ASA, Plavix for now


IV PPI.  


GI consult RE recurrent UGIB.  


Patient amenable to endoscopy if necessary





Facilitate home blood pressure meds and analgesia.


ff trops





Nephrology consult RE dialysis management





basal insulin, ISS BG goal 140-180.  





DVT prophylaxis, SCDs.  RE GI bleed. 





Full code.





Hospital Course


Nausea and Abdominal; Discomfort 


No more nausea and or vomiting


Feels better following Dialysis 





HEMATEMESIS


- EGD UNREVEALING


- no recurrence so far


- Hg 12--> 10.7--> remained stable around 9


- 2/16/17 


  s/p EGD: no source of bleeding found


  pathology:   


   A. DUODENUM, BIOPSY: NO DIAGNOSTIC ABNORMALITY. 


   B. STOMACH, BIOPSY: 


   1. MILD CHRONIC GASTRITIS. 


   2. NO SIGNIFICANT ACTIVITY (ACUTE INFLAMMATION) IDENTIFIED. 


   3. IMMUNOSTAIN FOR HELICOBACTER PYLORI: NEGATIVE. 


   C. GE JUNCTION, BIOPSY: 


   1. ULCERATED, IRREGULAR SQUAMOUS MUCOSA. 


   2. ORGANISM STAINS ARE PENDING. 


   3. SEE COMMENT. 


- continue Protonix BID


-diet resumed


-resumed Aspirin


- no recurrence of hematemesis since admission


-Hb remains stable


-will discharge home today  





ELEVATED CARDIAC MARKERS


HISTORY OF CAD, STENT


- no cardiac symptoms, but levels higher than baseline


- EKG no signs of acute ischemia


- Cardiology consulted-appreciate input 


- resume Aspirin


-continue Imdur, Metoprolol








RIGHT HIP PAIN


xray: no fractures or dislocation


increased pain today


consulted Ortho -appreciate recommendation


pain is much improved 


Denies any more pain now 





CHF DIASTOLIC TYPE


euvolemic


no acute symptoms





DM 2


Lantus


ISS


Blood sugars stable





ESRD


Dr. Davison consulted for HD- m/w/f


Discharge home today 


Continue OP HD 





LEFT THUMB NUMBNESS, DISCOLORATION


likely small vessel disease related


consulted Dr. Virgen


outpatient ff up scheduled for 2/23/17 for Left arm AV fistula US


Not any worse since admission


Remains stable 





RIGHT GREAT TOE ESCHAR, SCALING


Wound care consulted


No issue with it now





DVT prophylaxis


SCDs 


ambulation





Disposition


pending


lives at home 


Discharge home today


Total time spent on discharge = 35 minutes 


This includes examination of the patient, discharge planning, medication 

reconciliation, and communication with other providers.





Discharge Instructions


Admission


Reason for Admission:  Upper Gi Bleed





Discharge


Discharge Diagnosis / Problem:  Hematemesis-Resolved and Negative EGD,ESRF on HD





Discharge Goals


Goal(s):  Prevent Disease Progression





Activity Recommendations


Activity Limitations:  resume your previous activity





.





Instructions / Follow-Up


Instructions / Follow-Up


Dr Jiang on 2/28/17 at 10:10AM.Keep doing OP Hemodialysis





Current Hospital Diet


Patient's current hospital diet: Low Fat Diet, Diabetes Type 1 Diet





Discharge Diet


Recommended Diet:  Diabetes Type 2 Diet, Renal Diet


Fluid Restriction:  1500 ml (6 cups)





Pending Studies


Studies pending at discharge:  no





Laboratory Results





 Hemoglobin A1c








Test


  2/14/17


21:20 Range/Units


 


 


Estimated Average Glucose 157   mg/dl


 


Hemoglobin A1c 7.1 H 4.5-5.6  %











Medical Emergencies








.


Who to Call and When:





Medical Emergencies:  If at any time you feel your situation is an emergency, 

please call 911 immediately.





.





Non-Emergent Contact


Non-Emergency issues call your:  Primary Care Provider





.


.





"Provider Documentation" section prepared by Radha Merida.





VTE Core Measure


Inpt VTE Proph given/why not?:  Unfractionated heparin SQ











<Electronically signed by Radha Merida M.D.>





  Signed:  02/24/17 [id number]





Additional Copies To


Lurdes Smith D.O.

## 2017-03-02 NOTE — ANESTHESIOLOGY PROGRESS NOTE
Anesthesia Post Op Note


Date & Time


Mar 2, 2017 at 13:05





Vital Signs


Pain Intensity:  0.0





Notes


Mental Status:  alert / awake / arousable, participated in evaluation


Pt Amnestic to Procedure:  Yes


Nausea / Vomiting:  adequately controlled


Pain:  adequately controlled


Airway Patency, RR, SpO2:  stable & adequate


BP & HR:  stable & adequate


Hydration State:  stable & adequate


Anesthetic Complications:  no major complications apparent

## 2017-03-04 ENCOUNTER — HOSPITAL ENCOUNTER (INPATIENT)
Dept: HOSPITAL 45 - C.EDB | Age: 52
LOS: 7 days | Discharge: HOME | DRG: 638 | End: 2017-03-11
Attending: INTERNAL MEDICINE | Admitting: HOSPITALIST
Payer: COMMERCIAL

## 2017-03-04 VITALS
BODY MASS INDEX: 40.8 KG/M2 | BODY MASS INDEX: 40.8 KG/M2 | WEIGHT: 238.98 LBS | HEIGHT: 64 IN | HEIGHT: 64 IN | WEIGHT: 238.98 LBS

## 2017-03-04 VITALS
TEMPERATURE: 98.06 F | SYSTOLIC BLOOD PRESSURE: 179 MMHG | DIASTOLIC BLOOD PRESSURE: 113 MMHG | HEART RATE: 102 BPM | OXYGEN SATURATION: 100 %

## 2017-03-04 DIAGNOSIS — D63.8: ICD-10-CM

## 2017-03-04 DIAGNOSIS — I74.3: ICD-10-CM

## 2017-03-04 DIAGNOSIS — Z99.2: ICD-10-CM

## 2017-03-04 DIAGNOSIS — E11.52: ICD-10-CM

## 2017-03-04 DIAGNOSIS — Z82.49: ICD-10-CM

## 2017-03-04 DIAGNOSIS — E78.5: ICD-10-CM

## 2017-03-04 DIAGNOSIS — I12.0: ICD-10-CM

## 2017-03-04 DIAGNOSIS — Z87.891: ICD-10-CM

## 2017-03-04 DIAGNOSIS — Z79.4: ICD-10-CM

## 2017-03-04 DIAGNOSIS — F32.9: ICD-10-CM

## 2017-03-04 DIAGNOSIS — N18.6: ICD-10-CM

## 2017-03-04 DIAGNOSIS — N25.0: ICD-10-CM

## 2017-03-04 DIAGNOSIS — Z98.61: ICD-10-CM

## 2017-03-04 DIAGNOSIS — E11.69: Primary | ICD-10-CM

## 2017-03-04 DIAGNOSIS — B95.8: ICD-10-CM

## 2017-03-04 DIAGNOSIS — K21.9: ICD-10-CM

## 2017-03-04 DIAGNOSIS — Z79.82: ICD-10-CM

## 2017-03-04 DIAGNOSIS — I65.22: ICD-10-CM

## 2017-03-04 DIAGNOSIS — Z83.3: ICD-10-CM

## 2017-03-04 DIAGNOSIS — I25.10: ICD-10-CM

## 2017-03-04 DIAGNOSIS — M86.172: ICD-10-CM

## 2017-03-04 DIAGNOSIS — E66.01: ICD-10-CM

## 2017-03-04 DIAGNOSIS — A04.7: ICD-10-CM

## 2017-03-04 DIAGNOSIS — I50.42: ICD-10-CM

## 2017-03-04 DIAGNOSIS — I73.9: ICD-10-CM

## 2017-03-04 LAB
ANION GAP SERPL CALC-SCNC: 13 MMOL/L (ref 3–11)
BASOPHILS # BLD: 0.06 K/UL (ref 0–0.2)
BASOPHILS NFR BLD: 0.6 %
BUN SERPL-MCNC: 33 MG/DL (ref 7–18)
BUN/CREAT SERPL: 4.6 (ref 10–20)
CALCIUM SERPL-MCNC: 9.4 MG/DL (ref 8.5–10.1)
CHLORIDE SERPL-SCNC: 93 MMOL/L (ref 98–107)
CO2 SERPL-SCNC: 30 MMOL/L (ref 21–32)
COMPLETE: YES
CREAT CL PREDICTED SERPL C-G-VRATE: 11.3 ML/MIN
CREAT SERPL-MCNC: 7.2 MG/DL (ref 0.6–1.2)
EOSINOPHIL NFR BLD AUTO: 388 K/UL (ref 130–400)
GLUCOSE SERPL-MCNC: 239 MG/DL (ref 70–99)
HCT VFR BLD CALC: 34.8 % (ref 37–47)
IG%: 0.1 %
IMM GRANULOCYTES NFR BLD AUTO: 17.6 %
LYMPHOCYTES # BLD: 1.65 K/UL (ref 1.2–3.4)
MCH RBC QN AUTO: 29.7 PG (ref 25–34)
MCHC RBC AUTO-ENTMCNC: 31.6 G/DL (ref 32–36)
MCV RBC AUTO: 94.1 FL (ref 80–100)
MONOCYTES NFR BLD: 6.8 %
NEUTROPHILS # BLD AUTO: 3.6 %
NEUTROPHILS NFR BLD AUTO: 71.3 %
PMV BLD AUTO: 10.2 FL (ref 7.4–10.4)
POTASSIUM SERPL-SCNC: 3.8 MMOL/L (ref 3.5–5.1)
RBC # BLD AUTO: 3.7 M/UL (ref 4.2–5.4)
SODIUM SERPL-SCNC: 136 MMOL/L (ref 136–145)
WBC # BLD AUTO: 9.39 K/UL (ref 4.8–10.8)

## 2017-03-04 RX ADMIN — HEPARIN SODIUM SCH UNIT: 10000 INJECTION, SOLUTION INTRAVENOUS; SUBCUTANEOUS at 20:26

## 2017-03-04 RX ADMIN — INSULIN ASPART SCH UNITS: 100 INJECTION, SOLUTION INTRAVENOUS; SUBCUTANEOUS at 20:21

## 2017-03-04 RX ADMIN — RANITIDINE SCH MG: 150 TABLET ORAL at 20:14

## 2017-03-04 RX ADMIN — LORAZEPAM PRN MG: 0.5 TABLET ORAL at 22:16

## 2017-03-04 RX ADMIN — METRONIDAZOLE SCH MG: 500 TABLET ORAL at 21:58

## 2017-03-04 RX ADMIN — CALCIUM ACETATE SCH MG: 667 CAPSULE ORAL at 20:15

## 2017-03-04 RX ADMIN — METOPROLOL TARTRATE SCH MG: 50 TABLET, FILM COATED ORAL at 20:15

## 2017-03-04 RX ADMIN — TRAMADOL HYDROCHLORIDE PRN MG: 50 TABLET, COATED ORAL at 22:17

## 2017-03-04 RX ADMIN — METRONIDAZOLE SCH MG: 500 TABLET ORAL at 20:14

## 2017-03-04 NOTE — HISTORY AND PHYSICAL
History & Physical


Date & Time of Service:


Mar 4, 2017 at 16:34


Chief Complaint:


Left Toe-Sores And Leaking


Primary Care Physician:


Lurdes Smith D.O.


History of Present Illness


Source:  patient


This is a 52 yo F with complicated past medical hx of ESRD on HD , Type 2 DM , 

insulin dependent , hx of CAD s/p PTCA CARMELLA on Left Cx artery on 08/2016 follows 

with Cardiology Dr Duarte , severe left carotid artery disease Hx of GI bleed , 

.presents with non healing left great toe diabetic infection 


pt has is to follow at Wound Care clinic next week , recently seen at PCP 

office -for worsening of redness, swelling and pain on left great toe 


started on PO Keflex ,


noted her left great toe started to have worsening of drainage 


had 2-3 episodes of diarrhea since yesterday 


no fever or chills 





in the ER left great toe appears to be gangrenous with redness,open wound , 

black eschar at end 


stool positive for C diff





Past Medical/Surgical History


Medical Problems:


(1) Allergic rhinitis


Status: Chronic  





(2) CAD (coronary artery disease)


Permanent Comment: s/p PCI and BMS to left circumflex 8/2016


Status: Chronic  





(3) Carotid stenosis


Permanent Comment: left ICA


Status: Chronic  





(4) DM type 2 (diabetes mellitus, type 2)


Status: Chronic  





(5) Dyslipidemia


Status: Chronic  





(6) ESRD (end stage renal disease) on dialysis


Status: Chronic  





(7) GERD (gastroesophageal reflux disease)


Status: Chronic  





(8) HTN (hypertension)


Status: Chronic  





(9) HX OF PAST NONCOMPLIANCE


Status: Chronic  





(10) Ischemic cardiomyopathy


Permanent Comment: echo 10/2016 - EF 40-45%, grade I diastolic dysfunction


Status: Chronic  





(11) Morbid obesity


Status: Chronic  





(12) Tobacco use disorder


Status: Chronic  





Surgical Problems:


(1) Hemodialysis access, AV graft


Permanent Comment: [facility] Dr. Roberts 1/4/13


Status: Chronic  











Family History





Diabetes mellitus


  FATHER


  MOTHER


Heart disease


  FATHER


  MOTHER


Hypertension


  FATHER


  MOTHER


Kidney disease


  GRANDMOTHER





Social History


Smoking Status:  Former Smoker





Immunizations


History of Influenza Vaccine:  Yes


Influenza Vaccine Date:  Sep 1, 2016


History of Tetanus Vaccine?:  Yes


History of Pneumococcal:  Yes


Pneumococcal Date:  Jul 24, 2013


History of Hepatitis B Vaccine:  Yes


Hepatitis Immunization Date:  Dec 11, 2013





Multi-Drug Resistant Organisms


History of MDRO:  No





Allergies


Coded Allergies:  


     Adhesives (Verified  Allergy, Mild, RASH, SORES, 1/31/17)


     Pollen Extract (Unverified  Allergy, Unknown, rash, 1/31/17)





Home Medications


Scheduled


Aspirin (Aspirin Ec), 81 MG PO DAILY


Atorvastatin (Lipitor), 40 MG PO DAILY


Calcium Acetate (Phoslo 667 Mg), 3 CAP PO WM


Calcium Acetate (Phoslo 667 Mg), 2 CAP PO WITH SNACKS


Cephalexin Monohydrate (Keflex), 500 MG PO BID


Insulin Glargine (Lantus Solostar), 10 UNITS SC QPM


Isosorbide Mononitrate (Isosorbide Mononitrate ER), 60 MG PO QAM


Isosorbide Mononitrate Ext Rel (Imdur Ext Rel), 30 MG PO QAM


Metoprolol Tartrate (Lopressor), 50 MG PO BID


Pantoprazole (Protonix), 40 MG PO DAILY


Ranitidine Hcl (Zantac), 300 MG PO HS


Sertraline HCl (Sertraline HCl), 2 TAB PO DAILY





Scheduled PRN


Dicyclomine Hcl (Dicyclomine Hcl), 1 MG PO QID PRN for CRAMPING


Lorazepam (Ativan), 0.5 MG PO DAILY PRN for Anxiety


Methocarbamol (Methocarbamol), 500 MG PO BID PRN for Muscle Spasms


Tramadol (Ultram), 1 TAB PO TID PRN for Pain





Review of Systems


Constitutional:  + chills, + fatigue, + sweats, + weakness


Respiratory:  No cough, No dyspnea at rest, No dyspnea on exertion, No 

hemoptysis, No problem reported, No shortness of breath, No sputum, No wheezing


Cardiovascular:  No PND, No chest pain, No claudication, No edema, No orthopnea

, No palpitations, No problem reported


Abdomen:  + diarrhea


Musculoskeletal:  + problem reported (left great toe gangrous non healing 

infection )





Physical Exam


Vital Signs











  Date Time  Temp Pulse Resp B/P Pulse Ox O2 Delivery O2 Flow Rate FiO2


 


3/4/17 14:00  77 18 122/78 96 Room Air  


 


3/4/17 10:49 36.8 86 20 117/76 96 Room Air  








General Appearance:  no apparent distress


Eyes:  normal inspection


ENT:  hearing grossly normal


Neck:  supple


Respiratory/Chest:  lungs clear, normal breath sounds, no respiratory distress


Cardiovascular:  regular rate, rhythm


Abdomen/GI:  non tender, soft


Extremities/Musculoskelatal:  + pertinent finding (left great toe markedly 

erythematous with dusky red discoloartion , dark eschare at end , open wound on 

bottom with dranage , )





Diagnostics


Laboratory Results





Results Past 24 Hours








Test


  3/4/17


11:45 3/4/17


15:43 Range/Units


 


 


White Blood Count 9.39  4.8-10.8  K/uL


 


Red Blood Count 3.70  4.2-5.4  M/uL


 


Hemoglobin 11.0  12.0-16.0  g/dL


 


Hematocrit 34.8  37-47  %


 


Mean Corpuscular Volume 94.1    fL


 


Mean Corpuscular Hemoglobin 29.7  25-34  pg


 


Mean Corpuscular Hemoglobin


Concent 31.6


  


  32-36  g/dl


 


 


Platelet Count 388  130-400  K/uL


 


Mean Platelet Volume 10.2  7.4-10.4  fL


 


Neutrophils (%) (Auto) 71.3   %


 


Lymphocytes (%) (Auto) 17.6   %


 


Monocytes (%) (Auto) 6.8   %


 


Eosinophils (%) (Auto) 3.6   %


 


Basophils (%) (Auto) 0.6   %


 


Neutrophils # (Auto) 6.69  1.4-6.5  K/uL


 


Lymphocytes # (Auto) 1.65  1.2-3.4  K/uL


 


Monocytes # (Auto) 0.64  0.11-0.59  K/uL


 


Eosinophils # (Auto) 0.34  0-0.5  K/uL


 


Basophils # (Auto) 0.06  0-0.2  K/uL


 


RDW Standard Deviation 50.8  36.4-46.3  fL


 


RDW Coefficient of Variation 14.7  11.5-14.5  %


 


Immature Granulocyte % (Auto) 0.1   %


 


Immature Granulocyte # (Auto) 0.01  0.00-0.02  K/uL


 


Sodium Level 136  136-145  mmol/L


 


Potassium Level 3.8  3.5-5.1  mmol/L


 


Chloride Level 93    mmol/L


 


Carbon Dioxide Level 30  21-32  mmol/L


 


Anion Gap 13.0  3-11  mmol/L


 


Blood Urea Nitrogen 33  7-18  mg/dl


 


Creatinine


  7.20


  


  0.60-1.20


mg/dl


 


Est Creatinine Clear Calc


Drug Dose 11.3


  


   ml/min


 


 


Estimated GFR (


American) 7.0


  


   


 


 


Estimated GFR (Non-


American 6.1


  


   


 


 


BUN/Creatinine Ratio 4.6  10-20  


 


Random Glucose 239  70-99  mg/dl


 


Calcium Level 9.4  8.5-10.1  mg/dl


 


Lactic Acid Level  1.3 0.4-2.0  mmol/L








 Microbiology Results


3/4/17 C.difficile Toxin B Gene (PCR) - Final, Complete


         Positive for C. difficile toxin B gene





Diagnostic Radiology


LEFT FIRST TOE 3 VIEWS





CLINICAL HISTORY: First toe infection.





FINDINGS: 3 views of the left first toe are obtained. No prior studies are


available for comparison at the time of dictation. The skeletal structures are


osteopenic. No fracture is seen. No bony erosion or periostitis is identified.


Mild arthritic change is seen at the first metatarsophalangeal joint. Mild soft


tissue swelling is suggested in the first toe. No subcutaneous gas or radiodense


foreign body is seen. Advanced atherosclerotic calcification is noted in the


regional arteries.





IMPRESSION:





1. No acute bony abnormality is seen in the left first toe.





2. Osteopenia and mild arthritic change as above.





3. There is mild soft tissue swelling and advanced after carotid calcification


of the regional arteries.











Electronically signed by:  Nguyễn Nielson M.D.


3/4/2017 11:48 AM





Dictated Date/Time:  3/4/2017 11:46 AM





Impression


Assessment and Plan


DIABETIC INFECTION OF LEFT GREAT TOE : 


follows with wound clinic 


wound care consulted 


Xray of left great toe : 


1. No acute bony abnormality seen in Left ist toe 


2.  Osteopenia and mild arthritic change 


3. Mild soft tissue swelling 


no Sub cutaneous gas or radiodense foreign body 


advanced atherosclerotic calcification noted in regional arteries 





arterial Doppler ordered to R/o peripheral vascular disease 


blood /wound culture ordered 


Empiric ABx with Vancomycin -to be given with dialysis, IV Zosyn ( pharmacy 

consulted for renal dosing ) 


ID eval requested 





MRI of left great toe ordered to R/O Osteomyelitis


Ortho eval  








C DIFF COLITIS : 


was on ABx  recently Keflex for left great toe infection 


presents with Diarrhea 


stool C diff toxin assay positive


no evidence of sepsis , normal white count , no fever , Lactic acid 1.3 


PO Flagyl 500 mg TID -complete 10 days course 


contact precaution 








CHRONIC CHF WITH BOTH SYSTOLIC AND DIASTOLIC DYSFUNCTION : 


no evidence of decompensation or vol overload 


ECHO on 12/2017 : 


1. Normal left ventricular size with low-normal systolic function. Estimated EF 

50%.  Hypokinesis of the inferolateral, mid inferior, base to mid septal wall.  

Akinesis of the inferior base.  No left ventricular hypertrophy.  Type 2 

diastolic dysfunction.


2. The left atrium is mildly dilated.


3. There is mild mitral regurgitation.


4. Technically difficult study; IV echo-contrast may enhance image quality to 

better evaluate LV systolic function.


5. Compared to prior study on 2/1/2017, LV systolic function has declined.





cont to monitor 


cont scheduled HD X3 week 








ESRD ON HD : 


gets Hemodialysis on Monday/Wednesday /Friday 


follows with Dr Davison 


electrolytes and vol status stable 


no need for emergent dialysis 


Nephrology consulted for scheduled Dialysis while in hospital  





ANEMIA OF CHRONIC DISEASE : 


HB stable at baseline ~11 


cont to monitor 





HX OF  RECURRENT GI BLEED : 


last admission 2/14/17 with hematemesis 


EGD on 02/16/17 : normal esophagus, stomach , duodenum , no evidence of bleed 


Pathology : Benign , with evidence of chronic gastritis 


Immunostain negative for H Pylori 





CAD S/P PTCA : 


s/p Bare metal stent on left Cx on 08/2016 


completed dual antiplatelet therapy 


recent ECHO as above 


no active cardiac issue 


cont put pt cardiac meds -on Aspirin  , Imdur, Lopressor , statin 





HTN: 


BP stable 


cont Imdur , Lopressor 





HYPERLIPIDEMIA : 


Cont Lipitor 40 mg daily 











TYPE 2 DM ON INSULIN : 


Random glucose 239 


possible due to infection 


cont ABx as out lined above 


recent Hb A1c on 2/14/17 7.1 


cont basal Lantus 10 unit hs 


insulin SSI 


Pharmacy consulted for glycemic management 





GERD: 


cont PPI 





HX OF DEPRESSION DISORDER ; 


mood stable 


cont Zoloft 100 mg PO Daily 





DVT PROPHYLAXIS : 


moderate risk 


Sub q heparin ( H&H stable , no active bleeding ) 





FULL CODE 





DISPOSITION : 


To be determined 


PT/OT consult prior to discharge 


Will continue to follow at Wound care clinic 


Social service consulted for discharge planning 





Medicine follow up with Dr Day 


Nephrology follow up with Dr Davison





Level of Care


Med/Surg





Resuscitation Status


FULL RESUSCITATION





VTE Prophylaxis


VTE Risk Assessment Done? Y/N:  Yes


Risk Level:  Moderate


Given or contraindicated:  Unfractionated heparin SQ





Note


In my clinical judgment this beneficiary meets acute admission criteria, 

established by CMS, that includes being hospitalized through two midnights.





Additional Copies To


Lurdes Smith D.O., Phil BARNETT, DO

## 2017-03-04 NOTE — DIAGNOSTIC IMAGING REPORT
SINGLE VIEW CHEST



CLINICAL HISTORY:  Dyspnea. History of CHF.



FINDINGS: An AP upright chest radiograph is compared to study dated 2/14/2017.

Correlation is made with chest CT dated 12/6/2015. The examination is degraded

by large body habitus and apical lordotic positioning.  The heart is mildly

enlarged and there is atherosclerotic calcification of the thoracic aorta. The

pulmonary vasculature is noncongested. Chronic interstitial thickening is

similar to previous. No airspace consolidation, large pleural effusion, or

pneumothorax is seen. The bony thorax is grossly intact.



IMPRESSION: Cardiomegaly with no acute cardiopulmonary and malleus.







Electronically signed by:  Nguyễn Nielson M.D.

3/4/2017 6:36 PM



Dictated Date/Time:  3/4/2017 6:34 PM

## 2017-03-04 NOTE — DIAGNOSTIC IMAGING REPORT
MRI OF THE LEFT FOREFOOT WITHOUT IV CONTRAST



CLINICAL HISTORY: Foot infection.



COMPARISON STUDY: Radiographs of the left 1st toe dated 3/4/2017.



TECHNIQUE: MRI of the left forefoot is performed utilizing various T1 and

T2-weighted sequences in the axial, sagittal, and coronal planes. IV contrast

was not administered for this examination.



FINDINGS: There is mild marrow edema seen on the STIR images within the tuft of

the 1st distal phalanx. There is only minimal drop in signal on the T1-weighted

sequences. No cortical disruption is identified. No additional foci of marrow

signal abnormality are seen in the forefoot. A cutaneous defect in the distal

aspect of the 1st toe suggests ulceration. There is soft tissue edema and

thickening within the 1st toe suggesting cellulitis. No organized fluid

collection is identified. The visualized flexor and extensor tendons appear

intact.



IMPRESSION:



1. There is marrow edema seen within the tuft of the 1st distal phalanx with

mild drop in signal on the T1-weighted sequences. The appearance suggests

osteomyelitis. No clear cortical destruction is seen.



2. No additional foci of marrow abnormality are identified in the visualized

forefoot.



3. Soft tissue edema and ulcerations are seen in the 1st toe. The appearance

suggests cellulitis. No organized fluid collection is identified.









Dictated:  3/4/2017 6:17 PM

Transcribed:  3/4/2017 10:39 PM

[email or web address]







Electronically signed by:  Nguyễn Nielson M.D.

3/4/2017 10:43 PM



Dictated Date/Time:  3/4/2017 6:17 PM

## 2017-03-04 NOTE — EMERGENCY ROOM VISIT NOTE
ED Visit Note


First contact with patient:  10:56


I have seen and examined this patient with Luis Antonio Gonzalez and generally agree 

with the treatment plan as discussed.


Problem List


Medical Problems:


(1) Allergic rhinitis


Status: Chronic  





(2) CAD (coronary artery disease)


Permanent Comment: s/p PCI and BMS to left circumflex 8/2016


Status: Chronic  





(3) Carotid stenosis


Permanent Comment: left ICA


Status: Chronic  





(4) DM type 2 (diabetes mellitus, type 2)


Status: Chronic  





(5) Dyslipidemia


Status: Chronic  





(6) ESRD (end stage renal disease) on dialysis


Status: Chronic  





(7) GERD (gastroesophageal reflux disease)


Status: Chronic  





(8) HTN (hypertension)


Status: Chronic  





(9) HX OF PAST NONCOMPLIANCE


Status: Chronic  





(10) Ischemic cardiomyopathy


Permanent Comment: echo 10/2016 - EF 40-45%, grade I diastolic dysfunction


Status: Chronic  





(11) Morbid obesity


Status: Chronic  





(12) Tobacco use disorder


Status: Chronic  





Surgical Problems:


(1) Hemodialysis access, AV graft


Permanent Comment: [facility] Dr. Roberts 1/4/13


Status: Chronic  











Current/Historical Medications


Scheduled


Aspirin (Aspirin Ec), 81 MG PO DAILY


Atorvastatin (Lipitor), 40 MG PO DAILY


Calcium Acetate (Phoslo 667 Mg), 1 CAP PO WM


Clopidogrel Bisulfate (Plavix), 1 TAB PO DAILY


Insulin Glargine (Lantus Solostar), 10 UNITS SC QPM


Isosorbide Mononitrate (Isosorbide Mononitrate ER), 60 MG PO QAM


Isosorbide Mononitrate Ext Rel (Imdur Ext Rel), 30 MG PO QAM


Metoprolol Tartrate (Lopressor), 50 MG PO BID


Ranitidine Hcl (Zantac), 300 MG PO HS


Sertraline HCl (Sertraline HCl), 2 TAB PO DAILY





Scheduled PRN


Lorazepam (Ativan), 0.5 MG PO DAILY PRN for Anxiety


Methocarbamol (Methocarbamol), 500 MG PO BID PRN for Muscle Spasms


Tramadol (Ultram), 1 TAB PO TID PRN for Pain





Allergies


Coded Allergies:  


     Adhesives (Verified  Allergy, Mild, RASH, SORES, 1/31/17)


     Pollen Extract (Unverified  Allergy, Unknown, rash, 1/31/17)





Vital Signs











  Date Time  Temp Pulse Resp B/P Pulse Ox O2 Delivery O2 Flow Rate FiO2


 


3/4/17 10:49 36.8 86 20 117/76 96 Room Air  











Laboratory Results











Test


  3/4/17


11:07











Departure Information


Referrals


Lurdes Smith D.O. (PCP)





Patient Instructions


Critical access hospital

## 2017-03-04 NOTE — PHARMACY PROGRESS NOTE
Glycemic Control Intl Consult


Date of Service


Mar 4, 2017.





Scope


Glycemic Pharmacist consulted by Dr Staton on 3/4/17 for glycemic control and 

to write orders per [facility] inpatient glycemic control protocol





Objective


Weight (Kilograms):  107.000


Accuchecks BSG (last 24hrs):











Test


  3/4/17


11:45


 


Random Glucose


  239 mg/dl


(70-99)








Laboratory Data (last 24hrs)











Test


  3/4/17


11:45


 


Anion Gap 13.0 mmol/L 


 


BUN/Creatinine Ratio 4.6 


 


Blood Urea Nitrogen 33 mg/dl 


 


Creatinine 7.20 mg/dl 


 


Potassium Level 3.8 mmol/L 


 


Sodium Level 136 mmol/L 


 


White Blood Count 9.39 K/uL 


 


Red Blood Count 3.70 M/uL 


 


Hemoglobin 11.0 g/dL 


 


Hematocrit 34.8 % 


 


Mean Corpuscular Volume 94.1 fL 


 


Mean Corpuscular Hemoglobin 29.7 pg 


 


Mean Corpuscular Hemoglobin


Concent 31.6 g/dl 


 


 


Platelet Count 388 K/uL 


 


Mean Platelet Volume 10.2 fL 


 


Neutrophils (%) (Auto) 71.3 % 


 


Lymphocytes (%) (Auto) 17.6 % 


 


Monocytes (%) (Auto) 6.8 % 


 


Eosinophils (%) (Auto) 3.6 % 


 


Basophils (%) (Auto) 0.6 % 


 


Neutrophils # (Auto) 6.69 K/uL 


 


Lymphocytes # (Auto) 1.65 K/uL 


 


Monocytes # (Auto) 0.64 K/uL 


 


Eosinophils # (Auto) 0.34 K/uL 


 


Basophils # (Auto) 0.06 K/uL 











Recent Pertinent Medications


Outpatient Anti-diabetic Regimen: 


* Lantus 10 units qHS


* A1c = 7.1 %  2/14/17








Risk Factors for Insulin Resistance:


* Steroids:


* Infection: diabetic foot infection + C diff


* Pressors:


* IVF:


* Recent Surgery


* Diet:


* Mechanical Ventilation:





Assessment & Plan


ASSESSMENT:


* ADA & AACE recommend a goal blood sugar range 140-180 mg/dl for the majority 

of critically ill & non-critically ill patients.  However, more stringent 

targets may be selected in individual cases.





PLAN FOR INPATIENT GLYCEMIC CONTROL:





* Holding outpatient oral diabetes medications





* Basal insulin with LANTUS 5 units SQ BID





* Correctional Insulin with NOVOLOG per scale ACHS 


 * Goal Range:  Low 140 mg/dL - High 180 mg/dL


 * Correction Factor: 25 mg/dL/unit


 * Nutritional / Prandial insulin per carb ratio of 1 unit per 15 grams CHO 

consumed





* Please note that the plan above was derived based on current level of insulin 

resistance and hospital stress. These recommendations are appropriate for 

inpatient admission only. Plan of care upon discharge will need to be 

reassessed to avoid potential outpatient hypo/hyperglycemia. 





Thank you.

## 2017-03-04 NOTE — DIAGNOSTIC IMAGING REPORT
LEFT FIRST TOE 3 VIEWS



CLINICAL HISTORY: First toe infection.



FINDINGS: 3 views of the left first toe are obtained. No prior studies are

available for comparison at the time of dictation. The skeletal structures are

osteopenic. No fracture is seen. No bony erosion or periostitis is identified.

Mild arthritic change is seen at the first metatarsophalangeal joint. Mild soft

tissue swelling is suggested in the first toe. No subcutaneous gas or radiodense

foreign body is seen. Advanced atherosclerotic calcification is noted in the

regional arteries.



IMPRESSION:



1. No acute bony abnormality is seen in the left first toe.



2. Osteopenia and mild arthritic change as above.



3. There is mild soft tissue swelling and advanced after carotid calcification

of the regional arteries.







Electronically signed by:  Nguyễn Nielson M.D.

3/4/2017 11:48 AM



Dictated Date/Time:  3/4/2017 11:46 AM

## 2017-03-04 NOTE — DIAGNOSTIC IMAGING REPORT
ULTRASOUND LEFT LOWER EXTREMITY ARTERIAL



CLINICAL HISTORY: Gangrene of the left 1st toe.



COMPARISON STUDY: No priors.



TECHNIQUE: Real-time, grayscale, and color Doppler sonography of the arteries of

the left lower extremities performed from the inguinal crease to the foot.

Ankle-brachial indices were attempted. The vessels were noncompressible and

ankle-brachial indices could not be calculated. Toe pressures could not be

assessed due to wounds.



FINDINGS: There is advanced atherosclerotic plaque and irregularity seen

throughout the arteries of the left lower extremity. There are biphasic arterial

waveforms in the left common femoral artery with velocities measuring up to 83

cm/s. The left profunda femoris artery is patent with velocities measuring up to

34 cm/s. There are monophasic to biphasic waveforms seen throughout the

superficial femoral artery. Velocities measure up to 237 cm/s suggesting some

degree of stenosis. Velocities in the mid to distal superficial femoral artery

measure up to 101 cm/s. There are monophasic arterial waveforms in the popliteal

artery with velocities measuring up to 46 cm/s. There is two-vessel runoff to

the foot. The distal left peroneal artery appears occluded. Velocities within

the peroneal artery proximally measure up to 46 cm/s. Velocities in the

posterior tibial artery measure up to 33 cm/s, and velocities in the anterior

tibial artery measure up to 41 cm/s. The dorsalis pedis artery is patent with

velocities measuring up to 55 cm/s.



IMPRESSION:



1. Advanced peripheral vascular disease as above.



2. There is two-vessel runoff to the foot. The distal left peroneal artery is

occluded.



3. Ankle-brachial indices could not be assessed.



4. Focal/high-grade stenosis is suspected in the proximal left superficial

femoral artery.









Dictated:  3/4/2017 7:55 PM

Transcribed:  3/4/2017 10:47 PM

JUAN [email or web address]







Electronically signed by:  Nguyễn Nielson M.D.

3/4/2017 10:50 PM



Dictated Date/Time:  3/4/2017 7:55 PM

## 2017-03-04 NOTE — PHARMACY PROGRESS NOTE
Pharmacy Antibiotic Consult


Date of Service:


Mar 4, 2017.


Pharmacy Dosing Scope


Pharmacy is consulted to initiate vancomycin/Zosyn IV dosing therapy, order 

appropriate labs and adjust drug dose/frequency.





Subjective


The patient is a 51 year old female admitted on Mar 4, 2017 at 14:59 with a 

diabetic foot infection possibly osteomyelitis. She is followed by a wound 

clinic and was previously on Keflex. Now she has C difficle infection.





Objective


Height (Feet):  5


Height (Inches):  4.00


Weight (Kilograms):  107.000


Lab Results (24hrs):





Laboratory Tests








Test


  3/4/17


11:45


 


BUN/Creatinine Ratio 4.6 


 


Blood Urea Nitrogen 33 mg/dl 


 


Creatinine 7.20 mg/dl 


 


White Blood Count 9.39 K/uL 


 


Red Blood Count 3.70 M/uL 


 


Hemoglobin 11.0 g/dL 


 


Hematocrit 34.8 % 


 


Mean Corpuscular Volume 94.1 fL 


 


Mean Corpuscular Hemoglobin 29.7 pg 


 


Mean Corpuscular Hemoglobin


Concent 31.6 g/dl 


 


 


Platelet Count 388 K/uL 


 


Mean Platelet Volume 10.2 fL 


 


Neutrophils (%) (Auto) 71.3 % 


 


Lymphocytes (%) (Auto) 17.6 % 


 


Monocytes (%) (Auto) 6.8 % 


 


Eosinophils (%) (Auto) 3.6 % 


 


Basophils (%) (Auto) 0.6 % 


 


Neutrophils # (Auto) 6.69 K/uL 


 


Lymphocytes # (Auto) 1.65 K/uL 


 


Monocytes # (Auto) 0.64 K/uL 


 


Eosinophils # (Auto) 0.34 K/uL 


 


Basophils # (Auto) 0.06 K/uL 











Assessment & Plan


Loading dose:   vancomycin   2150 (20 mg/kg) mg IV X 1 dose then:





Random level has been ordered for:   3 / 4 / 17 since patient is dialysis 

patient.





Goal peak level estimate:  between 35 - 40 mcg/mL.





Goal trough level estimate:  between 10 - 15 mcg/mL (indication: cellulitis. 

Patient does not appear to be colonized with MRSA according to nares so may be 

okay to achieve lower trough)





Zosyn: loading dose of Zosyn 4.5 gm IV x1 then Zosyn 4.5 gm IV q12 hours due to 

dialysis patient.




















Pharmacy will continue to follow and will adjust dose/frequency as necessary.  

Thank you

## 2017-03-05 VITALS
OXYGEN SATURATION: 99 % | SYSTOLIC BLOOD PRESSURE: 153 MMHG | DIASTOLIC BLOOD PRESSURE: 72 MMHG | TEMPERATURE: 98.24 F | HEART RATE: 78 BPM

## 2017-03-05 VITALS
DIASTOLIC BLOOD PRESSURE: 68 MMHG | SYSTOLIC BLOOD PRESSURE: 147 MMHG | TEMPERATURE: 98.06 F | OXYGEN SATURATION: 98 % | HEART RATE: 80 BPM

## 2017-03-05 VITALS — HEART RATE: 67 BPM | SYSTOLIC BLOOD PRESSURE: 145 MMHG | DIASTOLIC BLOOD PRESSURE: 74 MMHG

## 2017-03-05 VITALS
HEART RATE: 64 BPM | OXYGEN SATURATION: 99 % | DIASTOLIC BLOOD PRESSURE: 64 MMHG | TEMPERATURE: 97.7 F | SYSTOLIC BLOOD PRESSURE: 150 MMHG

## 2017-03-05 LAB
ANION GAP SERPL CALC-SCNC: 15 MMOL/L (ref 3–11)
BUN SERPL-MCNC: 45 MG/DL (ref 7–18)
BUN/CREAT SERPL: 5.2 (ref 10–20)
CALCIUM SERPL-MCNC: 9 MG/DL (ref 8.5–10.1)
CHLORIDE SERPL-SCNC: 98 MMOL/L (ref 98–107)
CO2 SERPL-SCNC: 23 MMOL/L (ref 21–32)
CREAT CL PREDICTED SERPL C-G-VRATE: 9.3 ML/MIN
CREAT SERPL-MCNC: 8.7 MG/DL (ref 0.6–1.2)
EOSINOPHIL NFR BLD AUTO: 366 K/UL (ref 130–400)
GLUCOSE SERPL-MCNC: 156 MG/DL (ref 70–99)
HCT VFR BLD CALC: 32.1 % (ref 37–47)
MAGNESIUM SERPL-MCNC: 2.5 MG/DL (ref 1.8–2.4)
MCH RBC QN AUTO: 31.2 PG (ref 25–34)
MCHC RBC AUTO-ENTMCNC: 32.4 G/DL (ref 32–36)
MCV RBC AUTO: 96.4 FL (ref 80–100)
PMV BLD AUTO: 10.2 FL (ref 7.4–10.4)
POTASSIUM SERPL-SCNC: 4 MMOL/L (ref 3.5–5.1)
RBC # BLD AUTO: 3.33 M/UL (ref 4.2–5.4)
SODIUM SERPL-SCNC: 136 MMOL/L (ref 136–145)
WBC # BLD AUTO: 9.2 K/UL (ref 4.8–10.8)

## 2017-03-05 RX ADMIN — CALCIUM ACETATE SCH MG: 667 CAPSULE ORAL at 11:59

## 2017-03-05 RX ADMIN — TRAMADOL HYDROCHLORIDE PRN MG: 50 TABLET, COATED ORAL at 09:42

## 2017-03-05 RX ADMIN — RANITIDINE SCH MG: 150 TABLET ORAL at 20:51

## 2017-03-05 RX ADMIN — PANTOPRAZOLE SCH MG: 40 TABLET, DELAYED RELEASE ORAL at 09:41

## 2017-03-05 RX ADMIN — METOPROLOL TARTRATE SCH MG: 50 TABLET, FILM COATED ORAL at 20:50

## 2017-03-05 RX ADMIN — SERTRALINE HYDROCHLORIDE SCH MG: 100 TABLET, FILM COATED ORAL at 20:51

## 2017-03-05 RX ADMIN — ONDANSETRON PRN MG: 4 TABLET, ORALLY DISINTEGRATING ORAL at 09:45

## 2017-03-05 RX ADMIN — INSULIN ASPART SCH UNITS: 100 INJECTION, SOLUTION INTRAVENOUS; SUBCUTANEOUS at 20:46

## 2017-03-05 RX ADMIN — ATORVASTATIN CALCIUM SCH MG: 40 TABLET, FILM COATED ORAL at 09:40

## 2017-03-05 RX ADMIN — METRONIDAZOLE SCH MG: 500 TABLET ORAL at 20:51

## 2017-03-05 RX ADMIN — PIPERACILLIN SODIUM, TAZOBACTAM SODIUM SCH MLS/HR: 4; .5 INJECTION, POWDER, LYOPHILIZED, FOR SOLUTION INTRAVENOUS at 16:25

## 2017-03-05 RX ADMIN — METOPROLOL TARTRATE SCH MG: 50 TABLET, FILM COATED ORAL at 09:41

## 2017-03-05 RX ADMIN — PIPERACILLIN SODIUM, TAZOBACTAM SODIUM SCH MLS/HR: 4; .5 INJECTION, POWDER, LYOPHILIZED, FOR SOLUTION INTRAVENOUS at 04:07

## 2017-03-05 RX ADMIN — ISOSORBIDE MONONITRATE SCH MG: 60 TABLET ORAL at 09:40

## 2017-03-05 RX ADMIN — INSULIN ASPART SCH UNITS: 100 INJECTION, SOLUTION INTRAVENOUS; SUBCUTANEOUS at 18:07

## 2017-03-05 RX ADMIN — INSULIN GLARGINE SCH UNIT: 100 INJECTION, SOLUTION SUBCUTANEOUS at 20:48

## 2017-03-05 RX ADMIN — METRONIDAZOLE SCH MG: 500 TABLET ORAL at 09:38

## 2017-03-05 RX ADMIN — INSULIN ASPART SCH UNITS: 100 INJECTION, SOLUTION INTRAVENOUS; SUBCUTANEOUS at 09:32

## 2017-03-05 RX ADMIN — HEPARIN SODIUM SCH UNIT: 10000 INJECTION, SOLUTION INTRAVENOUS; SUBCUTANEOUS at 20:46

## 2017-03-05 RX ADMIN — CALCIUM ACETATE SCH MG: 667 CAPSULE ORAL at 09:41

## 2017-03-05 RX ADMIN — HEPARIN SODIUM SCH UNIT: 10000 INJECTION, SOLUTION INTRAVENOUS; SUBCUTANEOUS at 14:00

## 2017-03-05 RX ADMIN — ONDANSETRON PRN MG: 4 TABLET, ORALLY DISINTEGRATING ORAL at 05:10

## 2017-03-05 RX ADMIN — METRONIDAZOLE SCH MG: 500 TABLET ORAL at 14:26

## 2017-03-05 RX ADMIN — ISOSORBIDE MONONITRATE SCH MG: 30 TABLET, EXTENDED RELEASE ORAL at 09:39

## 2017-03-05 RX ADMIN — Medication SCH MG: at 09:38

## 2017-03-05 RX ADMIN — HEPARIN SODIUM SCH UNIT: 10000 INJECTION, SOLUTION INTRAVENOUS; SUBCUTANEOUS at 05:25

## 2017-03-05 RX ADMIN — CALCIUM ACETATE SCH MG: 667 CAPSULE ORAL at 16:30

## 2017-03-05 RX ADMIN — INSULIN ASPART SCH UNITS: 100 INJECTION, SOLUTION INTRAVENOUS; SUBCUTANEOUS at 13:14

## 2017-03-05 RX ADMIN — ONDANSETRON PRN MG: 4 TABLET, ORALLY DISINTEGRATING ORAL at 20:55

## 2017-03-05 NOTE — PROGRESS NOTE
Medicine Progress Note


Date & Time of Visit:


Mar 5, 2017 at 15:10.


Subjective


Pt was seen and examined


Sitting in bed with no distress


Pt said that the pain in the left great toe improved 


she denies any chest pain, palpitation and sob





Objective





Last 8 Hrs








  Date Time  Temp Pulse Resp B/P Pulse Ox O2 Delivery O2 Flow Rate FiO2


 


3/5/17 08:00      Room Air  


 


3/5/17 07:30 36.7 80 20 147/68 98 Room Air  








Physical Exam:


General- No acute distress


Head-  atraumatic


Eyes- PERRL, EOMI


ENT- oropharynx clear


Neck- supple, no JVD


Lungs- clear to auscultation and percussion


Heart- regular rhythm; no murmur


Abdomen- normal bowel sounds, soft


Extremities-no calf tenderness, wound in the left great toe wrapped with clean 

dressing


Neuro- alert, oriented x 3; PERRL, EOMI; no facial palsy;


Skin- warm & dry


Laboratory Results:





Last 24 Hours








Test


  3/4/17


15:43 3/4/17


20:06 3/5/17


07:26 3/5/17


08:17


 


Lactic Acid Level 1.3 mmol/L    


 


Bedside Glucose  137 mg/dl   149 mg/dl 


 


White Blood Count   9.20 K/uL  


 


Red Blood Count   3.33 M/uL  


 


Hemoglobin   10.4 g/dL  


 


Hematocrit   32.1 %  


 


Mean Corpuscular Volume   96.4 fL  


 


Mean Corpuscular Hemoglobin   31.2 pg  


 


Mean Corpuscular Hemoglobin


Concent 


  


  32.4 g/dl 


  


 


 


RDW Standard Deviation   52.0 fL  


 


RDW Coefficient of Variation   14.7 %  


 


Platelet Count   366 K/uL  


 


Mean Platelet Volume   10.2 fL  


 


Sodium Level   136 mmol/L  


 


Potassium Level   4.0 mmol/L  


 


Chloride Level   98 mmol/L  


 


Carbon Dioxide Level   23 mmol/L  


 


Anion Gap   15.0 mmol/L  


 


Blood Urea Nitrogen   45 mg/dl  


 


Creatinine   8.70 mg/dl  


 


Est Creatinine Clear Calc


Drug Dose 


  


  9.3 ml/min 


  


 


 


Estimated GFR (


American) 


  


  5.6 


  


 


 


Estimated GFR (Non-


American 


  


  4.8 


  


 


 


BUN/Creatinine Ratio   5.2  


 


Random Glucose   156 mg/dl  


 


Calcium Level   9.0 mg/dl  


 


Magnesium Level   2.5 mg/dl  


 


Random Vancomycin Level   28.6 mcg/ml  














Test


  3/5/17


12:24 


  


  


 


 


Bedside Glucose 156 mg/dl    








 








 Date/Time


Source Procedure


Growth Status


 


 


 3/4/17 20:15


Nasal MRSA DNA Surveillance Screen - Final


Specimen Negative for MRSA by DNA Probe Complete





 3/4/17 20:15


Ulcer Toe Left 1 Gram Stain - Final Resulted


 


 3/4/17 20:15 Wound Culture - Preliminary


Coag Neg Staphylococcus Resulted











Assessment & Plan


DIABETIC INFECTION OF LEFT GREAT TOE : 


MRI  of the left foot showed a marrow edema seen within the tuft of the 1st 

distal phalanx with


mild drop in signal on the T1-weighted sequences. The appearance suggests 

osteomyelitis. 


Soft tissue edema and ulcerations are seen in the 1st toe.


On IV zosyn/Vanco


ID on board recommended to continue current treatment


Ortho consulted and no intervention at this time. will evaluate the toe again 

in am to check for improvement


Will consult wound doctor


Continue daily dressing care


Wound cx growth coag negative staph





LEFT PERONEAL ARTERY OCCLUSION


[facility] of [place] showed distal left peroneal artery is occluded.


Will consult vascular 


Will do limb check





C DIFF COLITIS : 


Recently was on Keflex for left great toe infection 


presents with Diarrhea 


stool C diff toxin assay positive


afebrile, no leukocytosis


Continue PO Flagyl 500 mg TID -complete 10 days course 


contact precaution 


Diarrhea improved





CHRONIC CHF WITH BOTH SYSTOLIC AND DIASTOLIC DYSFUNCTION : 


no evidence of decompensation or vol overload 


Stable


ECHO on 12/2017 : 


1. Normal left ventricular size with low-normal systolic function. Estimated EF 

50%.  Hypokinesis of the inferolateral, mid inferior, base to mid septal wall.  

Akinesis of the inferior base.  No left ventricular hypertrophy.  Type 2 

diastolic dysfunction.


2. The left atrium is mildly dilated.


3. There is mild mitral regurgitation.


4. Technically difficult study; IV echo-contrast may enhance image quality to 

better evaluate LV systolic function.


5. Compared to prior study on 2/1/2017, LV systolic function has declined.








ESRD ON HD


HD on Monday/Wednesday /Friday 


Nephrology on board


Follow up with Dr. Redmond  





ANEMIA OF CHRONIC DISEASE : 


Hgb on admission stable 11  


Today hgb 10.4


Stable





HX OF  RECURRENT GI BLEED : 


last admission 2/14/17 with hematemesis 


EGD on 02/16/17 : normal esophagus, stomach , duodenum , no evidence of bleed 


Pathology : Benign , with evidence of chronic gastritis 


Immunostain negative for H Pylori 


Stable





CAD S/P PTCA : 


s/p Bare metal stent on left Cx on 08/2016  


continue Aspirin, Imdur, Lopressor, statin 


Asymptomatic





HTN: 


BP stable 


cont Imdur, Lopressor 





HYPERLIPIDEMIA : 


Cont Lipitor 40 mg daily 








TYPE 2 DM ON INSULIN : 


Last Hba1c 7.1 was 2/14/17


possible due to infection  


cont basal Lantus 10 unit hs 


insulin SSI 


Pharmacy consulted for glycemic management 





GERD: 


cont PPI 





HX OF DEPRESSION DISORDER ; 


mood stable 


cont Zoloft 100 mg PO Daily 





DVT PROPHYLAXIS : 


moderate risk 


Sub q heparin ( H&H stable , no active bleeding ) 





FULL CODE 





DISPOSITION : 


Will need to continue to follow with outpatient wound care


Consultants:


ID


Ortho


Wound Care


PT/OT


Current Inpatient Medications:





 Current Inpatient Medications








 Medications


  (Trade)  Dose


 Ordered  Sig/Willy


 Route  Start Time


 Stop Time Status Last Admin


Dose Admin


 


 Metronidazole


  (Flagyl Tab)  500 mg  TID


 PO  3/4/17 17:00


 3/14/17 16:59  3/5/17 14:26


500 MG


 


 Heparin Sodium


  (Porcine)


  (Heparin Sq 5000


 Unit/0.5ml)  5,000 unit  Q8


 SQ  3/4/17 22:00


 4/3/17 21:59   


 


 


 Acetaminophen


  (Tylenol Tab)  650 mg  Q4H  PRN


 PO  3/4/17 15:00


 4/3/17 14:59   


 


 


 Ondansetron HCl


  (Zofran Inj)  4 mg  Q6H  PRN


 IV  3/4/17 15:00


 4/3/17 14:59   


 


 


 Glucose


  (Glucose 40% Gel)  15-30


 GRAMS 15


 GRAMS...  UD  PRN


 PO  3/4/17 15:00


 4/3/17 14:59   


 


 


 Glucose


  (Glucose Chew


 Tab)  4-8


 Tablets 4


 Tabl...  UD  PRN


 PO  3/4/17 15:00


 4/3/17 14:59   


 


 


 Dextrose


  (Dextrose 50%


 50ML Syringe)  25-50ML OF


 50% DW IV


 FOR...  UD  PRN


 IV  3/4/17 15:00


 4/3/17 14:59   


 


 


 Glucagon


  (Glucagon Inj)  1 mg  UD  PRN


 SQ  3/4/17 15:00


 4/3/17 14:59   


 


 


 Aspirin


  (Ecotrin Tab)  81 mg  DAILY


 PO  3/5/17 08:00


 4/4/17 08:59  3/5/17 09:38


81 MG


 


 Atorvastatin


 Calcium


  (Lipitor Tab)  40 mg  DAILY


 PO  3/5/17 08:00


 4/4/17 08:59  3/5/17 09:40


40 MG


 


 Calcium Acetate


  (Phoslo Cap)  1,334 mg  DAILY  PRN


 PO  3/5/17 08:00


 4/4/17 07:59  3/5/17 14:26


1,334 MG


 


 Calcium Acetate


  (Phoslo Cap)  2,001 mg  TIDM


 PO  3/4/17 17:00


 4/3/17 16:59  3/5/17 11:59


2,001 MG


 


 Dicyclomine HCl


  (Bentyl Cap)  10 mg  QID  PRN


 PO  3/4/17 16:15


 4/3/17 16:14   


 


 


 Isosorbide


 Mononitrate


  (Imdur Ext Rel


 Tab)  60 mg  QAM


 PO  3/5/17 08:00


 4/4/17 08:59  3/5/17 09:40


60 MG


 


 Isosorbide


 Mononitrate


  (Imdur Ext Rel


 Tab)  30 mg  QAM


 PO  3/5/17 08:00


 4/4/17 08:59  3/5/17 09:39


30 MG


 


 Lorazepam


  (Ativan Tab)  0.5 mg  DAILY  PRN


 PO  3/4/17 16:15


 4/3/17 16:14  3/4/17 22:16


0.5 MG


 


 Methocarbamol


  (Robaxin Tab)  500 mg  BID  PRN


 PO  3/4/17 16:15


 4/3/17 16:14   


 


 


 Metoprolol


 Tartrate


  (Lopressor Tab)  50 mg  BID


 PO  3/4/17 20:00


 4/3/17 20:59  3/5/17 09:41


50 MG


 


 Pantoprazole


 Sodium


  (Protonix Tab)  40 mg  DAILY


 PO  3/5/17 08:00


 4/4/17 08:59  3/5/17 09:41


40 MG


 


 Ranitidine HCl


  (zANTac TAB)  300 mg  HS


 PO  3/4/17 21:00


 4/3/17 20:59  3/4/17 20:14


300 MG


 


 Tramadol HCl


  (Ultram Tab)  50 mg  TID  PRN


 PO  3/4/17 16:15


 4/3/17 16:14  3/5/17 09:42


50 MG


 


 Insulin Aspart


  (novoLOG ASPART)  SLIDING


 SCALE  ACHS


 SC  3/4/17 17:00


 4/3/17 16:59  3/5/17 13:14


4 UNITS


 


 Miscellaneous


 Information 1 ea  1 ea  UD  PRN


 N/A  3/4/17 17:18


 4/3/17 17:17   


 


 


 Piperacillin Sod/


 Tazobactam Sod/


 Dextrose


  (Zosyn Iv/D5


 100ml)  120 ml @ 


 30 mls/hr  Q12H


 IV  3/5/17 04:00


 4/16/17 03:59  3/5/17 04:07


30 MLS/HR


 


 Piperacillin Sod/


 Tazobactam Sod


  (Consult)  1 ea  UD  PRN


 N/A  3/4/17 17:00


 4/3/17 16:59   


 


 


 Vancomycin HCl


  (Consult)  1 ea  UD  PRN


 N/A  3/4/17 17:00


 4/3/17 16:59   


 


 


 Ondansetron HCl


  (Zofran Odt)  4 mg  Q4H  PRN


 PO  3/5/17 05:00


 4/4/17 04:59  3/5/17 09:45


4 MG


 


 Sertraline HCl


  (Zoloft Tab)  100 mg  HS


 PO  3/5/17 21:00


 4/4/17 20:59   


 


 


 Insulin Glargine


  (Lantus Solostar


 Pen)  see


 protocol


 text  HS


 SC  3/5/17 21:00


 4/4/17 20:59

## 2017-03-05 NOTE — PHARMACY PROGRESS NOTE
Glycemic Control:  Progress Nt


Date of Service


Mar 5, 2017.





Scope


Glycemic Pharmacist consulted by  on 3/4/17 for glycemic control and 

to write orders per [facility] inpatient glycemic control protocol.





Objective


Accuchecks BSG (last 24hrs):











Test


  3/5/17


07:26


 


Random Glucose


  156 mg/dl


(70-99)








Laboratory Data (last 24hrs)











Test


  3/5/17


07:26


 


Anion Gap 15.0 mmol/L 


 


BUN/Creatinine Ratio 5.2 


 


Blood Urea Nitrogen 45 mg/dl 


 


Creatinine 8.70 mg/dl 


 


Potassium Level 4.0 mmol/L 


 


Sodium Level 136 mmol/L 


 


White Blood Count 9.20 K/uL 











Recent Pertinent Medications


Outpatient Anti-diabetic Regimen: 


* Lantus 10 units qHS


* A1c - n/a; not accurate d/t HD








Risk Factors for Insulin Resistance:


* Infection: L toe osteo (ID consulted): On Vancomycin and Zosyn IV; Also 

positive for C. diff: On Flagyl PO


* Diet:DM2/renal





Assessment & Plan


ASSESSMENT:


* ADA & AACE recommend a goal blood sugar range 140-180 mg/dl for the majority 

of critically ill & non-critically ill patients.  However, more stringent 

targets may be selected in individual cases.





* 52 yo female admitted with Left toe osteomyelitis, now receiving IV 

antibiotics to treat.  Pt known to the pharmacy glycemic service from past 

admissions.


* A1c on file from 2/14/17 is not accurate d/t pt receiving HD.


* Based upon past admissions this pt has minimal insulin requirements.  

Therefore, will proceed with caution in adjusting insulin doses.  May even 

consider removing carb ratio if BSGs seem to be overcorrecting during the 

daytime.


* Will adjust Lantus scale and schedule once daily at bedtime based upon 

previous admission data.  If FBG appears to be elevated will increase Lantus 

dosing.


* Will also narrow BSG goal range for improved glycemic control.








PLAN FOR INPATIENT GLYCEMIC CONTROL:





* Adjust/decrease - Basal insulin: Lantus qHS: 


 * 0 units for BSG below 110 mg/dl


 * 5 units for  - 180 mg/dl


 * 10 units for BSG above 180 mg/dl





* Continue - Correctional Insulin with NOVOLOG per scale ACHS 


 * Goal Range:  Low 120 mg/dL - High 160 mg/dL


 * Correction Factor: 25 mg/dL/unit


 * Nutritional / Prandial insulin per carb ratio of 1 unit per 15 grams CHO 

consumed











* Please note that the plan above was derived based on current level of insulin 

resistance and hospital stress. These recommendations are appropriate for 

inpatient admission only. Plan of care upon discharge will need to be 

reassessed to avoid potential outpatient hypo/hyperglycemia. 








Thank you.

## 2017-03-05 NOTE — NEPHROLOGY CONSULTATION
DATE OF CONSULTATION:  03/05/2017

 

DATE OF CONSULTATION:  03/05/2017.  

 

ATTENDING OF RECORD:  Dr. Bolanos.

 

REASON FOR CONSULTATION:  ESRD.

 

HISTORY OF PRESENT ILLNESS:  This is a 51-year-old female who dialyzes at the

[facility] on Mondays, Wednesdays, and Fridays.  Last

dialysis was Friday.  I saw the patient on dialysis on that day.  The 

patient was complaining of left toe pain at that time and was on oral Keflex

and was to see wound care next week.  The patient's toe started to have more

swelling and redness and pain and also started to develop some more diarrhea

and came in to be evaluated and found to have a diabetic wound as well as C.

diff.  The patient is otherwise comfortable, denies any fevers or chills.

 

PAST MEDICAL HISTORY:  Heart disease, status post  stent in August 2016, 

carotid stenosis, type 2 diabetes, hypertension, ischemic cardiomyopathy,

obesity.

 

PAST SURGICAL HISTORY:  Cardiac stent placement, fistula placement.

 

FAMILY HISTORY:  Significant for diabetes.

 

SOCIAL HISTORY:  Former smoker, no alcohol, no drugs.  Lives independently.

 

CURRENT MEDICATIONS:  Zoloft 100 mg at night, aspirin 81 mg daily, Lipitor 40

mg p.o. daily, PhosLo 2  with snacks, Imdur 90 mg daily, Protonix 40 mg

daily, Zosyn 4.5 grams IV q. 12, heparin 5,000 units subQ q.  8, Zantac 300

mg at night, Lantus 5 units subQ b.i.d., Lopressor 50 mg p.o. b.i.d., PhosLo

3 p.o. t.i.d. with meals, Flagyl 500 mg p.o. t.i.d.

 

REVIEW OF SYSTEMS:  No fevers or chills.  No headaches, no blurry vision, no

dysphagia, no chest pain.  No shortness of breath.  Does have some nausea

which she is attributing to the antibiotics.  Positive diarrhea.  No

abdominal pain.  Positive left great toe pain and redness.  All other review

of systems otherwise negative.

 

PHYSICAL EXAMINATION: 

VITAL SIGNS:  Temperature 36.8, pulse 78, respiratory rate 18, blood pressure

153/72, satting 99% on room air.

GENERAL:  Awake, alert, oriented x3, obese.

EYES:  No  scleral icterus.

ENT:  Moist mucous membranes.

NECK:  Supple.

PULMONARY:  Clear to auscultation.

CARDIAC:  Regular rate and rhythm.

ABDOMEN:  Bowel sounds positive, soft, nontender, nondistended.

EXTREMITIES:  Left great toe pain is currently wrapped up, +1 edema.

NEUROLOGICALLY:  Nonfocal.

DERMATOLOGIC:  No rash or ulcers other than the left great toe.

 

LABORATORY DATA:   White count 9, H\T\H 11 and 34, platelet count is 388. 

Sodium was 136, potassium 3.8, chloride is 93, bicarbonate is 30, BUN is 33,

creatinine 7.2, glucose 239.  Lactic acid 1.3, calcium 9.4.  C. diff is

positive.

 

Chest x-ray shows cardiomegaly with no acute cardiopulmonary findings.

 

IMPRESSION AND PLAN:

1. End-stage renal disease:  The patient dialyzes Mondays, Wednesdays and

Fridays.  No emergent need for dialysis today.  Will plan on dialysis

tomorrow on her regularly scheduled day.

2. Anemia of renal failure.  We will continue Procrit on dialysis for a goal

hemoglobin 10 to 11.

3. Renal osteodystrophy.  Phos levels are improved at last check and will

continue her current prescription of 3 PhosLo with meals and 2 with snacks.

 

Appreciate consultation.

## 2017-03-05 NOTE — ORTHOPEDIC CONSULTATION
DATE OF CONSULTATION:  03/05/2017

 

DATE OF CONSULTATION:  03/05/2017.  

 

CHIEF COMPLAINT:  Left great toe infection.

 

HISTORY OF PRESENT ILLNESS:  The patient is a 51-year-old female with past

medical history  of end-stage renal disease on hemodialysis, type 2 diabetes

insulin-dependent, history of coronary artery disease.  She has been treating

a left great toe diabetic nonhealing wound for about 6 weeks.  She follows at

the wound clinic.  Over the last 2 weeks she has been having increasing pain

and swelling in that region.  Tuesday she was started on Keflex, did not

really respond to the Keflex and increasing pain and redness.  She was

changed to a different antibiotic the day before presentation, but then with

worsening pain, redness, presented to the Emergency Room.  She has been

admitted and placed on IV antibiotics.  She states that the toe feels

significantly better now that she has been on IV antibiotics.

 

PAST MEDICAL HISTORY:  Coronary artery disease, carotid stenosis, type 2

diabetes, dyslipidemia, end-stage renal disease on dialysis, GERD,

hypertension, ischemic cardiomyopathy, obesity, tobacco use.

 

PAST SURGICAL HISTORY:  AV fistula formation.

 

FAMILY HISTORY:  Diabetes, coronary artery disease, hypertension.

 

SOCIAL HISTORY:  Former smoker.

 

ALLERGIES:  ADHESIVES, POLLEN.

 

MEDICATIONS:  Aspirin, Lipitor, PhosLo, Keflex,  Lantus, isosorbide, Imdur,

Lopressor, Protonix, Zantac, sertraline, Ativan, methocarbamol, Ultram.

 

REVIEW OF SYSTEMS:  As above.

 

PHYSICAL EXAMINATION:

VITAL SIGNS:  Afebrile.  Vital signs stable.

GENERAL:  Alert and oriented x3, no acute distress.  Mood and affect are

normal.  She is pleasant and cooperative with examination.

HEAD, EYES, EARS, NOSE, AND THROAT:  Atraumatic.

CHEST:  Clear.

CARDIOVASCULAR:  Regular rate and rhythm.

ABDOMEN:  Soft, nontender.

EXTREMITIES:  Examination of the left great toe there is  region of  small 

eschar measuring about 3 mm x 4 mm on the plantar medial side of the toe as

well as a small region near the tip of the distal phalanx near the nail. 

There is no surrounding drainage appreciated.  There is some pinkish type

inflammation throughout the toe but no  cherry red erythema.  She is able to

move the toe with some pain, but she states it is significantly improved now

that she has been on the IV antibiotics.

 

LABORATORY DATA:  White blood cell count 9.3, hemoglobin 11.  C. diff toxin

screen is positive.  X-ray of the toe no bony abnormality is seen.  MRI

demonstrates edema in the bone of the  tuft of the distal phalanx.  No fluid

collection is appreciated.  Without the ability to add IV contrast due to her

kidney function it  is difficult  to definitively say whether the edema in

the bone is suggestive of osteomyelitis or reactive bony edema from the

cellulitis.

 

ASSESSMENT:  Left great toe diabetic wound.

 

PLAN:  The patient states that the pain and swelling in her toe is

significantly improved while she was started on IV antibiotics.  There is

some surrounding erythema in the toe.  The toe itself at least for now

appears viable.  There is some small region of eschar that currently does not

have any drainage from.  For now, I would recommend observing the toe for

another 24 hours to assess her response to the IV antibiotics.  The regions

of eschar may just need some local wound debridement.  I do not know for

certain if this is something that would require   amputation at this time. 

Will discuss the case with Dr. Murcia and get his opinion as to whether this

simply needs some local wound care or if  true surgery  is required but for

the time being my recommendation would be for continued IV antibiotics and

close observation.

## 2017-03-05 NOTE — INFECT. DISEASE CONSULTATION
DATE OF CONSULTATION:  03/05/2017

 

REQUESTING PHYSICIAN:  Dr. Bolanos.

 

HISTORY OF PRESENT ILLNESS:  This is a 51-year-old female who was admitted

from the ER after she had worsening toe ulceration.  She states she was

initially supposed to see the wound care center; however, she has had issues

with transportation.  She also was supposed to be seen by home care following

last week; however, this was not initiated prior to her arrival to the

Emergency Room.  She was following with her family physician for this.  She

states she did have an outpatient culture of her foot obtained on Tuesday. 

She does not know the results of this; however, she was placed on 2 separate

antibiotics.  She also had some complaints of diarrhea yesterday.  C. diff

was performed in the Emergency Room and was positive.  She is now on Flagyl

and vancomycin intravenously and Zosyn.  She is a patient who has been on

dialysis for some time.  Her creatinine upon admission was 7.2.  She has been

tolerating dialysis as an outpatient.  She denies any fevers or chills.  She

does complain of an ulcer on her left thumb, on her right index finger and

also of the left first toe.  She did have an x-ray which was unremarkable;

however, an MRI does show osteomyelitis of the first digit with surrounding

cellulitis and no fluid collection.  She states that she has been dealing

with this ulcer for about 6 weeks and it has been worsening.  She has

significant pain in the area.  She is tolerating her antibiotics.  She states

her diarrhea is better today.  She is eating well.  All remaining review of

systems are reviewed and are negative except for as noted above.

 

PAST MEDICAL HISTORY:  Significant for seasonal allergies, coronary artery

disease, carotid stenosis, type 2 diabetes, hyperlipidemia, end-stage renal

disease, on dialysis, GERD, hypertension, cardiomyopathy, morbid obesity.

 

PAST SURGICAL HISTORY:  Significant for cataract surgeries and dialysis

grafting.

 

FAMILY HISTORY:  Noncontributory.

 

SOCIAL HISTORY:  Significant for history of tobacco use.

 

ALLERGIES:  SHE IS ALLERGIC TO ADHESIVES.

 

CURRENT MEDICATIONS:  Include Zoloft, aspirin, Lipitor, PhosLo and/or

Protonix, Zofran, Zosyn, heparin, Zantac, Lopressor, Flagyl, insulin,

vancomycin, Bentyl, Ativan, Robaxin, Ultram, Tylenol.

 

PHYSICAL EXAMINATION:

VITAL SIGNS:  She is afebrile since admission to the hospital, pulse 78,

respiratory rate 18, blood pressure 153/72, oxygen saturation is 99% on room

air.

GENERAL:  She is awake, alert and oriented x3.  She is in no acute distress.

HEENT:  Mucous membranes are moist.

HEART:  Regular.

LUNGS:  Clear bilaterally.

ABDOMEN:  Soft, nontender and nondistended.

EXTREMITIES:  There is no lower extremity edema.  She does have a necrotic

ulcer on the right first and second fingers.  There is no surrounding

erythema, warmth, drainage or tenderness.  She does have an ulceration on the

left thumb.  This is without evidence of necrosis or surrounding erythema. 

She did not agree to take down her foot dressing secondary to pain; however,

I was able to see photographs.  She does have a black eschar over the tip of

that toe.  The toenail is intact.  She has significant tenderness to

palpation.

 

LABORATORY STUDIES:  CBC today reveals a white blood cell count of 9.2,

hemoglobin 10.4, platelets are 366.  Vancomycin level today is 28.6. 

Chemistry panel yesterday reveals a sodium of 136, potassium 3.8, BUN 33,

creatinine 7.2, glucose is 239.  Lactic acid is 1.3.  Wound culture is

pending with few GPCs on Gram stain.  C. diff was positive.  Chest x-ray done

in the Emergency Room showed cardiomegaly with no infiltrate.  Lower

extremity ultrasound was done in the ER and showed peripheral vascular

disease.  MRI is as above.

 

ASSESSMENT AND PLAN:  Osteomyelitis of the foot, Clostridium difficile

colitis.  At this time, she will be continued on empiric antibiotics.  She

does not wish to be placed on p.o. vancomycin secondary to the taste.  She

will be continued on Flagyl and IV antibiotics.  Her vancomycin level was

elevated today and this will be held until it is less than 15.  Final

antibiotic recommendations will be made when cultures return.  I would

suggest surgical evaluation. I suspect she may require surgical

intervention for cure.

 

Thank you for this consultation.

 

 

 

LOUISE

## 2017-03-05 NOTE — PROGRESS NOTE
Progress Note


Date of Service


Mar 5, 2017.





Progress Note


ID Consult dictated #[id number]





A/P:





1. left first toe osteo


2. c. diff colitis





-Continue abx, follow cultures


-crowley for c diff, does not want po vanco secndary to taste


-will follow, thank you

## 2017-03-06 VITALS — HEART RATE: 64 BPM | SYSTOLIC BLOOD PRESSURE: 137 MMHG | DIASTOLIC BLOOD PRESSURE: 74 MMHG | TEMPERATURE: 98.06 F

## 2017-03-06 VITALS
TEMPERATURE: 97.7 F | OXYGEN SATURATION: 99 % | DIASTOLIC BLOOD PRESSURE: 102 MMHG | HEART RATE: 65 BPM | SYSTOLIC BLOOD PRESSURE: 145 MMHG

## 2017-03-06 VITALS
OXYGEN SATURATION: 98 % | HEART RATE: 67 BPM | TEMPERATURE: 98.42 F | SYSTOLIC BLOOD PRESSURE: 148 MMHG | DIASTOLIC BLOOD PRESSURE: 66 MMHG

## 2017-03-06 VITALS — SYSTOLIC BLOOD PRESSURE: 151 MMHG | HEART RATE: 59 BPM | DIASTOLIC BLOOD PRESSURE: 82 MMHG

## 2017-03-06 VITALS — SYSTOLIC BLOOD PRESSURE: 124 MMHG | HEART RATE: 66 BPM | DIASTOLIC BLOOD PRESSURE: 84 MMHG

## 2017-03-06 VITALS — SYSTOLIC BLOOD PRESSURE: 145 MMHG | DIASTOLIC BLOOD PRESSURE: 80 MMHG | HEART RATE: 59 BPM

## 2017-03-06 VITALS — DIASTOLIC BLOOD PRESSURE: 70 MMHG | SYSTOLIC BLOOD PRESSURE: 125 MMHG

## 2017-03-06 VITALS
SYSTOLIC BLOOD PRESSURE: 154 MMHG | OXYGEN SATURATION: 98 % | HEART RATE: 64 BPM | DIASTOLIC BLOOD PRESSURE: 67 MMHG | TEMPERATURE: 98.06 F

## 2017-03-06 VITALS — HEART RATE: 58 BPM | DIASTOLIC BLOOD PRESSURE: 76 MMHG | SYSTOLIC BLOOD PRESSURE: 160 MMHG

## 2017-03-06 VITALS — HEART RATE: 68 BPM | SYSTOLIC BLOOD PRESSURE: 145 MMHG | DIASTOLIC BLOOD PRESSURE: 84 MMHG

## 2017-03-06 VITALS — SYSTOLIC BLOOD PRESSURE: 178 MMHG | HEART RATE: 62 BPM | DIASTOLIC BLOOD PRESSURE: 62 MMHG

## 2017-03-06 VITALS — TEMPERATURE: 98.42 F | HEART RATE: 61 BPM | DIASTOLIC BLOOD PRESSURE: 77 MMHG | SYSTOLIC BLOOD PRESSURE: 96 MMHG

## 2017-03-06 VITALS
OXYGEN SATURATION: 96 % | DIASTOLIC BLOOD PRESSURE: 67 MMHG | TEMPERATURE: 98.06 F | SYSTOLIC BLOOD PRESSURE: 128 MMHG | HEART RATE: 76 BPM

## 2017-03-06 VITALS — HEART RATE: 68 BPM | SYSTOLIC BLOOD PRESSURE: 156 MMHG | DIASTOLIC BLOOD PRESSURE: 90 MMHG

## 2017-03-06 VITALS — DIASTOLIC BLOOD PRESSURE: 45 MMHG | HEART RATE: 69 BPM | SYSTOLIC BLOOD PRESSURE: 160 MMHG

## 2017-03-06 VITALS — HEART RATE: 62 BPM | SYSTOLIC BLOOD PRESSURE: 146 MMHG | DIASTOLIC BLOOD PRESSURE: 85 MMHG

## 2017-03-06 VITALS — DIASTOLIC BLOOD PRESSURE: 62 MMHG | SYSTOLIC BLOOD PRESSURE: 159 MMHG | HEART RATE: 51 BPM

## 2017-03-06 VITALS — SYSTOLIC BLOOD PRESSURE: 151 MMHG | DIASTOLIC BLOOD PRESSURE: 88 MMHG | HEART RATE: 61 BPM

## 2017-03-06 VITALS — SYSTOLIC BLOOD PRESSURE: 137 MMHG | HEART RATE: 58 BPM | DIASTOLIC BLOOD PRESSURE: 71 MMHG

## 2017-03-06 VITALS — SYSTOLIC BLOOD PRESSURE: 138 MMHG | HEART RATE: 61 BPM | DIASTOLIC BLOOD PRESSURE: 80 MMHG

## 2017-03-06 LAB
ANION GAP SERPL CALC-SCNC: 16 MMOL/L (ref 3–11)
BUN SERPL-MCNC: 64 MG/DL (ref 7–18)
BUN/CREAT SERPL: 6.4 (ref 10–20)
CALCIUM SERPL-MCNC: 8.9 MG/DL (ref 8.5–10.1)
CHLORIDE SERPL-SCNC: 97 MMOL/L (ref 98–107)
CO2 SERPL-SCNC: 24 MMOL/L (ref 21–32)
CREAT CL PREDICTED SERPL C-G-VRATE: 8.1 ML/MIN
CREAT SERPL-MCNC: 10 MG/DL (ref 0.6–1.2)
GLUCOSE SERPL-MCNC: 145 MG/DL (ref 70–99)
HEPATITIS B AB: (no result)
MAGNESIUM SERPL-MCNC: 2.6 MG/DL (ref 1.8–2.4)
POTASSIUM SERPL-SCNC: 4.4 MMOL/L (ref 3.5–5.1)
SODIUM SERPL-SCNC: 137 MMOL/L (ref 136–145)

## 2017-03-06 RX ADMIN — ISOSORBIDE MONONITRATE SCH MG: 60 TABLET ORAL at 18:20

## 2017-03-06 RX ADMIN — ATORVASTATIN CALCIUM SCH MG: 40 TABLET, FILM COATED ORAL at 18:23

## 2017-03-06 RX ADMIN — Medication SCH MG: at 18:20

## 2017-03-06 RX ADMIN — HEPARIN SODIUM SCH UNIT: 10000 INJECTION, SOLUTION INTRAVENOUS; SUBCUTANEOUS at 13:30

## 2017-03-06 RX ADMIN — INSULIN GLARGINE SCH UNIT: 100 INJECTION, SOLUTION SUBCUTANEOUS at 20:35

## 2017-03-06 RX ADMIN — PIPERACILLIN SODIUM, TAZOBACTAM SODIUM SCH MLS/HR: 4; .5 INJECTION, POWDER, LYOPHILIZED, FOR SOLUTION INTRAVENOUS at 03:37

## 2017-03-06 RX ADMIN — TRAMADOL HYDROCHLORIDE PRN MG: 50 TABLET, COATED ORAL at 12:18

## 2017-03-06 RX ADMIN — PIPERACILLIN SODIUM, TAZOBACTAM SODIUM SCH MLS/HR: 4; .5 INJECTION, POWDER, LYOPHILIZED, FOR SOLUTION INTRAVENOUS at 18:18

## 2017-03-06 RX ADMIN — HEPARIN SODIUM SCH UNIT: 10000 INJECTION, SOLUTION INTRAVENOUS; SUBCUTANEOUS at 04:50

## 2017-03-06 RX ADMIN — METOPROLOL TARTRATE SCH MG: 50 TABLET, FILM COATED ORAL at 20:29

## 2017-03-06 RX ADMIN — CALCIUM ACETATE SCH MG: 667 CAPSULE ORAL at 12:13

## 2017-03-06 RX ADMIN — CALCIUM ACETATE SCH MG: 667 CAPSULE ORAL at 18:21

## 2017-03-06 RX ADMIN — METRONIDAZOLE SCH MG: 500 TABLET ORAL at 08:00

## 2017-03-06 RX ADMIN — HEPARIN SODIUM SCH UNIT: 10000 INJECTION, SOLUTION INTRAVENOUS; SUBCUTANEOUS at 20:48

## 2017-03-06 RX ADMIN — METRONIDAZOLE SCH MG: 500 TABLET ORAL at 18:20

## 2017-03-06 RX ADMIN — METRONIDAZOLE SCH MG: 500 TABLET ORAL at 20:27

## 2017-03-06 RX ADMIN — METOPROLOL TARTRATE SCH MG: 50 TABLET, FILM COATED ORAL at 18:24

## 2017-03-06 RX ADMIN — CALCIUM ACETATE SCH MG: 667 CAPSULE ORAL at 07:45

## 2017-03-06 RX ADMIN — SERTRALINE HYDROCHLORIDE SCH MG: 100 TABLET, FILM COATED ORAL at 20:26

## 2017-03-06 RX ADMIN — INSULIN ASPART SCH UNITS: 100 INJECTION, SOLUTION INTRAVENOUS; SUBCUTANEOUS at 12:37

## 2017-03-06 RX ADMIN — INSULIN ASPART SCH UNITS: 100 INJECTION, SOLUTION INTRAVENOUS; SUBCUTANEOUS at 20:36

## 2017-03-06 RX ADMIN — INSULIN ASPART SCH UNITS: 100 INJECTION, SOLUTION INTRAVENOUS; SUBCUTANEOUS at 08:58

## 2017-03-06 RX ADMIN — ISOSORBIDE MONONITRATE SCH MG: 30 TABLET, EXTENDED RELEASE ORAL at 18:23

## 2017-03-06 RX ADMIN — RANITIDINE SCH MG: 150 TABLET ORAL at 20:28

## 2017-03-06 RX ADMIN — INSULIN ASPART SCH UNITS: 100 INJECTION, SOLUTION INTRAVENOUS; SUBCUTANEOUS at 18:50

## 2017-03-06 RX ADMIN — PANTOPRAZOLE SCH MG: 40 TABLET, DELAYED RELEASE ORAL at 18:24

## 2017-03-06 RX ADMIN — ONDANSETRON PRN MG: 4 TABLET, ORALLY DISINTEGRATING ORAL at 09:00

## 2017-03-06 NOTE — PHARMACY PROGRESS NOTE
Glycemic Control:  Progress Nt


Date of Service


Mar 6, 2017.





Scope


Glycemic Pharmacist consulted by  on 3/4/17 for glycemic control and 

to write orders per [facility] inpatient glycemic control protocol.





Objective


Accuchecks BSG (last 24hrs):











Test


  3/5/17


16:44 3/5/17


20:25 3/6/17


06:02 3/6/17


08:07


 


Bedside Glucose


  162 mg/dl


(70-90) 121 mg/dl


(70-90) 


  110 mg/dl


(70-90)


 


Random Glucose


  


  


  145 mg/dl


(70-99) 


 














Test


  3/6/17


11:18 


  


  


 


 


Bedside Glucose


  128 mg/dl


(70-90) 


  


  


 








Laboratory Data (last 24hrs)











Test


  3/6/17


06:02


 


Anion Gap 16.0 mmol/L 


 


BUN/Creatinine Ratio 6.4 


 


Blood Urea Nitrogen 64 mg/dl 


 


Creatinine 10.00 mg/dl 


 


Potassium Level 4.4 mmol/L 


 


Sodium Level 137 mmol/L 











Recent Pertinent Medications


Outpatient Anti-diabetic Regimen: 


* Lantus 10 units qHS


* A1c - n/a; not accurate d/t HD








Risk Factors for Insulin Resistance:


* Infection: L toe osteo (ID consulted): On Vancomycin and Zosyn IV; Also 

positive for C. diff: On Flagyl PO


* Diet:DM2/renal





Assessment & Plan


ASSESSMENT:


* ADA & AACE recommend a goal blood sugar range 140-180 mg/dl for the majority 

of critically ill & non-critically ill patients.  However, more stringent 

targets may be selected in individual cases.





3/5/17


* 52 yo female admitted with Left toe osteomyelitis, now receiving IV 

antibiotics to treat.  Pt known to the pharmacy glycemic service from past 

admissions.


* A1c on file from 2/14/17 is not accurate d/t pt receiving HD.


* Based upon past admissions this pt has minimal insulin requirements.  

Therefore, will proceed with caution in adjusting insulin doses.  May even 

consider removing carb ratio if BSGs seem to be overcorrecting during the 

daytime.


* Will adjust Lantus scale and schedule once daily at bedtime based upon 

previous admission data.  If FBG appears to be elevated will increase Lantus 

dosing.


* Will also narrow BSG goal range for improved glycemic control.





3/6/17


* BSGs ranging 110 - 162 mg/dl during the past 24 hours.  BSGs are well-

controlled during inpatient stay so far.


* Will make slight adjustment to Lantus scale to avoid too strong basal rate.  

See details below.


* Otherwise, no changes warranted at this time to inpatient DM regimen.  

Continue Novolog per current order.








PLAN FOR INPATIENT GLYCEMIC CONTROL:





* Adjust - Basal insulin: Lantus qHS: 


 * 0 units for BSG below 110 mg/dl


 * 5 units for  - 180 mg/dl


 * 8 units for BSG above 180 mg/dl





* Continue - Correctional Insulin with NOVOLOG per scale ACHS 


 * Goal Range:  Low 120 mg/dL - High 160 mg/dL


 * Correction Factor: 25 mg/dL/unit


 * Nutritional / Prandial insulin per carb ratio of 1 unit per 15 grams CHO 

consumed








DISCHARGE RECOMMENDATIONS:


* Pt can likely resume PTA diabetes regimen of Lantus 10 units daily.








* Please note that the plan above was derived based on current level of insulin 

resistance and hospital stress. These recommendations are appropriate for 

inpatient admission only. Plan of care upon discharge will need to be 

reassessed to avoid potential outpatient hypo/hyperglycemia. 








Thank you.

## 2017-03-06 NOTE — ORTHOPEDIC PROGRESS NOTE
Orthopedic Progress Note


Date of Service


Mar 6, 2017.





Subjective


Additional Notes:


Pt seen in Dialysis.  Resting comfortably.  Seen by Dr Dill today per patient 

and toe wound addressed.  States she will be having angio this Wednesday by Dr Roberts.





Objective


Great toe with dressing.  Dressing left on.











  Date Time  Temp Pulse Resp B/P Pulse Ox O2 Delivery O2 Flow Rate FiO2


 


3/6/17 15:45  58  137/71    


 


3/6/17 15:30  58  160/76    


 


3/6/17 15:15  51  159/62    


 


3/6/17 15:00  59  151/82    


 


3/6/17 14:45  69  160/45    


 


3/6/17 14:35  62  178/62    


 


3/6/17 08:20      Room Air  


 


3/6/17 07:53 36.7 64 19 154/67 98 Room Air  


 


3/6/17 00:23 36.7 76 18 128/67 96 Room Air  


 


3/6/17 00:00      Room Air  


 


3/5/17 20:52  67  145/74    


 


3/5/17 20:00      Room Air  


 


3/5/17 17:48 36.5 64 20 150/64 99 Room Air  











Assessment & Plan


Assessment:


Left Great toe wound with Osteomyelitis


Plan:


Pt planning on having the toe taken care of by Dr Dill and Armando.  If 

amputation needed, plan will be to have Dr Roberts do the surgery.  Discussed 

briefly with Stephanie Rucker PAC today.  





Ortho will sign off for now.  Please call with any questions.





(1) Osteomyelitis


(2) C. difficile colitis

## 2017-03-06 NOTE — PROGRESS NOTE
Medicine Progress Note


Date & Time of Visit:


Mar 6, 2017 at 21:18.


Subjective


Pt was seen and examined


Sitting in bed with no distress


Pt said that the toe is getting better 


she said that the pain is less


denies any chest pain, palpitation, dizziness and sob





Objective





Last 8 Hrs








  Date Time  Temp Pulse Resp B/P Pulse Ox O2 Delivery O2 Flow Rate FiO2


 


3/6/17 20:50    125/70    


 


3/6/17 18:15 36.5 65 18 145/102 99 Room Air  


 


3/6/17 18:06 36.7 64  137/74    


 


3/6/17 18:00      Room Air  


 


3/6/17 17:30  68  145/84    


 


3/6/17 17:15  66  124/84    


 


3/6/17 17:00  61  151/88    


 


3/6/17 16:45  61  138/80    


 


3/6/17 16:30  59  145/80    


 


3/6/17 16:15  62  146/85    


 


3/6/17 16:00  68  156/90    


 


3/6/17 15:45  58  137/71    


 


3/6/17 15:30  58  160/76    


 


3/6/17 15:15  51  159/62    


 


3/6/17 15:00  59  151/82    


 


3/6/17 14:45  69  160/45    


 


3/6/17 14:35  62  178/62    


 


3/6/17 14:15 36.9 61  96/77    








Physical Exam:


General- No acute distress


Head-  atraumatic


Eyes- PERRL, EOMI


ENT- oropharynx clear


Neck- supple, no JVD


Lungs- clear to auscultation and percussion


Heart- regular rhythm; no murmur


Abdomen- normal bowel sounds, soft


Extremities-no calf tenderness, wound in the left great toe wrapped with clean 

dressing


Neuro- alert, oriented x 3; PERRL, EOMI; no facial palsy;


Skin- warm & dry


Laboratory Results:





Last 24 Hours








Test


  3/6/17


06:02 3/6/17


08:07 3/6/17


11:18 3/6/17


13:26


 


Sodium Level 137 mmol/L    


 


Potassium Level 4.4 mmol/L    


 


Chloride Level 97 mmol/L    


 


Carbon Dioxide Level 24 mmol/L    


 


Anion Gap 16.0 mmol/L    


 


Blood Urea Nitrogen 64 mg/dl    


 


Creatinine 10.00 mg/dl    


 


Est Creatinine Clear Calc


Drug Dose 8.1 ml/min 


  


  


  


 


 


Estimated GFR (


American) 4.7 


  


  


  


 


 


Estimated GFR (Non-


American 4.1 


  


  


  


 


 


BUN/Creatinine Ratio 6.4    


 


Random Glucose 145 mg/dl    


 


Calcium Level 8.9 mg/dl    


 


Magnesium Level 2.6 mg/dl    


 


Random Vancomycin Level 25.4 mcg/ml    


 


Bedside Glucose  110 mg/dl  128 mg/dl  


 


Hepatitis B Surface Antigen    NEG 


 


Hepatitis B Surface Antibody    POS 














Test


  3/6/17


18:15 3/6/17


20:17 


  


 


 


Bedside Glucose 117 mg/dl  137 mg/dl   











Assessment & Plan


DIABETIC INFECTION OF LEFT GREAT TOE WITH OSTEOMYELITIS


MRI  of the left foot showed a marrow edema seen within the tuft of the 1st 

distal phalanx with


mild drop in signal on the T1-weighted sequences. The appearance suggests 

osteomyelitis. 


Soft tissue edema and ulcerations are seen in the 1st toe.


On IV zosyn/Vanco


ID on board recommended to continue current treatment


Ortho consulted and no intervention at this time, continue monitor.


Dr. Dill from wound care was consulted


Continue daily dressing care


Wound cx growth coag negative staph


Continue current abx treatment





LEFT PERONEAL ARTERY OCCLUSION


U/S of LE showed distal left peroneal artery is occluded.


Case discussed with Stephanie from vascular recommended LLE angio with intervention d

/t PAD


Continue limb check 





C DIFF COLITIS : 


Recently was on Keflex for left great toe infection 


presents with Diarrhea 


stool C diff toxin assay positive


afebrile, no leukocytosis


Continue PO Flagyl 500 mg TID -complete 10 days course 


contact precaution 


Diarrhea improved


No diarrhea today





CHRONIC CHF WITH BOTH SYSTOLIC AND DIASTOLIC DYSFUNCTION : 


no evidence of decompensation or vol overload 


Stable


ECHO on 12/2017 : 


1. Normal left ventricular size with low-normal systolic function. Estimated EF 

50%.  Hypokinesis of the inferolateral, mid inferior, base to mid septal wall.  

Akinesis of the inferior base.  No left ventricular hypertrophy.  Type 2 

diastolic dysfunction.


2. The left atrium is mildly dilated.


3. There is mild mitral regurgitation.


4. Technically difficult study; IV echo-contrast may enhance image quality to 

better evaluate LV systolic function.


5. Compared to prior study on 2/1/2017, LV systolic function has declined.








ESRD ON HD


HD on Monday/Wednesday /Friday 


Nephrology on board


Follow up with Dr. Davison


HD today








ANEMIA OF CHRONIC DISEASE : 


Hgb on admission stable 11  


Stable





HX OF  RECURRENT GI BLEED : 


last admission 2/14/17 with hematemesis 


EGD on 02/16/17 : normal esophagus, stomach , duodenum , no evidence of bleed 


Pathology : Benign , with evidence of chronic gastritis 


Immunostain negative for H Pylori 


Stable





CAD S/P PTCA : 


s/p Bare metal stent on left Cx on 08/2016  


continue Aspirin, Imdur, Lopressor, statin 


Asymptomatic





HTN: 


BP stable 


cont Imdur, Lopressor 





HYPERLIPIDEMIA : 


Cont Lipitor 40 mg daily 








TYPE 2 DM ON INSULIN : 


Last Hba1c 7.1 was 2/14/17


possible due to infection  


cont basal Lantus 10 unit hs 


insulin SSI 


Pharmacy consulted for glycemic management 





GERD: 


cont PPI 





HX OF DEPRESSION DISORDER ; 


mood stable 


cont Zoloft 100 mg PO Daily 





DVT PROPHYLAXIS : 


moderate risk 


Sub q heparin ( H&H stable , no active bleeding ) 





FULL CODE 





DISPOSITION : 


Will need to continue to follow with outpatient wound clinic


Consultants:


ID


Ortho


Wound Care


PT/OT


Current Inpatient Medications:





 Current Inpatient Medications








 Medications


  (Trade)  Dose


 Ordered  Sig/Willy


 Route  Start Time


 Stop Time Status Last Admin


Dose Admin


 


 Metronidazole


  (Flagyl Tab)  500 mg  TID


 PO  3/4/17 17:00


 3/14/17 16:59  3/6/17 20:27


500 MG


 


 Heparin Sodium


  (Porcine)


  (Heparin Sq 5000


 Unit/0.5ml)  5,000 unit  Q8


 SQ  3/4/17 22:00


 4/3/17 21:59  3/6/17 20:48


5,000 UNIT


 


 Acetaminophen


  (Tylenol Tab)  650 mg  Q4H  PRN


 PO  3/4/17 15:00


 4/3/17 14:59   


 


 


 Ondansetron HCl


  (Zofran Inj)  4 mg  Q6H  PRN


 IV  3/4/17 15:00


 4/3/17 14:59   


 


 


 Glucose


  (Glucose 40% Gel)  15-30


 GRAMS 15


 GRAMS...  UD  PRN


 PO  3/4/17 15:00


 4/3/17 14:59   


 


 


 Glucose


  (Glucose Chew


 Tab)  4-8


 Tablets 4


 Tabl...  UD  PRN


 PO  3/4/17 15:00


 4/3/17 14:59   


 


 


 Dextrose


  (Dextrose 50%


 50ML Syringe)  25-50ML OF


 50% DW IV


 FOR...  UD  PRN


 IV  3/4/17 15:00


 4/3/17 14:59   


 


 


 Glucagon


  (Glucagon Inj)  1 mg  UD  PRN


 SQ  3/4/17 15:00


 4/3/17 14:59   


 


 


 Aspirin


  (Ecotrin Tab)  81 mg  DAILY


 PO  3/5/17 08:00


 4/4/17 08:59  3/6/17 18:20


81 MG


 


 Atorvastatin


 Calcium


  (Lipitor Tab)  40 mg  DAILY


 PO  3/5/17 08:00


 4/4/17 08:59  3/6/17 18:23


40 MG


 


 Calcium Acetate


  (Phoslo Cap)  1,334 mg  DAILY  PRN


 PO  3/5/17 08:00


 4/4/17 07:59  3/5/17 14:26


1,334 MG


 


 Calcium Acetate


  (Phoslo Cap)  2,001 mg  TIDM


 PO  3/4/17 17:00


 4/3/17 16:59  3/6/17 18:21


2,001 MG


 


 Dicyclomine HCl


  (Bentyl Cap)  10 mg  QID  PRN


 PO  3/4/17 16:15


 4/3/17 16:14   


 


 


 Isosorbide


 Mononitrate


  (Imdur Ext Rel


 Tab)  60 mg  QAM


 PO  3/5/17 08:00


 4/4/17 08:59  3/6/17 18:20


60 MG


 


 Isosorbide


 Mononitrate


  (Imdur Ext Rel


 Tab)  30 mg  QAM


 PO  3/5/17 08:00


 4/4/17 08:59  3/6/17 18:23


30 MG


 


 Lorazepam


  (Ativan Tab)  0.5 mg  DAILY  PRN


 PO  3/4/17 16:15


 4/3/17 16:14  3/4/17 22:16


0.5 MG


 


 Methocarbamol


  (Robaxin Tab)  500 mg  BID  PRN


 PO  3/4/17 16:15


 4/3/17 16:14   


 


 


 Metoprolol


 Tartrate


  (Lopressor Tab)  50 mg  BID


 PO  3/4/17 20:00


 4/3/17 20:59  3/6/17 20:29


50 MG


 


 Pantoprazole


 Sodium


  (Protonix Tab)  40 mg  DAILY


 PO  3/5/17 08:00


 4/4/17 08:59  3/6/17 18:24


40 MG


 


 Ranitidine HCl


  (zANTac TAB)  300 mg  HS


 PO  3/4/17 21:00


 4/3/17 20:59  3/6/17 20:28


300 MG


 


 Tramadol HCl


  (Ultram Tab)  50 mg  TID  PRN


 PO  3/4/17 16:15


 4/3/17 16:14  3/6/17 12:18


50 MG


 


 Insulin Aspart


  (novoLOG ASPART)  SLIDING


 SCALE  ACHS


 SC  3/4/17 17:00


 4/3/17 16:59  3/6/17 20:36


1 UNITS


 


 Miscellaneous


 Information 1 ea  1 ea  UD  PRN


 N/A  3/4/17 17:18


 4/3/17 17:17   


 


 


 Piperacillin Sod/


 Tazobactam Sod/


 Dextrose


  (Zosyn Iv/D5


 100ml)  120 ml @ 


 30 mls/hr  Q12H


 IV  3/5/17 04:00


 4/16/17 03:59  3/6/17 18:18


30 MLS/HR


 


 Piperacillin Sod/


 Tazobactam Sod


  (Consult)  1 ea  UD  PRN


 N/A  3/4/17 17:00


 4/3/17 16:59   


 


 


 Vancomycin HCl


  (Consult)  1 ea  UD  PRN


 N/A  3/4/17 17:00


 4/3/17 16:59   


 


 


 Ondansetron HCl


  (Zofran Odt)  4 mg  Q4H  PRN


 PO  3/5/17 05:00


 4/4/17 04:59  3/6/17 09:00


4 MG


 


 Sertraline HCl


  (Zoloft Tab)  100 mg  HS


 PO  3/5/17 21:00


 4/4/17 20:59  3/6/17 20:26


100 MG


 


 Insulin Glargine


  (Lantus Solostar


 Pen)  see


 protocol


 text  HS


 SC  3/5/17 21:00


 4/4/17 20:59  3/6/17 20:35


5 UNIT

## 2017-03-06 NOTE — NEPHROLOGY PROGRESS NOTE
Nephrology Progress Note


Date of Service:


Mar 6, 2017.


Subjective


50 yo female with diabetic foot ulcer being evaluated by vascular surgery and 

cdiff with diarrhea which is much improved. for dialysis today. pt doing well. 

no complaints.





Objective











  Date Time  Temp Pulse Resp B/P Pulse Ox O2 Delivery O2 Flow Rate FiO2


 


3/6/17 07:53 36.7 64 19 154/67 98 Room Air  


 


3/6/17 00:23 36.7 76 18 128/67 96 Room Air  


 


3/6/17 00:00      Room Air  


 


3/5/17 20:52  67  145/74    


 


3/5/17 20:00      Room Air  


 


3/5/17 17:48 36.5 64 20 150/64 99 Room Air  


 


3/5/17 16:00      Room Air  








Physical Exam:


General-aaox3, obese


Eyes-no scleral icterus


ENT-mmm


Neck-supple


Lungs-cta


Heart-rrr


Abdomen-bs+ s/nt/nd


Extremities-mild edema, left great toe ulcer 


Neuro-nonfocal





 Current Inpatient Medications








 Medications


  (Trade)  Dose


 Ordered  Sig/Willy


 Route  Start Time


 Stop Time Status Last Admin


Dose Admin


 


 Metronidazole


  (Flagyl Tab)  500 mg  TID


 PO  3/4/17 17:00


 3/14/17 16:59  3/5/17 20:51


500 MG


 


 Heparin Sodium


  (Porcine)


  (Heparin Sq 5000


 Unit/0.5ml)  5,000 unit  Q8


 SQ  3/4/17 22:00


 4/3/17 21:59   


 


 


 Acetaminophen


  (Tylenol Tab)  650 mg  Q4H  PRN


 PO  3/4/17 15:00


 4/3/17 14:59   


 


 


 Ondansetron HCl


  (Zofran Inj)  4 mg  Q6H  PRN


 IV  3/4/17 15:00


 4/3/17 14:59   


 


 


 Glucose


  (Glucose 40% Gel)  15-30


 GRAMS 15


 GRAMS...  UD  PRN


 PO  3/4/17 15:00


 4/3/17 14:59   


 


 


 Glucose


  (Glucose Chew


 Tab)  4-8


 Tablets 4


 Tabl...  UD  PRN


 PO  3/4/17 15:00


 4/3/17 14:59   


 


 


 Dextrose


  (Dextrose 50%


 50ML Syringe)  25-50ML OF


 50% DW IV


 FOR...  UD  PRN


 IV  3/4/17 15:00


 4/3/17 14:59   


 


 


 Glucagon


  (Glucagon Inj)  1 mg  UD  PRN


 SQ  3/4/17 15:00


 4/3/17 14:59   


 


 


 Aspirin


  (Ecotrin Tab)  81 mg  DAILY


 PO  3/5/17 08:00


 4/4/17 08:59  3/5/17 09:38


81 MG


 


 Atorvastatin


 Calcium


  (Lipitor Tab)  40 mg  DAILY


 PO  3/5/17 08:00


 4/4/17 08:59  3/5/17 09:40


40 MG


 


 Calcium Acetate


  (Phoslo Cap)  1,334 mg  DAILY  PRN


 PO  3/5/17 08:00


 4/4/17 07:59  3/5/17 14:26


1,334 MG


 


 Calcium Acetate


  (Phoslo Cap)  2,001 mg  TIDM


 PO  3/4/17 17:00


 4/3/17 16:59  3/6/17 07:45


2,001 MG


 


 Dicyclomine HCl


  (Bentyl Cap)  10 mg  QID  PRN


 PO  3/4/17 16:15


 4/3/17 16:14   


 


 


 Isosorbide


 Mononitrate


  (Imdur Ext Rel


 Tab)  60 mg  QAM


 PO  3/5/17 08:00


 4/4/17 08:59  3/5/17 09:40


60 MG


 


 Isosorbide


 Mononitrate


  (Imdur Ext Rel


 Tab)  30 mg  QAM


 PO  3/5/17 08:00


 4/4/17 08:59  3/5/17 09:39


30 MG


 


 Lorazepam


  (Ativan Tab)  0.5 mg  DAILY  PRN


 PO  3/4/17 16:15


 4/3/17 16:14  3/4/17 22:16


0.5 MG


 


 Methocarbamol


  (Robaxin Tab)  500 mg  BID  PRN


 PO  3/4/17 16:15


 4/3/17 16:14   


 


 


 Metoprolol


 Tartrate


  (Lopressor Tab)  50 mg  BID


 PO  3/4/17 20:00


 4/3/17 20:59  3/5/17 20:50


50 MG


 


 Pantoprazole


 Sodium


  (Protonix Tab)  40 mg  DAILY


 PO  3/5/17 08:00


 4/4/17 08:59  3/5/17 09:41


40 MG


 


 Ranitidine HCl


  (zANTac TAB)  300 mg  HS


 PO  3/4/17 21:00


 4/3/17 20:59  3/5/17 20:51


300 MG


 


 Tramadol HCl


  (Ultram Tab)  50 mg  TID  PRN


 PO  3/4/17 16:15


 4/3/17 16:14  3/5/17 09:42


50 MG


 


 Insulin Aspart


  (novoLOG ASPART)  SLIDING


 SCALE  ACHS


 SC  3/4/17 17:00


 4/3/17 16:59  3/6/17 08:58


2 UNITS


 


 Miscellaneous


 Information 1 ea  1 ea  UD  PRN


 N/A  3/4/17 17:18


 4/3/17 17:17   


 


 


 Piperacillin Sod/


 Tazobactam Sod/


 Dextrose


  (Zosyn Iv/D5


 100ml)  120 ml @ 


 30 mls/hr  Q12H


 IV  3/5/17 04:00


 4/16/17 03:59  3/6/17 03:37


30 MLS/HR


 


 Piperacillin Sod/


 Tazobactam Sod


  (Consult)  1 ea  UD  PRN


 N/A  3/4/17 17:00


 4/3/17 16:59   


 


 


 Vancomycin HCl


  (Consult)  1 ea  UD  PRN


 N/A  3/4/17 17:00


 4/3/17 16:59   


 


 


 Ondansetron HCl


  (Zofran Odt)  4 mg  Q4H  PRN


 PO  3/5/17 05:00


 4/4/17 04:59  3/6/17 09:00


4 MG


 


 Sertraline HCl


  (Zoloft Tab)  100 mg  HS


 PO  3/5/17 21:00


 4/4/17 20:59  3/5/17 20:51


100 MG


 


 Insulin Glargine


  (Lantus Solostar


 Pen)  see


 protocol


 text  HS


 SC  3/5/17 21:00


 4/4/17 20:59  3/5/17 20:48


5 UNIT


 


 Epoetin Narayan


  (Procrit Inj)  10,000 units  TODAY@0830


 IV.  3/6/17 08:30


 3/6/17 18:00   


 











Last 24 Hours








Test


  3/5/17


12:24 3/5/17


16:44 3/5/17


20:25 3/6/17


06:02


 


Bedside Glucose 156 mg/dl  162 mg/dl  121 mg/dl  


 


Sodium Level    137 mmol/L 


 


Potassium Level    4.4 mmol/L 


 


Chloride Level    97 mmol/L 


 


Carbon Dioxide Level    24 mmol/L 


 


Anion Gap    16.0 mmol/L 


 


Blood Urea Nitrogen    64 mg/dl 


 


Creatinine    10.00 mg/dl 


 


Est Creatinine Clear Calc


Drug Dose 


  


  


  8.1 ml/min 


 


 


Estimated GFR (


American) 


  


  


  4.7 


 


 


Estimated GFR (Non-


American 


  


  


  4.1 


 


 


BUN/Creatinine Ratio    6.4 


 


Random Glucose    145 mg/dl 


 


Calcium Level    8.9 mg/dl 


 


Magnesium Level    2.6 mg/dl 


 


Random Vancomycin Level    25.4 mcg/ml 














Test


  3/6/17


08:07 


  


  


 


 


Bedside Glucose 110 mg/dl    











Assessment & Plan


ESRD-for dialysis today for fluid removal and clearance of toxins. 





IMANI-phos levels are better, continue phoslo 3 with meals





Anemia of renal failure-intermittent procrit for goal of hg of 10 to 11. 





ID-vtepp-sweygakic, no recent diarrhea. 





DFU-being evaluated by vascular surgery. on appropriate antibiotics.

## 2017-03-06 NOTE — SURGERY CONSULTATION
Consultation


Date of Service


Mar 6, 2017.





Chief Complaint


L great toe infection, PAD





History of Present Illness


The patient is a 51 year old female with multiple medical problems, including 

HTN, DMII, ESRD on HD, carotid stenosis, CAD, known to Dr Virgen for vascular 

access and carotid stenosis, seen in consultation today for L great toe 

osteomyelitis and PAD.  Pt states she noted a small area of black on her distal 

great toe about 6 weeks ago and it has worsened.  She saw her PCP, who started 

her on keflex and set her up to see wound clinic, but is not scheduled until 

next week.  Pt admits severe pain in L great toe and has been walking slowly on 

the lateral side of her foot.  Was to see Dr Virgen in office tomorrow 

concerning her severe L carotid stenosis.  Denies HA, fever, chills, chest pain

, SOB, abd pain, N/V, rest pain, claudication, other complaints.  US 

demonstrates significant arterial disease with SFA lesion and monophasic flow, 

with 2 vessel runoff to foot.





Vitals





 Vital Signs Past 12 Hours








  Date Time  Temp Pulse Resp B/P Pulse Ox O2 Delivery O2 Flow Rate FiO2


 


3/6/17 07:53 36.7 64 19 154/67 98 Room Air  


 


3/6/17 00:23 36.7 76 18 128/67 96 Room Air  


 


3/6/17 00:00      Room Air  











Allergies


Coded Allergies:  


     Adhesives (Verified  Allergy, Mild, RASH, SORES, 1/31/17)


     Pollen Extract (Unverified  Allergy, Unknown, rash, 1/31/17)





Home Medications


Scheduled


Aspirin (Aspirin Ec), 81 MG PO DAILY


Atorvastatin (Lipitor), 40 MG PO DAILY


Calcium Acetate (Phoslo 667 Mg), 3 CAP PO WM


Calcium Acetate (Phoslo 667 Mg), 2 CAP PO WITH SNACKS


Cephalexin Monohydrate (Keflex), 500 MG PO BID


Insulin Glargine (Lantus Solostar), 10 UNITS SC QPM


Isosorbide Mononitrate (Isosorbide Mononitrate ER), 60 MG PO QAM


Isosorbide Mononitrate Ext Rel (Imdur Ext Rel), 30 MG PO QAM


Metoprolol Tartrate (Lopressor), 50 MG PO BID


Pantoprazole (Protonix), 40 MG PO DAILY


Ranitidine Hcl (Zantac), 300 MG PO HS


Sertraline HCl (Sertraline HCl), 2 TAB PO DAILY





Scheduled PRN


Dicyclomine Hcl (Dicyclomine Hcl), 1 MG PO QID PRN for CRAMPING


Lorazepam (Ativan), 0.5 MG PO DAILY PRN for Anxiety


Methocarbamol (Methocarbamol), 500 MG PO BID PRN for Muscle Spasms


Tramadol (Ultram), 1 TAB PO TID PRN for Pain





Problem List


Medical Problems:


(1) Allergic rhinitis


(2) C. difficile colitis


(3) CAD (coronary artery disease)


(4) Carotid stenosis


(5) Diabetic foot infection


(6) DM type 2 (diabetes mellitus, type 2)


(7) Dyslipidemia


(8) ESRD (end stage renal disease) on dialysis


(9) GERD (gastroesophageal reflux disease)


(10) HTN (hypertension)


(11) HX OF PAST NONCOMPLIANCE


(12) Ischemic cardiomyopathy


(13) Morbid obesity


(14) Tobacco use disorder


(15) UGIB (upper gastrointestinal bleed)


Surgical Problems:


(1) Hemodialysis access, AV graft








Surgical / Medical History


Hx Cardiac Surgery:  Yes (ANGIOPLASTY/STENT)


Hx Abdominal Surgery:  No


Hx Cancer Surgery:  No


Hx Thoracic Surgery:  No


Hx Orthopedic:  No


Hx Urinary Tract Surgery:  No


Past Medical/Surgical History:  Diabetes, Heart Disease, Hypertension, Kidney 

Disease





Family History





Diabetes mellitus


  FATHER


  MOTHER


Heart disease


  FATHER


  MOTHER


Hypertension


  FATHER


  MOTHER


Kidney disease


  GRANDMOTHER





Social History


Smoking Status:  Former Smoker


Hx Tobacco Use In Past Year?:  No


Hx Alcohol Use - Type & Amnt:  No


Hx Substance Use -Type & Amnt:  No





Review of Systems


Constitutional:  No chills, No malaise


Skin:  + change in color


Eyes:  No visual changes


ENMT:  No sore throat


Respiratory:  No SOSUA, No cough, No hemoptysis, No short of breath


Cardiovascular:  No chest pain, No edema, No intermittent claudication, No 

palpitations, No syncope


Gastrointestinal:  + diarrhea, No abdominal pain, No nausea, No vomiting


Neurologic:  No dizziness, No headache, No lethargy, No numbness, No tingling





Physical Exam


Constitutional:  


   General Apperance:  well-nourished, well-developed, obese (morbidly)


   Level of Distress:  NAD, chronically ill


Psychiatric:  


   Mental Status:  active & alert, normal mood, normal affect


   Orientation:  oriented except where noted, to time, to place, to person


   Memory:  recent memory normal, remote memory normal


Head:  normocephalic, atraumatic


Eyes:  


   EOM:  EOMI


ENMT:  normal ENT inspection, hearing grossly normal


Neck:  supple, trachea midline


Lungs:  


   Respiratory effort:  no dyspnea


   Auscultation:  no wheezing, no rales/crackles, no rhonchi


Cardiovascular:  


   Apical Impulse:  not displaced


   Heart Auscultation:  RRR, no rubs, no gallops


Peripheral Pulses:  


   Pulses:  full and equal, in all extremities except if noted


   Brachial Pulses:  normal on the left, normal on the right


   Radial Pulse:  decreased on the left, decreased on the right


   Femoral Pulse:  normal on the right, decreased on the left


   Posterior Tibialis Pulse:  doppler (Monophasic LLE and RLE)


   Dorsalis Pedis Pulse:  decreased on the right, doppler (LLE monophasic)


Abdomen:  


   Inspection & Palpation:  soft, non-distended, no tenderness, guarding & 

rebound


Musculoskeletal:  normal strength (5/5 throughout), normal tone


Extremities:  


   Upper Right:  no cyanosis, no edema, no varicosities


   Upper Left:  no cyanosis, no edema, no varicosities


   Lower Right:  no cyanosis, no varicosities, no palpable cord, edema (trace)


   Lower Left:  pertinent finding (L great toe with erythema, warmth, 2 black 

eschar.  + cap refill.  Minimal odor.  No drainage noted.  + TTP)


Neurologic:  


   Cranial Nerves:  grossly intact


   Sensation:  grossly intact





Assessment and Plan


ASSESSMENT and PLAN:


   Severe PAD with L great toe wound with osteomyelitis


      Pt discussed with Dr Virgen, who reviewed pt's US.  Recommends LLE angio 

with intervention d/t PAD.  Will discuss further with pt and schedule in OR 

this week.

## 2017-03-06 NOTE — EMERGENCY ROOM VISIT NOTE
History


First contact with patient:  10:56


Chief Complaint:  WOUND INFECTION


Stated Complaint:  LEFT TOE-SORES AND LEAKING


Nursing Triage Summary:  


Recent visit, wanted wound rechecked.





History of Present Illness


The patient is a 51 year old white female who presents to the Emergency Room 

with complaints of left great toe pain.  She is being evaluated by the wound 

care center for diabetic ulcers on her great toe.  She states it is draining 

more and smells worse than it did.  She is concerned about worsening infection.

  She denies any fevers or chills.  No sweats.  No streaking on her leg.  She 

is a known diabetic and has vascular disease.  She states the wound care nurses 

and evaluated her today and were concerned.  They sent her in for evaluation.  

She states she did take Keflex at home last night and this morning.  She has 

developed some diarrhea.  There is a distant history of C. difficile infection.

  She is concerned that she has again.





Review of Systems


REVIEW OF SYSTEM:


HEENT:  No dizziness, visual problems, hearing loss, or tinnitus.  There is no 

difficulty swallowing and no oral lesions are present.


PULMONARY: No cough, shortness of breath, sputum production or hemoptysis.


CARDIOVASCULAR:  No chest pain, palpitations, shortness of breath or peripheral 

edema.


GASTROINTESTINAL:  No constipation, nausea, vomiting, or abdominal pain.


GENITOURINARY:  No dysuria, frequency, urgency or nocturia.


NEUROLOGIC:  No weakness, muscle tenderness, epilepsy or history of 

neurological problems.  No history of chronic headaches.


MUSCULOSKELETAL: No history of joint tenderness/swelling.  No history of 

arthritis or arthralgias.


SKIN:  No rashes or lesions.


PSYCHIATRIC: No history of depression or mental illness.


ENDOCRINE: Positive history of diabetes.  No thyroid disorders, or abnormal 

hair growth.





Past Medical/Surgical History


Medical Problems:


(1) Allergic rhinitis


(2) C. difficile colitis


(3) CAD (coronary artery disease)


(4) Carotid stenosis


(5) Diabetic foot infection


(6) DM type 2 (diabetes mellitus, type 2)


(7) Dyslipidemia


(8) ESRD (end stage renal disease) on dialysis


(9) GERD (gastroesophageal reflux disease)


(10) HTN (hypertension)


(11) HX OF PAST NONCOMPLIANCE


(12) Ischemic cardiomyopathy


(13) Morbid obesity


(14) Osteomyelitis


(15) Tobacco use disorder


(16) UGIB (upper gastrointestinal bleed)


Surgical Problems:


(1) Hemodialysis access, AV graft








Family History





Diabetes mellitus


  FATHER


  MOTHER


Heart disease


  FATHER


  MOTHER


Hypertension


  FATHER


  MOTHER


Kidney disease


  GRANDMOTHER





Social History


Smoking Status:  Former Smoker


Alcohol Use:  none


Drug Use:  none


Housing Status:  lives with family


Occupation Status:  unemployed





Current/Historical Medications


Scheduled


Aspirin (Aspirin Ec), 81 MG PO DAILY


Atorvastatin (Lipitor), 40 MG PO DAILY


Calcium Acetate (Phoslo 667 Mg), 3 CAP PO WM


Calcium Acetate (Phoslo 667 Mg), 2 CAP PO WITH SNACKS


Cephalexin Monohydrate (Keflex), 500 MG PO BID


Insulin Glargine (Lantus Solostar), 10 UNITS SC QPM


Isosorbide Mononitrate (Isosorbide Mononitrate ER), 60 MG PO QAM


Isosorbide Mononitrate Ext Rel (Imdur Ext Rel), 30 MG PO QAM


Metoprolol Tartrate (Lopressor), 50 MG PO BID


Pantoprazole (Protonix), 40 MG PO DAILY


Ranitidine Hcl (Zantac), 300 MG PO HS


Sertraline HCl (Sertraline HCl), 2 TAB PO DAILY





Scheduled PRN


Dicyclomine Hcl (Dicyclomine Hcl), 1 MG PO QID PRN for CRAMPING


Lorazepam (Ativan), 0.5 MG PO DAILY PRN for Anxiety


Methocarbamol (Methocarbamol), 500 MG PO BID PRN for Muscle Spasms


Tramadol (Ultram), 1 TAB PO TID PRN for Pain





Allergies


Coded Allergies:  


     Adhesives (Verified  Allergy, Mild, RASH, SORES, 1/31/17)


     Pollen Extract (Unverified  Allergy, Unknown, rash, 1/31/17)





Physical Exam


Vital Signs











  Date Time  Temp Pulse Resp B/P Pulse Ox O2 Delivery O2 Flow Rate FiO2


 


3/4/17 14:00  77 18 122/78 96 Room Air  


 


3/4/17 10:49 36.8 86 20 117/76 96 Room Air  











Physical Exam


Gen.: Well-developed, well-nourished, middle-aged white female, in no acute 

distress.  Laying on a bed.  Alert and oriented.  Skin:Warm and dry with good 

turgor.  No rashes.  No ecchymosis.  The patient is not  diaphoretic.  No 

abrasions.  Left great toe is erythematous from the IP joint distally.  there 

is a black eschar present on the plantar surface measuring approximately 1 cm 

in diameter.  She also has a scab and ulcer present medially next to the 

toenail.  Skin is scaling.  No visible pus.  No lymphangitis.  Musculoskeletal: 

Patient has intact motor function to the ankle and MTP joint. significant pain 

with any attempt at motion at the IP joint.  Neurologic: Gross sensation is 

intact and great toe by soft touch.  Significant pain with any palpation of the 

great toe.





Medical Decision & Procedures


ER Provider


Diagnostic Interpretation:


Radiographic imaging obtained today of the great toe was read by radiology.  

There is no acute bony abnormality.  She does have mild arthritic change and 

osteopenia.  Mild soft tissue swelling.





Laboratory Results











Test


  3/4/17


11:45


 


Immature Granulocyte % (Auto) 0.1 % 


 


White Blood Count


  9.39 K/uL


(4.8-10.8)


 


Red Blood Count


  3.70 M/uL


(4.2-5.4)


 


Hemoglobin


  11.0 g/dL


(12.0-16.0)


 


Hematocrit 34.8 % (37-47) 


 


Mean Corpuscular Volume


  94.1 fL


()


 


Mean Corpuscular Hemoglobin


  29.7 pg


(25-34)


 


Mean Corpuscular Hemoglobin


Concent 31.6 g/dl


(32-36)


 


Platelet Count


  388 K/uL


(130-400)


 


Mean Platelet Volume


  10.2 fL


(7.4-10.4)


 


Neutrophils (%) (Auto) 71.3 % 


 


Lymphocytes (%) (Auto) 17.6 % 


 


Monocytes (%) (Auto) 6.8 % 


 


Eosinophils (%) (Auto) 3.6 % 


 


Basophils (%) (Auto) 0.6 % 


 


Neutrophils # (Auto)


  6.69 K/uL


(1.4-6.5)


 


Lymphocytes # (Auto)


  1.65 K/uL


(1.2-3.4)


 


Monocytes # (Auto)


  0.64 K/uL


(0.11-0.59)


 


Eosinophils # (Auto)


  0.34 K/uL


(0-0.5)


 


Basophils # (Auto)


  0.06 K/uL


(0-0.2)


 


Immature Granulocyte # (Auto)


  0.01 K/uL


(0.00-0.02)














 Date/Time


Source Procedure


Growth Status


 


 


 3/4/17 13:00


Stool C.difficile Toxin B Gene (PCR) - Final


Positive for C. difficile toxin B gene Complete





CBC and chem panel were obtained.  BUN is 33 with creatinine of 7.2.  She 

states she receives dialysis on Monday.  Glucose 239.  Normal WBCs at 9.3.  H&H 

11 and 35.


Stool sample was tested for C. difficile toxin B.  It is positive.





Medications Administered











 Medications


  (Trade)  Dose


 Ordered  Sig/Willy


 Route  Start Time


 Stop Time Status Last Admin


Dose Admin


 


 Ceftriaxone


 Sodium/Dextrose


  (Rocephin Inj/


 Dextrose


 Add-Hale 50ML)  50 ml @ 


 100 mls/hr  ONE  STAT


 IV  3/4/17 12:58


 3/4/17 13:27 DC 3/4/17 12:58


100 MLS/HR





Rocephin 1 g IV





ED Course


Patient was educated regarding today's findings.  Conservative care measures 

were discussed.  IV was established.  Labs were obtained.  Given the eschar and 

ulcerations on her great toe, she was given 1 g Rocephin IV.  With her current 

diarrhea and positive C. difficile toxin as well as renal insufficiency, I 

suggested admission to the hospital for closer management.  Patient is in 

agreement.  Hospitalist service was contacted and evaluated the patient.  

Please see that dictation for final management.  She remained stable while in 

the ED.





Medical Decision


Possibility of MRSA infection, osteomyelitis, and peripheral vascular disease 

were also considered.





Impression





 Primary Impression:  


 C. difficile colitis


 Additional Impressions:  


 Diabetic foot infection


 ESRD (end stage renal disease) on dialysis





Departure Information


Referrals


Lurdes Smith D.O. (PCP)





Patient Instructions


My [facility]





Problem Qualifiers

## 2017-03-06 NOTE — PROGRESS NOTE
Subjective


Date of Service:


Mar 6, 2017.


Subjective


wound culture with gnr. c diff + on flagyl. s/p surgical eval, for ? OR later 

this week. afebrile. tolerating abx. no new cbc.





Problem List


Medical Problems:


(1) Abdominal pain


Status: Acute  





(2) Acute electrocardiogram changes


Status: Acute  





(3) Elevated troponin


Status: Acute  





(4) Elevated troponin


Status: Acute  





(5) End stage renal disease


Status: Acute  





(6) GI bleed


Status: Acute  





(7) Hyperkalemia


Status: Acute  





(8) Joint effusion


Status: Acute  





(9) Left elbow pain


Status: Acute  





(10) Left sided chest pain


Status: Acute  





(11) Left sided chest pain


Status: Acute  





(12) Limb ischemia


Status: Acute  





(13) Medical non-compliance


Status: Acute  





(14) Pneumonia


Status: Acute  





(15) Pulmonary edema


Status: Acute  





(16) Renal failure


Status: Acute  





(17) Sinusitis


Status: Acute  





(18) Substernal chest pain


Status: Acute  





(19) Upper abdominal pain


Status: Acute  





(20) Upper GI bleed


Status: Acute  





(21) Urinary tract infection


Status: Acute  





(22) Vomiting


Status: Acute  





(23) Vomiting


Status: Acute  





(24) Vomiting


Status: Acute  





(25) Vomiting


Status: Acute  











Objective


Vital Signs











  Date Time  Temp Pulse Resp B/P Pulse Ox O2 Delivery O2 Flow Rate FiO2


 


3/6/17 07:53 36.7 64 19 154/67 98 Room Air  


 


3/6/17 00:23 36.7 76 18 128/67 96 Room Air  


 


3/6/17 00:00      Room Air  


 


3/5/17 20:52  67  145/74    


 


3/5/17 20:00      Room Air  


 


3/5/17 17:48 36.5 64 20 150/64 99 Room Air  


 


3/5/17 16:00      Room Air  











Laboratory Results











Item Value  Date Time


 


Gram Stain - Final Resulted 3/4/17 2015





Ulcer Toe Left 1  


 


C.difficile Toxin B Gene (PCR) - Final Complete 3/4/17 1300





Stool Positive for C. difficile toxin B gene 











Last 24 Hours








Test


  3/5/17


12:24 3/5/17


16:44 3/5/17


20:25 3/6/17


06:02


 


Bedside Glucose 156 mg/dl  162 mg/dl  121 mg/dl  


 


Sodium Level    137 mmol/L 


 


Potassium Level    4.4 mmol/L 


 


Chloride Level    97 mmol/L 


 


Carbon Dioxide Level    24 mmol/L 


 


Anion Gap    16.0 mmol/L 


 


Blood Urea Nitrogen    64 mg/dl 


 


Creatinine    10.00 mg/dl 


 


Est Creatinine Clear Calc


Drug Dose 


  


  


  8.1 ml/min 


 


 


Estimated GFR (


American) 


  


  


  4.7 


 


 


Estimated GFR (Non-


American 


  


  


  4.1 


 


 


BUN/Creatinine Ratio    6.4 


 


Random Glucose    145 mg/dl 


 


Calcium Level    8.9 mg/dl 


 


Magnesium Level    2.6 mg/dl 


 


Random Vancomycin Level    25.4 mcg/ml 














Test


  3/6/17


08:07 


  


  


 


 


Bedside Glucose 110 mg/dl    











Assessment and Plan





(1) Osteomyelitis


Assessment & Plan:  continue emperic abx for now, follow cultures, await final 

ID/sensitivity gnr from wound culture





(2) C. difficile colitis


Assessment & Plan:  continue flagyl

## 2017-03-07 VITALS
OXYGEN SATURATION: 100 % | SYSTOLIC BLOOD PRESSURE: 153 MMHG | DIASTOLIC BLOOD PRESSURE: 70 MMHG | TEMPERATURE: 98.96 F | HEART RATE: 53 BPM

## 2017-03-07 VITALS
OXYGEN SATURATION: 95 % | TEMPERATURE: 97.7 F | SYSTOLIC BLOOD PRESSURE: 126 MMHG | HEART RATE: 58 BPM | DIASTOLIC BLOOD PRESSURE: 73 MMHG

## 2017-03-07 VITALS
TEMPERATURE: 98.42 F | OXYGEN SATURATION: 98 % | HEART RATE: 66 BPM | SYSTOLIC BLOOD PRESSURE: 158 MMHG | DIASTOLIC BLOOD PRESSURE: 93 MMHG

## 2017-03-07 LAB
ANION GAP SERPL CALC-SCNC: 9 MMOL/L (ref 3–11)
BUN SERPL-MCNC: 40 MG/DL (ref 7–18)
BUN/CREAT SERPL: 5.4 (ref 10–20)
CALCIUM SERPL-MCNC: 8.7 MG/DL (ref 8.5–10.1)
CHLORIDE SERPL-SCNC: 98 MMOL/L (ref 98–107)
CO2 SERPL-SCNC: 30 MMOL/L (ref 21–32)
CREAT CL PREDICTED SERPL C-G-VRATE: 11.4 ML/MIN
CREAT SERPL-MCNC: 7.2 MG/DL (ref 0.6–1.2)
EOSINOPHIL NFR BLD AUTO: 350 K/UL (ref 130–400)
GLUCOSE SERPL-MCNC: 129 MG/DL (ref 70–99)
HCT VFR BLD CALC: 30.5 % (ref 37–47)
MAGNESIUM SERPL-MCNC: 2.3 MG/DL (ref 1.8–2.4)
MCH RBC QN AUTO: 30.4 PG (ref 25–34)
MCHC RBC AUTO-ENTMCNC: 32.1 G/DL (ref 32–36)
MCV RBC AUTO: 94.7 FL (ref 80–100)
PMV BLD AUTO: 9.5 FL (ref 7.4–10.4)
POTASSIUM SERPL-SCNC: 4.5 MMOL/L (ref 3.5–5.1)
RBC # BLD AUTO: 3.22 M/UL (ref 4.2–5.4)
SODIUM SERPL-SCNC: 137 MMOL/L (ref 136–145)
WBC # BLD AUTO: 8.13 K/UL (ref 4.8–10.8)

## 2017-03-07 RX ADMIN — METRONIDAZOLE SCH MG: 500 TABLET ORAL at 08:40

## 2017-03-07 RX ADMIN — CALCIUM ACETATE SCH MG: 667 CAPSULE ORAL at 17:38

## 2017-03-07 RX ADMIN — CALCIUM ACETATE SCH MG: 667 CAPSULE ORAL at 12:11

## 2017-03-07 RX ADMIN — INSULIN ASPART SCH UNITS: 100 INJECTION, SOLUTION INTRAVENOUS; SUBCUTANEOUS at 12:47

## 2017-03-07 RX ADMIN — SERTRALINE HYDROCHLORIDE SCH MG: 100 TABLET, FILM COATED ORAL at 20:43

## 2017-03-07 RX ADMIN — INSULIN ASPART SCH UNITS: 100 INJECTION, SOLUTION INTRAVENOUS; SUBCUTANEOUS at 20:52

## 2017-03-07 RX ADMIN — ONDANSETRON PRN MG: 4 TABLET, ORALLY DISINTEGRATING ORAL at 18:21

## 2017-03-07 RX ADMIN — METRONIDAZOLE SCH MG: 500 TABLET ORAL at 20:44

## 2017-03-07 RX ADMIN — LACTOBACILLUS TAB SCH TAB: TAB at 12:20

## 2017-03-07 RX ADMIN — METOPROLOL TARTRATE SCH MG: 50 TABLET, FILM COATED ORAL at 09:25

## 2017-03-07 RX ADMIN — INSULIN ASPART SCH UNITS: 100 INJECTION, SOLUTION INTRAVENOUS; SUBCUTANEOUS at 18:12

## 2017-03-07 RX ADMIN — ONDANSETRON PRN MG: 4 TABLET, ORALLY DISINTEGRATING ORAL at 00:55

## 2017-03-07 RX ADMIN — PIPERACILLIN SODIUM, TAZOBACTAM SODIUM SCH MLS/HR: 4; .5 INJECTION, POWDER, LYOPHILIZED, FOR SOLUTION INTRAVENOUS at 15:53

## 2017-03-07 RX ADMIN — INSULIN ASPART SCH UNITS: 100 INJECTION, SOLUTION INTRAVENOUS; SUBCUTANEOUS at 09:24

## 2017-03-07 RX ADMIN — ISOSORBIDE MONONITRATE SCH MG: 30 TABLET, EXTENDED RELEASE ORAL at 09:25

## 2017-03-07 RX ADMIN — METRONIDAZOLE SCH MG: 500 TABLET ORAL at 14:16

## 2017-03-07 RX ADMIN — METOPROLOL TARTRATE SCH MG: 50 TABLET, FILM COATED ORAL at 20:44

## 2017-03-07 RX ADMIN — INSULIN GLARGINE SCH UNIT: 100 INJECTION, SOLUTION SUBCUTANEOUS at 20:51

## 2017-03-07 RX ADMIN — Medication SCH MG: at 09:26

## 2017-03-07 RX ADMIN — RANITIDINE SCH MG: 150 TABLET ORAL at 20:44

## 2017-03-07 RX ADMIN — ISOSORBIDE MONONITRATE SCH MG: 60 TABLET ORAL at 09:25

## 2017-03-07 RX ADMIN — HEPARIN SODIUM SCH UNIT: 10000 INJECTION, SOLUTION INTRAVENOUS; SUBCUTANEOUS at 06:09

## 2017-03-07 RX ADMIN — ATORVASTATIN CALCIUM SCH MG: 40 TABLET, FILM COATED ORAL at 09:25

## 2017-03-07 RX ADMIN — DICYCLOMINE HYDROCHLORIDE PRN MG: 10 CAPSULE ORAL at 09:28

## 2017-03-07 RX ADMIN — HYDROMORPHONE HYDROCHLORIDE PRN MG: 1 INJECTION, SOLUTION INTRAMUSCULAR; INTRAVENOUS; SUBCUTANEOUS at 06:11

## 2017-03-07 RX ADMIN — PIPERACILLIN SODIUM, TAZOBACTAM SODIUM SCH MLS/HR: 4; .5 INJECTION, POWDER, LYOPHILIZED, FOR SOLUTION INTRAVENOUS at 04:01

## 2017-03-07 RX ADMIN — LACTOBACILLUS TAB SCH TAB: TAB at 17:38

## 2017-03-07 RX ADMIN — LORAZEPAM PRN MG: 0.5 TABLET ORAL at 18:25

## 2017-03-07 RX ADMIN — CALCIUM ACETATE SCH MG: 667 CAPSULE ORAL at 08:40

## 2017-03-07 RX ADMIN — ONDANSETRON PRN MG: 4 TABLET, ORALLY DISINTEGRATING ORAL at 08:45

## 2017-03-07 RX ADMIN — HEPARIN SODIUM SCH UNIT: 10000 INJECTION, SOLUTION INTRAVENOUS; SUBCUTANEOUS at 14:15

## 2017-03-07 RX ADMIN — PANTOPRAZOLE SCH MG: 40 TABLET, DELAYED RELEASE ORAL at 09:25

## 2017-03-07 RX ADMIN — HEPARIN SODIUM SCH UNIT: 10000 INJECTION, SOLUTION INTRAVENOUS; SUBCUTANEOUS at 20:52

## 2017-03-07 NOTE — PHARMACY PROGRESS NOTE
Glycemic Control:  Progress Nt


Date of Service


Mar 7, 2017.





Scope


Glycemic Pharmacist consulted by Dr Staton on 3/4/17 for glycemic control and 

to write orders per [facility] inpatient glycemic control protocol.





Objective


Accuchecks BSG (last 24hrs):











Test


  3/6/17


11:18 3/6/17


18:15 3/6/17


20:17 3/7/17


06:00


 


Bedside Glucose


  128 mg/dl


(70-90) 117 mg/dl


(70-90) 137 mg/dl


(70-90) 


 


 


Random Glucose


  


  


  


  129 mg/dl


(70-99)














Test


  3/7/17


07:33 


  


  


 


 


Bedside Glucose


  126 mg/dl


(70-90) 


  


  


 








Laboratory Data (last 24hrs)











Test


  3/7/17


06:00


 


Anion Gap 9.0 mmol/L 


 


BUN/Creatinine Ratio 5.4 


 


Blood Urea Nitrogen 40 mg/dl 


 


Creatinine 7.20 mg/dl 


 


Potassium Level 4.5 mmol/L 


 


Sodium Level 137 mmol/L 


 


White Blood Count 8.13 K/uL 











Recent Pertinent Medications


Outpatient Anti-diabetic Regimen: 


* Lantus 10 units qHS


* A1c - n/a; not accurate d/t HD








Risk Factors for Insulin Resistance:


* Infection: L toe osteo (ID consulted): On Vancomycin and Zosyn IV; Also 

positive for C. diff: On Flagyl PO


* Surgery: LLE Angio 3/8


* Diet:DM2/renal --> NPO after midnight tonight for surgery tomorrow





Assessment & Plan


ASSESSMENT:


* ADA & AACE recommend a goal blood sugar range 140-180 mg/dl for the majority 

of critically ill & non-critically ill patients.  However, more stringent 

targets may be selected in individual cases. Using 120-160mg/dl for HD patient.





3/5/17


* 52 yo female admitted with Left toe osteomyelitis, now receiving IV 

antibiotics to treat.  Pt known to the pharmacy glycemic service from past 

admissions.


* A1c on file from 2/14/17 is not accurate d/t pt receiving HD.


* Based upon past admissions this pt has minimal insulin requirements.  

Therefore, will proceed with caution in adjusting insulin doses.  May even 

consider removing carb ratio if BSGs seem to be overcorrecting during the 

daytime.


* Will adjust Lantus scale and schedule once daily at bedtime based upon 

previous admission data.  If FBG appears to be elevated will increase Lantus 

dosing.


* Will also narrow BSG goal range for improved glycemic control.





3/6/17


* BSGs ranging 110 - 162 mg/dl during the past 24 hours.  BSGs are well-

controlled during inpatient stay so far.


* Will make slight adjustment to Lantus scale to avoid too strong basal rate.  

See details below.


* Otherwise, no changes warranted at this time to inpatient DM regimen.  

Continue Novolog per current order.





3/7/17


* BSGs ranging 110 - 145 mg/dl during the past 24 hours.  BSGs are well-

controlled during inpatient stay so far.


* NPO after midnight tonight for LLE angio, no changes in insulin, as Lantus is 

on a scales based on BSG





PLAN FOR INPATIENT GLYCEMIC CONTROL:





* Basal insulin: Lantus qHS: 


 * 0 units for BSG below 110 mg/dl


 * 5 units for  - 180 mg/dl


 * 8 units for BSG above 180 mg/dl





* Continue - Correctional Insulin with NOVOLOG per scale ACHS 


 * Goal Range:  Low 120 mg/dL - High 160 mg/dL


 * Correction Factor: 25 mg/dL/unit


 * Nutritional / Prandial insulin per carb ratio of 1 unit per 15 grams CHO 

consumed








DISCHARGE RECOMMENDATIONS:


* Pt can likely resume PTA diabetes regimen of Lantus 10 units daily.





* Please note that the plan above was derived based on current level of insulin 

resistance and hospital stress. These recommendations are appropriate for 

inpatient admission only. Plan of care upon discharge will need to be 

reassessed to avoid potential outpatient hypo/hyperglycemia. 








Thank you.

## 2017-03-07 NOTE — PROGRESS NOTE
Subjective


Date of Service:


Mar 7, 2017.


Subjective


wound with gnr, not yet finalized. for vascular procedure tomorrow. afebrile. 

wbc 8.1, vanco level today is 16.9. tolerating abx. no overnight events.





Problem List


Medical Problems:


(1) Abdominal pain


Status: Acute  





(2) Acute electrocardiogram changes


Status: Acute  





(3) Elevated troponin


Status: Acute  





(4) Elevated troponin


Status: Acute  





(5) End stage renal disease


Status: Acute  





(6) ESRD (end stage renal disease) on dialysis


Status: Chronic  





(7) GI bleed


Status: Acute  





(8) Hyperkalemia


Status: Acute  





(9) Joint effusion


Status: Acute  





(10) Left elbow pain


Status: Acute  





(11) Left sided chest pain


Status: Acute  





(12) Left sided chest pain


Status: Acute  





(13) Limb ischemia


Status: Acute  





(14) Medical non-compliance


Status: Acute  





(15) Pneumonia


Status: Acute  





(16) Pulmonary edema


Status: Acute  





(17) Renal failure


Status: Acute  





(18) Sinusitis


Status: Acute  





(19) Substernal chest pain


Status: Acute  





(20) Upper abdominal pain


Status: Acute  





(21) Upper GI bleed


Status: Acute  





(22) Urinary tract infection


Status: Acute  





(23) Vomiting


Status: Acute  





(24) Vomiting


Status: Acute  





(25) Vomiting


Status: Acute  





(26) Vomiting


Status: Acute  











Objective


Vital Signs











  Date Time  Temp Pulse Resp B/P Pulse Ox O2 Delivery O2 Flow Rate FiO2


 


3/7/17 07:23 36.5 58 18 126/73 95   


 


3/7/17 00:00      Room Air  


 


3/6/17 23:53 36.9 67 18 148/66 98 Room Air  


 


3/6/17 20:50    125/70    


 


3/6/17 18:15 36.5 65 18 145/102 99 Room Air  


 


3/6/17 18:06 36.7 64  137/74    


 


3/6/17 18:00      Room Air  


 


3/6/17 17:30  68  145/84    


 


3/6/17 17:15  66  124/84    


 


3/6/17 17:00  61  151/88    


 


3/6/17 16:45  61  138/80    


 


3/6/17 16:30  59  145/80    


 


3/6/17 16:15  62  146/85    


 


3/6/17 16:00  68  156/90    


 


3/6/17 15:45  58  137/71    


 


3/6/17 15:30  58  160/76    


 


3/6/17 15:15  51  159/62    


 


3/6/17 15:00  59  151/82    


 


3/6/17 14:45  69  160/45    


 


3/6/17 14:35  62  178/62    


 


3/6/17 14:15 36.9 61  96/77    











Laboratory Results











Item Value  Date Time


 


Gram Stain - Final Resulted 3/4/17 2015





Ulcer Toe Left 1  


 


C.difficile Toxin B Gene (PCR) - Final Complete 3/4/17 1300





Stool Positive for C. difficile toxin B gene 











Last 24 Hours








Test


  3/6/17


11:18 3/6/17


13:26 3/6/17


18:15 3/6/17


20:17


 


Bedside Glucose 128 mg/dl   117 mg/dl  137 mg/dl 


 


Hepatitis B Surface Antigen  NEG   


 


Hepatitis B Surface Antibody  POS   














Test


  3/7/17


06:00 3/7/17


07:33 


  


 


 


White Blood Count 8.13 K/uL    


 


Red Blood Count 3.22 M/uL    


 


Hemoglobin 9.8 g/dL    


 


Hematocrit 30.5 %    


 


Mean Corpuscular Volume 94.7 fL    


 


Mean Corpuscular Hemoglobin 30.4 pg    


 


Mean Corpuscular Hemoglobin


Concent 32.1 g/dl 


  


  


  


 


 


RDW Standard Deviation 50.3 fL    


 


RDW Coefficient of Variation 14.4 %    


 


Platelet Count 350 K/uL    


 


Mean Platelet Volume 9.5 fL    


 


Sodium Level 137 mmol/L    


 


Potassium Level 4.5 mmol/L    


 


Chloride Level 98 mmol/L    


 


Carbon Dioxide Level 30 mmol/L    


 


Anion Gap 9.0 mmol/L    


 


Blood Urea Nitrogen 40 mg/dl    


 


Creatinine 7.20 mg/dl    


 


Est Creatinine Clear Calc


Drug Dose 11.4 ml/min 


  


  


  


 


 


Estimated GFR (


American) 7.0 


  


  


  


 


 


Estimated GFR (Non-


American 6.1 


  


  


  


 


 


BUN/Creatinine Ratio 5.4    


 


Random Glucose 129 mg/dl    


 


Calcium Level 8.7 mg/dl    


 


Magnesium Level 2.3 mg/dl    


 


Random Vancomycin Level 16.9 mcg/ml    


 


Bedside Glucose  126 mg/dl   











Assessment and Plan





(1) Osteomyelitis


Assessment & Plan:  continue current abx for now, vanco by level. await wound 

culture results. await findings from angio tomorrow. 





(2) C. difficile colitis

## 2017-03-07 NOTE — PHARMACY PROGRESS NOTE
Pharmacy Antibiotic Prog Note


Date of Service:


Mar 7, 2017.


Subjective:


The patient is currently receiving Vanco/Zosyn





Objective:


Height (Feet):  5


Height (Inches):  4.00


Weight (Kilograms):  108.700


Levels:











Item Value  Date Time


 


Random Vancomycin Level 28.6 mcg/ml 3/5/17 0726


 


Random Vancomycin Level 25.4 mcg/ml 3/6/17 0602


 


Random Vancomycin Level 16.9 mcg/ml 3/7/17 0600








Lab Results (24hrs):





Laboratory Tests








Test


  3/7/17


06:00


 


BUN/Creatinine Ratio 5.4 


 


Blood Urea Nitrogen 40 mg/dl 


 


Creatinine 7.20 mg/dl 


 


White Blood Count 8.13 K/uL 








Micro Results:











Item Value  Date Time


 


Gram Stain - Final Resulted 3/4/17 2015





Ulcer Toe Left 1 Coag Neg Staph


Gram (-) Rods 


 


MRSA DNA Surveillance Screen - Final Complete 3/4/17 2015





Nasal Specimen Negative for MRSA by DNA Probe 


 


C.difficile Toxin B Gene (PCR) - Final Complete 3/4/17 1300





Stool Positive for C. difficile toxin B gene 











Assessment & Plan:


Pt's random lvl came back therapeutic at 16.9. Mathematically Pt's t1/2 looks 

to be about 40hrs; however, she has been clearing the Vanco went from 25.4->

16.9 in 24 hours. I have ordered a one time dose of Vanco 500mg (4.5mg/kg) to 

ensure she remains therapeutic. I have ordered a random lvl to be drawn 3/8/17 @

0600. She last received HD yesterday and is set to receive HD tomorrow morning.








Thank you for consulting the pharmacy kinetic team and including us in the care 

of Ms. Mckeon. 








Pharmacy will continue to follow and will adjust dose/frequency as necessary.  

Thank you

## 2017-03-07 NOTE — PROGRESS NOTE
Internal Med Progress Note


Date of Service:


Mar 7, 2017.


Provider Documentation:





SUBJECTIVE:





Patient is sitting in the bed in no apparent distress. Denies any new 

complaint. "My toe is getting better."


Remains afebrile. No SOB.








OBJECTIVE:





Vital Signs-as noted below





Examination:





General- Alert/Awake and is in no acute distress


Head-  atraumatic


Eyes- PERRL, EOMI


ENT- ears, Nose & Throat are normal looking.


Neck- Supple, Central trachea, No JVD.


Lungs- B/L Clear to auscultation.


Heart- Regular Rate/Rhythm, Normal S1,S2,.


Abdomen- normal bowel sounds, soft


Extremities-no calf tenderness, wound in the left great toe wrapped with clean 

dressing


Neuro- alert, oriented x 3; PERRL, EOMI; no facial palsy;


Skin- warm & dry





Lab data as noted below.


ASSESSMENT & PLAN:


MRI  of the left foot showed a marrow edema seen within the tuft of the 1st 

distal phalanx with


mild drop in signal on the T1-weighted sequences. The appearance suggests 

osteomyelitis. 


Soft tissue edema and ulcerations are seen in the 1st toe.





Left Great Toe Osteomyelitis: Clinically doing well.


   -Continue IV Zosyn/Vancomycin.


   -ID following the patient & has recommended to continue current treatment


   -Ortho consulted and no intervention at this time, continue monitor.


   -Dr. Dill from wound care was consulted


   -Continue daily dressing care


   -Wound culture growth coag negative staph





C. Diff Colitis: Recently was on Keflex for left great toe infection & 

presented with Diarrhea which has improved.


   -Stool C diff toxin assay positive


   -Continue PO Flagyl 500 mg TID (Day # 3) -complete 14 days course 


   -Added lactobacillus.





Left Peroneal Artery Occlusion: U/S of LE showed distal left peroneal artery is 

occluded.


   -Case discussed with Stephanie from vascular recommended LLE angio with 

intervention d/t PAD. No final plan yet.


   -Continue limb check 





ESRD On Hemodialysis: HD on Monday/Wednesday /Friday 


   -Nephrology following the patient.





History Of Recurrent GI Bleed: last admission 2/14/17 with hematemesis. Stable 

so far.


EGD on 02/16/17 : normal esophagus, stomach , duodenum , no evidence of bleed 


Pathology : Benign , with evidence of chronic gastritis 


Immunostain negative for H Pylori 





Type II Diabetes: On Insulin. Last Hba1c 7.1 was 2/14/17


   -Continue basal Lantus 10 unit hs 


   -Monitor BS and cover as per sliding scale.


   -Pharmacy consulted for glycemic management 





Chronic CHF Due to Systolic/Diastolic Dysfunction: Clinically stable & no 

evidence of decompensation or vol overload 


ECHO on 12/2017 : 


1. Normal left ventricular size with low-normal systolic function. Estimated EF 

50%.  Hypokinesis of the inferolateral, mid inferior, base to mid septal wall.  

Akinesis of the inferior base.  No left ventricular hypertrophy.  Type 2 

diastolic dysfunction.


2. The left atrium is mildly dilated.


3. There is mild mitral regurgitation.


4. Technically difficult study; IV echo-contrast may enhance image quality to 

better evaluate LV systolic function.


5. Compared to prior study on 2/1/2017, LV systolic function has declined.





Anemia Of Chronic Disease: Stable. Monitoring H/H.





History CAD S/P PTCA : s/p Bare metal stent on left Cx on 08/2016. Asymptomatic

  


   -Continue Aspirin, Imdur, Lopressor, statin 





Hypertension: BP stable 


   -Continue Imdur, Lopressor 





Hyperlipidemia: Continue Lipitor 40 mg daily 





GERD: Stable.Continue PPI.


 


HX OF Depression Disorder:Stable.


   -Continue Zoloft 100 mg PO Daily 





DVT Prophylaxis: Sq Heparin





Code Status: FULL CODE 





Disposition: Discharge once is stable. Will need to continue to follow with 

outpatient wound clinic


Vital Signs:











  Date Time  Temp Pulse Resp B/P Pulse Ox O2 Delivery O2 Flow Rate FiO2


 


3/7/17 14:58 37.2 53 17 153/70 100 Room Air  


 


3/7/17 08:40      Room Air  


 


3/7/17 07:23 36.5 58 18 126/73 95   


 


3/7/17 00:00      Room Air  


 


3/6/17 23:53 36.9 67 18 148/66 98 Room Air  


 


3/6/17 20:50    125/70    


 


3/6/17 18:15 36.5 65 18 145/102 99 Room Air  


 


3/6/17 18:06 36.7 64  137/74    


 


3/6/17 18:00      Room Air  


 


3/6/17 17:30  68  145/84    


 


3/6/17 17:15  66  124/84    


 


3/6/17 17:00  61  151/88    


 


3/6/17 16:45  61  138/80    


 


3/6/17 16:30  59  145/80    


 


3/6/17 16:15  62  146/85    








Lab Results:





Results Past 24 Hours








Test


  3/6/17


18:15 3/6/17


20:17 3/7/17


06:00 3/7/17


07:33 Range/Units


 


 


Bedside Glucose 117 137  126 70-90  mg/dl


 


White Blood Count   8.13  4.8-10.8  K/uL


 


Red Blood Count   3.22  4.2-5.4  M/uL


 


Hemoglobin   9.8  12.0-16.0  g/dL


 


Hematocrit   30.5  37-47  %


 


Mean Corpuscular Volume   94.7    fL


 


Mean Corpuscular Hemoglobin   30.4  25-34  pg


 


Mean Corpuscular Hemoglobin


Concent 


  


  32.1


  


  32-36  g/dl


 


 


RDW Standard Deviation   50.3  36.4-46.3  fL


 


RDW Coefficient of Variation   14.4  11.5-14.5  %


 


Platelet Count   350  130-400  K/uL


 


Mean Platelet Volume   9.5  7.4-10.4  fL


 


Sodium Level   137  136-145  mmol/L


 


Potassium Level   4.5  3.5-5.1  mmol/L


 


Chloride Level   98    mmol/L


 


Carbon Dioxide Level   30  21-32  mmol/L


 


Anion Gap   9.0  3-11  mmol/L


 


Blood Urea Nitrogen   40  7-18  mg/dl


 


Creatinine


  


  


  7.20


  


  0.60-1.20


mg/dl


 


Est Creatinine Clear Calc


Drug Dose 


  


  11.4


  


   ml/min


 


 


Estimated GFR (


American) 


  


  7.0


  


   


 


 


Estimated GFR (Non-


American 


  


  6.1


  


   


 


 


BUN/Creatinine Ratio   5.4  10-20  


 


Random Glucose   129  70-99  mg/dl


 


Calcium Level   8.7  8.5-10.1  mg/dl


 


Magnesium Level   2.3  1.8-2.4  mg/dl


 


Random Vancomycin Level   16.9   mcg/ml














Test


  3/7/17


11:34 


  


  


  Range/Units


 


 


Bedside Glucose 111    70-90  mg/dl

## 2017-03-08 VITALS — DIASTOLIC BLOOD PRESSURE: 72 MMHG | HEART RATE: 58 BPM | SYSTOLIC BLOOD PRESSURE: 147 MMHG

## 2017-03-08 VITALS
OXYGEN SATURATION: 98 % | HEART RATE: 55 BPM | DIASTOLIC BLOOD PRESSURE: 85 MMHG | TEMPERATURE: 98.78 F | SYSTOLIC BLOOD PRESSURE: 155 MMHG

## 2017-03-08 VITALS
SYSTOLIC BLOOD PRESSURE: 148 MMHG | OXYGEN SATURATION: 98 % | HEART RATE: 56 BPM | TEMPERATURE: 98.78 F | DIASTOLIC BLOOD PRESSURE: 74 MMHG

## 2017-03-08 VITALS — DIASTOLIC BLOOD PRESSURE: 79 MMHG | SYSTOLIC BLOOD PRESSURE: 157 MMHG | HEART RATE: 60 BPM

## 2017-03-08 VITALS
OXYGEN SATURATION: 99 % | DIASTOLIC BLOOD PRESSURE: 82 MMHG | HEART RATE: 54 BPM | TEMPERATURE: 97.88 F | SYSTOLIC BLOOD PRESSURE: 168 MMHG

## 2017-03-08 VITALS
TEMPERATURE: 98.24 F | SYSTOLIC BLOOD PRESSURE: 172 MMHG | OXYGEN SATURATION: 99 % | DIASTOLIC BLOOD PRESSURE: 97 MMHG | HEART RATE: 62 BPM

## 2017-03-08 VITALS — DIASTOLIC BLOOD PRESSURE: 52 MMHG | HEART RATE: 56 BPM | SYSTOLIC BLOOD PRESSURE: 140 MMHG

## 2017-03-08 VITALS
DIASTOLIC BLOOD PRESSURE: 88 MMHG | OXYGEN SATURATION: 97 % | HEART RATE: 64 BPM | SYSTOLIC BLOOD PRESSURE: 183 MMHG | TEMPERATURE: 97.88 F

## 2017-03-08 VITALS
SYSTOLIC BLOOD PRESSURE: 180 MMHG | DIASTOLIC BLOOD PRESSURE: 108 MMHG | HEART RATE: 61 BPM | TEMPERATURE: 98.6 F | OXYGEN SATURATION: 96 %

## 2017-03-08 VITALS — SYSTOLIC BLOOD PRESSURE: 177 MMHG | HEART RATE: 62 BPM | DIASTOLIC BLOOD PRESSURE: 86 MMHG | OXYGEN SATURATION: 97 %

## 2017-03-08 VITALS
HEART RATE: 60 BPM | OXYGEN SATURATION: 98 % | DIASTOLIC BLOOD PRESSURE: 57 MMHG | SYSTOLIC BLOOD PRESSURE: 128 MMHG | TEMPERATURE: 97.88 F

## 2017-03-08 VITALS
HEART RATE: 63 BPM | OXYGEN SATURATION: 98 % | DIASTOLIC BLOOD PRESSURE: 92 MMHG | TEMPERATURE: 98.42 F | SYSTOLIC BLOOD PRESSURE: 181 MMHG

## 2017-03-08 VITALS
TEMPERATURE: 98.78 F | OXYGEN SATURATION: 99 % | SYSTOLIC BLOOD PRESSURE: 183 MMHG | DIASTOLIC BLOOD PRESSURE: 99 MMHG | HEART RATE: 62 BPM

## 2017-03-08 VITALS
DIASTOLIC BLOOD PRESSURE: 72 MMHG | TEMPERATURE: 98.42 F | HEART RATE: 59 BPM | SYSTOLIC BLOOD PRESSURE: 160 MMHG | OXYGEN SATURATION: 97 %

## 2017-03-08 VITALS — SYSTOLIC BLOOD PRESSURE: 154 MMHG | HEART RATE: 50 BPM | DIASTOLIC BLOOD PRESSURE: 54 MMHG

## 2017-03-08 VITALS — DIASTOLIC BLOOD PRESSURE: 73 MMHG | HEART RATE: 57 BPM | SYSTOLIC BLOOD PRESSURE: 157 MMHG

## 2017-03-08 VITALS
SYSTOLIC BLOOD PRESSURE: 160 MMHG | HEART RATE: 59 BPM | OXYGEN SATURATION: 97 % | DIASTOLIC BLOOD PRESSURE: 72 MMHG | TEMPERATURE: 98.42 F

## 2017-03-08 VITALS — DIASTOLIC BLOOD PRESSURE: 85 MMHG | HEART RATE: 57 BPM | SYSTOLIC BLOOD PRESSURE: 139 MMHG

## 2017-03-08 VITALS — SYSTOLIC BLOOD PRESSURE: 148 MMHG | HEART RATE: 52 BPM | DIASTOLIC BLOOD PRESSURE: 54 MMHG

## 2017-03-08 VITALS — TEMPERATURE: 97.34 F | HEART RATE: 62 BPM | DIASTOLIC BLOOD PRESSURE: 68 MMHG | SYSTOLIC BLOOD PRESSURE: 157 MMHG

## 2017-03-08 VITALS
HEART RATE: 61 BPM | SYSTOLIC BLOOD PRESSURE: 183 MMHG | TEMPERATURE: 97.88 F | DIASTOLIC BLOOD PRESSURE: 88 MMHG | OXYGEN SATURATION: 97 %

## 2017-03-08 VITALS
TEMPERATURE: 97.88 F | DIASTOLIC BLOOD PRESSURE: 96 MMHG | OXYGEN SATURATION: 97 % | SYSTOLIC BLOOD PRESSURE: 181 MMHG | HEART RATE: 66 BPM

## 2017-03-08 VITALS — DIASTOLIC BLOOD PRESSURE: 89 MMHG | HEART RATE: 59 BPM | SYSTOLIC BLOOD PRESSURE: 175 MMHG

## 2017-03-08 VITALS — DIASTOLIC BLOOD PRESSURE: 51 MMHG | SYSTOLIC BLOOD PRESSURE: 140 MMHG | HEART RATE: 50 BPM

## 2017-03-08 VITALS — DIASTOLIC BLOOD PRESSURE: 79 MMHG | HEART RATE: 67 BPM | SYSTOLIC BLOOD PRESSURE: 159 MMHG | TEMPERATURE: 97.7 F

## 2017-03-08 VITALS
DIASTOLIC BLOOD PRESSURE: 46 MMHG | HEART RATE: 59 BPM | OXYGEN SATURATION: 99 % | SYSTOLIC BLOOD PRESSURE: 135 MMHG | TEMPERATURE: 97.7 F

## 2017-03-08 VITALS
OXYGEN SATURATION: 99 % | TEMPERATURE: 98.24 F | HEART RATE: 30 BPM | SYSTOLIC BLOOD PRESSURE: 185 MMHG | DIASTOLIC BLOOD PRESSURE: 139 MMHG

## 2017-03-08 VITALS
DIASTOLIC BLOOD PRESSURE: 96 MMHG | HEART RATE: 63 BPM | TEMPERATURE: 98.78 F | SYSTOLIC BLOOD PRESSURE: 174 MMHG | OXYGEN SATURATION: 93 %

## 2017-03-08 VITALS — DIASTOLIC BLOOD PRESSURE: 47 MMHG | HEART RATE: 54 BPM | SYSTOLIC BLOOD PRESSURE: 155 MMHG

## 2017-03-08 VITALS — OXYGEN SATURATION: 97 % | HEART RATE: 62 BPM

## 2017-03-08 VITALS — DIASTOLIC BLOOD PRESSURE: 69 MMHG | SYSTOLIC BLOOD PRESSURE: 148 MMHG | HEART RATE: 60 BPM

## 2017-03-08 VITALS — DIASTOLIC BLOOD PRESSURE: 56 MMHG | HEART RATE: 57 BPM | SYSTOLIC BLOOD PRESSURE: 151 MMHG

## 2017-03-08 VITALS — SYSTOLIC BLOOD PRESSURE: 153 MMHG | DIASTOLIC BLOOD PRESSURE: 49 MMHG | HEART RATE: 50 BPM

## 2017-03-08 LAB
ANION GAP SERPL CALC-SCNC: 13 MMOL/L (ref 3–11)
BUN SERPL-MCNC: 56 MG/DL (ref 7–18)
BUN/CREAT SERPL: 6.1 (ref 10–20)
CALCIUM SERPL-MCNC: 8.9 MG/DL (ref 8.5–10.1)
CHLORIDE SERPL-SCNC: 97 MMOL/L (ref 98–107)
CO2 SERPL-SCNC: 26 MMOL/L (ref 21–32)
CREAT CL PREDICTED SERPL C-G-VRATE: 9.1 ML/MIN
CREAT SERPL-MCNC: 9.1 MG/DL (ref 0.6–1.2)
GLUCOSE SERPL-MCNC: 107 MG/DL (ref 70–99)
MAGNESIUM SERPL-MCNC: 2.4 MG/DL (ref 1.8–2.4)
POTASSIUM SERPL-SCNC: 4.3 MMOL/L (ref 3.5–5.1)
SODIUM SERPL-SCNC: 136 MMOL/L (ref 136–145)

## 2017-03-08 PROCEDURE — B41GYZZ FLUOROSCOPY OF LEFT LOWER EXTREMITY ARTERIES USING OTHER CONTRAST: ICD-10-PCS | Performed by: SURGERY

## 2017-03-08 RX ADMIN — CALCIUM ACETATE SCH MG: 667 CAPSULE ORAL at 10:05

## 2017-03-08 RX ADMIN — METRONIDAZOLE SCH MG: 500 TABLET ORAL at 20:39

## 2017-03-08 RX ADMIN — CALCIUM ACETATE SCH MG: 667 CAPSULE ORAL at 17:39

## 2017-03-08 RX ADMIN — HEPARIN SODIUM SCH UNIT: 10000 INJECTION, SOLUTION INTRAVENOUS; SUBCUTANEOUS at 14:00

## 2017-03-08 RX ADMIN — INSULIN ASPART SCH UNITS: 100 INJECTION, SOLUTION INTRAVENOUS; SUBCUTANEOUS at 20:44

## 2017-03-08 RX ADMIN — CALCIUM ACETATE SCH MG: 667 CAPSULE ORAL at 11:44

## 2017-03-08 RX ADMIN — LACTOBACILLUS TAB SCH TAB: TAB at 10:04

## 2017-03-08 RX ADMIN — METRONIDAZOLE SCH MG: 500 TABLET ORAL at 15:53

## 2017-03-08 RX ADMIN — LACTOBACILLUS TAB SCH TAB: TAB at 12:00

## 2017-03-08 RX ADMIN — Medication SCH MG: at 10:04

## 2017-03-08 RX ADMIN — INSULIN ASPART SCH UNITS: 100 INJECTION, SOLUTION INTRAVENOUS; SUBCUTANEOUS at 07:57

## 2017-03-08 RX ADMIN — INSULIN GLARGINE SCH UNIT: 100 INJECTION, SOLUTION SUBCUTANEOUS at 20:44

## 2017-03-08 RX ADMIN — LACTOBACILLUS TAB SCH TAB: TAB at 16:49

## 2017-03-08 RX ADMIN — SERTRALINE HYDROCHLORIDE SCH MG: 100 TABLET, FILM COATED ORAL at 20:40

## 2017-03-08 RX ADMIN — INSULIN ASPART SCH UNITS: 100 INJECTION, SOLUTION INTRAVENOUS; SUBCUTANEOUS at 17:52

## 2017-03-08 RX ADMIN — METOPROLOL TARTRATE SCH MG: 50 TABLET, FILM COATED ORAL at 10:05

## 2017-03-08 RX ADMIN — PIPERACILLIN SODIUM, TAZOBACTAM SODIUM SCH MLS/HR: 4; .5 INJECTION, POWDER, LYOPHILIZED, FOR SOLUTION INTRAVENOUS at 04:13

## 2017-03-08 RX ADMIN — LACTOBACILLUS TAB SCH TAB: TAB at 10:05

## 2017-03-08 RX ADMIN — ISOSORBIDE MONONITRATE SCH MG: 30 TABLET, EXTENDED RELEASE ORAL at 10:04

## 2017-03-08 RX ADMIN — Medication SCH MG: at 10:05

## 2017-03-08 RX ADMIN — HYDROMORPHONE HYDROCHLORIDE PRN MG: 1 INJECTION, SOLUTION INTRAMUSCULAR; INTRAVENOUS; SUBCUTANEOUS at 09:53

## 2017-03-08 RX ADMIN — INSULIN ASPART SCH UNITS: 100 INJECTION, SOLUTION INTRAVENOUS; SUBCUTANEOUS at 12:03

## 2017-03-08 RX ADMIN — LACTOBACILLUS TAB SCH TAB: TAB at 08:00

## 2017-03-08 RX ADMIN — ISOSORBIDE MONONITRATE SCH MG: 60 TABLET ORAL at 10:05

## 2017-03-08 RX ADMIN — HEPARIN SODIUM SCH UNIT: 10000 INJECTION, SOLUTION INTRAVENOUS; SUBCUTANEOUS at 05:25

## 2017-03-08 RX ADMIN — ATORVASTATIN CALCIUM SCH MG: 40 TABLET, FILM COATED ORAL at 10:05

## 2017-03-08 RX ADMIN — METRONIDAZOLE SCH MG: 500 TABLET ORAL at 10:05

## 2017-03-08 RX ADMIN — HEPARIN SODIUM SCH UNIT: 10000 INJECTION, SOLUTION INTRAVENOUS; SUBCUTANEOUS at 20:45

## 2017-03-08 RX ADMIN — METOPROLOL TARTRATE SCH MG: 50 TABLET, FILM COATED ORAL at 20:33

## 2017-03-08 RX ADMIN — PANTOPRAZOLE SCH MG: 40 TABLET, DELAYED RELEASE ORAL at 10:05

## 2017-03-08 NOTE — PHARMACY PROGRESS NOTE
Glycemic Control:  Progress Nt


Date of Service


Mar 8, 2017.





Scope


Glycemic Pharmacist consulted by Dr Staton on 3/4/17 for glycemic control and 

to write orders per [facility] inpatient glycemic control protocol.





Objective


Accuchecks BSG (last 24hrs):











Test


  3/7/17


16:39 3/7/17


20:11 3/8/17


06:10 3/8/17


07:55


 


Bedside Glucose


  114 mg/dl


(70-90) 121 mg/dl


(70-90) 


  94 mg/dl


(70-90)


 


Random Glucose


  


  


  107 mg/dl


(70-99) 


 














Test


  3/8/17


11:37 3/8/17


13:55 


  


 


 


Bedside Glucose


  78 mg/dl


(70-90) 72 mg/dl


(70-90) 


  


 








Laboratory Data (last 24hrs)











Test


  3/8/17


06:10


 


Anion Gap 13.0 mmol/L 


 


BUN/Creatinine Ratio 6.1 


 


Blood Urea Nitrogen 56 mg/dl 


 


Creatinine 9.10 mg/dl 


 


Potassium Level 4.3 mmol/L 


 


Sodium Level 136 mmol/L 











Recent Pertinent Medications


Outpatient Anti-diabetic Regimen: 


* Lantus 10 units qHS


* A1c - n/a; not accurate d/t HD





Risk Factors for Insulin Resistance:


* Infection: L toe osteo (ID consulted): On Vancomycin and Zosyn IV; Also 

positive for C. diff: On Flagyl PO


* Surgery: LLE Angio today


* Diet:DM2/renal --> NPO after midnight last night





Assessment & Plan


ASSESSMENT:


* ADA & AACE recommend a goal blood sugar range 140-180 mg/dl for the majority 

of critically ill & non-critically ill patients.  However, more stringent 

targets may be selected in individual cases. Using 120-160mg/dl for HD patient.





3/5/17


* 50 yo female admitted with Left toe osteomyelitis, now receiving IV 

antibiotics to treat.  Pt known to the pharmacy glycemic service from past 

admissions.


* A1c on file from 2/14/17 is not accurate d/t pt receiving HD.


* Based upon past admissions this pt has minimal insulin requirements.  

Therefore, will proceed with caution in adjusting insulin doses.  May even 

consider removing carb ratio if BSGs seem to be overcorrecting during the 

daytime.


* Will adjust Lantus scale and schedule once daily at bedtime based upon 

previous admission data.  If FBG appears to be elevated will increase Lantus 

dosing.


* Will also narrow BSG goal range for improved glycemic control.





3/6/17


* BSGs ranging 110 - 162 mg/dl during the past 24 hours.  BSGs are well-

controlled during inpatient stay so far.


* Will make slight adjustment to Lantus scale to avoid too strong basal rate.  

See details below.


* Otherwise, no changes warranted at this time to inpatient DM regimen.  

Continue Novolog per current order.





3/7/17


* BSGs ranging 110 - 145 mg/dl during the past 24 hours.  BSGs are well-

controlled during inpatient stay so far.


* NPO after midnight tonight for LLE angio, no changes in insulin, as Lantus is 

on a scales based on BSG


3/8/17


* Patient has had 0 units of insulin today d/t NPO status, only 5 units of 

Lantus last night, and BSGs in 70s. I will change Lantus scale parameters 

slightly so that patient does not get a dose unless BSG 120mg/dL or greater to 

prevent hypoglycemia.





PLAN FOR INPATIENT GLYCEMIC CONTROL:





* Basal insulin: Lantus qHS: 


 * 0 units for BSG below 120 mg/dl


 * 5 units for  - 180 mg/dl


 * 8 units for BSG above 180 mg/dl





* Continue - Correctional Insulin with NOVOLOG per scale ACHS 


 * Goal Range:  Low 120 mg/dL - High 160 mg/dL


 * Correction Factor: 25 mg/dL/unit


 * Nutritional / Prandial insulin per carb ratio of 1 unit per 15 grams CHO 

consumed








DISCHARGE RECOMMENDATIONS:


* Pt can likely resume PTA diabetes regimen of Lantus 10 units daily.





* Please note that the plan above was derived based on current level of insulin 

resistance and hospital stress. These recommendations are appropriate for 

inpatient admission only. Plan of care upon discharge will need to be 

reassessed to avoid potential outpatient hypo/hyperglycemia. 








Thank you.

## 2017-03-08 NOTE — PROGRESS NOTE
Subjective


Date of Service:


Mar 8, 2017.


Subjective


Pt evaluation today including:  conversation w/ patient, physical exam, chart 

review, lab review


pt seen in followup, just completed hd, for vascular procedure later today. 

still with pain in toe, diarrhea resolved. no abd pain. no f/c. tolerating abx. 

eval by wound center, continues with dressing changes. all remaining ros 

reviewed and are negative.





Problem List


Medical Problems:


(1) Abdominal pain


Status: Acute  





(2) Acute electrocardiogram changes


Status: Acute  





(3) Elevated troponin


Status: Acute  





(4) Elevated troponin


Status: Acute  





(5) End stage renal disease


Status: Acute  





(6) ESRD (end stage renal disease) on dialysis


Status: Chronic  





(7) GI bleed


Status: Acute  





(8) Hyperkalemia


Status: Acute  





(9) Joint effusion


Status: Acute  





(10) Left elbow pain


Status: Acute  





(11) Left sided chest pain


Status: Acute  





(12) Left sided chest pain


Status: Acute  





(13) Limb ischemia


Status: Acute  





(14) Medical non-compliance


Status: Acute  





(15) Pneumonia


Status: Acute  





(16) Pulmonary edema


Status: Acute  





(17) Renal failure


Status: Acute  





(18) Sinusitis


Status: Acute  





(19) Substernal chest pain


Status: Acute  





(20) Upper abdominal pain


Status: Acute  





(21) Upper GI bleed


Status: Acute  





(22) Urinary tract infection


Status: Acute  





(23) Vomiting


Status: Acute  





(24) Vomiting


Status: Acute  





(25) Vomiting


Status: Acute  





(26) Vomiting


Status: Acute  











Objective


Vital Signs











  Date Time  Temp Pulse Resp B/P Pulse Ox O2 Delivery O2 Flow Rate FiO2


 


3/8/17 09:26 36.3 62  157/68    


 


3/8/17 09:15  50  153/49    


 


3/8/17 09:00  50  140/51    


 


3/8/17 08:45  52  148/54    


 


3/8/17 08:30  50  154/54    


 


3/8/17 08:15  54  155/47    


 


3/8/17 08:00  57  151/56    


 


3/8/17 07:45  56  140/52    


 


3/8/17 07:30  57  139/85    


 


3/8/17 07:15  60  148/69    


 


3/8/17 07:00  57  157/73    


 


3/8/17 06:52 36.5 67  159/79    


 


3/8/17 06:45  58  147/72    


 


3/8/17 06:30  60  157/79    


 


3/8/17 06:15  59  175/89    


 


3/8/17 01:26      Room Air  


 


3/8/17 00:20      Room Air  


 


3/7/17 23:15 36.9 66 20 158/93 98   


 


3/7/17 16:20      Room Air  


 


3/7/17 14:58 37.2 53 17 153/70 100 Room Air  











Physical Exam


General Appearance:  WD/WN, no apparent distress


Eyes:  EOMI


Neck:  supple


Respiratory/Chest:  lungs clear


Cardiovascular:  regular rate, rhythm, no edema


Abdomen:  soft


Extremities:  non-tender, normal inspection, no pedal edema, + pertinent finding

 (dressing intact)


Neurologic/Psychiatric:  alert, oriented x 3


Skin:  normal color





Laboratory Results











Item Value  Date Time


 


Gram Stain - Final Complete 3/4/17 2015





Ulcer Toe Left 1  


 


C.difficile Toxin B Gene (PCR) - Final Complete 3/4/17 1300





Stool Positive for C. difficile toxin B gene 











Last 24 Hours








Test


  3/7/17


11:34 3/7/17


16:39 3/7/17


20:11 3/8/17


06:10


 


Bedside Glucose 111 mg/dl  114 mg/dl  121 mg/dl  


 


Sodium Level    136 mmol/L 


 


Potassium Level    4.3 mmol/L 


 


Chloride Level    97 mmol/L 


 


Carbon Dioxide Level    26 mmol/L 


 


Anion Gap    13.0 mmol/L 


 


Blood Urea Nitrogen    56 mg/dl 


 


Creatinine    9.10 mg/dl 


 


Est Creatinine Clear Calc


Drug Dose 


  


  


  9.1 ml/min 


 


 


Estimated GFR (


American) 


  


  


  5.3 


 


 


Estimated GFR (Non-


American 


  


  


  4.6 


 


 


BUN/Creatinine Ratio    6.1 


 


Random Glucose    107 mg/dl 


 


Calcium Level    8.9 mg/dl 


 


Magnesium Level    2.4 mg/dl 


 


Random Vancomycin Level    22.8 mcg/ml 











Assessment and Plan





(1) Osteomyelitis


Assessment & Plan:  will change to levaquin po, stop zosyn, can stop vanco by 

level as well.  duration will depend on response to therapy, for vascular 

procedure today. 





(2) C. difficile colitis


Assessment & Plan:  cont crowley, has not tolerated po vanco in past.

## 2017-03-08 NOTE — PROCEDURE NOTE
Pre-Mod Sedation Assessment


General


Date of Moderate Sedation:


Mar 8, 2017.


Vital Signs:





 Vital Signs Past 12 Hours








  Date Time  Temp Pulse Resp B/P Pulse Ox O2 Delivery O2 Flow Rate FiO2


 


3/8/17 12:34 36.9 59  160/72 97 Room Air  


 


3/8/17 10:28      Room Air  


 


3/8/17 09:26 36.3 62  157/68    


 


3/8/17 09:15  50  153/49    


 


3/8/17 09:00  50  140/51    


 


3/8/17 08:45  52  148/54    


 


3/8/17 08:30  50  154/54    


 


3/8/17 08:15  54  155/47    


 


3/8/17 08:00  57  151/56    


 


3/8/17 07:45  56  140/52    


 


3/8/17 07:30  57  139/85    


 


3/8/17 07:15  60  148/69    


 


3/8/17 07:00  57  157/73    


 


3/8/17 06:52 36.5 67  159/79    


 


3/8/17 06:45  58  147/72    


 


3/8/17 06:30  60  157/79    


 


3/8/17 06:15  59  175/89    











Pre-Sedation Airway Assessment


Oral Cavity:  Chipped Teeth


Short Thick Neck:  No


Hx of Sleep Apnea:  Yes


Smoking Status:  Former Smoker


Mallampati Classification:  Class I


ASA Classification:  Class II





Notes








The planned sedation has been discussed with the patient and consent obtained.  

I have


identified the patient, determined the appropriateness of sedation and have 

assessed the


patient immediately prior to the procedure.  





All medicine(s) and interventions are by my order.

## 2017-03-08 NOTE — DIALYSIS PROGRESS NOTE
Nephrology Dialysis Note


Date of Service:


Mar 8, 2017.


Subjective


50 yo female with diabetic foot ulcer who is scheduled for vascular procedure 

to look at the blood flows to the left foot this afternoon.  doing dialysis 

this morning. diarrhea is better. pt feels better. foot looks like it is 

improving





Objective











  Date Time  Temp Pulse Resp B/P Pulse Ox O2 Delivery O2 Flow Rate FiO2


 


3/8/17 08:15  54  155/47    


 


3/8/17 08:00  57  151/56    


 


3/8/17 07:45  56  140/52    


 


3/8/17 07:30  57  139/85    


 


3/8/17 07:15  60  148/69    


 


3/8/17 07:00  57  157/73    


 


3/8/17 06:52 36.5 67  159/79    


 


3/8/17 06:45  58  147/72    


 


3/8/17 06:30  60  157/79    


 


3/8/17 06:15  59  175/89    


 


3/8/17 01:26      Room Air  


 


3/8/17 00:20      Room Air  


 


3/7/17 23:15 36.9 66 20 158/93 98   


 


3/7/17 16:20      Room Air  


 


3/7/17 14:58 37.2 53 17 153/70 100 Room Air  








Physical Exam:


General-aaox3, obese


Eyes-no scleral icterus


ENT-mmm


Neck-supple


Lungs-clear


Heart-melissa


Abdomen-bs+ s/nt/nd


Extremities-no edema,  left great toe ulcer-improving


Neuro-nonfocal





 Current Inpatient Medications








 Medications


  (Trade)  Dose


 Ordered  Sig/Willy


 Route  Start Time


 Stop Time Status Last Admin


Dose Admin


 


 Metronidazole


  (Flagyl Tab)  500 mg  TID


 PO  3/4/17 17:00


 3/14/17 16:59  3/7/17 20:44


500 MG


 


 Heparin Sodium


  (Porcine)


  (Heparin Sq 5000


 Unit/0.5ml)  5,000 unit  Q8


 SQ  3/4/17 22:00


 4/3/17 21:59  3/7/17 20:52


5,000 UNIT


 


 Acetaminophen


  (Tylenol Tab)  650 mg  Q4H  PRN


 PO  3/4/17 15:00


 4/3/17 14:59   


 


 


 Ondansetron HCl


  (Zofran Inj)  4 mg  Q6H  PRN


 IV  3/4/17 15:00


 4/3/17 14:59   


 


 


 Glucose


  (Glucose 40% Gel)  15-30


 GRAMS 15


 GRAMS...  UD  PRN


 PO  3/4/17 15:00


 4/3/17 14:59   


 


 


 Glucose


  (Glucose Chew


 Tab)  4-8


 Tablets 4


 Tabl...  UD  PRN


 PO  3/4/17 15:00


 4/3/17 14:59   


 


 


 Dextrose


  (Dextrose 50%


 50ML Syringe)  25-50ML OF


 50% DW IV


 FOR...  UD  PRN


 IV  3/4/17 15:00


 4/3/17 14:59   


 


 


 Glucagon


  (Glucagon Inj)  1 mg  UD  PRN


 SQ  3/4/17 15:00


 4/3/17 14:59   


 


 


 Aspirin


  (Ecotrin Tab)  81 mg  DAILY


 PO  3/5/17 08:00


 4/4/17 08:59  3/7/17 09:26


81 MG


 


 Atorvastatin


 Calcium


  (Lipitor Tab)  40 mg  DAILY


 PO  3/5/17 08:00


 4/4/17 08:59  3/7/17 09:25


40 MG


 


 Calcium Acetate


  (Phoslo Cap)  1,334 mg  DAILY  PRN


 PO  3/5/17 08:00


 4/4/17 07:59  3/5/17 14:26


1,334 MG


 


 Calcium Acetate


  (Phoslo Cap)  2,001 mg  TIDM


 PO  3/4/17 17:00


 4/3/17 16:59  3/7/17 17:38


2,001 MG


 


 Dicyclomine HCl


  (Bentyl Cap)  10 mg  QID  PRN


 PO  3/4/17 16:15


 4/3/17 16:14  3/7/17 09:28


10 MG


 


 Isosorbide


 Mononitrate


  (Imdur Ext Rel


 Tab)  60 mg  QAM


 PO  3/5/17 08:00


 4/4/17 08:59  3/7/17 09:25


60 MG


 


 Isosorbide


 Mononitrate


  (Imdur Ext Rel


 Tab)  30 mg  QAM


 PO  3/5/17 08:00


 4/4/17 08:59  3/7/17 09:25


30 MG


 


 Lorazepam


  (Ativan Tab)  0.5 mg  DAILY  PRN


 PO  3/4/17 16:15


 4/3/17 16:14  3/7/17 18:25


0.5 MG


 


 Methocarbamol


  (Robaxin Tab)  500 mg  BID  PRN


 PO  3/4/17 16:15


 4/3/17 16:14   


 


 


 Metoprolol


 Tartrate


  (Lopressor Tab)  50 mg  BID


 PO  3/4/17 20:00


 4/3/17 20:59  3/7/17 20:44


50 MG


 


 Pantoprazole


 Sodium


  (Protonix Tab)  40 mg  DAILY


 PO  3/5/17 08:00


 4/4/17 08:59  3/7/17 09:25


40 MG


 


 Ranitidine HCl


  (zANTac TAB)  300 mg  HS


 PO  3/4/17 21:00


 4/3/17 20:59  3/7/17 20:44


300 MG


 


 Insulin Aspart


  (novoLOG ASPART)  SLIDING


 SCALE  ACHS


 SC  3/4/17 17:00


 4/3/17 16:59  3/7/17 20:52


2 UNITS


 


 Miscellaneous


 Information 1 ea  1 ea  UD  PRN


 N/A  3/4/17 17:18


 4/3/17 17:17   


 


 


 Piperacillin Sod/


 Tazobactam Sod/


 Dextrose


  (Zosyn Iv/D5


 100ml)  120 ml @ 


 30 mls/hr  Q12H


 IV  3/5/17 04:00


 4/16/17 03:59  3/8/17 04:13


30 MLS/HR


 


 Piperacillin Sod/


 Tazobactam Sod


  (Consult)  1 ea  UD  PRN


 N/A  3/4/17 17:00


 4/3/17 16:59   


 


 


 Vancomycin HCl


  (Consult)  1 ea  UD  PRN


 N/A  3/4/17 17:00


 4/3/17 16:59   


 


 


 Ondansetron HCl


  (Zofran Odt)  4 mg  Q4H  PRN


 PO  3/5/17 05:00


 4/4/17 04:59  3/7/17 18:21


4 MG


 


 Sertraline HCl


  (Zoloft Tab)  100 mg  HS


 PO  3/5/17 21:00


 4/4/17 20:59  3/7/17 20:43


100 MG


 


 Insulin Glargine


  (Lantus Solostar


 Pen)  see


 protocol


 text  HS


 SC  3/5/17 21:00


 4/4/17 20:59  3/7/17 20:51


5 UNIT


 


 Tramadol HCl


  (Ultram Tab)  50 mg  Q6H  PRN


 PO  3/7/17 02:00


 4/6/17 01:59   


 


 


 Hydromorphone HCl


  (Dilaudid Inj)  0.5 mg  Q6H  PRN


 IV  3/7/17 00:45


 3/21/17 00:44  3/7/17 06:11


0.5 MG


 


 Lactobacillus


 Acidophilus


  (Floranex Tab)  4 tab  TIDM


 PO  3/7/17 12:00


 4/6/17 11:59  3/7/17 17:38


4 TAB


 


 Epoetin Narayan


  (Procrit Inj)  10,000 units  TODAY@0600


 SQ  3/8/17 06:00


 3/8/17 12:00   


 











Last 24 Hours








Test


  3/7/17


11:34 3/7/17


16:39 3/7/17


20:11 3/8/17


06:10


 


Bedside Glucose 111 mg/dl  114 mg/dl  121 mg/dl  


 


Sodium Level    136 mmol/L 


 


Potassium Level    4.3 mmol/L 


 


Chloride Level    97 mmol/L 


 


Carbon Dioxide Level    26 mmol/L 


 


Anion Gap    13.0 mmol/L 


 


Blood Urea Nitrogen    56 mg/dl 


 


Creatinine    9.10 mg/dl 


 


Est Creatinine Clear Calc


Drug Dose 


  


  


  9.1 ml/min 


 


 


Estimated GFR (


American) 


  


  


  5.3 


 


 


Estimated GFR (Non-


American 


  


  


  4.6 


 


 


BUN/Creatinine Ratio    6.1 


 


Random Glucose    107 mg/dl 


 


Calcium Level    8.9 mg/dl 


 


Magnesium Level    2.4 mg/dl 


 


Random Vancomycin Level    22.8 mcg/ml 











Assessment & Plan


ESRD-seen on dialysis today. 2k bath. 2 liter uf. 3 hours. 300/600.  access 

working well. bp is good. pt in good spirits. 





IMANI- continue phoslo 3 with meals, follow phos levels intermittently





Anemia of renal failure-intermittent procrit for goal of hg of 10 to 11. 

redosed again today. 





[id number], no recent diarrhea. 





DFU-improving.  for vascular procedure to look at blood flows.

## 2017-03-08 NOTE — PROGRESS NOTE
Internal Med Progress Note


Date of Service:


Mar 8, 2017.


Provider Documentation:





SUBJECTIVE:





Patient is sitting in the bed in no apparent distress. Denies any new 

complaint. "My toe is getting better."


Was dialyzed earlier today. Remains afebrile. No SOB.








OBJECTIVE:





Vital Signs-as noted below





Examination:





General- Alert/Awake and is in no acute distress


Head-  atraumatic


Eyes- PERRL, EOMI


ENT- ears, Nose & Throat are normal looking.


Neck- Supple, Central trachea, No JVD.


Lungs- B/L Clear to auscultation.


Heart- Regular Rate/Rhythm, Normal S1,S2,.


Abdomen- normal bowel sounds, soft


Extremities-no calf tenderness, wound in the left great toe wrapped with clean 

dressing


Neuro- alert, oriented x 3; PERRL, EOMI; no facial palsy;


Skin- warm & dry





Lab data as noted below.


ASSESSMENT & PLAN:


ORDERED:  SURF YOGI NOGUEIRA/DAV                                                      

   


COMMENTS: Has Specimen Been Obtained/Collected? Y                     


--------------------------------------------------------------------------------

------------





  Procedure                         Result                         Verified    

       Site


--------------------------------------------------------------------------------

------------





  GRAM STAIN  Final                                                03/05/


        RESULT                      NO EPITHELIAL CELLS


                                    NO WBCs SEEN


                                    FEW GRAM POSITIVE COCCI





  SURFACE WOUND CULTURE  Final                                     03/08/


     Organism 1                     COAG NEG STAPHYLOCOCCUS


        QUANITY                     MANY


        SENS                        NO SENSITIVITY TO FOLLOW


        +MIXWOUND                   PLUS MODERATE COUNTS OF PROBABLE SKIN SEBASTIAN


     Organism 2                     ACHROMOBACTER XYLOSOXIDANS


        QUANITY                     FEW


        SENS                        SENSITIVITY TO FOLLOW





        SENSITIVITY RESULT INDICATES A MULTIPLY - RESISTANT


        ORGANISM.  PHONED TO 4-EAST (LINCOLN MCCANN) ON 03/08/17 AT


        0849 BY Garret Leonard. Results were verbalized back to


        ANDREE.


        RESULTS WERE ALSO CALLED TO [facility]


        INFECTION CONTROL ANSWERING MACHINE ON 03/08/17 BY ANDREE.


        





     2. ACHROMOBACTER XYLOSOXIDANS


                       Target Route Dose               RX     AB   Cost M.I.C. 

   IQ    


                       ------ ----- ------------------ ------ -- ------ --------

- ------


       TRIMET/SULFA                                    S                <=2/38 

         


       CEFTAZIDIME                                     R                >16    

         


       CEFEPIME                                        R                >16    

         


       IMIPENEM                                        S                2      

         


       AZTREONAM                                       R                >16    

         


       GENTAMICIN                                      R                >8     

         


       TOBRAMYCIN                                      R                >8     

         


       AMIKACIN                                        R                >32    

         


       CIPROFLOXACIN                                   I                2      

         


       LEVOFLOXACIN                                    S                <=2    

         


       PIP/TAZO                                        R                >64    

         








MRI  of the left foot showed a marrow edema seen within the tuft of the 1st 

distal phalanx with


mild drop in signal on the T1-weighted sequences. The appearance suggests 

osteomyelitis. 


Soft tissue edema and ulcerations are seen in the 1st toe.





Left Great Toe Osteomyelitis: Clinically doing well.


   -Continue IV Vancomycin & Levaquin has fabio started.


   -ID following the patient & has recommended the antibiotic regimen.


   -Ortho consulted and no intervention at this time, continue monitor.


   -Dr. Dill from wound care was consulted


   -Continue daily dressing care


   -Wound culture growth coag negative staph





C. Diff Colitis: Recently was on Keflex for left great toe infection & 

presented with Diarrhea which has improved.


   -Stool C diff toxin assay is positive


   -Continue PO Flagyl 500 mg TID (Day # 4) -complete 14 days course 


   -Continue lactobacillus.





Left Peroneal Artery Occlusion: U/S of LE showed distal left peroneal artery is 

occluded.


   -Case discussed with Stephanie from vascular recommended LLE angio with 

intervention d/t PAD. No final plan yet.


   -Continue limb check 





ESRD On Hemodialysis: HD on Monday/Wednesday /Friday 


   -Nephrology following the patient.





History Of Recurrent GI Bleed: last admission 2/14/17 with hematemesis. Stable 

so far.


EGD on 02/16/17 : normal esophagus, stomach , duodenum , no evidence of bleed 


Pathology : Benign , with evidence of chronic gastritis 


Immunostain negative for H Pylori 





Type II Diabetes: On Insulin. Last Hba1c 7.1 was 2/14/17


   -Continue basal Lantus 10 unit hs 


   -Monitor BS and cover as per sliding scale.


   -Pharmacy consulted for glycemic management 





Chronic CHF Due to Systolic/Diastolic Dysfunction: Clinically stable & no 

evidence of decompensation or vol overload 


ECHO on 12/2017 : 


1. Normal left ventricular size with low-normal systolic function. Estimated EF 

50%.  Hypokinesis of the inferolateral, mid inferior, base to mid septal wall.  

Akinesis of the inferior base.  No left ventricular hypertrophy.  Type 2 

diastolic dysfunction.


2. The left atrium is mildly dilated.


3. There is mild mitral regurgitation.


4. Technically difficult study; IV echo-contrast may enhance image quality to 

better evaluate LV systolic function.


5. Compared to prior study on 2/1/2017, LV systolic function has declined.





Anemia Of Chronic Disease: Stable. Monitoring H/H.





History CAD S/P PTCA : s/p Bare metal stent on left Cx on 08/2016. Asymptomatic

  


   -Continue Aspirin, Imdur, Lopressor, statin 





Hypertension: BP stable 


   -Continue Imdur, Lopressor 





Hyperlipidemia: Continue Lipitor 40 mg daily 





GERD: Stable.Continue PPI.


 


HX OF Depression Disorder:Stable.


   -Continue Zoloft 100 mg PO Daily 





DVT Prophylaxis: Sq Heparin





Code Status: FULL CODE 





Disposition: Discharge once is stable. Will need to continue to follow with 

outpatient wound clinic


Vital Signs:











  Date Time  Temp Pulse Resp B/P Pulse Ox O2 Delivery O2 Flow Rate FiO2


 


3/8/17 10:28      Room Air  


 


3/8/17 09:26 36.3 62  157/68    


 


3/8/17 09:15  50  153/49    


 


3/8/17 09:00  50  140/51    


 


3/8/17 08:45  52  148/54    


 


3/8/17 08:30  50  154/54    


 


3/8/17 08:15  54  155/47    


 


3/8/17 08:00  57  151/56    


 


3/8/17 07:45  56  140/52    


 


3/8/17 07:30  57  139/85    


 


3/8/17 07:15  60  148/69    


 


3/8/17 07:00  57  157/73    


 


3/8/17 06:52 36.5 67  159/79    


 


3/8/17 06:45  58  147/72    


 


3/8/17 06:30  60  157/79    


 


3/8/17 06:15  59  175/89    


 


3/8/17 01:26      Room Air  


 


3/8/17 00:20      Room Air  


 


3/7/17 23:15 36.9 66 20 158/93 98   


 


3/7/17 16:20      Room Air  


 


3/7/17 14:58 37.2 53 17 153/70 100 Room Air  








Lab Results:





Results Past 24 Hours








Test


  3/7/17


16:39 3/7/17


20:11 3/8/17


06:10 Range/Units


 


 


Bedside Glucose 114 121  70-90  mg/dl


 


Sodium Level   136 136-145  mmol/L


 


Potassium Level   4.3 3.5-5.1  mmol/L


 


Chloride Level   97   mmol/L


 


Carbon Dioxide Level   26 21-32  mmol/L


 


Anion Gap   13.0 3-11  mmol/L


 


Blood Urea Nitrogen   56 7-18  mg/dl


 


Creatinine


  


  


  9.10


  0.60-1.20


mg/dl


 


Est Creatinine Clear Calc


Drug Dose 


  


  9.1


   ml/min


 


 


Estimated GFR (


American) 


  


  5.3


   


 


 


Estimated GFR (Non-


American 


  


  4.6


   


 


 


BUN/Creatinine Ratio   6.1 10-20  


 


Random Glucose   107 70-99  mg/dl


 


Calcium Level   8.9 8.5-10.1  mg/dl


 


Magnesium Level   2.4 1.8-2.4  mg/dl


 


Random Vancomycin Level   22.8  mcg/ml

## 2017-03-08 NOTE — PROCEDURE NOTE
Post-Moderate Sedation Plan


General


Date of Moderate Sedation


Mar 8, 2017.


Vital Signs:





 Vital Signs Past 12 Hours








  Date Time  Temp Pulse Resp B/P Pulse Ox O2 Delivery O2 Flow Rate FiO2


 


3/8/17 12:34 36.9 59  160/72 97 Room Air  


 


3/8/17 10:28      Room Air  


 


3/8/17 09:26 36.3 62  157/68    


 


3/8/17 09:15  50  153/49    


 


3/8/17 09:00  50  140/51    


 


3/8/17 08:45  52  148/54    


 


3/8/17 08:30  50  154/54    


 


3/8/17 08:15  54  155/47    


 


3/8/17 08:00  57  151/56    


 


3/8/17 07:45  56  140/52    


 


3/8/17 07:30  57  139/85    


 


3/8/17 07:15  60  148/69    


 


3/8/17 07:00  57  157/73    


 


3/8/17 06:52 36.5 67  159/79    


 


3/8/17 06:45  58  147/72    


 


3/8/17 06:30  60  157/79    


 


3/8/17 06:15  59  175/89    











Review - Discharge Plan


Post Moderate Sedation Plan:





On clinical assessment, the patient appears to have tolerated the conscious 

sedation 


without complications.





Patient is recovering as anticipated.





Patient will continue to be monitored by nursing and may be discharged when 

conscious 


sedation discharge criteria are met.

## 2017-03-08 NOTE — [facility] POST OPERATIVE BRIEF NOTE
Immediate Operative Summary


Operative Date


Mar 8, 2017.





Pre-Operative Diagnosis





Peripheral Artery Disease with Ulcer left great toe





Post-Operative Diagnosis





same





Procedure(s) Performed





Left Lower Extremity Angiogram, 


Mechanical Closure of Right Femoral Artery, 


Moderate Concious Sedation [id number]





Surgeon


Dr. Roberts





Assistant Surgeon(s)


Dr. Bain





Estimated Blood Loss


5 ml





Findings


no large vessel stenosis, small vessel disease of the foot





Specimens





none





Anesthesia


Local with conscious moderate sedation





Complication(s)


None





Disposition


Recovery Room / PACU

## 2017-03-08 NOTE — DIAGNOSTIC IMAGING REPORT
DATE OF PROCEDURE: 03/08/2017

 

PREOPERATIVE DIAGNOSIS:  Left great toe nonhealing wound.

 

POSTOPERATIVE DIAGNOSIS:  Same.

 

PROCEDURE:

1. Ultrasound guided right femoral access.

2. Aortogram and left lower extremity angiogram (diagnostic only).

3. Percutaneous mechanical closure of right femoral artery.

 

SURGEON:  Dr. Jamel Roberts M.D.

 

ASSISTANT:  Dr. Ike Bain M.D.

 

ANESTHESIA:  Local plus conscious sedation. (28min) 

 

ESTIMATED BLOOD LOSS:  5 mL.

 

INDICATIONS:  This is a 51-year-old female with  known carotid artery disease

and end-stage renal disease on dialysis  who we are seeing in the  hospital

for nonhealing left great toe wound.  She has failed outpatient antibiotic

treatment and was recently admitted for   inpatient antibiotics to treat her

worsening wound.  Arterial duplex showed a possible stenosis in the

superficial femoral artery.  An angiogram was indicated for possible

treatment of any arterial disease to increase her chances of wound healing. 

All risks, benefits, and alternatives were explained of the patient and she

agreed to proceed.

 

PROCEDURE:  The patient was brought to the endovascular suite and placed in

the supine position.  The bilateral groins were prepped and draped in normal

sterile fashion.  A timeout was performed and all parties agreed to the

correct patient and procedure to be performed.  Under ultrasound guidance,

the right common femoral artery was accessed on the first attempt.  This was

upsized to a 5-Guamanian sheath.  A rim catheter was advanced along with an

angled Glidewire into the aorta.  Aortogram showed a widely patent iliac

arteries bilaterally.  The rim catheter was used to select the left iliac

artery was advanced to the level of the left common femoral artery.  We then

performed  selective left lower extremity angiogram.  This showed mild but

less than 80% stenosis at the origin of her superficial femoral artery.  The

SFA was widely patent as well as the popliteal artery.  The trifurcation at

the below knee level was also patent.  There was some mild proximal anterior

tibial artery stenosis.  In the level of the mid leg the anterior tibial and

posterior tibial artery were occluded.  There was really only peroneal runoff

to the foot.  This did reconstitute a dorsalis pedis artery.  A selective

angiogram of the left foot showed small vessels and really no metatarsal 
vessels at

all.  Given that her superficial femoral, popliteal and proximal tibial

vessels were all patent we elected not to intervene at this time.  We will

continue with wound care to treat her foot infection.  Selective right groin

angiogram showed our access site was in the common femoral artery which was

of good caliber and without calcification.  A 6-Gu[place]ian [employer] was brought

onto the field.  It was deployed without incident.  Hemostasis was achieved. 

Pressure was held for 3 minutes afterwards and the patient was transferred to

recovery room in satisfactory condition with no apparent complications.

 

 

 

 I, Dr. Roberts was present and scurbbed for the entire procedure.

LOUISE

## 2017-03-09 VITALS
DIASTOLIC BLOOD PRESSURE: 98 MMHG | SYSTOLIC BLOOD PRESSURE: 171 MMHG | OXYGEN SATURATION: 99 % | HEART RATE: 57 BPM | TEMPERATURE: 97.7 F

## 2017-03-09 VITALS — DIASTOLIC BLOOD PRESSURE: 66 MMHG | SYSTOLIC BLOOD PRESSURE: 115 MMHG | HEART RATE: 57 BPM

## 2017-03-09 VITALS
DIASTOLIC BLOOD PRESSURE: 75 MMHG | SYSTOLIC BLOOD PRESSURE: 118 MMHG | TEMPERATURE: 98.06 F | HEART RATE: 18 BPM | OXYGEN SATURATION: 97 %

## 2017-03-09 VITALS
DIASTOLIC BLOOD PRESSURE: 82 MMHG | HEART RATE: 61 BPM | SYSTOLIC BLOOD PRESSURE: 166 MMHG | OXYGEN SATURATION: 97 % | TEMPERATURE: 98.24 F

## 2017-03-09 RX ADMIN — CLOTRIMAZOLE SCH TROCHE: 10 LOZENGE ORAL at 20:14

## 2017-03-09 RX ADMIN — HEPARIN SODIUM SCH UNIT: 10000 INJECTION, SOLUTION INTRAVENOUS; SUBCUTANEOUS at 14:30

## 2017-03-09 RX ADMIN — SERTRALINE HYDROCHLORIDE SCH MG: 100 TABLET, FILM COATED ORAL at 20:14

## 2017-03-09 RX ADMIN — LACTOBACILLUS TAB SCH TAB: TAB at 17:39

## 2017-03-09 RX ADMIN — ISOSORBIDE MONONITRATE SCH MG: 30 TABLET, EXTENDED RELEASE ORAL at 08:18

## 2017-03-09 RX ADMIN — INSULIN ASPART SCH UNITS: 100 INJECTION, SOLUTION INTRAVENOUS; SUBCUTANEOUS at 08:56

## 2017-03-09 RX ADMIN — INSULIN GLARGINE SCH UNIT: 100 INJECTION, SOLUTION SUBCUTANEOUS at 20:19

## 2017-03-09 RX ADMIN — CLOTRIMAZOLE SCH TROCHE: 10 LOZENGE ORAL at 16:13

## 2017-03-09 RX ADMIN — ATORVASTATIN CALCIUM SCH MG: 40 TABLET, FILM COATED ORAL at 08:18

## 2017-03-09 RX ADMIN — PANTOPRAZOLE SCH MG: 40 TABLET, DELAYED RELEASE ORAL at 08:19

## 2017-03-09 RX ADMIN — LACTOBACILLUS TAB SCH TAB: TAB at 08:17

## 2017-03-09 RX ADMIN — INSULIN ASPART SCH UNITS: 100 INJECTION, SOLUTION INTRAVENOUS; SUBCUTANEOUS at 20:20

## 2017-03-09 RX ADMIN — INSULIN ASPART SCH UNITS: 100 INJECTION, SOLUTION INTRAVENOUS; SUBCUTANEOUS at 17:43

## 2017-03-09 RX ADMIN — Medication SCH MG: at 08:17

## 2017-03-09 RX ADMIN — CALCIUM ACETATE SCH MG: 667 CAPSULE ORAL at 17:39

## 2017-03-09 RX ADMIN — CALCIUM ACETATE SCH MG: 667 CAPSULE ORAL at 08:19

## 2017-03-09 RX ADMIN — LORAZEPAM PRN MG: 0.5 TABLET ORAL at 00:03

## 2017-03-09 RX ADMIN — LACTOBACILLUS TAB SCH TAB: TAB at 12:20

## 2017-03-09 RX ADMIN — METRONIDAZOLE SCH MG: 500 TABLET ORAL at 08:17

## 2017-03-09 RX ADMIN — METRONIDAZOLE SCH MG: 500 TABLET ORAL at 20:14

## 2017-03-09 RX ADMIN — METRONIDAZOLE SCH MG: 500 TABLET ORAL at 16:10

## 2017-03-09 RX ADMIN — INSULIN ASPART SCH UNITS: 100 INJECTION, SOLUTION INTRAVENOUS; SUBCUTANEOUS at 12:23

## 2017-03-09 RX ADMIN — METOPROLOL TARTRATE SCH MG: 50 TABLET, FILM COATED ORAL at 08:00

## 2017-03-09 RX ADMIN — DICYCLOMINE HYDROCHLORIDE PRN MG: 10 CAPSULE ORAL at 12:20

## 2017-03-09 RX ADMIN — CALCIUM ACETATE SCH MG: 667 CAPSULE ORAL at 12:21

## 2017-03-09 RX ADMIN — HEPARIN SODIUM SCH UNIT: 10000 INJECTION, SOLUTION INTRAVENOUS; SUBCUTANEOUS at 20:20

## 2017-03-09 RX ADMIN — CLOTRIMAZOLE SCH TROCHE: 10 LOZENGE ORAL at 23:58

## 2017-03-09 RX ADMIN — HEPARIN SODIUM SCH UNIT: 10000 INJECTION, SOLUTION INTRAVENOUS; SUBCUTANEOUS at 06:28

## 2017-03-09 RX ADMIN — METOPROLOL TARTRATE SCH MG: 50 TABLET, FILM COATED ORAL at 20:00

## 2017-03-09 NOTE — DIAGNOSTIC IMAGING REPORT
KUB



HISTORY: Generalized  abdominal pain.



COMPARISON: Abdomen and pelvis CT 2/16/2017



FINDINGS: The bowel gas pattern is unremarkable. There are no dilated loops of

small bowel to suggest an obstruction.  No renal calculi. No ureteral calculi.

Calcifications in the deep pelvis likely represent phleboliths. No

pneumoperitoneum or pneumatosis. Extensive vascular calcifications are again

noted.



IMPRESSION:  

Unremarkable bowel gas pattern. No evidence for bowel obstruction.







Electronically signed by:  Ismael Pascual M.D.

3/9/2017 12:09 PM



Dictated Date/Time:  3/9/2017 12:07 PM

## 2017-03-09 NOTE — PROGRESS NOTE
Internal Med Progress Note


Date of Service:


Mar 9, 2017.


Provider Documentation:





SUBJECTIVE:





Patient is sitting in the bed in no apparent distress. Denies any new 

complaint. "My toe is getting better."


 Remains afebrile. No SOB. Still c/o having loose BMs.








OBJECTIVE:





Vital Signs-as noted below





Examination:





General- Alert/Awake and is in no acute distress


Head-  atraumatic


Eyes- PERRL, EOMI


ENT- ears, Nose & Throat are normal looking.


Neck- Supple, Central trachea, No JVD.


Lungs- B/L Clear to auscultation.


Heart- Regular Rate/Rhythm, Normal S1,S2,.


Abdomen- normal bowel sounds, soft


Extremities-no calf tenderness, wound in the left great toe wrapped with clean 

dressing


Neuro- alert, oriented x 3; PERRL, EOMI; no facial palsy;


Skin- warm & dry





Lab data as noted below.


ASSESSMENT & PLAN:


ORDERED:  SURF WND CU/DAV                                                      

   


COMMENTS: Has Specimen Been Obtained/Collected? Y                     


--------------------------------------------------------------------------------

------------





  Procedure                         Result                         Verified    

       Site


--------------------------------------------------------------------------------

------------





  GRAM STAIN  Final                                                03/05/


        RESULT                      NO EPITHELIAL CELLS


                                    NO WBCs SEEN


                                    FEW GRAM POSITIVE COCCI





  SURFACE WOUND CULTURE  Final                                     03/08/


     Organism 1                     COAG NEG STAPHYLOCOCCUS


        QUANITY                     MANY


        SENS                        NO SENSITIVITY TO FOLLOW


        +MIXWOUND                   PLUS MODERATE COUNTS OF PROBABLE SKIN SEBASTIAN


     Organism 2                     ACHROMOBACTER XYLOSOXIDANS


        QUANITY                     FEW


        SENS                        SENSITIVITY TO FOLLOW





        SENSITIVITY RESULT INDICATES A MULTIPLY - RESISTANT


        ORGANISM.  PHONED TO 4-EAST (LINCOLN MCCANN) ON 03/08/17 AT


        0849 BY Garret Leonard. Results were verbalized back to


        ANDREE.


        RESULTS WERE ALSO CALLED TO [facility]


        INFECTION CONTROL ANSWERING MACHINE ON 03/08/17 BY ANDREE.


        





     2. ACHROMOBACTER XYLOSOXIDANS


                       Target Route Dose               RX     AB   Cost M.I.C. 

   IQ    


                       ------ ----- ------------------ ------ -- ------ --------

- ------


       TRIMET/SULFA                                    S                <=2/38 

         


       CEFTAZIDIME                                     R                >16    

         


       CEFEPIME                                        R                >16    

         


       IMIPENEM                                        S                2      

         


       AZTREONAM                                       R                >16    

         


       GENTAMICIN                                      R                >8     

         


       TOBRAMYCIN                                      R                >8     

         


       AMIKACIN                                        R                >32    

         


       CIPROFLOXACIN                                   I                2      

         


       LEVOFLOXACIN                                    S                <=2    

         


       PIP/TAZO                                        R                >64    

         








MRI  of the left foot showed a marrow edema seen within the tuft of the 1st 

distal phalanx with


mild drop in signal on the T1-weighted sequences. The appearance suggests 

osteomyelitis. 


Soft tissue edema and ulcerations are seen in the 1st toe.





Left Great Toe Osteomyelitis: Clinically doing well.


   -Continue IV Vancomycin & Levaquin has been started.


   -ID following the patient & has recommended the antibiotic regimen.


   -Ortho consulted and no intervention at this time, continue monitor.


   -Dr. Dill from wound care was consulted


   -Continue daily dressing care


   -Wound culture growth coag negative staph





C. Diff Colitis: Recently was on Keflex for left great toe infection & 

presented with Diarrhea which has improved.


   -Stool C diff toxin assay is positive


   -Continue PO Flagyl 500 mg TID (Day # 5) -complete 14 days course 


   -Continue lactobacillus.


   -Re-check C. Diff as Levaquin may be making it hard to be resolved.


   -KUB is negative





Left Peroneal Artery Occlusion: U/S of LE showed distal left peroneal artery is 

occluded.


   -Case discussed with Stephanie from vascular recommended LLE angio with 

intervention d/t PAD. No final plan yet.


   -Continue limb check 





ESRD On Hemodialysis: HD on Monday/Wednesday /Friday 


   -Nephrology following the patient.





History Of Recurrent GI Bleed: last admission 2/14/17 with hematemesis. Stable 

so far.


EGD on 02/16/17 : normal esophagus, stomach , duodenum , no evidence of bleed 


Pathology : Benign , with evidence of chronic gastritis 


Immunostain negative for H Pylori 





Type II Diabetes: On Insulin. Last Hba1c 7.1 was 2/14/17


   -Continue basal Lantus 10 unit hs 


   -Monitor BS and cover as per sliding scale.


   -Pharmacy consulted for glycemic management 





Chronic CHF Due to Systolic/Diastolic Dysfunction: Clinically stable & no 

evidence of decompensation or vol overload 


ECHO on 12/2017 : 


1. Normal left ventricular size with low-normal systolic function. Estimated EF 

50%.  Hypokinesis of the inferolateral, mid inferior, base to mid septal wall.  

Akinesis of the inferior base.  No left ventricular hypertrophy.  Type 2 

diastolic dysfunction.


2. The left atrium is mildly dilated.


3. There is mild mitral regurgitation.


4. Technically difficult study; IV echo-contrast may enhance image quality to 

better evaluate LV systolic function.


5. Compared to prior study on 2/1/2017, LV systolic function has declined.





Anemia Of Chronic Disease: Stable. Monitoring H/H.





History CAD S/P PTCA : s/p Bare metal stent on left Cx on 08/2016. Asymptomatic

  


   -Continue Aspirin, Imdur, Lopressor, statin 





Hypertension: BP stable 


   -Continue Imdur, Lopressor 





Hyperlipidemia: Continue Lipitor 40 mg daily 





GERD: Stable.Continue PPI.


 


HX OF Depression Disorder:Stable.


   -Continue Zoloft 100 mg PO Daily 





DVT Prophylaxis: Sq Heparin





Code Status: FULL CODE 





Disposition: Discharge once is stable. Will need to continue to follow with 

outpatient wound clinic


Vital Signs:











  Date Time  Temp Pulse Resp B/P Pulse Ox O2 Delivery O2 Flow Rate FiO2


 


3/9/17 16:10      Room Air  


 


3/9/17 16:00 36.7 58 18 118/75 97 Room Air  





        


 


3/9/17 10:47      Room Air  


 


3/9/17 07:17 36.5 57 18 171/98 99 Room Air  


 


3/9/17 00:35      Room Air  


 


3/8/17 23:41 36.6 60 20 128/57 98 Room Air  


 


3/8/17 21:36 37.1 56 16 148/74 98 Room Air  


 


3/8/17 20:37  62 18  97 Room Air  


 


3/8/17 20:25 36.5 59 18 135/46 99   


 


3/8/17 19:22 36.6 54 14 168/82 99 Room Air  


 


3/8/17 18:28 37.1 55 14 155/85 98   








Lab Results:





Results Past 24 Hours








Test


  3/8/17


20:23 3/9/17


07:25 3/9/17


11:28 3/9/17


16:18 Range/Units


 


 


Bedside Glucose 163 92 187 111 70-90  mg/dl

## 2017-03-09 NOTE — PROGRESS NOTE
Subjective


Date of Service:


Mar 9, 2017.


Subjective


s/p vascular procedure, tolerated well. tolerating po abx, levaquin and crowley (c 

diff). afebrile overnight. No new labs. no overnight events.





Problem List


Medical Problems:


(1) Abdominal pain


Status: Acute  





(2) Acute electrocardiogram changes


Status: Acute  





(3) Elevated troponin


Status: Acute  





(4) Elevated troponin


Status: Acute  





(5) End stage renal disease


Status: Acute  





(6) ESRD (end stage renal disease) on dialysis


Status: Chronic  





(7) GI bleed


Status: Acute  





(8) Hyperkalemia


Status: Acute  





(9) Joint effusion


Status: Acute  





(10) Left elbow pain


Status: Acute  





(11) Left sided chest pain


Status: Acute  





(12) Left sided chest pain


Status: Acute  





(13) Limb ischemia


Status: Acute  





(14) Medical non-compliance


Status: Acute  





(15) Pneumonia


Status: Acute  





(16) Pulmonary edema


Status: Acute  





(17) Renal failure


Status: Acute  





(18) Sinusitis


Status: Acute  





(19) Substernal chest pain


Status: Acute  





(20) Upper abdominal pain


Status: Acute  





(21) Upper GI bleed


Status: Acute  





(22) Urinary tract infection


Status: Acute  





(23) Vomiting


Status: Acute  





(24) Vomiting


Status: Acute  





(25) Vomiting


Status: Acute  





(26) Vomiting


Status: Acute  











Objective


Vital Signs











  Date Time  Temp Pulse Resp B/P Pulse Ox O2 Delivery O2 Flow Rate FiO2


 


3/9/17 07:17 36.5 57 18 171/98 99 Room Air  


 


3/9/17 00:35      Room Air  


 


3/8/17 23:41 36.6 60 20 128/57 98 Room Air  


 


3/8/17 21:36 37.1 56 16 148/74 98 Room Air  


 


3/8/17 20:37  62 18  97 Room Air  


 


3/8/17 20:25 36.5 59 18 135/46 99   


 


3/8/17 19:22 36.6 54 14 168/82 99 Room Air  


 


3/8/17 18:28 37.1 55 14 155/85 98   


 


3/8/17 17:22 37.1 63 16 174/96 93 Room Air  


 


3/8/17 16:51 37.1 62 16 183/99 99 Room Air  


 


3/8/17 16:22 37.0 61 16 180/108 96 Room Air  


 


3/8/17 16:11 36.9 63 16 181/92 98 Room Air  


 


3/8/17 16:10      Room Air  


 


3/8/17 15:51 36.8 62 18 172/97 99   


 


3/8/17 15:37 36.8 30 16 185/139 99 Room Air  


 


3/8/17 15:25  62 21 177/86 97 Room Air  


 


3/8/17 15:15 36.6 64 22 183/88 97 Room Air  


 


3/8/17 15:05 36.6 61 20 183/88 97 Room Air  


 


3/8/17 14:55 36.6  20 181/96 97 Room Air  


 


3/8/17 14:55 36.6 66 20 181/96 97 Room Air  


 


3/8/17 14:55  66 20 181/96 97 Room Air  


 


3/8/17 13:59 36.9 59 20 160/72 97 Room Air  


 


3/8/17 12:34 36.9 59  160/72 97 Room Air  


 


3/8/17 10:28      Room Air  











Laboratory Results











Item Value  Date Time


 


Gram Stain - Final Complete 3/4/17 2015





Ulcer Toe Left 1  


 


C.difficile Toxin B Gene (PCR) - Final Complete 3/4/17 1300





Stool Positive for C. difficile toxin B gene 











Last 24 Hours








Test


  3/8/17


11:37 3/8/17


13:55 3/8/17


14:55 3/8/17


16:31


 


Bedside Glucose 78 mg/dl  72 mg/dl  71 mg/dl  80 mg/dl 














Test


  3/8/17


20:23 3/9/17


07:25 


  


 


 


Bedside Glucose 163 mg/dl  92 mg/dl   











Assessment and Plan





(1) Osteomyelitis


Assessment & Plan:  would continue levaquin for 4 weeks. continue local wound 

care. can follow in wound center with ID as well post d/c. would give 4 weeks 

renally dosed levaquin and adjust duration if needed as oupt. ok for d/c from 

ID standpoint. 





(2) C. difficile colitis


Assessment & Plan:  continue crowley, would continue while on levaquin. will follow 

as outpatient.

## 2017-03-10 VITALS — SYSTOLIC BLOOD PRESSURE: 169 MMHG | TEMPERATURE: 97.88 F | DIASTOLIC BLOOD PRESSURE: 83 MMHG | HEART RATE: 65 BPM

## 2017-03-10 VITALS — SYSTOLIC BLOOD PRESSURE: 133 MMHG | DIASTOLIC BLOOD PRESSURE: 70 MMHG | HEART RATE: 60 BPM

## 2017-03-10 VITALS
DIASTOLIC BLOOD PRESSURE: 93 MMHG | HEART RATE: 62 BPM | OXYGEN SATURATION: 99 % | TEMPERATURE: 97.88 F | SYSTOLIC BLOOD PRESSURE: 180 MMHG

## 2017-03-10 VITALS — SYSTOLIC BLOOD PRESSURE: 125 MMHG | HEART RATE: 57 BPM | DIASTOLIC BLOOD PRESSURE: 71 MMHG

## 2017-03-10 VITALS — DIASTOLIC BLOOD PRESSURE: 74 MMHG | SYSTOLIC BLOOD PRESSURE: 142 MMHG | HEART RATE: 57 BPM

## 2017-03-10 VITALS — DIASTOLIC BLOOD PRESSURE: 71 MMHG | SYSTOLIC BLOOD PRESSURE: 141 MMHG | HEART RATE: 62 BPM

## 2017-03-10 VITALS — SYSTOLIC BLOOD PRESSURE: 128 MMHG | HEART RATE: 63 BPM | DIASTOLIC BLOOD PRESSURE: 72 MMHG

## 2017-03-10 VITALS — HEART RATE: 61 BPM | DIASTOLIC BLOOD PRESSURE: 69 MMHG | SYSTOLIC BLOOD PRESSURE: 141 MMHG

## 2017-03-10 VITALS
OXYGEN SATURATION: 96 % | SYSTOLIC BLOOD PRESSURE: 160 MMHG | DIASTOLIC BLOOD PRESSURE: 90 MMHG | HEART RATE: 66 BPM | TEMPERATURE: 98.06 F

## 2017-03-10 VITALS — TEMPERATURE: 97.88 F | DIASTOLIC BLOOD PRESSURE: 74 MMHG | SYSTOLIC BLOOD PRESSURE: 140 MMHG | HEART RATE: 63 BPM

## 2017-03-10 VITALS — HEART RATE: 61 BPM | DIASTOLIC BLOOD PRESSURE: 66 MMHG | SYSTOLIC BLOOD PRESSURE: 143 MMHG

## 2017-03-10 VITALS — DIASTOLIC BLOOD PRESSURE: 76 MMHG | SYSTOLIC BLOOD PRESSURE: 139 MMHG | HEART RATE: 56 BPM

## 2017-03-10 VITALS
TEMPERATURE: 98.78 F | DIASTOLIC BLOOD PRESSURE: 77 MMHG | OXYGEN SATURATION: 94 % | SYSTOLIC BLOOD PRESSURE: 134 MMHG | HEART RATE: 60 BPM

## 2017-03-10 VITALS — HEART RATE: 62 BPM | SYSTOLIC BLOOD PRESSURE: 149 MMHG | DIASTOLIC BLOOD PRESSURE: 87 MMHG

## 2017-03-10 VITALS — OXYGEN SATURATION: 96 %

## 2017-03-10 VITALS — SYSTOLIC BLOOD PRESSURE: 143 MMHG | DIASTOLIC BLOOD PRESSURE: 72 MMHG | HEART RATE: 60 BPM

## 2017-03-10 VITALS — SYSTOLIC BLOOD PRESSURE: 180 MMHG | DIASTOLIC BLOOD PRESSURE: 101 MMHG | HEART RATE: 69 BPM

## 2017-03-10 VITALS — SYSTOLIC BLOOD PRESSURE: 152 MMHG | HEART RATE: 59 BPM | DIASTOLIC BLOOD PRESSURE: 78 MMHG

## 2017-03-10 VITALS — SYSTOLIC BLOOD PRESSURE: 123 MMHG | DIASTOLIC BLOOD PRESSURE: 68 MMHG | HEART RATE: 59 BPM

## 2017-03-10 LAB
ANION GAP SERPL CALC-SCNC: 16 MMOL/L (ref 3–11)
BUN SERPL-MCNC: 48 MG/DL (ref 7–18)
BUN/CREAT SERPL: 5.5 (ref 10–20)
CALCIUM SERPL-MCNC: 8.9 MG/DL (ref 8.5–10.1)
CHLORIDE SERPL-SCNC: 100 MMOL/L (ref 98–107)
CO2 SERPL-SCNC: 22 MMOL/L (ref 21–32)
CREAT CL PREDICTED SERPL C-G-VRATE: 9.3 ML/MIN
CREAT SERPL-MCNC: 8.9 MG/DL (ref 0.6–1.2)
EOSINOPHIL NFR BLD AUTO: 358 K/UL (ref 130–400)
GLUCOSE SERPL-MCNC: 131 MG/DL (ref 70–99)
HCT VFR BLD CALC: 33.2 % (ref 37–47)
MCH RBC QN AUTO: 30.6 PG (ref 25–34)
MCHC RBC AUTO-ENTMCNC: 31.9 G/DL (ref 32–36)
MCV RBC AUTO: 96 FL (ref 80–100)
PMV BLD AUTO: 10.1 FL (ref 7.4–10.4)
POTASSIUM SERPL-SCNC: 4 MMOL/L (ref 3.5–5.1)
RBC # BLD AUTO: 3.46 M/UL (ref 4.2–5.4)
SODIUM SERPL-SCNC: 138 MMOL/L (ref 136–145)
WBC # BLD AUTO: 8.15 K/UL (ref 4.8–10.8)

## 2017-03-10 RX ADMIN — INSULIN ASPART SCH UNITS: 100 INJECTION, SOLUTION INTRAVENOUS; SUBCUTANEOUS at 13:36

## 2017-03-10 RX ADMIN — LACTOBACILLUS TAB SCH TAB: TAB at 13:33

## 2017-03-10 RX ADMIN — INSULIN ASPART SCH UNITS: 100 INJECTION, SOLUTION INTRAVENOUS; SUBCUTANEOUS at 18:12

## 2017-03-10 RX ADMIN — CALCIUM ACETATE SCH MG: 667 CAPSULE ORAL at 13:33

## 2017-03-10 RX ADMIN — METRONIDAZOLE SCH MG: 500 TABLET ORAL at 13:33

## 2017-03-10 RX ADMIN — INSULIN ASPART SCH UNITS: 100 INJECTION, SOLUTION INTRAVENOUS; SUBCUTANEOUS at 08:28

## 2017-03-10 RX ADMIN — CLOTRIMAZOLE SCH TROCHE: 10 LOZENGE ORAL at 19:00

## 2017-03-10 RX ADMIN — INSULIN GLARGINE SCH UNIT: 100 INJECTION, SOLUTION SUBCUTANEOUS at 21:04

## 2017-03-10 RX ADMIN — METOPROLOL TARTRATE SCH MG: 50 TABLET, FILM COATED ORAL at 20:59

## 2017-03-10 RX ADMIN — CLOTRIMAZOLE SCH TROCHE: 10 LOZENGE ORAL at 13:45

## 2017-03-10 RX ADMIN — METRONIDAZOLE SCH MG: 500 TABLET ORAL at 20:59

## 2017-03-10 RX ADMIN — HEPARIN SODIUM SCH UNIT: 10000 INJECTION, SOLUTION INTRAVENOUS; SUBCUTANEOUS at 05:52

## 2017-03-10 RX ADMIN — HEPARIN SODIUM SCH UNIT: 10000 INJECTION, SOLUTION INTRAVENOUS; SUBCUTANEOUS at 21:05

## 2017-03-10 RX ADMIN — CLOTRIMAZOLE SCH TROCHE: 10 LOZENGE ORAL at 06:32

## 2017-03-10 RX ADMIN — LACTOBACILLUS TAB SCH TAB: TAB at 17:40

## 2017-03-10 RX ADMIN — CLOTRIMAZOLE SCH TROCHE: 10 LOZENGE ORAL at 11:00

## 2017-03-10 RX ADMIN — CALCIUM ACETATE SCH MG: 667 CAPSULE ORAL at 08:24

## 2017-03-10 RX ADMIN — CLOTRIMAZOLE SCH TROCHE: 10 LOZENGE ORAL at 23:00

## 2017-03-10 RX ADMIN — HEPARIN SODIUM SCH UNIT: 10000 INJECTION, SOLUTION INTRAVENOUS; SUBCUTANEOUS at 13:37

## 2017-03-10 RX ADMIN — LACTOBACILLUS TAB SCH TAB: TAB at 08:24

## 2017-03-10 RX ADMIN — ISOSORBIDE MONONITRATE SCH MG: 30 TABLET, EXTENDED RELEASE ORAL at 13:32

## 2017-03-10 RX ADMIN — METRONIDAZOLE SCH MG: 500 TABLET ORAL at 08:23

## 2017-03-10 RX ADMIN — CALCIUM ACETATE SCH MG: 667 CAPSULE ORAL at 17:41

## 2017-03-10 RX ADMIN — DICYCLOMINE HYDROCHLORIDE PRN MG: 10 CAPSULE ORAL at 08:23

## 2017-03-10 RX ADMIN — CALCIUM ACETATE SCH MG: 667 CAPSULE ORAL at 08:00

## 2017-03-10 RX ADMIN — Medication SCH MG: at 08:23

## 2017-03-10 RX ADMIN — INSULIN ASPART SCH UNITS: 100 INJECTION, SOLUTION INTRAVENOUS; SUBCUTANEOUS at 21:00

## 2017-03-10 RX ADMIN — ATORVASTATIN CALCIUM SCH MG: 40 TABLET, FILM COATED ORAL at 08:24

## 2017-03-10 RX ADMIN — PANTOPRAZOLE SCH MG: 40 TABLET, DELAYED RELEASE ORAL at 08:24

## 2017-03-10 RX ADMIN — ONDANSETRON PRN MG: 4 TABLET, ORALLY DISINTEGRATING ORAL at 02:39

## 2017-03-10 RX ADMIN — METOPROLOL TARTRATE SCH MG: 50 TABLET, FILM COATED ORAL at 13:32

## 2017-03-10 RX ADMIN — SERTRALINE HYDROCHLORIDE SCH MG: 100 TABLET, FILM COATED ORAL at 21:00

## 2017-03-10 RX ADMIN — ONDANSETRON PRN MG: 4 TABLET, ORALLY DISINTEGRATING ORAL at 06:32

## 2017-03-10 NOTE — DISCHARGE SUMMARY
Discharge Summary


Date of Service


Mar 10, 2017.





Discharge Summary


Admission Date:


Mar 4, 2017 at 14:59


Discharge Date:  Mar 11, 2017


Discharge Disposition:  Home with services


Principal Diagnosis:


Left Great Osteomyelitis


C. Diff. Colitis


Secondary Diagnoses/Problems:


History Recurrent GI Bleed


ESRD (On Hemodialysis)


Type II Diabetes


Chronic Systolic/Diastolic CHF


PAD


History CAD


Hypertension


GERD


Dyslipidemia


History Depression


Procedures:


MRI OF THE LEFT FOREFOOT WITHOUT IV CONTRAST





CLINICAL HISTORY: Foot infection.





COMPARISON STUDY: Radiographs of the left 1st toe dated 3/4/2017.





FINDINGS: There is mild marrow edema seen on the STIR images within the tuft of 

the 1st distal phalanx. There is only minimal drop in signal on the T1-weighted 

sequences. No cortical disruption is identified. No additional foci of marrow 

signal abnormality are seen in the forefoot. A cutaneous defect in the distal 

aspect of the 1st toe suggests ulceration. There is soft tissue edema and 

thickening within the 1st toe suggesting cellulitis. No organized fluid


collection is identified. The visualized flexor and extensor tendons appear 

intact.





IMPRESSION:





1. There is marrow edema seen within the tuft of the 1st distal phalanx with 

mild drop in signal on the T1-weighted sequences. The appearance suggests


osteomyelitis. No clear cortical disruption is seen.





2. No additional foci of marrow abnormality are identified in the visualized 

forefoot.





3. Soft tissue edema and ulcerations are seen in the 1st toe. The appearance 

suggests cellulitis. No organized fluid collection is identified.


Vaccinations:


NONE


Consultations:


ID


Ortho


Wound Care


PT/OT


Pending Studies/Follow-Up:


Follow p with ID and Wound Care as outpatient.





Medication Reconciliation


New Medications:  


Lactobacillus Acidophilus (Floranex) 1 Tab Tab


1 TAB PO TID for 35 Days, #105 TAB 0 Refills





Ondansetron Hcl (Zofran) 4 Mg Tab


4 MG PO TID PRN for Nausea, #30 TAB





Clotrimazole (Clotrimazole) 1 Parminder Troc


1 PARMINDER MT 5XDQ4H for 4 Days, #4 PARMINDER 0 Refills





Levofloxacin (Levofloxacin) 500 Mg Tab


500 MG PO Q48H for 28 Days, #14 TAB 0 Refills





Metronidazole (Metronidazole) 500 Mg Tab


500 MG PO TID for 35 Days, #105 TAB 0 Refills





 


Continued Medications:  


Aspirin (Aspirin Ec) 81 Mg Tab


81 MG PO DAILY





Atorvastatin (Lipitor) 20 Mg Tab


40 MG PO DAILY, TAB





Calcium Acetate (Phoslo 667 Mg) 667 Mg Cap


3 CAP PO WM, CAP





Calcium Acetate (Phoslo 667 Mg) 667 Mg Cap


2 CAP PO WITH SNACKS





Dicyclomine Hcl (Dicyclomine Hcl) 10 Mg Cap


1 MG PO QID PRN for CRAMPING





Insulin Glargine (Lantus Solostar) 100 Unit/Ml Inj


10 UNITS SC QPM, PEN





Isosorbide Mononitrate (Isosorbide Mononitrate ER) 30 Mg Tabcr


60 MG PO QAM





Isosorbide Mononitrate Ext Rel (Imdur Ext Rel) 30 Mg Ertab


30 MG PO QAM, TAB





Lorazepam (Ativan) 1 Mg Tab


0.5 MG PO DAILY PRN for Anxiety, TAB





Methocarbamol (Methocarbamol) 500 Mg Tab


500 MG PO BID PRN for Muscle Spasms





Metoprolol Tartrate (Lopressor) 25 Mg Tab


50 MG PO BID, TAB





Pantoprazole (Protonix) 40 Mg Tab


40 MG PO DAILY, #30 TAB





Sertraline HCl (Sertraline HCl) 50 Mg Tab


2 TAB PO DAILY for 30 Days, #60 TAB 5 Refills





Tramadol (Ultram) 50 Mg Tab


1 TAB PO TID PRN for Pain for 30 Days, #90 TAB





 


Discontinued Medications:  


Cephalexin Monohydrate (Keflex) 500 Mg Cap


500 MG PO BID, #15 CAP





Ranitidine Hcl (Zantac) 150 Mg Tab


300 MG PO HS, TAB











Admission Information


HPI (per Admitting provider):


This is a 52 yo F with complicated past medical hx of ESRD on HD , Type 2 DM , 

insulin dependent , hx of CAD s/p PTCA CARMELLA on Left Cx artery on 08/2016 follows 

with Cardiology Dr Duarte , severe left carotid artery disease Hx of GI bleed , 

.presents with non healing left great toe diabetic infection 


pt has is to follow at Wound Care clinic next week , recently seen at PCP 

office -for worsening of redness, swelling and pain on left great toe 


started on PO Keflex ,


noted her left great toe started to have worsening of drainage 


had 2-3 episodes of diarrhea since yesterday 


no fever or chills 





in the ER left great toe appears to be gangrenous with redness,open wound , 

black eschar at end 


stool positive for C diff


Physical Exam (per Admitting):  


   General Appearance:  no apparent distress


   Eyes:  normal inspection


   ENT:  hearing grossly normal


   Neck:  supple


   Respiratory/Chest:  lungs clear, normal breath sounds, no respiratory 

distress


   Cardiovascular:  regular rate, rhythm


   Abdomen/GI:  non tender, soft


   Extremities/Musculoskelatal:  + pertinent finding (left great toe markedly 

erythematous with dusky red discoloartion , dark eschare at end , open wound on 

bottom with dranage , )





Hospital Course


ORDERED:  SURF WND CU/SMR                                                      

   


COMMENTS: Has Specimen Been Obtained/Collected? Y                     


--------------------------------------------------------------------------------

------------





  Procedure                         Result                         Verified    

       Site


--------------------------------------------------------------------------------

------------





  GRAM STAIN  Final                                                03/05/


        RESULT                      NO EPITHELIAL CELLS


                                    NO WBCs SEEN


                                    FEW GRAM POSITIVE COCCI





  SURFACE WOUND CULTURE  Final                                     03/08/


     Organism 1                     COAG NEG STAPHYLOCOCCUS


        QUANITY                     MANY


        SENS                        NO SENSITIVITY TO FOLLOW


        +MIXWOUND                   PLUS MODERATE COUNTS OF PROBABLE SKIN SEBASTIAN


     Organism 2                     ACHROMOBACTER XYLOSOXIDANS


        QUANITY                     FEW


        SENS                        SENSITIVITY TO FOLLOW





        SENSITIVITY RESULT INDICATES A MULTIPLY - RESISTANT


        ORGANISM.  PHONED TO EAST (LINCOLN MCCANN) ON 03/08/17 AT


        0849 BY Garret Leonard. Results were verbalized back to


        ANDREE.


        RESULTS WERE ALSO CALLED TO [facility]


        INFECTION CONTROL ANSWERING MACHINE ON 03/08/17 BY ANDREE.


        





     2. ACHROMOBACTER XYLOSOXIDANS


                       Target Route Dose               RX     AB   Cost M.I.C. 

   IQ    


                       ------ ----- ------------------ ------ -- ------ --------

- ------


       TRIMET/SULFA                                    S                <=2/38 

         


       CEFTAZIDIME                                     R                >16    

         


       CEFEPIME                                        R                >16    

         


       IMIPENEM                                        S                2      

         


       AZTREONAM                                       R                >16    

         


       GENTAMICIN                                      R                >8     

         


       TOBRAMYCIN                                      R                >8     

         


       AMIKACIN                                        R                >32    

         


       CIPROFLOXACIN                                   I                2      

         


       LEVOFLOXACIN                                    S                <=2    

         


       PIP/TAZO                                        R                >64    

         








MRI  of the left foot showed a marrow edema seen within the tuft of the 1st 

distal phalanx with


mild drop in signal on the T1-weighted sequences. The appearance suggests 

osteomyelitis. 


Soft tissue edema and ulcerations are seen in the 1st toe.





Left Great Toe Osteomyelitis: Clinically doing well.


   -Off IV Vancomycin & continue Levaquin (Day # 3). Needs for total 4 weeks. 


   -ID following the patient & has recommended the antibiotic regimen.


   -Ortho consulted and no intervention at this time, continue monitor.


   -Dr. Dill from wound care was consulted


   -Continue daily dressing care


   -Wound culture growth coag negative staph





C. Diff Colitis: Recently was on Keflex for left great toe infection & 

presented with Diarrhea which has improved.


   -Stool C diff toxin assay is positive


   -Continue PO Flagyl 500 mg TID (Day # 6). Continue as long patient is on 

Levaquin and even one week beyond that.


   -Continue lactobacillus.


   -Repeat C. Diff is negative.


   -KUB is negative





Left Peroneal Artery Occlusion: U/S of LE showed distal left peroneal artery is 

occluded.


   -Vascular surgery was involved for evaluation


   -Patient has angiogram done and has with following finding





Findings


no large vessel stenosis, small vessel disease of the foot





ESRD On Hemodialysis: HD on Monday/Wednesday /Friday 


   -Nephrology following the patient.





History Of Recurrent GI Bleed: last admission 2/14/17 with hematemesis. Stable 

so far.


EGD on 02/16/17 : normal esophagus, stomach , duodenum , no evidence of bleed 


Pathology : Benign , with evidence of chronic gastritis 


Immunostain negative for H Pylori 





Type II Diabetes: On Insulin. Last Hba1c 7.1 was 2/14/17


   -Continue basal Lantus 10 unit hs 


   -Monitor BS and cover as per sliding scale.


   -Pharmacy consulted for glycemic management 





Chronic CHF Due to Systolic/Diastolic Dysfunction: Clinically stable & no 

evidence of decompensation or vol overload 


ECHO on 12/2017 : 


1. Normal left ventricular size with low-normal systolic function. Estimated EF 

50%.  Hypokinesis of the inferolateral, mid inferior, base to mid septal wall.  

Akinesis of the inferior base.  No left ventricular hypertrophy.  Type 2 

diastolic dysfunction.


2. The left atrium is mildly dilated.


3. There is mild mitral regurgitation.


4. Technically difficult study; IV echo-contrast may enhance image quality to 

better evaluate LV systolic function.


5. Compared to prior study on 2/1/2017, LV systolic function has declined.





Anemia Of Chronic Disease: Stable. Monitoring H/H.





History CAD S/P PTCA : s/p Bare metal stent on left Cx on 08/2016. Asymptomatic

  


   -Continue Aspirin, Imdur, Lopressor, statin 





Hypertension: BP stable 


   -Continue Imdur, Lopressor 





Hyperlipidemia: Continue Lipitor 40 mg daily 





GERD: Stable.Continue PPI.


 


HX OF Depression Disorder:Stable.


   -Continue Zoloft 100 mg PO Daily 





DVT Prophylaxis: Sq Heparin





Code Status: FULL CODE 





Disposition: Discharge home later today.





Follow up with PCP on 3/15/2017 @ 10.50 AM





Follow up for outpatient dialysis on Monday, Wednesday & Friday.





Follow up with Outpatient Wound Clinic as per their advice


Total time spent on discharge = 45 minutes.


This includes examination of the patient, discharge planning, medication 

reconciliation, and communication with other providers.





Discharge Instructions


Discharge Goals


Goal(s):  Decrease discomfort, Improve function, Increase independence, Improve 

disease control, Improve nutritional status, Learn about illness, Diagnostic 

testing, Therapeutic intervention





Activity Recommendations


Activity Limitations:  resume your previous activity (As Tolerated.)


Lifting Limitations:  no more than 5 pounds


Exercise/Sports Limitations:  as tolerated


May Resume Sexual Activity:  when tolerated


Shower/Bathe:  no limitations (Keep the affected area of foot dry.)





.





Instructions / Follow-Up


Instructions / Follow-Up


1. Use all the medications as directed.


2. Monitor your blood sugar regularly.








Follow up with PCP on 3/15/2017 @ 10.50 AM





Follow up for outpatient dialysis on Monday, Wednesday & Friday.





Follow up with Outpatient Wound Clinic as per their advice





Current Hospital Diet


Patient's current hospital diet: Diabetes Type 2 Diet, Renal Diet





Additional Copies To


Lurdes Smith D.O., Kerim I., DO

## 2017-03-10 NOTE — NEPHROLOGY PROGRESS NOTE
Nephrology Progress Note


Date of Service:


Mar 10, 2017.


Subjective


52 yo female with diabetic foot ulcer with cdiff and recurrent diarrhea and now 

with yeast infection as well. pt has noticed a metallic taste in her mouth from 

one of the new medications.  unpleasant feeling.  otherwise doing well. no 

chest pain or sob. pt was to have a dobutamine stress test this week as an 

outpt for eventual preparation of carotid surgery. follows with Dr. Duarte of 

[facility].





Objective











  Date Time  Temp Pulse Resp B/P Pulse Ox O2 Delivery O2 Flow Rate FiO2


 


3/10/17 00:01      Room Air  


 


3/9/17 23:29 36.8 61 20 166/82 97 Room Air  


 


3/9/17 21:21      Room Air  


 


3/9/17 20:22  57  115/66    


 


3/9/17 16:10      Room Air  


 


3/9/17 16:00 36.7 58 18 118/75 97 Room Air  





        


 


3/9/17 10:47      Room Air  








Physical Exam:


General-aaox3, obese


Eyes-no scleral icterus


ENT-mmm


Neck-supple


Lungs-cta


Heart-melissa


Abdomen-bs+ s/nt/nd


Extremities-no edema,  left great toe ulcer-improving


Neuro-nonfocal





 Current Inpatient Medications








 Medications


  (Trade)  Dose


 Ordered  Sig/Willy


 Route  Start Time


 Stop Time Status Last Admin


Dose Admin


 


 Metronidazole


  (Flagyl Tab)  500 mg  TID


 PO  3/4/17 17:00


 3/14/17 16:59  3/9/17 20:14


500 MG


 


 Heparin Sodium


  (Porcine)


  (Heparin Sq 5000


 Unit/0.5ml)  5,000 unit  Q8


 SQ  3/4/17 22:00


 4/3/17 21:59  3/10/17 05:52


5,000 UNIT


 


 Acetaminophen


  (Tylenol Tab)  650 mg  Q4H  PRN


 PO  3/4/17 15:00


 4/3/17 14:59   


 


 


 Ondansetron HCl


  (Zofran Inj)  4 mg  Q6H  PRN


 IV  3/4/17 15:00


 4/3/17 14:59   


 


 


 Glucose


  (Glucose 40% Gel)  15-30


 GRAMS 15


 GRAMS...  UD  PRN


 PO  3/4/17 15:00


 4/3/17 14:59   


 


 


 Glucose


  (Glucose Chew


 Tab)  4-8


 Tablets 4


 Tabl...  UD  PRN


 PO  3/4/17 15:00


 4/3/17 14:59   


 


 


 Dextrose


  (Dextrose 50%


 50ML Syringe)  25-50ML OF


 50% DW IV


 FOR...  UD  PRN


 IV  3/4/17 15:00


 4/3/17 14:59   


 


 


 Glucagon


  (Glucagon Inj)  1 mg  UD  PRN


 SQ  3/4/17 15:00


 4/3/17 14:59   


 


 


 Aspirin


  (Ecotrin Tab)  81 mg  DAILY


 PO  3/5/17 08:00


 4/4/17 08:59  3/9/17 08:17


81 MG


 


 Atorvastatin


 Calcium


  (Lipitor Tab)  40 mg  DAILY


 PO  3/5/17 08:00


 4/4/17 08:59  3/9/17 08:18


40 MG


 


 Calcium Acetate


  (Phoslo Cap)  1,334 mg  DAILY  PRN


 PO  3/5/17 08:00


 4/4/17 07:59  3/5/17 14:26


1,334 MG


 


 Calcium Acetate


  (Phoslo Cap)  2,001 mg  TIDM


 PO  3/4/17 17:00


 4/3/17 16:59  3/9/17 17:39


2,001 MG


 


 Dicyclomine HCl


  (Bentyl Cap)  10 mg  QID  PRN


 PO  3/4/17 16:15


 4/3/17 16:14  3/9/17 12:20


10 MG


 


 Lorazepam


  (Ativan Tab)  0.5 mg  DAILY  PRN


 PO  3/4/17 16:15


 4/3/17 16:14  3/9/17 00:03


0.5 MG


 


 Methocarbamol


  (Robaxin Tab)  500 mg  BID  PRN


 PO  3/4/17 16:15


 4/3/17 16:14   


 


 


 Metoprolol


 Tartrate


  (Lopressor Tab)  50 mg  BID


 PO  3/4/17 20:00


 4/3/17 20:59  3/8/17 20:33


50 MG


 


 Pantoprazole


 Sodium


  (Protonix Tab)  40 mg  DAILY


 PO  3/5/17 08:00


 4/4/17 08:59  3/9/17 08:19


40 MG


 


 Insulin Aspart


  (novoLOG ASPART)  SLIDING


 SCALE  ACHS


 SC  3/4/17 17:00


 4/3/17 16:59  3/9/17 17:43


3 UNITS


 


 Miscellaneous


 Information


  (Consult


 Glycemic


 Management


 Pharmacy)  1 ea  UD  PRN


 N/A  3/4/17 17:18


 4/3/17 17:17   


 


 


 Ondansetron HCl


  (Zofran Odt)  4 mg  Q4H  PRN


 PO  3/5/17 05:00


 4/4/17 04:59  3/10/17 06:32


4 MG


 


 Sertraline HCl


  (Zoloft Tab)  100 mg  HS


 PO  3/5/17 21:00


 4/4/17 20:59  3/9/17 20:14


100 MG


 


 Insulin Glargine


  (Lantus Solostar


 Pen)  see


 protocol


 text  HS


 SC  3/5/17 21:00


 4/4/17 20:59  3/9/17 20:19


5 UNIT


 


 Tramadol HCl


  (Ultram Tab)  50 mg  Q6H  PRN


 PO  3/7/17 02:00


 4/6/17 01:59  3/9/17 00:04


50 MG


 


 Lactobacillus


 Acidophilus


  (Floranex Tab)  4 tab  TIDM


 PO  3/7/17 12:00


 4/6/17 11:59  3/9/17 17:39


4 TAB


 


 Levofloxacin


  (Levaquin Tab)  500 mg  Q48H


 PO  3/10/17 11:00


 4/19/17 10:59   


 


 


 Isosorbide


 Mononitrate


  (Imdur Ext Rel


 Tab)  90 mg  QAM


 PO  3/9/17 08:00


 4/8/17 07:59  3/9/17 08:18


90 MG


 


 Clotrimazole


  (Mycelex 10MG


 Rhea)  1 rhea  5XDQ4H


 MT  3/9/17 15:00


 3/19/17 14:59  3/9/17 23:58


1 RHEA


 


 Epoetin Narayan


  (Procrit Inj)  10,000 units  TODAY@0800


 IV.  3/10/17 08:00


 3/10/17 16:00   


 











Last 24 Hours








Test


  3/9/17


11:28 3/9/17


16:18 3/9/17


19:59 3/10/17


05:36


 


Bedside Glucose 187 mg/dl  111 mg/dl  146 mg/dl  


 


White Blood Count    8.15 K/uL 


 


Red Blood Count    3.46 M/uL 


 


Hemoglobin    10.6 g/dL 


 


Hematocrit    33.2 % 


 


Mean Corpuscular Volume    96.0 fL 


 


Mean Corpuscular Hemoglobin    30.6 pg 


 


Mean Corpuscular Hemoglobin


Concent 


  


  


  31.9 g/dl 


 


 


RDW Standard Deviation    50.6 fL 


 


RDW Coefficient of Variation    14.6 % 


 


Platelet Count    358 K/uL 


 


Mean Platelet Volume    10.1 fL 


 


Sodium Level    138 mmol/L 


 


Potassium Level    4.0 mmol/L 


 


Chloride Level    100 mmol/L 


 


Carbon Dioxide Level    22 mmol/L 


 


Anion Gap    16.0 mmol/L 


 


Blood Urea Nitrogen    48 mg/dl 


 


Creatinine    8.90 mg/dl 


 


Est Creatinine Clear Calc


Drug Dose 


  


  


  9.3 ml/min 


 


 


Estimated GFR (


American) 


  


  


  5.5 


 


 


Estimated GFR (Non-


American 


  


  


  4.7 


 


 


BUN/Creatinine Ratio    5.5 


 


Random Glucose    131 mg/dl 


 


Calcium Level    8.9 mg/dl 














Test


  3/10/17


07:16 


  


  


 


 


Bedside Glucose 106 mg/dl    








 








 Date/Time


Source Procedure


Growth Status


 


 


 3/10/17 00:01


Stool C.difficile Toxin B Gene (PCR) - Final


No C. difficile toxin B gene detected Complete











Assessment & Plan


ESRD-for dialysis today, will continue on a 2k bath to help control the k.  





IMANI- continue phoslo 3 with meals, follow phos levels intermittently





Anemia of renal failure-intermittent procrit for goal of hg of 10 to 11.





.

## 2017-03-10 NOTE — DISCHARGE INSTRUCTIONS
Discharge Instructions


Date of Service


Mar 10, 2017.





Admission


Reason for Admission:  C Difficile Colitis, Diabetic Foot Infection





Discharge


Discharge Diagnosis / Problem:  C. Diff Colitis





Discharge Goals


Goal(s):  Decrease discomfort, Improve function, Increase independence, Improve 

disease control, Improve nutritional status, Learn about illness, Diagnostic 

testing, Therapeutic intervention





Activity Recommendations


Activity Limitations:  resume your previous activity (As Tolerated.)


Lifting Limitations:  no more than 5 pounds


Exercise/Sports Limitations:  as tolerated


May Resume Sexual Activity:  when tolerated


Shower/Bathe:  no limitations (Keep the affected area of foot dry.)





.





Instructions / Follow-Up


Instructions / Follow-Up


1. Use all the medications as directed.


2. Monitor your blood sugar regularly.








Follow up with PCP on 3/15/2017 @ 10.50 AM





Follow up for outpatient dialysis on Monday, Wednesday & Friday.





Follow up with Outpatient Wound Clinic as per their advice





Current Hospital Diet


Patient's current hospital diet: Diabetes Type 2 Diet, Renal Diet





Discharge Diet


Recommended Diet:  Diabetes Type 2 Diet, Renal Diet


Fluid Restriction:  1500 ml (6 cups)





Procedures


Procedures Performed:  


Left Lower Extremity Angiogram, 


Mechanical Closure of Right Femoral Artery, 


Moderate Concious Sedation 2980-2117





Pending Studies


Studies pending at discharge:  no





Laboratory Results





 Hemoglobin A1c








Test


  2/14/17


21:20 Range/Units


 


 


Estimated Average Glucose 157   mg/dl


 


Hemoglobin A1c 7.1 H 4.5-5.6  %











Medical Emergencies








.


Who to Call and When:





Medical Emergencies:  If at any time you feel your situation is an emergency, 

please call 911 immediately.





.





Non-Emergent Contact


Non-Emergency issues call your:  Primary Care Provider


Call Non-Emergent contact if:  you have a fever, wound has increased drainage, 

wound has increased redness, wound has increased pain





.


.





"Provider Documentation" section prepared by Kristian Almanzar.





VTE Core Measure


Inpt VTE Proph given/why not?:  Unfractionated heparin SQ

## 2017-03-10 NOTE — PROGRESS NOTE
Progress Note


Date of Service:


Mar 10, 2017.


Subjective


52 yo f with multiple medical problems, s/p LLE angio without intervention d/t 

gangrene of L great toe, seen in f/u today.  Pt states toe feeling ok presently

, less pain than previously, although .  Denies any concerns 

regarding her R femoral puncture.





Problem List


Medical Problems:


(1) Abdominal pain


Status: Acute  





(2) Acute electrocardiogram changes


Status: Acute  





(3) Elevated troponin


Status: Acute  





(4) Elevated troponin


Status: Acute  





(5) End stage renal disease


Status: Acute  





(6) ESRD (end stage renal disease) on dialysis


Status: Chronic  





(7) GI bleed


Status: Acute  





(8) Hyperkalemia


Status: Acute  





(9) Joint effusion


Status: Acute  





(10) Left elbow pain


Status: Acute  





(11) Left sided chest pain


Status: Acute  





(12) Left sided chest pain


Status: Acute  





(13) Limb ischemia


Status: Acute  





(14) Medical non-compliance


Status: Acute  





(15) Pneumonia


Status: Acute  





(16) Pulmonary edema


Status: Acute  





(17) Renal failure


Status: Acute  





(18) Sinusitis


Status: Acute  





(19) Substernal chest pain


Status: Acute  





(20) Upper abdominal pain


Status: Acute  





(21) Upper GI bleed


Status: Acute  





(22) Urinary tract infection


Status: Acute  





(23) Vomiting


Status: Acute  





(24) Vomiting


Status: Acute  





(25) Vomiting


Status: Acute  





(26) Vomiting


Status: Acute  











Objective


Vital Signs





 Vital Signs Past 12 Hours








  Date Time  Temp Pulse Resp B/P Pulse Ox O2 Delivery O2 Flow Rate FiO2


 


3/10/17 12:15  62  141/71    


 


3/10/17 12:00  60  133/70    


 


3/10/17 11:45  63  128/72    


 


3/10/17 11:30  61  141/69    


 


3/10/17 11:15  59  123/68    


 


3/10/17 11:00  61  143/66    


 


3/10/17 10:45  57  125/71    


 


3/10/17 10:30  60  143/72    


 


3/10/17 10:15  56  139/76    


 


3/10/17 10:00  57  142/74    


 


3/10/17 09:45  59  152/78    


 


3/10/17 09:34  62  149/87    


 


3/10/17 09:23 36.6 65  169/83    


 


3/10/17 07:55 36.6 62 18 180/93 99 Room Air  








Exam


CONST: A&O x4, AND, morbidly obese, chronically ill appearing female


EXT: R groin puncture without erythema, edema, tenderness, ecchymosis, mass or 

discharge.  LLE great toe with significantly less erythema.  eschars appear dry

, no odor.  + cap refill to toes.


Laboratory and Microbiology





Results Past 24 Hours








Test


  3/9/17


16:18 3/9/17


19:59 3/10/17


05:36 3/10/17


07:16 Range/Units


 


 


Bedside Glucose 111 146  106 70-90  mg/dl


 


White Blood Count   8.15  4.8-10.8  K/uL


 


Red Blood Count   3.46  4.2-5.4  M/uL


 


Hemoglobin   10.6  12.0-16.0  g/dL


 


Hematocrit   33.2  37-47  %


 


Mean Corpuscular Volume   96.0    fL


 


Mean Corpuscular Hemoglobin   30.6  25-34  pg


 


Mean Corpuscular Hemoglobin


Concent 


  


  31.9


  


  32-36  g/dl


 


 


RDW Standard Deviation   50.6  36.4-46.3  fL


 


RDW Coefficient of Variation   14.6  11.5-14.5  %


 


Platelet Count   358  130-400  K/uL


 


Mean Platelet Volume   10.1  7.4-10.4  fL


 


Sodium Level   138  136-145  mmol/L


 


Potassium Level   4.0  3.5-5.1  mmol/L


 


Chloride Level   100    mmol/L


 


Carbon Dioxide Level   22  21-32  mmol/L


 


Anion Gap   16.0  3-11  mmol/L


 


Blood Urea Nitrogen   48  7-18  mg/dl


 


Creatinine


  


  


  8.90


  


  0.60-1.20


mg/dl


 


Est Creatinine Clear Calc


Drug Dose 


  


  9.3


  


   ml/min


 


 


Estimated GFR (


American) 


  


  5.5


  


   


 


 


Estimated GFR (Non-


American 


  


  4.7


  


   


 


 


BUN/Creatinine Ratio   5.5  10-20  


 


Random Glucose   131  70-99  mg/dl


 


Calcium Level   8.9  8.5-10.1  mg/dl














Test


  3/10/17


11:20 


  


  


  Range/Units


 


 


Bedside Glucose 146    70-90  mg/dl








 Microbiology Results


3/10/17 C.difficile Toxin B Gene (PCR) - Final, Complete


          No C. difficile toxin B gene detected





ASSESSMENT and PLAN:


   s/p LLE angio without intervention


   Severe PAD with gangrene LLE


      Pt noted to have significant small vessel disease not amenable to 

endovascular tx.  Toe appears viable.  Will reevaluate in office in 4 weeks for 

healing.  Pt may follow with wound clinic in meantime for wound care.

## 2017-03-10 NOTE — PROGRESS NOTE
Internal Med Progress Note


Date of Service:


Mar 10, 2017.


Provider Documentation:





SUBJECTIVE:





Patient is sitting in the bed in no apparent distress, being dialyzed. Denies 

any new complaint. "My toe is getting better."


 Remains afebrile. No SOB. Still c/o having intermittent loose BMs.








OBJECTIVE:





Vital Signs-as noted below





Examination:





General- Alert/Awake and is in no acute distress


Head-  atraumatic


Eyes- PERRL, EOMI


ENT- ears, Nose & Throat are normal looking.


Neck- Supple, Central trachea, No JVD.


Lungs- B/L Clear to auscultation.


Heart- Regular Rate/Rhythm, Normal S1,S2,.


Abdomen- normal bowel sounds, soft


Extremities-no calf tenderness, wound in the left great toe wrapped with clean 

dressing


Neuro- alert, oriented x 3; PERRL, EOMI; no facial palsy;


Skin- warm & dry





Lab data as noted below.


ASSESSMENT & PLAN:


ORDERED:  SURF [facility]                                                      

   


COMMENTS: Has Specimen Been Obtained/Collected? Y                     


--------------------------------------------------------------------------------

------------





  Procedure                         Result                         Verified    

       Site


--------------------------------------------------------------------------------

------------





  GRAM STAIN  Final                                                03/05/


        RESULT                      NO EPITHELIAL CELLS


                                    NO WBCs SEEN


                                    FEW GRAM POSITIVE COCCI





  SURFACE WOUND CULTURE  Final                                     03/08/


     Organism 1                     COAG NEG STAPHYLOCOCCUS


        QUANITY                     MANY


        SENS                        NO SENSITIVITY TO FOLLOW


        +MIXWOUND                   PLUS MODERATE COUNTS OF PROBABLE SKIN SEBASTIAN


     Organism 2                     ACHROMOBACTER XYLOSOXIDANS


        QUANITY                     FEW


        SENS                        SENSITIVITY TO FOLLOW





        SENSITIVITY RESULT INDICATES A MULTIPLY - RESISTANT


        ORGANISM.  PHONED TO 4-EAST (LINCOLN MCCANN) ON 03/08/17 AT


        0849 BY Garret Leonard. Results were verbalized back to


        ANDREE.


        RESULTS WERE ALSO CALLED TO [facility]


        INFECTION CONTROL ANSWERING MACHINE ON 03/08/17 BY ANDREE.


        





     2. ACHROMOBACTER XYLOSOXIDANS


                       Target Route Dose               RX     AB   Cost M.I.C. 

   IQ    


                       ------ ----- ------------------ ------ -- ------ --------

- ------


       TRIMET/SULFA                                    S                <=2/38 

         


       CEFTAZIDIME                                     R                >16    

         


       CEFEPIME                                        R                >16    

         


       IMIPENEM                                        S                2      

         


       AZTREONAM                                       R                >16    

         


       GENTAMICIN                                      R                >8     

         


       TOBRAMYCIN                                      R                >8     

         


       AMIKACIN                                        R                >32    

         


       CIPROFLOXACIN                                   I                2      

         


       LEVOFLOXACIN                                    S                <=2    

         


       PIP/TAZO                                        R                >64    

         








MRI  of the left foot showed a marrow edema seen within the tuft of the 1st 

distal phalanx with


mild drop in signal on the T1-weighted sequences. The appearance suggests 

osteomyelitis. 


Soft tissue edema and ulcerations are seen in the 1st toe.





Left Great Toe Osteomyelitis: Clinically doing well.


   -Off IV Vancomycin & continue Levaquin (Day # 3). Needs for total 4 weeks. 


   -ID following the patient & has recommended the antibiotic regimen.


   -Ortho consulted and no intervention at this time, continue monitor.


   -Dr. Dill from wound care was consulted


   -Continue daily dressing care


   -Wound culture growth coag negative staph





C. Diff Colitis: Recently was on Keflex for left great toe infection & 

presented with Diarrhea which has improved.


   -Stool C diff toxin assay is positive


   -Continue PO Flagyl 500 mg TID (Day # 6). Continue as long patient is on 

Levaquin and even one week beyond that.


   -Continue lactobacillus.


   -Repeat C. Diff is negative.


   -KUB is negative





Left Peroneal Artery Occlusion: U/S of LE showed distal left peroneal artery is 

occluded.


   -Vascular surgery was involved for evaluation


   -Patient has angiogram done and has with following finding





Findings


no large vessel stenosis, small vessel disease of the foot





ESRD On Hemodialysis: HD on Monday/Wednesday /Friday 


   -Nephrology following the patient.





History Of Recurrent GI Bleed: last admission 2/14/17 with hematemesis. Stable 

so far.


EGD on 02/16/17 : normal esophagus, stomach , duodenum , no evidence of bleed 


Pathology : Benign , with evidence of chronic gastritis 


Immunostain negative for H Pylori 





Type II Diabetes: On Insulin. Last Hba1c 7.1 was 2/14/17


   -Continue basal Lantus 10 unit hs 


   -Monitor BS and cover as per sliding scale.


   -Pharmacy consulted for glycemic management 





Chronic CHF Due to Systolic/Diastolic Dysfunction: Clinically stable & no 

evidence of decompensation or vol overload 


ECHO on 12/2017 : 


1. Normal left ventricular size with low-normal systolic function. Estimated EF 

50%.  Hypokinesis of the inferolateral, mid inferior, base to mid septal wall.  

Akinesis of the inferior base.  No left ventricular hypertrophy.  Type 2 

diastolic dysfunction.


2. The left atrium is mildly dilated.


3. There is mild mitral regurgitation.


4. Technically difficult study; IV echo-contrast may enhance image quality to 

better evaluate LV systolic function.


5. Compared to prior study on 2/1/2017, LV systolic function has declined.





Anemia Of Chronic Disease: Stable. Monitoring H/H.





History CAD S/P PTCA : s/p Bare metal stent on left Cx on 08/2016. Asymptomatic

  


   -Continue Aspirin, Imdur, Lopressor, statin 





Hypertension: BP stable 


   -Continue Imdur, Lopressor 





Hyperlipidemia: Continue Lipitor 40 mg daily 





GERD: Stable.Continue PPI.


 


HX OF Depression Disorder:Stable.


   -Continue Zoloft 100 mg PO Daily 





DVT Prophylaxis: Sq Heparin





Code Status: FULL CODE 





Disposition: Discharge home later today.





Follow up with PCP on 3/15/2017 @ 10.50 AM





Follow up for outpatient dialysis on Monday, Wednesday & Friday.





Follow up with Outpatient Wound Clinic as per their advice


Vital Signs:











  Date Time  Temp Pulse Resp B/P Pulse Ox O2 Delivery O2 Flow Rate FiO2


 


3/10/17 12:15  62  141/71    


 


3/10/17 12:00  60  133/70    


 


3/10/17 11:45  63  128/72    


 


3/10/17 11:30  61  141/69    


 


3/10/17 11:15  59  123/68    


 


3/10/17 11:00  61  143/66    


 


3/10/17 10:45  57  125/71    


 


3/10/17 10:30  60  143/72    


 


3/10/17 10:15  56  139/76    


 


3/10/17 10:00  57  142/74    


 


3/10/17 09:45  59  152/78    


 


3/10/17 09:34  62  149/87    


 


3/10/17 09:23 36.6 65  169/83    


 


3/10/17 07:55 36.6 62 18 180/93 99 Room Air  


 


3/10/17 00:01      Room Air  


 


3/9/17 23:29 36.8 61 20 166/82 97 Room Air  


 


3/9/17 21:21      Room Air  


 


3/9/17 20:22  57  115/66    


 


3/9/17 16:10      Room Air  


 


3/9/17 16:00 36.7 58 18 118/75 97 Room Air  





        








Lab Results:





Results Past 24 Hours








Test


  3/9/17


16:18 3/9/17


19:59 3/10/17


05:36 3/10/17


07:16 Range/Units


 


 


Bedside Glucose 111 146  106 70-90  mg/dl


 


White Blood Count   8.15  4.8-10.8  K/uL


 


Red Blood Count   3.46  4.2-5.4  M/uL


 


Hemoglobin   10.6  12.0-16.0  g/dL


 


Hematocrit   33.2  37-47  %


 


Mean Corpuscular Volume   96.0    fL


 


Mean Corpuscular Hemoglobin   30.6  25-34  pg


 


Mean Corpuscular Hemoglobin


Concent 


  


  31.9


  


  32-36  g/dl


 


 


RDW Standard Deviation   50.6  36.4-46.3  fL


 


RDW Coefficient of Variation   14.6  11.5-14.5  %


 


Platelet Count   358  130-400  K/uL


 


Mean Platelet Volume   10.1  7.4-10.4  fL


 


Sodium Level   138  136-145  mmol/L


 


Potassium Level   4.0  3.5-5.1  mmol/L


 


Chloride Level   100    mmol/L


 


Carbon Dioxide Level   22  21-32  mmol/L


 


Anion Gap   16.0  3-11  mmol/L


 


Blood Urea Nitrogen   48  7-18  mg/dl


 


Creatinine


  


  


  8.90


  


  0.60-1.20


mg/dl


 


Est Creatinine Clear Calc


Drug Dose 


  


  9.3


  


   ml/min


 


 


Estimated GFR (


American) 


  


  5.5


  


   


 


 


Estimated GFR (Non-


American 


  


  4.7


  


   


 


 


BUN/Creatinine Ratio   5.5  10-20  


 


Random Glucose   131  70-99  mg/dl


 


Calcium Level   8.9  8.5-10.1  mg/dl














Test


  3/10/17


11:20 


  


  


  Range/Units


 


 


Bedside Glucose 146    70-90  mg/dl








 Microbiology Results


3/10/17 C.difficile Toxin B Gene (PCR) - Final, Complete


          No C. difficile toxin B gene detected

## 2017-03-11 VITALS
SYSTOLIC BLOOD PRESSURE: 134 MMHG | HEART RATE: 55 BPM | DIASTOLIC BLOOD PRESSURE: 75 MMHG | TEMPERATURE: 98.42 F | OXYGEN SATURATION: 96 %

## 2017-03-11 VITALS
TEMPERATURE: 98.42 F | HEART RATE: 55 BPM | SYSTOLIC BLOOD PRESSURE: 134 MMHG | DIASTOLIC BLOOD PRESSURE: 75 MMHG | OXYGEN SATURATION: 96 %

## 2017-03-11 VITALS — OXYGEN SATURATION: 94 %

## 2017-03-11 RX ADMIN — HEPARIN SODIUM SCH UNIT: 10000 INJECTION, SOLUTION INTRAVENOUS; SUBCUTANEOUS at 06:41

## 2017-03-11 RX ADMIN — LACTOBACILLUS TAB SCH TAB: TAB at 12:48

## 2017-03-11 RX ADMIN — PANTOPRAZOLE SCH MG: 40 TABLET, DELAYED RELEASE ORAL at 08:39

## 2017-03-11 RX ADMIN — CALCIUM ACETATE SCH MG: 667 CAPSULE ORAL at 12:47

## 2017-03-11 RX ADMIN — CLOTRIMAZOLE SCH TROCHE: 10 LOZENGE ORAL at 11:00

## 2017-03-11 RX ADMIN — CLOTRIMAZOLE SCH TROCHE: 10 LOZENGE ORAL at 06:42

## 2017-03-11 RX ADMIN — Medication SCH MG: at 08:36

## 2017-03-11 RX ADMIN — METRONIDAZOLE SCH MG: 500 TABLET ORAL at 08:40

## 2017-03-11 RX ADMIN — INSULIN ASPART SCH UNITS: 100 INJECTION, SOLUTION INTRAVENOUS; SUBCUTANEOUS at 08:33

## 2017-03-11 RX ADMIN — METOPROLOL TARTRATE SCH MG: 50 TABLET, FILM COATED ORAL at 08:37

## 2017-03-11 RX ADMIN — ATORVASTATIN CALCIUM SCH MG: 40 TABLET, FILM COATED ORAL at 08:36

## 2017-03-11 RX ADMIN — ISOSORBIDE MONONITRATE SCH MG: 30 TABLET, EXTENDED RELEASE ORAL at 08:36

## 2017-03-11 RX ADMIN — INSULIN ASPART SCH UNITS: 100 INJECTION, SOLUTION INTRAVENOUS; SUBCUTANEOUS at 12:47

## 2017-03-11 RX ADMIN — LACTOBACILLUS TAB SCH TAB: TAB at 08:35

## 2017-03-11 RX ADMIN — CALCIUM ACETATE SCH MG: 667 CAPSULE ORAL at 08:39

## 2017-03-11 NOTE — PROGRESS NOTE
Internal Med Progress Note


Date of Service:


Mar 11, 2017.


Provider Documentation:





SUBJECTIVE:





Patient is sitting in the bed in no apparent distress, being dialyzed. Denies 

any new complaint. "My toe is getting better."


 Remains afebrile. No SOB. Still c/o having intermittent loose BMs.








OBJECTIVE:





Vital Signs-as noted below





Examination:





General- Alert/Awake and is in no acute distress


Head-  atraumatic


Eyes- PERRL, EOMI


ENT- ears, Nose & Throat are normal looking.


Neck- Supple, Central trachea, No JVD.


Lungs- B/L Clear to auscultation.


Heart- Regular Rate/Rhythm, Normal S1,S2,.


Abdomen- normal bowel sounds, soft


Extremities-no calf tenderness, wound in the left great toe wrapped with clean 

dressing


Neuro- alert, oriented x 3; PERRL, EOMI; no facial palsy;


Skin- warm & dry





Lab data as noted below.


ASSESSMENT & PLAN:


ORDERED:  SURF [facility]                                                      

   


COMMENTS: Has Specimen Been Obtained/Collected? Y                     


--------------------------------------------------------------------------------

------------





  Procedure                         Result                         Verified    

       Site


--------------------------------------------------------------------------------

------------





  GRAM STAIN  Final                                                03/05/


        RESULT                      NO EPITHELIAL CELLS


                                    NO WBCs SEEN


                                    FEW GRAM POSITIVE COCCI





  SURFACE WOUND CULTURE  Final                                     03/08/


     Organism 1                     COAG NEG STAPHYLOCOCCUS


        QUANITY                     MANY


        SENS                        NO SENSITIVITY TO FOLLOW


        +MIXWOUND                   PLUS MODERATE COUNTS OF PROBABLE SKIN SEBASTIAN


     Organism 2                     ACHROMOBACTER XYLOSOXIDANS


        QUANITY                     FEW


        SENS                        SENSITIVITY TO FOLLOW





        SENSITIVITY RESULT INDICATES A MULTIPLY - RESISTANT


        ORGANISM.  PHONED TO 4-EAST (LINCOLN MCCANN) ON 03/08/17 AT


        0849 BY Garret Leonard. Results were verbalized back to


        ANDREE.


        RESULTS WERE ALSO CALLED TO [facility]


        INFECTION CONTROL ANSWERING MACHINE ON 03/08/17 BY ANDREE.


        





     2. ACHROMOBACTER XYLOSOXIDANS


                       Target Route Dose               RX     AB   Cost M.I.C. 

   IQ    


                       ------ ----- ------------------ ------ -- ------ --------

- ------


       TRIMET/SULFA                                    S                <=2/38 

         


       CEFTAZIDIME                                     R                >16    

         


       CEFEPIME                                        R                >16    

         


       IMIPENEM                                        S                2      

         


       AZTREONAM                                       R                >16    

         


       GENTAMICIN                                      R                >8     

         


       TOBRAMYCIN                                      R                >8     

         


       AMIKACIN                                        R                >32    

         


       CIPROFLOXACIN                                   I                2      

         


       LEVOFLOXACIN                                    S                <=2    

         


       PIP/TAZO                                        R                >64    

         








MRI  of the left foot showed a marrow edema seen within the tuft of the 1st 

distal phalanx with


mild drop in signal on the T1-weighted sequences. The appearance suggests 

osteomyelitis. 


Soft tissue edema and ulcerations are seen in the 1st toe.





Left Great Toe Osteomyelitis: Clinically doing well.


   -Off IV Vancomycin & continue Levaquin (Day # 3). Needs for total 4 weeks. 


   -ID following the patient & has recommended the antibiotic regimen.


   -Ortho consulted and no intervention at this time, continue monitor.


   -Dr. Dill from wound care was consulted


   -Continue daily dressing care


   -Wound culture growth coag negative staph





C. Diff Colitis: Recently was on Keflex for left great toe infection & 

presented with Diarrhea which has improved.


   -Stool C diff toxin assay is positive


   -Continue PO Flagyl 500 mg TID (Day # 6). Continue as long patient is on 

Levaquin and even one week beyond that.


   -Continue lactobacillus.


   -Repeat C. Diff is negative.


   -KUB is negative





Left Peroneal Artery Occlusion: U/S of LE showed distal left peroneal artery is 

occluded.


   -Vascular surgery was involved for evaluation


   -Patient has angiogram done and has with following finding





Findings


no large vessel stenosis, small vessel disease of the foot





ESRD On Hemodialysis: HD on Monday/Wednesday /Friday 


   -Nephrology following the patient.





History Of Recurrent GI Bleed: last admission 2/14/17 with hematemesis. Stable 

so far.


EGD on 02/16/17 : normal esophagus, stomach , duodenum , no evidence of bleed 


Pathology : Benign , with evidence of chronic gastritis 


Immunostain negative for H Pylori 





Type II Diabetes: On Insulin. Last Hba1c 7.1 was 2/14/17


   -Continue basal Lantus 10 unit hs 


   -Monitor BS and cover as per sliding scale.


   -Pharmacy consulted for glycemic management 





Chronic CHF Due to Systolic/Diastolic Dysfunction: Clinically stable & no 

evidence of decompensation or vol overload 


ECHO on 12/2017 : 


1. Normal left ventricular size with low-normal systolic function. Estimated EF 

50%.  Hypokinesis of the inferolateral, mid inferior, base to mid septal wall.  

Akinesis of the inferior base.  No left ventricular hypertrophy.  Type 2 

diastolic dysfunction.


2. The left atrium is mildly dilated.


3. There is mild mitral regurgitation.


4. Technically difficult study; IV echo-contrast may enhance image quality to 

better evaluate LV systolic function.


5. Compared to prior study on 2/1/2017, LV systolic function has declined.





Anemia Of Chronic Disease: Stable. Monitoring H/H.





History CAD S/P PTCA : s/p Bare metal stent on left Cx on 08/2016. Asymptomatic

  


   -Continue Aspirin, Imdur, Lopressor, statin 





Hypertension: BP stable 


   -Continue Imdur, Lopressor 





Hyperlipidemia: Continue Lipitor 40 mg daily 





GERD: Stable.Continue PPI.


 


HX OF Depression Disorder:Stable.


   -Continue Zoloft 100 mg PO Daily 





DVT Prophylaxis: Sq Heparin





Code Status: FULL CODE 





Disposition: Discharge home later today.





Follow up with PCP on 3/15/2017 @ 10.50 AM





Follow up for outpatient dialysis on Monday, Wednesday & Friday.





Follow up with Outpatient Wound Clinic as per their advice


Vital Signs:











  Date Time  Temp Pulse Resp B/P Pulse Ox O2 Delivery O2 Flow Rate FiO2


 


3/11/17 07:10 36.9 55 18 134/75 96 Room Air  


 


3/11/17 00:00     94 Room Air  


 


3/10/17 23:22 37.1 60 18 134/77 94 Room Air  


 


3/10/17 16:00     96 Room Air  


 


3/10/17 14:48 36.7 66 18 160/90 96   


 


3/10/17 13:51      Room Air  


 


3/10/17 12:15  62  141/71    


 


3/10/17 12:00  60  133/70    


 


3/10/17 11:45  63  128/72    


 


3/10/17 11:30  61  141/69    


 


3/10/17 11:15  59  123/68    


 


3/10/17 11:00  61  143/66    


 


3/10/17 10:45  57  125/71    


 


3/10/17 10:30  60  143/72    


 


3/10/17 10:15  56  139/76    


 


3/10/17 10:00  57  142/74    


 


3/10/17 09:45  59  152/78    


 


3/10/17 09:34  62  149/87    


 


3/10/17 09:23 36.6 65  169/83    


 


3/10/17 07:55 36.6 62 18 180/93 99 Room Air  








Lab Results:





Results Past 24 Hours








Test


  3/10/17


11:20 3/10/17


16:21 3/10/17


20:19 Range/Units


 


 


Bedside Glucose 146 100 144 70-90  mg/dl

## 2017-04-27 ENCOUNTER — HOSPITAL ENCOUNTER (OUTPATIENT)
Dept: HOSPITAL 45 - C.NUCL | Age: 52
Discharge: HOME | End: 2017-04-27
Attending: INTERNAL MEDICINE
Payer: COMMERCIAL

## 2017-04-27 DIAGNOSIS — I21.3: Primary | ICD-10-CM

## 2017-04-27 DIAGNOSIS — I25.10: ICD-10-CM

## 2017-04-28 NOTE — MYOCARDIAL PERFUSION SCAN
PRIMARY CARE PHYSICIAN:  Dr. Smith.

 

REASON FOR STUDY:  Prior elevated troponin, history of coronary artery

disease.

 

STUDY TITLE:  One-day nuclear medicine technetium-99m Cardiolite myocardial

perfusion scan.

 

EKG:  Baseline EKG shows normal sinus rhythm at a ventricular rate of 73. 

She has inferior Q-waves, no significant ST abnormalities.

 

Lexiscan stress EKG:  Baseline heart rate of 78 eamon to 96, representing 56%

of maximum predicted heart rate.  There were no stress induced ST changes or

arrhythmias.  The patient had mild chest tightness with Lexiscan.

 

TECHNIQUE:  For the stress portion of the study 30.1 mCi of technetium-99m

Cardiolite IV was injected at 11:35 a.m. on 04/27/2017.  Thirty minutes

following the injection, imaging of the heart was performed in multiple

projections.  For the rest portion of the study 11.0 mCi of technetium-99m

Cardiolite was injected IV at 9:40 a.m.  One hour following the injection,

imaging of the heart was performed in the same projections.

 

FINDINGS:  Rotating raw images were reviewed in detail.  There was mild

vertical motion on the stress images.  There was a large diffuse breast

shadow on both stress and rest images.  There was minimal liver and spine

uptake near the inferior imaging borders of the heart.  There was no

significant extra cardiac pathologic uptake.

 

The short axis, long axis, vertical long axis images were reviewed in detail.

 There was a severe fixed inferior perfusion defect from the base to apex

with minimal surrounding reversible perfusion defects.  No other significant

signs of ischemia.  There was significantly decreased scintigraphic counts in 
the

inferior segments suggesting limited viability.

 

There was normal LV size, EF was moderately to severely reduced with an EF of

31%.  The inferior wall was akinetic, lateral wall was moderately

hypokinesis.

 

IMPRESSION:

1.  Negative Lexiscan Cardiolite study for ischemia.

2.  Fixed inferior perfusion defect consistent with large RCA/dominant

circumflex infarct.

3.  Normal LV size.  Moderate to severe LV dysfunction, EF 31%, with inferior 
akinesis

and lateral wall hypokinesis.

4.  Nondiagnostic Lexiscan EKG due to inability to reach target heart rate.

 

 

 

[facility]

## 2017-05-30 NOTE — PAT MEDICATION INSTRUCTIONS
Service Date


May 30, 2017.





Current Home Medication List


Aspirin (Aspirin Ec), 81 MG PO QAM


Atorvastatin (Lipitor), 40 MG PO QAM


Calcium Acetate (Phoslo 667 Mg), 3 CAP PO WM


Calcium Acetate (Phoslo 667 Mg), 2 CAP PO WITH SNACKS


Dicyclomine Hcl (Dicyclomine Hcl), 1 MG PO QID PRN for CRAMPING


Fluticasone Propionate (Nasal) (Allergy Nasal [place] 24 Ho), 1 SPRAY N/A DAILY


Insulin Glargine (Lantus Solostar), 10 UNITS SC QPM


Isosorbide Mononitrate (Isosorbide Mononitrate ER), 60 MG PO QAM


Isosorbide Mononitrate Ext Rel (Imdur Ext Rel), 30 MG PO QAM


Loratadine (Claritin Childrens), 2 TAB PO QPM


Lorazepam (Ativan), 0.5 MG PO DAILY PRN for Anxiety


Methocarbamol (Methocarbamol), 500 MG PO BID PRN for Muscle Spasms


Metoprolol Tartrate (Lopressor), 25 MG PO BID


Pantoprazole (Protonix), 40 MG PO QAM


Sertraline Hcl (Zoloft), 100 MG PO HS


Tramadol (Ultram), 1 TAB PO TID PRN for Pain





Medication Instructions


For Your Scheduled Surgery 





-Please check with your surgeon for instructions on the following: 


Aspirin (Aspirin Ec), 81 MG PO QAM











- Hold the following medications the morning of surgery:


Dicyclomine Hcl (Dicyclomine Hcl), 1 MG PO QID PRN for CRAMPING


Calcium Acetate (Phoslo 667 Mg), 3 CAP PO WM


Calcium Acetate (Phoslo 667 Mg), 2 CAP PO WITH SNACKS


Methocarbamol (Methocarbamol), 500 MG PO BID PRN for Muscle Spasms











- Take the following medications the morning of surgery with a sip of water 

OTHERWISE NOTHING TO EAT OR DRINK AFTER MIDNIGHT:


Atorvastatin (Lipitor), 40 MG PO QAM


Fluticasone Propionate (Nasal) (Allergy Nasal [place] 24 Ho), 1 SPRAY N/A DAILY


Pantoprazole (Protonix), 40 MG PO QAM


Lorazepam (Ativan), 0.5 MG PO DAILY PRN for Anxiety


Tramadol (Ultram), 1 TAB PO TID PRN for Pain (may take if needed up to 4 hours 

prior to surgery)


Isosorbide Mononitrate (Isosorbide Mononitrate ER), 60 MG PO QAM


Isosorbide Mononitrate Ext Rel (Imdur Ext Rel), 30 MG PO QAM


Metoprolol Tartrate (Lopressor), 25 MG PO BID











- Take the following medications as scheduled the evening before surgery:


Dicyclomine Hcl (Dicyclomine Hcl), 1 MG PO QID PRN for CRAMPING


Insulin Glargine (Lantus Solostar), 10 UNITS SC QPM


Sertraline Hcl (Zoloft), 100 MG PO HS


Loratadine (Claritin Childrens), 2 TAB PO QPM


Lorazepam (Ativan), 0.5 MG PO DAILY PRN for Anxiety


Tramadol (Ultram), 1 TAB PO TID PRN for Pain


Metoprolol Tartrate (Lopressor), 25 MG PO BID


Calcium Acetate (Phoslo 667 Mg), 3 CAP PO WM


Calcium Acetate (Phoslo 667 Mg), 2 CAP PO WITH SNACKS


Methocarbamol (Methocarbamol), 500 MG PO BID PRN for Muscle Spasms











If you have any questions please call us at [phone] or [phone] or 

[phone]

## 2017-06-13 ENCOUNTER — HOSPITAL ENCOUNTER (OUTPATIENT)
Dept: HOSPITAL 45 - C.ACU | Age: 52
Discharge: HOME | End: 2017-06-13
Attending: SURGERY
Payer: COMMERCIAL

## 2017-06-13 VITALS
BODY MASS INDEX: 40.26 KG/M2 | WEIGHT: 250.53 LBS | BODY MASS INDEX: 40.26 KG/M2 | HEIGHT: 65.98 IN | WEIGHT: 250.53 LBS | HEIGHT: 65.98 IN

## 2017-06-13 VITALS — OXYGEN SATURATION: 99 % | DIASTOLIC BLOOD PRESSURE: 131 MMHG | SYSTOLIC BLOOD PRESSURE: 237 MMHG | HEART RATE: 87 BPM

## 2017-06-13 VITALS
OXYGEN SATURATION: 98 % | DIASTOLIC BLOOD PRESSURE: 87 MMHG | SYSTOLIC BLOOD PRESSURE: 173 MMHG | TEMPERATURE: 97.88 F | HEART RATE: 81 BPM

## 2017-06-13 VITALS — DIASTOLIC BLOOD PRESSURE: 74 MMHG | SYSTOLIC BLOOD PRESSURE: 174 MMHG

## 2017-06-13 VITALS
HEART RATE: 87 BPM | TEMPERATURE: 97.16 F | DIASTOLIC BLOOD PRESSURE: 92 MMHG | SYSTOLIC BLOOD PRESSURE: 175 MMHG | OXYGEN SATURATION: 98 %

## 2017-06-13 VITALS — DIASTOLIC BLOOD PRESSURE: 75 MMHG | HEART RATE: 87 BPM | OXYGEN SATURATION: 95 % | SYSTOLIC BLOOD PRESSURE: 166 MMHG

## 2017-06-13 VITALS
OXYGEN SATURATION: 96 % | HEART RATE: 74 BPM | TEMPERATURE: 97.88 F | SYSTOLIC BLOOD PRESSURE: 195 MMHG | DIASTOLIC BLOOD PRESSURE: 100 MMHG

## 2017-06-13 VITALS — TEMPERATURE: 97.88 F | SYSTOLIC BLOOD PRESSURE: 195 MMHG | DIASTOLIC BLOOD PRESSURE: 100 MMHG | OXYGEN SATURATION: 96 %

## 2017-06-13 VITALS — SYSTOLIC BLOOD PRESSURE: 200 MMHG | DIASTOLIC BLOOD PRESSURE: 96 MMHG

## 2017-06-13 VITALS — SYSTOLIC BLOOD PRESSURE: 201 MMHG | DIASTOLIC BLOOD PRESSURE: 101 MMHG

## 2017-06-13 DIAGNOSIS — I12.0: ICD-10-CM

## 2017-06-13 DIAGNOSIS — T82.898A: Primary | ICD-10-CM

## 2017-06-13 DIAGNOSIS — I65.29: ICD-10-CM

## 2017-06-13 DIAGNOSIS — N18.6: ICD-10-CM

## 2017-06-13 DIAGNOSIS — Z79.82: ICD-10-CM

## 2017-06-13 DIAGNOSIS — Z99.2: ICD-10-CM

## 2017-06-13 DIAGNOSIS — E11.22: ICD-10-CM
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
## 2017-06-13 DIAGNOSIS — Z79.899: ICD-10-CM

## 2017-06-13 DIAGNOSIS — Z79.4: ICD-10-CM

## 2017-06-13 DIAGNOSIS — I25.10: ICD-10-CM

## 2017-06-13 RX ADMIN — Medication SCH MLS/HR: at 06:00

## 2017-06-13 RX ADMIN — Medication SCH MLS/HR: at 14:36

## 2017-06-13 NOTE — HISTORY AND PHYSICAL
History & Physical


Date of Service


Jun 13, 2017.





History & Physical


Chief Complaint


Malfunctioning left upper arm fistula





History of Present Illness


The patient is a 51 year old female with multiple medical problems, including 

HTN, DMII, ESRD on HD, carotid stenosis, CAD.  She is having problems with her 

fistula at dialysis.  She is admitted now for a fistulogram and possible 

intervention.








Allergies


Coded Allergies:  


     Adhesives (Verified  Allergy, Mild, RASH, SORES, 1/31/17)


     Pollen Extract (Unverified  Allergy, Unknown, rash, 1/31/17)





Home Medications


Scheduled


Aspirin (Aspirin Ec), 81 MG PO DAILY


Atorvastatin (Lipitor), 40 MG PO DAILY


Calcium Acetate (Phoslo 667 Mg), 3 CAP PO WM


Calcium Acetate (Phoslo 667 Mg), 2 CAP PO WITH SNACKS


Cephalexin Monohydrate (Keflex), 500 MG PO BID


Insulin Glargine (Lantus Solostar), 10 UNITS SC QPM


Isosorbide Mononitrate (Isosorbide Mononitrate ER), 60 MG PO QAM


Isosorbide Mononitrate Ext Rel (Imdur Ext Rel), 30 MG PO QAM


Metoprolol Tartrate (Lopressor), 50 MG PO BID


Pantoprazole (Protonix), 40 MG PO DAILY


Ranitidine Hcl (Zantac), 300 MG PO HS


Sertraline HCl (Sertraline HCl), 2 TAB PO DAILY





Scheduled PRN


Dicyclomine Hcl (Dicyclomine Hcl), 1 MG PO QID PRN for CRAMPING


Lorazepam (Ativan), 0.5 MG PO DAILY PRN for Anxiety


Methocarbamol (Methocarbamol), 500 MG PO BID PRN for Muscle Spasms


Tramadol (Ultram), 1 TAB PO TID PRN for Pain





Problem List


Medical Problems:


(1) Allergic rhinitis


(2) C. difficile colitis


(3) CAD (coronary artery disease)


(4) Carotid stenosis


(5) Diabetic foot infection


(6) DM type 2 (diabetes mellitus, type 2)


(7) Dyslipidemia


(8) ESRD (end stage renal disease) on dialysis


(9) GERD (gastroesophageal reflux disease)


(10) HTN (hypertension)


(11) HX OF PAST NONCOMPLIANCE


(12) Ischemic cardiomyopathy


(13) Morbid obesity


(14) Tobacco use disorder


(15) UGIB (upper gastrointestinal bleed)


Surgical Problems:


(1) Hemodialysis access, AV graft








Surgical / Medical History


Hx Cardiac Surgery:  Yes (ANGIOPLASTY/STENT)


Hx Abdominal Surgery:  No


Hx Cancer Surgery:  No


Hx Thoracic Surgery:  No


Hx Orthopedic:  No


Hx Urinary Tract Surgery:  No


Past Medical/Surgical History:  Diabetes, Heart Disease, Hypertension, Kidney 

Disease





Family History





Diabetes mellitus


  FATHER


  MOTHER


Heart disease


  FATHER


  MOTHER


Hypertension


  FATHER


  MOTHER


Kidney disease


  GRANDMOTHER





Social History


Smoking Status:  Former Smoker


Hx Tobacco Use In Past Year?:  No


Hx Alcohol Use - Type & Amnt:  No


Hx Substance Use -Type & Amnt:  No








Review of Systems


Constitutional:  No chills, No malaise


Skin:  + change in color


Eyes:  No visual changes


ENMT:  No sore throat


Respiratory:  No cough, No SOUSA, No hemoptysis, No short of breath


Cardiovascular:  No chest pain, No palpitations, No syncope, No edema, No 

intermittent claudication


Gastrointestinal:   No abdominal pain, No nausea, No vomiting


Neurologic:  No dizziness, No headache, No lethargy, No numbness, No tingling








Physical Exam


Constitutional:  


   General Apperance:  well-nourished, well-developed, obese (morbidly)


   Level of Distress:  NAD, chronically ill


Psychiatric:  


   Mental Status:  active & alert, normal mood, normal affect


   Orientation:  oriented except where noted, to time, to place, to person


   Memory:  recent memory normal, remote memory normal


Head:  normocephalic, atraumatic


Eyes:  


   EOM:  EOMI


ENMT:  normal ENT inspection, hearing grossly normal


Neck:  supple, trachea midline


Lungs:  


   Respiratory effort:  no dyspnea


   Auscultation:  no wheezing, no rales/crackles, no rhonchi


Cardiovascular:  


   Apical Impulse:  not displaced


   Heart Auscultation:  RRR, no rubs, no gallops


Peripheral Pulses:  


   Pulses:  full and equal, in all extremities except if noted


   Brachial Pulses:  normal on the left, normal on the right


   Radial Pulse:  decreased on the left, decreased on the right


   Femoral Pulse:  normal on the right, decreased on the left


   Posterior Tibialis Pulse:  doppler (Monophasic LLE and RLE)


   Dorsalis Pedis Pulse:  decreased on the right, doppler (LLE monophasic)


Abdomen:  


   Inspection & Palpation:  soft, non-distended, no tenderness, guarding & 

rebound


Musculoskeletal:  normal strength (5/5 throughout), normal tone


Extremities:  


   Upper Right:  no cyanosis, no edema, no varicosities


   Upper Left:  no cyanosis, no edema, no varicosities


   Lower Right:  no cyanosis, no varicosities, no palpable cord, edema (trace)


   Lower Left:  no cyanosis, no varicosities, no palpable cord, edema (trace)


Neurologic:  


   Cranial Nerves:  grossly intact


   Sensation:  grossly intact











ASSESSMENT and PLAN:


   Malfunctioning av fistula left arm





Plan:


   Patient is admitted for a fistulogram with possible intervention. I have 

discussed the risks options and benefits of the procedure with the patient.  

The patient understands the risks options and benefits and agrees to the 

procedure.

## 2017-06-13 NOTE — PROCEDURE NOTE
Post-Moderate Sedation Plan


General


Date of Moderate Sedation


Jun 13, 2017.


Vital Signs:





Vital Signs Past 12 Hours








  Date Time  Temp Pulse Resp B/P (MAP) Pulse Ox O2 Delivery O2 Flow Rate FiO2


 


6/13/17 12:10 36.6  18 195/100 96 Room Air  


 


6/13/17 10:09 36.6 74 18 195/100 96 Room Air  











Review - Discharge Plan


Post Moderate Sedation Plan:





On clinical assessment, the patient appears to have tolerated the conscious 

sedation 


without complications.





Patient is recovering as anticipated.





Patient will continue to be monitored by nursing and may be discharged when 

conscious 


sedation discharge criteria are met.

## 2017-06-13 NOTE — DIAGNOSTIC IMAGING REPORT
DATE OF PROCEDURE: 06/13/2017

 

PREOPERATIVE DIAGNOSIS:  Difficulty accessing left arm brachial basilic

arteriovenous fistula.

 

POSTOPERATIVE DIAGNOSIS:  Difficulty accessing left brachial basilic

arteriovenous fistula.

 

PROCEDURE:  Left arm fistulogram.

 

ATTENDING:  Dr. Jamel Roberts.

 

ASSISTANT:  Dr. Lelia Miramontes.

 

ANESTHESIA:  Sedation plus local.

 

ESTIMATED BLOOD LOSS:  1 mL.

 

COMPLICATIONS:  None.

 

CONDITION:  Stable.

 

INDICATIONS:  Mrs. Lurdes Mckeon is a 52-year-old woman with history of

hypertension, type 2 diabetes, end-stage renal disease on hemodialysis,

carotid stenosis, coronary artery disease who was having difficulty with her

left arm brachiobasilic AV fistula.  Staff at the dialysis center is having

difficulty accessing her fistula.  For this reason, she was recommended to

undergo a fistulogram.  The risks, benefits and alternatives were discussed

with patient and she consented to the procedure.

 

PROCEDURE:  The patient was taken to the [facility] OR and placed in the supine

position.  Her left upper arm was prepped and draped in the usual sterile

fashion.  A safety timeout was performed and the patient, procedure and side

were correctly identified.  The patient was given 1 mg of Versed and 50 mg of

fentanyl for sedation.  Two  mL of local were injected overlying the fistula

near the antecubital fossa.  A micropuncture needle was used to access the AV

fistula and a micropuncture wire placed through the access needle.  Needle

was exchanged for micropuncture sheath.  The micropuncture sheath was

connected to extension tubing and a fistulogram was obtained.  This showed

the basilic vein to be patent without any areas of stenosis.  We then looked

more centrally and she did not appear to have any central stenosis.  Manual

pressure was held over the  basilic vein and  other images obtained which

showed the basilic artery to be patent.  The anastomosis appeared patent

without any areas of stenosis.  There is no etiology identified for her

difficulty with access during dialysis.  Micropuncture sheath was removed and

manual pressure was held over the access site for approximately 5 minutes

with good hemostasis.  The patient was transferred to PACU in stable

condition.  There were no immediate complications.  Dr. Jamel Roberts was

present for the entire procedure.

 



I, Dr. Roberts was present and scrubed for the entire procedure.

 

 

 

[facility]

## 2017-06-13 NOTE — [facility] POST OPERATIVE BRIEF NOTE
Immediate Operative Summary


Operative Date


Jun 13, 2017.





Pre-Operative Diagnosis





Malfunctioning av fistula left arm





Post-Operative Diagnosis





Functioning Fistula





Procedure(s) Performed





Fistulogram


Moderate Sedation [id number]





Surgeon


Armando





Assistant Surgeon(s)


Lelia Miramontes





Estimated Blood Loss


1





Findings


no stenosis seen





Specimens





None





Anesthesia


Local with conscious sedation





Complication(s)


None





Disposition

## 2017-06-13 NOTE — DISCHARGE INSTRUCTIONS
Discharge Instructions


Date of Service


Jun 13, 2017.





Visit


Reason for Visit:  End Stage Renal Disease, Malfunctioning Fistula





Discharge


Discharge Diagnosis / Problem:  Malfunctioning fistula





Discharge Goals


Goal(s):  Therapeutic intervention





Activity Recommendations


Activity Limitations:  resume your previous activity





Anesthesia


.





Post Anesthesia Instructions:





If you have had General Anesthesia or IV Sedation:





*  Do not drive today.


*  Resume driving when surgeon permits.


*  Do not make important decisions or sign legal documents today.


*  Call surgeon for:





   1.  Temperature elevations greater than 101 degrees F.


   2.  Uncontrollable pain.


   3.  Excessive bleeding.


   4.  Persistent nausea and vomiting.


   5.  Medication intolerance (nausea, vomiting or rash).





*  For nausea and vomiting use only clear liquids such as: tea, soda, bouillon 

until nausea subsides, then gradually increase diet as tolerated.





*  If you have any concerns or questions, call your surgeon's office.  If 

physician is unavailable and it is an emergency, call 911 or go to the nearest 

emergency room.





.





Instructions / Follow-Up


Instructions / Follow-Up


Call [phone] to schedule a follow up appointment if one not already 

scheduled.





SPECIAL CARE INSTRUCTIONS:





Medications:





*  Continue to take your medications as directed.  If you have been given a 

prescription


   for Plavix, please fill it immediately and take as directed.





Incision Care:





*  Your puncture site may have some bruising and minor swelling for about one 

week.





*  You will have a small dressing covering your puncture site.  You may remove 

the 


    dressing after 24 hours and shower.  You may let the warm soapy water run 

over it, 


    but be sure to dry the puncture site well and keep it dry.





*  DO NOT IMMERSE THE INCISION IN A TUB/POOL/etc. UNTIL HEALED.





*  Puncture sites should be kept covered with a band-aid until it begins to 

heal.





Restrictions:





*  Depending on whether you leg or arm was punctured to access the arteries,


    you will be required to lay flat, hold your arm still, or both, for about 4 

hours


    after the procedure to prevent bleeding.





*  Limit your activity for the first 48 hours.  You may walk and go up and down


   steps.  Avoid excessive bending or movement at the puncture site.





Possible Complications:





*  Excessive Swelling - after blood flow is improved you may notice increased


    swelling in the lower legs.  This is a normal response.  This usually 

depends


    on the amount of blockages in the leg, how long they have been there


    prior to your procedure and how much blood flow was restored.  Elevating


    your legs will help to improve this.  Please notify our office ([phone]

)


    if the swelling does not go away after lying in bed overnight.





*  Infection/Drainage/Bleeding - Drainage or bleeding from the puncture site


    should be minimal.  If you have excessive bleeding or drainage, call our 


    office ([phone]) right away.





*  Pain - You may experience some mild pain or soreness at your puncture site.


    If your pain does not improve, please contact our office ([phone]).





Call your doctor and seek emergent treatment if you develop:





*  Temperature above 101 degrees





*  Any fever or chills





*  Any redness or purulent drainage from the puncture site





*  Any new dusky/blue colored toes or feet with coolness or sharp or aching 

pain.





SKIN IRRITATION:





*  You may experience some redness and/or swelling in the area where radiation 

was


   administered.  If any skin irritation occurs, please contact your family 

physician.








FOLLOW UP VISIT:





Keep any scheduled doctor appointments.





Diet Recommendations


Recommended Home Diet:  resume previous diet





Procedures


Procedures Performed:  


Fistulogram


Moderate Sedation [id number]





Pending Studies


Studies pending at discharge:  no





Medical Emergencies








.


Who to Call and When:





Medical Emergencies:  If at any time you feel your situation is an emergency, 

please call 911 immediately.





.





Non-Emergent Contact


Non-Emergency issues call your:  Surgeon





.


.








"Provider Documentation" section prepared by Jamel Roberts.








.

## 2017-06-13 NOTE — PROCEDURE NOTE
Pre-Mod Sedation Assessment


General


Date of Moderate Sedation:


Jun 13, 2017.


Vital Signs:





Vital Signs Past 12 Hours








  Date Time  Temp Pulse Resp B/P (MAP) Pulse Ox O2 Delivery O2 Flow Rate FiO2


 


6/13/17 12:10 36.6  18 195/100 96 Room Air  


 


6/13/17 10:09 36.6 74 18 195/100 96 Room Air  











Pre-Sedation Airway Assessment


Oral Cavity:  Chipped Teeth


Short Thick Neck:  No


Hx of Sleep Apnea:  No


Smoking Status:  Former Smoker


Mallampati Classification:  Class I


ASA Classification:  Class III





Notes








The planned sedation has been discussed with the patient and consent obtained.  

I have


identified the patient, determined the appropriateness of sedation and have 

assessed the


patient immediately prior to the procedure.  





All medicine(s) and interventions are by my order.

## 2017-06-27 ENCOUNTER — HOSPITAL ENCOUNTER (OUTPATIENT)
Dept: HOSPITAL 45 - C.RDSM | Age: 52
Discharge: HOME | End: 2017-06-27
Attending: ORTHOPAEDIC SURGERY
Payer: COMMERCIAL

## 2017-06-27 DIAGNOSIS — E11.621: Primary | ICD-10-CM

## 2017-06-28 NOTE — DIAGNOSTIC IMAGING REPORT
LEFT FIRST TOE RADIOGRAPHS



CLINICAL HISTORY: LEFT 1ST TOE ULCER    



COMPARISON: Left first toe radiographs and MRI of the left first toe March 4, 2017.



FINDINGS:  There is subtle cortical irregularity with lucency of the distal tuft

of the distal phalanx of the left first toe which is new since exam March 4, 2017 and corresponds to the area of signal abnormality on MRI of the left

forefoot. There is extensive vascular calcification. 



IMPRESSION: Subtle cortical irregularity with erosion of the distal tuft of the

distal phalanx of the left first toe which has developed since exam of March 4, 2017 and suggests osteomyelitis.







Electronically signed by:  Giovanni Clemens M.D.

6/28/2017 6:38 AM



Dictated Date/Time:  6/28/2017 6:35 AM

## 2017-07-06 ENCOUNTER — HOSPITAL ENCOUNTER (EMERGENCY)
Dept: HOSPITAL 45 - C.EDB | Age: 52
Discharge: HOME | End: 2017-07-06
Payer: COMMERCIAL

## 2017-07-06 ENCOUNTER — HOSPITAL ENCOUNTER (OUTPATIENT)
Dept: HOSPITAL 45 - C.MRI | Age: 52
Discharge: HOME | End: 2017-07-06
Attending: ORTHOPAEDIC SURGERY
Payer: COMMERCIAL

## 2017-07-06 VITALS — SYSTOLIC BLOOD PRESSURE: 148 MMHG | HEART RATE: 82 BPM | OXYGEN SATURATION: 99 % | DIASTOLIC BLOOD PRESSURE: 81 MMHG

## 2017-07-06 VITALS
WEIGHT: 251.33 LBS | WEIGHT: 251.33 LBS | BODY MASS INDEX: 40.39 KG/M2 | HEIGHT: 65.98 IN | HEIGHT: 65.98 IN | BODY MASS INDEX: 40.39 KG/M2

## 2017-07-06 VITALS — TEMPERATURE: 98.42 F

## 2017-07-06 DIAGNOSIS — M70.61: Primary | ICD-10-CM

## 2017-07-06 DIAGNOSIS — K21.9: ICD-10-CM

## 2017-07-06 DIAGNOSIS — E66.01: ICD-10-CM

## 2017-07-06 DIAGNOSIS — E78.5: ICD-10-CM

## 2017-07-06 DIAGNOSIS — E11.621: ICD-10-CM

## 2017-07-06 DIAGNOSIS — E11.621: Primary | ICD-10-CM

## 2017-07-06 DIAGNOSIS — I25.10: ICD-10-CM

## 2017-07-06 DIAGNOSIS — Z79.4: ICD-10-CM

## 2017-07-06 DIAGNOSIS — Z72.0: ICD-10-CM

## 2017-07-06 DIAGNOSIS — E11.22: ICD-10-CM

## 2017-07-06 DIAGNOSIS — I25.5: ICD-10-CM

## 2017-07-06 DIAGNOSIS — N18.6: ICD-10-CM

## 2017-07-06 DIAGNOSIS — Z79.82: ICD-10-CM

## 2017-07-06 DIAGNOSIS — I12.0: ICD-10-CM

## 2017-07-06 DIAGNOSIS — Z79.899: ICD-10-CM

## 2017-07-06 NOTE — DIAGNOSTIC IMAGING REPORT
MRI OF THE LEFT FOREFOOT WITHOUT IV CONTRAST



CLINICAL HISTORY: Left first toe wound. Diabetic foot ulcer.



COMPARISON STUDY: Radiographs of the left first toe dated 6/27/2017.



TECHNIQUE: MRI of the left forefoot is performed utilizing various T1 and

T2-weighted sequences in the axial, sagittal, and coronal planes. IV contrast

was not ministered for this examination. The examination is moderately degraded

by motion artifact.



FINDINGS: There is no marrow signal abnormality identified typical in appearance

for osteomyelitis. Mild arthritic changes seen at the first metatarsophalangeal

joint. There is subcutaneous soft tissue edema identified in the first toe,

greatest along the plantar aspect at the level of the distal phalanx. This

likely corresponds to patient's reported history of ulcer. No organized fluid

collection is seen. The imaged musculature of the forefoot is normal in

appearance. The visualized extensor and flexor tendons are unremarkable.



IMPRESSION:



1. There is no marrow signal change identified in the left first toe to indicate

osteomyelitis.



2. Findings suggest cellulitis and a cutaneous ulcer along the plantar aspect of

the first toe. No organized fluid collection is seen to indicate abscess.







Electronically signed by:  Nguyễn Nielson M.D.

7/6/2017 2:44 PM



Dictated Date/Time:  7/6/2017 2:32 PM

## 2017-07-06 NOTE — EMERGENCY ROOM VISIT NOTE
ED Visit Note


First contact with patient:  14:54


CHIEF COMPLAINT: Right hip pain





HISTORY OF PRESENT ILLNESS:  This 52-year-old female presents the ER with chief 

complaint of right hip pain and low back pain.  The patient states that her 

pain started when she was playing bingo on Saturday and lean forward and she 

felt a pain in her right hip.  She had an MRI of her toe and had to lay on a 

hard table which aggravated her symptoms.  The patient states that she took 

Percocet to help her sleep after the initial onset of symptoms on Saturday.  

The patient has chronic renal failure and cannot have NSAIDs.  She also states 

that she has had bursitis in the past.  She sees Dr. Dhillon.  The patient 

denies any loss of bowel or bladder control.  The patient denies any pain 

radiating down the entire leg.





REVIEW OF SYSTEMS: 6 system review was performed and was negative unless stated 

otherwise in history of present illness.





PMH: See chronic problem list





SOCIAL HISTORY:  Patient lives alone





PHYSICAL EXAM: Vital Signs were reviewed Reviewed Nurse's notes and agree.  

GENERAL: Obese 52-year-old white female appears in no acute distress.  MENTAL 

STATUS: Alert and oriented 3.  LUMBAR SPINE: No gross bony abnormality noted. 

Patient is nontender to palpation over the spinous processes.  She is tender to 

palpation over the right lower paravertebral region, left side nontender.  She 

has full range of motion of the lumbar spine with pain elicited with left 

rotation.  . Muscle strength is 5 out of 5 bilateral lower extremities and 

symmetrical.  Bilateral patellar and Achilles reflexes are 2+. Sensation is 

intact to pinprick bilateral lower extremities.  RIGHT HIP: The patient has 

point tenderness over the greater trochanter.





EMERGENCY DEPARTMENT COURSE: The patient was evaluated.  The patient was 

offered an oxycodone while in the emergency room but she declined.  She states 

that they normally put her to sleep but then 30 minutes therefore she would 

like to wait and take it later this evening.  The patient was discharged home 

in stable condition.





DIAGNOSIS: Right trochanteric bursitis





DISCHARGE INSTRUCTIONS AND TREATMENT:  Take oxycodone as needed for pain.  Do 

not drive while taking the oxycodone.  Follow-up with Dr. Dhillon next 

Tuesday as previously scheduled.  If your symptoms had now resolved by that 

appointment he may want to give you a steroid injection into the hip.


Problem List


Medical Problems:


(1) Allergic rhinitis


Status: Chronic  





(2) CAD (coronary artery disease)


Permanent Comment: s/p PCI and BMS to left circumflex 8/2016


Status: Chronic  





(3) Carotid stenosis


Permanent Comment: left ICA


Status: Chronic  





(4) DM type 2 (diabetes mellitus, type 2)


Status: Chronic  





(5) Dyslipidemia


Status: Chronic  





(6) ESRD (end stage renal disease) on dialysis


Status: Chronic  





(7) GERD (gastroesophageal reflux disease)


Status: Chronic  





(8) HTN (hypertension)


Status: Chronic  





(9) HX OF PAST NONCOMPLIANCE


Status: Chronic  





(10) Ischemic cardiomyopathy


Permanent Comment: echo 10/2016 - EF 40-45%, grade I diastolic dysfunction


Status: Chronic  





(11) Morbid obesity


Status: Chronic  





(12) Tobacco use disorder


Status: Chronic  





Surgical Problems:


(1) Hemodialysis access, AV graft


Permanent Comment: [facility] Dr. Roberts 1/4/13


Status: Chronic  











Current/Historical Medications


Scheduled


Aspirin (Aspirin Ec), 81 MG PO QAM


Atorvastatin (Lipitor), 40 MG PO QAM


Calcium Acetate (Phoslo 667 Mg), 3 CAP PO WM


Calcium Acetate (Phoslo 667 Mg), 2 CAP PO WITH SNACKS


Doxycycline (Monohydrate) (Doxycycline), 100 MG PO BIDM


Fluticasone Propionate (Nasal) (Allergy Nasal [place] 24 Ho), 1 SPRAY N/A DAILY


Insulin Glargine (Lantus Solostar), 10 UNITS SC QPM


Isosorbide Mononitrate (Isosorbide Mononitrate ER), 60 MG PO QAM


Isosorbide Mononitrate Ext Rel (Imdur Ext Rel), 30 MG PO QAM


Levofloxacin (Levaquin), 500 MG PO Q2D


Loratadine (Claritin Childrens), 2 TAB PO QPM


Metoprolol Tartrate (Lopressor), 25 MG PO BID


Pantoprazole (Protonix), 40 MG PO QAM


Sertraline Hcl (Zoloft), 100 MG PO HS





Scheduled PRN


Dicyclomine Hcl (Dicyclomine Hcl), 1 MG PO QID PRN for CRAMPING


Lorazepam (Ativan), 0.5 MG PO DAILY PRN for Anxiety


Methocarbamol (Methocarbamol), 500 MG PO BID PRN for Muscle Spasms


Tramadol (Ultram), 1 TAB PO TID PRN for Pain





Allergies


Coded Allergies:  


     Adhesives (Verified  Allergy, Mild, RASH, SORES, 5/30/17)


     NO KNOWN DRUG ALLERGIES (Verified  Allergy, Mild, ., 5/30/17)


     Pollen Extract (Unverified  Allergy, Unknown, rash, 5/30/17)





Vital Signs











  Date Time  Temp Pulse Resp B/P (MAP) Pulse Ox O2 Delivery O2 Flow Rate FiO2


 


7/6/17 14:44 36.9 85 16 158/92 98 Room Air  











Departure Information


Referrals


Lurdes Smith D.O. (PCP)





Patient Instructions


My [facility]

## 2017-08-10 ENCOUNTER — HOSPITAL ENCOUNTER (INPATIENT)
Dept: HOSPITAL 45 - C.EDB | Age: 52
LOS: 6 days | Discharge: NURSING, REHAB OR LONG-TERM CARE | DRG: 981 | End: 2017-08-16
Attending: HOSPITALIST | Admitting: HOSPITALIST
Payer: COMMERCIAL

## 2017-08-10 VITALS
WEIGHT: 239.42 LBS | HEIGHT: 67 IN | HEIGHT: 67 IN | BODY MASS INDEX: 37.58 KG/M2 | WEIGHT: 239.42 LBS | BODY MASS INDEX: 37.58 KG/M2 | BODY MASS INDEX: 37.58 KG/M2

## 2017-08-10 DIAGNOSIS — N18.6: ICD-10-CM

## 2017-08-10 DIAGNOSIS — R07.9: ICD-10-CM

## 2017-08-10 DIAGNOSIS — M79.1: Primary | ICD-10-CM

## 2017-08-10 DIAGNOSIS — Z79.4: ICD-10-CM

## 2017-08-10 DIAGNOSIS — D63.1: ICD-10-CM

## 2017-08-10 DIAGNOSIS — I50.22: ICD-10-CM

## 2017-08-10 DIAGNOSIS — Z79.899: ICD-10-CM

## 2017-08-10 DIAGNOSIS — Z98.61: ICD-10-CM

## 2017-08-10 DIAGNOSIS — Z91.14: ICD-10-CM

## 2017-08-10 DIAGNOSIS — E78.5: ICD-10-CM

## 2017-08-10 DIAGNOSIS — F32.9: ICD-10-CM

## 2017-08-10 DIAGNOSIS — Z91.040: ICD-10-CM

## 2017-08-10 DIAGNOSIS — G47.00: ICD-10-CM

## 2017-08-10 DIAGNOSIS — I13.2: ICD-10-CM

## 2017-08-10 DIAGNOSIS — E11.319: ICD-10-CM

## 2017-08-10 DIAGNOSIS — K21.9: ICD-10-CM
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
## 2017-08-10 DIAGNOSIS — E66.01: ICD-10-CM

## 2017-08-10 DIAGNOSIS — E11.22: ICD-10-CM

## 2017-08-10 DIAGNOSIS — F17.200: ICD-10-CM

## 2017-08-10 DIAGNOSIS — I65.22: ICD-10-CM

## 2017-08-10 DIAGNOSIS — Z91.15: ICD-10-CM

## 2017-08-10 DIAGNOSIS — N25.0: ICD-10-CM
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
## 2017-08-10 DIAGNOSIS — I25.10: ICD-10-CM

## 2017-08-10 DIAGNOSIS — R53.1: ICD-10-CM

## 2017-08-10 DIAGNOSIS — Z99.2: ICD-10-CM

## 2017-08-10 DIAGNOSIS — Z79.82: ICD-10-CM

## 2017-08-10 DIAGNOSIS — Z82.49: ICD-10-CM

## 2017-08-10 DIAGNOSIS — S39.012A: ICD-10-CM

## 2017-08-10 DIAGNOSIS — I73.9: ICD-10-CM

## 2017-08-10 DIAGNOSIS — I25.5: ICD-10-CM

## 2017-08-10 LAB
ALBUMIN/GLOB SERPL: 0.5 {RATIO} (ref 0.9–2)
ALP SERPL-CCNC: 67 U/L (ref 45–117)
ALT SERPL-CCNC: 12 U/L (ref 12–78)
ANION GAP SERPL CALC-SCNC: 21 MMOL/L (ref 3–11)
AST SERPL-CCNC: 7 U/L (ref 15–37)
BASOPHILS # BLD: 0.02 K/UL (ref 0–0.2)
BASOPHILS NFR BLD: 0.1 %
BUN SERPL-MCNC: 154 MG/DL (ref 7–18)
BUN/CREAT SERPL: 10.3 (ref 10–20)
CALCIUM SERPL-MCNC: 10.2 MG/DL (ref 8.5–10.1)
CHLORIDE SERPL-SCNC: 90 MMOL/L (ref 98–107)
CO2 SERPL-SCNC: 21 MMOL/L (ref 21–32)
COMPLETE: YES
CREAT CL PREDICTED SERPL C-G-VRATE: 5.9 ML/MIN
CREAT SERPL-MCNC: 15 MG/DL (ref 0.6–1.2)
EOSINOPHIL NFR BLD AUTO: 368 K/UL (ref 130–400)
GLOBULIN SER-MCNC: 5.4 GM/DL (ref 2.5–4)
GLUCOSE SERPL-MCNC: 139 MG/DL (ref 70–99)
HCT VFR BLD CALC: 31.4 % (ref 37–47)
IG%: 0.2 %
IMM GRANULOCYTES NFR BLD AUTO: 14.6 %
LYMPHOCYTES # BLD: 1.95 K/UL (ref 1.2–3.4)
MCH RBC QN AUTO: 30.2 PG (ref 25–34)
MCHC RBC AUTO-ENTMCNC: 32.8 G/DL (ref 32–36)
MCV RBC AUTO: 92.1 FL (ref 80–100)
MONOCYTES NFR BLD: 7.2 %
NEUTROPHILS # BLD AUTO: 0.7 %
NEUTROPHILS NFR BLD AUTO: 77.2 %
PMV BLD AUTO: 11.2 FL (ref 7.4–10.4)
POTASSIUM SERPL-SCNC: 5.2 MMOL/L (ref 3.5–5.1)
RBC # BLD AUTO: 3.41 M/UL (ref 4.2–5.4)
SODIUM SERPL-SCNC: 132 MMOL/L (ref 136–145)
WBC # BLD AUTO: 13.35 K/UL (ref 4.8–10.8)

## 2017-08-10 NOTE — DIAGNOSTIC IMAGING REPORT
CERVICAL SPINE W/O





CT DOSE:    



HISTORY: Pain  pain



TECHNIQUE: Multiaxial CT images of the cervical spine were performed and

reformatted in the sagittal and coronal plane without the use of contrast.  A

dose lowering technique was utilized adhering to the principles of ALARA.



COMPARISON:  None.



FINDINGS: No fractures. No subluxation. Prevertebral soft tissues and the C1-C2

interval are intact. No pneumothorax. Considerable degenerative disc change

C5-C6. Posterior osteophytic complex with mild/moderate multifactorial narrowing

of the spinal canal.



IMPRESSION:  



1. No acute process.





2. Considerable degenerative change C5-C6 with mild/moderate multifactorial

narrowing of the spinal canal.







The above report was generated using voice recognition software.  It may contain

grammatical, syntax or spelling errors.







Electronically signed by:  Dipak Lopez M.D.

8/10/2017 9:38 PM



Dictated Date/Time:  8/10/2017 9:37 PM

## 2017-08-10 NOTE — EMERGENCY ROOM VISIT NOTE
History


Report prepared by Eda:  Scott Machuca


Under the Supervision of:  Dr. Angie Susnhine D.O.


First contact with patient:  19:57


Chief Complaint:  BACK PAIN


Stated Complaint:  NECK PAIN ACROSS BOTH SHOULDERS, LOWER BACK PAIN





History of Present Illness


The patient is a 52 year old female currently on dialysis stage 3 chronic 

kidney disease who presents to the Emergency Room with complaints of worsening 

back pain that started around 2 days ago. She says that she was on the beach at 

[facility] and was getting up from her hands and knees when she suddenly felt a 

twinge in her low back, making her fall to the side. The patient notes a 

history of low back pain, but now is having worsened pain over the right hip 

and right buttock, with intermittent shooting pains down her right leg. Since 

then, she has been having worsening back pain.  The patient states that she is 

having sharp pains behind her neck and across both her shoulders, which 

intermittently shoots pain down to both her hands. She says that she cannot 

take a deep breath due to the pain. States intermittent chest pain also.  Not 

specifically with exertion.  Some tenderness with palpation of the chest. The 

patient adds that her legs feel wobbly and weak. She says that she has been 

having episodes where she breaks out in hot sweats and has dry heaves, but she 

thinks that is mostly due to anxiety. She denies any numbness, tingling, or 

incontinence. The patient also denies any trauma or injury. She has no back 

surgery history.





   Source of History:  patient


   Onset:  2 days ago


   Position:  back


   Timing:  worsening


   Associated Symptoms:  + diaphoresis, + neck pain, + weakness (in legs), No 

numbness (or tingling)


   Note:


Associated symptoms: Worsened pain over right hip and right buttock, with 

intermittent shooting pains down right leg. Sharp pains across both shoulders, 

intermittently shooting pain down to both hands. Cannot take deep breath. 

Intermittent hot sweats and dry heaves. Denies incontinence.





Review of Systems


See HPI for pertinent positives & negatives. A total of 10 systems reviewed and 

were otherwise negative.





Past Medical & Surgical


Medical Problems:


(1) Allergic rhinitis


(2) C. difficile colitis


(3) CAD (coronary artery disease)


(4) Carotid stenosis


(5) Diabetic foot infection


(6) DM type 2 (diabetes mellitus, type 2)


(7) Dyslipidemia


(8) ESRD (end stage renal disease) on dialysis


(9) GERD (gastroesophageal reflux disease)


(10) HTN (hypertension)


(11) HX OF PAST NONCOMPLIANCE


(12) Ischemic cardiomyopathy


(13) Morbid obesity


(14) Osteomyelitis


(15) Tobacco use disorder


(16) UGIB (upper gastrointestinal bleed)


Surgical Problems:


(1) Hemodialysis access, AV graft








Family History





Diabetes mellitus


  FATHER


  MOTHER


Heart disease


  FATHER


  MOTHER


Hypertension


  FATHER


  MOTHER


Kidney disease


  GRANDMOTHER





Social History


Smoking Status:  Former Smoker


Alcohol Use:  none


Drug Use:  none


Marital Status:  


Housing Status:  lives with family


Occupation Status:  unemployed





Current/Historical Medications


Scheduled


Aspirin (Aspirin 81), 1 TAB PO DAILY


Atorvastatin (Lipitor), 40 MG PO QAM


Calcium Acetate (Phoslo 667 Mg), 1 CAP PO TID


Fluticasone Propionate (Nasal) (Allergy Nasal [place] 24 Ho), 1 SPRAY N/A DAILY


Insulin Glargine (Lantus Solostar), 10 UNITS SC QPM


Isosorbide Mononitrate (Isosorbide Mononitrate ER), 60 MG PO QAM


Isosorbide Mononitrate Ext Rel (Imdur Ext Rel), 30 MG PO QAM


Loratadine (Claritin Childrens), 2 TAB PO QPM


Metoprolol Tartrate (Lopressor) (Lopressor), 1 TAB PO BID


Pantoprazole (Protonix), 40 MG PO QAM


Sertraline Hcl (Zoloft), 100 MG PO HS


Triamcinolone Acet (Triamcinolone Acetonide), 1 APPLN TOP BID


[Percocet], 1 TAB PO PRN





Scheduled PRN


Dicyclomine Hcl (Dicyclomine Hcl), 1 MG PO QID PRN for CRAMPING


Lorazepam (Ativan), 0.5 MG PO DAILY PRN for Anxiety





Allergies


Coded Allergies:  


     Adhesives (Verified  Allergy, Mild, RASH, SORES, 5/30/17)


     NO KNOWN DRUG ALLERGIES (Verified  Allergy, Mild, ., 5/30/17)


     Latex1 -Allergic Contact Dermititis (Verified  Allergy, Unknown, rash, 8/1/ 17)


     Pollen Extract (Verified  Allergy, Unknown, rash, 8/1/17)





Physical Exam


Vital Signs











  Date Time  Temp Pulse Resp B/P (MAP) Pulse Ox O2 Delivery O2 Flow Rate FiO2


 


8/11/17 00:10  64 20 156/95 92 Room Air  


 


8/10/17 22:00  76 20 182/109 96 Room Air  


 


8/10/17 19:47 36.9 99 16 186/96 98 Room Air  











Physical Exam


GENERAL: very obese, alert, well appearing, well nourished, no distress, non-

toxic 


EYE EXAM: normal conjunctiva, PERRL and EOM's grossly intact


OROPHARYNX: no exudate, no erythema, lips, buccal mucosa, and tongue normal and 

mucous membranes are moist


NECK: supple, no nuchal rigidity, no adenopathy, non-tender


LUNGS: Clear to auscultation. Normal chest wall mechanics


HEART: no murmurs, S1 normal and S2 normal 


ABDOMEN: abdomen soft, non-tender, normo-active bowel sounds, no masses, no 

rebound or guarding. 


BACK: Pain bilateral paraspinal musculature upper thoracic and mid thoracic, no 

midline tenderness, no stepoff. Pain bilateral low lumbar region, no pain with 

palpation over hips.  Patient able to stand, pivot and get into wheelchair and 

into bed too, but complained of increased back pain with those movements


SKIN: no rashes and no bruising 


UPPER EXTREMITIES: upper extremities are grossly normal. 


LOWER EXTREMITIES: No pitting edema.


NEURO EXAM: Normal sensorium, cranial nerves II-XII grossly intact, normal 

speech,  no gross weakness of arms, no gross weakness of legs..





Medical Decision & Procedures


ER Provider


Diagnostic Interpretation:


CT:Per my review, radiologist interpretation.








THORACIC SPINE WITHOUT





CT DOSE:    





HISTORY: Pain  pain





TECHNIQUE: Multiaxial CT images of the thoracic spine were performed and


reformatted in the sagittal and coronal plane without the use of contrast.  A


dose lowering technique was utilized adhering to the principles of ALARA.








COMPARISON:  None.





FINDINGS: No fractures. No subluxation. Paraspinal soft tissues are


unremarkable. Moderate degenerative disc change throughout the entire thoracic


region. Mild degenerative change posterior elements. No major compromise of the


spinal canal. No evidence for compression deformity.





IMPRESSION:  


Mild/moderate degenerative disc change throughout the entire thoracic region. No


acute process. No major compromise of the spinal canal.














The above report was generated using voice recognition software.  It may contain


grammatical, syntax or spelling errors.








Electronically signed by:  Dipak Lopez M.D.


8/10/2017 9:40 PM





Dictated Date/Time:  8/10/2017 9:39 PM

















LUMBAR SPINE WITHOUT





CT DOSE: 3423.81 mGy.cm





HISTORY: Pain  pain





TECHNIQUE: Multiaxial CT images of the lumbar spine were performed and


reformatted in the sagittal and coronal plane without the use of contrast.  A


dose lowering technique was utilized adhering to the principles of ALARA.





COMPARISON:  None.





FINDINGS: No fractures. No subluxation. Paraspinal soft tissues are


unremarkable. 


Minimal degenerative disc change.


IMPRESSION:  


Minimal degenerative change. No acute process.

















The above report was generated using voice recognition software.  It may contain


grammatical, syntax or spelling errors.








Electronically signed by:  Dipak Lopez M.D.


8/10/2017 9:47 PM





Dictated Date/Time:  8/10/2017 9:46 PM














CERVICAL SPINE W/O








CT DOSE:    





HISTORY: Pain  pain





TECHNIQUE: Multiaxial CT images of the cervical spine were performed and


reformatted in the sagittal and coronal plane without the use of contrast.  A


dose lowering technique was utilized adhering to the principles of ALARA.





COMPARISON:  None.





FINDINGS: No fractures. No subluxation. Prevertebral soft tissues and the C1-C2


interval are intact. No pneumothorax. Considerable degenerative disc change


C5-C6. Posterior osteophytic complex with mild/moderate multifactorial narrowing


of the spinal canal.





IMPRESSION:  





1. No acute process.








2. Considerable degenerative change C5-C6 with mild/moderate multifactorial


narrowing of the spinal canal.











The above report was generated using voice recognition software.  It may contain


grammatical, syntax or spelling errors.








Electronically signed by:  Dipak Lopez M.D.


8/10/2017 9:38 PM





Dictated Date/Time:  8/10/2017 9:37 PM




















Chest x-ray read by me: mild cardiomegaly, no effusion, mildly prominent 

interstitial markings, no jaye pulmonary edema, no focal consolidation, no 

wide mediastinum





Laboratory Results


8/10/17 21:00








Red Blood Count 3.41, Mean Corpuscular Volume 92.1, Mean Corpuscular Hemoglobin 

30.2, Mean Corpuscular Hemoglobin Concent 32.8, Mean Platelet Volume 11.2, 

Neutrophils (%) (Auto) 77.2, Lymphocytes (%) (Auto) 14.6, Monocytes (%) (Auto) 

7.2, Eosinophils (%) (Auto) 0.7, Basophils (%) (Auto) 0.1, Neutrophils # (Auto) 

10.29, Lymphocytes # (Auto) 1.95, Monocytes # (Auto) 0.96, Eosinophils # (Auto) 

0.10, Basophils # (Auto) 0.02





8/10/17 21:00

















Test


  8/10/17


21:00


 


White Blood Count


  13.35 K/uL


(4.8-10.8)


 


Red Blood Count


  3.41 M/uL


(4.2-5.4)


 


Hemoglobin


  10.3 g/dL


(12.0-16.0)


 


Hematocrit 31.4 % (37-47) 


 


Mean Corpuscular Volume


  92.1 fL


()


 


Mean Corpuscular Hemoglobin


  30.2 pg


(25-34)


 


Mean Corpuscular Hemoglobin


Concent 32.8 g/dl


(32-36)


 


Platelet Count


  368 K/uL


(130-400)


 


Mean Platelet Volume


  11.2 fL


(7.4-10.4)


 


Neutrophils (%) (Auto) 77.2 % 


 


Lymphocytes (%) (Auto) 14.6 % 


 


Monocytes (%) (Auto) 7.2 % 


 


Eosinophils (%) (Auto) 0.7 % 


 


Basophils (%) (Auto) 0.1 % 


 


Neutrophils # (Auto)


  10.29 K/uL


(1.4-6.5)


 


Lymphocytes # (Auto)


  1.95 K/uL


(1.2-3.4)


 


Monocytes # (Auto)


  0.96 K/uL


(0.11-0.59)


 


Eosinophils # (Auto)


  0.10 K/uL


(0-0.5)


 


Basophils # (Auto)


  0.02 K/uL


(0-0.2)


 


RDW Standard Deviation


  48.0 fL


(36.4-46.3)


 


RDW Coefficient of Variation


  14.3 %


(11.5-14.5)


 


Immature Granulocyte % (Auto) 0.2 % 


 


Immature Granulocyte # (Auto)


  0.03 K/uL


(0.00-0.02)


 


Anion Gap


  21.0 mmol/L


(3-11)


 


Est Creatinine Clear Calc


Drug Dose 5.9 ml/min 


 


 


Estimated GFR (


American) 2.9 


 


 


Estimated GFR (Non-


American 2.5 


 


 


BUN/Creatinine Ratio 10.3 (10-20) 


 


Calcium Level


  10.2 mg/dl


(8.5-10.1)


 


Total Bilirubin


  0.4 mg/dl


(0.2-1)


 


Aspartate Amino Transf


(AST/SGOT) 7 U/L (15-37) 


 


 


Alanine Aminotransferase


(ALT/SGPT) 12 U/L (12-78) 


 


 


Alkaline Phosphatase


  67 U/L


()


 


Troponin I


  0.145 ng/ml


(0-0.045)


 


Total Protein


  8.2 gm/dl


(6.4-8.2)


 


Albumin


  2.8 gm/dl


(3.4-5.0)


 


Globulin


  5.4 gm/dl


(2.5-4.0)


 


Albumin/Globulin Ratio 0.5 (0.9-2) 





Laboratory results per my review.





Medications Administered











 Medications


  (Trade)  Dose


 Ordered  Sig/Willy


 Route  Start Time


 Stop Time Status Last Admin


Dose Admin


 


 Lidocaine


  (Lidoderm Patch


 5%)  1 patch  NOW  STAT


 TD  8/10/17 22:16


 8/10/17 22:18 DC 8/10/17 22:16


1 PATCH


 


 Fentanyl Citrate


  (Fentanyl Inj)  100 mcg  NOW  ONCE


 IV  8/10/17 22:30


 8/10/17 22:31 DC 8/10/17 22:26


100 MCG


 


 Oxycodone/


 Acetaminophen


  (Percocet


 5-325mg Tab)  1 tab  NOW  STAT


 PO  8/10/17 23:26


 8/10/17 23:27 DC 8/10/17 23:31


1 TAB


 


 Al Hydroxide/Mg


 Hydroxide


  (Maalox Susp)  15 ml  NOW  STAT


 PO  8/11/17 00:45


 8/11/17 00:47 DC 8/11/17 00:45


15 ML


 


 Aspirin/Aluminum/


 Magnesium/Ca Carb


  (Ascriptin Tab)  325 mg  NOW  STAT


 PO  8/11/17 00:46


 8/11/17 00:47 DC 8/11/17 00:57


325 MG











ECG


Indication:  other (back pain)


Rate (beats per minute):  101


Rhythm:  sinus tachycardia


Findings:  Q waves (lead 3), T-wave inversion (lead 3), no acute ischemic change

, other (flattened T-wave in V2, normal axis, normal intervals)


Comparison ECG Date:  compared to August 1st 2017, flattened T-wave is new





ED Course


2032: The patient was evaluated in room C6. A complete history and physical 

exam was performed. 





2216: Ordered Lidoderm Patch 5% 1 patch TD.





2230: Ordered Fentanyl Inj 100 mcg IV.





2315: I reevaluated the patient and her back pain is better, but she is still 

having low back pain. She also notes some intermittent chest pain.





2326: Ordered Percocet 5-325mg Tab 1 tab PO.





0042: Patient still states intermittent chest pain although improved since 

Percocet.  Chest x-ray with no other new or concerning findings given history 

and comorbidities.  Pt with prior MI and elevated trop although trop more 

likely related to CKD and elevated Creatinine.  Back pain improved also.  ASA 

ordered.





0137: Discussed with Dr. Merida for admission.





Medical Decision





Differential diagnosis:


Etiologies such as musculoskeletal, disc herniation, fracture, aortic disease, 

metastatic disease, cord compression, discitis, infection, renal colic, 

gastrointestinal, acute exacerbation of chronic back pain, sciatica, cauda 

equina, as well as others were entertained.








No evidence of trauma, likely musculoskeletal strain.  Discussed f/u with pt.  

Doubt ACS, likely chest pain related to GI etiology or anxiety.  Abn labs 

likely due to CKD.  Doubt occult infectious etiology.  Pt improved with pain 

meds here.  Offered DC, pt concerned about pain.  Asked hospitalist to admit as 

a precaution given hx of MI and multiple risk factors.  Doubt vascular etiology.





Medication Reconcilliation


Current Medication List:  was personally reviewed by me





Blood Pressure Screening


Patient's blood pressure:  Elevated blood pressure


Blood pressure disposition:  Referred to PCP





Impression





 Primary Impression:  


 Strain of lumbar region


 Additional Impressions:  


 Musculoskeletal strain


 Chest pain


 Obesity


 Hypertension


 Chronic kidney disease (CKD)


 Morbid obesity





Scribe Attestation


The scribe's documentation has been prepared under my direction and personally 

reviewed by me in its entirety. I confirm that the note above accurately 

reflects all work, treatment, procedures, and medical decision making performed 

by me.





Departure Information


Dispostion


Being Evaluated By Hospitalist





Referrals


Lurdes Smith D.O. (PCP)





Patient Instructions


My [facility]





Problem Qualifiers








 Primary Impression:  


 Strain of lumbar region


 Encounter type:  initial encounter  Qualified Codes:  S39.012A - Strain of 

muscle, fascia and tendon of lower back, initial encounter


 Additional Impressions:  


 Chest pain


 Chest pain type:  unspecified  Qualified Codes:  R07.9 - Chest pain, 

unspecified


 Obesity


 Obesity type:  due to excess calories  Obesity classification:  unspecified 

obesity classification  Serious obesity comorbidity presence:  with serious 

comorbidity  Qualified Codes:  E66.09 - Other obesity due to excess calories


 Hypertension


 Hypertension type:  essential hypertension  Qualified Codes:  I10 - Essential (

primary) hypertension


 Chronic kidney disease (CKD)


 Chronic kidney disease stage:  on chronic dialysis  Qualified Codes:  N18.6 - 

End stage renal disease; Z99.2 - Dependence on renal dialysis

## 2017-08-10 NOTE — DIAGNOSTIC IMAGING REPORT
LUMBAR SPINE WITHOUT



CT DOSE: 3423.81 mGy.cm



HISTORY: Pain  pain



TECHNIQUE: Multiaxial CT images of the lumbar spine were performed and

reformatted in the sagittal and coronal plane without the use of contrast.  A

dose lowering technique was utilized adhering to the principles of ALARA.



COMPARISON:  None.



FINDINGS: No fractures. No subluxation. Paraspinal soft tissues are

unremarkable. 

Minimal degenerative disc change.

IMPRESSION:  

Minimal degenerative change. No acute process.











The above report was generated using voice recognition software.  It may contain

grammatical, syntax or spelling errors.







Electronically signed by:  Dipak Lopez M.D.

8/10/2017 9:47 PM



Dictated Date/Time:  8/10/2017 9:46 PM

## 2017-08-10 NOTE — DIAGNOSTIC IMAGING REPORT
THORACIC SPINE WITHOUT



CT DOSE:    



HISTORY: Pain  pain



TECHNIQUE: Multiaxial CT images of the thoracic spine were performed and

reformatted in the sagittal and coronal plane without the use of contrast.  A

dose lowering technique was utilized adhering to the principles of ALARA.





COMPARISON:  None.



FINDINGS: No fractures. No subluxation. Paraspinal soft tissues are

unremarkable. Moderate degenerative disc change throughout the entire thoracic

region. Mild degenerative change posterior elements. No major compromise of the

spinal canal. No evidence for compression deformity.



IMPRESSION:  

Mild/moderate degenerative disc change throughout the entire thoracic region. No

acute process. No major compromise of the spinal canal.









The above report was generated using voice recognition software.  It may contain

grammatical, syntax or spelling errors.







Electronically signed by:  Dipak Lopez M.D.

8/10/2017 9:40 PM



Dictated Date/Time:  8/10/2017 9:39 PM

## 2017-08-11 VITALS
OXYGEN SATURATION: 94 % | SYSTOLIC BLOOD PRESSURE: 163 MMHG | DIASTOLIC BLOOD PRESSURE: 95 MMHG | TEMPERATURE: 98.06 F | HEART RATE: 95 BPM

## 2017-08-11 VITALS
SYSTOLIC BLOOD PRESSURE: 163 MMHG | TEMPERATURE: 98.06 F | DIASTOLIC BLOOD PRESSURE: 83 MMHG | OXYGEN SATURATION: 100 % | HEART RATE: 89 BPM

## 2017-08-11 VITALS — DIASTOLIC BLOOD PRESSURE: 69 MMHG | SYSTOLIC BLOOD PRESSURE: 125 MMHG | HEART RATE: 71 BPM

## 2017-08-11 VITALS
SYSTOLIC BLOOD PRESSURE: 170 MMHG | OXYGEN SATURATION: 98 % | HEART RATE: 101 BPM | DIASTOLIC BLOOD PRESSURE: 93 MMHG | TEMPERATURE: 97.7 F

## 2017-08-11 VITALS
SYSTOLIC BLOOD PRESSURE: 168 MMHG | TEMPERATURE: 98.24 F | OXYGEN SATURATION: 93 % | DIASTOLIC BLOOD PRESSURE: 101 MMHG | HEART RATE: 82 BPM

## 2017-08-11 VITALS — DIASTOLIC BLOOD PRESSURE: 85 MMHG | SYSTOLIC BLOOD PRESSURE: 150 MMHG | HEART RATE: 75 BPM

## 2017-08-11 VITALS — SYSTOLIC BLOOD PRESSURE: 141 MMHG | HEART RATE: 82 BPM | DIASTOLIC BLOOD PRESSURE: 83 MMHG

## 2017-08-11 VITALS — DIASTOLIC BLOOD PRESSURE: 98 MMHG | HEART RATE: 91 BPM | SYSTOLIC BLOOD PRESSURE: 182 MMHG

## 2017-08-11 VITALS — HEART RATE: 90 BPM | DIASTOLIC BLOOD PRESSURE: 105 MMHG | SYSTOLIC BLOOD PRESSURE: 189 MMHG

## 2017-08-11 VITALS — SYSTOLIC BLOOD PRESSURE: 155 MMHG | HEART RATE: 75 BPM | DIASTOLIC BLOOD PRESSURE: 84 MMHG

## 2017-08-11 VITALS — SYSTOLIC BLOOD PRESSURE: 147 MMHG | HEART RATE: 83 BPM | DIASTOLIC BLOOD PRESSURE: 87 MMHG

## 2017-08-11 VITALS — SYSTOLIC BLOOD PRESSURE: 192 MMHG | DIASTOLIC BLOOD PRESSURE: 106 MMHG | HEART RATE: 96 BPM | TEMPERATURE: 97.7 F

## 2017-08-11 VITALS — SYSTOLIC BLOOD PRESSURE: 136 MMHG | DIASTOLIC BLOOD PRESSURE: 79 MMHG | HEART RATE: 77 BPM

## 2017-08-11 VITALS
OXYGEN SATURATION: 93 % | DIASTOLIC BLOOD PRESSURE: 101 MMHG | HEART RATE: 82 BPM | TEMPERATURE: 98.24 F | SYSTOLIC BLOOD PRESSURE: 168 MMHG

## 2017-08-11 VITALS — SYSTOLIC BLOOD PRESSURE: 105 MMHG | HEART RATE: 71 BPM | DIASTOLIC BLOOD PRESSURE: 61 MMHG

## 2017-08-11 VITALS — TEMPERATURE: 97.34 F | SYSTOLIC BLOOD PRESSURE: 140 MMHG | DIASTOLIC BLOOD PRESSURE: 75 MMHG | HEART RATE: 74 BPM

## 2017-08-11 VITALS — DIASTOLIC BLOOD PRESSURE: 105 MMHG | SYSTOLIC BLOOD PRESSURE: 183 MMHG | HEART RATE: 94 BPM

## 2017-08-11 VITALS
HEART RATE: 86 BPM | OXYGEN SATURATION: 98 % | SYSTOLIC BLOOD PRESSURE: 154 MMHG | TEMPERATURE: 98.06 F | DIASTOLIC BLOOD PRESSURE: 89 MMHG

## 2017-08-11 VITALS — HEART RATE: 74 BPM | SYSTOLIC BLOOD PRESSURE: 136 MMHG | DIASTOLIC BLOOD PRESSURE: 76 MMHG

## 2017-08-11 VITALS — DIASTOLIC BLOOD PRESSURE: 92 MMHG | SYSTOLIC BLOOD PRESSURE: 157 MMHG | HEART RATE: 80 BPM

## 2017-08-11 VITALS — DIASTOLIC BLOOD PRESSURE: 65 MMHG | SYSTOLIC BLOOD PRESSURE: 111 MMHG | HEART RATE: 71 BPM

## 2017-08-11 VITALS — DIASTOLIC BLOOD PRESSURE: 104 MMHG | HEART RATE: 92 BPM | SYSTOLIC BLOOD PRESSURE: 151 MMHG

## 2017-08-11 VITALS — SYSTOLIC BLOOD PRESSURE: 157 MMHG | DIASTOLIC BLOOD PRESSURE: 81 MMHG | HEART RATE: 86 BPM

## 2017-08-11 LAB
APPEARANCE UR: CLEAR
BILIRUB UR-MCNC: (no result) MG/DL
CKMB/CK RATIO: 8.2 (ref 0–3)
CKMB/CK RATIO: 9.3 (ref 0–3)
COLOR UR: YELLOW
MANUAL MICROSCOPIC REQUIRED?: NO
NITRITE UR QL STRIP: (no result)
PH UR STRIP: 7 [PH] (ref 4.5–7.5)
REVIEW REQ?: NO
SP GR UR STRIP: 1.02 (ref 1–1.03)
URINE BILL WITH OR WITHOUT MIC: (no result)
URINE EPITHELIAL CELL AUTO: >30 /LPF (ref 0–5)
UROBILINOGEN UR-MCNC: (no result) MG/DL
ZZUR CULT IF INDIC CLEAN CATCH: YES

## 2017-08-11 RX ADMIN — Medication SCH MG: at 20:00

## 2017-08-11 RX ADMIN — ATORVASTATIN CALCIUM SCH MG: 20 TABLET, FILM COATED ORAL at 15:22

## 2017-08-11 RX ADMIN — HEPARIN SODIUM SCH UNIT: 10000 INJECTION, SOLUTION INTRAVENOUS; SUBCUTANEOUS at 22:00

## 2017-08-11 RX ADMIN — METOPROLOL TARTRATE SCH MG: 50 TABLET, FILM COATED ORAL at 07:14

## 2017-08-11 RX ADMIN — CALCIUM ACETATE SCH MG: 667 CAPSULE ORAL at 17:00

## 2017-08-11 RX ADMIN — CALCIUM ACETATE SCH MG: 667 CAPSULE ORAL at 07:14

## 2017-08-11 RX ADMIN — METOPROLOL TARTRATE SCH MG: 50 TABLET, FILM COATED ORAL at 20:00

## 2017-08-11 RX ADMIN — PANTOPRAZOLE SCH MG: 40 TABLET, DELAYED RELEASE ORAL at 15:22

## 2017-08-11 RX ADMIN — TRIAMCINOLONE ACETONIDE SCH APPLN: 5 CREAM TOPICAL at 19:59

## 2017-08-11 RX ADMIN — ACETAMINOPHEN PRN MG: 325 TABLET ORAL at 19:47

## 2017-08-11 RX ADMIN — FLUTICASONE PROPIONATE SCH SPRAYS: 50 SPRAY, METERED NASAL at 07:15

## 2017-08-11 RX ADMIN — ATORVASTATIN CALCIUM SCH MG: 20 TABLET, FILM COATED ORAL at 07:15

## 2017-08-11 RX ADMIN — OXYCODONE HYDROCHLORIDE AND ACETAMINOPHEN PRN TAB: 5; 325 TABLET ORAL at 07:52

## 2017-08-11 RX ADMIN — PANTOPRAZOLE SCH MG: 40 TABLET, DELAYED RELEASE ORAL at 07:14

## 2017-08-11 RX ADMIN — ISOSORBIDE MONONITRATE SCH MG: 30 TABLET, EXTENDED RELEASE ORAL at 07:14

## 2017-08-11 RX ADMIN — OXYCODONE HYDROCHLORIDE AND ACETAMINOPHEN PRN TAB: 5; 325 TABLET ORAL at 15:09

## 2017-08-11 RX ADMIN — ISOSORBIDE MONONITRATE SCH MG: 30 TABLET, EXTENDED RELEASE ORAL at 15:22

## 2017-08-11 RX ADMIN — METOPROLOL TARTRATE SCH MG: 50 TABLET, FILM COATED ORAL at 09:00

## 2017-08-11 RX ADMIN — HEPARIN SODIUM SCH UNIT: 10000 INJECTION, SOLUTION INTRAVENOUS; SUBCUTANEOUS at 06:12

## 2017-08-11 RX ADMIN — SERTRALINE HYDROCHLORIDE SCH MG: 100 TABLET, FILM COATED ORAL at 20:01

## 2017-08-11 RX ADMIN — CEFTRIAXONE SODIUM SCH MLS/HR: 1 INJECTION, POWDER, FOR SOLUTION INTRAVENOUS at 17:54

## 2017-08-11 RX ADMIN — TRIAMCINOLONE ACETONIDE SCH APPLN: 5 CREAM TOPICAL at 07:13

## 2017-08-11 RX ADMIN — HEPARIN SODIUM SCH UNIT: 10000 INJECTION, SOLUTION INTRAVENOUS; SUBCUTANEOUS at 15:18

## 2017-08-11 RX ADMIN — CALCIUM ACETATE SCH MG: 667 CAPSULE ORAL at 15:11

## 2017-08-11 RX ADMIN — OXYCODONE HYDROCHLORIDE AND ACETAMINOPHEN PRN TAB: 5; 325 TABLET ORAL at 23:42

## 2017-08-11 RX ADMIN — CALCIUM ACETATE SCH MG: 667 CAPSULE ORAL at 16:45

## 2017-08-11 RX ADMIN — INSULIN GLARGINE SCH UNITS: 100 INJECTION, SOLUTION SUBCUTANEOUS at 20:11

## 2017-08-11 NOTE — PROGRESS NOTE
Medicine Progress Note


Date & Time of Visit:


Aug 11, 2017 at 15:17.


Subjective


51 yo F s/p fall in [place] three days ago (was on hands and knees on the 

beach and tipped over on her side)





-tolerating PO


-Percocet controlling her pain


-HD today


-CP intermittent, not present now and only when she coughs or sneezes hard


-denies SOB





Objective





Last 8 Hrs








  Date Time  Temp Pulse Resp B/P (MAP) Pulse Ox O2 Delivery O2 Flow Rate FiO2


 


8/11/17 14:15  71  105/61    


 


8/11/17 14:00  71  111/65    


 


8/11/17 13:45  71  125/69    


 


8/11/17 13:30  74  136/76    


 


8/11/17 13:15  75  150/85    


 


8/11/17 13:00  75  155/84    


 


8/11/17 12:45  77  136/79    


 


8/11/17 12:30  80  157/92    


 


8/11/17 12:15  82  141/83    


 


8/11/17 12:00  83  147/87    


 


8/11/17 12:00      Room Air  


 


8/11/17 11:45  86  157/81    


 


8/11/17 11:30  101  152/93    


 


8/11/17 11:30 36.5 91 18 170/91 (117) 98 Room Air  


 


8/11/17 11:15  92  151/104    


 


8/11/17 11:00  91  182/98    


 


8/11/17 10:45  94  183/105    


 


8/11/17 10:31  90  189/105    


 


8/11/17 10:22 36.5 96  192/106 (134)    


 


8/11/17 08:00      Room Air  








Physical Exam:


GEN: morbid obesity, in no acute distress, alert and appropriate, receiving HD


HEENT: NC/AT, pupils equal and reactive bilaterally, normal sclerae, MMM


CARDIO: reg rate, S1/2 heard without m/g/r


LUNGS: CTA bilaterally, no crackles, rales or wheezes, good diaphragmatic 

excursion


ABD: soft, non-tender, non-distended, no rebound or guarding, +BS


EXTREMITY: RP and DP palpable 2+ bilat, no LE swelling or edema, extremities 

are warm and well-perfused


NEURO: CN 2-12 grossly intact, no gross focal deficits


MUSC: 5/5 strength throughout, moves all extremities equally, limited exam as 

patient cannot move much while hooked up to HD machine. 


SKIN:  warm and dry


Laboratory Results:


8/10/17 21:00








Red Blood Count 3.41, Mean Corpuscular Volume 92.1, Mean Corpuscular Hemoglobin 

30.2, Mean Corpuscular Hemoglobin Concent 32.8, Mean Platelet Volume 11.2, 

Neutrophils (%) (Auto) 77.2, Lymphocytes (%) (Auto) 14.6, Monocytes (%) (Auto) 

7.2, Eosinophils (%) (Auto) 0.7, Basophils (%) (Auto) 0.1, Neutrophils # (Auto) 

10.29, Lymphocytes # (Auto) 1.95, Monocytes # (Auto) 0.96, Eosinophils # (Auto) 

0.10, Basophils # (Auto) 0.02





8/10/17 21:00

















Test


  8/10/17


21:00 8/11/17


10:25 8/11/17


14:19 8/11/17


15:02


 


White Blood Count


  13.35 K/uL


(4.8-10.8) 


  


  


 


 


Red Blood Count


  3.41 M/uL


(4.2-5.4) 


  


  


 


 


Hemoglobin


  10.3 g/dL


(12.0-16.0) 


  


  


 


 


Hematocrit 31.4 % (37-47)    


 


Mean Corpuscular Volume


  92.1 fL


() 


  


  


 


 


Mean Corpuscular Hemoglobin


  30.2 pg


(25-34) 


  


  


 


 


Mean Corpuscular Hemoglobin


Concent 32.8 g/dl


(32-36) 


  


  


 


 


Platelet Count


  368 K/uL


(130-400) 


  


  


 


 


Mean Platelet Volume


  11.2 fL


(7.4-10.4) 


  


  


 


 


Neutrophils (%) (Auto) 77.2 %    


 


Lymphocytes (%) (Auto) 14.6 %    


 


Monocytes (%) (Auto) 7.2 %    


 


Eosinophils (%) (Auto) 0.7 %    


 


Basophils (%) (Auto) 0.1 %    


 


Neutrophils # (Auto)


  10.29 K/uL


(1.4-6.5) 


  


  


 


 


Lymphocytes # (Auto)


  1.95 K/uL


(1.2-3.4) 


  


  


 


 


Monocytes # (Auto)


  0.96 K/uL


(0.11-0.59) 


  


  


 


 


Eosinophils # (Auto)


  0.10 K/uL


(0-0.5) 


  


  


 


 


Basophils # (Auto)


  0.02 K/uL


(0-0.2) 


  


  


 


 


RDW Standard Deviation


  48.0 fL


(36.4-46.3) 


  


  


 


 


RDW Coefficient of Variation


  14.3 %


(11.5-14.5) 


  


  


 


 


Immature Granulocyte % (Auto) 0.2 %    


 


Immature Granulocyte # (Auto)


  0.03 K/uL


(0.00-0.02) 


  


  


 


 


Anion Gap


  21.0 mmol/L


(3-11) 


  


  


 


 


Est Creatinine Clear Calc


Drug Dose 5.9 ml/min 


  


  


  


 


 


Estimated GFR (


American) 2.9 


  


  


  


 


 


Estimated GFR (Non-


American 2.5 


  


  


  


 


 


BUN/Creatinine Ratio 10.3 (10-20)    


 


Calcium Level


  10.2 mg/dl


(8.5-10.1) 


  


  


 


 


Total Bilirubin


  0.4 mg/dl


(0.2-1) 


  


  


 


 


Aspartate Amino Transf


(AST/SGOT) 7 U/L (15-37) 


  


  


  


 


 


Alanine Aminotransferase


(ALT/SGPT) 12 U/L (12-78) 


  


  


  


 


 


Alkaline Phosphatase


  67 U/L


() 


  


  


 


 


Total Protein


  8.2 gm/dl


(6.4-8.2) 


  


  


 


 


Albumin


  2.8 gm/dl


(3.4-5.0) 


  


  


 


 


Globulin


  5.4 gm/dl


(2.5-4.0) 


  


  


 


 


Albumin/Globulin Ratio 0.5 (0.9-2)    


 


Urine Color  YELLOW   


 


Urine Appearance  CLEAR (CLEAR)   


 


Urine pH  7.0 (4.5-7.5)   


 


Urine Specific Gravity


  


  1.017


(1.000-1.030) 


  


 


 


Urine Protein  3+ (NEG)   


 


Urine Glucose (UA)  1+ (NEG)   


 


Urine Ketones  NEG (NEG)   


 


Urine Occult Blood  TRACE (NEG)   


 


Urine Nitrite  NEG (NEG)   


 


Urine Bilirubin  NEG (NEG)   


 


Urine Urobilinogen  NEG (NEG)   


 


Urine Leukocyte Esterase  SMALL (NEG)   


 


Urine WBC (Auto)


  


  10-30 /hpf


(0-5) 


  


 


 


Urine RBC (Auto)  0-4 /hpf (0-4)   


 


Urine Hyaline Casts (Auto)  1-5 /lpf (0-5)   


 


Urine Epithelial Cells (Auto)  >30 /lpf (0-5)   


 


Urine Bacteria (Auto)  1+ (NEG)   


 


Total Creatine Kinase


  


  


  41 U/L


() 


 


 


Creatine Kinase MB


  


  


  3.8 ng/ml


(0.5-3.6) 


 


 


Creatine Kinase MB Ratio   9.3 (0-3.0)  


 


Troponin I


  


  


  0.125 ng/ml


(0-0.045) 


 


 


Bedside Glucose


  


  


  


  82 mg/dl


(70-90)














 Date/Time


Source Procedure


Growth Status


 


 


 8/11/17 10:25


Urine , Clean Catch Urine Culture


Pending Received








Last 24 Hours








Test


  8/10/17


21:00 8/11/17


06:35 8/11/17


07:46 8/11/17


10:25


 


White Blood Count 13.35 K/uL    


 


Red Blood Count 3.41 M/uL    


 


Hemoglobin 10.3 g/dL    


 


Hematocrit 31.4 %    


 


Mean Corpuscular Volume 92.1 fL    


 


Mean Corpuscular Hemoglobin 30.2 pg    


 


Mean Corpuscular Hemoglobin


Concent 32.8 g/dl 


  


  


  


 


 


Platelet Count 368 K/uL    


 


Mean Platelet Volume 11.2 fL    


 


Neutrophils (%) (Auto) 77.2 %    


 


Lymphocytes (%) (Auto) 14.6 %    


 


Monocytes (%) (Auto) 7.2 %    


 


Eosinophils (%) (Auto) 0.7 %    


 


Basophils (%) (Auto) 0.1 %    


 


Neutrophils # (Auto) 10.29 K/uL    


 


Lymphocytes # (Auto) 1.95 K/uL    


 


Monocytes # (Auto) 0.96 K/uL    


 


Eosinophils # (Auto) 0.10 K/uL    


 


Basophils # (Auto) 0.02 K/uL    


 


RDW Standard Deviation 48.0 fL    


 


RDW Coefficient of Variation 14.3 %    


 


Immature Granulocyte % (Auto) 0.2 %    


 


Immature Granulocyte # (Auto) 0.03 K/uL    


 


Sodium Level 132 mmol/L    


 


Potassium Level 5.2 mmol/L    


 


Chloride Level 90 mmol/L    


 


Carbon Dioxide Level 21 mmol/L    


 


Anion Gap 21.0 mmol/L    


 


Blood Urea Nitrogen 154 mg/dl    


 


Creatinine 15.00 mg/dl    


 


Est Creatinine Clear Calc


Drug Dose 5.9 ml/min 


  


  


  


 


 


Estimated GFR (


American) 2.9 


  


  


  


 


 


Estimated GFR (Non-


American 2.5 


  


  


  


 


 


BUN/Creatinine Ratio 10.3    


 


Random Glucose 139 mg/dl    


 


Calcium Level 10.2 mg/dl    


 


Total Bilirubin 0.4 mg/dl    


 


Aspartate Amino Transf


(AST/SGOT) 7 U/L 


  


  


  


 


 


Alanine Aminotransferase


(ALT/SGPT) 12 U/L 


  


  


  


 


 


Alkaline Phosphatase 67 U/L    


 


Troponin I 0.145 ng/ml   0.122 ng/ml  


 


Total Protein 8.2 gm/dl    


 


Albumin 2.8 gm/dl    


 


Globulin 5.4 gm/dl    


 


Albumin/Globulin Ratio 0.5    


 


Bedside Glucose  149 mg/dl   


 


Total Creatine Kinase   44 U/L  


 


Creatine Kinase MB   3.6 ng/ml  


 


Creatine Kinase MB Ratio   8.2  


 


Urine Color    YELLOW 


 


Urine Appearance    CLEAR 


 


Urine pH    7.0 


 


Urine Specific Gravity    1.017 


 


Urine Protein    3+ 


 


Urine Glucose (UA)    1+ 


 


Urine Ketones    NEG 


 


Urine Occult Blood    TRACE 


 


Urine Nitrite    NEG 


 


Urine Bilirubin    NEG 


 


Urine Urobilinogen    NEG 


 


Urine Leukocyte Esterase    SMALL 


 


Urine WBC (Auto)    10-30 /hpf 


 


Urine RBC (Auto)    0-4 /hpf 


 


Urine Hyaline Casts (Auto)    1-5 /lpf 


 


Urine Epithelial Cells (Auto)    >30 /lpf 


 


Urine Bacteria (Auto)    1+ 


 


Test


  8/11/17


11:08 8/11/17


13:42 8/11/17


14:19 8/11/17


15:02


 


Bedside Glucose 107 mg/dl    82 mg/dl 


 


Creatine Kinase MB Ratio     














 Date/Time


Source Procedure


Growth Status


 


 


 8/11/17 10:25


Urine , Clean Catch Urine Culture


Pending Received











Assessment & Plan


51 yo F s/p fall in [place] three days ago (was on hands and knees on the 

beach and tipped over on her side)





1.  ESRD-noncompliant with HD for the past week.  HD performed today.  Per 

Nephro OK to go home after HD complete.


2.  Muscle pain-pt with recent h/o long car ride to and from [facility].  

She got back into town 2 days ago.  While down at the beach she was on her 

hands and knees and tipped over onto her side.  She waited a few days and then 

presented complaining of weakness and severe pain.  Limited options exist for 

pain medication at this point, so percocet is being given.  Of note, the 

patient is asking for Fentanyl IV despite the fact that I told her it doesn't 

last long, because it "puts me to sleep."  The request was declined.  

Considered scheduled Tylenol/Tramadol until patient stated that percocet was 

fine for her.  PT/OT to evaluate prior to discharge as patient also feels weak.

  Of note, multiple CT scans were performed and negative. Pain appears to be 

strictly musculoskeletal in nature. 


3.  Weakness-multifactorial in setting of noncompliance with medications for 

multiple comorbidities and noncompliance with dialysis.  She is very obese and 

deconditioned, also.  Urinalysis appears positive for UTI, which also may be 

contributing.  Will start empiric antibiotics and await culture results. PT/OT 

to evaluate. 


4.  Elevated troponin-likely related to demand ischemia in setting of recent 

travel to the beach (long car ride to and from [place]) in setting of ESRD and 

noncompliance with HD.  Ordered TTE, no increase in troponin and no cardiac 

chest pain present.  She only admits to chest pain with sneezing or coughing 

deeply. 


5.  DMII with retinopathy-ISS/Lantus


6.  HTN-cont with home meds. 


7.  PAD-pt states she is to go for a carotid surgery next week.  She will have 

a pre-op evaluation with PCP on Monday.


8.  Noncompliance.--patient has not been taking her medications for over 1 

year.  Restarted on meds, however, will watch closely for side effects, etc.  

This is important in setting of upcoming vascular surgery which puts her a 

higher risk of perioperative mortality. 





DVT proph-Heparin


FULL CODE


Dispo-will move to med/surg, cont with pain control efforts, PT/OT to see her. 





Melanie Naqvi DO


[facility] Hospitalist


Current Inpatient Medications:





Current Inpatient Medications








 Medications


  (Trade)  Dose


 Ordered  Sig/Willy


 Route  Start Time


 Stop Time Status Last Admin


Dose Admin


 


 Heparin Sodium


  (Porcine)


  (Heparin Sq 5000


 Unit/0.5ml)  5,000 unit  Q8


 SQ  8/11/17 06:00


 9/10/17 05:59  8/11/17 06:12


5,000 UNIT


 


 Acetaminophen


  (Tylenol Tab)  650 mg  Q4H  PRN


 PO  8/11/17 01:45


 9/10/17 01:44   


 


 


 Ondansetron HCl


  (Zofran Inj)  4 mg  Q6H  PRN


 IV  8/11/17 01:45


 9/10/17 01:44   


 


 


 Nitroglycerin


  (Nitrostat Tab)  0.4 mg  UD  PRN


 SL  8/11/17 01:45


 9/10/17 01:44   


 


 


 Aspirin


  (Ecotrin Tab)  81 mg  DAILY


 PO  8/12/17 09:00


 9/11/17 08:59   


 


 


 Atorvastatin


 Calcium


  (Lipitor Tab)  40 mg  QAM


 PO  8/11/17 09:00


 9/10/17 08:59  8/11/17 07:15


40 MG


 


 Calcium Acetate


  (Phoslo Cap)  667 mg  TIDM


 PO  8/11/17 07:30


 9/10/17 07:29  8/11/17 07:14


667 MG


 


 Dicyclomine HCl


  (Bentyl Cap)  10 mg  QID  PRN


 PO  8/11/17 02:00


 9/10/17 01:59   


 


 


 Fluticasone


 Propionate


  (Flonase Nasal


 Spray)  2 sprays  DAILY


 NA  8/11/17 09:00


 9/10/17 08:59  8/11/17 07:15


2 SPRAYS


 


 Insulin Glargine


  (Lantus Solostar


 Pen)  10 units  QPM


 SC  8/11/17 21:00


 9/10/17 20:59   


 


 


 Lorazepam


  (Ativan Tab)  0.5 mg  DAILY  PRN


 PO  8/11/17 02:00


 9/10/17 01:59   


 


 


 Metoprolol


 Tartrate


  (Lopressor Tab)  50 mg  BID


 PO  8/11/17 09:00


 9/10/17 08:59  8/11/17 07:14


50 MG


 


 Pantoprazole


 Sodium


  (Protonix Tab)  40 mg  QAM


 PO  8/11/17 09:00


 9/10/17 08:59  8/11/17 07:14


40 MG


 


 Sertraline HCl


  (Zoloft Tab)  100 mg  HS


 PO  8/11/17 21:00


 9/10/17 20:59   


 


 


 Triamcinolone


 Acetonide


  (Kenalog 0.5%


 Crm)  1 appln  BID


 EXT  8/11/17 09:00


 9/10/17 08:59  8/11/17 07:13


1 APPLN


 


 Loratadine


  (Claritin Tab)  10 mg  PM


 PO  8/11/17 21:00


 9/10/17 20:59   


 


 


 Oxycodone/


 Acetaminophen


  (Percocet


 5-325mg Tab)  1 tab  Q6H  PRN


 PO  8/11/17 02:00


 8/25/17 01:59  8/11/17 07:52


1 TAB


 


 Isosorbide


 Mononitrate


  (Imdur Ext Rel


 Tab)  90 mg  QAM


 PO  8/11/17 09:00


 9/10/17 08:59  8/11/17 07:14


90 MG


 


 Glucose


  (Glucose 40% Gel)  15-30


 GRAMS 15


 GRAMS...  UD  PRN


 PO  8/11/17 02:30


 9/10/17 02:29   


 


 


 Glucose


  (Glucose Chew


 Tab)  4-8


 Tablets 4


 Tabl...  UD  PRN


 PO  8/11/17 02:30


 9/10/17 02:29   


 


 


 Dextrose


  (Dextrose 50%


 50ML Syringe)  25-50ML OF


 50% DW IV


 FOR...  UD  PRN


 IV  8/11/17 02:30


 9/10/17 02:29   


 


 


 Glucagon


  (Glucagon Inj)  1 mg  UD  PRN


 SQ  8/11/17 02:30


 9/10/17 02:29   


 


 


 Sodium Chloride


  ([place]cean Nasal


 Spray)  1 sprays  PRN  PRN


 NA  8/11/17 04:30


 9/10/17 04:29  8/11/17 06:13


1 SPRAYS


 


 Miscellaneous


  (Iv Fluids


 Completed)  1 ea  PRN  PRN


 N/A  8/11/17 04:45


 8/11/18 04:44

## 2017-08-11 NOTE — NEPHROLOGY CONSULTATION
DATE OF CONSULTATION:  08/11/2017

 

DATE OF CONSULTATION:  08/11/2017  

 

TIME:  6:43 a.m. 

 

ATTENDING OF RECORD:  Dr. Merida.

 

REASON FOR CONSULTATION:  End-stage renal disease.

 

HISTORY OF PRESENT ILLNESS:  This is a 52-year-old female who dialyzes at the

[facility] on Mondays, Wednesdays, and Fridays.  The patient has

been more compliant with coming to dialysis; however, is supposed to be

taking many medications and takes no medications whatsoever at home.  Patient

has also missed dialysis monday and wednesday with last dialysis treatment a 
week 

ago.  The patient is scheduled for carotid surgery and so has

an appointment with her primary care doctor coming up next week and states

that she is going to be getting all of her meds refilled at that

appointment so that she can start taking them again. The patient fell out of

bed.  She describes it as flopped  out of bed and then had significant hip

pain and neck pain, diffuse pain throughout.  The patient was admitted under

observation for evaluation of chest pain with elevation in troponin.  The

patient is comfortable this morning; however continues to have significant

pain throughout her body.

 

PAST MEDICAL HISTORY:  End-stage renal disease on hemodialysis Mondays,

Wednesdays, and Fridays, diabetes, hypertension, hyperlipidemia, coronary

artery disease with history of stent placements, GERD, obesity.

 

PAST SURGICAL HISTORY:  Fistula placement, cath placement.

 

FAMILY HISTORY:  Significant for hypertension.

 

SOCIAL HISTORY:  Remote history of tobacco, occasional alcohol, no drugs.

 

REVIEW OF SYSTEMS:  Positive fatigue.  Positive poor vision.  Positive

shortness of breath with exertion.  No more chest pain; however continues to

have diffuse hip pain.  No nausea, vomiting.  No itching.  No diarrhea or

constipation.  No dysuria.  All other review of systems otherwise negative.

 

CURRENT MEDICATIONS:  Aspirin 81 mg a day, Lantus 10 units subQ at night,

Zoloft 100 mg at night, Claritin 10 mg at night, Lipitor 40 mg in the

morning,  Lopressor 50 mg p.o. b.i.d., Protonix 40 mg daily, PhosLo 1 p.o.

t.i.d. with meals, Imdur 90 mg daily, heparin 5000 units subQ q. 8.

 

PHYSICAL EXAMINATION:

VITAL SIGNS:  Temperature 36.7, pulse 95, respiratory rate 18, blood pressure

is 163/95.  Satting 94% on room air.

GENERAL:  Awake, alert, oriented x3.

EYES:  No scleral icterus.  The patient is morbidly obese.

NECK:  Supple.

PULMONARY:  Clear to auscultation.

CARDIAC:  Regular rate and rhythm.

ABDOMEN:  Bowel sounds positive, soft, nontender.

EXTREMITIES:  No significant clubbing, cyanosis or edema.

NEUROLOGICALLY:  Nonfocal although limited mobility secondary to diffuse

arthralgias.

 

LABORATORIES:  Sodium was 132, potassium 5.2, chloride is 90, bicarb is 21,

BUN is 154, creatinine is 15, glucose 139, calcium is 10.2, troponin 0.145. 

Albumin 2.8.  White count is 13, H&H 10 and 31, platelet count is 368.

 

IMPRESSION AND PLAN:

1. End-stage renal disease.  Will dialyze today 4 hour treatment, 3 liter UF

as blood pressure tolerates and 2K bath to help optimize clearance.

2. Anemia of renal failure.  Goal hemoglobin is 10-11.  Will continue Procrit

while here in the hospital.

3. Renal osteodystrophy.  The patient does not take any medications

whatsoever for any of her medical problems at home including phosphate

binders, antihypertensives,  diabetic medications.  The only thing the

patient actually does to help take care of herself is come to

dialysis.  However she missed the last week of dialysis.  

The patient with an overall poor prognosis long-term.  

Does have significant carotid stenosis and is scheduled for an outpatient 
carotid

surgery.  The patient is nervous and scared about having the procedure done

and is thinking she should start taking her medications.  The patient does

have an appointment with her family doctor this upcoming week to get her

medications refilled.  Will have to monitor closely since patient is not used

to taking any of the medications that were recently restarted while here in

the hospital so hopefully she tolerates all of the medications well since

relatively new.

 

Appreciate consultation.

 

 

 

LOUISE

## 2017-08-11 NOTE — DIAGNOSTIC IMAGING REPORT
CHEST ONE VIEW PORTABLE



CLINICAL HISTORY: Atypical chest pain    



COMPARISON STUDY:  117



FINDINGS: The heart is enlarged. There is stable mild prominence the main

pulmonary artery segment. There is no focal pulmonary consolidation. There is no

overt failure. There are no pleural effusions.[ 



IMPRESSION: Mild cardiomegaly. No evidence of focal pulmonary consolidation







Electronically signed by:  Won Richey M.D.

8/11/2017 6:50 AM



Dictated Date/Time:  8/11/2017 6:50 AM

## 2017-08-11 NOTE — HISTORY & PHYSICAL EXAMINATION
DATE OF ADMISSION:  08/11/2017

 

PRIMARY CARE PHYSICIAN:  Dr. Smith

 

CHIEF COMPLAINT:  Status post fall, followed by pain all over and also chest

pain.

 

HISTORY OF PRESENT COMPLAINT:  She is a 52-year-old female with significant

past medical history of end-stage renal disease, on hemodialysis, depression,

diabetes with retinopathy, GERD, morbid obesity, hyperlipidemia, tobacco use

disorder, hypertension and ischemic cardiomyopathy.  Apparently, has had a

minor fall.  She slid down the bed and injured multiple  areas, but none

severely.  She was brought into the Emergency Room and she has been

complaining of pain all over and at the end, she also complained of pain in

the center chest.  She has had unremarkable labs, except troponin noted to be

elevated at 0.1.  From that point, she was advised to stay in the hospital to

rule out possible ACS.  She denies to have any recent shortness of breath,

any palpitations.  No abdominal pain, nausea or vomiting, but she does have

pain in the left hip, shoulder and multiple areas of the body.  No numbness

or tingling in the extremities and no weakness involving any side of the body

in particular.

 

PAST MEDICAL HISTORY:  Significant for end-stage renal disease, on

hemodialysis, history of depression, diabetes mellitus with background

retinopathy, GERD, morbid obesity, hyperlipidemia, hypertension, tobacco use

disorder, status post placement of bare metal coronary stent with

cardiomyopathy.

 

PAST SURGICAL HISTORY:  Dialysis catheter placement, flexible EGD in 2017 for

gastritis and a stress echo in January of this year that came out to be

unremarkable.

 

FAMILY HISTORY:  Both the parents have hypertension and heart disorder.

 

SOCIAL HISTORY:  She is a .  She smoked 0.3 pack per day for one 1 year.

 She uses alcohol occasionally and her activity of daily living is limited to

some extent due to obesity.

 

REVIEW OF SYSTEMS:  Other systems reviewed are unremarkable, except for those

mentioned in the history of present complaint.

 

ALLERGY:  Allergic reaction to ADHESIVES, LATEX, POLLEN EXTRACT.

 

MEDICATIONS:  She has been on aspirin 81 mg daily, Lipitor 40 mg daily,

calcium acetate, PhosLo 667 mg 1 tablet twice daily, dicyclomine 10 mg q.i.d.

as needed, Flonase nasal spray 2 sprays per nostril daily, Lantus 10 units

subQ at night, isosorbide mononitrate 30-mg tablet 2 tablets in the morning,

isosorbide mononitrate 30 mg at night, tritan 5 mg 2 tablets daily, Ativan

1-mg tablet 0.5 mg daily as needed, metoprolol tartrate 50 mg 1 tablet

b.i.d., pantoprazole 40 mg daily, Zoloft 100 mg daily, triamcinolone

acetonide as directed and Percocet 1 tablet p.o. p.r.n.

 

PHYSICAL EXAMINATION:

GENERAL:  On examination in the Emergency Room, she was not having any acute

distress.

VITAL SIGNS:  Temperature 36.9, pulse 64, blood pressure 156/95, saturation

92% on room air.

HEENT:  Unremarkable.

NECK:  Supple.  No JVD, no bruit.

CHEST:  Clear to auscultate bilaterally with decreased breath sounds both

sides.

HEART:  S1, S2 regular.

ABDOMEN:  Soft, benign, nontender, no organomegaly.  Bowel sounds present.

EXTREMITIES:  Trace edema bilaterally.

MUSCULOSKELETAL SYSTEM:  No acute arthritis in any joint.  She does have

moderate pain in the left shoulder and left hip area as well, but no bruising

and/or hematoma.

CENTRAL NERVOUS SYSTEM:  She was alert, awake, oriented x3.

 

LABORATORY DATA:  Noted today, white count was 13.35, H&H 10.3/41.4, platelet

was 368.  Sodium 132, potassium 5.2, chloride 90, carbon dioxide 21, 

and creatinine 15.  Random glucose 139, calcium 10.2, troponin 0.145, albumin

2.8.  Her troponin remains elevated since February.  It was very high on 15th of February, at that time it was 3.  A chest x-ray did not show any CHF

and/or infiltration.  CT of the cervical spine, no acute process, mild

degenerative changes in C5 and C6.  Lumbar spine CT, no subluxation,

paraspinal soft tissues are unremarkable, minimal degenerative changes, no

other acute process.  CT of the thoracic spine, mild degenerative changes but

no fracture.  EKG was in sinus rhythm, rate of 101 per minute, normal axis

and no acute ST-T wave changes.

 

IMPRESSION AND PLAN:

1.  Status post fall with minor injuries involving multiple areas.  We will

continue with a small dose narcotic pain medication to control the pain.

2.  Chest pain with mild elevation of troponin.  She has chronic elevation of

troponin, secondary to chronic kidney disease.  She has anxiety and she wants

to make sure that there is no heart attack.  She will be admitted to

telemetry unit for observation.  Serial cardiac enzymes.  She has had a

negative dobutamine stress test in January

3.  Diabetes type 2 with retinopathy.  Continue with her current insulin dose

and with sliding scale coverage.

4.  Hypertension.  Continue with antiepileptic medications.  Blood pressure

seems to be controlled.

5.  Gastroesophageal reflux disease.  Continue PPI.

6.  Hyperlipidemia.  Continue statin.

7.  Chronic kidney disease, on hemodialysis.  Her BUN and creatinine are

extremely high.  She supposed to have dialysis today.  We will ask

nephrologist for continuation of dialysis.

8.  Code status:  She will be full code.

 

In my clinical judgment, the beneficiary meets criteria for CMS for 2

midnight stay in the hospital.

 

 

 

[email or web address]

## 2017-08-12 VITALS — HEART RATE: 63 BPM | SYSTOLIC BLOOD PRESSURE: 101 MMHG | TEMPERATURE: 97.52 F | DIASTOLIC BLOOD PRESSURE: 56 MMHG

## 2017-08-12 VITALS — SYSTOLIC BLOOD PRESSURE: 95 MMHG | DIASTOLIC BLOOD PRESSURE: 53 MMHG | HEART RATE: 59 BPM

## 2017-08-12 VITALS — HEART RATE: 60 BPM | SYSTOLIC BLOOD PRESSURE: 89 MMHG | DIASTOLIC BLOOD PRESSURE: 49 MMHG

## 2017-08-12 VITALS — SYSTOLIC BLOOD PRESSURE: 110 MMHG | HEART RATE: 67 BPM | DIASTOLIC BLOOD PRESSURE: 37 MMHG

## 2017-08-12 VITALS — HEART RATE: 60 BPM | SYSTOLIC BLOOD PRESSURE: 93 MMHG | DIASTOLIC BLOOD PRESSURE: 54 MMHG

## 2017-08-12 VITALS
DIASTOLIC BLOOD PRESSURE: 50 MMHG | TEMPERATURE: 98.42 F | HEART RATE: 75 BPM | SYSTOLIC BLOOD PRESSURE: 136 MMHG | OXYGEN SATURATION: 98 %

## 2017-08-12 VITALS — DIASTOLIC BLOOD PRESSURE: 86 MMHG | SYSTOLIC BLOOD PRESSURE: 155 MMHG

## 2017-08-12 VITALS — HEART RATE: 66 BPM | DIASTOLIC BLOOD PRESSURE: 62 MMHG | SYSTOLIC BLOOD PRESSURE: 104 MMHG

## 2017-08-12 VITALS — SYSTOLIC BLOOD PRESSURE: 152 MMHG | HEART RATE: 74 BPM | DIASTOLIC BLOOD PRESSURE: 88 MMHG

## 2017-08-12 VITALS — TEMPERATURE: 97.52 F | DIASTOLIC BLOOD PRESSURE: 83 MMHG | HEART RATE: 76 BPM | SYSTOLIC BLOOD PRESSURE: 150 MMHG

## 2017-08-12 VITALS — SYSTOLIC BLOOD PRESSURE: 115 MMHG | HEART RATE: 66 BPM | DIASTOLIC BLOOD PRESSURE: 68 MMHG

## 2017-08-12 VITALS — HEART RATE: 57 BPM | OXYGEN SATURATION: 95 % | SYSTOLIC BLOOD PRESSURE: 75 MMHG | DIASTOLIC BLOOD PRESSURE: 50 MMHG

## 2017-08-12 VITALS — HEART RATE: 60 BPM | DIASTOLIC BLOOD PRESSURE: 58 MMHG | SYSTOLIC BLOOD PRESSURE: 105 MMHG

## 2017-08-12 VITALS — SYSTOLIC BLOOD PRESSURE: 141 MMHG | DIASTOLIC BLOOD PRESSURE: 82 MMHG | HEART RATE: 77 BPM

## 2017-08-12 VITALS — HEART RATE: 59 BPM | DIASTOLIC BLOOD PRESSURE: 55 MMHG | SYSTOLIC BLOOD PRESSURE: 92 MMHG

## 2017-08-12 VITALS
TEMPERATURE: 98.24 F | SYSTOLIC BLOOD PRESSURE: 126 MMHG | HEART RATE: 72 BPM | OXYGEN SATURATION: 95 % | DIASTOLIC BLOOD PRESSURE: 74 MMHG

## 2017-08-12 VITALS — OXYGEN SATURATION: 95 %

## 2017-08-12 VITALS — DIASTOLIC BLOOD PRESSURE: 55 MMHG | HEART RATE: 57 BPM | SYSTOLIC BLOOD PRESSURE: 94 MMHG

## 2017-08-12 LAB
ANION GAP SERPL CALC-SCNC: 13 MMOL/L (ref 3–11)
BUN SERPL-MCNC: 77 MG/DL (ref 7–18)
BUN/CREAT SERPL: 8.2 (ref 10–20)
CALCIUM SERPL-MCNC: 9.8 MG/DL (ref 8.5–10.1)
CHLORIDE SERPL-SCNC: 95 MMOL/L (ref 98–107)
CO2 SERPL-SCNC: 26 MMOL/L (ref 21–32)
CREAT CL PREDICTED SERPL C-G-VRATE: 9 ML/MIN
CREAT SERPL-MCNC: 9.4 MG/DL (ref 0.6–1.2)
EOSINOPHIL NFR BLD AUTO: 318 K/UL (ref 130–400)
GLUCOSE SERPL-MCNC: 104 MG/DL (ref 70–99)
HCT VFR BLD CALC: 32.7 % (ref 37–47)
MAGNESIUM SERPL-MCNC: 2.5 MG/DL (ref 1.8–2.4)
MCH RBC QN AUTO: 31.3 PG (ref 25–34)
MCHC RBC AUTO-ENTMCNC: 33 G/DL (ref 32–36)
MCV RBC AUTO: 94.8 FL (ref 80–100)
PHOSPHATE SERPL-MCNC: 9.4 MG/DL (ref 2.5–4.9)
PMV BLD AUTO: 11.2 FL (ref 7.4–10.4)
POTASSIUM SERPL-SCNC: 4.7 MMOL/L (ref 3.5–5.1)
RBC # BLD AUTO: 3.45 M/UL (ref 4.2–5.4)
SODIUM SERPL-SCNC: 134 MMOL/L (ref 136–145)
WBC # BLD AUTO: 10.2 K/UL (ref 4.8–10.8)

## 2017-08-12 RX ADMIN — Medication SCH MG: at 21:00

## 2017-08-12 RX ADMIN — SERTRALINE HYDROCHLORIDE SCH MG: 100 TABLET, FILM COATED ORAL at 21:00

## 2017-08-12 RX ADMIN — CALCIUM ACETATE SCH MG: 667 CAPSULE ORAL at 11:09

## 2017-08-12 RX ADMIN — HEPARIN SODIUM SCH UNIT: 10000 INJECTION, SOLUTION INTRAVENOUS; SUBCUTANEOUS at 13:04

## 2017-08-12 RX ADMIN — TRIAMCINOLONE ACETONIDE SCH APPLN: 5 CREAM TOPICAL at 08:02

## 2017-08-12 RX ADMIN — CALCIUM ACETATE SCH MG: 667 CAPSULE ORAL at 17:52

## 2017-08-12 RX ADMIN — ATORVASTATIN CALCIUM SCH MG: 20 TABLET, FILM COATED ORAL at 08:00

## 2017-08-12 RX ADMIN — HEPARIN SODIUM SCH UNIT: 10000 INJECTION, SOLUTION INTRAVENOUS; SUBCUTANEOUS at 06:25

## 2017-08-12 RX ADMIN — INSULIN GLARGINE SCH UNITS: 100 INJECTION, SOLUTION SUBCUTANEOUS at 21:05

## 2017-08-12 RX ADMIN — CEFTRIAXONE SODIUM SCH MLS/HR: 1 INJECTION, POWDER, FOR SOLUTION INTRAVENOUS at 17:52

## 2017-08-12 RX ADMIN — OXYCODONE HYDROCHLORIDE AND ACETAMINOPHEN PRN TAB: 5; 325 TABLET ORAL at 08:14

## 2017-08-12 RX ADMIN — HEPARIN SODIUM SCH UNIT: 10000 INJECTION, SOLUTION INTRAVENOUS; SUBCUTANEOUS at 21:06

## 2017-08-12 RX ADMIN — METOPROLOL TARTRATE SCH MG: 50 TABLET, FILM COATED ORAL at 08:04

## 2017-08-12 RX ADMIN — PANTOPRAZOLE SCH MG: 40 TABLET, DELAYED RELEASE ORAL at 08:00

## 2017-08-12 RX ADMIN — ISOSORBIDE MONONITRATE SCH MG: 30 TABLET, EXTENDED RELEASE ORAL at 08:00

## 2017-08-12 RX ADMIN — FLUTICASONE PROPIONATE SCH SPRAYS: 50 SPRAY, METERED NASAL at 08:03

## 2017-08-12 RX ADMIN — CALCIUM ACETATE SCH MG: 667 CAPSULE ORAL at 08:03

## 2017-08-12 RX ADMIN — Medication SCH MG: at 08:04

## 2017-08-12 RX ADMIN — METOPROLOL TARTRATE SCH MG: 50 TABLET, FILM COATED ORAL at 21:01

## 2017-08-12 RX ADMIN — ACETAMINOPHEN PRN MG: 325 TABLET ORAL at 02:01

## 2017-08-12 RX ADMIN — TRIAMCINOLONE ACETONIDE SCH APPLN: 5 CREAM TOPICAL at 20:59

## 2017-08-12 NOTE — ECHOCARDIOGRAM REPORT
*NOTICE TO RECEIVING PARTY AGENCY**  This information is strictly Confidential and 
protected under [place] law.  [place] law prohibits you from making any further 
disclosure of this information unless further disclosure is expressly permitted by the 
written consent of the person to whom it pertains or is authorized by law.  A general 
authorization for the release of medical or other information is not sufficient for this 
purpose.  Hospital accepts no responsibility if the information is made available to any 
other person, INCLUDING THE PATIENT.



Interpretation Summary

  *  Name: RHEA MCLEAN  Study Date: 2017 09:59 AM  BP: 181/101 mmHg

  *  MRN: [id number]  Patient Location: 4E\S\E418\S\1  HR: 76

  *  : 1965 (M/d/yyyy)  Gender: Female  Height: 67 in

  *  Age: 52 yrs  Ethnicity: [place]  Weight: 242 lb

  *  Ordering Physician: Melanie Naqvi

  *  Referring Physician: Self, Referred

  *  Performed By: Rebeca Carrion RCS

  *  Accession# [id number]  Account# [id number]

  *  Reason For Study: Elevated Troponin

  *  BSA: 2.2 m2

  *  The study was technically adequate.

  *  -- Conclusions --

  *  The left ventricle is mildly dilated.

  *  There is a large sized apical, septal, inferior, and posterior wall motion 
abnormality with hypokinesis of the segments.

  *  Left ventricular systolic function is mildly reduced.

  *  The calculated left ventricular ejection fraction =46%.

  *  Grade I diastolic dysfunction, (abnormal relaxation pattern).

  *  Compared to the images of the prior study dated 17, the LV apex is better 
visualized allowing improved assessment of the LV systolic function. When similar images 
are compared, there does not appear to be a significant interval change in the LV systolic 
function.

Procedure Details

  *  A complete two-dimensional transthoracic echocardiogram was performed (2D, M-mode, 
Doppler and color flow Doppler).

Left Ventricle

  *  The left ventricle is mildly dilated.

  *  There is normal left ventricular wall thickness.

  *  Left ventricular systolic function is mildly reduced.

  *  The caclulated left ventricular ejection fraction =46%.

  *  There is a large sized apical, septal, inferior, and posterior wall motion 
abnormality with hypokinesis of the segments.

Right Ventricle

  *  The right ventricle is normal in size and function.

Atria

  *  The left atrium is mildly dilated.

  *  Right atrial size is normal.

  *  There is no evidence of atrial septal defect, but resolution does not allow 
assessment for a patent foramen ovale.

Mitral Valve

  *  The mitral valve is normal.

  *  There is no mitral valve stenosis.

  *  Significant mitral regurgitation is absent.

Tricuspid Valve

  *  The tricuspid valve is normal.

  *  There is no tricuspid stenosis.

  *  Significant tricuspid regurgitation is absent.

  *  Doppler findings do not suggest pulmonary hypertension.

Aortic Valve

  *  The aortic valve is trileaflet.

  *  Aortic stenosis is absent.

  *  There is no significant aortic regurgitation.

Pulmonic Valve

  *  The pulmonary valve is not well seen, but the Doppler examination is normal without 
significant regurgitation or stenosis.

Great Vessels

  *  The aortic root and proximal ascending aorta are normal sized.

Pericardium/Pleural

  *  There is no pericardial effusion.

Great Vessels

  *  Normal inferior vena cava diameter and respiratory variation suggests normal central 
venous pressure.

Left Ventricular Diastolic Function

  *  Grade I diastolic dysfunction, (abnormal relaxation pattern).



MMode 2D Measurements and Calculations

IVSd 1.2 cm



LVIDd 5.8 cm

LVIDs 4.5 cm

LVPWd 1.2 cm



IVS/LVPW 0.94 

FS 23.0 %

EDV(Teich) 167.5 ml

ESV(Teich) 91.4 ml

EF(Teich) 45.4 %



EDV(cubed) 196.5 ml

ESV(cubed) 89.8 ml

EF(cubed) 54.3 %





LV mass(C)d 294.4 grams

LV mass(C)dI 134.3 grams/m\S\2



SV(Teich) 76.1 ml

SI(Teich) 34.7 ml/m\S\2

SV(cubed) 106.7 ml

SI(cubed) 48.6 ml/m\S\2



Ao root diam 3.0 cm

Ao root area 7.1 cm\S\2



LVOT diam 2.0 cm

LVOT area 3.1 cm\S\2





EDV(MOD-sp4) 168.7 ml

ESV(MOD-sp4) 77.6 ml

EF(MOD-sp4) 54.0 %



EDV(MOD-sp2) 141.0 ml

ESV(MOD-sp2) 86.0 ml

EF(MOD-sp2) 39.0 %



SV(MOD-sp4) 91.1 ml

SI(MOD-sp4) 41.6 ml/m\S\2



SV(MOD-sp2) 55.0 ml

SI(MOD-sp2) 25.1 ml/m\S\2







Doppler Measurements and Calculations

MV E max conchis 82.0 cm/sec

MV A max conchis 114.3 cm/sec



MV E/A 0.72 



MV dec time 0.18 sec



Ao V2 max 172.3 cm/sec

Ao max PG 11.9 mmHg

Ao max PG (full) 10.7 mmHg

JENNIFER(V,A) 0.75 cm\S\2

JENNIFER(V,D) 0.75 cm\S\2





LV V1 max PG 1.2 mmHg



LV V1 max 40.8 cm/sec

## 2017-08-12 NOTE — PROGRESS NOTE
Medicine Progress Note


Date & Time of Visit:


Aug 12, 2017 at 1600.


Subjective


-tolerating PO


-decreased ambulation ability


-denies CP or SOB


-undergoing HD at this time


-feels fatigued





Objective





Last 8 Hrs








  Date Time  Temp Pulse Resp B/P (MAP) Pulse Ox O2 Delivery O2 Flow Rate FiO2


 


8/12/17 18:20 36.4 63  101/56 (71)    


 


8/12/17 17:17  60  89/49    


 


8/12/17 17:00  59  92/55    


 


8/12/17 16:45  66  115/68    


 


8/12/17 16:30  60  93/54    


 


8/12/17 16:15  59  95/53    


 


8/12/17 16:00  57  75/50    


 


8/12/17 16:00     95 Room Air  


 


8/12/17 15:45  67  110/37    


 


8/12/17 15:30  57  94/55    


 


8/12/17 15:15  60  105/58    


 


8/12/17 15:00  66  104/62    


 


8/12/17 14:45  77  141/82    


 


8/12/17 14:32  74  152/88    


 


8/12/17 14:24 36.4 76  150/83 (105)    








Physical Exam:


GEN: morbid obesity, in no acute distress, alert and appropriate, receiving HD


HEENT: NC/AT, pupils equal and reactive bilaterally, normal sclerae, MMM


CARDIO: reg rate, S1/2 heard without m/g/r


LUNGS: CTA bilaterally, no crackles, rales or wheezes, good diaphragmatic 

excursion


ABD: soft, non-tender, non-distended, no rebound or guarding, +BS


EXTREMITY: RP and DP palpable 2+ bilat, no LE swelling or edema, extremities 

are warm and well-perfused


NEURO: CN 2-12 grossly intact, no gross focal deficits


MUSC: 5/5 strength throughout, moves all extremities equally, limited exam as 

patient cannot move much while hooked up to HD machine. 


SKIN:  warm and dry


Laboratory Results:


8/12/17 06:07








8/12/17 06:07

















Test


  8/10/17


21:00 8/11/17


10:25 8/11/17


14:19 8/12/17


06:07


 


Immature Granulocyte % (Auto) 0.2 %    


 


White Blood Count


  13.35 K/uL


(4.8-10.8) 


  


  


 


 


Red Blood Count


  3.41 M/uL


(4.2-5.4) 


  


  3.45 M/uL


(4.2-5.4)


 


Hemoglobin


  10.3 g/dL


(12.0-16.0) 


  


  


 


 


Hematocrit 31.4 % (37-47)    


 


Mean Corpuscular Volume


  92.1 fL


() 


  


  94.8 fL


()


 


Mean Corpuscular Hemoglobin


  30.2 pg


(25-34) 


  


  31.3 pg


(25-34)


 


Mean Corpuscular Hemoglobin


Concent 32.8 g/dl


(32-36) 


  


  33.0 g/dl


(32-36)


 


Platelet Count


  368 K/uL


(130-400) 


  


  


 


 


Mean Platelet Volume


  11.2 fL


(7.4-10.4) 


  


  11.2 fL


(7.4-10.4)


 


Neutrophils (%) (Auto) 77.2 %    


 


Lymphocytes (%) (Auto) 14.6 %    


 


Monocytes (%) (Auto) 7.2 %    


 


Eosinophils (%) (Auto) 0.7 %    


 


Basophils (%) (Auto) 0.1 %    


 


Neutrophils # (Auto)


  10.29 K/uL


(1.4-6.5) 


  


  


 


 


Lymphocytes # (Auto)


  1.95 K/uL


(1.2-3.4) 


  


  


 


 


Monocytes # (Auto)


  0.96 K/uL


(0.11-0.59) 


  


  


 


 


Eosinophils # (Auto)


  0.10 K/uL


(0-0.5) 


  


  


 


 


Basophils # (Auto)


  0.02 K/uL


(0-0.2) 


  


  


 


 


Immature Granulocyte # (Auto)


  0.03 K/uL


(0.00-0.02) 


  


  


 


 


Total Bilirubin


  0.4 mg/dl


(0.2-1) 


  


  


 


 


Aspartate Amino Transf


(AST/SGOT) 7 U/L (15-37) 


  


  


  


 


 


Alanine Aminotransferase


(ALT/SGPT) 12 U/L (12-78) 


  


  


  


 


 


Alkaline Phosphatase


  67 U/L


() 


  


  


 


 


Total Protein


  8.2 gm/dl


(6.4-8.2) 


  


  


 


 


Albumin


  2.8 gm/dl


(3.4-5.0) 


  


  


 


 


Globulin


  5.4 gm/dl


(2.5-4.0) 


  


  


 


 


Albumin/Globulin Ratio 0.5 (0.9-2)    


 


Urine Color  YELLOW   


 


Urine Appearance  CLEAR (CLEAR)   


 


Urine pH  7.0 (4.5-7.5)   


 


Urine Specific Gravity


  


  1.017


(1.000-1.030) 


  


 


 


Urine Protein  3+ (NEG)   


 


Urine Glucose (UA)  1+ (NEG)   


 


Urine Ketones  NEG (NEG)   


 


Urine Occult Blood  TRACE (NEG)   


 


Urine Nitrite  NEG (NEG)   


 


Urine Bilirubin  NEG (NEG)   


 


Urine Urobilinogen  NEG (NEG)   


 


Urine Leukocyte Esterase  SMALL (NEG)   


 


Urine WBC (Auto)


  


  10-30 /hpf


(0-5) 


  


 


 


Urine RBC (Auto)  0-4 /hpf (0-4)   


 


Urine Hyaline Casts (Auto)  1-5 /lpf (0-5)   


 


Urine Epithelial Cells (Auto)  >30 /lpf (0-5)   


 


Urine Bacteria (Auto)  1+ (NEG)   


 


Total Creatine Kinase


  


  


  41 U/L


() 


 


 


Creatine Kinase MB


  


  


  3.8 ng/ml


(0.5-3.6) 


 


 


Creatine Kinase MB Ratio   9.3 (0-3.0)  


 


Troponin I


  


  


  0.125 ng/ml


(0-0.045) 


 


 


RDW Standard Deviation


  


  


  


  49.2 fL


(36.4-46.3)


 


RDW Coefficient of Variation


  


  


  


  14.2 %


(11.5-14.5)


 


Anion Gap


  


  


  


  13.0 mmol/L


(3-11)


 


Est Creatinine Clear Calc


Drug Dose 


  


  


  9.0 ml/min 


 


 


Estimated GFR (


American) 


  


  


  5.1 


 


 


Estimated GFR (Non-


American 


  


  


  4.4 


 


 


BUN/Creatinine Ratio    8.2 (10-20) 


 


Calcium Level


  


  


  


  9.8 mg/dl


(8.5-10.1)


 


Phosphorus Level


  


  


  


  9.4 mg/dl


(2.5-4.9)


 


Magnesium Level


  


  


  


  2.5 mg/dl


(1.8-2.4)


 


Test


  8/12/17


20:36 


  


  


 


 


Bedside Glucose


  143 mg/dl


(70-90) 


  


  


 














 Date/Time


Source Procedure


Growth Status


 


 


 8/11/17 10:25


Urine , Clean Catch Urine Culture - Final


MORE THAN THREE TYPES OF ORGANISMS MN... Complete








Last 24 Hours








Test


  8/12/17


06:07 8/12/17


20:36


 


White Blood Count 10.20 K/uL  


 


Red Blood Count 3.45 M/uL  


 


Hemoglobin 10.8 g/dL  


 


Hematocrit 32.7 %  


 


Mean Corpuscular Volume 94.8 fL  


 


Mean Corpuscular Hemoglobin 31.3 pg  


 


Mean Corpuscular Hemoglobin


Concent 33.0 g/dl 


  


 


 


RDW Standard Deviation 49.2 fL  


 


RDW Coefficient of Variation 14.2 %  


 


Platelet Count 318 K/uL  


 


Mean Platelet Volume 11.2 fL  


 


Sodium Level 134 mmol/L  


 


Potassium Level 4.7 mmol/L  


 


Chloride Level 95 mmol/L  


 


Carbon Dioxide Level 26 mmol/L  


 


Anion Gap 13.0 mmol/L  


 


Blood Urea Nitrogen 77 mg/dl  


 


Creatinine 9.40 mg/dl  


 


Est Creatinine Clear Calc


Drug Dose 9.0 ml/min 


  


 


 


Estimated GFR (


American) 5.1 


  


 


 


Estimated GFR (Non-


American 4.4 


  


 


 


BUN/Creatinine Ratio 8.2  


 


Random Glucose 104 mg/dl  


 


Calcium Level 9.8 mg/dl  


 


Phosphorus Level 9.4 mg/dl  


 


Magnesium Level 2.5 mg/dl  


 


Bedside Glucose  143 mg/dl 











Assessment & Plan


53 yo F s/p fall in [place] three days ago (was on hands and knees on the 

beach and tipped over on her side).  Elevated troponins without chest pain or 

shortness of breath thought 2/2 probable diastolic dysfunction in setting of 

noncompliance and ESRD, also noncompliant with HD.  TTE ordered revealing wall 

motion abnormalities.  Cont tele monitoring at this time and consult cardiology 

for further evaluation especially as she is slated to undergo a carotid 

revascularization soon.  Currently PT recommends rehabilitation when she leaves 

the hospital.  She has been noncompliant with meds and HD and is, therefore, 

not optimized for surgery.  Addiitonally she continues to not take meds given 

to her here in the hospital. 





1.  Elevated troponin-likely related to demand ischemia in setting of recent 

travel to the beach (long car ride to and from [place]) in setting of ESRD and 

noncompliance with HD.  Ordered TTE, no increase in troponin and no cardiac 

chest pain present. TTE revealed a large sized apical, septal, inferior, and 

posterior wall motion abnormality with HK of the segments, LV systolic function 

is mildly reduced, EF is 46%, GRade I DD seen.  Monitor on tele in case of 

arrhythmia and consult Cardiology to assist in setting of upcoming carotid 

surgery.


2.  ESRD-noncompliant with HD for the past week.  HD performed today.  Per 

Nephro OK to go home after HD complete.


3.  Muscle pain/back strain-pt with recent h/o long car ride to and from 

[facility].  She got back into town a few days ago.  While down at the 

beach she was on her hands and knees and tipped over onto her side.  She waited 

a few days and then presented complaining of weakness and severe pain in her 

lower back.  Limited options exist for pain medication at this point, so 

percocet is being given.  Of note, multiple CT scans were performed and 

negative. Pain appears to be strictly musculoskeletal in nature. 


4.  Weakness-multifactorial in setting of noncompliance with medications for 

multiple comorbidities and noncompliance with dialysis.  Obese, deconditioned 

and not motivated to improve.  


5.  DMII with retinopathy-ISS/Lantus, controlled.


6.  HTN-cont with home meds. 


7.  PAD-pt states she is to go for a carotid surgery next week.  This will 

likely be delayed.  Cont ASA and statin.


8.  Noncompliance.--patient has not been taking her medications for over 1 

year.  Restarted on meds, however, will watch closely for side effects, etc.  

This is important in setting of upcoming vascular surgery which puts her a 

higher risk of perioperative mortality.  Also has been noncompliant with 

inpatient medications. 





DVT proph-Heparin


FULL CODE


Dispo-to tele





Melanie Naqvi DO


[facility] Hospitalist


Current Inpatient Medications:





Current Inpatient Medications








 Medications


  (Trade)  Dose


 Ordered  Sig/Willy


 Route  Start Time


 Stop Time Status Last Admin


Dose Admin


 


 Heparin Sodium


  (Porcine)


  (Heparin Sq 5000


 Unit/0.5ml)  5,000 unit  Q8


 SQ  8/11/17 06:00


 9/10/17 05:59  8/12/17 21:06


5,000 UNIT


 


 Acetaminophen


  (Tylenol Tab)  650 mg  Q4H  PRN


 PO  8/11/17 01:45


 9/10/17 01:44  8/12/17 02:01


650 MG


 


 Ondansetron HCl


  (Zofran Inj)  4 mg  Q6H  PRN


 IV  8/11/17 01:45


 9/10/17 01:44   


 


 


 Nitroglycerin


  (Nitrostat Tab)  0.4 mg  UD  PRN


 SL  8/11/17 01:45


 9/10/17 01:44   


 


 


 Aspirin


  (Ecotrin Tab)  81 mg  DAILY


 PO  8/12/17 08:00


 9/11/17 08:59  8/12/17 08:04


81 MG


 


 Atorvastatin


 Calcium


  (Lipitor Tab)  40 mg  QAM


 PO  8/11/17 09:00


 9/10/17 08:59  8/12/17 08:00


40 MG


 


 Calcium Acetate


  (Phoslo Cap)  667 mg  TIDM


 PO  8/11/17 07:30


 9/10/17 07:29  8/12/17 17:52


667 MG


 


 Dicyclomine HCl


  (Bentyl Cap)  10 mg  QID  PRN


 PO  8/11/17 02:00


 9/10/17 01:59   


 


 


 Fluticasone


 Propionate


  (Flonase Nasal


 Spray)  2 sprays  DAILY


 NA  8/11/17 09:00


 9/10/17 08:59  8/12/17 08:03


2 SPRAYS


 


 Insulin Glargine


  (Lantus Solostar


 Pen)  10 units  QPM


 SC  8/11/17 21:00


 9/10/17 20:59  8/12/17 21:05


10 UNITS


 


 Lorazepam


  (Ativan Tab)  0.5 mg  DAILY  PRN


 PO  8/11/17 02:00


 9/10/17 01:59   


 


 


 Metoprolol


 Tartrate


  (Lopressor Tab)  50 mg  BID


 PO  8/11/17 09:00


 9/10/17 08:59  8/12/17 21:01


50 MG


 


 Pantoprazole


 Sodium


  (Protonix Tab)  40 mg  QAM


 PO  8/11/17 09:00


 9/10/17 08:59  8/12/17 08:00


40 MG


 


 Sertraline HCl


  (Zoloft Tab)  100 mg  HS


 PO  8/11/17 21:00


 9/10/17 20:59  8/12/17 21:00


100 MG


 


 Triamcinolone


 Acetonide


  (Kenalog 0.5%


 Crm)  1 appln  BID


 EXT  8/11/17 09:00


 9/10/17 08:59  8/12/17 20:59


1 APPLN


 


 Loratadine


  (Claritin Tab)  10 mg  PM


 PO  8/11/17 21:00


 9/10/17 20:59  8/12/17 21:00


10 MG


 


 Oxycodone/


 Acetaminophen


  (Percocet


 5-325mg Tab)  1 tab  Q6H  PRN


 PO  8/11/17 02:00


 8/25/17 01:59  8/12/17 08:14


1 TAB


 


 Isosorbide


 Mononitrate


  (Imdur Ext Rel


 Tab)  90 mg  QAM


 PO  8/11/17 09:00


 9/10/17 08:59  8/12/17 08:00


90 MG


 


 Glucose


  (Glucose 40% Gel)  15-30


 GRAMS 15


 GRAMS...  UD  PRN


 PO  8/11/17 02:30


 9/10/17 02:29   


 


 


 Glucose


  (Glucose Chew


 Tab)  4-8


 Tablets 4


 Tabl...  UD  PRN


 PO  8/11/17 02:30


 9/10/17 02:29   


 


 


 Dextrose


  (Dextrose 50%


 50ML Syringe)  25-50ML OF


 50% DW IV


 FOR...  UD  PRN


 IV  8/11/17 02:30


 9/10/17 02:29   


 


 


 Glucagon


  (Glucagon Inj)  1 mg  UD  PRN


 SQ  8/11/17 02:30


 9/10/17 02:29   


 


 


 Sodium Chloride


  (Ocean Nasal


 Spray)  1 sprays  PRN  PRN


 NA  8/11/17 04:30


 9/10/17 04:29  8/11/17 06:13


1 SPRAYS


 


 Miscellaneous


  (Iv Fluids


 Completed)  1 ea  PRN  PRN


 N/A  8/11/17 04:45


 8/11/18 04:44   


 


 


 Ceftriaxone


 Sodium 1 gm/


 Dextrose  50 ml @ 


 100 mls/hr  DAILY@1600


 IV  8/11/17 16:00


 8/16/17 15:59  8/12/17 17:52


100 MLS/HR

## 2017-08-13 VITALS
SYSTOLIC BLOOD PRESSURE: 124 MMHG | DIASTOLIC BLOOD PRESSURE: 76 MMHG | TEMPERATURE: 98.42 F | HEART RATE: 72 BPM | OXYGEN SATURATION: 92 %

## 2017-08-13 VITALS
OXYGEN SATURATION: 95 % | SYSTOLIC BLOOD PRESSURE: 137 MMHG | DIASTOLIC BLOOD PRESSURE: 75 MMHG | TEMPERATURE: 98.42 F | HEART RATE: 77 BPM

## 2017-08-13 VITALS
SYSTOLIC BLOOD PRESSURE: 113 MMHG | HEART RATE: 72 BPM | TEMPERATURE: 98.42 F | DIASTOLIC BLOOD PRESSURE: 58 MMHG | OXYGEN SATURATION: 94 %

## 2017-08-13 VITALS
TEMPERATURE: 98.42 F | HEART RATE: 71 BPM | SYSTOLIC BLOOD PRESSURE: 136 MMHG | DIASTOLIC BLOOD PRESSURE: 72 MMHG | OXYGEN SATURATION: 97 %

## 2017-08-13 VITALS
SYSTOLIC BLOOD PRESSURE: 108 MMHG | HEART RATE: 62 BPM | TEMPERATURE: 97.7 F | OXYGEN SATURATION: 94 % | DIASTOLIC BLOOD PRESSURE: 65 MMHG

## 2017-08-13 VITALS
DIASTOLIC BLOOD PRESSURE: 75 MMHG | OXYGEN SATURATION: 95 % | TEMPERATURE: 98.42 F | SYSTOLIC BLOOD PRESSURE: 137 MMHG | HEART RATE: 77 BPM

## 2017-08-13 LAB
ANION GAP SERPL CALC-SCNC: 10 MMOL/L (ref 3–11)
BUN SERPL-MCNC: 45 MG/DL (ref 7–18)
BUN/CREAT SERPL: 6.4 (ref 10–20)
CALCIUM SERPL-MCNC: 9.3 MG/DL (ref 8.5–10.1)
CHLORIDE SERPL-SCNC: 97 MMOL/L (ref 98–107)
CO2 SERPL-SCNC: 29 MMOL/L (ref 21–32)
CREAT CL PREDICTED SERPL C-G-VRATE: 11.8 ML/MIN
CREAT SERPL-MCNC: 7.1 MG/DL (ref 0.6–1.2)
EOSINOPHIL NFR BLD AUTO: 346 K/UL (ref 130–400)
GLUCOSE SERPL-MCNC: 113 MG/DL (ref 70–99)
HCT VFR BLD CALC: 33.8 % (ref 37–47)
MAGNESIUM SERPL-MCNC: 2.5 MG/DL (ref 1.8–2.4)
MCH RBC QN AUTO: 31 PG (ref 25–34)
MCHC RBC AUTO-ENTMCNC: 32.2 G/DL (ref 32–36)
MCV RBC AUTO: 96 FL (ref 80–100)
PHOSPHATE SERPL-MCNC: 8.3 MG/DL (ref 2.5–4.9)
PMV BLD AUTO: 11.2 FL (ref 7.4–10.4)
POTASSIUM SERPL-SCNC: 4.4 MMOL/L (ref 3.5–5.1)
RBC # BLD AUTO: 3.52 M/UL (ref 4.2–5.4)
SODIUM SERPL-SCNC: 136 MMOL/L (ref 136–145)
WBC # BLD AUTO: 13.42 K/UL (ref 4.8–10.8)

## 2017-08-13 RX ADMIN — METOPROLOL TARTRATE SCH MG: 50 TABLET, FILM COATED ORAL at 08:24

## 2017-08-13 RX ADMIN — INSULIN GLARGINE SCH UNITS: 100 INJECTION, SOLUTION SUBCUTANEOUS at 21:37

## 2017-08-13 RX ADMIN — ATORVASTATIN CALCIUM SCH MG: 20 TABLET, FILM COATED ORAL at 08:24

## 2017-08-13 RX ADMIN — Medication SCH MG: at 21:30

## 2017-08-13 RX ADMIN — HEPARIN SODIUM SCH UNIT: 10000 INJECTION, SOLUTION INTRAVENOUS; SUBCUTANEOUS at 05:52

## 2017-08-13 RX ADMIN — METOPROLOL TARTRATE SCH MG: 50 TABLET, FILM COATED ORAL at 21:28

## 2017-08-13 RX ADMIN — OXYCODONE HYDROCHLORIDE AND ACETAMINOPHEN PRN TAB: 5; 325 TABLET ORAL at 10:07

## 2017-08-13 RX ADMIN — DIAZEPAM SCH MG: 2 TABLET ORAL at 14:25

## 2017-08-13 RX ADMIN — PANTOPRAZOLE SCH MG: 40 TABLET, DELAYED RELEASE ORAL at 08:25

## 2017-08-13 RX ADMIN — Medication SCH EA: at 21:00

## 2017-08-13 RX ADMIN — CALCIUM ACETATE SCH MG: 667 CAPSULE ORAL at 11:30

## 2017-08-13 RX ADMIN — CALCIUM ACETATE SCH MG: 667 CAPSULE ORAL at 17:21

## 2017-08-13 RX ADMIN — DIAZEPAM SCH MG: 2 TABLET ORAL at 21:29

## 2017-08-13 RX ADMIN — CALCIUM ACETATE SCH MG: 667 CAPSULE ORAL at 08:22

## 2017-08-13 RX ADMIN — TRIAMCINOLONE ACETONIDE SCH APPLN: 5 CREAM TOPICAL at 08:23

## 2017-08-13 RX ADMIN — ACETAMINOPHEN SCH MG: 500 TABLET, COATED ORAL at 14:23

## 2017-08-13 RX ADMIN — Medication SCH MG: at 08:23

## 2017-08-13 RX ADMIN — HEPARIN SODIUM SCH UNIT: 10000 INJECTION, SOLUTION INTRAVENOUS; SUBCUTANEOUS at 21:37

## 2017-08-13 RX ADMIN — LIDOCAINE SCH PATCH: 50 PATCH CUTANEOUS at 12:28

## 2017-08-13 RX ADMIN — FLUTICASONE PROPIONATE SCH SPRAYS: 50 SPRAY, METERED NASAL at 08:23

## 2017-08-13 RX ADMIN — ACETAMINOPHEN SCH MG: 500 TABLET, COATED ORAL at 21:29

## 2017-08-13 RX ADMIN — ISOSORBIDE MONONITRATE SCH MG: 30 TABLET, EXTENDED RELEASE ORAL at 08:24

## 2017-08-13 RX ADMIN — TRIAMCINOLONE ACETONIDE SCH APPLN: 5 CREAM TOPICAL at 21:24

## 2017-08-13 RX ADMIN — HEPARIN SODIUM SCH UNIT: 10000 INJECTION, SOLUTION INTRAVENOUS; SUBCUTANEOUS at 14:28

## 2017-08-13 NOTE — CARDIOLOGY CONSULTATION
DATE OF CONSULTATION:  08/13/2017

 

PERTINENT HISTORY:  Ms. Mckeon is a 52-year-old white female admitted on

08/11/2017 after a fall resulting in intractable back pain.  The patient's

troponin I level is mildly elevated and abnormal echocardiogram was noted

yesterday.  This consultation was ordered to assist in her cardiac

management.  Of note, the patient follows with Dr. Jesus Duarte in our

outpatient clinic.

 

The patient was in her usual state of health until the day prior to

presentation.  While [place] the beach, as she fell on to her back, had developed

intermediate discomfort which became intractable.  Troponin I levels were

drawn and found to be mildly elevated.  However, this is a chronic finding

throughout most of her hospitalizations.

 

At no time, did the patient experience her typical angina pectoris.  Prior to

her fall, the patient was able to carry on with activities of daily life and

climb a flight of stairs without exertional angina pectoris.

 

The patient's cardiac history began in August 2016 when a preoperative

evaluation revealed an abnormal echocardiogram with mildly reduced ejection

fraction of 45% with inferior wall motion abnormality.  The patient then

underwent a cardiac catheterization with Dr. Duarte.  She was found to have a

totally occluded proximal right coronary artery with a distal

collateralization.  She had a tight stenosis in the mid left circumflex. 

This was stented with a 3.5 x 15 mm bare-metal Integrity stent.  Bare-metal

stent was chosen as she had upcoming carotid surgery.  There was also 30%

stenosis at takeoff of the first diagonal branch and LAD.  There is a 60%

ostial stenosis of the small first diagonal branch.

 

The patient's carotid surgery was postponed.  She did undergo a full

operative evaluation with Alysha Caputo in our office in April.  She did

have a nuclear stress test performed on 04/27/2017, which revealed a large

right coronary artery defect without evidence of ischemia.  Ejection fraction

was estimated at 31%.  An echocardiogram performed in February of 2017 noted

low ejection fraction of 45-50% with an inferior and inferolateral wall

motion abnormality.

 

Currently, the patient is resting comfortably in bed and without complaints.

 

PAST MEDICAL HISTORY:

1.  Coronary artery disease.

2.  Mid LCX bare-metal stent - 08/16/2016.

3.  Ischemic cardiomyopathy - 45%.

4.  Inferior wall motion abnormality.

5.  Chronically elevated troponin level.

6.  Hypertension.

7.  Hypercholesterolemia.

8.  Carotid disease - 90% left internal carotid stenosis.

9.  Chronic renal failure - on hemodialysis.

10.  Diabetes mellitus.

11.  GERD.

12.  Obesity.

13.  Depression/anxiety.

 

MEDICATIONS:

1.  Metoprolol tartrate 50 mg b.i.d.

2.  Imdur 90 mg daily.

3.  Aspirin 81 mg per day.

4.  Lipitor 40 mg at bedtime.

5.  Heparin 5000 units subQ q. 8 hours.

6.  Zoloft 100 mg daily.

7.  Claritin 10 mg per day.

8.  Protonix 40 mg per day.

9.  PhosLo 667 mg t.i.d.

 

SOCIAL HISTORY:  The patient is a .  Denies tobacco and alcohol use.

 

FAMILY HISTORY:  No early coronary artery disease.

 

REVIEW OF SYSTEMS:  Ten-point review of systems is negative except for that

described above.

 

PHYSICAL EXAMINATION:

GENERAL:  This is a mildly obese white female, lying supine in bed without

complaints.

VITAL SIGNS:  Blood pressure is 124/76 with a regular pulse of 72. 

Respiratory rate is 18.  The patient is afebrile at 36.9 degrees Celsius. 

Saturations 92% on room air.

HEENT:  Negative.

NECK:  Supple with full carotid upstrokes.  There are no carotid bruits. 

Jugular venous pressure is flat at 90 degrees.  There is no thyromegaly.

CARDIOVASCULAR:  Reveals a regular rhythm with normal S1 and S2.  Heart

sounds are distant.  No obvious murmurs.

LUNGS:  Clear without rales, rhonchi, or wheezes.

ABDOMEN:  Soft without bruits.

EXTREMITIES:  Reveal intact radial artery on the right.  Fistula noted in the

left upper extremity with a bruit.  Trace pretibial edema is noted.

 

LABORATORY DATA:  CBC notes a hemoglobin of 10.9, hematocrit 33.8, white

count 13.4, and platelet count 346,000.  Electrolytes note a sodium of 136,

potassium 4.4, chloride 97, bicarbonate 29, BUN 45, creatinine 7.1, and

glucose 113.  Troponin I levels were 0.145, 0.122, and 0.125.  CKs were

normal at 44 and 41 with MB fractions of 3.6 and 3.8 respectively.  EKG notes

sinus tachycardia and an old inferior myocardial infarction pattern. 

Telemetry monitor is benign.  Chest x-ray shows cardiomegaly.

 

IMPRESSION:  Ms. Mckeon was admitted with intractable back pain and found to

have a mildly elevated troponin.  As above, a mildly elevated troponin has

been seen on nearly all of her recent hospitalizations.  She has known

coronary artery disease and the echocardiogram performed here is similar to

one performed in February of this year.  She had a nuclear stress test

performed in April, which did note an inferior wall defect, but no evidence

of ischemia.

 

The patient is scheduled for a carotid endarterectomy with Dr. Roberts on

Tuesday.  It was felt that she is an acceptable cardiac risk to proceed.

 

PLAN:

1.  Continue usual medications.

2.  Stable for transfer off telemetry.

3.  Acceptable cardiac risk for carotid endarterectomy.

4.  Dr. Duarte to assume care tomorrow.

## 2017-08-13 NOTE — PROGRESS NOTE
Medicine Progress Note


Date & Time of Visit:


Aug 13, 2017 at 10:22.


Subjective


tolerating PO


denies chest pain or shortness of breath


denies decreased sensation in legs.


reports that back pain is somewhat improved


discussed rehab and patient states that she is willing to go and pushed her 

surgery back


discussed percocet with options


pt states that this is her chronic back pain made worse by the fall.





Objective





Last 8 Hrs








  Date Time  Temp Pulse Resp B/P (MAP) Pulse Ox O2 Delivery O2 Flow Rate FiO2


 


8/13/17 07:03 36.9 72 18 124/76 (92) 92 Room Air  


 


8/13/17 04:05 36.9 71 16 136/72 (93) 97 Room Air  


 


8/13/17 04:00      Room Air  








Physical Exam:


GEN: morbid obesity, in no acute distress, alert and appropriate, receiving HD


HEENT: NC/AT, pupils equal and reactive bilaterally, normal sclerae, MMM


CARDIO: reg rate, S1/2 heard without m/g/r


LUNGS: CTA bilaterally, no crackles, rales or wheezes, good diaphragmatic 

excursion


ABD: soft, non-tender, non-distended, no rebound or guarding, +BS


BACK: paraspinal TTP in thoracic and lumbar spine


EXTREMITY: RP and DP palpable 2+ bilat, no LE swelling or edema, extremities 

are warm and well-perfused


NEURO: CN 2-12 grossly intact, no gross focal deficits


MUSC: 5/5 strength throughout, moves all extremities equally


SKIN:  warm and dry


Laboratory Results:


8/13/17 05:21








8/13/17 05:21

















Test


  8/10/17


21:00 8/11/17


10:25 8/11/17


14:19 8/12/17


20:36


 


Immature Granulocyte % (Auto) 0.2 %    


 


White Blood Count


  13.35 K/uL


(4.8-10.8) 


  


  


 


 


Red Blood Count


  3.41 M/uL


(4.2-5.4) 


  


  


 


 


Hemoglobin


  10.3 g/dL


(12.0-16.0) 


  


  


 


 


Hematocrit 31.4 % (37-47)    


 


Mean Corpuscular Volume


  92.1 fL


() 


  


  


 


 


Mean Corpuscular Hemoglobin


  30.2 pg


(25-34) 


  


  


 


 


Mean Corpuscular Hemoglobin


Concent 32.8 g/dl


(32-36) 


  


  


 


 


Platelet Count


  368 K/uL


(130-400) 


  


  


 


 


Mean Platelet Volume


  11.2 fL


(7.4-10.4) 


  


  


 


 


Neutrophils (%) (Auto) 77.2 %    


 


Lymphocytes (%) (Auto) 14.6 %    


 


Monocytes (%) (Auto) 7.2 %    


 


Eosinophils (%) (Auto) 0.7 %    


 


Basophils (%) (Auto) 0.1 %    


 


Neutrophils # (Auto)


  10.29 K/uL


(1.4-6.5) 


  


  


 


 


Lymphocytes # (Auto)


  1.95 K/uL


(1.2-3.4) 


  


  


 


 


Monocytes # (Auto)


  0.96 K/uL


(0.11-0.59) 


  


  


 


 


Eosinophils # (Auto)


  0.10 K/uL


(0-0.5) 


  


  


 


 


Basophils # (Auto)


  0.02 K/uL


(0-0.2) 


  


  


 


 


Immature Granulocyte # (Auto)


  0.03 K/uL


(0.00-0.02) 


  


  


 


 


Total Bilirubin


  0.4 mg/dl


(0.2-1) 


  


  


 


 


Aspartate Amino Transf


(AST/SGOT) 7 U/L (15-37) 


  


  


  


 


 


Alanine Aminotransferase


(ALT/SGPT) 12 U/L (12-78) 


  


  


  


 


 


Alkaline Phosphatase


  67 U/L


() 


  


  


 


 


Total Protein


  8.2 gm/dl


(6.4-8.2) 


  


  


 


 


Albumin


  2.8 gm/dl


(3.4-5.0) 


  


  


 


 


Globulin


  5.4 gm/dl


(2.5-4.0) 


  


  


 


 


Albumin/Globulin Ratio 0.5 (0.9-2)    


 


Urine Color  YELLOW   


 


Urine Appearance  CLEAR (CLEAR)   


 


Urine pH  7.0 (4.5-7.5)   


 


Urine Specific Gravity


  


  1.017


(1.000-1.030) 


  


 


 


Urine Protein  3+ (NEG)   


 


Urine Glucose (UA)  1+ (NEG)   


 


Urine Ketones  NEG (NEG)   


 


Urine Occult Blood  TRACE (NEG)   


 


Urine Nitrite  NEG (NEG)   


 


Urine Bilirubin  NEG (NEG)   


 


Urine Urobilinogen  NEG (NEG)   


 


Urine Leukocyte Esterase  SMALL (NEG)   


 


Urine WBC (Auto)


  


  10-30 /hpf


(0-5) 


  


 


 


Urine RBC (Auto)  0-4 /hpf (0-4)   


 


Urine Hyaline Casts (Auto)  1-5 /lpf (0-5)   


 


Urine Epithelial Cells (Auto)  >30 /lpf (0-5)   


 


Urine Bacteria (Auto)  1+ (NEG)   


 


Total Creatine Kinase


  


  


  41 U/L


() 


 


 


Creatine Kinase MB


  


  


  3.8 ng/ml


(0.5-3.6) 


 


 


Creatine Kinase MB Ratio   9.3 (0-3.0)  


 


Troponin I


  


  


  0.125 ng/ml


(0-0.045) 


 


 


Bedside Glucose


  


  


  


  143 mg/dl


(70-90)


 


Test


  8/13/17


05:21 


  


  


 


 


Red Blood Count


  3.52 M/uL


(4.2-5.4) 


  


  


 


 


Mean Corpuscular Volume


  96.0 fL


() 


  


  


 


 


Mean Corpuscular Hemoglobin


  31.0 pg


(25-34) 


  


  


 


 


Mean Corpuscular Hemoglobin


Concent 32.2 g/dl


(32-36) 


  


  


 


 


RDW Standard Deviation


  49.9 fL


(36.4-46.3) 


  


  


 


 


RDW Coefficient of Variation


  14.3 %


(11.5-14.5) 


  


  


 


 


Mean Platelet Volume


  11.2 fL


(7.4-10.4) 


  


  


 


 


Anion Gap


  10.0 mmol/L


(3-11) 


  


  


 


 


Est Creatinine Clear Calc


Drug Dose 11.8 ml/min 


  


  


  


 


 


Estimated GFR (


American) 7.1 


  


  


  


 


 


Estimated GFR (Non-


American 6.1 


  


  


  


 


 


BUN/Creatinine Ratio 6.4 (10-20)    


 


Calcium Level


  9.3 mg/dl


(8.5-10.1) 


  


  


 


 


Phosphorus Level


  8.3 mg/dl


(2.5-4.9) 


  


  


 


 


Magnesium Level


  2.5 mg/dl


(1.8-2.4) 


  


  


 














 Date/Time


Source Procedure


Growth Status


 


 


 8/11/17 10:25


Urine , Clean Catch Urine Culture - Final


MORE THAN THREE TYPES OF ORGANISMS CO... Complete








Last 24 Hours








Test


  8/12/17


20:36 8/13/17


05:21


 


Bedside Glucose 143 mg/dl  


 


White Blood Count  13.42 K/uL 


 


Red Blood Count  3.52 M/uL 


 


Hemoglobin  10.9 g/dL 


 


Hematocrit  33.8 % 


 


Mean Corpuscular Volume  96.0 fL 


 


Mean Corpuscular Hemoglobin  31.0 pg 


 


Mean Corpuscular Hemoglobin


Concent 


  32.2 g/dl 


 


 


RDW Standard Deviation  49.9 fL 


 


RDW Coefficient of Variation  14.3 % 


 


Platelet Count  346 K/uL 


 


Mean Platelet Volume  11.2 fL 


 


Sodium Level  136 mmol/L 


 


Potassium Level  4.4 mmol/L 


 


Chloride Level  97 mmol/L 


 


Carbon Dioxide Level  29 mmol/L 


 


Anion Gap  10.0 mmol/L 


 


Blood Urea Nitrogen  45 mg/dl 


 


Creatinine  7.10 mg/dl 


 


Est Creatinine Clear Calc


Drug Dose 


  11.8 ml/min 


 


 


Estimated GFR (


American) 


  7.1 


 


 


Estimated GFR (Non-


American 


  6.1 


 


 


BUN/Creatinine Ratio  6.4 


 


Random Glucose  113 mg/dl 


 


Calcium Level  9.3 mg/dl 


 


Phosphorus Level  8.3 mg/dl 


 


Magnesium Level  2.5 mg/dl 











Assessment & Plan


51 yo F s/p fall in [place] three days ago (was [place] hands and knees on the 

beach and tipped over on her side).  Elevated troponins without chest pain or 

shortness of breath thought 2/2 probable diastolic dysfunction in setting of 

noncompliance and ESRD, also noncompliant with HD.  TTE ordered revealing wall 

motion abnormalities consistent with poss inferior MI.  Cont tele monitoring at 

this time and consult cardiology for further evaluation especially as she is 

slated to undergo a carotid revascularization soon.  Currently PT recommends 

rehabilitation when she leaves the hospital-CM aware and will place referral--

pt is willing to go.  Per Cardiology evaluation, wall motion abnormalities are 

old and she is OK to proceed to carotid surgery from a cardiac standpoint when 

ready.  Because of rehab needs, she will have her surgery pushed back a few 

weeks. 





1.  Elevated troponin-TTE revealed a large sized apical, septal, inferior, and 

posterior wall motion abnormality with HK of the segments, LV systolic function 

is mildly reduced, EF is 46%, GRade I DD seen.  Systolic dysfunction is 

actually old and she is compensated with no new symptoms of concern for acute 

process.  Appreciate Dr. Ramsey evaluation.  She will be transferred back to 

floor while awaiting rehab placement. 


2.  Chronic systolic heart failure-compensated, cont current medical therapy 

with no changes. 


3.  ESRD-cont HD per Nephrology.


4.  Muscle pain/back strain-no fractures/dislocations on admission imaging CTs.

  Pain has improved somewhat with the percocet.  Will stop this now and start a 

trial of scheduled Valium and Tylenol.  Encouraged ambulation-appears to have 

low motivation and just wants to sleep.  Rehab referral to be placed.  Kpad 

ordered for comfort. 


5.  Weakness-multifactorial in setting of noncompliance with medications for 

multiple comorbidities and noncompliance with dialysis.  Obese, deconditioned 

and not motivated to improve.  


6.  DMII with retinopathy-ISS/Lantus, controlled.


7.  HTN-cont with home meds. 


8.  PAD-pt states she is to go for a carotid surgery next week.  This will 

likely be delayed.  Cont ASA and statin.


9.  Noncompliance.--patient has not been taking her medications for over 1 

year.  Restarted on meds, however, will watch closely for side effects, etc.  

This is important in setting of upcoming vascular surgery which puts her a 

higher risk of perioperative mortality.  Also has been noncompliant with 

inpatient medications. 


10.  Fatigue 2/2 insomnia today--changed sertraline dosing to am instead of pm 

to help with this.  Likely mutlifactorial from renal failure, obesity with 

deconditioning, meds, etc





DVT proph-Heparin


FULL CODE


Dispo-to med/surg, ok to go to rehab when bed available. 





Melanie Naqvi DO


[facility] Hospitalist


Current Inpatient Medications:





Current Inpatient Medications








 Medications


  (Trade)  Dose


 Ordered  Sig/Willy


 Route  Start Time


 Stop Time Status Last Admin


Dose Admin


 


 Heparin Sodium


  (Porcine)


  (Heparin Sq 5000


 Unit/0.5ml)  5,000 unit  Q8


 SQ  8/11/17 06:00


 9/10/17 05:59  8/13/17 05:52


5,000 UNIT


 


 Acetaminophen


  (Tylenol Tab)  650 mg  Q4H  PRN


 PO  8/11/17 01:45


 9/10/17 01:44  8/12/17 02:01


650 MG


 


 Ondansetron HCl


  (Zofran Inj)  4 mg  Q6H  PRN


 IV  8/11/17 01:45


 9/10/17 01:44  8/13/17 00:05


4 MG


 


 Nitroglycerin


  (Nitrostat Tab)  0.4 mg  UD  PRN


 SL  8/11/17 01:45


 9/10/17 01:44   


 


 


 Aspirin


  (Ecotrin Tab)  81 mg  DAILY


 PO  8/12/17 08:00


 9/11/17 08:59  8/13/17 08:23


81 MG


 


 Atorvastatin


 Calcium


  (Lipitor Tab)  40 mg  QAM


 PO  8/11/17 09:00


 9/10/17 08:59  8/13/17 08:24


40 MG


 


 Calcium Acetate


  (Phoslo Cap)  667 mg  TIDM


 PO  8/11/17 07:30


 9/10/17 07:29  8/13/17 08:22


667 MG


 


 Dicyclomine HCl


  (Bentyl Cap)  10 mg  QID  PRN


 PO  8/11/17 02:00


 9/10/17 01:59   


 


 


 Fluticasone


 Propionate


  (Flonase Nasal


 Spray)  2 sprays  DAILY


 NA  8/11/17 09:00


 9/10/17 08:59  8/13/17 08:23


2 SPRAYS


 


 Insulin Glargine


  (Lantus Solostar


 Pen)  10 units  QPM


 SC  8/11/17 21:00


 9/10/17 20:59  8/12/17 21:05


10 UNITS


 


 Lorazepam


  (Ativan Tab)  0.5 mg  DAILY  PRN


 PO  8/11/17 02:00


 9/10/17 01:59   


 


 


 Metoprolol


 Tartrate


  (Lopressor Tab)  50 mg  BID


 PO  8/11/17 09:00


 9/10/17 08:59  8/13/17 08:24


50 MG


 


 Pantoprazole


 Sodium


  (Protonix Tab)  40 mg  QAM


 PO  8/11/17 09:00


 9/10/17 08:59  8/13/17 08:25


40 MG


 


 Sertraline HCl


  (Zoloft Tab)  100 mg  HS


 PO  8/11/17 21:00


 9/10/17 20:59  8/12/17 21:00


100 MG


 


 Triamcinolone


 Acetonide


  (Kenalog 0.5%


 Crm)  1 appln  BID


 EXT  8/11/17 09:00


 9/10/17 08:59  8/13/17 08:23


1 APPLN


 


 Loratadine


  (Claritin Tab)  10 mg  PM


 PO  8/11/17 21:00


 9/10/17 20:59  8/12/17 21:00


10 MG


 


 Oxycodone/


 Acetaminophen


  (Percocet


 5-325mg Tab)  1 tab  Q6H  PRN


 PO  8/11/17 02:00


 8/25/17 01:59  8/13/17 10:07


1 TAB


 


 Isosorbide


 Mononitrate


  (Imdur Ext Rel


 Tab)  90 mg  QAM


 PO  8/11/17 09:00


 9/10/17 08:59  8/13/17 08:24


90 MG


 


 Glucose


  (Glucose 40% Gel)  15-30


 GRAMS 15


 GRAMS...  UD  PRN


 PO  8/11/17 02:30


 9/10/17 02:29   


 


 


 Glucose


  (Glucose Chew


 Tab)  4-8


 Tablets 4


 Tabl...  UD  PRN


 PO  8/11/17 02:30


 9/10/17 02:29   


 


 


 Dextrose


  (Dextrose 50%


 50ML Syringe)  25-50ML OF


 50% DW IV


 FOR...  UD  PRN


 IV  8/11/17 02:30


 9/10/17 02:29   


 


 


 Glucagon


  (Glucagon Inj)  1 mg  UD  PRN


 SQ  8/11/17 02:30


 9/10/17 02:29   


 


 


 Sodium Chloride


  ([place] Nasal


 Spray)  1 sprays  PRN  PRN


 NA  8/11/17 04:30


 9/10/17 04:29  8/11/17 06:13


1 SPRAYS


 


 Miscellaneous


  (Iv Fluids


 Completed)  1 ea  PRN  PRN


 N/A  8/11/17 04:45


 8/11/18 04:44

## 2017-08-14 VITALS — SYSTOLIC BLOOD PRESSURE: 126 MMHG | DIASTOLIC BLOOD PRESSURE: 67 MMHG | HEART RATE: 67 BPM

## 2017-08-14 VITALS — HEART RATE: 75 BPM | SYSTOLIC BLOOD PRESSURE: 125 MMHG | DIASTOLIC BLOOD PRESSURE: 87 MMHG

## 2017-08-14 VITALS — DIASTOLIC BLOOD PRESSURE: 76 MMHG | SYSTOLIC BLOOD PRESSURE: 132 MMHG | HEART RATE: 63 BPM

## 2017-08-14 VITALS — DIASTOLIC BLOOD PRESSURE: 75 MMHG | SYSTOLIC BLOOD PRESSURE: 122 MMHG | HEART RATE: 64 BPM

## 2017-08-14 VITALS — HEART RATE: 61 BPM | SYSTOLIC BLOOD PRESSURE: 107 MMHG | DIASTOLIC BLOOD PRESSURE: 45 MMHG

## 2017-08-14 VITALS — SYSTOLIC BLOOD PRESSURE: 116 MMHG | TEMPERATURE: 97.88 F | HEART RATE: 59 BPM | DIASTOLIC BLOOD PRESSURE: 68 MMHG

## 2017-08-14 VITALS — DIASTOLIC BLOOD PRESSURE: 64 MMHG | HEART RATE: 62 BPM | SYSTOLIC BLOOD PRESSURE: 111 MMHG

## 2017-08-14 VITALS — TEMPERATURE: 98.06 F | SYSTOLIC BLOOD PRESSURE: 125 MMHG | HEART RATE: 75 BPM | DIASTOLIC BLOOD PRESSURE: 87 MMHG

## 2017-08-14 VITALS — HEART RATE: 60 BPM | DIASTOLIC BLOOD PRESSURE: 70 MMHG | SYSTOLIC BLOOD PRESSURE: 129 MMHG

## 2017-08-14 VITALS — DIASTOLIC BLOOD PRESSURE: 52 MMHG | HEART RATE: 61 BPM | SYSTOLIC BLOOD PRESSURE: 98 MMHG

## 2017-08-14 VITALS — SYSTOLIC BLOOD PRESSURE: 139 MMHG | DIASTOLIC BLOOD PRESSURE: 79 MMHG | HEART RATE: 70 BPM

## 2017-08-14 VITALS — SYSTOLIC BLOOD PRESSURE: 115 MMHG | DIASTOLIC BLOOD PRESSURE: 67 MMHG | HEART RATE: 58 BPM

## 2017-08-14 VITALS — HEART RATE: 65 BPM | DIASTOLIC BLOOD PRESSURE: 81 MMHG | SYSTOLIC BLOOD PRESSURE: 143 MMHG

## 2017-08-14 VITALS
DIASTOLIC BLOOD PRESSURE: 76 MMHG | TEMPERATURE: 97.88 F | OXYGEN SATURATION: 98 % | HEART RATE: 69 BPM | SYSTOLIC BLOOD PRESSURE: 159 MMHG

## 2017-08-14 VITALS — HEART RATE: 58 BPM | SYSTOLIC BLOOD PRESSURE: 118 MMHG | DIASTOLIC BLOOD PRESSURE: 60 MMHG

## 2017-08-14 VITALS — SYSTOLIC BLOOD PRESSURE: 115 MMHG | DIASTOLIC BLOOD PRESSURE: 50 MMHG | HEART RATE: 57 BPM

## 2017-08-14 VITALS — DIASTOLIC BLOOD PRESSURE: 56 MMHG | HEART RATE: 56 BPM | SYSTOLIC BLOOD PRESSURE: 110 MMHG

## 2017-08-14 LAB
ANION GAP SERPL CALC-SCNC: 13 MMOL/L (ref 3–11)
BUN SERPL-MCNC: 68 MG/DL (ref 7–18)
BUN/CREAT SERPL: 7.3 (ref 10–20)
CALCIUM SERPL-MCNC: 9.6 MG/DL (ref 8.5–10.1)
CHLORIDE SERPL-SCNC: 96 MMOL/L (ref 98–107)
CO2 SERPL-SCNC: 26 MMOL/L (ref 21–32)
CREAT CL PREDICTED SERPL C-G-VRATE: 9.1 ML/MIN
CREAT SERPL-MCNC: 9.2 MG/DL (ref 0.6–1.2)
GLUCOSE SERPL-MCNC: 142 MG/DL (ref 70–99)
MAGNESIUM SERPL-MCNC: 2.5 MG/DL (ref 1.8–2.4)
PHOSPHATE SERPL-MCNC: 8.8 MG/DL (ref 2.5–4.9)
POTASSIUM SERPL-SCNC: 4.3 MMOL/L (ref 3.5–5.1)
SODIUM SERPL-SCNC: 135 MMOL/L (ref 136–145)

## 2017-08-14 RX ADMIN — FLUTICASONE PROPIONATE SCH SPRAYS: 50 SPRAY, METERED NASAL at 08:14

## 2017-08-14 RX ADMIN — HEPARIN SODIUM SCH UNIT: 10000 INJECTION, SOLUTION INTRAVENOUS; SUBCUTANEOUS at 14:00

## 2017-08-14 RX ADMIN — HEPARIN SODIUM SCH UNIT: 10000 INJECTION, SOLUTION INTRAVENOUS; SUBCUTANEOUS at 20:47

## 2017-08-14 RX ADMIN — CALCIUM ACETATE SCH MG: 667 CAPSULE ORAL at 12:00

## 2017-08-14 RX ADMIN — TRIAMCINOLONE ACETONIDE SCH APPLN: 5 CREAM TOPICAL at 08:03

## 2017-08-14 RX ADMIN — INSULIN GLARGINE SCH UNITS: 100 INJECTION, SOLUTION SUBCUTANEOUS at 20:46

## 2017-08-14 RX ADMIN — ACETAMINOPHEN SCH MG: 500 TABLET, COATED ORAL at 20:27

## 2017-08-14 RX ADMIN — LIDOCAINE SCH PATCH: 50 PATCH CUTANEOUS at 08:08

## 2017-08-14 RX ADMIN — SERTRALINE HYDROCHLORIDE SCH MG: 100 TABLET, FILM COATED ORAL at 08:07

## 2017-08-14 RX ADMIN — CALCIUM ACETATE SCH MG: 667 CAPSULE ORAL at 17:00

## 2017-08-14 RX ADMIN — Medication SCH MG: at 20:26

## 2017-08-14 RX ADMIN — ACETAMINOPHEN SCH MG: 500 TABLET, COATED ORAL at 15:52

## 2017-08-14 RX ADMIN — HEPARIN SODIUM SCH UNIT: 10000 INJECTION, SOLUTION INTRAVENOUS; SUBCUTANEOUS at 05:52

## 2017-08-14 RX ADMIN — DIAZEPAM SCH MG: 2 TABLET ORAL at 08:13

## 2017-08-14 RX ADMIN — METOPROLOL TARTRATE SCH MG: 50 TABLET, FILM COATED ORAL at 08:06

## 2017-08-14 RX ADMIN — DIAZEPAM SCH MG: 2 TABLET ORAL at 15:52

## 2017-08-14 RX ADMIN — TRIAMCINOLONE ACETONIDE SCH APPLN: 5 CREAM TOPICAL at 20:28

## 2017-08-14 RX ADMIN — ISOSORBIDE MONONITRATE SCH MG: 30 TABLET, EXTENDED RELEASE ORAL at 08:04

## 2017-08-14 RX ADMIN — Medication SCH MG: at 08:06

## 2017-08-14 RX ADMIN — PANTOPRAZOLE SCH MG: 40 TABLET, DELAYED RELEASE ORAL at 08:04

## 2017-08-14 RX ADMIN — Medication SCH EA: at 20:29

## 2017-08-14 RX ADMIN — CALCIUM ACETATE SCH MG: 667 CAPSULE ORAL at 08:07

## 2017-08-14 RX ADMIN — ACETAMINOPHEN SCH MG: 500 TABLET, COATED ORAL at 08:05

## 2017-08-14 RX ADMIN — METOPROLOL TARTRATE SCH MG: 50 TABLET, FILM COATED ORAL at 20:28

## 2017-08-14 RX ADMIN — ATORVASTATIN CALCIUM SCH MG: 20 TABLET, FILM COATED ORAL at 08:06

## 2017-08-14 RX ADMIN — DIAZEPAM SCH MG: 2 TABLET ORAL at 20:30

## 2017-08-14 NOTE — PROGRESS NOTE
Medicine Progress Note


Date & Time of Visit:


Aug 14, 2017 at 17:05.


Subjective


tolerating PO 


Valium and Tylenol have helped her pain overnight


she feels she is moving more.


She is OK to proceed with surgery tomorrow. 


Denies CP or SOB





Objective





Last 8 Hrs








  Date Time  Temp Pulse Resp B/P (MAP) Pulse Ox O2 Delivery O2 Flow Rate FiO2


 


8/14/17 16:04      Room Air  


 


8/14/17 15:08 36.6 59  116/68 (84)    


 


8/14/17 15:00  61  98/52    


 


8/14/17 14:45  62  111/64    


 


8/14/17 14:30  61  107/45    


 


8/14/17 14:15  56  110/56    


 


8/14/17 14:00  58  118/60    


 


8/14/17 13:45  57  115/50    


 


8/14/17 13:30  60  129/70    


 


8/14/17 13:15  64  122/75    


 


8/14/17 13:00  58  115/67    


 


8/14/17 12:45  63  132/76    


 


8/14/17 12:30  65  143/81    


 


8/14/17 12:15  70  139/79    


 


8/14/17 12:00  75  125/87    


 


8/14/17 11:45 36.7 75  125/87 (100)    








Physical Exam:


GEN: morbid obesity, in no acute distress, alert and appropriate, receiving HD


HEENT: NC/AT, pupils equal and reactive bilaterally, normal sclerae, MMM


CARDIO: reg rate, S1/2 heard without m/g/r


LUNGS: CTA bilaterally, no crackles, rales or wheezes, good diaphragmatic 

excursion


ABD: soft, non-tender, non-distended, no rebound or guarding, +BS


BACK: paraspinal TTP in thoracic and lumbar spine


EXTREMITY: RP and DP palpable 2+ bilat, no LE swelling or edema, extremities 

are warm and well-perfused


NEURO: CN 2-12 grossly intact, no gross focal deficits


MUSC: 5/5 strength throughout, moves all extremities equally


SKIN:  warm and dry


Laboratory Results:


8/13/17 05:21








8/14/17 06:55

















Test


  8/10/17


21:00 8/11/17


10:25 8/11/17


14:19 8/13/17


05:21


 


Immature Granulocyte % (Auto) 0.2 %    


 


White Blood Count


  13.35 K/uL


(4.8-10.8) 


  


  


 


 


Red Blood Count


  3.41 M/uL


(4.2-5.4) 


  


  3.52 M/uL


(4.2-5.4)


 


Hemoglobin


  10.3 g/dL


(12.0-16.0) 


  


  


 


 


Hematocrit 31.4 % (37-47)    


 


Mean Corpuscular Volume


  92.1 fL


() 


  


  96.0 fL


()


 


Mean Corpuscular Hemoglobin


  30.2 pg


(25-34) 


  


  31.0 pg


(25-34)


 


Mean Corpuscular Hemoglobin


Concent 32.8 g/dl


(32-36) 


  


  32.2 g/dl


(32-36)


 


Platelet Count


  368 K/uL


(130-400) 


  


  


 


 


Mean Platelet Volume


  11.2 fL


(7.4-10.4) 


  


  11.2 fL


(7.4-10.4)


 


Neutrophils (%) (Auto) 77.2 %    


 


Lymphocytes (%) (Auto) 14.6 %    


 


Monocytes (%) (Auto) 7.2 %    


 


Eosinophils (%) (Auto) 0.7 %    


 


Basophils (%) (Auto) 0.1 %    


 


Neutrophils # (Auto)


  10.29 K/uL


(1.4-6.5) 


  


  


 


 


Lymphocytes # (Auto)


  1.95 K/uL


(1.2-3.4) 


  


  


 


 


Monocytes # (Auto)


  0.96 K/uL


(0.11-0.59) 


  


  


 


 


Eosinophils # (Auto)


  0.10 K/uL


(0-0.5) 


  


  


 


 


Basophils # (Auto)


  0.02 K/uL


(0-0.2) 


  


  


 


 


Immature Granulocyte # (Auto)


  0.03 K/uL


(0.00-0.02) 


  


  


 


 


Total Bilirubin


  0.4 mg/dl


(0.2-1) 


  


  


 


 


Aspartate Amino Transf


(AST/SGOT) 7 U/L (15-37) 


  


  


  


 


 


Alanine Aminotransferase


(ALT/SGPT) 12 U/L (12-78) 


  


  


  


 


 


Alkaline Phosphatase


  67 U/L


() 


  


  


 


 


Total Protein


  8.2 gm/dl


(6.4-8.2) 


  


  


 


 


Albumin


  2.8 gm/dl


(3.4-5.0) 


  


  


 


 


Globulin


  5.4 gm/dl


(2.5-4.0) 


  


  


 


 


Albumin/Globulin Ratio 0.5 (0.9-2)    


 


Urine Color  YELLOW   


 


Urine Appearance  CLEAR (CLEAR)   


 


Urine pH  7.0 (4.5-7.5)   


 


Urine Specific Gravity


  


  1.017


(1.000-1.030) 


  


 


 


Urine Protein  3+ (NEG)   


 


Urine Glucose (UA)  1+ (NEG)   


 


Urine Ketones  NEG (NEG)   


 


Urine Occult Blood  TRACE (NEG)   


 


Urine Nitrite  NEG (NEG)   


 


Urine Bilirubin  NEG (NEG)   


 


Urine Urobilinogen  NEG (NEG)   


 


Urine Leukocyte Esterase  SMALL (NEG)   


 


Urine WBC (Auto)


  


  10-30 /hpf


(0-5) 


  


 


 


Urine RBC (Auto)  0-4 /hpf (0-4)   


 


Urine Hyaline Casts (Auto)  1-5 /lpf (0-5)   


 


Urine Epithelial Cells (Auto)  >30 /lpf (0-5)   


 


Urine Bacteria (Auto)  1+ (NEG)   


 


Total Creatine Kinase


  


  


  41 U/L


() 


 


 


Creatine Kinase MB


  


  


  3.8 ng/ml


(0.5-3.6) 


 


 


Creatine Kinase MB Ratio   9.3 (0-3.0)  


 


Troponin I


  


  


  0.125 ng/ml


(0-0.045) 


 


 


RDW Standard Deviation


  


  


  


  49.9 fL


(36.4-46.3)


 


RDW Coefficient of Variation


  


  


  


  14.3 %


(11.5-14.5)


 


Test


  8/14/17


06:55 8/14/17


20:43 


  


 


 


Anion Gap


  13.0 mmol/L


(3-11) 


  


  


 


 


Est Creatinine Clear Calc


Drug Dose 9.1 ml/min 


  


  


  


 


 


Estimated GFR (


American) 5.2 


  


  


  


 


 


Estimated GFR (Non-


American 4.5 


  


  


  


 


 


BUN/Creatinine Ratio 7.3 (10-20)    


 


Calcium Level


  9.6 mg/dl


(8.5-10.1) 


  


  


 


 


Phosphorus Level


  8.8 mg/dl


(2.5-4.9) 


  


  


 


 


Magnesium Level


  2.5 mg/dl


(1.8-2.4) 


  


  


 


 


Bedside Glucose


  


  141 mg/dl


(70-90) 


  


 














 Date/Time


Source Procedure


Growth Status


 


 


 8/11/17 10:25


Urine , Clean Catch Urine Culture - Final


MORE THAN THREE TYPES OF ORGANISMS OK... Complete








Last 24 Hours








Test


  8/13/17


21:34 8/14/17


06:55


 


Bedside Glucose 166 mg/dl  


 


Sodium Level  135 mmol/L 


 


Potassium Level  4.3 mmol/L 


 


Chloride Level  96 mmol/L 


 


Carbon Dioxide Level  26 mmol/L 


 


Anion Gap  13.0 mmol/L 


 


Blood Urea Nitrogen  68 mg/dl 


 


Creatinine  9.20 mg/dl 


 


Est Creatinine Clear Calc


Drug Dose 


  9.1 ml/min 


 


 


Estimated GFR (


American) 


  5.2 


 


 


Estimated GFR (Non-


American 


  4.5 


 


 


BUN/Creatinine Ratio  7.3 


 


Random Glucose  142 mg/dl 


 


Calcium Level  9.6 mg/dl 


 


Phosphorus Level  8.8 mg/dl 


 


Magnesium Level  2.5 mg/dl 











Assessment & Plan


51 yo F s/p fall in [place] three days ago (was on hands and knees on the 

beach and tipped over on her side).  Elevated troponins without chest pain or 

shortness of breath thought 2/2 probable diastolic dysfunction in setting of 

noncompliance and ESRD, also noncompliant with HD.  TTE ordered revealing wall 

motion abnormalities consistent with poss inferior MI.  Cont tele monitoring at 

this time and consult cardiology for further evaluation especially as she is 

slated to undergo a carotid revascularization soon.  Currently PT recommends 

rehabilitation when she leaves the hospital-CM aware and will place referral--

pt is willing to go.  Per Cardiology evaluation, wall motion abnormalities are 

old and she is OK to proceed to carotid surgery from a cardiac standpoint when 

ready.  She was transferred back to [facility] and after discussion with Dr. Roberts today, he would like to proceed with her carotid surgery.  NPO p MN.  

Received HD today in preparation.  





1.  Muscle pain/back strain-no fractures/dislocations on admission imaging CTs.

  Pain controlled with scheduled Valium and Tylenol.  Encouraged ambulation-

appears to have low motivation and just wants to sleep.  Rehab referral to be 

placed and she was accepted to [facility], which is now delayed based on her 

surgery plans tomorrow.  Kpad ordered for comfort. 


2.  Elevated troponin-TTE revealed a large sized apical, septal, inferior, and 

posterior wall motion abnormality with HK of the segments, LV systolic function 

is mildly reduced, EF is 46%, GRade I DD seen.  Systolic dysfunction is 

actually old and she is compensated with no new symptoms of concern for acute 

process.  Appreciate Dr. Ramsey evaluation.  These are all chronic issues.  She 

will be transferred back to [facility] while awaiting rehab placement. 


3.  Chronic systolic heart failure-compensated, cont current medical therapy 

with no changes.  Of note, patient has been noncompliant with all medications 

for the past 6-12 months as outaptient.  Watch for hypotension, etc.  


4.  ESRD-cont HD per Nephrology.


5.  Weakness-multifactorial in setting of noncompliance with medications for 

multiple comorbidities and noncompliance with dialysis.  Obese, deconditioned 

and not motivated to improve.  For now, plans to go to rehab on discharge. 


6.  DMII with retinopathy-ISS/Lantus, controlled.


7.  HTN-cont with home meds. 


8.  PAD-For carotid surgery tomorrow.  On ASA and statin. 


9.  Noncompliance.--patient has not been taking her medications for over 1 

year.  Restarted on meds, however, will watch closely for side effects, etc.  

This is important in setting of upcoming vascular surgery which puts her a 

higher risk of perioperative mortality.  Also has been noncompliant with 

inpatient medications. 


10.  Fatigue 2/2 insomnia today--changed sertraline dosing to am instead of pm 

to help with this.  Likely mutlifactorial from renal failure undergoing HD, 

obesity with deconditioning, meds, etc





DVT proph-Heparin


FULL CODE


Dispo-to surgery in am, then reevaluate with PT/OT





Melanie Naqvi DO


[facility] Hospitalist


Consultants:


Vascular Surgery-[facility]


Cardiology-[facility]


Nephro-[facility]


Current Inpatient Medications:





Current Inpatient Medications








 Medications


  (Trade)  Dose


 Ordered  Sig/Willy


 Route  Start Time


 Stop Time Status Last Admin


Dose Admin


 


 Heparin Sodium


  (Porcine)


  (Heparin Sq 5000


 Unit/0.5ml)  5,000 unit  Q8


 SQ  8/11/17 06:00


 9/10/17 05:59  8/14/17 05:52


5,000 UNIT


 


 Ondansetron HCl


  (Zofran Inj)  4 mg  Q6H  PRN


 IV  8/11/17 01:45


 9/10/17 01:44  8/13/17 00:05


4 MG


 


 Nitroglycerin


  (Nitrostat Tab)  0.4 mg  UD  PRN


 SL  8/11/17 01:45


 9/10/17 01:44   


 


 


 Aspirin


  (Ecotrin Tab)  81 mg  DAILY


 PO  8/12/17 08:00


 9/11/17 08:59  8/14/17 08:06


81 MG


 


 Atorvastatin


 Calcium


  (Lipitor Tab)  40 mg  QAM


 PO  8/11/17 09:00


 9/10/17 08:59  8/14/17 08:06


40 MG


 


 Dicyclomine HCl


  (Bentyl Cap)  10 mg  QID  PRN


 PO  8/11/17 02:00


 9/10/17 01:59   


 


 


 Fluticasone


 Propionate


  (Flonase Nasal


 Spray)  2 sprays  DAILY


 NA  8/11/17 09:00


 9/10/17 08:59  8/14/17 08:14


2 SPRAYS


 


 Insulin Glargine


  (Lantus Solostar


 Pen)  10 units  QPM


 SC  8/11/17 21:00


 9/10/17 20:59  8/13/17 21:37


10 UNITS


 


 Lorazepam


  (Ativan Tab)  0.5 mg  DAILY  PRN


 PO  8/11/17 02:00


 9/10/17 01:59   


 


 


 Metoprolol


 Tartrate


  (Lopressor Tab)  50 mg  BID


 PO  8/11/17 09:00


 9/10/17 08:59  8/14/17 08:06


50 MG


 


 Pantoprazole


 Sodium


  (Protonix Tab)  40 mg  QAM


 PO  8/11/17 09:00


 9/10/17 08:59  8/14/17 08:04


40 MG


 


 Triamcinolone


 Acetonide


  (Kenalog 0.5%


 Crm)  1 appln  BID


 EXT  8/11/17 09:00


 9/10/17 08:59  8/14/17 08:03


1 APPLN


 


 Loratadine


  (Claritin Tab)  10 mg  PM


 PO  8/11/17 21:00


 9/10/17 20:59  8/13/17 21:30


10 MG


 


 Isosorbide


 Mononitrate


  (Imdur Ext Rel


 Tab)  90 mg  QAM


 PO  8/11/17 09:00


 9/10/17 08:59  8/14/17 08:04


90 MG


 


 Glucose


  (Glucose 40% Gel)  15-30


 GRAMS 15


 GRAMS...  UD  PRN


 PO  8/11/17 02:30


 9/10/17 02:29   


 


 


 Glucose


  (Glucose Chew


 Tab)  4-8


 Tablets 4


 Tabl...  UD  PRN


 PO  8/11/17 02:30


 9/10/17 02:29   


 


 


 Dextrose


  (Dextrose 50%


 50ML Syringe)  25-50ML OF


 50% DW IV


 FOR...  UD  PRN


 IV  8/11/17 02:30


 9/10/17 02:29   


 


 


 Glucagon


  (Glucagon Inj)  1 mg  UD  PRN


 SQ  8/11/17 02:30


 9/10/17 02:29   


 


 


 Sodium Chloride


  (Ocean Nasal


 Spray)  1 sprays  PRN  PRN


 NA  8/11/17 04:30


 9/10/17 04:29  8/11/17 06:13


1 SPRAYS


 


 Miscellaneous


  (Iv Fluids


 Completed)  1 ea  PRN  PRN


 N/A  8/11/17 04:45


 8/11/18 04:44   


 


 


 Sertraline HCl


  (Zoloft Tab)  100 mg  QAM


 PO  8/14/17 08:00


 9/10/17 20:59  8/14/17 08:07


100 MG


 


 Diazepam


  (Valium Tab)  2 mg  TID


 PO  8/13/17 14:00


 9/12/17 13:59  8/14/17 15:52


2 MG


 


 Acetaminophen


  (Tylenol Tab)  1,000 mg  TID


 PO  8/13/17 14:00


 9/12/17 13:59  8/14/17 15:52


1,000 MG


 


 Lidocaine


  (Lidoderm Patch


 5%)  1 patch  QAM


 TD  8/13/17 12:00


 9/12/17 11:59  8/14/17 08:08


1 PATCH


 


 Miscellaneous


  (Remove Lidoderm


 Patch)  1 ea  DAILY@21


 N/A  8/13/17 21:00


 9/12/17 20:59  8/13/17 21:00


1 EA


 


 Calcium Acetate


  (Phoslo Cap)  2,001 mg  TIDM


 PO  8/14/17 08:00


 9/10/17 07:29  8/14/17 08:07


2,001 MG


 


 Epoetin Narayan


  (Procrit Inj)  10,000 units  TODAY@0800


 IV.  8/14/17 08:00


 8/14/17 18:00   


 


 


 Cefazolin Sodium


 1000 mg/Dextrose  55 ml @ 


 100 mls/hr  PREOP


 IV  8/15/17 06:00


 8/15/17 15:59

## 2017-08-14 NOTE — DIALYSIS PROGRESS NOTE
Nephrology Dialysis Note


Date of Service:


Aug 14, 2017.


Subjective


51 yo female with esrd who presented with diffuse muscle aches following a fall 

and did not have dialysis for about a week prior to admission.  pts muscle 

aches are improving. tolerating po meds well. pt scared to have carotid 

surgery.  seen on dialysis and tolerating fluid removal. no cramping. 

complaining of hip pain so difficult for her to walk.





Objective











  Date Time  Temp Pulse Resp B/P (MAP) Pulse Ox O2 Delivery O2 Flow Rate FiO2


 


8/14/17 15:08 36.6 59  116/68 (84)    


 


8/14/17 15:00  61  98/52    


 


8/14/17 14:45  62  111/64    


 


8/14/17 14:30  61  107/45    


 


8/14/17 14:15  56  110/56    


 


8/14/17 14:00  58  118/60    


 


8/14/17 13:45  57  115/50    


 


8/14/17 13:30  60  129/70    


 


8/14/17 13:15  64  122/75    


 


8/14/17 13:00  58  115/67    


 


8/14/17 12:45  63  132/76    


 


8/14/17 12:30  65  143/81    


 


8/14/17 12:15  70  139/79    


 


8/14/17 12:00  75  125/87    


 


8/14/17 11:45 36.7 75  125/87 (100)    


 


8/14/17 08:00      Room Air  


 


8/14/17 07:47 36.6 69 16 159/76 (103) 98 Room Air  


 


8/14/17 00:00      Room Air  


 


8/13/17 23:23 36.9 72 18 113/58 (76) 94 Room Air  


 


8/13/17 16:00      Room Air  








Physical Exam:


General-aaox3, obese


Eyes-no scleral icterus


ENT-mmm


Neck-supple


Lungs-cta


Heart-regular to melissa with 2/6 systolic murmur


Abdomen-bs+ s/nt/nd 


Extremities-no c/c/e 


Neuro-nonfocal





Current Inpatient Medications








 Medications


  (Trade)  Dose


 Ordered  Sig/Willy


 Route  Start Time


 Stop Time Status Last Admin


Dose Admin


 


 Heparin Sodium


  (Porcine)


  (Heparin Sq 5000


 Unit/0.5ml)  5,000 unit  Q8


 SQ  8/11/17 06:00


 9/10/17 05:59  8/14/17 05:52


5,000 UNIT


 


 Ondansetron HCl


  (Zofran Inj)  4 mg  Q6H  PRN


 IV  8/11/17 01:45


 9/10/17 01:44  8/13/17 00:05


4 MG


 


 Nitroglycerin


  (Nitrostat Tab)  0.4 mg  UD  PRN


 SL  8/11/17 01:45


 9/10/17 01:44   


 


 


 Aspirin


  (Ecotrin Tab)  81 mg  DAILY


 PO  8/12/17 08:00


 9/11/17 08:59  8/14/17 08:06


81 MG


 


 Atorvastatin


 Calcium


  (Lipitor Tab)  40 mg  QAM


 PO  8/11/17 09:00


 9/10/17 08:59  8/14/17 08:06


40 MG


 


 Dicyclomine HCl


  (Bentyl Cap)  10 mg  QID  PRN


 PO  8/11/17 02:00


 9/10/17 01:59   


 


 


 Fluticasone


 Propionate


  (Flonase Nasal


 Spray)  2 sprays  DAILY


 NA  8/11/17 09:00


 9/10/17 08:59  8/14/17 08:14


2 SPRAYS


 


 Insulin Glargine


  (Lantus Solostar


 Pen)  10 units  QPM


 SC  8/11/17 21:00


 9/10/17 20:59  8/13/17 21:37


10 UNITS


 


 Lorazepam


  (Ativan Tab)  0.5 mg  DAILY  PRN


 PO  8/11/17 02:00


 9/10/17 01:59   


 


 


 Metoprolol


 Tartrate


  (Lopressor Tab)  50 mg  BID


 PO  8/11/17 09:00


 9/10/17 08:59  8/14/17 08:06


50 MG


 


 Pantoprazole


 Sodium


  (Protonix Tab)  40 mg  QAM


 PO  8/11/17 09:00


 9/10/17 08:59  8/14/17 08:04


40 MG


 


 Triamcinolone


 Acetonide


  (Kenalog 0.5%


 Crm)  1 appln  BID


 EXT  8/11/17 09:00


 9/10/17 08:59  8/14/17 08:03


1 APPLN


 


 Loratadine


  (Claritin Tab)  10 mg  PM


 PO  8/11/17 21:00


 9/10/17 20:59  8/13/17 21:30


10 MG


 


 Isosorbide


 Mononitrate


  (Imdur Ext Rel


 Tab)  90 mg  QAM


 PO  8/11/17 09:00


 9/10/17 08:59  8/14/17 08:04


90 MG


 


 Glucose


  (Glucose 40% Gel)  15-30


 GRAMS 15


 GRAMS...  UD  PRN


 PO  8/11/17 02:30


 9/10/17 02:29   


 


 


 Glucose


  (Glucose Chew


 Tab)  4-8


 Tablets 4


 Tabl...  UD  PRN


 PO  8/11/17 02:30


 9/10/17 02:29   


 


 


 Dextrose


  (Dextrose 50%


 50ML Syringe)  25-50ML OF


 50% DW IV


 FOR...  UD  PRN


 IV  8/11/17 02:30


 9/10/17 02:29   


 


 


 Glucagon


  (Glucagon Inj)  1 mg  UD  PRN


 SQ  8/11/17 02:30


 9/10/17 02:29   


 


 


 Sodium Chloride


  (Ocean Nasal


 Spray)  1 sprays  PRN  PRN


 NA  8/11/17 04:30


 9/10/17 04:29  8/11/17 06:13


1 SPRAYS


 


 Miscellaneous


  (Iv Fluids


 Completed)  1 ea  PRN  PRN


 N/A  8/11/17 04:45


 8/11/18 04:44   


 


 


 Sertraline HCl


  (Zoloft Tab)  100 mg  QAM


 PO  8/14/17 08:00


 9/10/17 20:59  8/14/17 08:07


100 MG


 


 Diazepam


  (Valium Tab)  2 mg  TID


 PO  8/13/17 14:00


 9/12/17 13:59  8/14/17 08:13


2 MG


 


 Acetaminophen


  (Tylenol Tab)  1,000 mg  TID


 PO  8/13/17 14:00


 9/12/17 13:59  8/14/17 08:05


1,000 MG


 


 Lidocaine


  (Lidoderm Patch


 5%)  1 patch  QAM


 TD  8/13/17 12:00


 9/12/17 11:59  8/14/17 08:08


1 PATCH


 


 Miscellaneous


  (Remove Lidoderm


 Patch)  1 ea  DAILY@21


 N/A  8/13/17 21:00


 9/12/17 20:59  8/13/17 21:00


1 EA


 


 Calcium Acetate


  (Phoslo Cap)  2,001 mg  TIDM


 PO  8/14/17 08:00


 9/10/17 07:29  8/14/17 08:07


2,001 MG


 


 Epoetin Narayan


  (Procrit Inj)  10,000 units  TODAY@0800


 IV.  8/14/17 08:00


 8/14/17 18:00   


 











Last 24 Hours








Test


  8/13/17


21:34 8/14/17


06:55


 


Bedside Glucose 166 mg/dl  


 


Sodium Level  135 mmol/L 


 


Potassium Level  4.3 mmol/L 


 


Chloride Level  96 mmol/L 


 


Carbon Dioxide Level  26 mmol/L 


 


Anion Gap  13.0 mmol/L 


 


Blood Urea Nitrogen  68 mg/dl 


 


Creatinine  9.20 mg/dl 


 


Est Creatinine Clear Calc


Drug Dose 


  9.1 ml/min 


 


 


Estimated GFR (


American) 


  5.2 


 


 


Estimated GFR (Non-


American 


  4.5 


 


 


BUN/Creatinine Ratio  7.3 


 


Random Glucose  142 mg/dl 


 


Calcium Level  9.6 mg/dl 


 


Phosphorus Level  8.8 mg/dl 


 


Magnesium Level  2.5 mg/dl 











Assessment & Plan


ESRD-pt had dialysis on friday and saturday and again seen on dialysis today. 

clearance of toxins has been optimized. volume status is good. k is also good. 

access working well. 





IMANI-phos is chronically high and have increased her phosphate binder to 3 with 

meals while inpatient. pt will need new prescriptions of meds when she 

eventually goes home.  





unclear if she will start taking her meds when she eventually goes home.  has a 

history of non-compliance despite educations, encouragement, family meetings.  


ok from renal perspective to go home when medically cleared.

## 2017-08-15 VITALS
OXYGEN SATURATION: 99 % | HEART RATE: 61 BPM | DIASTOLIC BLOOD PRESSURE: 59 MMHG | SYSTOLIC BLOOD PRESSURE: 125 MMHG | TEMPERATURE: 97.88 F

## 2017-08-15 VITALS
OXYGEN SATURATION: 95 % | HEART RATE: 52 BPM | SYSTOLIC BLOOD PRESSURE: 130 MMHG | DIASTOLIC BLOOD PRESSURE: 68 MMHG | TEMPERATURE: 97.7 F

## 2017-08-15 VITALS
OXYGEN SATURATION: 98 % | TEMPERATURE: 97.88 F | SYSTOLIC BLOOD PRESSURE: 130 MMHG | HEART RATE: 58 BPM | DIASTOLIC BLOOD PRESSURE: 68 MMHG

## 2017-08-15 VITALS
DIASTOLIC BLOOD PRESSURE: 76 MMHG | SYSTOLIC BLOOD PRESSURE: 145 MMHG | TEMPERATURE: 97.7 F | HEART RATE: 63 BPM | OXYGEN SATURATION: 93 %

## 2017-08-15 VITALS
TEMPERATURE: 98.24 F | HEART RATE: 68 BPM | SYSTOLIC BLOOD PRESSURE: 157 MMHG | OXYGEN SATURATION: 96 % | DIASTOLIC BLOOD PRESSURE: 87 MMHG

## 2017-08-15 VITALS
HEART RATE: 50 BPM | SYSTOLIC BLOOD PRESSURE: 130 MMHG | OXYGEN SATURATION: 96 % | TEMPERATURE: 97.7 F | DIASTOLIC BLOOD PRESSURE: 65 MMHG

## 2017-08-15 VITALS — OXYGEN SATURATION: 99 %

## 2017-08-15 LAB
ANION GAP SERPL CALC-SCNC: 10 MMOL/L (ref 3–11)
BUN SERPL-MCNC: 47 MG/DL (ref 7–18)
BUN/CREAT SERPL: 6.8 (ref 10–20)
CALCIUM SERPL-MCNC: 9.8 MG/DL (ref 8.5–10.1)
CHLORIDE SERPL-SCNC: 97 MMOL/L (ref 98–107)
CO2 SERPL-SCNC: 28 MMOL/L (ref 21–32)
CREAT CL PREDICTED SERPL C-G-VRATE: 12.6 ML/MIN
CREAT SERPL-MCNC: 6.8 MG/DL (ref 0.6–1.2)
EOSINOPHIL NFR BLD AUTO: 422 K/UL (ref 130–400)
GLUCOSE SERPL-MCNC: 124 MG/DL (ref 70–99)
HCT VFR BLD CALC: 34.1 % (ref 37–47)
MAGNESIUM SERPL-MCNC: 2.3 MG/DL (ref 1.8–2.4)
MCH RBC QN AUTO: 29.9 PG (ref 25–34)
MCHC RBC AUTO-ENTMCNC: 30.8 G/DL (ref 32–36)
MCV RBC AUTO: 97.2 FL (ref 80–100)
PHOSPHATE SERPL-MCNC: 7.9 MG/DL (ref 2.5–4.9)
PMV BLD AUTO: 10.5 FL (ref 7.4–10.4)
POTASSIUM SERPL-SCNC: 4.2 MMOL/L (ref 3.5–5.1)
RBC # BLD AUTO: 3.51 M/UL (ref 4.2–5.4)
SODIUM SERPL-SCNC: 135 MMOL/L (ref 136–145)
WBC # BLD AUTO: 10.29 K/UL (ref 4.8–10.8)

## 2017-08-15 PROCEDURE — 03CL0ZZ EXTIRPATION OF MATTER FROM LEFT INTERNAL CAROTID ARTERY, OPEN APPROACH: ICD-10-PCS | Performed by: SURGERY

## 2017-08-15 RX ADMIN — Medication SCH EA: at 21:00

## 2017-08-15 RX ADMIN — FLUTICASONE PROPIONATE SCH SPRAYS: 50 SPRAY, METERED NASAL at 15:25

## 2017-08-15 RX ADMIN — DIAZEPAM SCH MG: 2 TABLET ORAL at 15:28

## 2017-08-15 RX ADMIN — METOPROLOL TARTRATE SCH MG: 50 TABLET, FILM COATED ORAL at 21:53

## 2017-08-15 RX ADMIN — Medication SCH MG: at 21:50

## 2017-08-15 RX ADMIN — METOPROLOL TARTRATE SCH MG: 50 TABLET, FILM COATED ORAL at 07:02

## 2017-08-15 RX ADMIN — SERTRALINE HYDROCHLORIDE SCH MG: 100 TABLET, FILM COATED ORAL at 15:27

## 2017-08-15 RX ADMIN — ATORVASTATIN CALCIUM SCH MG: 20 TABLET, FILM COATED ORAL at 15:23

## 2017-08-15 RX ADMIN — CALCIUM ACETATE SCH MG: 667 CAPSULE ORAL at 15:25

## 2017-08-15 RX ADMIN — TRIAMCINOLONE ACETONIDE SCH APPLN: 5 CREAM TOPICAL at 21:52

## 2017-08-15 RX ADMIN — ACETAMINOPHEN SCH MG: 500 TABLET, COATED ORAL at 15:28

## 2017-08-15 RX ADMIN — ACETAMINOPHEN SCH MG: 500 TABLET, COATED ORAL at 21:50

## 2017-08-15 RX ADMIN — BUPIVACAINE HYDROCHLORIDE AND EPINEPHRINE ONE ML: 5; 5 INJECTION, SOLUTION EPIDURAL; INTRACAUDAL; PERINEURAL at 07:57

## 2017-08-15 RX ADMIN — CEFAZOLIN SCH MLS/HR: 10 INJECTION, POWDER, FOR SOLUTION INTRAVENOUS at 18:44

## 2017-08-15 RX ADMIN — ISOSORBIDE MONONITRATE SCH MG: 30 TABLET, EXTENDED RELEASE ORAL at 15:22

## 2017-08-15 RX ADMIN — CALCIUM ACETATE SCH MG: 667 CAPSULE ORAL at 15:27

## 2017-08-15 RX ADMIN — HEPARIN SODIUM SCH UNIT: 10000 INJECTION, SOLUTION INTRAVENOUS; SUBCUTANEOUS at 21:52

## 2017-08-15 RX ADMIN — LIDOCAINE SCH PATCH: 50 PATCH CUTANEOUS at 15:25

## 2017-08-15 RX ADMIN — INSULIN GLARGINE SCH UNITS: 100 INJECTION, SOLUTION SUBCUTANEOUS at 21:51

## 2017-08-15 RX ADMIN — Medication SCH MG: at 15:22

## 2017-08-15 RX ADMIN — TRIAMCINOLONE ACETONIDE SCH APPLN: 5 CREAM TOPICAL at 08:00

## 2017-08-15 RX ADMIN — HEPARIN SODIUM SCH UNIT: 10000 INJECTION, SOLUTION INTRAVENOUS; SUBCUTANEOUS at 05:58

## 2017-08-15 RX ADMIN — ACETAMINOPHEN SCH MG: 500 TABLET, COATED ORAL at 15:27

## 2017-08-15 RX ADMIN — DIAZEPAM SCH MG: 2 TABLET ORAL at 15:27

## 2017-08-15 RX ADMIN — BUPIVACAINE HYDROCHLORIDE AND EPINEPHRINE ONE ML: 5; 5 INJECTION, SOLUTION EPIDURAL; INTRACAUDAL; PERINEURAL at 07:00

## 2017-08-15 RX ADMIN — PANTOPRAZOLE SCH MG: 40 TABLET, DELAYED RELEASE ORAL at 15:26

## 2017-08-15 RX ADMIN — DIAZEPAM SCH MG: 2 TABLET ORAL at 21:00

## 2017-08-15 RX ADMIN — CEFAZOLIN SCH MLS/HR: 10 INJECTION, POWDER, FOR SOLUTION INTRAVENOUS at 11:25

## 2017-08-15 RX ADMIN — CALCIUM ACETATE SCH MG: 667 CAPSULE ORAL at 16:45

## 2017-08-15 NOTE — SURGERY CONSULTATION
Consultation


Date of Service


Aug 15, 2017.





Chief Complaint


Left carotid stenosis, severe





History of Present Illness


The patient is a 52 year old female who has severe left internal carotid artery 

stenosis over 90% and due to risk of CVA she had previously been advised to 

consider undergoing carotid endarterectomy.  Unfortunately, due to other 

medical issues this has been put on hold.  At this point, she denies any 

symptoms related to cerebrovascular insufficiency including amaurosis, 

unilateral lower extremity weakness, numbness or tingling, difficulty speaking 

or swallowing, facial droop, confusion or disorientation or other complaints.  

It was recommended that she have the endarterectomy during this hospital stay.








Vitals





Vital Signs Past 12 Hours








  Date Time  Temp Pulse Resp B/P (MAP) Pulse Ox O2 Delivery O2 Flow Rate FiO2


 


8/15/17 00:07 36.5 63 18 145/76 (99) 93 Room Air  


 


8/15/17 00:00      Room Air  


 


8/14/17 20:23  67  126/67 (86)    











Allergies


Coded Allergies:  


     Adhesives (Verified  Allergy, Mild, RASH, SORES, 5/30/17)


     NO KNOWN DRUG ALLERGIES (Verified  Allergy, Mild, ., 5/30/17)


     Latex1 -Allergic Contact Dermititis (Verified  Allergy, Unknown, rash, 8/1/ 17)


     Pollen Extract (Verified  Allergy, Unknown, rash, 8/1/17)





Home Medications


Scheduled


Aspirin (Aspirin 81), 1 TAB PO DAILY


Atorvastatin (Lipitor), 40 MG PO QAM


Calcium Acetate (Phoslo 667 Mg), 1 CAP PO TID


Fluticasone Propionate (Nasal) (Allergy Nasal Warrior 24 Ho), 1 SPRAY N/A DAILY


Insulin Glargine (Lantus Solostar), 10 UNITS SC QPM


Isosorbide Mononitrate (Isosorbide Mononitrate ER), 60 MG PO QAM


Isosorbide Mononitrate Ext Rel (Imdur Ext Rel), 30 MG PO QAM


Loratadine (Claritin Childrens), 2 TAB PO QPM


Metoprolol Tartrate (Lopressor) (Lopressor), 1 TAB PO BID


Pantoprazole (Protonix), 40 MG PO QAM


Sertraline Hcl (Zoloft), 100 MG PO HS


Triamcinolone Acet (Triamcinolone Acetonide), 1 APPLN TOP BID


[Percocet], 1 TAB PO PRN





Scheduled PRN


Dicyclomine Hcl (Dicyclomine Hcl), 1 MG PO QID PRN for CRAMPING


Lorazepam (Ativan), 0.5 MG PO DAILY PRN for Anxiety





Problem List


Medical Problems:


(1) Allergic rhinitis


(2) C. difficile colitis


(3) CAD (coronary artery disease)


(4) Carotid stenosis


(5) Diabetic foot infection


(6) DM type 2 (diabetes mellitus, type 2)


(7) Dyslipidemia


(8) ESRD (end stage renal disease) on dialysis


(9) Fall


(10) GERD (gastroesophageal reflux disease)


(11) HTN (hypertension)


(12) HX OF PAST NONCOMPLIANCE


(13) Ischemic cardiomyopathy


(14) Morbid obesity


(15) Osteomyelitis


(16) Tobacco use disorder


(17) UGIB (upper gastrointestinal bleed)


(18) Weakness


Surgical Problems:


(1) Hemodialysis access, AV graft








Surgical / Medical History


Hx Abdominal Surgery:  No


Hx Cancer Surgery:  No


Hx Thoracic Surgery:  No


Hx Orthopedic:  No


Hx Urinary Tract Surgery:  No


Past Medical/Surgical History:  Depression, Diabetes, Heart Disease, High 

Cholesterol, Hypertension, Kidney Disease, Reflux





Family History





Diabetes mellitus


  FATHER


  MOTHER


Heart disease


  FATHER


  MOTHER


Hypertension


  FATHER


  MOTHER


Kidney disease


  GRANDMOTHER





Social History


Smoking Status:  Never Smoker


Hx Tobacco Use In Past Year?:  No (QUIT 2014)


Hx Alcohol Use - Type & Amnt:  No


Hx Substance Use -Type & Amnt:  No





Review of Systems


Constitutional:  No chills, No diaphoresis, No fever, No malaise, No weakness, 

No weight gain, No weight loss, No sweats, No fatigue, No problem reported


Eyes:  No discharge, No blurred vision, No double vision, No eye pain, No 

tearing, No itching, No photophobia, No redness, No visual changes, No dryness, 

No irritation, No problem reported


ENMT:  No dental pain, No loss of hearing, No epistaxis, No ear discharge, No 

ear pain, No gum swelling, No mouth pain, No mouth swelling, No nasal congestion

, No nasal pain, No rhinorrhea, No stridor, No tinnitus, No sore throat, No 

throat swelling, No problem reported


Respiratory:  No cough, No cyanosis, No SOUSA, No hemoptysis, No orthopnea, No PND

, No short of breath, No sputum production, No stridor, No wheezing, No dyspnea

, No problem reported


Cardiovascular:  No chest pain, No chest tightness, No chest pressure, No 

palpitations, No syncope, No diaphoresis, No edema, No intermittent claudication

, No orthopnea, No cyanosis, No mumur, No lightheadedness, No paroxysmal 

nocturnal dyspnea, No problem reported


Gastrointestinal:  + heartburn, + indigestion


Genitourinary - Female:  + problem reported (renal failure), No dysmenorrhea, 

No dysuria, No hematuria, No hesitancy, No menorrhagia, No metrorrhagia, No 

pregnancy, No rash, No urinary frequency, No urinary incontinence, No urinary 

retention, No urinary urgency, No vulvadynia, No vaginal bleeding, No vaginal 

discharge, No vaginal itching, No breast problems


Musculoskeletal:  No back pain, No gout, No joint pain, No joint swelling, No 

muscle pain, No muscle stiffness, No muscle weakness, No neck pain, No problem 

reported


Psychiatric:  + depression, No anxiety, No alcohol abuse, No auditory 

hallucinations, No drug abuse, No homicidal ideation, No mood changes, No 

suicidal ideation, No visual hallucinations, No problem reported





Physical Exam


Constitutional:  


   General Apperance:  overweight


   Level of Distress:  NAD


   Ambulation:  ambulating normally


Psychiatric:  


   Mental Status:  active & alert, normal mood, normal affect


   Orientation:  oriented except where noted, to time, to place, to person


   Memory:  recent memory normal, remote memory normal


Neck:  supple


Lungs:  


   Auscultation:  breath sounds normal


Cardiovascular:  


   Heart Auscultation:  RRR


Peripheral Pulses:  


   Carotid Pulse:  normal on the left, normal on the right


   Brachial Pulses:  normal on the left, normal on the right


   Radial Pulse:  normal on the left, normal on the right


   Femoral Pulse:  normal on the left, normal on the right


Abdomen:  


   Inspection & Palpation:  soft


Musculoskeletal:  normal


Extremities:  


   Upper Right:  no cyanosis, no edema, no varicosities, no palpable cord, no 

clubbing, no ulcers, no mottling


   Upper Left:  no cyanosis, no edema, no palpable cord, no clubbing, no ulcers

, no mottling


   Lower Right:  no cyanosis, no edema, no varicosities, no palpable cord, no 

clubbing, no ulcers, no mottling


   Lower Left:  no cyanosis, no edema, no varicosities, no palpable cord, no 

clubbing, no ulcers, no mottling


Neurologic:  


   Cranial Nerves:  grossly intact


   Sensation:  grossly intact





Assessment and Plan


Imp:


   Left internal carotid artery stenosis





Plan:


   Recommended to undergo a left CEA.  I have discussed the risks options and 

benefits of the procedure with the patient.  The patient understands the risks 

options and benefits and agrees to the procedure.

## 2017-08-15 NOTE — [facility] POST OPERATIVE BRIEF NOTE
Immediate Operative Summary


Operative Date


Aug 15, 2017.





Pre-Operative Diagnosis





Left internal carotid artery stenosis





Post-Operative Diagnosis





Left internal carotid artery stenosis





Procedure(s) Performed





Left Carotid Endarterectomy





Surgeon


Dr. Roberts





Assistant Surgeon(s)


Stephanie Rucker PA-C





Estimated Blood Loss


80





Findings


severe stenosis of left internal carotid artery





Specimens





A. Left carotid plaque





Anesthesia


Gen





Complication(s)


None





Disposition


Recovery Room / PACU

## 2017-08-15 NOTE — [facility] OPERATIVE REPORT
Operative Report


Operative Date


Aug 15, 2017.





Pre-Operative Diagnosis





Left internal carotid artery stenosis





Post-Operative Diagnosis





Left internal carotid artery stenosis





Procedure(s) Performed





Left Carotid Endarterectomy





Surgeon


Dr. Roberts





Assistant Surgeon(s)


Jorge Ellsworth MD, Stephanie Rucker PA-C





Estimated Blood Loss


80





Findings


Patient had a severely calcified plaque.


Patient had good backbleeding from the internal carotid and good inflow from 

the common carotid patient also had good backbleeding from the external 

carotid.  The vagus nerve was identified and protected the answer cervicalis 

was also identified and protected





Specimens





A. Left carotid plaque





Anesthesia


Gen





Complication(s)


None





Disposition


Recovery Room / PACU





Indications


52 year old female who has severe left internal carotid artery stenosis over 90

% and due to risk of CVA she had previously been advised to consider undergoing 

carotid endarterectomy.  Unfortunately, due to other medical issues this has 

been put on hold.  At this point, she denies any symptoms related to 

cerebrovascular insufficiency including amaurosis, unilateral lower extremity 

weakness, numbness or tingling, difficulty speaking or swallowing, facial droop

, confusion or disorientation or other complaints.  It was recommended that she 

have the endarterectomy during this hospital stay.





Description of Procedure


The patient was brought to the operating room, where an arterial line was 

placed and general anesthesia was secured. The Left neck was prepped and 

sterilely draped. An oblique incision was made along the anterior border of the 

left sternocleidomastoid muscle.    The platysma was divided and dissection was 

done down to the carotid sheath. The facial vein was doubly ligated and divided 

exposing the carotid bifurcation. The vagus nerve and XII nerve were identified 

and kept free from dissection and retraction. The internal, external, and 

common carotid arteries were dissected free proximally and distally. 7000 Units 

of Heparin was given intravenously.  The internal carotid as well as common 

carotid vessels were encircled with vessel loops. Once the heparin was allowed 

to circulate for approximately 5 minutes, clamps were placed starting with the 

internal carotid and common carotid and external carotid. An anterior 

arteriotomy was made on the common carotid artery using an 11 blade. Flores 

scissors was used to extend the arteriotomy through the plaque on to the distal 

soft internal carotid artery. The plaque was heavily calcified, ulcerated, and 

nearly occlusive.  A shunt was then inserted into the into the internal carotid 

artery and revealed good backbleeding.  The proximal end of the shunt was then 

inserted into the common carotid artery and secured in place.  The Doppler and 

attached to the shunt was turned on and revealed good flow through the shunt. A 

[place] elevator was used to create an endarterectomy plane. The proximal 

endpoint was created using Flores scissors as well as a distal endpoint was 

created with the Flores scissors.  The plaque was freed out of the external 

carotid artery.With downward retraction on the plaque, a smooth distal endpoint 

was created. All debris was meticulously debrided from the inside of the lumen 

and confirmed with instillation of heparinized saline.  Once endarterectomy was 

completed, an Aquacel patch was then cut to the appropriate size and sewn into 

internal carotid artery using a PTFE 6-0 sutures starting from the proximal 

corner of the arteriotomy at the tip of the internal carotid artery. Before the 

patch was completed, the shunt was pulled and all the arteries were backbled 

extruding any air and debris. The patch was then completed. The external 

carotid clamp was removed first, followed by the common carotid artery clamp, 

and finally the internal carotid artery clamp, restoring blood flow to the 

brain.  A repair stich 6-0 Prolene sutures was used to achieve hemostasis as 

well as thrombin soaked gel foam. Meticulous hemostasis was secured. The wound 

was then closed in multiple layers using 3-0 Vicryl suture then a 4-0 Vicryl 

subcutaneous layer closing the skin.  Dermabond was applied over the incision.  

The patient tolerated the procedure well and was extubated on table. The 

patient was moving all four extremities to command prior to and upon transfer 

to the recovery room.  Dr. Roberts was present and scrubbed for the entirety of 

this case





I, Dr. Roberts was present and scrubbed for the entire procedure.


I attest to the content of the Intraoperative Record and any orders documented 

therein.  Any exceptions are noted below.

## 2017-08-16 VITALS — DIASTOLIC BLOOD PRESSURE: 85 MMHG | SYSTOLIC BLOOD PRESSURE: 157 MMHG | HEART RATE: 56 BPM

## 2017-08-16 VITALS — DIASTOLIC BLOOD PRESSURE: 69 MMHG | HEART RATE: 68 BPM | SYSTOLIC BLOOD PRESSURE: 165 MMHG

## 2017-08-16 VITALS
SYSTOLIC BLOOD PRESSURE: 140 MMHG | HEART RATE: 70 BPM | OXYGEN SATURATION: 99 % | DIASTOLIC BLOOD PRESSURE: 82 MMHG | TEMPERATURE: 98.24 F

## 2017-08-16 VITALS
DIASTOLIC BLOOD PRESSURE: 57 MMHG | HEART RATE: 69 BPM | TEMPERATURE: 97.7 F | SYSTOLIC BLOOD PRESSURE: 126 MMHG | OXYGEN SATURATION: 92 %

## 2017-08-16 VITALS — DIASTOLIC BLOOD PRESSURE: 65 MMHG | SYSTOLIC BLOOD PRESSURE: 168 MMHG | TEMPERATURE: 97.88 F | HEART RATE: 59 BPM

## 2017-08-16 VITALS — HEART RATE: 76 BPM | SYSTOLIC BLOOD PRESSURE: 175 MMHG | TEMPERATURE: 97.7 F | DIASTOLIC BLOOD PRESSURE: 74 MMHG

## 2017-08-16 VITALS
TEMPERATURE: 97.7 F | OXYGEN SATURATION: 92 % | DIASTOLIC BLOOD PRESSURE: 57 MMHG | SYSTOLIC BLOOD PRESSURE: 126 MMHG | HEART RATE: 69 BPM

## 2017-08-16 VITALS — SYSTOLIC BLOOD PRESSURE: 171 MMHG | DIASTOLIC BLOOD PRESSURE: 74 MMHG | HEART RATE: 73 BPM

## 2017-08-16 VITALS
DIASTOLIC BLOOD PRESSURE: 100 MMHG | OXYGEN SATURATION: 100 % | HEART RATE: 68 BPM | TEMPERATURE: 97.52 F | SYSTOLIC BLOOD PRESSURE: 179 MMHG

## 2017-08-16 VITALS — DIASTOLIC BLOOD PRESSURE: 69 MMHG | SYSTOLIC BLOOD PRESSURE: 174 MMHG | HEART RATE: 73 BPM

## 2017-08-16 VITALS — HEART RATE: 59 BPM | DIASTOLIC BLOOD PRESSURE: 79 MMHG | SYSTOLIC BLOOD PRESSURE: 149 MMHG

## 2017-08-16 VITALS
HEART RATE: 75 BPM | OXYGEN SATURATION: 92 % | SYSTOLIC BLOOD PRESSURE: 163 MMHG | TEMPERATURE: 98.42 F | DIASTOLIC BLOOD PRESSURE: 75 MMHG

## 2017-08-16 VITALS — DIASTOLIC BLOOD PRESSURE: 75 MMHG | HEART RATE: 64 BPM | SYSTOLIC BLOOD PRESSURE: 184 MMHG

## 2017-08-16 VITALS — DIASTOLIC BLOOD PRESSURE: 71 MMHG | HEART RATE: 82 BPM | SYSTOLIC BLOOD PRESSURE: 170 MMHG

## 2017-08-16 VITALS — DIASTOLIC BLOOD PRESSURE: 76 MMHG | SYSTOLIC BLOOD PRESSURE: 181 MMHG | HEART RATE: 72 BPM

## 2017-08-16 VITALS — DIASTOLIC BLOOD PRESSURE: 67 MMHG | SYSTOLIC BLOOD PRESSURE: 165 MMHG | HEART RATE: 72 BPM

## 2017-08-16 VITALS — SYSTOLIC BLOOD PRESSURE: 140 MMHG | HEART RATE: 56 BPM | DIASTOLIC BLOOD PRESSURE: 81 MMHG

## 2017-08-16 VITALS — HEART RATE: 59 BPM | SYSTOLIC BLOOD PRESSURE: 158 MMHG | DIASTOLIC BLOOD PRESSURE: 73 MMHG

## 2017-08-16 VITALS — DIASTOLIC BLOOD PRESSURE: 71 MMHG | HEART RATE: 70 BPM | SYSTOLIC BLOOD PRESSURE: 164 MMHG

## 2017-08-16 VITALS — HEART RATE: 68 BPM | DIASTOLIC BLOOD PRESSURE: 81 MMHG | SYSTOLIC BLOOD PRESSURE: 147 MMHG

## 2017-08-16 VITALS — OXYGEN SATURATION: 92 %

## 2017-08-16 LAB
ANION GAP SERPL CALC-SCNC: 9 MMOL/L (ref 3–11)
BUN SERPL-MCNC: 69 MG/DL (ref 7–18)
BUN/CREAT SERPL: 7.8 (ref 10–20)
CALCIUM SERPL-MCNC: 9.5 MG/DL (ref 8.5–10.1)
CHLORIDE SERPL-SCNC: 96 MMOL/L (ref 98–107)
CO2 SERPL-SCNC: 26 MMOL/L (ref 21–32)
CREAT CL PREDICTED SERPL C-G-VRATE: 9.6 ML/MIN
CREAT SERPL-MCNC: 8.9 MG/DL (ref 0.6–1.2)
EOSINOPHIL NFR BLD AUTO: 402 K/UL (ref 130–400)
GLUCOSE SERPL-MCNC: 186 MG/DL (ref 70–99)
HCT VFR BLD CALC: 31.2 % (ref 37–47)
MCH RBC QN AUTO: 31.1 PG (ref 25–34)
MCHC RBC AUTO-ENTMCNC: 32.7 G/DL (ref 32–36)
MCV RBC AUTO: 95.1 FL (ref 80–100)
PHOSPHATE SERPL-MCNC: 7.7 MG/DL (ref 2.5–4.9)
PMV BLD AUTO: 10.3 FL (ref 7.4–10.4)
POTASSIUM SERPL-SCNC: 4.4 MMOL/L (ref 3.5–5.1)
RBC # BLD AUTO: 3.28 M/UL (ref 4.2–5.4)
SODIUM SERPL-SCNC: 131 MMOL/L (ref 136–145)
WBC # BLD AUTO: 12.44 K/UL (ref 4.8–10.8)

## 2017-08-16 RX ADMIN — FLUTICASONE PROPIONATE SCH SPRAYS: 50 SPRAY, METERED NASAL at 08:41

## 2017-08-16 RX ADMIN — ATORVASTATIN CALCIUM SCH MG: 20 TABLET, FILM COATED ORAL at 08:37

## 2017-08-16 RX ADMIN — PANTOPRAZOLE SCH MG: 40 TABLET, DELAYED RELEASE ORAL at 08:36

## 2017-08-16 RX ADMIN — CALCIUM ACETATE SCH MG: 667 CAPSULE ORAL at 11:42

## 2017-08-16 RX ADMIN — METOPROLOL TARTRATE SCH MG: 50 TABLET, FILM COATED ORAL at 08:36

## 2017-08-16 RX ADMIN — ACETAMINOPHEN SCH MG: 500 TABLET, COATED ORAL at 08:39

## 2017-08-16 RX ADMIN — SERTRALINE HYDROCHLORIDE SCH MG: 100 TABLET, FILM COATED ORAL at 08:37

## 2017-08-16 RX ADMIN — DIAZEPAM SCH MG: 2 TABLET ORAL at 08:35

## 2017-08-16 RX ADMIN — HEPARIN SODIUM SCH UNIT: 10000 INJECTION, SOLUTION INTRAVENOUS; SUBCUTANEOUS at 11:51

## 2017-08-16 RX ADMIN — DIAZEPAM SCH MG: 2 TABLET ORAL at 14:00

## 2017-08-16 RX ADMIN — HEPARIN SODIUM SCH UNIT: 10000 INJECTION, SOLUTION INTRAVENOUS; SUBCUTANEOUS at 03:39

## 2017-08-16 RX ADMIN — Medication SCH MG: at 08:37

## 2017-08-16 RX ADMIN — LIDOCAINE SCH PATCH: 50 PATCH CUTANEOUS at 08:39

## 2017-08-16 RX ADMIN — CALCIUM ACETATE SCH MG: 667 CAPSULE ORAL at 08:35

## 2017-08-16 RX ADMIN — TRIAMCINOLONE ACETONIDE SCH APPLN: 5 CREAM TOPICAL at 08:40

## 2017-08-16 RX ADMIN — ACETAMINOPHEN SCH MG: 500 TABLET, COATED ORAL at 14:11

## 2017-08-16 RX ADMIN — ISOSORBIDE MONONITRATE SCH MG: 30 TABLET, EXTENDED RELEASE ORAL at 08:37

## 2017-08-16 NOTE — PROGRESS NOTE
Medicine Progress Note


Date & Time of Visit:


Aug 16, 2017 at 18:05.


Subjective


Pt was seen and examined


Lying in bed with no distress


She said that she still has some tenderness in the left neck


denies any difficulty to swallow


denies any chest pain, palpitation, dizziness and SOB





Objective





Last 8 Hrs








  Date Time  Temp Pulse Resp B/P (MAP) Pulse Ox O2 Delivery O2 Flow Rate FiO2


 


8/16/17 16:27 36.5 69 14  92 Room Air  


 


8/16/17 15:23 36.5 69 14 126/57 (80) 92 Room Air  


 


8/16/17 12:00      Nasal Cannula 2.0 


 


8/16/17 11:42 36.6 59  168/65 (99)    


 


8/16/17 11:30  59  149/79    


 


8/16/17 11:15  56  157/85    


 


8/16/17 11:02  68  147/81    


 


8/16/17 10:45  64  184/75    


 


8/16/17 10:30  56  140/81    


 


8/16/17 10:15  59  158/73    








Physical Exam:


General- No acute distress


Head-  atraumatic


Eyes- PERRL, EOMI


ENT- oropharynx clear


Neck- Left carotid incision, no drainage


Neck- supple, no JVD


Lungs-No wheezing


Heart-  no murmur


Abdomen- normal bowel sounds, soft


Extremities-  no calf tenderness


Neuro- alert, oriented x 3; PERRL, EOMI


Skin- warm & dry


Laboratory Results:





Last 24 Hours








Test


  8/15/17


20:14 8/16/17


06:43 8/16/17


09:03 8/16/17


11:21


 


Bedside Glucose 185 mg/dl  159 mg/dl   145 mg/dl 


 


White Blood Count   12.44 K/uL  


 


Red Blood Count   3.28 M/uL  


 


Hemoglobin   10.2 g/dL  


 


Hematocrit   31.2 %  


 


Mean Corpuscular Volume   95.1 fL  


 


Mean Corpuscular Hemoglobin   31.1 pg  


 


Mean Corpuscular Hemoglobin


Concent 


  


  32.7 g/dl 


  


 


 


RDW Standard Deviation   47.8 fL  


 


RDW Coefficient of Variation   13.9 %  


 


Platelet Count   402 K/uL  


 


Mean Platelet Volume   10.3 fL  


 


Sodium Level   131 mmol/L  


 


Potassium Level   4.4 mmol/L  


 


Chloride Level   96 mmol/L  


 


Carbon Dioxide Level   26 mmol/L  


 


Anion Gap   9.0 mmol/L  


 


Blood Urea Nitrogen   69 mg/dl  


 


Creatinine   8.90 mg/dl  


 


Est Creatinine Clear Calc


Drug Dose 


  


  9.6 ml/min 


  


 


 


Estimated GFR (


American) 


  


  5.4 


  


 


 


Estimated GFR (Non-


American 


  


  4.7 


  


 


 


BUN/Creatinine Ratio   7.8  


 


Random Glucose   186 mg/dl  


 


Calcium Level   9.5 mg/dl  


 


Phosphorus Level   7.7 mg/dl  


 


Test


  8/16/17


16:38 


  


  


 


 


Bedside Glucose 156 mg/dl    











Assessment & Plan








1.  Muscle pain/back strain


CT lumbar/thoracic and cervical on admission showed no fractures/dislocations . 


Pain controlled with scheduled Valium and Tylenol. 


Continue PT/OT


Transfer to  for  rehab today





2.  Elevated troponin


denies any chest pain


Related to ESRD


Cardiology on board


Per Cardiology evaluation, wall motion abnormalities are old 


OK to proceed to carotid surgery from a cardiac standpoint when ready.  


ECHO showed 


* The left ventricle is mildly dilated.


* There is a large sized apical, septal, inferior, and posterior wall motion 

abnormality with hypokinesis of the segments.


* Left ventricular systolic function is mildly reduced.


* The calculated left ventricular ejection fraction =46%.


* Grade I diastolic dysfunction, (abnormal relaxation pattern).


* Compared to the images of the prior study dated 2/16/17, the LV apex is 

better visualized allowing improved assessment of the LV systolic function. 

When similar images are compared, there does not appear to be a significant 

interval change in the LV systolic function.








3.  Chronic systolic heart failure


Compensated


continue current medical therapy with no changes.  


 


4.  ESRD ON HD


     HD done today


     Continue regular HD on [facility]


     Nephro on board





5.  Generalized Weakness


     multifactorial in setting of noncompliance with medications for multiple 

comorbidities and noncompliance with dialysis.  


    Will go to rehab for PT today


    Continue PT





6.  DMII with retinopathy-


     ISS/Lantus, controlled.


     Continue monitor BS





7.  HTN


     cont with home meds. 


     Stable





8.  PAD


     S/P day#1 right Left Carotid endarterectomy by Dr. Roberts


     Continue statin and asa


     Follow up with vascular surgery in 2 weeks





9.  Obese


     Counseling on weight loss and diet





9.  Noncompliance.


     Has not been taking her medications for over 1 year. 





10. DVT proph-Heparin








11. FULL CODE





12. Disposition


      Will discharge to rehab today


      Follow up with vascular in 2 weeks


      Follow up with your PCP once discharge from refab


     Continue HD


Consultants:


Vascular Surgery-[facility]


Cardiology-[facility]


Nephro-[facility]


Procedures:


Left carotid endarterectomy

## 2017-08-16 NOTE — PROGRESS NOTE
Medicine Progress Note


Date & Time of Visit:


Aug 15, 2017 at 19:59.


Subjective


Pt was seen and examined


Denies any chest pain, palpitation and sob





Objective





Last 8 Hrs








  Date Time  Temp Pulse Resp B/P (MAP) Pulse Ox O2 Delivery O2 Flow Rate FiO2


 


8/15/17 18:50 36.6 61 20 125/59 (81) 99 Nasal Cannula 2.0 


 


8/15/17 16:00      Nasal Cannula 2.0 


 


8/15/17 13:15 36.5 52 16 130/68 (88) 95 Room Air  


 


8/15/17 12:44 36.5 50 16 130/65 (86) 96 Room Air  


 


8/15/17 12:34 36.6 58 18 130/68 (88) 98 Nasal Cannula 2.0 


 


8/15/17 12:00 36.5 53 20 113/53 95 Nasal Cannula 2 








Physical Exam:


General- No acute distress


Head-  atraumatic


Eyes- PERRL, EOMI


ENT- oropharynx clear


Neck- supple, no JVD


Lungs-No wheezing


Heart-  no murmur


Abdomen- normal bowel sounds, soft


Extremities-  no calf tenderness


Neuro- alert, oriented x 3; PERRL, EOMI


Skin- warm & dry


Laboratory Results:





Last 24 Hours








Test


  8/14/17


20:43 8/15/17


05:39 8/15/17


07:29 8/15/17


11:14


 


Bedside Glucose 141 mg/dl   94 mg/dl  121 mg/dl 


 


White Blood Count  10.29 K/uL   


 


Red Blood Count  3.51 M/uL   


 


Hemoglobin  10.5 g/dL   


 


Hematocrit  34.1 %   


 


Mean Corpuscular Volume  97.2 fL   


 


Mean Corpuscular Hemoglobin  29.9 pg   


 


Mean Corpuscular Hemoglobin


Concent 


  30.8 g/dl 


  


  


 


 


RDW Standard Deviation  49.4 fL   


 


RDW Coefficient of Variation  14.0 %   


 


Platelet Count  422 K/uL   


 


Mean Platelet Volume  10.5 fL   


 


Sodium Level  135 mmol/L   


 


Potassium Level  4.2 mmol/L   


 


Chloride Level  97 mmol/L   


 


Carbon Dioxide Level  28 mmol/L   


 


Anion Gap  10.0 mmol/L   


 


Blood Urea Nitrogen  47 mg/dl   


 


Creatinine  6.80 mg/dl   


 


Est Creatinine Clear Calc


Drug Dose 


  12.6 ml/min 


  


  


 


 


Estimated GFR (


American) 


  7.5 


  


  


 


 


Estimated GFR (Non-


American 


  6.5 


  


  


 


 


BUN/Creatinine Ratio  6.8   


 


Random Glucose  124 mg/dl   


 


Calcium Level  9.8 mg/dl   


 


Phosphorus Level  7.9 mg/dl   


 


Magnesium Level  2.3 mg/dl   











Assessment & Plan








1.  Muscle pain/back strain


CT lumbar/thoracic and cervical on admission showed no fractures/dislocations . 


Pain controlled with scheduled Valium and Tylenol. 


Continue PT/OT


Transfer to rehab once stable





2.  Elevated troponin


denies any chest pain


Related to ESRD


Cardiology on board


Per Cardiology evaluation, wall motion abnormalities are old 


OK to proceed to carotid surgery from a cardiac standpoint when ready.  


ECHO showed 


* The left ventricle is mildly dilated.


* There is a large sized apical, septal, inferior, and posterior wall motion 

abnormality with hypokinesis of the segments.


* Left ventricular systolic function is mildly reduced.


* The calculated left ventricular ejection fraction =46%.


* Grade I diastolic dysfunction, (abnormal relaxation pattern).


* Compared to the images of the prior study dated 2/16/17, the LV apex is 

better visualized allowing improved assessment of the LV systolic function. 

When similar images are compared, there does not appear to be a significant 

interval change in the LV systolic function.








3.  Chronic systolic heart failure


Compensated


continue current medical therapy with no changes.  


 


4.  ESRD ON HD


     HD done yesterday


     Continue regular HD on [facility]


     Nephro on board





5.  Generalized Weakness


     multifactorial in setting of noncompliance with medications for multiple 

comorbidities and noncompliance with dialysis.  


    Will go to rehab for PT once stable


    Continue PT





6.  DMII with retinopathy-


     ISS/Lantus, controlled.


     Continue monitor BS





7.  HTN


     cont with home meds. 


     Stable





8.  PAD


     S/P right Left Carotid endarterectomy by Dr. Roberts


     Continue statin and asa





9.  Obese


     Counseling on weight loss and diet





9.  Noncompliance.


     Has not been taking her medications for over 1 year. 





10. DVT proph-Heparin








11. FULL CODE





12. Disposition


      Will discharge to rehab tomorrow if stable by vascular


Consultants:


Vascular Surgery-[facility]


Cardiology-[facility] [place]


Nephro-[facility]


Procedures:


Left carotid endarterectomy


Current Inpatient Medications:





Current Inpatient Medications








 Medications


  (Trade)  Dose


 Ordered  Sig/Willy


 Route  Start Time


 Stop Time Status Last Admin


Dose Admin


 


 Ondansetron HCl


  (Zofran Inj)  4 mg  Q6H  PRN


 IV  8/11/17 01:45


 9/10/17 01:44  8/13/17 00:05


4 MG


 


 Nitroglycerin


  (Nitrostat Tab)  0.4 mg  UD  PRN


 SL  8/11/17 01:45


 9/10/17 01:44   


 


 


 Aspirin


  (Ecotrin Tab)  81 mg  DAILY


 PO  8/12/17 08:00


 9/11/17 08:59  8/15/17 15:22


81 MG


 


 Atorvastatin


 Calcium


  (Lipitor Tab)  40 mg  QAM


 PO  8/11/17 09:00


 9/10/17 08:59  8/15/17 15:23


40 MG


 


 Dicyclomine HCl


  (Bentyl Cap)  10 mg  QID  PRN


 PO  8/11/17 02:00


 9/10/17 01:59   


 


 


 Fluticasone


 Propionate


  (Flonase Nasal


 Spray)  2 sprays  DAILY


 NA  8/11/17 09:00


 9/10/17 08:59  8/14/17 08:14


2 SPRAYS


 


 Insulin Glargine


  (Lantus Solostar


 Pen)  10 units  QPM


 SC  8/11/17 21:00


 9/10/17 20:59  8/14/17 20:46


10 UNITS


 


 Lorazepam


  (Ativan Tab)  0.5 mg  DAILY  PRN


 PO  8/11/17 02:00


 9/10/17 01:59   


 


 


 Metoprolol


 Tartrate


  (Lopressor Tab)  50 mg  BID


 PO  8/11/17 09:00


 9/10/17 08:59  8/15/17 07:02


50 MG


 


 Pantoprazole


 Sodium


  (Protonix Tab)  40 mg  QAM


 PO  8/11/17 09:00


 9/10/17 08:59  8/14/17 08:04


40 MG


 


 Triamcinolone


 Acetonide


  (Kenalog 0.5%


 Crm)  1 appln  BID


 EXT  8/11/17 09:00


 9/10/17 08:59  8/14/17 20:28


1 APPLN


 


 Loratadine


  (Claritin Tab)  10 mg  PM


 PO  8/11/17 21:00


 9/10/17 20:59  8/14/17 20:26


10 MG


 


 Isosorbide


 Mononitrate


  (Imdur Ext Rel


 Tab)  90 mg  QAM


 PO  8/11/17 09:00


 9/10/17 08:59  8/15/17 15:22


90 MG


 


 Glucose


  (Glucose 40% Gel)  15-30


 GRAMS 15


 GRAMS...  UD  PRN


 PO  8/11/17 02:30


 9/10/17 02:29   


 


 


 Glucose


  (Glucose Chew


 Tab)  4-8


 Tablets 4


 Tabl...  UD  PRN


 PO  8/11/17 02:30


 9/10/17 02:29   


 


 


 Dextrose


  (Dextrose 50%


 50ML Syringe)  25-50ML OF


 50% DW IV


 FOR...  UD  PRN


 IV  8/11/17 02:30


 9/10/17 02:29   


 


 


 Glucagon


  (Glucagon Inj)  1 mg  UD  PRN


 SQ  8/11/17 02:30


 9/10/17 02:29   


 


 


 Sodium Chloride


  (Ocean Nasal


 Spray)  1 sprays  PRN  PRN


 NA  8/11/17 04:30


 9/10/17 04:29  8/11/17 06:13


1 SPRAYS


 


 Miscellaneous


  (Iv Fluids


 Completed)  1 ea  PRN  PRN


 N/A  8/11/17 04:45


 8/11/18 04:44   


 


 


 Sertraline HCl


  (Zoloft Tab)  100 mg  QAM


 PO  8/14/17 08:00


 9/10/17 20:59  8/14/17 08:07


100 MG


 


 Diazepam


  (Valium Tab)  2 mg  TID


 PO  8/13/17 14:00


 9/12/17 13:59  8/14/17 20:30


2 MG


 


 Acetaminophen


  (Tylenol Tab)  1,000 mg  TID


 PO  8/13/17 14:00


 9/12/17 13:59  8/14/17 20:27


1,000 MG


 


 Lidocaine


  (Lidoderm Patch


 5%)  1 patch  QAM


 TD  8/13/17 12:00


 9/12/17 11:59  8/15/17 15:25


1 PATCH


 


 Miscellaneous


  (Remove Lidoderm


 Patch)  1 ea  DAILY@21


 N/A  8/13/17 21:00


 9/12/17 20:59  8/14/17 20:29


1 EA


 


 Calcium Acetate


  (Phoslo Cap)  2,001 mg  TIDM


 PO  8/14/17 08:00


 9/10/17 07:29  8/14/17 08:07


2,001 MG


 


 Oxycodone/


 Acetaminophen


  (Percocet


 5-325mg Tab)  FOR


 MODERATE


 PAIN ...  Q4H  PRN


 PO  8/15/17 10:45


 8/29/17 10:44  8/15/17 14:19


1 TAB


 


 Heparin Sodium


  (Porcine)


  (Heparin Sq 5000


 Unit/0.5ml)  5,000 unit  Q8H


 SQ  8/15/17 20:00


 9/14/17 19:59

## 2017-08-16 NOTE — DISCHARGE INSTRUCTIONS
Discharge Instructions


Date of Service


Aug 16, 2017.





Admission


Reason for Admission:  Chest Pain, Ckd, Fall





Discharge


Discharge Diagnosis / Problem:  back pain, ESRD on HD, Left carotid 

endarterectomy, Elevated troponin





Discharge Goals


Goal(s):  Decrease discomfort, Improve function, Improve disease control





Activity Recommendations


Activity Limitations:  resume your previous activity (as tolerated)





.





Instructions / Follow-Up


Instructions / Follow-Up


Discharge to [facility] for rehab


Follow up with vascular surgery dr. Roberts in 2 weeks


Follow up with your primary care provider once discharge from rehab


Continue regular hemodialysis


fall precaution


Continue physical therapy





Current Hospital Diet


Patient's current hospital diet: Low Sodium Diet (2gm Na), Renal Diet





Discharge Diet


Recommended Diet:  Low Sodium Diet (2gm Na), Renal Diet





Procedures


Procedures Performed:  


Left Carotid Endarterectomy





Pending Studies


Studies pending at discharge:  no





Laboratory Results





Hemoglobin A1c








Test


  8/1/17


15:11 Range/Units


 


 


Estimated Average Glucose 177   mg/dl


 


Hemoglobin A1c 7.8 H 4.5-5.6  %











Medical Emergencies








.


Who to Call and When:





Medical Emergencies:  If at any time you feel your situation is an emergency, 

please call 911 immediately.





.





Non-Emergent Contact


Non-Emergency issues call your:  Primary Care Provider


Call Non-Emergent contact if:  you have any medication questions





.


.








"Provider Documentation" section prepared by Guerline Bolanos.








.





VTE Core Measure


Inpt VTE Proph given/why not?:  Unfractionated heparin SQ

## 2017-08-16 NOTE — PROGRESS NOTE
Progress Note


Date of Service:


Aug 16, 2017.


Subjective


53 yo f with multiple medical problems, POD #1 after L CEA, seen in f/u today.  

Pt admits pain in L neck.  Denies HA, vision changes, unilateral weakness, 

facial droop, AMS, difficulty speaking or choking on food.  Admits sore throat 

from intubation, but states is able to eat/drink.





Problem List


Medical Problems:


(1) Abdominal pain


Status: Acute  





(2) Acute electrocardiogram changes


Status: Acute  





(3) Chest pain


Status: Acute  





(4) Chronic kidney disease (CKD)


Status: Acute  





(5) Elevated troponin


Status: Acute  





(6) Elevated troponin


Status: Acute  





(7) Elevated troponin


Status: Acute  





(8) End stage renal disease


Status: Acute  





(9) GI bleed


Status: Acute  





(10) Hyperkalemia


Status: Acute  





(11) Hyperkalemia


Status: Acute  





(12) Hypertension


Status: Acute  





(13) Joint effusion


Status: Acute  





(14) Left elbow pain


Status: Acute  





(15) Left sided chest pain


Status: Acute  





(16) Left sided chest pain


Status: Acute  





(17) Limb ischemia


Status: Acute  





(18) Medical non-compliance


Status: Acute  





(19) Mid back pain on right side


Status: Acute  





(20) Morbid obesity


Status: Chronic  





(21) Musculoskeletal strain


Status: Acute  





(22) Obesity


Status: Acute  





(23) Pneumonia


Status: Acute  





(24) Pulmonary edema


Status: Acute  





(25) Renal failure


Status: Acute  





(26) Sinusitis


Status: Acute  





(27) Strain of lumbar region


Status: Acute  





(28) Substernal chest pain


Status: Acute  





(29) Trochanteric bursitis, right hip


Status: Acute  





(30) Upper abdominal pain


Status: Acute  





(31) Upper GI bleed


Status: Acute  





(32) Uremia


Status: Acute  





(33) Urinary tract infection


Status: Acute  





(34) Vomiting


Status: Acute  





(35) Vomiting


Status: Acute  





(36) Vomiting


Status: Acute  





(37) Vomiting


Status: Acute  











Objective


Vital Signs





Vital Signs Past 12 Hours








  Date Time  Temp Pulse Resp B/P (MAP) Pulse Ox O2 Delivery O2 Flow Rate FiO2


 


8/16/17 12:00      Nasal Cannula 2.0 


 


8/16/17 11:42 36.6 59  168/65 (99)    


 


8/16/17 11:30  59  149/79    


 


8/16/17 11:15  56  157/85    


 


8/16/17 11:02  68  147/81    


 


8/16/17 10:45  64  184/75    


 


8/16/17 10:30  56  140/81    


 


8/16/17 10:15  59  158/73    


 


8/16/17 10:00  70  164/71    


 


8/16/17 09:45  73  171/74    


 


8/16/17 09:30  72  181/76    


 


8/16/17 09:15  73  174/69    


 


8/16/17 09:00  68  165/69    


 


8/16/17 08:45  72  165/67    


 


8/16/17 08:30  82  170/71    


 


8/16/17 08:10 36.5 76  175/74 (107)    


 


8/16/17 08:00      Nasal Cannula 2.0 


 


8/16/17 07:40 36.4 68 18 179/100 (126) 100  3.0 


 


8/16/17 04:00 36.8 70 18 140/82 (101) 99 Nasal Cannula 2.0 


 


8/16/17 04:00      Nasal Cannula  








Exam


CONST: A&O x4, NAD, obese, chronically ill appearing female


NECK: L neck incision C/D/I, mild local edema, tenderness, ecchymosis.  trachea 

midline


Neuro: No focal deficits


Intake & Output











8-Hour Column   


 


 8/16/17 8/17/17 8/17/17





 16:00 00:00 08:00


 


Output Total 1000 ml  


 


Balance -1000 ml  














24-Hour Column 


 


 8/17/17





 08:00


 


Output Total 1000 ml


 


Balance -1000 ml








Laboratory and Microbiology





Results Past 24 Hours








Test


  8/15/17


20:14 8/16/17


06:43 8/16/17


09:03 8/16/17


11:21 Range/Units


 


 


Bedside Glucose 185 159  145 70-90  mg/dl


 


White Blood Count   12.44  4.8-10.8  K/uL


 


Red Blood Count   3.28  4.2-5.4  M/uL


 


Hemoglobin   10.2  12.0-16.0  g/dL


 


Hematocrit   31.2  37-47  %


 


Mean Corpuscular Volume   95.1    fL


 


Mean Corpuscular Hemoglobin   31.1  25-34  pg


 


Mean Corpuscular Hemoglobin


Concent 


  


  32.7


  


  32-36  g/dl


 


 


RDW Standard Deviation   47.8  36.4-46.3  fL


 


RDW Coefficient of Variation   13.9  11.5-14.5  %


 


Platelet Count   402  130-400  K/uL


 


Mean Platelet Volume   10.3  7.4-10.4  fL


 


Sodium Level   131  136-145  mmol/L


 


Potassium Level   4.4  3.5-5.1  mmol/L


 


Chloride Level   96    mmol/L


 


Carbon Dioxide Level   26  21-32  mmol/L


 


Anion Gap   9.0  3-11  mmol/L


 


Blood Urea Nitrogen   69  7-18  mg/dl


 


Creatinine


  


  


  8.90


  


  0.60-1.20


mg/dl


 


Est Creatinine Clear Calc


Drug Dose 


  


  9.6


  


   ml/min


 


 


Estimated GFR (


American) 


  


  5.4


  


   


 


 


Estimated GFR (Non-


American 


  


  4.7


  


   


 


 


BUN/Creatinine Ratio   7.8  10-20  


 


Random Glucose   186  70-99  mg/dl


 


Calcium Level   9.5  8.5-10.1  mg/dl


 


Phosphorus Level   7.7  2.5-4.9  mg/dl








ASSESSMENT and PLAN:


   s/p L CEA


   LICAS


      Pt doing well postop.  Ok for d/c to rehab from vascular standpoint.  

Will see in office in 2 wks. Please call if needed.

## 2017-08-16 NOTE — NEPHROLOGY PROGRESS NOTE
Nephrology Progress Note


Date of Service:


Aug 16, 2017.


Subjective


51 yo female with esrd who presented with diffuse muscle aches following a fall 

and did not have dialysis for about a week prior to admission.  pt underwent 

her carotid surgery while inpatient. went well. pt complaining of a rawness to 

the back of her throat but otherwise doing well.





Objective











  Date Time  Temp Pulse Resp B/P (MAP) Pulse Ox O2 Delivery O2 Flow Rate FiO2


 


8/16/17 07:40 36.4 68 18 179/100 (126) 100  3.0 


 


8/16/17 04:00 36.8 70 18 140/82 (101) 99 Nasal Cannula 2.0 


 


8/16/17 04:00      Nasal Cannula  


 


8/16/17 00:05     92 Room Air  


 


8/16/17 00:00 36.9 75 18 163/75 (104) 92 Room Air  


 


8/15/17 20:00     99 Nasal Cannula 2.0 


 


8/15/17 18:50 36.6 61 20 125/59 (81) 99 Nasal Cannula 2.0 


 


8/15/17 16:00      Nasal Cannula 2.0 


 


8/15/17 13:15 36.5 52 16 130/68 (88) 95 Room Air  


 


8/15/17 12:44 36.5 50 16 130/65 (86) 96 Room Air  


 


8/15/17 12:34 36.6 58 18 130/68 (88) 98 Nasal Cannula 2.0 


 


8/15/17 12:00 36.5 53 20 113/53 95 Nasal Cannula 2 


 


8/15/17 11:50  53 20 124/59 97 Nasal Cannula 2 


 


8/15/17 11:40  57 20 111/60 97 Nasal Cannula 2 


 


8/15/17 11:30  57 20 123/80 96 Nasal Cannula 2 


 


8/15/17 11:20  57 20 131/64 100 Oxymask 10 


 


8/15/17 11:10  55 16 144/75 100 Oxymask 10 


 


8/15/17 11:00 36 61 16 153/68 100 Oxymask 10 








Physical Exam:


General-aaox3, obese


Eyes-no scleral icterus


ENT-mmm


Neck-+left carotid incision


Lungs-clear


Heart-rrr with 2/6 systolic murmur


Abdomen-bs+ s/nt/nd 


Extremities-no c/c/e 


Neuro-nonfocal





Current Inpatient Medications








 Medications


  (Trade)  Dose


 Ordered  Sig/Willy


 Route  Start Time


 Stop Time Status Last Admin


Dose Admin


 


 Ondansetron HCl


  (Zofran Inj)  4 mg  Q6H  PRN


 IV  8/11/17 01:45


 9/10/17 01:44  8/13/17 00:05


4 MG


 


 Nitroglycerin


  (Nitrostat Tab)  0.4 mg  UD  PRN


 SL  8/11/17 01:45


 9/10/17 01:44   


 


 


 Aspirin


  (Ecotrin Tab)  81 mg  DAILY


 PO  8/12/17 08:00


 9/11/17 08:59  8/16/17 08:37


81 MG


 


 Atorvastatin


 Calcium


  (Lipitor Tab)  40 mg  QAM


 PO  8/11/17 09:00


 9/10/17 08:59  8/16/17 08:37


40 MG


 


 Dicyclomine HCl


  (Bentyl Cap)  10 mg  QID  PRN


 PO  8/11/17 02:00


 9/10/17 01:59   


 


 


 Fluticasone


 Propionate


  (Flonase Nasal


 Spray)  2 sprays  DAILY


 NA  8/11/17 09:00


 9/10/17 08:59  8/16/17 08:41


2 SPRAYS


 


 Insulin Glargine


  (Lantus Solostar


 Pen)  10 units  QPM


 SC  8/11/17 21:00


 9/10/17 20:59  8/15/17 21:51


10 UNITS


 


 Lorazepam


  (Ativan Tab)  0.5 mg  DAILY  PRN


 PO  8/11/17 02:00


 9/10/17 01:59   


 


 


 Metoprolol


 Tartrate


  (Lopressor Tab)  50 mg  BID


 PO  8/11/17 09:00


 9/10/17 08:59  8/16/17 08:36


50 MG


 


 Pantoprazole


 Sodium


  (Protonix Tab)  40 mg  QAM


 PO  8/11/17 09:00


 9/10/17 08:59  8/16/17 08:36


40 MG


 


 Triamcinolone


 Acetonide


  (Kenalog 0.5%


 Crm)  1 appln  BID


 EXT  8/11/17 09:00


 9/10/17 08:59  8/16/17 08:40


1 APPLN


 


 Loratadine


  (Claritin Tab)  10 mg  PM


 PO  8/11/17 21:00


 9/10/17 20:59  8/15/17 21:50


10 MG


 


 Isosorbide


 Mononitrate


  (Imdur Ext Rel


 Tab)  90 mg  QAM


 PO  8/11/17 09:00


 9/10/17 08:59  8/16/17 08:37


90 MG


 


 Glucose


  (Glucose 40% Gel)  15-30


 GRAMS 15


 GRAMS...  UD  PRN


 PO  8/11/17 02:30


 9/10/17 02:29   


 


 


 Glucose


  (Glucose Chew


 Tab)  4-8


 Tablets 4


 Tabl...  UD  PRN


 PO  8/11/17 02:30


 9/10/17 02:29   


 


 


 Dextrose


  (Dextrose 50%


 50ML Syringe)  25-50ML OF


 50% DW IV


 FOR...  UD  PRN


 IV  8/11/17 02:30


 9/10/17 02:29   


 


 


 Glucagon


  (Glucagon Inj)  1 mg  UD  PRN


 SQ  8/11/17 02:30


 9/10/17 02:29   


 


 


 Sodium Chloride


  (Ocean Nasal


 Spray)  1 sprays  PRN  PRN


 NA  8/11/17 04:30


 9/10/17 04:29  8/11/17 06:13


1 SPRAYS


 


 Miscellaneous


  (Iv Fluids


 Completed)  1 ea  PRN  PRN


 N/A  8/11/17 04:45


 8/11/18 04:44   


 


 


 Sertraline HCl


  (Zoloft Tab)  100 mg  QAM


 PO  8/14/17 08:00


 9/10/17 20:59  8/16/17 08:37


100 MG


 


 Diazepam


  (Valium Tab)  2 mg  TID


 PO  8/13/17 14:00


 9/12/17 13:59  8/16/17 08:35


2 MG


 


 Acetaminophen


  (Tylenol Tab)  1,000 mg  TID


 PO  8/13/17 14:00


 9/12/17 13:59  8/16/17 08:39


1,000 MG


 


 Lidocaine


  (Lidoderm Patch


 5%)  1 patch  QAM


 TD  8/13/17 12:00


 9/12/17 11:59  8/16/17 08:39


1 PATCH


 


 Miscellaneous


  (Remove Lidoderm


 Patch)  1 ea  DAILY@21


 N/A  8/13/17 21:00


 9/12/17 20:59  8/15/17 21:00


1 EA


 


 Calcium Acetate


  (Phoslo Cap)  2,001 mg  TIDM


 PO  8/14/17 08:00


 9/10/17 07:29  8/16/17 08:35


2,001 MG


 


 Oxycodone/


 Acetaminophen


  (Percocet


 5-325mg Tab)  FOR


 MODERATE


 PAIN ...  Q4H  PRN


 PO  8/15/17 10:45


 8/29/17 10:44  8/15/17 14:19


1 TAB


 


 Heparin Sodium


  (Porcine)


  (Heparin Sq 5000


 Unit/0.5ml)  5,000 unit  Q8H


 SQ  8/15/17 20:00


 9/14/17 19:59  8/16/17 03:39


5,000 UNIT


 


 Epoetin Narayan


  (Procrit Inj)  10,000 units  TODAY@0815


 IV.  8/16/17 08:15


 8/16/17 18:00   


 











Last 24 Hours








Test


  8/15/17


11:14 8/15/17


20:14 8/16/17


06:43 8/16/17


08:42


 


Bedside Glucose 121 mg/dl  185 mg/dl  159 mg/dl  











Assessment & Plan


ESRD-for dialysis today, 2 liters uf as tolerated. volume status is better. bun 

and creatinine much improved. pt feels better. 





Anemia of renal failure-hg goal of 10 to 11. on procrit. 





IMANI-have increased her phosphate binders and taking three with meals now. 





labs pending for today.

## 2017-08-21 NOTE — DISCHARGE SUMMARY
Discharge Summary


Date of Service


Aug 21, 2017.





Discharge Summary


Admission Date:


Aug 12, 2017 at 22:00


Discharge Date:  Aug 16, 2017


Discharge Disposition:  Rehab


Principal Diagnosis:


Muscle pain/back strain


Secondary Diagnoses/Problems:


 back pain


 ESRD on HD


 Left carotid endarterectomy


 Elevated troponin


 Chronic systolic heart failure


 Obese


 DMII with retinopathy


 Generalized Weakness


 PAD 


 HTN


Procedures:


Left carotid endarterectomy


Consultations:


Vascular Surgery-[facility]


Cardiology-[facility] [place]


Nephro-[facility]





Medication Reconciliation


Continued Medications:  


Aspirin (Aspirin 81) 81 Mg Tab


1 TAB PO DAILY





Atorvastatin (Lipitor) 40 Mg Tab


40 MG PO QAM, TAB





Calcium Acetate (Phoslo 667 Mg) 667 Mg Cap


1 CAP PO TID, CAP





Dicyclomine Hcl (Dicyclomine Hcl) 10 Mg Cap


1 MG PO QID PRN for CRAMPING





Fluticasone Propionate (Nasal) (Allergy Nasal [place] 24 Ho) 50 Mcg/Act Spr


1 SPRAY N/A DAILY





Insulin Glargine (Lantus Solostar) 100 Unit/Ml Inj


10 UNITS SC QPM, PEN





Isosorbide Mononitrate (Isosorbide Mononitrate ER) 30 Mg Tabcr


60 MG PO QAM


TAKES A 30 AND 60 = 90


Isosorbide Mononitrate Ext Rel (Imdur Ext Rel) 30 Mg Ertab


30 MG PO QAM, TAB


TAKES A 30 AND A 60 = 90


Loratadine (Claritin Childrens) 5 Mg Chw


2 TAB PO QPM





Lorazepam (Ativan) 1 Mg Tab


0.5 MG PO DAILY PRN for Anxiety, TAB





Metoprolol Tartrate (Lopressor) (Lopressor) 50 Mg Tab


1 TAB PO BID for 30 Days, #60 TAB 5 Refills





Pantoprazole (Protonix) 40 Mg Tab


40 MG PO QAM





Sertraline Hcl (Zoloft) 100 Mg Tab


100 MG PO HS, TAB





Triamcinolone Acet (Triamcinolone Acetonide) 45 Appln/15 Gm Cr


1 APPLN TOP BID for 30 Days, #30 GM





[Percocet] ()  


1 TAB PO PRN











Admission Information


HPI (per Admitting provider):


CHIEF COMPLAINT:  Status post fall, followed by pain all over and also chest


pain.


 


HISTORY OF PRESENT COMPLAINT:  She is a 52-year-old female with significant


past medical history of end-stage renal disease, on hemodialysis, depression,


diabetes with retinopathy, GERD, morbid obesity, hyperlipidemia, tobacco use


disorder, hypertension and ischemic cardiomyopathy.  Apparently, has had a


minor fall.  She slid down the bed and injured multiple  areas, but none


severely.  She was brought into the Emergency Room and she has been


complaining of pain all over and at the end, she also complained of pain in


the center chest.  She has had unremarkable labs, except troponin noted to be


elevated at 0.1.  From that point, she was advised to stay in the hospital to


rule out possible ACS.  She denies to have any recent shortness of breath,


any palpitations.  No abdominal pain, nausea or vomiting, but she does have


pain in the left hip, shoulder and multiple areas of the body.  No numbness


or tingling in the extremities and no weakness involving any side of the body


in particular.


Physical Exam (per Admitting):


GENERAL:  On examination in the Emergency Room, she was not having any acute


distress.


VITAL SIGNS:  Temperature 36.9, pulse 64, blood pressure 156/95, saturation


92% on room air.


HEENT:  Unremarkable.


NECK:  Supple.  No JVD, no bruit.


CHEST:  Clear to auscultate bilaterally with decreased breath sounds both


sides.


HEART:  S1, S2 regular.


ABDOMEN:  Soft, benign, nontender, no organomegaly.  Bowel sounds present.


EXTREMITIES:  Trace edema bilaterally.


MUSCULOSKELETAL SYSTEM:  No acute arthritis in any joint.  She does have


moderate pain in the left shoulder and left hip area as well, but no bruising


and/or hematoma.


CENTRAL NERVOUS SYSTEM:  She was alert, awake, oriented x3.





Hospital Course








1.  Muscle pain/back strain


CT lumbar/thoracic and cervical on admission showed no fractures/dislocations . 


Pain controlled with scheduled Valium and Tylenol. 


Continue PT/OT


Transfer to rehab once stable





2.  Elevated troponin


denies any chest pain


Related to ESRD


Cardiology on board


Per Cardiology evaluation, wall motion abnormalities are old 


OK to proceed to carotid surgery from a cardiac standpoint when ready.  


ECHO showed 


* The left ventricle is mildly dilated.


* There is a large sized apical, septal, inferior, and posterior wall motion 

abnormality with hypokinesis of the segments.


* Left ventricular systolic function is mildly reduced.


* The calculated left ventricular ejection fraction =46%.


* Grade I diastolic dysfunction, (abnormal relaxation pattern).


* Compared to the images of the prior study dated 2/16/17, the LV apex is 

better visualized allowing improved assessment of the LV systolic function. 

When similar images are compared, there does not appear to be a significant 

interval change in the LV systolic function.








3.  Chronic systolic heart failure


Compensated


continue current medical therapy with no changes.  


 


4.  ESRD ON HD


     HD done yesterday


     Continue regular HD on [facility]


     Nephro on board





5.  Generalized Weakness


     multifactorial in setting of noncompliance with medications for multiple 

comorbidities and noncompliance with dialysis.  


    Will go to rehab for PT once stable


    Continue PT





6.  DMII with retinopathy-


     ISS/Lantus, controlled.


     Continue monitor BS





7.  HTN


     cont with home meds. 


     Stable





8.  PAD


     S/P right Left Carotid endarterectomy by Dr. Roberts


     Continue statin and asa





9.  Obese


     Counseling on weight loss and diet





9.  Noncompliance.


     Has not been taking her medications for over 1 year. 





10. DVT proph-Heparin








11. FULL CODE





12. Disposition


      Will discharge to rehab tomorrow if stable by vascular


Total time spent on discharge = 35 minutes


This includes examination of the patient, discharge planning, medication 

reconciliation, and communication with other providers.





Discharge Instructions


Discharge Instructions


Date of Service


Aug 16, 2017.





Admission


Reason for Admission:  Chest Pain, Ckd, Fall





Discharge


Discharge Diagnosis / Problem:  back pain, ESRD on HD, Left carotid 

endarterectomy, Elevated troponin





Discharge Goals


Goal(s):  Decrease discomfort, Improve function, Improve disease control





Activity Recommendations


Activity Limitations:  resume your previous activity (as tolerated)





.





Instructions / Follow-Up


Instructions / Follow-Up


Discharge to [facility] for rehab


Follow up with vascular surgery dr. Roberts in 2 weeks


Follow up with your primary care provider once discharge from rehab


Continue regular hemodialysis


fall precaution


Continue physical therapy





Current Hospital Diet


Patient's current hospital diet: Low Sodium Diet (2gm Na), Renal Diet





Discharge Diet


Recommended Diet:  Low Sodium Diet (2gm Na), Renal Diet





Procedures


Procedures Performed:  


Left Carotid Endarterectomy





Pending Studies


Studies pending at discharge:  no





Laboratory Results





Hemoglobin A1c








Test


  8/1/17


15:11 Range/Units


 


 


Estimated Average Glucose 177   mg/dl


 


Hemoglobin A1c 7.8 H 4.5-5.6  %











Medical Emergencies








.


Who to Call and When:





Medical Emergencies:  If at any time you feel your situation is an emergency, 

please call 911 immediately.





.





Non-Emergent Contact


Non-Emergency issues call your:  Primary Care Provider


Call Non-Emergent contact if:  you have any medication questions





.


.








"Provider Documentation" section prepared by Guerline Bolanos.








.





VTE Core Measure


Inpt VTE Proph given/why not?:  Unfractionated heparin SQ





Additional Copies To


Lurdes Smith D.O.


[facility]

## 2017-09-07 ENCOUNTER — HOSPITAL ENCOUNTER (EMERGENCY)
Dept: HOSPITAL 45 - C.EDB | Age: 52
Discharge: HOME | End: 2017-09-07
Payer: COMMERCIAL

## 2017-09-07 VITALS — HEART RATE: 92 BPM | DIASTOLIC BLOOD PRESSURE: 62 MMHG | OXYGEN SATURATION: 95 % | SYSTOLIC BLOOD PRESSURE: 137 MMHG

## 2017-09-07 VITALS — HEIGHT: 67.01 IN

## 2017-09-07 VITALS — TEMPERATURE: 98.6 F

## 2017-09-07 DIAGNOSIS — M25.511: ICD-10-CM

## 2017-09-07 DIAGNOSIS — I25.10: ICD-10-CM

## 2017-09-07 DIAGNOSIS — N18.6: ICD-10-CM

## 2017-09-07 DIAGNOSIS — E66.01: ICD-10-CM

## 2017-09-07 DIAGNOSIS — M25.512: ICD-10-CM

## 2017-09-07 DIAGNOSIS — E11.9: ICD-10-CM

## 2017-09-07 DIAGNOSIS — E78.5: ICD-10-CM

## 2017-09-07 DIAGNOSIS — K21.9: ICD-10-CM

## 2017-09-07 DIAGNOSIS — Z99.2: ICD-10-CM

## 2017-09-07 DIAGNOSIS — M54.5: Primary | ICD-10-CM

## 2017-09-07 LAB
ANION GAP SERPL CALC-SCNC: 10 MMOL/L (ref 3–11)
APPEARANCE UR: (no result)
BASOPHILS # BLD: 0.02 K/UL (ref 0–0.2)
BASOPHILS NFR BLD: 0.1 %
BILIRUB UR-MCNC: (no result) MG/DL
BUN SERPL-MCNC: 57 MG/DL (ref 7–18)
BUN/CREAT SERPL: 6.3 (ref 10–20)
CALCIUM SERPL-MCNC: 9.6 MG/DL (ref 8.5–10.1)
CHLORIDE SERPL-SCNC: 87 MMOL/L (ref 98–107)
CO2 SERPL-SCNC: 30 MMOL/L (ref 21–32)
COLOR UR: YELLOW
COMPLETE: YES
CREAT SERPL-MCNC: 9 MG/DL (ref 0.6–1.2)
EOSINOPHIL NFR BLD AUTO: 315 K/UL (ref 130–400)
GLUCOSE SERPL-MCNC: 229 MG/DL (ref 70–99)
HCT VFR BLD CALC: 33.8 % (ref 37–47)
IG%: 0.4 %
IMM GRANULOCYTES NFR BLD AUTO: 8.2 %
LYME DISEASE AB IGG: (no result)
LYME DISEASE AB IGM: (no result)
LYMPHOCYTES # BLD: 1.36 K/UL (ref 1.2–3.4)
MANUAL MICROSCOPIC REQUIRED?: NO
MCH RBC QN AUTO: 31.4 PG (ref 25–34)
MCHC RBC AUTO-ENTMCNC: 33.1 G/DL (ref 32–36)
MCV RBC AUTO: 94.7 FL (ref 80–100)
MONOCYTES NFR BLD: 7.3 %
NEUTROPHILS # BLD AUTO: 0.4 %
NEUTROPHILS NFR BLD AUTO: 83.6 %
NITRITE UR QL STRIP: (no result)
PH UR STRIP: >= 9 [PH] (ref 4.5–7.5)
PMV BLD AUTO: 10.4 FL (ref 7.4–10.4)
POTASSIUM SERPL-SCNC: 4.7 MMOL/L (ref 3.5–5.1)
RBC # BLD AUTO: 3.57 M/UL (ref 4.2–5.4)
REVIEW REQ?: NO
SODIUM SERPL-SCNC: 127 MMOL/L (ref 136–145)
SP GR UR STRIP: 1.01 (ref 1–1.03)
SULFASALICYLIC ACID: (no result)
URINE BILL WITH OR WITHOUT MIC: (no result)
URINE EPITHELIAL CELL AUTO: >30 /LPF (ref 0–5)
UROBILINOGEN UR-MCNC: (no result) MG/DL
WBC # BLD AUTO: 16.52 K/UL (ref 4.8–10.8)

## 2017-09-07 NOTE — DIAGNOSTIC IMAGING REPORT
RIGHT SHOULDER MIN 2 VIEWS ROUTINE



HISTORY:  52 years-old Female same

Right acute bilateral shoulder pain without reported trauma.



COMPARISON: Portable chest radiograph 8/10/2017



TECHNIQUE: 3 views of the right shoulder



FINDINGS: 

Moderate, clavicular and mild glenohumeral osteoarthritis is noted. Amorphous

appearing calcifications are seen within the region of the

subacromial/subdeltoid bursa. Imaged lung fields are clear.



IMPRESSION:

1. Degenerative changes about the right shoulder without acute bony abnormality.

2. Amorphous calcifications within the expected region of the

subacromial/subdeltoid bursa are suspicious for calcific bursitis or

alternatively calcific tendinosis of the rotator cuff.







The above report was generated using voice recognition software. It may contain

grammatical, syntax or spelling errors.







Electronically signed by:  Homer Salcido M.D.

9/7/2017 3:11 PM



Dictated Date/Time:  9/7/2017 3:10 PM

## 2017-09-07 NOTE — EMERGENCY ROOM VISIT NOTE
ED Visit Note


First contact with patient:  13:22


Chief Complaint: My hips and shoulders hurt.





History of Present Illness: Ms. Mckeon is a 52-year-old white female who 

brought into the ED the wheelchair complaining of bilateral hips and shoulder 

pain.


Historically patient reports she was diagnosed with osteoarthritis last year 

but was never referred to a specialist for her arthritis.  She reports since 

being diagnosed she has intermittent bilateral shoulder and hip pain.


Patient reports she was feeling well yesterday after returning home from 

dialysis and noted some mild discomfort in the posterior pelvis area; this is 

the discomfort she is calling hip pain.  She reports she used her prescribed 

Percocet and a heating pad and was able to sleep all night.  She reports when 

she woke up this morning she was not immediately having any pain but when she 

attempted to get out of bed and was moving her legs her pain became severe and 

she could barely stand up.  During this process she also noted that she was 

having bilateral shoulder pain.


Patient places her hip discomfort actually over the bilateral sacroiliac joint 

area.  She describes her pain as a stabbing and a sensation of stepping on 

glass.  Her hip pain radiates around the lateral aspect of the pelvis and into 

the anterior thigh.  She rates her discomfort 10/10.  Her pain worsens with 

palpation of the lower back predominately over the sacroiliac joint areas but 

also over the L5-S1 area, movement of the legs predominately at the hips, and 

weightbearing.  She reports she has not identified any alleviating factors 

related to the pain.  Today she reports she has not taken any medications for 

pain.


Currently she places her right shoulder pain over the anterior aspect of the 

humerus in the area of the bicipital groove.  She places her left shoulder pain 

over the lateral aspect of the humerus.  She reports this pain is not as 

severe.  Her pain worsens with palpation in all movements of the shoulder.  She 

has not identified any alleviating factors related to the pain.


She denies fevers, chills, sweats, skin eruptions, skin color changes, recent 

trauma, neck pain, thoracic back pain, upper respiratory tract symptoms, 

shortness of breath, chest pain, extremity weakness/numbness/tingling, recent 

injuries to the back and extremities.


Lastly she reports that she did go out to eat just prior to coming to the 

hospital and after eating she became nauseated but has not vomited.





Review of Systems: As noted above in history of present illness.  All body 

systems were reviewed and found to be negative as noted above.





Past Medical History:


(1) Allergic rhinitis


(2) C. difficile colitis


(3) CAD (coronary artery disease)


(4) Carotid stenosis


(5) Diabetic foot infection


(6) DM type 2 (diabetes mellitus, type 2)


(7) Dyslipidemia


(8) ESRD (end stage renal disease) on dialysis


(9) Fall


(10) GERD (gastroesophageal reflux disease)


(11) HTN (hypertension)


(12) HX OF PAST NONCOMPLIANCE


(13) Ischemic cardiomyopathy


(14) Morbid obesity


(15) Osteomyelitis


(16) Tobacco use disorder


(17) UGIB (upper gastrointestinal bleed)


(18) Weakness


Surgical Problems:


(1) Hemodialysis access, AV graft





Current Medications:








 Medications  Dose


 Route/Sig


 Max Daily Dose Days Date Category Dose


Instructions


 


 Metoprolol


 Succinate ER


  (Metoprolol


 Succinate) 50 Mg


 Tabcr  50 Mg


 PO DAILY


    9/7/17 Reported 


 


 Aspirin 81


  (Aspirin) 81 Mg


 Tab  1 Tab


 PO DAILY


    8/1/17 Reported 


 


 Triamcinolone


 Acetonide


  (Triamcinolone


 Acet) 45 Appln/15


 Gm Cr  1 Appln


 TOP BID


   30 8/1/17 Reported 


 


 Lopressor


  (Metoprolol


 Tartrate) 50 Mg


 Tab  1 Tab


 PO BID


   30 8/1/17 Reported 


 


 Allergy Nasal


 [place] 24 Ho


  (Fluticasone


 Propionate


  (Nasal)) 50


 Mcg/Act Spr  1 Spray


 N/A DAILY


    5/30/17 Reported 


 


 Claritin


 Childrens


  (Loratadine) 5 Mg


 Chw  2 Tab


 PO QPM


    5/30/17 Reported 


 


 Zoloft


  (Sertraline Hcl)


 100 Mg Tab  100 Mg


 PO HS


    5/30/17 Reported 


 


 Lipitor


  (Atorvastatin) 40


 Mg Tab  40 Mg


 PO QAM


    5/30/17 Reported 


 


 Protonix


  (Pantoprazole


 Sodium) 40 Mg Tab  40 Mg


 PO QAM


    3/4/17 Reported 


 


 Dicyclomine Hcl


 10 Mg Cap  1 Mg


 PO QID PRN


    3/4/17 Reported 


 


 Phoslo 667 Mg


  (Calcium Acetate)


 667 Mg Cap  1 Cap


 PO TID


    1/24/17 Reported 


 


 Isosorbide


 Mononitrate ER


  (Isosorbide


 Mononitrate) 30


 Mg Tabcr  60 Mg


 PO QAM


    1/24/17 Reported  TAKES A 30 AND 60 = 90


 


 Imdur Ext Rel


  (Isosorbide


 Mononitrate) 30


 Mg Ertab  30 Mg


 PO QAM


    1/24/17 Reported  TAKES A 30 AND A 60 = 90


 


 Lantus Solostar


  (Insulin


 Glargine) 100


 Unit/Ml Inj  10 Units


 SC QPM


    1/24/17 Reported 


 


 Ativan


  (Lorazepam) 1 Mg


 Tab  0.5 Mg


 PO DAILY PRN


    1/24/17 Reported 








Allergies to Medications: Latex.


Social History: Patient is not employed; she feels safe in her home environment

; she denies tobacco use.





Physical Examination:


Vital Signs: 








  Date Time  Temp Pulse Resp B/P (MAP) Pulse Ox O2 Delivery O2 Flow Rate FiO2


 


9/7/17 21:47  92 17 137/62 95   


 


9/7/17 18:25  90 16 143/58 96   


 


9/7/17 16:47  97 15 122/75 95 Room Air  


 


9/7/17 15:39  92      


 


9/7/17 13:10 37.0 97 18 122/71 97 Room Air  





GENERAL: 52-year-old female in mild to moderate distress due to pain, nontoxic-

appearing, afebrile and hemodynamically stable.


NEUROLOGICAL: Awake, alert and oriented to person, place and time.  Answering 

questions appropriately and following commands.  Good hand eye coordination.  

No focal motor sensory deficits.


SKIN: Warm, dry and pink.  No soft tissue eruptions or trauma noted.


HEENT:  Atraumatic and normocephalic.  PERRLA.  Sclera white and conjunctiva 

pink.  No drainage from naris.  Oral cavity moist and pink.  Pharynx is 

nonerythematous or edematous.  Speech normal.  No lymphadenopathy.  Trachea 

midline.  No jugular venous distention.


BACK:  No tenderness over the bony cervical and thoracic spine.  Moderate 

tenderness over the bony L4 to S1 area without bony crepitus, bony deformity, 

swelling, erythema or step-offs.  There is also moderate tenderness bilateral 

sacroiliac joints without bony deformity, bony crepitus, swelling or 

ecchymosis.  Unable to do straight leg raise test due to patient's habitus and 

pain.  No CVA tenderness.  


THORAX:  Lungs sounds are clear to auscultation and equal bilaterally with 

symmetrical chest wall.  No wheezing, rales or rhonchi.  No crepitus, tenderness

, subcutaneous air or deformities noted.


HEART:  Regular rate and rhythm.  No gallops, rubs or murmurs are appreciated.


ABDOMEN: Obese, soft and nontender.  Positive bowel sounds in all quadrants.  

No guarding, rigidity or organomegaly.


UPPER EXTREMITIES: No gross bony deformity.  Right: Tenderness over the anterior

-year-old head and in the bicipital groove without bony deformity, bony crepitus

, swelling, erythema or ecchymosis.  Decreased range of motion in all shoulder 

movements due to pain.  No tenderness over the lateral or posterior oral head, 

clavicle or scapula.  With the shoulder stabilized patient has 5/5 muscle 

strength in flexion and extension of the elbow, pronation and supination of the 

forearm and flexion, extension and radial ulnar deviation of the wrist.  

Throughout the lower arm and hand the skin was warm and pink and capillary 

refill is brisk.  She is able to distinguish light sensations through all 

dermatomes.  Left: Tenderness over the lateral aspect of the humeral head 

without bony deformity, bony crepitus, swelling, erythema or ecchymosis.  No 

tenderness over the anterior posterior oral head, clavicle or scapula.  

Decreased range of motion due to pain.  With the shoulder stabilize she does 

have full range of motion and muscle strength in flexion and extension of the 

elbow, pronation and supination of the forearm and flexion, extension and 

radial and ulnar deviation of the wrist.  Throughout the arm and hand the skin 

was warm and pink and capillary refill is brisk.  She is able to distinguish 

light sensations through all dermatomes.


LOWER EXTREMITY: No gross bony deformity.  No shortening or malrotation.  No 

tenderness over the hips, thighs, knees, lower legs or ankle.  Patient refused 

to do range of motion exercises in the hips due to pain.  With that stabilize 

she does have full range of motion and muscle strength in flexion and extension 

of the knees and plantar flexion and dorsiflexion of the ankles.  Throughout 

the lower legs and feet the skin was warm and pink and capillary refill is 

brisk.  She is able to distinguish light sensations through all dermatomes.





ED Course:


Patient is assessed as noted above.


Patient's medication list was reviewed.


Laboratory Testing:








Test


  9/7/17


14:05 9/7/17


15:21 9/7/17


19:10 Range/Units


 


 


White Blood Count 16.52   4.8-10.8  K/uL


 


Red Blood Count 3.57   4.2-5.4  M/uL


 


Hemoglobin 11.2   12.0-16.0  g/dL


 


Hematocrit 33.8   37-47  %


 


Mean Corpuscular Volume 94.7     fL


 


Mean Corpuscular Hemoglobin 31.4   25-34  pg


 


Mean Corpuscular Hemoglobin


Concent 33.1


  


  


  32-36  g/dl


 


 


Platelet Count 315   130-400  K/uL


 


Mean Platelet Volume 10.4   7.4-10.4  fL


 


Neutrophils (%) (Auto) 83.6    %


 


Lymphocytes (%) (Auto) 8.2    %


 


Monocytes (%) (Auto) 7.3    %


 


Eosinophils (%) (Auto) 0.4    %


 


Basophils (%) (Auto) 0.1    %


 


Neutrophils # (Auto) 13.82   1.4-6.5  K/uL


 


Lymphocytes # (Auto) 1.36   1.2-3.4  K/uL


 


Monocytes # (Auto) 1.20   0.11-0.59  K/uL


 


Eosinophils # (Auto) 0.06   0-0.5  K/uL


 


Basophils # (Auto) 0.02   0-0.2  K/uL


 


RDW Standard Deviation 49.1   36.4-46.3  fL


 


RDW Coefficient of Variation 14.2   11.5-14.5  %


 


Immature Granulocyte % (Auto) 0.4    %


 


Immature Granulocyte # (Auto) 0.06   0.00-0.02  K/uL


 


Erythrocyte Sedimentation Rate 81   0-21  mm/hr


 


Sodium Level 127   136-145  mmol/L


 


Potassium Level 4.7   3.5-5.1  mmol/L


 


Chloride Level 87     mmol/L


 


Carbon Dioxide Level 30   21-32  mmol/L


 


Anion Gap 10.0   3-11  mmol/L


 


Blood Urea Nitrogen 57   7-18  mg/dl


 


Creatinine


  9.00


  


  


  0.60-1.20


mg/dl


 


Estimated GFR (


American) 5.3


  


  


   


 


 


Estimated GFR (Non-


American 4.6


  


  


   


 


 


BUN/Creatinine Ratio 6.3   10-20  


 


Random Glucose 229   70-99  mg/dl


 


Calcium Level 9.6   8.5-10.1  mg/dl


 


Lyme Disease IgG Antibody  NEG  NEG  


 


Lyme Disease IgM Antibody  EQUIVOCAL  NEG  


 


Urine Color   YELLOW  


 


Urine Appearance   CLOUDY CLEAR  


 


Urine pH   >= 9.0 4.5-7.5  


 


Urine Specific Gravity   1.013 1.000-1.030  


 


Urine Protein   1+ NEG  


 


Urine Glucose (UA)   1+ NEG  


 


Urine Ketones   NEG NEG  


 


Urine Occult Blood   NEG NEG  


 


Urine Nitrite   NEG NEG  


 


Urine Bilirubin   NEG NEG  


 


Urine Urobilinogen   NEG NEG  


 


Urine Leukocyte Esterase   SMALL NEG  


 


Urine WBC (Auto)   >30 0-5  /hpf


 


Urine RBC (Auto)   0-4 0-4  /hpf


 


Urine Hyaline Casts (Auto)   1-5 0-5  /lpf


 


Urine Epithelial Cells (Auto)   >30 0-5  /lpf


 


Urine Bacteria (Auto)   1+ NEG  





                       Urine Culture: Pending.


Right Shoulder X-Rays: Were read by myself and the radiologist and shows 

degenerative changes about the right shoulder without acute bony abnormalities 

and amorphous calcification within the expected region of the subacromial/

subdeltoid bursa suspicious for calcific bursitis or calcified tendon noticed 

of this rotator cuff.


Left Shoulder X-Rays: Were read by myself and the radiologist and shows no 

acute bony abnormalities.  Osteopenic and degenerative changes at the 

acromioclavicular joint.  Calcified tendinosis was once again noted.


Lumbar Spine MRI: Were reviewed by myself and read by the radiologist showing a 

small focal central disc for trusion at the L5-S1 without significant central 

canal or neural foraminal narrowing, focal calcification with edema along the 

right side of the L4-L5 transverse processes similar to previous study and 

trace retroperitoneal/prevertebral edema within the lower lumbar spine 

digestive of chronic change.


Patient was hydrated with normal saline, she received a total of 10 mg of 

morphine IV for pain, 4 mg of Zofran IV for nausea and 100 mg of doxycycline 

for antibiotic coverage for equivocal Lyme's disease testing.


Patient was reassessed multiple times during her stay in the emergency 

department.


Patient's case was reviewed with Dr. Matta; we agreed on diagnostic 

approach, treatment, disposition and plan.


Patient was educated about today's findings and instructed on her treatment plan

; she verbalized understanding and agreement with this plan.





Clinical Impression: Acute lumbar back pain.  Bilateral shoulder pain.  

Equivocal Lyme testing.





Decision-Making: Initially my differential diagnosis I considered disc 

herniation, spinal abscess, pyelonephritis, osteomyelitis, osteoarthritis 

exacerbation and other causes.





Disposition: Patient discharged home in stable condition accompanied by family 

member; prior to departure she was reassessed and subjectively reported she was 

feeling better.  She continued to rate her discomfort 7/10.





Plan:


Patient was encouraged to continue her current medications as prescribed.


Patient was prescribed doxycycline 2 times a day for 14 days; she was 

encouraged to follow-up with her PCP before 14 days for reevaluation and 

possible extension of her prescription for an additional 14 days.


Patient was prescribed Percocet one tablet every 6 hours as needed for severe 

pain.


Patient was encouraged to use her walker for stabilization and support.


Patient was encouraged to contact her PCP and request follow-up care and 

treatment and urine culture results in 24-36 hours.


Patient was encouraged return ED for worsening/uncontrolled pain, fevers, 

urinary symptoms, genital numbness/tingling, bowel and bladder dysfunction, 

lower extremity weakness or any new/concerning symptoms.

## 2017-09-07 NOTE — DIAGNOSTIC IMAGING REPORT
LEFT SHOULDER 3 VIEWS



CLINICAL HISTORY: Left shoulder pain.



FINDINGS: 3 views of the left shoulder are obtained. No prior studies are

available for comparison at the time of dictation. The skeletal structures are

osteopenic. No fracture or dislocation is seen. Mild productive degenerative

change is seen at the acromioclavicular joint. The glenohumeral articulation is

preserved. Calcific tendinopathy is noted. The overlying soft tissues are within

normal limits. Atherosclerotic calcification is noted in the left axillary

artery. A surgical clip is present in the left upper extremity. The visualized

left upper lobe lung parenchyma appears clear. The heart is enlarged and there

is atherosclerotic calcification of the thoracic aorta.



IMPRESSION:



1. Osteopenia and degenerative change as above. No acute bony abnormality seen

in the left shoulder.



2. Calcific tendinopathy is noted in the left shoulder.







Electronically signed by:  Nguyễn Nielson M.D.

9/7/2017 3:25 PM



Dictated Date/Time:  9/7/2017 3:23 PM

## 2017-09-07 NOTE — DIAGNOSTIC IMAGING REPORT
LUMBAR SPINE MRI



HISTORY:      acute lower back pain; elevated white blood cell count.



TECHNIQUE: Multiplanar multisequence MRI of the lumbar spine was performed

without the use of contrast.



COMPARISON:  Lumbar spine CT 8/10/2017.



FINDINGS: For the purpose of the report the L5-S1 disc space will be located on

axial image 27 of 30.

Alignment is intact. No fracture or subluxation. Normal marrow signal intensity

seen throughout the visualized osseous structures. Mild disc space narrowing and

disc desiccation L5-S1. Remaining disc spaces are preserved. The conus

terminates at the L1-L2 disc space level. Mild subcutaneous edema within the

lumbar region. There is trace prevertebral edema which is likely chronic from

the L3-S1 levels. Mild facet degenerative changes within the lower lumbar spine.

Focal edema adjacent to the right L4-L5 transverse process with an associated

focal calcification. This is unchanged compared to the prior CT examination.

There is also a tiny focus of fluid at this location measuring 5 mm.



L1-L2: No significant central canal or neural foraminal narrowing.



L2-L3: No significant central canal or neural foraminal narrowing.



L3-L4: No significant central canal or neural foraminal narrowing.



L4-L5: No significant central canal or neural foraminal narrowing.



L5-S1: Small focal central disc protrusion without significant central canal or

neural foraminal narrowing.



IMPRESSION:  



1. Small focal central disc protrusion at L5-S1 without significant central

canal or neural foraminal narrowing.

2. Focal calcification with edema along the right side of the L4 and L5

transverse processes. This is similar to the prior study and may be due to old

trauma or long-standing degenerative change. There is also 5 mm focal fluid

collection at this location which may represent an associated bursitis.

3. Trace retroperitoneal/prevertebral edema within the lower lumbar spine

suggestive of chronic change.







Electronically signed by:  Ismael Pascual M.D.

9/7/2017 4:51 PM



Dictated Date/Time:  9/7/2017 4:43 PM

## 2017-09-11 LAB
B BURGDOR IGM PATRN SER IB-IMP: NONREACTIVE
B BURGDOR18KD IGG SER QL IB: NONREACTIVE
B BURGDOR23KD IGG SER QL IB: NONREACTIVE
B BURGDOR28KD IGG SER QL IB: NONREACTIVE
B BURGDOR30KD IGG SER QL IB: REACTIVE
B BURGDOR39KD IGG SER QL IB: NONREACTIVE
B BURGDOR39KD IGG SER QL IB: NONREACTIVE
B BURGDOR41KD IGG SER QL IB: REACTIVE
B BURGDOR41KD IGG SNV QL IB: NONREACTIVE
B BURGDOR45KD IGG SER QL IB: NONREACTIVE
B BURGDOR58KD IGG SER QL IB: REACTIVE
B BURGDOR66KD IGG SER QL IB: NONREACTIVE
B BURGDOR93KD IGG SER QL IB: NONREACTIVE

## 2017-09-26 ENCOUNTER — HOSPITAL ENCOUNTER (EMERGENCY)
Dept: HOSPITAL 45 - C.EDB | Age: 52
LOS: 1 days | Discharge: HOME | End: 2017-09-27
Payer: COMMERCIAL

## 2017-09-26 VITALS — TEMPERATURE: 98.42 F

## 2017-09-26 VITALS
WEIGHT: 231.49 LBS | HEIGHT: 65.98 IN | BODY MASS INDEX: 37.2 KG/M2 | BODY MASS INDEX: 37.2 KG/M2 | WEIGHT: 231.49 LBS | HEIGHT: 65.98 IN

## 2017-09-26 DIAGNOSIS — E11.9: ICD-10-CM

## 2017-09-26 DIAGNOSIS — E78.5: ICD-10-CM

## 2017-09-26 DIAGNOSIS — I12.0: ICD-10-CM

## 2017-09-26 DIAGNOSIS — I25.10: ICD-10-CM

## 2017-09-26 DIAGNOSIS — Z91.09: ICD-10-CM

## 2017-09-26 DIAGNOSIS — Z91.81: ICD-10-CM

## 2017-09-26 DIAGNOSIS — M25.561: ICD-10-CM

## 2017-09-26 DIAGNOSIS — Z99.2: ICD-10-CM

## 2017-09-26 DIAGNOSIS — K21.9: ICD-10-CM

## 2017-09-26 DIAGNOSIS — Z79.899: ICD-10-CM

## 2017-09-26 DIAGNOSIS — Z87.891: ICD-10-CM

## 2017-09-26 DIAGNOSIS — Z83.3: ICD-10-CM

## 2017-09-26 DIAGNOSIS — M25.562: ICD-10-CM

## 2017-09-26 DIAGNOSIS — Z91.040: ICD-10-CM

## 2017-09-26 DIAGNOSIS — Z79.82: ICD-10-CM

## 2017-09-26 DIAGNOSIS — M54.5: Primary | ICD-10-CM

## 2017-09-26 DIAGNOSIS — Z79.4: ICD-10-CM

## 2017-09-26 DIAGNOSIS — Z82.49: ICD-10-CM

## 2017-09-26 DIAGNOSIS — F17.200: ICD-10-CM

## 2017-09-26 DIAGNOSIS — M86.9: ICD-10-CM

## 2017-09-26 DIAGNOSIS — N18.6: ICD-10-CM

## 2017-09-26 DIAGNOSIS — Z84.1: ICD-10-CM

## 2017-09-26 LAB
ALP SERPL-CCNC: 67 U/L (ref 45–117)
ALT SERPL-CCNC: 8 U/L (ref 12–78)
ANION GAP SERPL CALC-SCNC: 12 MMOL/L (ref 3–11)
AST SERPL-CCNC: 8 U/L (ref 15–37)
BASOPHILS # BLD: 0.01 K/UL (ref 0–0.2)
BASOPHILS NFR BLD: 0.1 %
BUN SERPL-MCNC: 46 MG/DL (ref 7–18)
BUN/CREAT SERPL: 6.3 (ref 10–20)
CALCIUM SERPL-MCNC: 9.7 MG/DL (ref 8.5–10.1)
CHLORIDE SERPL-SCNC: 89 MMOL/L (ref 98–107)
CO2 SERPL-SCNC: 28 MMOL/L (ref 21–32)
COMPLETE: YES
CREAT CL PREDICTED SERPL C-G-VRATE: 11.3 ML/MIN
CREAT SERPL-MCNC: 7.3 MG/DL (ref 0.6–1.2)
EOSINOPHIL NFR BLD AUTO: 387 K/UL (ref 130–400)
GLUCOSE SERPL-MCNC: 173 MG/DL (ref 70–99)
HCT VFR BLD CALC: 32.4 % (ref 37–47)
IG%: 0.3 %
IMM GRANULOCYTES NFR BLD AUTO: 9.9 %
LYMPHOCYTES # BLD: 1.43 K/UL (ref 1.2–3.4)
MCH RBC QN AUTO: 29.2 PG (ref 25–34)
MCHC RBC AUTO-ENTMCNC: 30.6 G/DL (ref 32–36)
MCV RBC AUTO: 95.6 FL (ref 80–100)
MONOCYTES NFR BLD: 12.2 %
NEUTROPHILS # BLD AUTO: 0.6 %
NEUTROPHILS NFR BLD AUTO: 76.9 %
PMV BLD AUTO: 9.8 FL (ref 7.4–10.4)
POTASSIUM SERPL-SCNC: 4.3 MMOL/L (ref 3.5–5.1)
RBC # BLD AUTO: 3.39 M/UL (ref 4.2–5.4)
SODIUM SERPL-SCNC: 129 MMOL/L (ref 136–145)
WBC # BLD AUTO: 14.38 K/UL (ref 4.8–10.8)

## 2017-09-26 RX ADMIN — LIDOCAINE ONE PATCH: 50 PATCH CUTANEOUS at 20:35

## 2017-09-26 RX ADMIN — ONDANSETRON STA MG: 2 INJECTION INTRAMUSCULAR; INTRAVENOUS at 17:14

## 2017-09-26 RX ADMIN — SODIUM CHLORIDE STA MLS/HR: 900 INJECTION, SOLUTION INTRAVENOUS at 16:30

## 2017-09-26 RX ADMIN — HYDROMORPHONE HYDROCHLORIDE STA MG: 1 INJECTION, SOLUTION INTRAMUSCULAR; INTRAVENOUS; SUBCUTANEOUS at 17:15

## 2017-09-26 RX ADMIN — SODIUM CHLORIDE STA MG: 9 INJECTION INTRAMUSCULAR; INTRAVENOUS; SUBCUTANEOUS at 17:12

## 2017-09-26 RX ADMIN — PROCHLORPERAZINE EDISYLATE STA MG: 5 INJECTION INTRAMUSCULAR; INTRAVENOUS at 17:29

## 2017-09-26 RX ADMIN — LIDOCAINE STA PATCH: 50 PATCH CUTANEOUS at 20:35

## 2017-09-26 NOTE — DIAGNOSTIC IMAGING REPORT
R KNEE 1 OR 2 VIEWS ROUTINE



CLINICAL HISTORY: Right knee pain. History of trauma one month ago.     



COMPARISON: None.



DISCUSSION: No acute fractures are visualized. There is fragmentation of the

anterior tibial tuberosity. This is felt to be chronic. There is a small soft

tissue calcification adjacent to the lateral aspect of the lateral femoral

condyle. There are moderately extensive vascular calcifications present. There

is a small joint effusion.    



IMPRESSION: 

1. Mild degenerative changes with mild medial joint compartment narrowing

2. Small joint effusion

3. No acute fractures.







Electronically signed by:  Won Richey M.D.

9/26/2017 6:27 PM



Dictated Date/Time:  9/26/2017 6:26 PM

## 2017-09-26 NOTE — DIAGNOSTIC IMAGING REPORT
PELVIS 1 OR 2 VIEW ROUTINE



CLINICAL HISTORY: Pain. Trauma one month ago.    



COMPARISON STUDY:  2/20/2017



FINDINGS: There are extensive vascular calcifications. No acute fractures are

visualized. There is mild bilateral joint space narrowing consistent with mild

degenerative change. No destructive lesions are evident.



IMPRESSION:  Mild degenerative change. No fractures are visualized. 









Electronically signed by:  Won Richey M.D.

9/26/2017 6:28 PM



Dictated Date/Time:  9/26/2017 6:27 PM

## 2017-09-26 NOTE — DIAGNOSTIC IMAGING REPORT
LEFT KNEE 2 VIEWS



CLINICAL HISTORY: Left knee pain     



COMPARISON: None.



DISCUSSION: No fractures or dislocations are visualized. There is fragmentation

of the anterior tibial tuberosity. This is felt to be old. There are extensive

vascular calcifications present. There is a probable subchondral cyst versus

osteochondral defect involving the superior patella. There is no radiographic

evidence of a joint effusion. There is mild medial joint compartment narrowing.



IMPRESSION: 

1. Mild degenerative change

2. No acute fractures

3. Small osteochondral defect versus subchondral cyst involving the superior

pole of the patella







Electronically signed by:  Won Richey M.D.

9/26/2017 6:31 PM



Dictated Date/Time:  9/26/2017 6:30 PM

## 2017-09-26 NOTE — EMERGENCY ROOM VISIT NOTE
History


Report prepared by Eda:  Domonique Harrison


Under the Supervision of:  Dr. Adam Cole M.D.


First contact with patient:  16:21


Chief Complaint:  PAIN (GENERALIZED)


Stated Complaint:  RT KNEE PAIN, LT LWR PELVIS, LWR BACK





History of Present Illness


The patient is a 52 year old female who presents to the Emergency Room with 

complaints of persistent lower back pain starting PTA. The patient states that 

she cannot stand or walk because of the pain. She is also having pain in her 

neck, shoulder, right knee, and hips. She has been taking Percocet for her 

pain. The pain is causing her to become nauseous. She is not confused according 

to her family. She did fall onto her right knee 1 month ago. She has been 

having pain since then. She has not followed with ortho.





   Source of History:  patient, family


   Onset:  PTA


   Position:  back (lower)


   Quality:  other (pain)


   Timing:  other (persistent)


   Modifying Factors (Relieving):  other (percocet)


   Associated Symptoms:  + neck pain, + nausea


Note:


Pt reports shoulder pain, right knee pain, hip pain.





Review of Systems


See HPI for pertinent positives & negatives. A total of 10 systems reviewed and 

were otherwise negative.





Past Medical & Surgical


Medical Problems:


(1) Allergic rhinitis


(2) C. difficile colitis


(3) CAD (coronary artery disease)


(4) Carotid stenosis


(5) Diabetic foot infection


(6) DM type 2 (diabetes mellitus, type 2)


(7) Dyslipidemia


(8) ESRD (end stage renal disease) on dialysis


(9) Fall


(10) GERD (gastroesophageal reflux disease)


(11) HTN (hypertension)


(12) HX OF PAST NONCOMPLIANCE


(13) Ischemic cardiomyopathy


(14) Morbid obesity


(15) Osteomyelitis


(16) Tobacco use disorder


(17) UGIB (upper gastrointestinal bleed)


(18) Weakness


Surgical Problems:


(1) Hemodialysis access, AV graft








Family History





Diabetes mellitus


  FATHER


  MOTHER


Heart disease


  FATHER


  MOTHER


Hypertension


  FATHER


  MOTHER


Kidney disease


  GRANDMOTHER





Social History


Smoking Status:  Former Smoker


Alcohol Use:  none


Drug Use:  none


Marital Status:  


Housing Status:  lives with family


Occupation Status:  unemployed





Current/Historical Medications


Scheduled


Aspirin (Aspirin 81), 1 TAB PO DAILY


Atorvastatin (Lipitor), 40 MG PO QAM


Calcium Acetate (Phoslo 667 Mg), 1 CAP PO TID


Fluticasone Propionate (Nasal) (Allergy Nasal [place] 24 Ho), 1 SPRAY N/A DAILY


Insulin Glargine (Lantus Solostar), 10 UNITS SC QPM


Isosorbide Mononitrate (Isosorbide Mononitrate ER), 60 MG PO QAM


Isosorbide Mononitrate Ext Rel (Imdur Ext Rel), 30 MG PO QAM


Loratadine (Claritin Childrens), 2 TAB PO QPM


Metoprolol Succinate (Metoprolol Succinate ER), 50 MG PO DAILY


Pantoprazole (Protonix), 40 MG PO QAM


Sertraline Hcl (Zoloft), 100 MG PO HS





Scheduled PRN


Dicyclomine Hcl (Dicyclomine Hcl), 1 MG PO QID PRN for CRAMPING


Lorazepam (Ativan), 0.5 MG PO DAILY PRN for Anxiety


Oxycodone/Acetaminophen 5MG/325MG (Percocet 5MG/325MG), 1 TAB PO Q6 PRN for Pain


Oxycodone/Acetaminophen 5MG/325MG (Percocet 5MG/325MG), 1-2 TAB PO Q4H PRN for 

Pain





Allergies


Coded Allergies:  


     Adhesives (Verified  Allergy, Mild, RASH, SORES, 9/26/17)


     NO KNOWN DRUG ALLERGIES (Verified  Allergy, Mild, ., 5/30/17)


     Latex1 -Allergic Contact Dermititis (Verified  Allergy, Unknown, rash, 9/26 /17)


     Pollen Extract (Verified  Allergy, Unknown, rash, 9/26/17)





Physical Exam


Vital Signs











  Date Time  Temp Pulse Resp B/P (MAP) Pulse Ox O2 Delivery O2 Flow Rate FiO2


 


9/26/17 22:50  69 20  97 Nasal Cannula 2.0 


 


9/26/17 21:02  83      


 


9/26/17 20:38  88 18 186/96 100 Nasal Cannula 2.0 


 


9/26/17 19:08  97 18 115/83 96 Room Air  


 


9/26/17 17:52  89 21 153/83 96 Room Air  


 


9/26/17 17:00  92      


 


9/26/17 16:30  90 15 158/94 95 Room Air  


 


9/26/17 14:10 36.9 89 16 132/78 97 Room Air  











Physical Exam


GENERAL: Patient is a healthy-appearing well-nourished female


HEAD: Normocephalic atraumatic


EYES: Ocular movements intact pupils equal and react to light


OROPHARYNX mucous membranes are moist no exudates present no erythema or edema 

present


NECK: Supple no nuchal rigidity


CHEST: Good equal expansion


LUNGS: Clear and equal to auscultation


CARDIAC: Normal S1 and S2


ABDOMEN: Soft nontender no guarding


BACK: No CVA tenderness tenderness with palpation to the midline L1-L5.


EXTREMITIES: exquisitely tender to the right knee normal muscle strength in all 

groups no clubbing cyanosis or edema


NEURO: Patient is following commands and answering questions appropriately. 

Alert and oriented x3 Cranial Nerves 2-12 grossly intact no loss of bowel or 

bladder control no evidence of saddle anesthesia





Medical Decision & Procedures


ER Provider


Diagnostic Interpretation:


X-ray results as stated below per interpretation by me and the radiologist. 

Radiology results as stated below per my review and radiologist interpretation:





LEFT KNEE 2 VIEWS





CLINICAL HISTORY: Left knee pain     





COMPARISON: None.





DISCUSSION: No fractures or dislocations are visualized. There is fragmentation


of the anterior tibial tuberosity. This is felt to be old. There are extensive


vascular calcifications present. There is a probable subchondral cyst versus


osteochondral defect involving the superior patella. There is no radiographic


evidence of a joint effusion. There is mild medial joint compartment narrowing.





IMPRESSION: 


1. Mild degenerative change


2. No acute fractures


3. Small osteochondral defect versus subchondral cyst involving the superior


pole of the patella





Electronically signed by:  Won Richey M.D.


9/26/2017 6:31 PM





Dictated Date/Time:  9/26/2017 6:30 PM








R KNEE 1 OR 2 VIEWS ROUTINE





CLINICAL HISTORY: Right knee pain. History of trauma one month ago.     





COMPARISON: None.





DISCUSSION: No acute fractures are visualized. There is fragmentation of the


anterior tibial tuberosity. This is felt to be chronic. There is a small soft


tissue calcification adjacent to the lateral aspect of the lateral femoral


condyle. There are moderately extensive vascular calcifications present. There


is a small joint effusion.    





IMPRESSION: 


1. Mild degenerative changes with mild medial joint compartment narrowing


2. Small joint effusion


3. No acute fractures.





Electronically signed by:  Won Richey M.D.


9/26/2017 6:27 PM





Dictated Date/Time:  9/26/2017 6:26 PM








PELVIS 1 OR 2 VIEW ROUTINE





CLINICAL HISTORY: Pain. Trauma one month ago.    





COMPARISON STUDY:  2/20/2017





FINDINGS: There are extensive vascular calcifications. No acute fractures are


visualized. There is mild bilateral joint space narrowing consistent with mild


degenerative change. No destructive lesions are evident.





IMPRESSION:  Mild degenerative change. No fractures are visualized. 





Electronically signed by:  Won Richey M.D.


9/26/2017 6:28 PM





Dictated Date/Time:  9/26/2017 6:27 PM








LUMBAR SPINE W/O CONTRAST





CLINICAL HISTORY: 52 years-old Female presenting with Pt c/o low back pain . 





TECHNIQUE: Multisequence, multiplanar MR imaging of the lumbar spine was


performed without the use of intravenous contrast. IV contrast: None. 





COMPARISON: 9/7/2017.





FINDINGS:





Localizer images: Unremarkable.





Normal lumbar lordosis. Vertebral bodies maintain normal height, alignment, and


bone marrow signal intensity. Mild intervertebral disc desiccation noted at


L5-S1 without significant height loss. Remaining intervertebral discs preserved.


No significant neural foraminal or spinal canal stenosis. Only mild degenerative


changes of the facet joints at L4-5 and L5-S1 noted. Nonsignificant central disc


protrusion at L5-S1.





Mild edema noted in the region of the L3-4 spinous processes, present on prior


exam. Decreased prominence of prevertebral edema noted. The small paraspinal


fluid collection is no longer present. Nonspecific subcutaneous edema noted in


the lower back.





The spinal cord ends in good position at L1. Cauda equina normal in morphology.





IMPRESSION:


1.  Mild persistent paraspinal edema centered at the spinous processes of L4-5.


This is likely either related to strain or degenerative in etiology.





2.  Interval resolution of the small paraspinal fluid collection and


prevertebral edema.





3.  Mild facet arthropathy in the lower lumbar spine.





Electronically signed by:  Amando Thompson M.D.


9/26/2017 6:59 PM





Dictated Date/Time:  9/26/2017 6:51 PM





Laboratory Results


9/26/17 17:42








Red Blood Count 3.39, Mean Corpuscular Volume 95.6, Mean Corpuscular Hemoglobin 

29.2, Mean Corpuscular Hemoglobin Concent 30.6, Mean Platelet Volume 9.8, 

Neutrophils (%) (Auto) 76.9, Lymphocytes (%) (Auto) 9.9, Monocytes (%) (Auto) 

12.2, Eosinophils (%) (Auto) 0.6, Basophils (%) (Auto) 0.1, Neutrophils # (Auto

) 11.07, Lymphocytes # (Auto) 1.43, Monocytes # (Auto) 1.75, Eosinophils # (Auto

) 0.08, Basophils # (Auto) 0.01





9/26/17 17:42

















Test


  9/26/17


17:42


 


White Blood Count


  14.38 K/uL


(4.8-10.8)


 


Red Blood Count


  3.39 M/uL


(4.2-5.4)


 


Hemoglobin


  9.9 g/dL


(12.0-16.0)


 


Hematocrit 32.4 % (37-47) 


 


Mean Corpuscular Volume


  95.6 fL


()


 


Mean Corpuscular Hemoglobin


  29.2 pg


(25-34)


 


Mean Corpuscular Hemoglobin


Concent 30.6 g/dl


(32-36)


 


Platelet Count


  387 K/uL


(130-400)


 


Mean Platelet Volume


  9.8 fL


(7.4-10.4)


 


Neutrophils (%) (Auto) 76.9 % 


 


Lymphocytes (%) (Auto) 9.9 % 


 


Monocytes (%) (Auto) 12.2 % 


 


Eosinophils (%) (Auto) 0.6 % 


 


Basophils (%) (Auto) 0.1 % 


 


Neutrophils # (Auto)


  11.07 K/uL


(1.4-6.5)


 


Lymphocytes # (Auto)


  1.43 K/uL


(1.2-3.4)


 


Monocytes # (Auto)


  1.75 K/uL


(0.11-0.59)


 


Eosinophils # (Auto)


  0.08 K/uL


(0-0.5)


 


Basophils # (Auto)


  0.01 K/uL


(0-0.2)


 


RDW Standard Deviation


  50.5 fL


(36.4-46.3)


 


RDW Coefficient of Variation


  14.4 %


(11.5-14.5)


 


Immature Granulocyte % (Auto) 0.3 % 


 


Immature Granulocyte # (Auto)


  0.04 K/uL


(0.00-0.02)


 


Anion Gap


  12.0 mmol/L


(3-11)


 


Est Creatinine Clear Calc


Drug Dose 11.3 ml/min 


 


 


Estimated GFR (


American) 6.9 


 


 


Estimated GFR (Non-


American 5.9 


 


 


BUN/Creatinine Ratio 6.3 (10-20) 


 


Calcium Level


  9.7 mg/dl


(8.5-10.1)


 


Total Bilirubin


  0.5 mg/dl


(0.2-1)


 


Direct Bilirubin


  0.2 mg/dl


(0-0.2)


 


Aspartate Amino Transf


(AST/SGOT) 8 U/L (15-37) 


 


 


Alanine Aminotransferase


(ALT/SGPT) 8 U/L (12-78) 


 


 


Alkaline Phosphatase


  67 U/L


()


 


Total Protein


  7.8 gm/dl


(6.4-8.2)


 


Albumin


  2.6 gm/dl


(3.4-5.0)


 


Lipase


  83 U/L


()





Labs reviewed by ED physician.





Medications Administered











 Medications


  (Trade)  Dose


 Ordered  Sig/Willy


 Route  Start Time


 Stop Time Status Last Admin


Dose Admin


 


 Hydromorphone HCl


  (Dilaudid Inj)  1 mg  NOW  STAT


 IV  9/26/17 16:30


 9/26/17 16:34 DC 9/26/17 17:15


1 MG


 


 Lidocaine


  (Lidoderm Patch


 5%)  1 patch  NOW  STAT


 TD  9/26/17 16:30


 9/26/17 16:34 DC 9/26/17 20:35


1 PATCH


 


 Ketorolac


 Tromethamine


  (Toradol Inj)  30 mg  NOW  STAT


 IV  9/26/17 16:30


 9/26/17 16:34 DC 9/26/17 17:12


30 MG


 


 Sodium Chloride  500 ml @ 


 999 mls/hr  Q31M STAT


 IV  9/26/17 16:30


 9/26/17 17:00 DC 9/26/17 16:30


999 MLS/HR


 


 Ondansetron HCl


  (Zofran Inj)  4 mg  NOW  STAT


 IV  9/26/17 16:30


 9/26/17 16:34 DC 9/26/17 17:14


4 MG


 


 Hydromorphone HCl


  (Dilaudid Inj)  0.5 mg  NOW  STAT


 IV  9/26/17 23:47


 9/26/17 23:48 DC 9/27/17 00:06


0.5 MG











ED Course


1626: Past medical records reviewed. The patient was evaluated in room C3. A 

complete history and physical examination was performed. 





1630: Zofran Inj 4 mg IV,  ml @ 999 mls/hr IV, Toradol Inj 30 mg IV, 

Lidocaine 1 patch TD, Dilaudid Inj 1 mg IV. 





1725: I reevaluated the patient. She is feeling better.





1946: I reevaluated the patient. I discussed the results with her. I spoke with 

the  about placement into [facility]. 





2347: Dilaudid Inj 0.5 mg IV.





0014: Upon reexamination the patient is doing well. I discussed results and 

treatment plan with the patient. She verbalizes agreement and understanding. 

She will follow up with [facility] tomorrow. The patient is ready for 

discharge.





0030: Percocet 1 homepack PO.





Medical Decision


Differential diagnosis:


Etiologies such as metabolic, infection, hypo/hyperglycemia, electrolyte 

abnormalities, cardiac sources, intracerebral event, toxicologic, neurologic, 

as well as others were entertained.





This is a 52-year-old female who presents emergency Department with a number of 

complaints.  The patient is on dialysis Monday Wednesday and Friday.  She 

reports she is unable to walk due to the pain in her lower back as well as her 

bilateral knees.  The patient is refusing to walk for me and is belligerent 

upon arrival to the emergency department.  An IV was established, the patient 

was given a Lidoderm patch, Dilaudid, Toradol.  Repeat examination revealed 

improvement the patient's symptoms.  Due to the inability the patient walk she 

was sent for an MRI of the spine.  This showed improvement in the previous MRI.

  I did discuss the case with case management as I felt the patient would be a 

good candidate for rehabilitation.





Medication Reconcilliation


Current Medication List:  was personally reviewed by me





Blood Pressure Screening


Patient's blood pressure:  Elevated blood pressure


Blood pressure disposition:  Referred to PCP





Impression





 Primary Impression:  


 Back pain





Scribe Attestation


The scribe's documentation has been prepared under my direction and personally 

reviewed by me in its entirety. I confirm that the note above accurately 

reflects all work, treatment, procedures, and medical decision making performed 

by me.





Departure Information


Dispostion


Home / Self-Care





Prescriptions





Oxycodone/Acetaminophen 5MG/325MG (PERCOCET 5MG/325MG)  Tab


1-2 TAB PO Q4H Y for Pain, #14 TAB


   Prov: Adam Cole MD         9/27/17





Referrals


Lurdes Smith D.O. (PCP)





Forms


HOME CARE DOCUMENTATION FORM,                                                 

               IMPORTANT VISIT INFORMATION, WORK / SCHOOL INSTRUCTIONS





Patient Instructions


Back Pain - [facility], ED Exercises Lumbar Muscles, [facility]





Additional Instructions





Follow up with [facility] tomorrow








You received narcotic or benzodiazepene medication while in the emergency room 

today.  This is an addictive medication that may cause drowziness as well as 

constipation.  Do not drive, operate heavy machinery, or drink alcohol under 

the influence of this medication.  











You have been examined and treated today on an emergency basis only. This is 

not a substitute for, or an effort to provide, complete comprehensive medical 

care. It is impossible to recognize and treat all injuries or illnesses in a 

single emergency department visit. It is therefore important that you follow up 

closely with Dr Smith.  Call as soon as possible for an appointment.  





Thank you for your time and consideration.  I look forward to speaking with you 

again soon.  Please don't hesitate to call us if you have any questions.





Problem Qualifiers








 Primary Impression:  


 Back pain


 Back pain location:  low back pain  Chronicity:  acute  Back pain laterality:  

midline  Sciatica presence:  with sciatica  Sciatica laterality:  bilateral 

sciatica  Qualified Codes:  M54.42 - Lumbago with sciatica, left side; M54.41 - 

Lumbago with sciatica, right side

## 2017-09-26 NOTE — DIAGNOSTIC IMAGING REPORT
LUMBAR SPINE W/O CONTRAST



CLINICAL HISTORY: 52 years-old Female presenting with Pt c/o low back pain . 



TECHNIQUE: Multisequence, multiplanar MR imaging of the lumbar spine was

performed without the use of intravenous contrast. IV contrast: None. 



COMPARISON: 9/7/2017.



FINDINGS:



Localizer images: Unremarkable.



Normal lumbar lordosis. Vertebral bodies maintain normal height, alignment, and

bone marrow signal intensity. Mild intervertebral disc desiccation noted at

L5-S1 without significant height loss. Remaining intervertebral discs preserved.

No significant neural foraminal or spinal canal stenosis. Only mild degenerative

changes of the facet joints at L4-5 and L5-S1 noted. Nonsignificant central disc

protrusion at L5-S1.



Mild edema noted in the region of the L3-4 spinous processes, present on prior

exam. Decreased prominence of prevertebral edema noted. The small paraspinal

fluid collection is no longer present. Nonspecific subcutaneous edema noted in

the lower back.



The spinal cord ends in good position at L1. Cauda equina normal in morphology.



IMPRESSION:

1.  Mild persistent paraspinal edema centered at the spinous processes of L4-5.

This is likely either related to strain or degenerative in etiology.



2.  Interval resolution of the small paraspinal fluid collection and

prevertebral edema.



3.  Mild facet arthropathy in the lower lumbar spine.







Electronically signed by:  Amando Thompson M.D.

9/26/2017 6:59 PM



Dictated Date/Time:  9/26/2017 6:51 PM

## 2017-09-27 VITALS — SYSTOLIC BLOOD PRESSURE: 162 MMHG | HEART RATE: 70 BPM | OXYGEN SATURATION: 98 % | DIASTOLIC BLOOD PRESSURE: 81 MMHG

## 2017-09-27 RX ADMIN — OXYCODONE HYDROCHLORIDE AND ACETAMINOPHEN ONE HOMEPACK: 5; 325 TABLET ORAL at 00:30

## 2017-09-27 RX ADMIN — HYDROMORPHONE HYDROCHLORIDE STA MG: 1 INJECTION, SOLUTION INTRAMUSCULAR; INTRAVENOUS; SUBCUTANEOUS at 00:06

## 2017-10-13 ENCOUNTER — HOSPITAL ENCOUNTER (EMERGENCY)
Dept: HOSPITAL 45 - C.EDB | Age: 52
Discharge: HOME | End: 2017-10-13
Payer: COMMERCIAL

## 2017-10-13 VITALS — TEMPERATURE: 98.78 F | HEART RATE: 87 BPM | OXYGEN SATURATION: 93 %

## 2017-10-13 VITALS
HEIGHT: 65.98 IN | BODY MASS INDEX: 37.95 KG/M2 | BODY MASS INDEX: 37.95 KG/M2 | WEIGHT: 236.12 LBS | WEIGHT: 236.12 LBS | HEIGHT: 65.98 IN

## 2017-10-13 VITALS — DIASTOLIC BLOOD PRESSURE: 124 MMHG | SYSTOLIC BLOOD PRESSURE: 208 MMHG

## 2017-10-13 DIAGNOSIS — E66.01: ICD-10-CM

## 2017-10-13 DIAGNOSIS — Z95.828: ICD-10-CM

## 2017-10-13 DIAGNOSIS — T82.590A: Primary | ICD-10-CM
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
## 2017-10-13 DIAGNOSIS — M86.9: ICD-10-CM

## 2017-10-13 DIAGNOSIS — Z99.2: ICD-10-CM

## 2017-10-13 DIAGNOSIS — K21.9: ICD-10-CM

## 2017-10-13 DIAGNOSIS — N18.6: ICD-10-CM

## 2017-10-13 DIAGNOSIS — Z79.82: ICD-10-CM

## 2017-10-13 DIAGNOSIS — E78.5: ICD-10-CM

## 2017-10-13 DIAGNOSIS — I65.29: ICD-10-CM

## 2017-10-13 DIAGNOSIS — M19.90: ICD-10-CM

## 2017-10-13 DIAGNOSIS — I12.0: ICD-10-CM

## 2017-10-13 DIAGNOSIS — E11.22: ICD-10-CM

## 2017-10-13 DIAGNOSIS — Z87.891: ICD-10-CM

## 2017-10-13 DIAGNOSIS — J30.9: ICD-10-CM

## 2017-10-13 DIAGNOSIS — I25.10: ICD-10-CM

## 2017-10-13 DIAGNOSIS — Z79.4: ICD-10-CM

## 2017-10-13 DIAGNOSIS — Z82.49: ICD-10-CM

## 2017-10-13 DIAGNOSIS — Z83.3: ICD-10-CM

## 2017-10-13 LAB
ANION GAP SERPL CALC-SCNC: 13 MMOL/L (ref 3–11)
ANION GAP SERPL CALC-SCNC: 19 MMOL/L (ref 16–25)
BASOPHILS # BLD: 0.04 K/UL (ref 0–0.2)
BASOPHILS NFR BLD: 0.3 %
BUN SERPL-MCNC: 80 MG/DL (ref 7–18)
BUN/CREAT SERPL: 7.3 (ref 10–20)
CA-I BLD-SCNC: 1.16 MMOL/L (ref 1.12–1.32)
CALCIUM SERPL-MCNC: 9.6 MG/DL (ref 8.5–10.1)
CHLORIDE BLD-SCNC: 97 MEQ/L (ref 101–112)
CHLORIDE SERPL-SCNC: 96 MMOL/L (ref 98–107)
CO2 SERPL-SCNC: 24 MMOL/L (ref 21–32)
COMPLETE: YES
CREAT BLD-MCNC: 12.1 MG/DL (ref 0.6–1.3)
CREAT CL PREDICTED SERPL C-G-VRATE: 7.6 ML/MIN
CREAT SERPL-MCNC: 11 MG/DL (ref 0.6–1.2)
EOSINOPHIL NFR BLD AUTO: 426 K/UL (ref 130–400)
GLUCOSE SERPL-MCNC: 139 MG/DL (ref 70–99)
HCT VFR BLD AUTO: 30 % (ref 37–47)
HCT VFR BLD CALC: 28.7 % (ref 37–47)
HGB BLD-MCNC: 10.2 G/DL (ref 12–16)
IG%: 0.3 %
IMM GRANULOCYTES NFR BLD AUTO: 13.4 %
ISTAT CARBON DIOXIDE: 23 MEQ/L (ref 24–31)
LYMPHOCYTES # BLD: 1.58 K/UL (ref 1.2–3.4)
MCH RBC QN AUTO: 29.5 PG (ref 25–34)
MCHC RBC AUTO-ENTMCNC: 32.1 G/DL (ref 32–36)
MCV RBC AUTO: 92 FL (ref 80–100)
MONOCYTES NFR BLD: 6.8 %
NEUTROPHILS # BLD AUTO: 2.1 %
NEUTROPHILS NFR BLD AUTO: 77.1 %
PHOSPHATE SERPL-MCNC: 7.6 MG/DL (ref 2.5–4.9)
PMV BLD AUTO: 10.1 FL (ref 7.4–10.4)
POTASSIUM SERPL-SCNC: 4.4 MMOL/L (ref 3.5–5.1)
RBC # BLD AUTO: 3.12 M/UL (ref 4.2–5.4)
SODIUM BLD-SCNC: 134 MEQ/L (ref 135–144)
SODIUM SERPL-SCNC: 133 MMOL/L (ref 136–145)
WBC # BLD AUTO: 11.81 K/UL (ref 4.8–10.8)

## 2017-10-13 NOTE — EMERGENCY ROOM VISIT NOTE
History


Report prepared by Eda:  Acacia Aguilar


Under the Supervision of:  Dr. Adam Cole M.D.


First contact with patient:  06:32


Chief Complaint:  REFERRED BY DOCTOR


Stated Complaint:  REFERRED BY NURSE





History of Present Illness


The patient is a 52 year old female who presents to the Emergency Room from her 

morning dialysis appointment. She was at dialysis today and the fistula in her 

left arm was not working. She states that the nurse called Dr. Roberts of 

vascular surgery and was told to come to the ED for further evaluation. The 

patient has no complaints at this time.





   Source of History:  patient


   Onset:  PTA


   Position:  arm (left)


   Timing:  constant


Note:


Pt has no other complaints.





Review of Systems


See HPI for pertinent positives & negatives. A total of 10 systems reviewed and 

were otherwise negative.





Past Medical & Surgical


Medical Problems:


(1) Allergic rhinitis


(2) Arthritis


(3) C. difficile colitis


(4) CAD (coronary artery disease)


(5) Carotid stenosis


(6) Diabetic foot infection


(7) DM type 2 (diabetes mellitus, type 2)


(8) Dyslipidemia


(9) ESRD (end stage renal disease) on dialysis


(10) Fall


(11) GERD (gastroesophageal reflux disease)


(12) HTN (hypertension)


(13) HX OF PAST NONCOMPLIANCE


(14) Ischemic cardiomyopathy


(15) Morbid obesity


(16) Osteomyelitis


(17) Sepsis


(18) Tobacco use disorder


(19) UGIB (upper gastrointestinal bleed)


(20) Weakness


Surgical Problems:


(1) Hemodialysis access, AV graft








Family History





Diabetes mellitus


  FATHER


  MOTHER


Heart disease


  FATHER


  MOTHER


Hypertension


  FATHER


  MOTHER


Kidney disease


  GRANDMOTHER





Social History


Smoking Status:  Former Smoker


Alcohol Use:  none


Drug Use:  none


Marital Status:  


Housing Status:  lives with family


Occupation Status:  unemployed





Current/Historical Medications


Scheduled


Aspirin (Aspirin Ec), 81 MG PO DAILY


Atorvastatin (Atorvastatin Calcium), 40 MG PO QAM


Calcium Acetate (Phosphate Bin (Phoslo 667 Mg), 2,001 MG PO TIDM


Cephalexin Monohydrate (Keflex), 500 MG PO BID


Insulin Glargine (Lantus Solostar), 12 UNITS SC QPM


Isosorbide Mononitrate Ext Rel (Imdur Ext Rel), 30 MG PO QAM


Isosorbide Mononitrate Ext Rel (Imdur Ext Rel), 60 MG PO QAM


Lactobacillus (Probiotic), 2 CAP PO BID


Metoprolol Succinate (Metoprolol Succinate ER), 50 MG PO DAILY


Pantoprazole (Pantoprazole Sodium), 40 MG PO QAM


Sertraline HCl (Sertraline HCl), 100 MG PO HS





Scheduled PRN


Calcium Acetate (Phosphate Bin (Phoslo 667 Mg), 2 CAP PO UD PRN for snacks


Dicyclomine Hcl (Dicyclomine Hcl), 10 MG PO QID PRN for Cramping


Fluticasone Propionate (Fluticasone Propionate), 2 SPRAYS SUZI DAILY PRN for 

Allergy Symptoms


Loratadine (Claritin Childrens), 5 MG PO DAILY PRN for Allergy Symptoms


Lorazepam (Lorazepam), 0.5 MG PO DAILY PRN for Anxiety


Oxycodone/Acetaminophen 5MG/325MG (Oxycodone/Acetaminophen 5MG/325MG), 1-2 TABS 

PO Q4H PRN for Pain





Allergies


Coded Allergies:  


     Adhesives (Verified  Allergy, Mild, RASH, SORES, 10/13/17)


     NO KNOWN DRUG ALLERGIES (Verified  Allergy, Mild, ., 10/13/17)


     Latex1 -Allergic Contact Dermititis (Verified  Allergy, Unknown, rash, 10/

13/17)


     Pollen Extract (Verified  Allergy, Unknown, rash, 10/13/17)





Physical Exam


Vital Signs











  Date Time  Temp Pulse Resp B/P (MAP) Pulse Ox O2 Delivery O2 Flow Rate FiO2


 


10/13/17 08:45    208/124    


 


10/13/17 06:24 37.1 87 18 189/100 93 Room Air  











Physical Exam


GENERAL: Patient is a healthy-appearing well-nourished 52 year old female. 


HEAD: Normocephalic atraumatic


EYES: Ocular movements intact pupils equal and react to light


OROPHARYNX mucous membranes are moist no exudates present no erythema or edema 

present


NECK: Supple no nuchal rigidity


CHEST: Good equal expansion


LUNGS: Clear and equal to auscultation


CARDIAC: Normal S1 and S2


ABDOMEN: Soft nontender no guarding


BACK: No CVA tenderness


EXTREMITIES: No pain upon palpation normal muscle strength in all groups no 

clubbing cyanosis or edema. Fistula in place in left arm, no bruits palpable. 


NEURO: Patient is following commands and answering questions appropriately. 

Alert and oriented x3 Cranial Nerves 2-12 grossly intact





Medical Decision & Procedures


Laboratory Results


10/13/17 07:29








Red Blood Count 3.12, Mean Corpuscular Volume 92.0, Mean Corpuscular Hemoglobin 

29.5, Mean Corpuscular Hemoglobin Concent 32.1, Mean Platelet Volume 10.1, 

Neutrophils (%) (Auto) 77.1, Lymphocytes (%) (Auto) 13.4, Monocytes (%) (Auto) 

6.8, Eosinophils (%) (Auto) 2.1, Basophils (%) (Auto) 0.3, Neutrophils # (Auto) 

9.11, Lymphocytes # (Auto) 1.58, Monocytes # (Auto) 0.80, Eosinophils # (Auto) 

0.25, Basophils # (Auto) 0.04





10/13/17 07:29

















Test


  10/13/17


07:29 10/13/17


07:40


 


White Blood Count


  11.81 K/uL


(4.8-10.8) 


 


 


Red Blood Count


  3.12 M/uL


(4.2-5.4) 


 


 


Hemoglobin


  9.2 g/dL


(12.0-16.0) 


 


 


Hematocrit 28.7 % (37-47)  


 


Mean Corpuscular Volume


  92.0 fL


() 


 


 


Mean Corpuscular Hemoglobin


  29.5 pg


(25-34) 


 


 


Mean Corpuscular Hemoglobin


Concent 32.1 g/dl


(32-36) 


 


 


Platelet Count


  426 K/uL


(130-400) 


 


 


Mean Platelet Volume


  10.1 fL


(7.4-10.4) 


 


 


Neutrophils (%) (Auto) 77.1 %  


 


Lymphocytes (%) (Auto) 13.4 %  


 


Monocytes (%) (Auto) 6.8 %  


 


Eosinophils (%) (Auto) 2.1 %  


 


Basophils (%) (Auto) 0.3 %  


 


Neutrophils # (Auto)


  9.11 K/uL


(1.4-6.5) 


 


 


Lymphocytes # (Auto)


  1.58 K/uL


(1.2-3.4) 


 


 


Monocytes # (Auto)


  0.80 K/uL


(0.11-0.59) 


 


 


Eosinophils # (Auto)


  0.25 K/uL


(0-0.5) 


 


 


Basophils # (Auto)


  0.04 K/uL


(0-0.2) 


 


 


RDW Standard Deviation


  47.9 fL


(36.4-46.3) 


 


 


RDW Coefficient of Variation


  14.6 %


(11.5-14.5) 


 


 


Immature Granulocyte % (Auto) 0.3 %  


 


Immature Granulocyte # (Auto)


  0.03 K/uL


(0.00-0.02) 


 


 


Est Creatinine Clear Calc


Drug Dose 7.6 ml/min 


  


 


 


Estimated GFR (


American) 4.2 


  


 


 


Estimated GFR (Non-


American 3.6 


  


 


 


BUN/Creatinine Ratio 7.3 (10-20)  


 


Calcium Level


  9.6 mg/dl


(8.5-10.1) 


 


 


Phosphorus Level


  7.6 mg/dl


(2.5-4.9) 


 


 


Albumin


  2.8 gm/dl


(3.4-5.0) 


 


 


Bedside Hemoglobin


  


  10.2 g/dl


(12.0-16.0)


 


Bedside Hematocrit  30 % (37-47) 


 


Bedside Sodium


  


  134 mEq/L


(135-144)


 


Bedside Potassium


  


  4.3 mEq/L


(3.3-5.0)


 


Bedside Chloride


  


  97 mEq/L


(101-112)


 


Bedside Total CO2


  


  23 mEq/l


(24-31)


 


Anion Gap


  


  19.0 mmol/L


(16-25)


 


Bedside Blood Urea Nitrogen


  


  79 mg/dl


(7-18)


 


Bedside Creatinine


  


  12.1 mg/dl


(0.6-1.3)


 


Bedside Glucose (other)


  


  138 mg/dl


(70-99)


 


Bedside Ionized Calcium (Imani)


  


  1.16 mmol/l


(1.12-1.32)





Labs reviewed by ED physician.





ECG


Indication:  other


Rate (beats per minute):  83


Rhythm:  normal sinus


Findings:  no acute ischemic change, no ectopy





ED Course


0632: Past medical records reviewed. The patient was evaluated in room B4B. A 

complete history and physical examination was performed. 





0641: I spoke with Dr. Roberts of vascular surgery. He states that he was never 

called this morning regarding the patient's fistula. He is not available to see 

her today. He would like her potassium checked. 





0645: Solu-Medrol 125 mg IV, Duoneb 12 ml INH 





0822: I discussed the patient's results with Dr. Roberts. He suspects that she 

has not had dialysis for a week and he will get her on the schedule for Monday. 





0831: I reassessed the patient at this time. She is feeling better and resting 

comfortably. I discussed the results and treatment plan with the patient. I 

answered all pertaining questions that she had. She expressed understanding and 

verbalized agreement. The patient will be discharged home.





Medical Decision


This is a 52-year-old female who presents emergency department after her 

fistula malfunction at dialysis today.  The patient's potassium is stable.  I 

did discuss the case with Dr. Roberts is unable fit the patient in today.  The 

patient is not hypoxic here in emergency department and denies any chest pain 

or shortness of breath.  Based on these findings along with the patient's 

potassium I feel she can be safely discharged home for follow-up in Dr. Roberts'

s office on Monday.  The patient last had dialysis this past Monday when she 

was admitted to the hospital.  I strongly encouraged her to return to the 

emergency department she develops severe chest pain.





Medication Reconcilliation


Current Medication List:  was personally reviewed by me





Blood Pressure Screening


Patient's blood pressure:  Elevated blood pressure


Blood pressure disposition:  Referred to PCP





Consults


Time Called:  0638


Consulting Physician:  Dr. Roberts


Returned Call:  0641


I spoke with Dr. Roberts of vascular surgery. He states that he was never called 

this morning regarding the patient's fistula. He is not available to see her 

today. He would like her potassium checked.


Additional Consults:  


   Time Called:  0818


   Consulted Physician:  Dr. Roberts


   Returned Call:  0822


Additional Comments:


I discussed the patient's results with Dr. Roberts. He suspects that she has not 

had dialysis for a week and he will get her on the schedule for Monday.





Impression





 Primary Impression:  


 Dialysis AV fistula malfunction





Scribe Attestation


The scribe's documentation has been prepared under my direction and personally 

reviewed by me in its entirety. I confirm that the note above accurately 

reflects all work, treatment, procedures, and medical decision making performed 

by me.





Departure Information


Dispostion


Home / Self-Care





Referrals


Lurdes Smith D.O. (PCP)








Jamel Roberts M.D.





Forms


HOME CARE DOCUMENTATION FORM,                                                 

               IMPORTANT VISIT INFORMATION, WORK / SCHOOL INSTRUCTIONS





Patient Instructions


My [facility]





Additional Instructions





Follow up with Dr Roberts's office Monday





You were found to have an elevated blood pressure today (>120 sytolic or >90 

diastolic). Per medicare guidelines, you need to follow up with this blood 

pressure screening with your Primary Care Physician (PCP). For a new PCP call 

[phone].   











You have been examined and treated today on an emergency basis only. This is 

not a substitute for, or an effort to provide, complete comprehensive medical 

care. It is impossible to recognize and treat all injuries or illnesses in a 

single emergency department visit. It is therefore important that you follow up 

closely with Dr Smith.  Call as soon as possible for an appointment.  





Thank you for your time and consideration.  I look forward to speaking with you 

again soon.  Please don't hesitate to call us if you have any questions.





Problem Qualifiers








 Primary Impression:  


 Dialysis AV fistula malfunction


 Encounter type:  initial encounter  Qualified Codes:  T82.590A - Other 

mechanical complication of surgically created arteriovenous fistula, initial 

encounter

## 2017-10-14 ENCOUNTER — HOSPITAL ENCOUNTER (EMERGENCY)
Dept: HOSPITAL 45 - C.EDB | Age: 52
Discharge: HOME | End: 2017-10-14
Payer: COMMERCIAL

## 2017-10-14 VITALS — HEART RATE: 84 BPM | SYSTOLIC BLOOD PRESSURE: 147 MMHG | DIASTOLIC BLOOD PRESSURE: 75 MMHG | OXYGEN SATURATION: 94 %

## 2017-10-14 VITALS — TEMPERATURE: 99.14 F

## 2017-10-14 VITALS
HEIGHT: 65.98 IN | WEIGHT: 237.22 LBS | HEIGHT: 65.98 IN | BODY MASS INDEX: 38.12 KG/M2 | BODY MASS INDEX: 38.12 KG/M2 | WEIGHT: 237.22 LBS

## 2017-10-14 DIAGNOSIS — M75.81: Primary | ICD-10-CM

## 2017-10-14 DIAGNOSIS — M19.90: ICD-10-CM

## 2017-10-14 DIAGNOSIS — E66.01: ICD-10-CM

## 2017-10-14 DIAGNOSIS — Z95.828: ICD-10-CM

## 2017-10-14 DIAGNOSIS — I25.5: ICD-10-CM

## 2017-10-14 DIAGNOSIS — Z99.2: ICD-10-CM

## 2017-10-14 DIAGNOSIS — Z79.82: ICD-10-CM

## 2017-10-14 DIAGNOSIS — Z87.891: ICD-10-CM

## 2017-10-14 DIAGNOSIS — E78.5: ICD-10-CM

## 2017-10-14 DIAGNOSIS — Z79.4: ICD-10-CM

## 2017-10-14 DIAGNOSIS — N18.6: ICD-10-CM

## 2017-10-14 DIAGNOSIS — M86.9: ICD-10-CM

## 2017-10-14 DIAGNOSIS — K21.9: ICD-10-CM

## 2017-10-14 DIAGNOSIS — I65.29: ICD-10-CM

## 2017-10-14 DIAGNOSIS — E11.22: ICD-10-CM

## 2017-10-14 DIAGNOSIS — I25.10: ICD-10-CM

## 2017-10-14 DIAGNOSIS — I12.0: ICD-10-CM

## 2017-10-14 NOTE — DIAGNOSTIC IMAGING REPORT
RIGHT SHOULDER 4 VIEWS



CLINICAL HISTORY: Chronic right shoulder pain.



FINDINGS: 4 views of the right shoulder are compared to study dated 9/7/2017.

The skeletal structures are osteopenic. No fracture or dislocation is seen.

Productive degenerative change is noted at the acromioclavicular joint.

Arthritic change and sclerosis with mild spurring is seen in the greater

tuberosity of the humeral head. Calcific tendinopathy is observed. The overlying

soft tissues are otherwise within normal limits. The visualized right lung

parenchyma appears clear. There is atherosclerotic calcification of the thoracic

aorta.



IMPRESSION:



1. No acute bony abnormality is seen in the right shoulder and there has been no

significant change from 9/7/2017.



2. Osteopenia and arthritic change as above.



3. Calcific tendinopathy is noted.







Electronically signed by:  Nguyễn Nielson M.D.

10/14/2017 1:27 PM



Dictated Date/Time:  10/14/2017 1:26 PM

## 2017-10-14 NOTE — EMERGENCY ROOM VISIT NOTE
History


Report prepared by Eda:  Acacia Aguilar


Under the Supervision of:  Dr. Buck Garcia M.D.


First contact with patient:  12:05


Chief Complaint:  SHOULDER PAIN


Stated Complaint:  RIGHT SHOULDER PAIN, NECK PAIN





History of Present Illness


The patient is a 52 year old white female with a past medical history of right 

shoulder tendonitis and CKD who presents to the ED with a cc of right shoulder 

pain beginning last night. Positive right-sided neck pain, right arm pain, 

numbness in right hand. Pt notes nausea secondary to pain. Negative recent 

trauma, falls, injury.  Pt thinks that the cold weather makes her sx worse. Pt 

was seen in the ED yesterday for malfunction of her dialysis fistula [place] [facility] and 

discharged home. Pt reports a history of a bulging disc in her back. She rates 

her current pain as a 10/10 in severity.





   Source of History:  patient


   Onset:  last night


   Position:  shoulder (right)


   Symptom Intensity:  10/10


   Timing:  constant


   Associated Symptoms:  + neck pain, + nausea, + numbness (right hand)





Review of Systems


See HPI for pertinent positives and negatives.  A total of ten systems were 

reviewed and were otherwise negative.





Past Medical & Surgical


Medical Problems:


(1) Allergic rhinitis


(2) Arthritis


(3) C. difficile colitis


(4) CAD (coronary artery disease)


(5) Carotid stenosis


(6) Diabetic foot infection


(7) DM type 2 (diabetes mellitus, type 2)


(8) Dyslipidemia


(9) ESRD (end stage renal disease) on dialysis


(10) Fall


(11) GERD (gastroesophageal reflux disease)


(12) HTN (hypertension)


(13) HX OF PAST NONCOMPLIANCE


(14) Ischemic cardiomyopathy


(15) Morbid obesity


(16) Osteomyelitis


(17) Sepsis


(18) Tobacco use disorder


(19) UGIB (upper gastrointestinal bleed)


(20) Weakness


Surgical Problems:


(1) Hemodialysis access, AV graft








Family History





Diabetes mellitus


  FATHER


  MOTHER


Heart disease


  FATHER


  MOTHER


Hypertension


  FATHER


  MOTHER


Kidney disease


  GRANDMOTHER





Social History


Smoking Status:  Former Smoker


Alcohol Use:  none


Drug Use:  none


Marital Status:  


Housing Status:  lives with family


Occupation Status:  unemployed





Current/Historical Medications


Scheduled


Aspirin (Aspirin Ec), 81 MG PO DAILY


Atorvastatin (Atorvastatin Calcium), 40 MG PO QAM


Calcium Acetate (Phosphate Bin (Phoslo 667 Mg), 2,001 MG PO TIDM


Cephalexin Monohydrate (Keflex), 500 MG PO BID


Insulin Glargine (Lantus Solostar), 12 UNITS SC QPM


Isosorbide Mononitrate Ext Rel (Imdur Ext Rel), 30 MG PO QAM


Isosorbide Mononitrate Ext Rel (Imdur Ext Rel), 60 MG PO QAM


Lactobacillus (Probiotic), 2 CAP PO BID


Metoprolol Succinate (Metoprolol Succinate ER), 50 MG PO DAILY


Pantoprazole (Pantoprazole Sodium), 40 MG PO QAM


Sertraline HCl (Sertraline HCl), 100 MG PO HS





Scheduled PRN


Calcium Acetate (Phosphate Bin (Phoslo 667 Mg), 2 CAP PO UD PRN for snacks


Dicyclomine Hcl (Dicyclomine Hcl), 10 MG PO QID PRN for Cramping


Fluticasone Propionate (Fluticasone Propionate), 2 SPRAYS SUZI DAILY PRN for 

Allergy Symptoms


Loratadine (Claritin Childrens), 5 MG PO DAILY PRN for Allergy Symptoms


Lorazepam (Lorazepam), 0.5 MG PO DAILY PRN for Anxiety


Oxycodone/Acetaminophen 5MG/325MG (Oxycodone/Acetaminophen 5MG/325MG), 1-2 TABS 

PO Q4H PRN for Pain





Allergies


Coded Allergies:  


     Adhesives (Verified  Allergy, Mild, RASH, SORES, 10/14/17)


     NO KNOWN DRUG ALLERGIES (Verified  Allergy, Mild, ., 10/14/17)


     Latex1 -Allergic Contact Dermititis (Verified  Allergy, Unknown, rash, 10/

14/17)


     Pollen Extract (Verified  Allergy, Unknown, rash, 10/14/17)





Physical Exam


Vital Signs











  Date Time  Temp Pulse Resp B/P (MAP) Pulse Ox O2 Delivery O2 Flow Rate FiO2


 


10/14/17 14:38  84 20 147/75 94   


 


10/14/17 13:51  86 20 172/96 93 Room Air  


 


10/14/17 11:48 37.3 90 17 197/101 95 Room Air  











Physical Exam


GENERAL: Awake, alert, well-appearing, NAD


HENT: Normocephalic, atraumatic.


EYES: Normal conjunctiva. Sclera non-icteric.


NECK: Supple. No nuchal rigidity. FROM. Mild neck pain. 


RESPIRATORY: CTAB, no rhonchi, wheezing, crackles


CARDIAC: RRR, no MRG


ABDOMEN: Soft, NTND, BS+


MSK: No chest wall TTP. Anterior right shoulder pain, no scapular pain. NVI 

distally, sensory and motor intact to M/U/R nerves. Pain with internal/external 

rotation, flexion/extension of right shoulder. LUE with AV fistula palpated, no 

palpable thrill present, distally the limb is not ischemic, faint thrill noted, 

M/U/R nerves intact to sensory and motor distally. No LE edema


NEURO: GCS 15, CN 2-12 intact, moves all 4s on command


SKIN: No rash or jaundice noted.





Medical Decision & Procedures


ER Provider


Diagnostic Interpretation:


Radiology results as stated below per my review and radiologist interpretation: 








RIGHT SHOULDER 4 VIEWS





CLINICAL HISTORY: Chronic right shoulder pain.





FINDINGS: 4 views of the right shoulder are compared to study dated 9/7/2017.


The skeletal structures are osteopenic. No fracture or dislocation is seen.


Productive degenerative change is noted at the acromioclavicular joint.


Arthritic change and sclerosis with mild spurring is seen in the greater


tuberosity of the humeral head. Calcific tendinopathy is observed. The overlying


soft tissues are otherwise within normal limits. The visualized right lung


parenchyma appears clear. There is atherosclerotic calcification of the thoracic


aorta.





IMPRESSION:





1. No acute bony abnormality is seen in the right shoulder and there has been no


significant change from 9/7/2017.





2. Osteopenia and arthritic change as above.





3. Calcific tendinopathy is noted.











Electronically signed by:  Nguyễn Nielson M.D.


10/14/2017 1:27 PM





Dictated Date/Time:  10/14/2017 1:26 PM





Medications Administered











 Medications


  (Trade)  Dose


 Ordered  Sig/Willy


 Route  Start Time


 Stop Time Status Last Admin


Dose Admin


 


 Acetaminophen/


 Hydrocodone Bitart


  (Norco 5/325 Tab)  1 tab  NOW  STAT


 PO  10/14/17 12:43


 10/14/17 12:44 DC 10/14/17 12:59


1 TAB


 


 Tramadol HCl


  (Ultram Tab)  50 mg  NOW  STAT


 PO  10/14/17 12:43


 10/14/17 12:44 DC 10/14/17 12:59


50 MG











ED Course


1205: The patient was evaluated in room A2. A complete history and physical 

exam was performed.





1243: Tramadol HCl 50 mg PO, Norco 5/325 tab PO





1358: I reassessed the patient at this time. She is feeling better and resting 

comfortably. I discussed the results and treatment plan with the patient. I 

answered all pertaining questions that she had. She expressed understanding and 

verbalized agreement. The patient will be discharged home.





Medical Decision


The patient is a 52 year old white female with a past medical history of right 

shoulder tendonitis and CKD who presents to the ED with a cc of right shoulder 

pain beginning last night. 





Differential diagnoses includes fracture, sprain, strain, tendonitis. 





Patient was seen and evaluated the bedside.  Patient was seen yesterday for 

concerning AV fistula issue.  Patient of note does not have a palpable thrill 

today.  Patient's potassium was checked yesterday and was normal.  Patient is 

not complaining of any signs or symptoms like shortness of breath or lower 

extremity swelling.  Patient is not confused at this time either.  Patient does 

have a follow-up with Dr. Roberts on Monday.  Patient was complaining of some 

right shoulder pain.  This appears to be chronic in nature she was told she has 

some calcific tendinopathy.  Patient did have pain with internal/external 

rotation as well as extension and flexion abduction and abduction of the right 

shoulder.  Patient is neurovascularly intact distally.  Of note patient had a 

trace thrill noted on auscultation of the left AV fistula and patient was 

neurovascularly intact distally in the left upper extremity.  Patient was told 

of the findings and will take Tylenol as well as moist heat and/or ice packs 

for her shoulder pain.  Patient was also given shoulder range of motion 

exercises.  Patient's history less likely ACS given reproducible shoulder pain 

that is nonradiating given a prior history as well as shoulder film that is 

indicative of calcific tendinopathy.  No dislocation or fractures evident.  

Patient states that [facility].  Patient was given strict follow-up, discharge, and return 

precautions.  Patient agreed with plan of care was safely discharged home.





Medication Reconcilliation


Current Medication List:  was personally reviewed by me





Blood Pressure Screening


Patient's blood pressure:  Elevated blood pressure


Blood pressure disposition:  Referred to PCP





Impression





 Primary Impression:  


 Tendinopathy of right shoulder





Scribe Attestation


The scribe's documentation has been prepared under my direction and personally 

reviewed by me in its entirety. I confirm that the note above accurately 

reflects all work, treatment, procedures, and medical decision making performed 

by me.





Departure Information


Dispostion


Home / Self-Care





Referrals


Lurdes Smith D.O. (PCP)





Patient Instructions


Anatomy Shoulder, Exercise Shoulder Adduction, Exercise Shoulder Back Scratch, 

Exercise Shoulder Extern Rotation, Exercise Shoulder Intern Rotation, Exercise 

Shoulder Shrug, Exercise Shoulder Squeeze, My [facility] [employer]





Additional Instructions





Please return to the emergency department if you have worsening or recurrent 

symptoms not amenable to at-home treatment.  Please call for a follow-up 

appointment with her primary care physician.  Please take your medications as 

prescribed.  If you have other concerns and/or complaints please feel free to 

also call your primary care physician's office or return the ED for further 

evaluation, management, and treatment.





You were found to have an elevated blood pressure today (>120 sytolic or >90 

diastolic). Per medicare guidelines, you need to follow up with this blood 

pressure screening with your Primary Care Physician (PCP). For a new PCP call 

[phone].





You received narcotic or benzodiazepene medication while in the emergency room 

today. This is an addictive medication that may cause drowziness as well as 

constipation. Do not drive, operate heavy machinery, or drink alcohol under the 

influence of this medication.





You may take tylenol 1000mg every 6 hours as needed for pain. 





You have been examined and treated today on an emergency basis only. This is 

not a substitute for, or an effort to provide, complete comprehensive medical 

care. It is impossible to recognize and treat all injuries or illnesses in a 

single emergency department visit. It is therefore important that you follow up 

closely with [facility]. Call as soon as possible for an 

appointment.





Thank you for your time and consideration. I look forward to speaking with you 

again soon. Please don't hesitate to call us if you have any questions.

## 2017-10-17 ENCOUNTER — HOSPITAL ENCOUNTER (INPATIENT)
Dept: HOSPITAL 45 - C.ACU | Age: 52
LOS: 9 days | Discharge: NURSING, REHAB OR LONG-TERM CARE | DRG: 252 | End: 2017-10-26
Attending: HOSPITALIST | Admitting: HOSPITALIST
Payer: COMMERCIAL

## 2017-10-17 VITALS
DIASTOLIC BLOOD PRESSURE: 58 MMHG | SYSTOLIC BLOOD PRESSURE: 122 MMHG | HEART RATE: 94 BPM | OXYGEN SATURATION: 95 % | TEMPERATURE: 98.24 F

## 2017-10-17 VITALS
HEART RATE: 87 BPM | DIASTOLIC BLOOD PRESSURE: 98 MMHG | SYSTOLIC BLOOD PRESSURE: 208 MMHG | OXYGEN SATURATION: 98 % | TEMPERATURE: 98.6 F

## 2017-10-17 VITALS
HEIGHT: 66 IN | HEIGHT: 66 IN | BODY MASS INDEX: 36.64 KG/M2 | WEIGHT: 227.96 LBS | BODY MASS INDEX: 36.64 KG/M2 | WEIGHT: 227.96 LBS | BODY MASS INDEX: 36.64 KG/M2

## 2017-10-17 VITALS — HEART RATE: 86 BPM | DIASTOLIC BLOOD PRESSURE: 103 MMHG | SYSTOLIC BLOOD PRESSURE: 179 MMHG

## 2017-10-17 VITALS — SYSTOLIC BLOOD PRESSURE: 186 MMHG | HEART RATE: 87 BPM | DIASTOLIC BLOOD PRESSURE: 107 MMHG

## 2017-10-17 VITALS — DIASTOLIC BLOOD PRESSURE: 96 MMHG | HEART RATE: 85 BPM | SYSTOLIC BLOOD PRESSURE: 175 MMHG

## 2017-10-17 VITALS — SYSTOLIC BLOOD PRESSURE: 158 MMHG | HEART RATE: 90 BPM | DIASTOLIC BLOOD PRESSURE: 91 MMHG

## 2017-10-17 VITALS — HEART RATE: 86 BPM | SYSTOLIC BLOOD PRESSURE: 182 MMHG | DIASTOLIC BLOOD PRESSURE: 106 MMHG

## 2017-10-17 VITALS — HEART RATE: 82 BPM | SYSTOLIC BLOOD PRESSURE: 172 MMHG | DIASTOLIC BLOOD PRESSURE: 103 MMHG

## 2017-10-17 VITALS
DIASTOLIC BLOOD PRESSURE: 103 MMHG | HEART RATE: 95 BPM | SYSTOLIC BLOOD PRESSURE: 178 MMHG | OXYGEN SATURATION: 98 % | TEMPERATURE: 98.6 F

## 2017-10-17 VITALS — DIASTOLIC BLOOD PRESSURE: 103 MMHG | SYSTOLIC BLOOD PRESSURE: 168 MMHG | HEART RATE: 83 BPM

## 2017-10-17 VITALS
HEART RATE: 89 BPM | DIASTOLIC BLOOD PRESSURE: 95 MMHG | SYSTOLIC BLOOD PRESSURE: 187 MMHG | TEMPERATURE: 98.06 F | OXYGEN SATURATION: 100 %

## 2017-10-17 VITALS
TEMPERATURE: 98.24 F | SYSTOLIC BLOOD PRESSURE: 200 MMHG | OXYGEN SATURATION: 94 % | HEART RATE: 85 BPM | DIASTOLIC BLOOD PRESSURE: 98 MMHG

## 2017-10-17 VITALS
DIASTOLIC BLOOD PRESSURE: 95 MMHG | OXYGEN SATURATION: 100 % | TEMPERATURE: 98.06 F | SYSTOLIC BLOOD PRESSURE: 187 MMHG | HEART RATE: 72 BPM

## 2017-10-17 VITALS — DIASTOLIC BLOOD PRESSURE: 95 MMHG | HEART RATE: 89 BPM | SYSTOLIC BLOOD PRESSURE: 170 MMHG | TEMPERATURE: 98.24 F

## 2017-10-17 VITALS — HEART RATE: 85 BPM | SYSTOLIC BLOOD PRESSURE: 177 MMHG | DIASTOLIC BLOOD PRESSURE: 103 MMHG

## 2017-10-17 VITALS — HEART RATE: 86 BPM | SYSTOLIC BLOOD PRESSURE: 180 MMHG | DIASTOLIC BLOOD PRESSURE: 108 MMHG

## 2017-10-17 VITALS — SYSTOLIC BLOOD PRESSURE: 178 MMHG | HEART RATE: 87 BPM | DIASTOLIC BLOOD PRESSURE: 103 MMHG

## 2017-10-17 VITALS — HEART RATE: 89 BPM | SYSTOLIC BLOOD PRESSURE: 180 MMHG | DIASTOLIC BLOOD PRESSURE: 107 MMHG

## 2017-10-17 VITALS — DIASTOLIC BLOOD PRESSURE: 112 MMHG | SYSTOLIC BLOOD PRESSURE: 185 MMHG | HEART RATE: 86 BPM

## 2017-10-17 VITALS — DIASTOLIC BLOOD PRESSURE: 103 MMHG | HEART RATE: 87 BPM | SYSTOLIC BLOOD PRESSURE: 183 MMHG

## 2017-10-17 VITALS
TEMPERATURE: 98.6 F | DIASTOLIC BLOOD PRESSURE: 96 MMHG | SYSTOLIC BLOOD PRESSURE: 195 MMHG | OXYGEN SATURATION: 95 % | HEART RATE: 91 BPM

## 2017-10-17 VITALS — OXYGEN SATURATION: 98 %

## 2017-10-17 DIAGNOSIS — Z79.82: ICD-10-CM

## 2017-10-17 DIAGNOSIS — I12.0: ICD-10-CM

## 2017-10-17 DIAGNOSIS — N18.6: ICD-10-CM

## 2017-10-17 DIAGNOSIS — Z91.19: ICD-10-CM

## 2017-10-17 DIAGNOSIS — Z87.891: ICD-10-CM

## 2017-10-17 DIAGNOSIS — E11.21: ICD-10-CM

## 2017-10-17 DIAGNOSIS — E66.01: ICD-10-CM

## 2017-10-17 DIAGNOSIS — J30.9: ICD-10-CM

## 2017-10-17 DIAGNOSIS — I25.5: ICD-10-CM

## 2017-10-17 DIAGNOSIS — N25.0: ICD-10-CM
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
## 2017-10-17 DIAGNOSIS — I65.22: ICD-10-CM

## 2017-10-17 DIAGNOSIS — Z95.5: ICD-10-CM

## 2017-10-17 DIAGNOSIS — E87.1: ICD-10-CM

## 2017-10-17 DIAGNOSIS — M15.9: ICD-10-CM

## 2017-10-17 DIAGNOSIS — E87.5: ICD-10-CM

## 2017-10-17 DIAGNOSIS — K59.00: ICD-10-CM

## 2017-10-17 DIAGNOSIS — T82.868A: Primary | ICD-10-CM

## 2017-10-17 DIAGNOSIS — I25.10: ICD-10-CM

## 2017-10-17 LAB
ANION GAP SERPL CALC-SCNC: 17 MMOL/L (ref 3–11)
BUN SERPL-MCNC: 115 MG/DL (ref 7–18)
BUN/CREAT SERPL: 6.8 (ref 10–20)
CALCIUM SERPL-MCNC: 9.8 MG/DL (ref 8.5–10.1)
CHLORIDE SERPL-SCNC: 93 MMOL/L (ref 98–107)
CO2 SERPL-SCNC: 19 MMOL/L (ref 21–32)
CREAT CL PREDICTED SERPL C-G-VRATE: 4.9 ML/MIN
CREAT SERPL-MCNC: 16.8 MG/DL (ref 0.6–1.2)
EOSINOPHIL NFR BLD AUTO: 452 K/UL (ref 130–400)
GLUCOSE SERPL-MCNC: 139 MG/DL (ref 70–99)
HCT VFR BLD CALC: 27.9 % (ref 37–47)
MCH RBC QN AUTO: 29.1 PG (ref 25–34)
MCHC RBC AUTO-ENTMCNC: 33 G/DL (ref 32–36)
MCV RBC AUTO: 88.3 FL (ref 80–100)
PMV BLD AUTO: 10.5 FL (ref 7.4–10.4)
POTASSIUM SERPL-SCNC: 5.6 MMOL/L (ref 3.5–5.1)
RBC # BLD AUTO: 3.16 M/UL (ref 4.2–5.4)
SODIUM SERPL-SCNC: 129 MMOL/L (ref 136–145)
WBC # BLD AUTO: 12.14 K/UL (ref 4.8–10.8)

## 2017-10-17 PROCEDURE — 5A1D70Z PERFORMANCE OF URINARY FILTRATION, INTERMITTENT, LESS THAN 6 HOURS PER DAY: ICD-10-PCS | Performed by: INTERNAL MEDICINE

## 2017-10-17 PROCEDURE — 06HY33Z INSERTION OF INFUSION DEVICE INTO LOWER VEIN, PERCUTANEOUS APPROACH: ICD-10-PCS | Performed by: SURGERY

## 2017-10-17 RX ADMIN — ONDANSETRON PRN MG: 2 INJECTION INTRAMUSCULAR; INTRAVENOUS at 22:41

## 2017-10-17 RX ADMIN — LACTOBACILLUS TAB SCH TAB: TAB at 19:37

## 2017-10-17 RX ADMIN — INSULIN ASPART SCH UNITS: 100 INJECTION, SOLUTION INTRAVENOUS; SUBCUTANEOUS at 19:39

## 2017-10-17 RX ADMIN — CALCIUM ACETATE SCH MG: 667 CAPSULE ORAL at 19:37

## 2017-10-17 RX ADMIN — OXYCODONE HYDROCHLORIDE AND ACETAMINOPHEN PRN TAB: 5; 325 TABLET ORAL at 19:21

## 2017-10-17 RX ADMIN — SERTRALINE HYDROCHLORIDE SCH MG: 100 TABLET, FILM COATED ORAL at 19:38

## 2017-10-17 NOTE — PROGRESS NOTE
Progress Note


Date of Service


Oct 17, 2017.





Progress Note


Patient with K of 5.6.  


Case cancelled by anesthesia


Will place a temporary line for dialysis today and do the thrombectomy tomorrow


I have discussed the risks options and benefits of the procedure with the 

patient.  The patient understands the risks options and benefits and agrees to 

the procedure.

## 2017-10-17 NOTE — [facility] OPERATIVE REPORT
Operative Report


Operative Date


Oct 17, 2017.





Pre-Operative Diagnosis





thrombosed left arm fistula





Post-Operative Diagnosis





same





Procedure(s) Performed





Insertion Of Temporary Dialysis Catheter Right Femoral Artery, Ultrasound 


Localization Of Right Femoral Artery, Fluoroscopy For Positioning





Surgeon


Dr. Roberts





Assistant Surgeon(s)


none





Estimated Blood Loss


0





Findings


tip in ivc, good flow





Specimens





none





Anesthesia


Local





Complication(s)


None





Disposition








Indications


This patient is a 52-year-old white female who has a thrombosed fistula in her 

left arm.  She was to undergo thrombectomy today but her potassium was 5.6 and 

a creatinine was 16.  Anesthesia requested that she be dialyzed prior to 

proceeding the procedure.  The temporary dialysis catheter was recommended.  I 

have discussed the risks options and benefits of the procedure with the 

patient.  The patient understands the risks options and benefits and agrees to 

the procedure.





Description of Procedure


Patient was takent to the angio suite and placed in the supine position.  The 

right groin was prepped and draped in a sterile manner.  Local anesthesia was 

then administered to the appropriate areas of the groin.  Ultrasound was then 

used to locate the right femoral vein.  The vein compressed easily, had no 

filing defects, and was patent.  The vein was then punctured under direct 

ultrasound imaging.  A guidewire was then passed centrally under fluoroscopic 

imaging.  A stab wound was then made and a 24 cm temporary dialysis catheter 

was passed into the iliac vein centrally.   The catheter was then sutured in 

place using nylon sutures.  Both ports aspirated and flushed easily and were 

then packed with heparin.  A sterile dressing was applied to the catheter.  The 

patient left the angio suite in good condition and tolerated the procedure well.


I attest to the content of the Intraoperative Record and any orders documented 

therein.  Any exceptions are noted below.

## 2017-10-17 NOTE — NEPHROLOGY CONSULTATION
DATE OF CONSULTATION:  10/17/2017

 

ATTENDING OF RECORD:  [facility].

 

REASON FOR CONSULTATION:  End-stage renal disease.

 

HISTORY OF PRESENT ILLNESS:  This is a 52-year-old female who dialyzes

Mondays, Wednesdays, and Fridays at the [facility], the last

dialysis was a week ago today on Monday.  The patient had a non-working

access on Friday.  When attempting to declot it today, potassium level was

found to be elevated at 5.6.  So, Dr. Roberts placed a temporary dialysis

catheter and to run dialysis today with a tentative plan of a declot

tomorrow.  The patient still urinates and is not grossly fluid overloaded. 

The patient currently evaluated at dialysis and tolerating it well.

 

PAST MEDICAL HISTORY/PAST SURGICAL HISTORY:  End-stage renal disease, diabetes, 
hypertension,

hyperlipidemia, coronary artery disease with 1 stent placed, left CEA in

August of this year, obesity, GERD, active tobacco use, anxiety and

depression.

 

FAMILY HISTORY:  Significant for diabetes and heart disease.

 

SOCIAL HISTORY:  Active smoker, no alcohol, no drugs and is .

 

CURRENT MEDICATIONS:  Reviewed.

 

REVIEW OF SYSTEMS:  No fevers or chills.  No headaches, no blurry vision, no

shortness of breath.  No chest pain.  Positive nausea, no vomiting, decreased

appetite.  No diarrhea or constipation.  No dysuria.  All other review of

systems otherwise negative.

 

PHYSICAL EXAMINATION:

VITAL SIGNS:  Temperature 36.7, blood pressure 185/112, pulse 86, respiratory

rate is 22, pulse ox 100%.

GENERAL:  Awake, alert, oriented x3, obese.

EYES:  No scleral icterus.

ENT:  Moist mucous membranes.

NECK:  Supple.

PULMONARY:  Clear to auscultation.

CARDIAC:  Regular rate and rhythm.

ABDOMEN:  Bowel sounds positive, soft, nontender, nondistended.

EXTREMITIES:  Mild edema.

NEUROLOGICALLY:  Nonfocal.

DERMATOLOGIC:  No rash or ulcers noted.

 

LABORATORY DATA:  Show a sodium level 129, potassium 5.6, chloride 93,

bicarbonate is 19, BUN is 115, creatinine 16, glucose 138; calcium is 9.8.

 

IMPRESSION AND PLAN:

1.  End-stage renal disease.  The patient seen on dialysis, removing 2 liters

today to help lower her blood pressure.

2.  Hyperkalemia.  Potassium level 5.6, dialyzed on a 2K bath which should

help improve potassium levels.  Will tentatively plan on dialysis again

tomorrow after successful declotting secondary to the potassium release after

lysis.

3.  Hyponatremia.  Sodium level was 129 and should improve with dialysis and

fluid removal.

4.  Renal osteodystrophy.  We will continue patient's phosphate binders,

while in house.

5.  Hypertension.  Blood pressure should improve as we remove fluid and may

need blood pressure medications adjusted while in house, to try to help

control her significantly elevated blood pressure.

6.  Overall, patient has noncompliance with medications as well as coming to

dialysis with a chronically high phosphorus and PTH levels with the high risk

of heart attack in the future given her significant premature calcification

of vessels, given her uncontrolled phosphorus and elevated PTH levels through 
the

years that I have known her.  Despite multiple educational attempts and

encouragement, the patient has issues with taking meds on a regular basis and

continue to work with supporting her at the outpatient dialysis unit.  For

now, will dialyze tonight, adjust blood pressure medication accordingly and

dialyze again tomorrow after declotted, trying to optimize her clearance of

toxins, hyponatremia and hyperkalemia.

 

Appreciate consultation.

 

 

 

LOUISE

## 2017-10-17 NOTE — HISTORY AND PHYSICAL
History & Physical


Date & Time of Service:


Oct 17, 2017 at 16:25


Chief Complaint:


End Stage Renal Disease, Malfunctioning Fistula


Primary Care Physician:


Lurdes Smith D.O.


History of Present Illness


Source:  patient


Pt is 51y/o F with hx ESRD on HD, HTN, DM II, CAD, carotid stenosis presents as 

direct admission from Dr Roberts for hyperkalemia, HTN. Pt was in same day 

surgery for thrombectomy for thrombosis of L arm fistula. In same day surgery 

BP was noted to be 200/98. A R femoral dialysis catheter was placed instead of 

thrombectomy. Pt sent for dialysis and is receiving currently. She is scheduled 

for thrombectomy of L arm fistula tomorrow. K: 5.6, BUN: 115, CR: 16.8, glucose

: 138





Pt states typically has dialysis on MWF schedule. States didn't have done 

friday or yesterday (monday) secondary to malfunctioning fistula. She admits to 

not taking her medicines for the past couple of days as she has been having 

nausea which she relates is secondary to not receiving dialysis. She took 

Zofran 4mg po this am and her ASA 81mg, probiotic, otherwise hasn't been taking 

her medications. States hx chronic constipation, hasn't had BM in couple of 

days. Pt with hx chronic low back pain, denies any changes with that. Denies 

fever/chills, diaphoresis, N/V/D, melena, hematochezia, HA, dizziness, syncope, 

vision changes, neck pain, CP, SOB, orthopnea, palpitations, cough, sore throat

, choking, otalgia, rhinorrhea, flank pain, paresthesias, weakness, extremity 

weakness, extremity edema, rashes, or changes with urination (pt doesn't 

urinate much secondary to HD.








Pt was recently admitted to hospital on 10/11/17 for R shoulder pain, elevated 

WBC with concern for possible sepsis, elevated troponin. Unremarkable hospital 

course with decreased WBC and no distinct infection source, suspected was from 

pain/inflammation. Troponin no increase in trend. R shoulder pain improved.





Past Medical/Surgical History


Medical Problems:


(1) Allergic rhinitis


Status: Chronic  





(2) CAD (coronary artery disease)


Permanent Comment: s/p PCI and BMS to left circumflex 8/2016


Status: Chronic  





(3) Carotid stenosis


Permanent Comment: left ICA


Status: Chronic  





(4) DM type 2 (diabetes mellitus, type 2)


Status: Chronic  





(5) Dyslipidemia


Status: Chronic  





(6) ESRD (end stage renal disease) on dialysis


Status: Chronic  





(7) GERD (gastroesophageal reflux disease)


Status: Chronic  





(8) HTN (hypertension)


Status: Chronic  





(9) HX OF PAST NONCOMPLIANCE


Status: Chronic  





(10) Ischemic cardiomyopathy


Permanent Comment: echo 10/2016 - EF 40-45%, grade I diastolic dysfunction


Status: Chronic  





(11) Morbid obesity


Status: Chronic  





(12) Tobacco use disorder


Status: Chronic  





Surgical Problems:


(1) Hemodialysis access, AV graft


Permanent Comment: [facility] Dr. Roberts 1/4/13


Status: Chronic  








Family History





Diabetes mellitus


  FATHER


  MOTHER


Heart disease


  FATHER


  MOTHER


Hypertension


  FATHER


  MOTHER


Kidney disease


  GRANDMOTHER





Social History


Smoking Status:  Former Smoker (pt admits to smoking intermittently)


Alcohol Use:  occasionally


Drug Use:  none


Marital Status:  


Housing status:  lives alone


Occupational Status:  unemployed





Immunizations


History of Influenza Vaccine:  Yes


Influenza Vaccine Date:  Sep 1, 2016


History of Tetanus Vaccine?:  Yes


History of Pneumococcal:  Yes


Pneumococcal Date:  Jul 24, 2013


History of Hepatitis B Vaccine:  Yes


Hepatitis Immunization Date:  Dec 11, 2013





Multi-Drug Resistant Organisms


History of MDRO:  Yes


Type of MDRO:  MRSA





Allergies


Coded Allergies:  


     Adhesives (Verified  Allergy, Mild, RASH, SORES, 10/17/17)


     NO KNOWN DRUG ALLERGIES (Verified  Allergy, Mild, ., 10/17/17)


     Latex1 -Allergic Contact Dermititis (Verified  Allergy, Unknown, rash, 10/

17/17)


     Pollen Extract (Verified  Allergy, Unknown, WATERY EYES, 10/17/17)





Home Medications


Scheduled


Aspirin (Aspirin Ec), 81 MG PO QAM


Atorvastatin (Atorvastatin Calcium), 40 MG PO QAM


Calcium Acetate (Phosphate Bin (Phoslo 667 Mg), 2,001 MG PO TIDM


Cephalexin Monohydrate (Keflex), 500 MG PO BID


Insulin Glargine (Lantus Solostar), 12 UNITS SC QPM


Isosorbide Mononitrate Ext Rel (Imdur Ext Rel), 30 MG PO QAM


Isosorbide Mononitrate Ext Rel (Imdur Ext Rel), 60 MG PO QAM


Lactobacillus (Probiotic), 2 CAP PO BID


Metoprolol Succinate (Metoprolol Succinate ER), 50 MG PO QAM


Pantoprazole Sodium (Protonix), 40 MG PO DAILY


Sertraline HCl (Sertraline HCl), 100 MG PO HS





Scheduled PRN


Calcium Acetate (Phosphate Bin (Phoslo 667 Mg), 2 CAP PO UD PRN for snacks


Dicyclomine Hcl (Dicyclomine Hcl), 10 MG PO QID PRN for Cramping


Fluticasone Propionate (Fluticasone Propionate), 2 SPRAYS SUZI DAILY PRN for 

Allergy Symptoms


Loratadine (Claritin Childrens), 5 MG PO DAILY PRN for Allergy Symptoms


Lorazepam (Lorazepam), 0.5 MG PO DAILY PRN for Anxiety


Ondansetron Hcl (Zofran), 4 MG PO Q6 PRN for Nausea


Oxycodone/Acetaminophen 5MG/325MG (Oxycodone/Acetaminophen 5MG/325MG), 1-2 TABS 

PO Q4H PRN for Pain





Review of Systems


See HPI for pertinent positives & negatives. A total of 10 systems reviewed and 

were otherwise negative





Physical Exam


Vital Signs











  Date Time  Temp Pulse Resp B/P (MAP) Pulse Ox O2 Delivery O2 Flow Rate FiO2


 


10/17/17 16:15  85  175/96    


 


10/17/17 16:00  86  180/108    


 


10/17/17 15:45  88  186/107    


 


10/17/17 15:45  87  192/108 (136)    


 


10/17/17 13:40 36.7 72 22 187/95 100 Room Air  


 


10/17/17 12:09 36.7 89 22 187/95 (125) 100 Room Air  








General Appearance:  + obese, + pertinent finding (chronically ill appearing)


Head:  normocephalic, atraumatic


Eyes:  normal inspection, PERRL, EOMI


ENT:  pharynx normal


Neck:  supple, no adenopathy, trachea midline


Respiratory/Chest:  chest non-tender, lungs clear, normal breath sounds, no 

respiratory distress, no accessory muscle use


Cardiovascular:  regular rate, rhythm, no murmur


Abdomen/GI:  normal bowel sounds, non tender, soft


Extremities/Musculoskelatal:  non-tender, + pedal edema (trace)


Neurologic/Psych:  alert, normal mood/affect, oriented x 3


Skin:  warm/dry, + pertinent finding (ashy skin color)





Diagnostics


Laboratory Results





Results Past 24 Hours








Test


  10/17/17


11:48 10/17/17


11:58 10/17/17


14:47 10/17/17


16:03 Range/Units


 


 


Bedside Glucose 134  138  70-90  mg/dl


 


Sodium Level  129   136-145  mmol/L


 


Potassium Level  5.6   3.5-5.1  mmol/L


 


Chloride Level  93     mmol/L


 


Carbon Dioxide Level  19   21-32  mmol/L


 


Anion Gap  17.0   3-11  mmol/L


 


Blood Urea Nitrogen  115   7-18  mg/dl


 


Creatinine


  


  16.80


  


  


  0.60-1.20


mg/dl


 


Est Creatinine Clear Calc


Drug Dose 


  4.9


  


  


   ml/min


 


 


Estimated GFR (


American) 


  2.5


  


  


   


 


 


Estimated GFR (Non-


American 


  2.2


  


  


   


 


 


BUN/Creatinine Ratio  6.8   10-20  


 


Random Glucose  139   70-99  mg/dl


 


Calcium Level  9.8   8.5-10.1  mg/dl











Impression


Assessment and Plan


HYPERKALEMIA:


-Probable secondary to pt not receiving hemodialysis. Pt already started 

dialysis prior to pt being seen. Will repeat bmp tomorrow AM to recheck





ESRD on HD


-Pt on MWF dialysis schedule, follows with Dr Davison


-nephrology aware and pt receiving dialysis today


-vascular surgery consulted as pt is scheduled for thrombectomy of fistula 

thrombosis tomorrow.





CAD, PVD


-continue ASA


-continue lipitor


-recommend smoking cessation 





HTN:


-Pt has not been taking home meds and hasn't had HD. Will wait until pt done 

with dialysis and if continues to be hypertensive consider hydralazine


-can continue metoprolol





DM II


-Pt hasn't been taking her insulin past several days. Glucose 138 currently. 

Will hold on lantus currently and do sliding scale per protocol.


-HgbA1c: 7.7 on 10/9/17





DVT PROPHYLAXIS


-SCD's as will hold on heparin with pt having recent procedure and scheduled 

for procedure in am.





DISPOSITION


-admit tele


- Full Code as per discussion with pt


-Follows with Dr Smith for routine care





Pt seen with Dr Naqvi





ADDENDUM:  I have seen and examined the patient above and agree with the 

assessment and plan as written.  Additional problems include hyponatremia which 

is chronic but low--will repeat PRP in am after receiving HD tonight.  Also she 

has chronic, stable anemia with no reports of pain and chronic pain 2/2 OA 

which has flared up per her report.  She takes Percocet PRN for this.  Exam is 

unremarkable aside from no thrill to palpation of fistula.  Plan to continue to 

procedure in am.  Likely HD again tomorrow.  [place], DO





Level of Care


Telemetry





Resuscitation Status


FULL RESUSCITATION





VTE Prophylaxis


VTE Risk Assessment Done? Y/N:  Yes


Risk Level:  Moderate


Given or contraindicated:  SCD's





Additional Copies To


Lurdes Smith D.O.

## 2017-10-17 NOTE — HISTORY AND PHYSICAL
History & Physical


Date of Service


Oct 17, 2017.





History & Physical


Chief Complaint


Thrombosed left upper arm fistula





History of Present Illness


The patient is a 51 year old female with multiple medical problems, including 

HTN, DMII, ESRD on HD, carotid stenosis, CAD.  She has thrombosis of her left 

arm fistula.  She is admitted now for a thrombectomy of her fistula.








Allergies


Coded Allergies:  


     Adhesives (Verified  Allergy, Mild, RASH, SORES, 1/31/17)


     Pollen Extract (Unverified  Allergy, Unknown, rash, 1/31/17)





Home Medications


Scheduled


Aspirin (Aspirin Ec), 81 MG PO DAILY


Atorvastatin (Lipitor), 40 MG PO DAILY


Calcium Acetate (Phoslo 667 Mg), 3 CAP PO WM


Calcium Acetate (Phoslo 667 Mg), 2 CAP PO WITH SNACKS


Cephalexin Monohydrate (Keflex), 500 MG PO BID


Insulin Glargine (Lantus Solostar), 10 UNITS SC QPM


Isosorbide Mononitrate (Isosorbide Mononitrate ER), 60 MG PO QAM


Isosorbide Mononitrate Ext Rel (Imdur Ext Rel), 30 MG PO QAM


Metoprolol Tartrate (Lopressor), 50 MG PO BID


Pantoprazole (Protonix), 40 MG PO DAILY


Ranitidine Hcl (Zantac), 300 MG PO HS


Sertraline HCl (Sertraline HCl), 2 TAB PO DAILY





Scheduled PRN


Dicyclomine Hcl (Dicyclomine Hcl), 1 MG PO QID PRN for CRAMPING


Lorazepam (Ativan), 0.5 MG PO DAILY PRN for Anxiety


Methocarbamol (Methocarbamol), 500 MG PO BID PRN for Muscle Spasms


Tramadol (Ultram), 1 TAB PO TID PRN for Pain





Problem List


Medical Problems:


(1) Allergic rhinitis


(2) C. difficile colitis


(3) CAD (coronary artery disease)


(4) Carotid stenosis


(5) Diabetic foot infection


(6) DM type 2 (diabetes mellitus, type 2)


(7) Dyslipidemia


(8) ESRD (end stage renal disease) on dialysis


(9) GERD (gastroesophageal reflux disease)


(10) HTN (hypertension)


(11) HX OF PAST NONCOMPLIANCE


(12) Ischemic cardiomyopathy


(13) Morbid obesity


(14) Tobacco use disorder


(15) UGIB (upper gastrointestinal bleed)


Surgical Problems:


(1) Hemodialysis access, AV graft








Surgical / Medical History


Hx Cardiac Surgery:  Yes (ANGIOPLASTY/STENT)


Hx Abdominal Surgery:  No


Hx Cancer Surgery:  No


Hx Thoracic Surgery:  No


Hx Orthopedic:  No


Hx Urinary Tract Surgery:  No


Past Medical/Surgical History:  Diabetes, Heart Disease, Hypertension, Kidney 

Disease





Family History





Diabetes mellitus


  FATHER


  MOTHER


Heart disease


  FATHER


  MOTHER


Hypertension


  FATHER


  MOTHER


Kidney disease


  GRANDMOTHER





Social History


Smoking Status:  Former Smoker


Hx Tobacco Use In Past Year?:  No


Hx Alcohol Use - Type & Amnt:  No


Hx Substance Use -Type & Amnt:  No








Review of Systems


Constitutional:  No chills, No malaise


Skin:  + change in color


Eyes:  No visual changes


ENMT:  No sore throat


Respiratory:  No cough, No SOUSA, No hemoptysis, No short of breath


Cardiovascular:  No chest pain, No palpitations, No syncope, No edema, No 

intermittent claudication


Gastrointestinal:   No abdominal pain, No nausea, No vomiting


Neurologic:  No dizziness, No headache, No lethargy, No numbness, No tingling








Physical Exam


Constitutional:  


   General Apperance:  well-nourished, well-developed, obese (morbidly)


   Level of Distress:  NAD, chronically ill


Psychiatric:  


   Mental Status:  active & alert, normal mood, normal affect


   Orientation:  oriented except where noted, to time, to place, to person


   Memory:  recent memory normal, remote memory normal


Head:  normocephalic, atraumatic


Eyes:  


   EOM:  EOMI


ENMT:  normal ENT inspection, hearing grossly normal


Neck:  supple, trachea midline


Lungs:  


   Respiratory effort:  no dyspnea


   Auscultation:  no wheezing, no rales/crackles, no rhonchi


Cardiovascular:  


   Apical Impulse:  not displaced


   Heart Auscultation:  RRR, no rubs, no gallops


Peripheral Pulses:  


   Pulses:  full and equal, in all extremities except if noted


   Brachial Pulses:  normal on the left, normal on the right


   Radial Pulse:  decreased on the left, decreased on the right


   Femoral Pulse:  normal on the right, decreased on the left


   Posterior Tibialis Pulse:  doppler (Monophasic LLE and RLE)


   Dorsalis Pedis Pulse:  decreased on the right, doppler (LLE monophasic)


Abdomen:  


   Inspection & Palpation:  soft, non-distended, no tenderness, guarding & 

rebound


Musculoskeletal:  normal strength (5/5 throughout), normal tone


Extremities:  


   Upper Right:  no cyanosis, no edema, no varicosities


   Upper Left:  no cyanosis, no edema, no varicosities


   Lower Right:  no cyanosis, no varicosities, no palpable cord, edema (trace)


   Lower Left:  no cyanosis, no varicosities, no palpable cord, edema (trace)


Neurologic:  


   Cranial Nerves:  grossly intact


   Sensation:  grossly intact











ASSESSMENT and PLAN:


   Thrombosis of the av fistula left arm





Plan:


   Patient is admitted for a thrombectomy of her left arm fistula and possible 

permcath insertion. I have discussed the risks options and benefits of the 

procedure with the patient.  The patient understands the risks options and 

benefits and agrees to the procedure.

## 2017-10-18 VITALS — HEART RATE: 69 BPM | SYSTOLIC BLOOD PRESSURE: 91 MMHG | DIASTOLIC BLOOD PRESSURE: 50 MMHG

## 2017-10-18 VITALS — SYSTOLIC BLOOD PRESSURE: 91 MMHG | HEART RATE: 66 BPM | DIASTOLIC BLOOD PRESSURE: 55 MMHG

## 2017-10-18 VITALS
OXYGEN SATURATION: 91 % | HEART RATE: 78 BPM | TEMPERATURE: 98.24 F | SYSTOLIC BLOOD PRESSURE: 149 MMHG | DIASTOLIC BLOOD PRESSURE: 85 MMHG

## 2017-10-18 VITALS — SYSTOLIC BLOOD PRESSURE: 85 MMHG | DIASTOLIC BLOOD PRESSURE: 46 MMHG | HEART RATE: 65 BPM

## 2017-10-18 VITALS — DIASTOLIC BLOOD PRESSURE: 59 MMHG | SYSTOLIC BLOOD PRESSURE: 89 MMHG | HEART RATE: 66 BPM

## 2017-10-18 VITALS
HEART RATE: 78 BPM | SYSTOLIC BLOOD PRESSURE: 105 MMHG | TEMPERATURE: 98.06 F | OXYGEN SATURATION: 95 % | DIASTOLIC BLOOD PRESSURE: 57 MMHG

## 2017-10-18 VITALS — HEART RATE: 66 BPM | SYSTOLIC BLOOD PRESSURE: 105 MMHG | DIASTOLIC BLOOD PRESSURE: 59 MMHG

## 2017-10-18 VITALS
HEART RATE: 88 BPM | SYSTOLIC BLOOD PRESSURE: 132 MMHG | OXYGEN SATURATION: 95 % | DIASTOLIC BLOOD PRESSURE: 77 MMHG | TEMPERATURE: 99.32 F

## 2017-10-18 VITALS — SYSTOLIC BLOOD PRESSURE: 104 MMHG | HEART RATE: 60 BPM | DIASTOLIC BLOOD PRESSURE: 60 MMHG

## 2017-10-18 VITALS — SYSTOLIC BLOOD PRESSURE: 123 MMHG | DIASTOLIC BLOOD PRESSURE: 102 MMHG | HEART RATE: 109 BPM

## 2017-10-18 VITALS — HEART RATE: 69 BPM | SYSTOLIC BLOOD PRESSURE: 96 MMHG | DIASTOLIC BLOOD PRESSURE: 57 MMHG

## 2017-10-18 VITALS — TEMPERATURE: 98.24 F | DIASTOLIC BLOOD PRESSURE: 58 MMHG | HEART RATE: 68 BPM | SYSTOLIC BLOOD PRESSURE: 111 MMHG

## 2017-10-18 VITALS — TEMPERATURE: 97.52 F | SYSTOLIC BLOOD PRESSURE: 107 MMHG | DIASTOLIC BLOOD PRESSURE: 60 MMHG | HEART RATE: 68 BPM

## 2017-10-18 VITALS — HEART RATE: 66 BPM | SYSTOLIC BLOOD PRESSURE: 155 MMHG | DIASTOLIC BLOOD PRESSURE: 53 MMHG

## 2017-10-18 VITALS
SYSTOLIC BLOOD PRESSURE: 147 MMHG | TEMPERATURE: 100.04 F | DIASTOLIC BLOOD PRESSURE: 81 MMHG | HEART RATE: 87 BPM | OXYGEN SATURATION: 92 %

## 2017-10-18 VITALS — SYSTOLIC BLOOD PRESSURE: 102 MMHG | DIASTOLIC BLOOD PRESSURE: 52 MMHG | HEART RATE: 72 BPM

## 2017-10-18 VITALS — HEART RATE: 72 BPM | DIASTOLIC BLOOD PRESSURE: 62 MMHG | SYSTOLIC BLOOD PRESSURE: 105 MMHG

## 2017-10-18 VITALS
HEART RATE: 66 BPM | TEMPERATURE: 98.24 F | SYSTOLIC BLOOD PRESSURE: 91 MMHG | OXYGEN SATURATION: 100 % | DIASTOLIC BLOOD PRESSURE: 57 MMHG

## 2017-10-18 VITALS
DIASTOLIC BLOOD PRESSURE: 84 MMHG | SYSTOLIC BLOOD PRESSURE: 148 MMHG | TEMPERATURE: 98.6 F | HEART RATE: 84 BPM | OXYGEN SATURATION: 94 %

## 2017-10-18 VITALS — SYSTOLIC BLOOD PRESSURE: 104 MMHG | DIASTOLIC BLOOD PRESSURE: 45 MMHG | HEART RATE: 68 BPM

## 2017-10-18 VITALS — OXYGEN SATURATION: 99 %

## 2017-10-18 VITALS — HEART RATE: 68 BPM | DIASTOLIC BLOOD PRESSURE: 60 MMHG | SYSTOLIC BLOOD PRESSURE: 107 MMHG

## 2017-10-18 LAB
ANION GAP SERPL CALC-SCNC: 12 MMOL/L (ref 3–11)
BUN SERPL-MCNC: 58 MG/DL (ref 7–18)
BUN/CREAT SERPL: 5.5 (ref 10–20)
CALCIUM SERPL-MCNC: 9.2 MG/DL (ref 8.5–10.1)
CHLORIDE SERPL-SCNC: 97 MMOL/L (ref 98–107)
CO2 SERPL-SCNC: 24 MMOL/L (ref 21–32)
CREAT CL PREDICTED SERPL C-G-VRATE: 8 ML/MIN
CREAT SERPL-MCNC: 10.3 MG/DL (ref 0.6–1.2)
EOSINOPHIL NFR BLD AUTO: 411 K/UL (ref 130–400)
GLUCOSE SERPL-MCNC: 115 MG/DL (ref 70–99)
HCT VFR BLD CALC: 26 % (ref 37–47)
INR PPP: 1.2 (ref 0.9–1.1)
MCH RBC QN AUTO: 29.7 PG (ref 25–34)
MCHC RBC AUTO-ENTMCNC: 32.7 G/DL (ref 32–36)
MCV RBC AUTO: 90.9 FL (ref 80–100)
PMV BLD AUTO: 9.7 FL (ref 7.4–10.4)
POTASSIUM SERPL-SCNC: 4.6 MMOL/L (ref 3.5–5.1)
PROTHROMBIN TIME: 12.8 SECONDS (ref 9–12)
RBC # BLD AUTO: 2.86 M/UL (ref 4.2–5.4)
SODIUM SERPL-SCNC: 133 MMOL/L (ref 136–145)
WBC # BLD AUTO: 10.9 K/UL (ref 4.8–10.8)

## 2017-10-18 PROCEDURE — 03WY07Z REVISION OF AUTOLOGOUS TISSUE SUBSTITUTE IN UPPER ARTERY, OPEN APPROACH: ICD-10-PCS | Performed by: SURGERY

## 2017-10-18 PROCEDURE — 03CY0ZZ EXTIRPATION OF MATTER FROM UPPER ARTERY, OPEN APPROACH: ICD-10-PCS | Performed by: SURGERY

## 2017-10-18 RX ADMIN — PANTOPRAZOLE SCH MG: 40 TABLET, DELAYED RELEASE ORAL at 09:09

## 2017-10-18 RX ADMIN — INSULIN ASPART SCH UNITS: 100 INJECTION, SOLUTION INTRAVENOUS; SUBCUTANEOUS at 20:29

## 2017-10-18 RX ADMIN — INSULIN ASPART SCH UNITS: 100 INJECTION, SOLUTION INTRAVENOUS; SUBCUTANEOUS at 16:15

## 2017-10-18 RX ADMIN — CALCIUM ACETATE SCH MG: 667 CAPSULE ORAL at 07:30

## 2017-10-18 RX ADMIN — INSULIN ASPART SCH UNITS: 100 INJECTION, SOLUTION INTRAVENOUS; SUBCUTANEOUS at 07:00

## 2017-10-18 RX ADMIN — LACTOBACILLUS TAB SCH TAB: TAB at 16:45

## 2017-10-18 RX ADMIN — SERTRALINE HYDROCHLORIDE SCH MG: 100 TABLET, FILM COATED ORAL at 20:52

## 2017-10-18 RX ADMIN — CALCIUM ACETATE SCH MG: 667 CAPSULE ORAL at 11:30

## 2017-10-18 RX ADMIN — ATORVASTATIN CALCIUM SCH MG: 40 TABLET, FILM COATED ORAL at 09:09

## 2017-10-18 RX ADMIN — OXYCODONE HYDROCHLORIDE AND ACETAMINOPHEN PRN TAB: 5; 325 TABLET ORAL at 00:28

## 2017-10-18 RX ADMIN — METOPROLOL SUCCINATE SCH MG: 50 TABLET, EXTENDED RELEASE ORAL at 09:10

## 2017-10-18 RX ADMIN — Medication SCH MG: at 09:09

## 2017-10-18 RX ADMIN — CALCIUM ACETATE SCH MG: 667 CAPSULE ORAL at 09:09

## 2017-10-18 RX ADMIN — FLUTICASONE PROPIONATE PRN SPRAYS: 50 SPRAY, METERED NASAL at 20:52

## 2017-10-18 RX ADMIN — INSULIN ASPART SCH UNITS: 100 INJECTION, SOLUTION INTRAVENOUS; SUBCUTANEOUS at 11:00

## 2017-10-18 RX ADMIN — CALCIUM ACETATE SCH MG: 667 CAPSULE ORAL at 19:14

## 2017-10-18 RX ADMIN — OXYCODONE HYDROCHLORIDE AND ACETAMINOPHEN PRN TAB: 5; 325 TABLET ORAL at 04:39

## 2017-10-18 RX ADMIN — LACTOBACILLUS TAB SCH TAB: TAB at 09:08

## 2017-10-18 RX ADMIN — ISOSORBIDE MONONITRATE SCH MG: 60 TABLET ORAL at 09:09

## 2017-10-18 RX ADMIN — ISOSORBIDE MONONITRATE SCH MG: 30 TABLET, EXTENDED RELEASE ORAL at 09:09

## 2017-10-18 RX ADMIN — OXYCODONE HYDROCHLORIDE AND ACETAMINOPHEN PRN TAB: 5; 325 TABLET ORAL at 20:23

## 2017-10-18 NOTE — NEPHROLOGY PROGRESS NOTE
Nephrology Progress Note


Date of Service:


Oct 18, 2017.


Subjective


This is a 53y/o ESRD patient admitted for failed access and hyperkalemia. 

Patient had a temporary femoral cath placed and patient had dialysis yesterday. 

Plan for today to proceed with thrombectomy and post surgical dialysis. Patient 

states that she had issues sleeping last night. She denies any SOB, chest pain, 

nausea or vomiting. She is not confused today. still with some urine output and 

does not appear to be volume overloaded.





Objective











  Date Time  Temp Pulse Resp B/P (MAP) Pulse Ox O2 Delivery O2 Flow Rate FiO2


 


10/18/17 07:59      Room Air  


 


10/18/17 07:57 37.0 84 18 148/84 (105) 94 Room Air  


 


10/18/17 03:55 37.8 87 19 147/81 (103) 92 Room Air  


 


10/18/17 00:22 37.4 88 18 132/77 (95) 95 Room Air  


 


10/18/17 00:00      Room Air  


 


10/17/17 21:49  90  158/91 (113)    


 


10/17/17 20:00     98 Room Air  


 


10/17/17 18:57 36.8 89  170/95 (120)    


 


10/17/17 18:50 37.0 95 20 178/103 98 Room Air  


 


10/17/17 18:30  82  172/103    


 


10/17/17 18:15  83  168/103    


 


10/17/17 18:00  86  179/103    


 


10/17/17 17:45  87  178/103    


 


10/17/17 17:30  87  183/103    


 


10/17/17 17:15  86  182/106    


 


10/17/17 17:00  85  177/103    


 


10/17/17 16:45  89  180/107    


 


10/17/17 16:30  86  185/112    


 


10/17/17 16:15  85  175/96    


 


10/17/17 16:00  86  180/108    


 


10/17/17 15:45  88  186/107    


 


10/17/17 15:45  87  192/108 (136)    


 


10/17/17 15:25 37.0 87 18 208/98 98 Room Air  


 


10/17/17 15:00 37.0 91 18 195/96 95 Room Air  


 


10/17/17 14:37 36.8 85 18 200/98 94 Room Air  


 


10/17/17 13:40 36.7 72 22 187/95 100 Room Air  


 


10/17/17 12:09 36.7 89 22 187/95 (125) 100 Room Air  








Physical Exam:


GENERAL:  Awake, alert, oriented x3, obese.


EYES:  No scleral icterus.


ENT:  Moist mucous membranes.


NECK:  Supple. CEA scar on left side


PULMONARY:  Clear to auscultation.


CARDIAC:  Regular rate and rhythm.


ABDOMEN:  Bowel sounds positive, soft, nontender, nondistended.


EXTREMITIES:  Mild edema. with cyst on the dorsum of her left foot. +femoral 

catheter RLE


NEUROLOGICALLY:  Nonfocal.


DERMATOLOGIC:  No rash or ulcers noted.





Current Inpatient Medications








 Medications


  (Trade)  Dose


 Ordered  Sig/Willy


 Route  Start Time


 Stop Time Status Last Admin


Dose Admin


 


 Acetaminophen


  (Tylenol Tab)  650 mg  Q4H  PRN


 PO  10/17/17 15:45


 11/16/17 15:44   


 


 


 Ondansetron HCl


  (Zofran Inj)  4 mg  Q6H  PRN


 IV  10/17/17 15:45


 11/16/17 15:44  10/17/17 22:41


4 MG


 


 Miscellaneous


  (Iv Fluids


 Completed)  1 ea  PRN  PRN


 N/A  10/17/17 16:00


 10/17/18 15:59   


 


 


 Insulin Aspart


  (novoLOG ASPART)  **SLIDING


 SCALE**


 **If C...  ACHS


 SC  10/17/17 21:00


 11/16/17 20:59   


 


 


 Glucose


  (Glucose 40% Gel)  15-30


 GRAMS 15


 GRAMS...  UD  PRN


 PO  10/17/17 16:15


 11/16/17 16:14   


 


 


 Glucose


  (Glucose Chew


 Tab)  4-8


 Tablets 4


 Tabl...  UD  PRN


 PO  10/17/17 16:15


 11/16/17 16:14   


 


 


 Dextrose


  (Dextrose 50%


 50ML Syringe)  25-50ML OF


 50% DW IV


 FOR...  UD  PRN


 IV  10/17/17 16:15


 11/16/17 16:14   


 


 


 Glucagon


  (Glucagon Inj)  1 mg  UD  PRN


 SQ  10/17/17 16:15


 11/16/17 16:14   


 


 


 Aspirin


  (Ecotrin Tab)  81 mg  QAM


 PO  10/18/17 09:00


 11/17/17 08:59   


 


 


 Atorvastatin


 Calcium


  (Lipitor Tab)  40 mg  QAM


 PO  10/18/17 09:00


 11/17/17 08:59   


 


 


 Calcium Acetate


  (Phoslo Cap)  1,334 mg  UD  PRN


 PO  10/17/17 16:30


 11/16/17 16:29   


 


 


 Calcium Acetate


  (Phoslo Cap)  2,001 mg  TIDM


 PO  10/17/17 17:01


 11/16/17 17:59  10/17/17 19:37


2,001 MG


 


 Dicyclomine HCl


  (Bentyl Cap)  10 mg  QID  PRN


 PO  10/17/17 16:30


 11/16/17 16:29  10/18/17 04:37


10 MG


 


 Fluticasone


 Propionate


  (Flonase Nasal


 Spray)  2 sprays  DAILY  PRN


 SUZI  10/17/17 16:30


 11/16/17 16:29   


 


 


 Isosorbide


 Mononitrate


  (Imdur Ext Rel


 Tab)  30 mg  QAM


 PO  10/18/17 09:00


 11/17/17 08:59   


 


 


 Isosorbide


 Mononitrate


  (Imdur Ext Rel


 Tab)  60 mg  QAM


 PO  10/18/17 09:00


 11/17/17 08:59   


 


 


 Lorazepam


  (Ativan Tab)  0.5 mg  DAILY  PRN


 PO  10/17/17 16:30


 11/16/17 16:29   


 


 


 Metoprolol


 Succinate


  (Toprol Xl Tab)  50 mg  QAM


 PO  10/18/17 09:00


 11/17/17 08:59   


 


 


 Oxycodone/


 Acetaminophen


  (Percocet


 5-325mg Tab)  1 tab  Q4H  PRN


 PO  10/17/17 16:30


 10/31/17 16:29  10/18/17 04:39


1 TAB


 


 Pantoprazole


 Sodium


  (Protonix Tab)  40 mg  DAILY


 PO  10/18/17 09:00


 11/17/17 08:59   


 


 


 Sertraline HCl


  (Zoloft Tab)  100 mg  HS


 PO  10/17/17 21:00


 11/16/17 20:59  10/17/17 19:38


100 MG


 


 Lactobacillus


 Acidophilus


  (Floranex Tab)  4 tab  BIDM


 PO  10/17/17 17:01


 11/16/17 17:59  10/17/17 19:37


4 TAB


 


 Loratadine


  (Claritin Tab)  5 mg  DAILY  PRN


 PO  10/17/17 16:26


 11/16/17 16:25   


 


 


 Fentanyl Citrate


  (Fentanyl Inj)  25 mcg  Q5M  PRN


 IV  10/18/17 08:15


 10/18/17 13:00   


 


 


 Ondansetron HCl


  (Zofran Inj)  4 mg  ONE  PRN


 IV  10/18/17 08:15


 10/18/17 13:00   


 


 


 Ephedrine Sulfate


  (EpHEDrine


 SULFATE INJ)  5 mg  Q5M  PRN


 IV  10/18/17 08:15


 10/18/17 13:00   


 


 


 Atropine Sulfate


  (Atropine


 Sulfate 0.1MG/Ml


 Inj)  0.5 mg  Q1M  PRN


 IV  10/18/17 08:15


 10/18/17 13:00   


 











Last 24 Hours








Test


  10/17/17


11:48 10/17/17


11:58 10/17/17


14:47 10/17/17


19:19


 


Bedside Glucose 134 mg/dl   138 mg/dl  107 mg/dl 


 


Sodium Level  129 mmol/L   


 


Potassium Level  5.6 mmol/L   


 


Chloride Level  93 mmol/L   


 


Carbon Dioxide Level  19 mmol/L   


 


Anion Gap  17.0 mmol/L   


 


Blood Urea Nitrogen  115 mg/dl   


 


Creatinine  16.80 mg/dl   


 


Est Creatinine Clear Calc


Drug Dose 


  4.9 ml/min 


  


  


 


 


Estimated GFR (


American) 


  2.5 


  


  


 


 


Estimated GFR (Non-


American 


  2.2 


  


  


 


 


BUN/Creatinine Ratio  6.8   


 


Random Glucose  139 mg/dl   


 


Calcium Level  9.8 mg/dl   


 


Test


  10/17/17


20:00 10/18/17


06:15 10/18/17


06:16 10/18/17


08:39


 


White Blood Count 12.14 K/uL   10.90 K/uL  


 


Red Blood Count 3.16 M/uL   2.86 M/uL  


 


Hemoglobin 9.2 g/dL   8.5 g/dL  


 


Hematocrit 27.9 %   26.0 %  


 


Mean Corpuscular Volume 88.3 fL   90.9 fL  


 


Mean Corpuscular Hemoglobin 29.1 pg   29.7 pg  


 


Mean Corpuscular Hemoglobin


Concent 33.0 g/dl 


  


  32.7 g/dl 


  


 


 


RDW Standard Deviation 47.3 fL   49.2 fL  


 


RDW Coefficient of Variation 14.6 %   14.7 %  


 


Platelet Count 452 K/uL   411 K/uL  


 


Mean Platelet Volume 10.5 fL   9.7 fL  


 


Bedside Glucose  116 mg/dl   133 mg/dl 


 


Prothrombin Time   12.8 SECONDS  


 


Prothromb Time International


Ratio 


  


  1.2 


  


 


 


Sodium Level   133 mmol/L  


 


Potassium Level   4.6 mmol/L  


 


Chloride Level   97 mmol/L  


 


Carbon Dioxide Level   24 mmol/L  


 


Anion Gap   12.0 mmol/L  


 


Blood Urea Nitrogen   58 mg/dl  


 


Creatinine   10.30 mg/dl  


 


Est Creatinine Clear Calc


Drug Dose 


  


  8.0 ml/min 


  


 


 


Estimated GFR (


American) 


  


  4.5 


  


 


 


Estimated GFR (Non-


American 


  


  3.9 


  


 


 


BUN/Creatinine Ratio   5.5  


 


Random Glucose   115 mg/dl  


 


Calcium Level   9.2 mg/dl  











Assessment & Plan


End-stage renal disease. Patient tolerated dialysis yesterday. Potassium this 

morning is 4.6. plan to proceed with surgery and then have post surgery 

dialysis with a 2k bath. will remove 2L to help with BP.





Hyperkalemia.  Potassium improved at 4.6. plan for 2k bath with dialysis today.





Hyponatremia.  Sodium level improved to 133 with volume removal with dialysis. 

will continue to follow. 





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





Hypertension: generally improved and should continue to improve with dialysis. 

may need additional BP Medications while admitted if BP becomes significantly 

elevated. 





This patient was seen and treated with direct collaboration with Dr. Davison. 

Thank you for the opportunity to participate in this patient's care. Appreciate 

the Consult.





ATTENDING NOTE:


I performed a history and physical examination of the patient, including 

specifically on history- pts electrolytes more stable this morning, pt feels 

good, , on physical exam-cta, and my impression and plan are ESRD-plan on 

dialysis today after declot. I have discussed the patient's management with 

Halina Diana PA-C, Please refer to above note for the documented findings and 

plan of care. 





Phli Davison DO

## 2017-10-18 NOTE — ANESTHESIOLOGY PROGRESS NOTE
Anesthesia Post Op Note


Date & Time


Oct 18, 2017 at 16:21





Vital Signs


Pain Intensity:  0





Vital Signs Past 12 Hours








  Date Time  Temp Pulse Resp B/P (MAP) Pulse Ox O2 Delivery O2 Flow Rate FiO2


 


10/18/17 16:00 36.4 64 16 108/58 99 Nasal Cannula 3 


 


10/18/17 15:50 36.4 62 16 102/56 99 Nasal Cannula 3 


 


10/18/17 15:35 36.4 65 16 98/52 99 Nasal Cannula 3 


 


10/18/17 15:25  65 16 101/40 96 Nasal Cannula 3 


 


10/18/17 15:16 36.4 68 16 92/29 96 Nasal Cannula 3 


 


10/18/17 12:00      Room Air  


 


10/18/17 10:52 36.8 78 18 149/85 (106) 91   


 


10/18/17 08:00      Room Air  


 


10/18/17 08:00      Room Air  


 


10/18/17 07:59      Room Air  


 


10/18/17 07:57 37.0 84 18 148/84 (105) 94 Room Air  











Notes


Mental Status:  alert / awake / arousable, participated in evaluation


Pt Amnestic to Procedure:  Yes


Nausea / Vomiting:  adequately controlled


Pain:  adequately controlled


Airway Patency, RR, SpO2:  stable & adequate


BP & HR:  stable & adequate


Hydration State:  stable & adequate


Anesthetic Complications:  no major complications apparent

## 2017-10-18 NOTE — [facility] POST OPERATIVE BRIEF NOTE
Immediate Operative Summary


Operative Date


Oct 18, 2017.





Pre-Operative Diagnosis





Thrombosed Left Upper Arm Fistula





Post-Operative Diagnosis





Thrombosed Left Upper Arm Fistula





Procedure(s) Performed





Thrombectomy Left upper arm arteriovenous fistula and revision





Surgeon


Dr. Roberts





Assistant Surgeon(s)


none





Estimated Blood Loss


150ml





Findings


good thrill





Specimens





none per surgeon





Anesthesia


MAC





Complication(s)


None





Disposition


Recovery Room / PACU

## 2017-10-18 NOTE — PROGRESS NOTE
Progress Note


Date of Service


Oct 18, 2017.





Progress Note


Patient can be d/c'd tomorrow after we pull her temporary femoral catheter.

## 2017-10-18 NOTE — PROGRESS NOTE
Internal Med Progress Note


Date of Service:


Oct 18, 2017.


Provider Documentation:





SUBJECTIVE:





The patient was seen and examined 


Complains of pain almost all the joints 


Hips and right shoulder in particular


Generalized weakness








OBJECTIVE:





Vital Signs-as noted below





Exam:


General-NO distress at rest


Eyes-normal


ENT-normal


Neck-supple


Lungs-Clear to ausucltate bilaterally 


Heart-Regular ,no murmur appreciated


Abdomen-Benign,no masses 


Extremities-NO edema 


Neuro-AAOx3





Lab data as noted below.


ASSESSMENT & PLAN:








HYPERKALEMIA:


Secondary to ESRD 


-Probable secondary to pt not receiving hemodialysis. 


-Pt already started dialysis prior to pt being seen. 


-Potassium -normalized





ESRD on HD


-Pt on MWF dialysis schedule, follows with Dr Davison


-nephrology aware and pt receiving dialysis today


-vascular surgery consulted as pt is scheduled for thrombectomy of fistula 

thrombosis tomorrow.


-Appreciate Vascular surgery and Nephrology  input


-Dialysis tomorrow 


-PT/OT evaluation  and Home tomorrow





OA


Generalized


Continue Ultram


Avoid Narcotics 





CAD, PVD


-continue ASA


-continue lipitor


-recommend smoking cessation 


-denies any symptoms





HTN:


-Pt has not been taking home meds and hasn't had HD. Will wait until pt done 

with dialysis and if continues to be hypertensive consider hydralazine


-can continue metoprolol


-BP remains stable 





DM II


-Pt hasn't been taking her insulin past several days. Glucose 138 currently. 

Will hold on Lantus currently and do sliding scale per protocol.


-HgbA1c: 7.7 on 10/9/17


-[facility] 





DVT PROPHYLAXIS


-SCD's as will hold on heparin with pt having recent procedure and scheduled 

for procedure in am.





DISPOSITION


-admit tele


- Full Code as per discussion with pt


-Follows with Dr Smith for routine care


Vital Signs:











  Date Time  Temp Pulse Resp B/P (MAP) Pulse Ox O2 Delivery O2 Flow Rate FiO2


 


10/18/17 12:00      Room Air  


 


10/18/17 10:52 36.8 78 18 149/85 (106) 91   


 


10/18/17 08:00      Room Air  


 


10/18/17 08:00      Room Air  


 


10/18/17 07:59      Room Air  


 


10/18/17 07:57 37.0 84 18 148/84 (105) 94 Room Air  


 


10/18/17 03:55 37.8 87 19 147/81 (103) 92 Room Air  


 


10/18/17 00:22 37.4 88 18 132/77 (95) 95 Room Air  


 


10/18/17 00:00      Room Air  


 


10/17/17 21:49  90  158/91 (113)    


 


10/17/17 20:00     98 Room Air  


 


10/17/17 18:57 36.8 89  170/95 (120)    


 


10/17/17 18:50 37.0 95 20 178/103 98 Room Air  


 


10/17/17 18:30  82  172/103    


 


10/17/17 18:15  83  168/103    


 


10/17/17 18:00  86  179/103    


 


10/17/17 17:45  87  178/103    


 


10/17/17 17:30  87  183/103    


 


10/17/17 17:15  86  182/106    


 


10/17/17 17:00  85  177/103    


 


10/17/17 16:45  89  180/107    


 


10/17/17 16:30  86  185/112    


 


10/17/17 16:15  85  175/96    


 


10/17/17 16:00  86  180/108    


 


10/17/17 15:45  88  186/107    


 


10/17/17 15:45  87  192/108 (136)    


 


10/17/17 15:25 37.0 87 18 208/98 98 Room Air  


 


10/17/17 15:00 37.0 91 18 195/96 95 Room Air  


 


10/17/17 14:37 36.8 85 18 200/98 94 Room Air  








Lab Results:





Results Past 24 Hours








Test


  10/17/17


14:47 10/17/17


19:19 10/17/17


20:00 10/18/17


06:15 Range/Units


 


 


Bedside Glucose 138 107  116 70-90  mg/dl


 


White Blood Count   12.14  4.8-10.8  K/uL


 


Red Blood Count   3.16  4.2-5.4  M/uL


 


Hemoglobin   9.2  12.0-16.0  g/dL


 


Hematocrit   27.9  37-47  %


 


Mean Corpuscular Volume   88.3    fL


 


Mean Corpuscular Hemoglobin   29.1  25-34  pg


 


Mean Corpuscular Hemoglobin


Concent 


  


  33.0


  


  32-36  g/dl


 


 


RDW Standard Deviation   47.3  36.4-46.3  fL


 


RDW Coefficient of Variation   14.6  11.5-14.5  %


 


Platelet Count   452  130-400  K/uL


 


Mean Platelet Volume   10.5  7.4-10.4  fL


 


Test


  10/18/17


06:16 10/18/17


08:39 10/18/17


11:20 


  Range/Units


 


 


White Blood Count 10.90    4.8-10.8  K/uL


 


Red Blood Count 2.86    4.2-5.4  M/uL


 


Hemoglobin 8.5    12.0-16.0  g/dL


 


Hematocrit 26.0    37-47  %


 


Mean Corpuscular Volume 90.9      fL


 


Mean Corpuscular Hemoglobin 29.7    25-34  pg


 


Mean Corpuscular Hemoglobin


Concent 32.7


  


  


  


  32-36  g/dl


 


 


RDW Standard Deviation 49.2    36.4-46.3  fL


 


RDW Coefficient of Variation 14.7    11.5-14.5  %


 


Platelet Count 411    130-400  K/uL


 


Mean Platelet Volume 9.7    7.4-10.4  fL


 


Prothrombin Time


  12.8


  


  


  


  9.0-12.0


SECONDS


 


Prothromb Time International


Ratio 1.2


  


  


  


  0.9-1.1  


 


 


Sodium Level 133    136-145  mmol/L


 


Potassium Level 4.6    3.5-5.1  mmol/L


 


Chloride Level 97      mmol/L


 


Carbon Dioxide Level 24    21-32  mmol/L


 


Anion Gap 12.0    3-11  mmol/L


 


Blood Urea Nitrogen 58    7-18  mg/dl


 


Creatinine


  10.30


  


  


  


  0.60-1.20


mg/dl


 


Est Creatinine Clear Calc


Drug Dose 8.0


  


  


  


   ml/min


 


 


Estimated GFR (


American) 4.5


  


  


  


   


 


 


Estimated GFR (Non-


American 3.9


  


  


  


   


 


 


BUN/Creatinine Ratio 5.5    10-20  


 


Random Glucose 115    70-99  mg/dl


 


Calcium Level 9.2    8.5-10.1  mg/dl


 


Bedside Glucose  133 121  70-90  mg/dl

## 2017-10-18 NOTE — PROGRESS NOTE
Progress Note


Date of Service


Oct 18, 2017.





Progress Note


Patient for thrombectomy of her left arm fistula and possible permcath 

insertion if needed.


I have discussed the risks options and benefits of the procedure with the 

patient.  The patient understands the risks options and benefits and agrees to 

the procedure.


I have examined the patient, reviewed the History & Physical and in the 

interval since the performance of the History & Physical I have noted the 

following changes of clinical significance: No changes noted

## 2017-10-19 VITALS
DIASTOLIC BLOOD PRESSURE: 74 MMHG | SYSTOLIC BLOOD PRESSURE: 117 MMHG | HEART RATE: 76 BPM | TEMPERATURE: 98.42 F | OXYGEN SATURATION: 97 %

## 2017-10-19 VITALS
OXYGEN SATURATION: 97 % | HEART RATE: 78 BPM | TEMPERATURE: 97.7 F | SYSTOLIC BLOOD PRESSURE: 108 MMHG | DIASTOLIC BLOOD PRESSURE: 65 MMHG

## 2017-10-19 VITALS — SYSTOLIC BLOOD PRESSURE: 109 MMHG | OXYGEN SATURATION: 97 % | DIASTOLIC BLOOD PRESSURE: 69 MMHG | HEART RATE: 74 BPM

## 2017-10-19 VITALS
HEART RATE: 79 BPM | TEMPERATURE: 98.42 F | SYSTOLIC BLOOD PRESSURE: 124 MMHG | DIASTOLIC BLOOD PRESSURE: 78 MMHG | OXYGEN SATURATION: 91 %

## 2017-10-19 VITALS
SYSTOLIC BLOOD PRESSURE: 115 MMHG | HEART RATE: 77 BPM | TEMPERATURE: 98.6 F | OXYGEN SATURATION: 99 % | DIASTOLIC BLOOD PRESSURE: 69 MMHG

## 2017-10-19 VITALS
OXYGEN SATURATION: 95 % | HEART RATE: 90 BPM | DIASTOLIC BLOOD PRESSURE: 75 MMHG | TEMPERATURE: 98.42 F | SYSTOLIC BLOOD PRESSURE: 118 MMHG

## 2017-10-19 VITALS
SYSTOLIC BLOOD PRESSURE: 109 MMHG | HEART RATE: 80 BPM | DIASTOLIC BLOOD PRESSURE: 61 MMHG | OXYGEN SATURATION: 96 % | TEMPERATURE: 98.96 F

## 2017-10-19 VITALS — OXYGEN SATURATION: 95 %

## 2017-10-19 LAB
ANION GAP SERPL CALC-SCNC: 7 MMOL/L (ref 3–11)
BUN SERPL-MCNC: 40 MG/DL (ref 7–18)
BUN/CREAT SERPL: 5.1 (ref 10–20)
CALCIUM SERPL-MCNC: 8.8 MG/DL (ref 8.5–10.1)
CHLORIDE SERPL-SCNC: 98 MMOL/L (ref 98–107)
CO2 SERPL-SCNC: 28 MMOL/L (ref 21–32)
CREAT CL PREDICTED SERPL C-G-VRATE: 10.6 ML/MIN
CREAT SERPL-MCNC: 7.77 MG/DL (ref 0.6–1.2)
EOSINOPHIL NFR BLD AUTO: 365 K/UL (ref 130–400)
GLUCOSE SERPL-MCNC: 156 MG/DL (ref 70–99)
HCT VFR BLD CALC: 24.6 % (ref 37–47)
MCH RBC QN AUTO: 29.4 PG (ref 25–34)
MCHC RBC AUTO-ENTMCNC: 31.7 G/DL (ref 32–36)
MCV RBC AUTO: 92.8 FL (ref 80–100)
PMV BLD AUTO: 9.8 FL (ref 7.4–10.4)
POTASSIUM SERPL-SCNC: 4.7 MMOL/L (ref 3.5–5.1)
RBC # BLD AUTO: 2.65 M/UL (ref 4.2–5.4)
SODIUM SERPL-SCNC: 133 MMOL/L (ref 136–145)
WBC # BLD AUTO: 9.54 K/UL (ref 4.8–10.8)

## 2017-10-19 PROCEDURE — 06PYX3Z REMOVAL OF INFUSION DEVICE FROM LOWER VEIN, EXTERNAL APPROACH: ICD-10-PCS | Performed by: SURGERY

## 2017-10-19 RX ADMIN — ISOSORBIDE MONONITRATE SCH MG: 60 TABLET ORAL at 08:03

## 2017-10-19 RX ADMIN — METOPROLOL SUCCINATE SCH MG: 50 TABLET, EXTENDED RELEASE ORAL at 08:04

## 2017-10-19 RX ADMIN — LACTOBACILLUS TAB SCH TAB: TAB at 16:45

## 2017-10-19 RX ADMIN — Medication SCH MG: at 08:04

## 2017-10-19 RX ADMIN — Medication SCH MG: at 09:00

## 2017-10-19 RX ADMIN — INSULIN ASPART SCH UNITS: 100 INJECTION, SOLUTION INTRAVENOUS; SUBCUTANEOUS at 21:00

## 2017-10-19 RX ADMIN — LACTOBACILLUS TAB SCH TAB: TAB at 08:04

## 2017-10-19 RX ADMIN — INSULIN ASPART SCH UNITS: 100 INJECTION, SOLUTION INTRAVENOUS; SUBCUTANEOUS at 16:15

## 2017-10-19 RX ADMIN — LACTOBACILLUS TAB SCH TAB: TAB at 07:30

## 2017-10-19 RX ADMIN — INSULIN ASPART SCH UNITS: 100 INJECTION, SOLUTION INTRAVENOUS; SUBCUTANEOUS at 07:59

## 2017-10-19 RX ADMIN — OXYCODONE HYDROCHLORIDE AND ACETAMINOPHEN PRN TAB: 5; 325 TABLET ORAL at 05:28

## 2017-10-19 RX ADMIN — CALCIUM ACETATE SCH MG: 667 CAPSULE ORAL at 11:37

## 2017-10-19 RX ADMIN — ISOSORBIDE MONONITRATE SCH MG: 30 TABLET, EXTENDED RELEASE ORAL at 08:03

## 2017-10-19 RX ADMIN — ATORVASTATIN CALCIUM SCH MG: 40 TABLET, FILM COATED ORAL at 08:04

## 2017-10-19 RX ADMIN — ATORVASTATIN CALCIUM SCH MG: 40 TABLET, FILM COATED ORAL at 09:00

## 2017-10-19 RX ADMIN — SERTRALINE HYDROCHLORIDE SCH MG: 100 TABLET, FILM COATED ORAL at 21:09

## 2017-10-19 RX ADMIN — ISOSORBIDE MONONITRATE SCH MG: 60 TABLET ORAL at 09:00

## 2017-10-19 RX ADMIN — CALCIUM ACETATE SCH MG: 667 CAPSULE ORAL at 08:03

## 2017-10-19 RX ADMIN — METOPROLOL SUCCINATE SCH MG: 50 TABLET, EXTENDED RELEASE ORAL at 09:00

## 2017-10-19 RX ADMIN — CALCIUM ACETATE SCH MG: 667 CAPSULE ORAL at 07:30

## 2017-10-19 RX ADMIN — CALCIUM ACETATE SCH MG: 667 CAPSULE ORAL at 16:45

## 2017-10-19 RX ADMIN — INSULIN ASPART SCH UNITS: 100 INJECTION, SOLUTION INTRAVENOUS; SUBCUTANEOUS at 11:42

## 2017-10-19 RX ADMIN — ISOSORBIDE MONONITRATE SCH MG: 30 TABLET, EXTENDED RELEASE ORAL at 09:00

## 2017-10-19 RX ADMIN — PANTOPRAZOLE SCH MG: 40 TABLET, DELAYED RELEASE ORAL at 08:04

## 2017-10-19 RX ADMIN — PANTOPRAZOLE SCH MG: 40 TABLET, DELAYED RELEASE ORAL at 09:00

## 2017-10-19 NOTE — PROCEDURE NOTE
Procedure Note


Procedure Date


Oct 19, 2017.





Procedure Description


Procedure Name:


Removal temporary femoral HD catheter


Procedure time out:  side/site verified, patient ID confirmed, correct procedure


Consent obtained:  verbal


Time of procedure:  15:25


Performed by:  physician extender


Indications:  other (No longer needed for HD)


Contraindications:  none


Description:


After prepping in sterile manner, anchor sutures removed and catheter pulled 

gently.  Pressure held to exit site for 10minutes.  No further bleeding noted, 

so dressing applied.


Complications:  none


Patient tolerated procedure:  well


Post-procedure vital signs:  other

## 2017-10-19 NOTE — NEPHROLOGY PROGRESS NOTE
Nephrology Progress Note


Date of Service:


Oct 19, 2017.


Subjective


This is a 53y/o ESRD patient admitted for failed access and hyperkalemia. 

Patient had a temporary femoral cath placed and patient had dialysis on the 17 

and 18th after thrombectomy. Patient has not used arm access for dialysis yet. 

catheter removed today. Plan for dialysis tomorrow to confirm that access is 

functional. States that she is tired but otherwise well. tolerated dialysis 

well yesterday. She denies any SOB, chest pain, nausea or vomiting. volume 

status appropriate.





Objective











  Date Time  Temp Pulse Resp B/P (MAP) Pulse Ox O2 Delivery O2 Flow Rate FiO2


 


10/19/17 12:00      Room Air  


 


10/19/17 11:54 37.0 77 18 115/69 (84) 99   


 


10/19/17 08:00      Room Air  


 


10/19/17 07:39 36.5 78 18 108/65 (79) 97   


 


10/19/17 04:00      Room Air  


 


10/19/17 03:28 37.2 80 17 109/61 (77) 96   


 


10/19/17 00:00      Room Air  


 


10/18/17 23:25 36.7 78 20 105/57 (73) 95 Nasal Cannula 2.0 


 


10/18/17 20:00      Room Air  


 


10/18/17 19:54 36.8 68  111/58 (75)    


 


10/18/17 19:45  60  104/60    


 


10/18/17 19:33 36.8 66 16 91/57 (68) 100 Room Air  


 


10/18/17 19:30  66  105/59    


 


10/18/17 19:15  66  91/55    


 


10/18/17 19:00  65  85/46    


 


10/18/17 18:45  66  155/53    


 


10/18/17 18:30  109  123/102    


 


10/18/17 18:15  69  96/57    


 


10/18/17 18:00  66  89/59    


 


10/18/17 17:45  72  102/52    


 


10/18/17 17:30  69  91/50    


 


10/18/17 17:15  68  104/45    


 


10/18/17 17:00  72  105/62    


 


10/18/17 16:45  68  107/60    


 


10/18/17 16:20 36.4 68  107/60 (76)    








Physical Exam:


GENERAL:  Awake, alert, oriented x3, obese.


EYES:  No scleral icterus.


ENT:  Moist mucous membranes.


NECK:  Supple. CEA scar on left side


PULMONARY:  Clear to auscultation.


CARDIAC:  Regular rate and rhythm.


ABDOMEN:  Bowel sounds positive, soft, nontender, nondistended.


EXTREMITIES:  Mild edema. with cyst on the dorsum of her left foot. dressing 

over fem cath removal. staples over c/d/i incisions RUE. +bruit


NEUROLOGICALLY:  Nonfocal.


DERMATOLOGIC:  No rash or ulcers noted.





Current Inpatient Medications








 Medications


  (Trade)  Dose


 Ordered  Sig/Willy


 Route  Start Time


 Stop Time Status Last Admin


Dose Admin


 


 Acetaminophen


  (Tylenol Tab)  650 mg  Q4H  PRN


 PO  10/17/17 15:45


 11/16/17 15:44   


 


 


 Ondansetron HCl


  (Zofran Inj)  4 mg  Q6H  PRN


 IV  10/17/17 15:45


 11/16/17 15:44  10/17/17 22:41


4 MG


 


 Miscellaneous


  (Iv Fluids


 Completed)  1 ea  PRN  PRN


 N/A  10/17/17 16:00


 10/17/18 15:59   


 


 


 Insulin Aspart


  (novoLOG ASPART)  **SLIDING


 SCALE**


 **If C...  ACHS


 SC  10/17/17 21:00


 11/16/17 20:59  10/19/17 11:42


3 UNITS


 


 Glucose


  (Glucose 40% Gel)  15-30


 GRAMS 15


 GRAMS...  UD  PRN


 PO  10/17/17 16:15


 11/16/17 16:14   


 


 


 Glucose


  (Glucose Chew


 Tab)  4-8


 Tablets 4


 Tabl...  UD  PRN


 PO  10/17/17 16:15


 11/16/17 16:14   


 


 


 Dextrose


  (Dextrose 50%


 50ML Syringe)  25-50ML OF


 50% DW IV


 FOR...  UD  PRN


 IV  10/17/17 16:15


 11/16/17 16:14   


 


 


 Glucagon


  (Glucagon Inj)  1 mg  UD  PRN


 SQ  10/17/17 16:15


 11/16/17 16:14   


 


 


 Aspirin


  (Ecotrin Tab)  81 mg  QAM


 PO  10/18/17 09:00


 11/17/17 08:59  10/18/17 09:09


81 MG


 


 Atorvastatin


 Calcium


  (Lipitor Tab)  40 mg  QAM


 PO  10/18/17 09:00


 11/17/17 08:59  10/18/17 09:09


40 MG


 


 Calcium Acetate


  (Phoslo Cap)  1,334 mg  UD  PRN


 PO  10/17/17 16:30


 11/16/17 16:29   


 


 


 Calcium Acetate


  (Phoslo Cap)  2,001 mg  TIDM


 PO  10/17/17 17:01


 11/16/17 17:59  10/19/17 11:37


2,001 MG


 


 Dicyclomine HCl


  (Bentyl Cap)  10 mg  QID  PRN


 PO  10/17/17 16:30


 11/16/17 16:29  10/18/17 04:37


10 MG


 


 Fluticasone


 Propionate


  (Flonase Nasal


 Spray)  2 sprays  DAILY  PRN


 SUZI  10/17/17 16:30


 11/16/17 16:29  10/18/17 20:52


2 SPRAYS


 


 Isosorbide


 Mononitrate


  (Imdur Ext Rel


 Tab)  30 mg  QAM


 PO  10/18/17 09:00


 11/17/17 08:59  10/18/17 09:09


30 MG


 


 Isosorbide


 Mononitrate


  (Imdur Ext Rel


 Tab)  60 mg  QAM


 PO  10/18/17 09:00


 11/17/17 08:59  10/18/17 09:09


60 MG


 


 Lorazepam


  (Ativan Tab)  0.5 mg  DAILY  PRN


 PO  10/17/17 16:30


 11/16/17 16:29   


 


 


 Metoprolol


 Succinate


  (Toprol Xl Tab)  50 mg  QAM


 PO  10/18/17 09:00


 11/17/17 08:59  10/18/17 09:10


50 MG


 


 Oxycodone/


 Acetaminophen


  (Percocet


 5-325mg Tab)  1 tab  Q4H  PRN


 PO  10/17/17 16:30


 10/31/17 16:29  10/19/17 05:28


1 TAB


 


 Pantoprazole


 Sodium


  (Protonix Tab)  40 mg  DAILY


 PO  10/18/17 09:00


 11/17/17 08:59  10/18/17 09:09


40 MG


 


 Sertraline HCl


  (Zoloft Tab)  100 mg  HS


 PO  10/17/17 21:00


 11/16/17 20:59  10/18/17 20:52


100 MG


 


 Lactobacillus


 Acidophilus


  (Floranex Tab)  4 tab  BIDM


 PO  10/17/17 17:01


 11/16/17 17:59  10/18/17 09:08


4 TAB


 


 Loratadine


  (Claritin Tab)  5 mg  DAILY  PRN


 PO  10/17/17 16:26


 11/16/17 16:25   


 











Last 24 Hours








Test


  10/18/17


16:30 10/18/17


19:42 10/19/17


05:52 10/19/17


06:44


 


Bedside Glucose 93 mg/dl  96 mg/dl   157 mg/dl 


 


White Blood Count   9.54 K/uL  


 


Red Blood Count   2.65 M/uL  


 


Hemoglobin   7.8 g/dL  


 


Hematocrit   24.6 %  


 


Mean Corpuscular Volume   92.8 fL  


 


Mean Corpuscular Hemoglobin   29.4 pg  


 


Mean Corpuscular Hemoglobin


Concent 


  


  31.7 g/dl 


  


 


 


RDW Standard Deviation   49.6 fL  


 


RDW Coefficient of Variation   14.7 %  


 


Platelet Count   365 K/uL  


 


Mean Platelet Volume   9.8 fL  


 


Sodium Level   133 mmol/L  


 


Potassium Level   4.7 mmol/L  


 


Chloride Level   98 mmol/L  


 


Carbon Dioxide Level   28 mmol/L  


 


Anion Gap   7.0 mmol/L  


 


Blood Urea Nitrogen   40 mg/dl  


 


Creatinine   7.77 mg/dl  


 


Est Creatinine Clear Calc


Drug Dose 


  


  10.6 ml/min 


  


 


 


Estimated GFR (


American) 


  


  6.4 


  


 


 


Estimated GFR (Non-


American 


  


  5.5 


  


 


 


BUN/Creatinine Ratio   5.1  


 


Random Glucose   156 mg/dl  


 


Calcium Level   8.8 mg/dl  


 


Test


  10/19/17


11:06 


  


  


 


 


Bedside Glucose 131 mg/dl    











Assessment & Plan


End-stage renal disease. Patient tolerated dialysis yesterday. Potassium this 

morning is 4.7. plan to continue dialysis on m/w/f schedule or as clinical 

condition dictates. dialysis team to use newly operated access. no longer with 

catheter. 





Hyperkalemia.  Potassium 4.6. plan for 2k bath with dialysis.





Hyponatremia.  Sodium level continues to be 133. volume status appropriate and 

will continue to follow. 





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





Hypertension: generally better controlled. no need for additional 

antihypertensives to current regimen at this time.





This patient was seen and treated with direct collaboration with Dr. Davison. 

Thank you for the opportunity to participate in this patient's care. Appreciate 

the Consult.





ATTENDING NOTE:


I performed a history and physical examination of the patient, including 

specifically on history- sleepy, had dialysis catheter removed,  on physical 

exam-minimal edema, and my impression and plan are ESRD through access in arm 

tomorrow. I have discussed the patient's management with Halina Diana PA-C, 

Please refer to above note for the documented findings and plan of care. 





Phil Davison DO

## 2017-10-19 NOTE — PROGRESS NOTE
Internal Med Progress Note


Date of Service:


Oct 19, 2017.


Provider Documentation:





SUBJECTIVE:





The patient was seen and examined 


Complains of Soreness all over 


Awaiting Physical Therapy


Generalized weakness








OBJECTIVE:





Vital Signs-as noted below





Exam:


General-NO distress at rest


Eyes-normal


ENT-normal


Neck-supple


Lungs-Clear to ausucltate bilaterally 


Heart-Regular ,no murmur appreciated


Abdomen-Benign,no masses 


Extremities-NO edema 


Neuro-AAOx3


Generally weak and Lethargic,No focal neuro deficit


Lab data as noted below.


ASSESSMENT & PLAN:








HYPERKALEMIA:


Secondary to ESRD 


-Probable secondary to pt not receiving hemodialysis. 


-Pt already started dialysis prior to pt being seen. 


-Potassium -normalized


 





ESRD on HD


-Pt on MWF dialysis schedule, follows with Dr Davison


-nephrology aware and pt receiving dialysis today


-vascular surgery consulted as pt is scheduled for thrombectomy of fistula 

thrombosis tomorrow.


-Appreciate Vascular surgery and Nephrology  input


-Dialysis and likely home this afternoon  


-PT/OT evaluation  and Home tomorrow


-may need home health 





OA


Generalized


Continue Ultram


Avoid Narcotics


Getting PT  





CAD, PVD


-continue ASA


-continue lipitor


-recommend smoking cessation 


-denies any symptoms





HTN:


-Pt has not been taking home meds and hasn't had HD. Will wait until pt done 

with dialysis and if continues to be hypertensive consider hydralazine


-can continue metoprolol


-BP remains stable 





DM II


-Pt hasn't been taking her insulin past several days. Glucose 138 currently. 

Will hold on Lantus currently and do sliding scale per protocol.


-HgbA1c: 7.7 on 10/9/17


[facility] 





DVT PROPHYLAXIS


-SCD's as will hold on heparin with pt having recent procedure and scheduled 

for procedure in am.





DISPOSITION


-admit tele


- Full Code as per discussion with pt


-Follows with Dr Smith for routine care


-Likely to be discharged today/tomorrow


Vital Signs:











  Date Time  Temp Pulse Resp B/P (MAP) Pulse Ox O2 Delivery O2 Flow Rate FiO2


 


10/19/17 12:00      Room Air  


 


10/19/17 11:54 37.0 77 18 115/69 (84) 99   


 


10/19/17 08:00      Room Air  


 


10/19/17 07:39 36.5 78 18 108/65 (79) 97   


 


10/19/17 04:00      Room Air  


 


10/19/17 03:28 37.2 80 17 109/61 (77) 96   


 


10/19/17 00:00      Room Air  


 


10/18/17 23:25 36.7 78 20 105/57 (73) 95 Nasal Cannula 2.0 


 


10/18/17 20:00      Room Air  


 


10/18/17 19:54 36.8 68  111/58 (75)    


 


10/18/17 19:45  60  104/60    


 


10/18/17 19:33 36.8 66 16 91/57 (68) 100 Room Air  


 


10/18/17 19:30  66  105/59    


 


10/18/17 19:15  66  91/55    


 


10/18/17 19:00  65  85/46    


 


10/18/17 18:45  66  155/53    


 


10/18/17 18:30  109  123/102    


 


10/18/17 18:15  69  96/57    


 


10/18/17 18:00  66  89/59    


 


10/18/17 17:45  72  102/52    


 


10/18/17 17:30  69  91/50    


 


10/18/17 17:15  68  104/45    


 


10/18/17 17:00  72  105/62    


 


10/18/17 16:45  68  107/60    


 


10/18/17 16:20 36.4 68  107/60 (76)    


 


10/18/17 16:00     99 Room Air  


 


10/18/17 16:00 36.4 64 16 108/58 99 Nasal Cannula 3 


 


10/18/17 15:50 36.4 62 16 102/56 99 Nasal Cannula 3 


 


10/18/17 15:35 36.4 65 16 98/52 99 Nasal Cannula 3 


 


10/18/17 15:25  65 16 101/40 96 Nasal Cannula 3 


 


10/18/17 15:16 36.4 68 16 92/29 96 Nasal Cannula 3 








Lab Results:





Results Past 24 Hours








Test


  10/18/17


15:32 10/18/17


16:30 10/18/17


19:42 10/19/17


05:52 Range/Units


 


 


Bedside Glucose 98 93 96  70-90  mg/dl


 


White Blood Count    9.54 4.8-10.8  K/uL


 


Red Blood Count    2.65 4.2-5.4  M/uL


 


Hemoglobin    7.8 12.0-16.0  g/dL


 


Hematocrit    24.6 37-47  %


 


Mean Corpuscular Volume    92.8   fL


 


Mean Corpuscular Hemoglobin    29.4 25-34  pg


 


Mean Corpuscular Hemoglobin


Concent 


  


  


  31.7


  32-36  g/dl


 


 


RDW Standard Deviation    49.6 36.4-46.3  fL


 


RDW Coefficient of Variation    14.7 11.5-14.5  %


 


Platelet Count    365 130-400  K/uL


 


Mean Platelet Volume    9.8 7.4-10.4  fL


 


Sodium Level    133 136-145  mmol/L


 


Potassium Level    4.7 3.5-5.1  mmol/L


 


Chloride Level    98   mmol/L


 


Carbon Dioxide Level    28 21-32  mmol/L


 


Anion Gap    7.0 3-11  mmol/L


 


Blood Urea Nitrogen    40 7-18  mg/dl


 


Creatinine


  


  


  


  7.77


  0.60-1.20


mg/dl


 


Est Creatinine Clear Calc


Drug Dose 


  


  


  10.6


   ml/min


 


 


Estimated GFR (


American) 


  


  


  6.4


   


 


 


Estimated GFR (Non-


American 


  


  


  5.5


   


 


 


BUN/Creatinine Ratio    5.1 10-20  


 


Random Glucose    156 70-99  mg/dl


 


Calcium Level    8.8 8.5-10.1  mg/dl


 


Test


  10/19/17


06:44 10/19/17


11:06 


  


  Range/Units


 


 


Bedside Glucose 157 131   70-90  mg/dl

## 2017-10-19 NOTE — ANESTHESIOLOGY PROGRESS NOTE
Anesthesia Post Op Note


Date & Time


Oct 19, 2017 at 12:36





Vital Signs


Pain Intensity:  3.0





Vital Signs Past 12 Hours








  Date Time  Temp Pulse Resp B/P (MAP) Pulse Ox O2 Delivery O2 Flow Rate FiO2


 


10/19/17 11:54 37.0 77 18 115/69 (84) 99   


 


10/19/17 08:00      Room Air  


 


10/19/17 07:39 36.5 78 18 108/65 (79) 97   


 


10/19/17 04:00      Room Air  


 


10/19/17 03:28 37.2 80 17 109/61 (77) 96   











Notes


Mental Status:  alert / awake / arousable, participated in evaluation


Pt Amnestic to Procedure:  Yes


Nausea / Vomiting:  adequately controlled


Pain:  adequately controlled


Airway Patency, RR, SpO2:  stable & adequate


BP & HR:  stable & adequate


Hydration State:  stable & adequate


Anesthetic Complications:  no major complications apparent

## 2017-10-20 VITALS — OXYGEN SATURATION: 95 %

## 2017-10-20 VITALS
TEMPERATURE: 98.24 F | HEART RATE: 75 BPM | OXYGEN SATURATION: 93 % | DIASTOLIC BLOOD PRESSURE: 62 MMHG | SYSTOLIC BLOOD PRESSURE: 103 MMHG

## 2017-10-20 VITALS — DIASTOLIC BLOOD PRESSURE: 56 MMHG | SYSTOLIC BLOOD PRESSURE: 109 MMHG | HEART RATE: 69 BPM

## 2017-10-20 VITALS — SYSTOLIC BLOOD PRESSURE: 91 MMHG | HEART RATE: 70 BPM | DIASTOLIC BLOOD PRESSURE: 49 MMHG

## 2017-10-20 VITALS — SYSTOLIC BLOOD PRESSURE: 130 MMHG | HEART RATE: 69 BPM | DIASTOLIC BLOOD PRESSURE: 55 MMHG

## 2017-10-20 VITALS
TEMPERATURE: 97.7 F | OXYGEN SATURATION: 94 % | SYSTOLIC BLOOD PRESSURE: 122 MMHG | HEART RATE: 73 BPM | DIASTOLIC BLOOD PRESSURE: 77 MMHG

## 2017-10-20 VITALS
SYSTOLIC BLOOD PRESSURE: 126 MMHG | DIASTOLIC BLOOD PRESSURE: 76 MMHG | TEMPERATURE: 98.06 F | OXYGEN SATURATION: 92 % | HEART RATE: 74 BPM

## 2017-10-20 VITALS — DIASTOLIC BLOOD PRESSURE: 60 MMHG | TEMPERATURE: 97.7 F | SYSTOLIC BLOOD PRESSURE: 120 MMHG | HEART RATE: 70 BPM

## 2017-10-20 VITALS
HEART RATE: 69 BPM | OXYGEN SATURATION: 98 % | SYSTOLIC BLOOD PRESSURE: 148 MMHG | TEMPERATURE: 97.88 F | DIASTOLIC BLOOD PRESSURE: 83 MMHG

## 2017-10-20 VITALS
OXYGEN SATURATION: 96 % | SYSTOLIC BLOOD PRESSURE: 104 MMHG | DIASTOLIC BLOOD PRESSURE: 69 MMHG | HEART RATE: 75 BPM | TEMPERATURE: 98.6 F

## 2017-10-20 VITALS — SYSTOLIC BLOOD PRESSURE: 82 MMHG | DIASTOLIC BLOOD PRESSURE: 45 MMHG | HEART RATE: 69 BPM

## 2017-10-20 VITALS — DIASTOLIC BLOOD PRESSURE: 51 MMHG | HEART RATE: 70 BPM | SYSTOLIC BLOOD PRESSURE: 95 MMHG

## 2017-10-20 VITALS — HEART RATE: 67 BPM | SYSTOLIC BLOOD PRESSURE: 93 MMHG | DIASTOLIC BLOOD PRESSURE: 44 MMHG

## 2017-10-20 VITALS — DIASTOLIC BLOOD PRESSURE: 42 MMHG | SYSTOLIC BLOOD PRESSURE: 88 MMHG | HEART RATE: 70 BPM

## 2017-10-20 VITALS — SYSTOLIC BLOOD PRESSURE: 154 MMHG | HEART RATE: 71 BPM | TEMPERATURE: 97.7 F | DIASTOLIC BLOOD PRESSURE: 69 MMHG

## 2017-10-20 VITALS — SYSTOLIC BLOOD PRESSURE: 101 MMHG | HEART RATE: 70 BPM | DIASTOLIC BLOOD PRESSURE: 49 MMHG

## 2017-10-20 VITALS — SYSTOLIC BLOOD PRESSURE: 85 MMHG | DIASTOLIC BLOOD PRESSURE: 41 MMHG | HEART RATE: 66 BPM

## 2017-10-20 VITALS — DIASTOLIC BLOOD PRESSURE: 45 MMHG | HEART RATE: 70 BPM | SYSTOLIC BLOOD PRESSURE: 89 MMHG

## 2017-10-20 VITALS — SYSTOLIC BLOOD PRESSURE: 100 MMHG | HEART RATE: 69 BPM | DIASTOLIC BLOOD PRESSURE: 47 MMHG

## 2017-10-20 VITALS — DIASTOLIC BLOOD PRESSURE: 40 MMHG | SYSTOLIC BLOOD PRESSURE: 84 MMHG | HEART RATE: 70 BPM

## 2017-10-20 VITALS — HEART RATE: 72 BPM | DIASTOLIC BLOOD PRESSURE: 48 MMHG | SYSTOLIC BLOOD PRESSURE: 92 MMHG

## 2017-10-20 RX ADMIN — METOPROLOL SUCCINATE SCH MG: 50 TABLET, EXTENDED RELEASE ORAL at 09:00

## 2017-10-20 RX ADMIN — CALCIUM ACETATE SCH MG: 667 CAPSULE ORAL at 17:36

## 2017-10-20 RX ADMIN — ONDANSETRON PRN MG: 2 INJECTION INTRAMUSCULAR; INTRAVENOUS at 03:19

## 2017-10-20 RX ADMIN — CALCIUM ACETATE SCH MG: 667 CAPSULE ORAL at 07:30

## 2017-10-20 RX ADMIN — LACTOBACILLUS TAB SCH TAB: TAB at 17:36

## 2017-10-20 RX ADMIN — PANTOPRAZOLE SCH MG: 40 TABLET, DELAYED RELEASE ORAL at 09:18

## 2017-10-20 RX ADMIN — Medication SCH MG: at 09:17

## 2017-10-20 RX ADMIN — INSULIN ASPART SCH UNITS: 100 INJECTION, SOLUTION INTRAVENOUS; SUBCUTANEOUS at 17:37

## 2017-10-20 RX ADMIN — ISOSORBIDE MONONITRATE SCH MG: 60 TABLET ORAL at 09:00

## 2017-10-20 RX ADMIN — OXYCODONE HYDROCHLORIDE AND ACETAMINOPHEN PRN TAB: 5; 325 TABLET ORAL at 16:10

## 2017-10-20 RX ADMIN — SERTRALINE HYDROCHLORIDE SCH MG: 100 TABLET, FILM COATED ORAL at 21:34

## 2017-10-20 RX ADMIN — INSULIN ASPART SCH UNITS: 100 INJECTION, SOLUTION INTRAVENOUS; SUBCUTANEOUS at 21:35

## 2017-10-20 RX ADMIN — ISOSORBIDE MONONITRATE SCH MG: 30 TABLET, EXTENDED RELEASE ORAL at 09:00

## 2017-10-20 RX ADMIN — INSULIN ASPART SCH UNITS: 100 INJECTION, SOLUTION INTRAVENOUS; SUBCUTANEOUS at 08:33

## 2017-10-20 RX ADMIN — ATORVASTATIN CALCIUM SCH MG: 40 TABLET, FILM COATED ORAL at 09:17

## 2017-10-20 RX ADMIN — LACTOBACILLUS TAB SCH TAB: TAB at 07:30

## 2017-10-20 NOTE — PROGRESS NOTE
Internal Med Progress Note


Date of Service:


Oct 20, 2017.


Provider Documentation:





SUBJECTIVE:





Seen and examined at bedside


Feels very tired


States having nausea and is very sleepy this morning


Denies Chest pain, SOB, dizziness


Planned for HD today


Offers no other complaints 








OBJECTIVE:





Vital Signs-as noted below





Physical Exam:


General Appearance:Obese, no apparent distress, very sleepy


Head:  normocephalic, Atraumatic 


Eyes:  normal inspection, EOMI, PERRL


Neck:  supple, Trachea midline


Respiratory/Chest: Normal breath sounds, CTA


Cardiovascular: S1, S2, No murmur


Abdomen/GI:Soft, Non tender, Bowel sounds present


Extremities/Musculoskelatal:normal inspection, no edema 


Neurologic/Psych:grossly no focal neurological deficits 


Skin: normal color, warm





Lab data as noted below.


ASSESSMENT & PLAN:





HYPERKALEMIA:


Secondary to ESRD 


Resolved


monitor 


 





ESRD on HD


On MWF dialysis schedule, follows with Dr Davison


Removal of temporary femoral HD catheter by Vascular Surgery 


Appreciate Vascular surgery and Nephrology  input


Planned for dialysis using the newly operated access  


PT/OT 


May need home health 








OA


Generalized








CAD, PVD


continue ASA, Lipitor


recommend smoking cessation 


denies any symptoms








HTN:


stable


continue current meds


monitor








DM II


Pt hasn't been taking her insulin past several days. 


HgbA1c: 7.7 on 10/9/17


Continue SSI with [facility] blood sugar check 





DVT Px


SCD's 








CODE STATUS:


Full Code








DISPOSITION


Follows with Dr Smith for routine care


Plan to discharge home when stable. May need Home Health


Vital Signs:











  Date Time  Temp Pulse Resp B/P (MAP) Pulse Ox O2 Delivery O2 Flow Rate FiO2


 


10/20/17 15:12 36.5 70  120/60 (80)    


 


10/20/17 15:00  67  93/44    


 


10/20/17 14:45  66  85/41    


 


10/20/17 14:30  69  100/47    


 


10/20/17 14:15  70  89/45    


 


10/20/17 14:00  70  101/49    


 


10/20/17 13:45  69  82/45    


 


10/20/17 13:30  70  95/51    


 


10/20/17 13:15  70  88/42    


 


10/20/17 13:00  70  91/49    


 


10/20/17 12:45  70  84/40    


 


10/20/17 12:30  72  92/48    


 


10/20/17 12:15  69  109/56    


 


10/20/17 12:08  69  130/55    


 


10/20/17 11:49 36.5 71  154/69 (97)    


 


10/20/17 11:45 36.7 74 18 126/76 (93) 92 Room Air  


 


10/20/17 08:00 36.5 73 18 122/77 (92) 94 Room Air  


 


10/20/17 08:00      Room Air  


 


10/20/17 04:09 37.0 75 18 104/69 (81) 96 Room Air  


 


10/20/17 04:00     95 Room Air  


 


10/20/17 00:00     95 Room Air  


 


10/19/17 23:48 36.9 79 19 124/78 (93) 91   


 


10/19/17 20:00     95 Room Air  


 


10/19/17 18:57 36.9 90 16 118/75 (89) 95 Room Air  


 


10/19/17 16:07 36.9 76 18 117/74 (88) 97 Room Air  








Lab Results:





Results Past 24 Hours








Test


  10/19/17


16:31 10/19/17


20:43 10/20/17


06:37 10/20/17


11:08 Range/Units


 


 


Bedside Glucose 127 104 101 91 70-90  mg/dl

## 2017-10-20 NOTE — NEPHROLOGY PROGRESS NOTE
Nephrology Progress Note


Date of Service:


Oct 20, 2017.


Subjective


This is a 51y/o ESRD patient admitted for failed access and hyperkalemia. 

Patient had a temporary femoral cath placed and patient had dialysis on the 17 

and 18th after thrombectomy. Patient has not used arm access for dialysis yet. 

catheter removed yesterday. for dialysis with revised access today and plan to 

continue dialysis as outpatient if access requires no further intervention. 

today she states that she is slightly nauseous today. with decreased po intake. 

No SOB, chest pain, vomiting. volume status appropriate.





Objective











  Date Time  Temp Pulse Resp B/P (MAP) Pulse Ox O2 Delivery O2 Flow Rate FiO2


 


10/20/17 08:00 36.5 73 18 122/77 (92) 94 Room Air  


 


10/20/17 04:09 37.0 75 18 104/69 (81) 96 Room Air  


 


10/20/17 04:00     95 Room Air  


 


10/20/17 00:00     95 Room Air  


 


10/19/17 23:48 36.9 79 19 124/78 (93) 91   


 


10/19/17 20:00     95 Room Air  


 


10/19/17 18:57 36.9 90 16 118/75 (89) 95 Room Air  


 


10/19/17 16:07 36.9 76 18 117/74 (88) 97 Room Air  


 


10/19/17 16:00      Room Air  


 


10/19/17 15:55  74   97   


 


10/19/17 12:00      Room Air  


 


10/19/17 11:54 37.0 77 18 115/69 (84) 99   








Physical Exam:


GENERAL:  Awake, alert, oriented x3, obese.


EYES:  No scleral icterus.


ENT:  Moist mucous membranes.


NECK:  Supple. CEA scar on left side


PULMONARY:  Clear to auscultation.


CARDIAC:  Regular rate and rhythm.


ABDOMEN:  Bowel sounds positive, soft, nontender, nondistended.


EXTREMITIES:  Mild edema. with cyst on the dorsum of her left foot. dressing 

over fem cath removal. staples over c/d/i incisions RUE. +bruit


NEUROLOGICALLY:  Nonfocal.


DERMATOLOGIC:  No rash or ulcers noted.





Current Inpatient Medications








 Medications


  (Trade)  Dose


 Ordered  Sig/Willy


 Route  Start Time


 Stop Time Status Last Admin


Dose Admin


 


 Acetaminophen


  (Tylenol Tab)  650 mg  Q4H  PRN


 PO  10/17/17 15:45


 11/16/17 15:44   


 


 


 Ondansetron HCl


  (Zofran Inj)  4 mg  Q6H  PRN


 IV  10/17/17 15:45


 11/16/17 15:44  10/20/17 03:19


4 MG


 


 Miscellaneous


  (Iv Fluids


 Completed)  1 ea  PRN  PRN


 N/A  10/17/17 16:00


 10/17/18 15:59   


 


 


 Insulin Aspart


  (novoLOG ASPART)  **SLIDING


 SCALE**


 **If C...  ACHS


 SC  10/17/17 21:00


 11/16/17 20:59  10/19/17 11:42


3 UNITS


 


 Glucose


  (Glucose 40% Gel)  15-30


 GRAMS 15


 GRAMS...  UD  PRN


 PO  10/17/17 16:15


 11/16/17 16:14   


 


 


 Glucose


  (Glucose Chew


 Tab)  4-8


 Tablets 4


 Tabl...  UD  PRN


 PO  10/17/17 16:15


 11/16/17 16:14   


 


 


 Dextrose


  (Dextrose 50%


 50ML Syringe)  25-50ML OF


 50% DW IV


 FOR...  UD  PRN


 IV  10/17/17 16:15


 11/16/17 16:14   


 


 


 Glucagon


  (Glucagon Inj)  1 mg  UD  PRN


 SQ  10/17/17 16:15


 11/16/17 16:14   


 


 


 Aspirin


  (Ecotrin Tab)  81 mg  QAM


 PO  10/18/17 09:00


 11/17/17 08:59  10/18/17 09:09


81 MG


 


 Atorvastatin


 Calcium


  (Lipitor Tab)  40 mg  QAM


 PO  10/18/17 09:00


 11/17/17 08:59  10/18/17 09:09


40 MG


 


 Calcium Acetate


  (Phoslo Cap)  1,334 mg  UD  PRN


 PO  10/17/17 16:30


 11/16/17 16:29   


 


 


 Calcium Acetate


  (Phoslo Cap)  2,001 mg  TIDM


 PO  10/17/17 17:01


 11/16/17 17:59  10/19/17 11:37


2,001 MG


 


 Dicyclomine HCl


  (Bentyl Cap)  10 mg  QID  PRN


 PO  10/17/17 16:30


 11/16/17 16:29  10/18/17 04:37


10 MG


 


 Fluticasone


 Propionate


  (Flonase Nasal


 Spray)  2 sprays  DAILY  PRN


 SUZI  10/17/17 16:30


 11/16/17 16:29  10/18/17 20:52


2 SPRAYS


 


 Isosorbide


 Mononitrate


  (Imdur Ext Rel


 Tab)  30 mg  QAM


 PO  10/18/17 09:00


 11/17/17 08:59  10/18/17 09:09


30 MG


 


 Isosorbide


 Mononitrate


  (Imdur Ext Rel


 Tab)  60 mg  QAM


 PO  10/18/17 09:00


 11/17/17 08:59  10/18/17 09:09


60 MG


 


 Lorazepam


  (Ativan Tab)  0.5 mg  DAILY  PRN


 PO  10/17/17 16:30


 11/16/17 16:29   


 


 


 Metoprolol


 Succinate


  (Toprol Xl Tab)  50 mg  QAM


 PO  10/18/17 09:00


 11/17/17 08:59  10/18/17 09:10


50 MG


 


 Oxycodone/


 Acetaminophen


  (Percocet


 5-325mg Tab)  1 tab  Q4H  PRN


 PO  10/17/17 16:30


 10/31/17 16:29  10/19/17 05:28


1 TAB


 


 Pantoprazole


 Sodium


  (Protonix Tab)  40 mg  DAILY


 PO  10/18/17 09:00


 11/17/17 08:59  10/18/17 09:09


40 MG


 


 Sertraline HCl


  (Zoloft Tab)  100 mg  HS


 PO  10/17/17 21:00


 11/16/17 20:59  10/19/17 21:09


100 MG


 


 Lactobacillus


 Acidophilus


  (Floranex Tab)  4 tab  BIDM


 PO  10/17/17 17:01


 11/16/17 17:59  10/18/17 09:08


4 TAB


 


 Loratadine


  (Claritin Tab)  5 mg  DAILY  PRN


 PO  10/17/17 16:26


 11/16/17 16:25   


 


 


 Epoetin Narayan


  (Procrit Inj)  10,000 units  TODAY@0700


 IV.  10/20/17 07:00


 10/20/17 18:00   


 


 


 Promethazine HCl


 12.5 mg/Sodium


 Chloride  50.5 ml @ 


 204 mls/hr  Q6H  PRN


 IV  10/20/17 08:00


 11/19/17 07:59  10/20/17 08:32


204 MLS/HR











Last 24 Hours








Test


  10/19/17


11:06 10/19/17


16:31 10/19/17


20:43 10/20/17


06:37


 


Bedside Glucose 131 mg/dl  127 mg/dl  104 mg/dl  101 mg/dl 











Assessment & Plan


End-stage renal disease. Plan for dialysis today. will only try for 1Liter UF 

as patient's volume status is improved and she is not eating/drinking as much. 

continue to monitor potassium. to run on a 2k bath. ok from a renal standpoint 

to discharge after dialysis if access works well. will continue dialysis on m/w/

f schedule as outpatient.





Hyperkalemia.  plan for 2k bath with dialysis. continue to follow potassium.





Hyponatremia.  volume status appropriate and will continue to follow. 





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





Hypertension: generally better controlled. no need for additional 

antihypertensives to current regimen at this time.





This patient was seen and treated with direct collaboration with Dr. Davison. 

Thank you for the opportunity to participate in this patient's care. Appreciate 

the Consult.





ATTENDING NOTE:


I performed a history and physical examination of the patient, including 

specifically on history- pt feels good, no sob, still tired and decreased 

appetite,on physical exam-lungs cta, fistula with good bruit and thrill, and my 

impression and plan are ESRD-for dialysis today with fistula and ok from renal 

standpoint to go home after dialysis. I have discussed the patient's management 

with Halina Diana PA-C, Please refer to above note for the documented 

findings and plan of care. 





Phil Davison DO

## 2017-10-21 VITALS
TEMPERATURE: 98.06 F | SYSTOLIC BLOOD PRESSURE: 158 MMHG | HEART RATE: 78 BPM | DIASTOLIC BLOOD PRESSURE: 75 MMHG | OXYGEN SATURATION: 93 %

## 2017-10-21 VITALS
TEMPERATURE: 98.42 F | SYSTOLIC BLOOD PRESSURE: 153 MMHG | HEART RATE: 74 BPM | OXYGEN SATURATION: 95 % | DIASTOLIC BLOOD PRESSURE: 88 MMHG

## 2017-10-21 LAB
ANION GAP SERPL CALC-SCNC: 9 MMOL/L (ref 3–11)
BUN SERPL-MCNC: 30 MG/DL (ref 7–18)
BUN/CREAT SERPL: 4.5 (ref 10–20)
CALCIUM SERPL-MCNC: 8.9 MG/DL (ref 8.5–10.1)
CHLORIDE SERPL-SCNC: 97 MMOL/L (ref 98–107)
CO2 SERPL-SCNC: 28 MMOL/L (ref 21–32)
CREAT CL PREDICTED SERPL C-G-VRATE: 12.2 ML/MIN
CREAT SERPL-MCNC: 6.74 MG/DL (ref 0.6–1.2)
GLUCOSE SERPL-MCNC: 107 MG/DL (ref 70–99)
HCT VFR BLD CALC: 24.7 % (ref 37–47)
MAGNESIUM SERPL-MCNC: 2.5 MG/DL (ref 1.8–2.4)
PHOSPHATE SERPL-MCNC: 6.7 MG/DL (ref 2.5–4.9)
POTASSIUM SERPL-SCNC: 4 MMOL/L (ref 3.5–5.1)
SODIUM SERPL-SCNC: 134 MMOL/L (ref 136–145)

## 2017-10-21 RX ADMIN — CALCIUM ACETATE SCH MG: 667 CAPSULE ORAL at 12:00

## 2017-10-21 RX ADMIN — METOPROLOL SUCCINATE SCH MG: 50 TABLET, EXTENDED RELEASE ORAL at 08:17

## 2017-10-21 RX ADMIN — CALCIUM ACETATE SCH MG: 667 CAPSULE ORAL at 17:00

## 2017-10-21 RX ADMIN — PANTOPRAZOLE SCH MG: 40 TABLET, DELAYED RELEASE ORAL at 08:17

## 2017-10-21 RX ADMIN — LACTOBACILLUS TAB SCH TAB: TAB at 08:17

## 2017-10-21 RX ADMIN — ISOSORBIDE MONONITRATE SCH MG: 60 TABLET ORAL at 08:18

## 2017-10-21 RX ADMIN — CALCIUM ACETATE SCH MG: 667 CAPSULE ORAL at 08:00

## 2017-10-21 RX ADMIN — FLUTICASONE PROPIONATE PRN SPRAYS: 50 SPRAY, METERED NASAL at 16:57

## 2017-10-21 RX ADMIN — LACTOBACILLUS TAB SCH TAB: TAB at 16:55

## 2017-10-21 RX ADMIN — INSULIN ASPART SCH UNITS: 100 INJECTION, SOLUTION INTRAVENOUS; SUBCUTANEOUS at 16:30

## 2017-10-21 RX ADMIN — CALCIUM ACETATE SCH MG: 667 CAPSULE ORAL at 16:58

## 2017-10-21 RX ADMIN — ONDANSETRON PRN MG: 2 INJECTION INTRAMUSCULAR; INTRAVENOUS at 06:38

## 2017-10-21 RX ADMIN — ONDANSETRON PRN MG: 2 INJECTION INTRAMUSCULAR; INTRAVENOUS at 15:05

## 2017-10-21 RX ADMIN — INSULIN ASPART SCH UNITS: 100 INJECTION, SOLUTION INTRAVENOUS; SUBCUTANEOUS at 11:00

## 2017-10-21 RX ADMIN — ISOSORBIDE MONONITRATE SCH MG: 30 TABLET, EXTENDED RELEASE ORAL at 08:18

## 2017-10-21 RX ADMIN — INSULIN ASPART SCH UNITS: 100 INJECTION, SOLUTION INTRAVENOUS; SUBCUTANEOUS at 06:30

## 2017-10-21 RX ADMIN — SERTRALINE HYDROCHLORIDE SCH MG: 100 TABLET, FILM COATED ORAL at 21:17

## 2017-10-21 RX ADMIN — ATORVASTATIN CALCIUM SCH MG: 40 TABLET, FILM COATED ORAL at 08:18

## 2017-10-21 RX ADMIN — INSULIN ASPART SCH UNITS: 100 INJECTION, SOLUTION INTRAVENOUS; SUBCUTANEOUS at 21:18

## 2017-10-21 RX ADMIN — LACTOBACILLUS TAB SCH TAB: TAB at 08:00

## 2017-10-21 RX ADMIN — Medication SCH MG: at 08:18

## 2017-10-21 NOTE — PROGRESS NOTE
Internal Med Progress Note


Date of Service:


Oct 21, 2017.


Provider Documentation:





SUBJECTIVE:





The patient was seen and examined 


Complains of Soreness all over 


Remains weak and lethargic


Not yet ready to be discharged 








OBJECTIVE:





Vital Signs-as noted below





Exam:


General-NO distress at rest


Eyes-normal


ENT-normal


Neck-supple


Lungs-Clear to ausucltate bilaterally 


Heart-Regular ,no murmur appreciated


Abdomen-Benign,no masses 


Extremities-NO edema 


Neuro-AAOx3


Generally weak and Lethargic,No focal neuro deficit


Lab data as noted below.


ASSESSMENT & PLAN:








HYPERKALEMIA:-resolved 


Secondary to ESRD 


-Probable secondary to pt not receiving hemodialysis. 


-Pt already started dialysis prior to pt being seen. 


-Potassium -normalized


-continue with Dialysis





Generalized Weakness


Deconditioning 


Lacks motivation


Getting PT/OT 


 





ESRD on HD


-Pt on MWF dialysis schedule, follows with Dr Davison


-nephrology aware and pt receiving dialysis today


-vascular surgery consulted as pt is scheduled for thrombectomy of fistula 

thrombosis tomorrow.


-Appreciate Vascular surgery and Nephrology  input


-Dialysis and likely home this afternoon  


-PT/OT evaluation  and Home tomorrow


-may need home health 





OA


Generalized


Continue Ultram


Avoid Narcotics


Getting PT  





CAD, PVD


-continue ASA


-continue lipitor


-recommend smoking cessation 


-denies any symptoms





HTN:


-Pt has not been taking home meds and hasn't had HD. Will wait until pt done 

with dialysis and if continues to be hypertensive consider hydralazine


-can continue metoprolol


-BP remains stable 





DM II


-Pt hasn't been taking her insulin past several days. Glucose 138 currently. 

Will hold on Lantus currently and do sliding scale per protocol.


-HgbA1c: 7.7 on 10/9/17


-SSI with [facility] blood sugar check 





DVT PROPHYLAXIS


-SCD's as will hold on heparin with pt having recent procedure and scheduled 

for procedure in am.





DISPOSITION


-admit tele


- Full Code as per discussion with pt


-Follows with Dr Smith for routine care


-Likely to be discharged in a day or two


Vital Signs:











  Date Time  Temp Pulse Resp B/P (MAP) Pulse Ox O2 Delivery O2 Flow Rate FiO2


 


10/21/17 08:11 36.7 71 18 134/75 (94) 93   


 


10/21/17 08:00      Room Air  


 


10/21/17 00:00      Room Air  


 


10/20/17 23:11 36.8 75 20 103/62 (76) 93 Room Air  


 


10/20/17 20:00      Room Air  


 


10/20/17 16:00      Room Air  


 


10/20/17 16:00 36.6 69 18 148/83 (104) 98 Room Air  


 


10/20/17 15:12 36.5 70  120/60 (80)    








Lab Results:





Results Past 24 Hours








Test


  10/20/17


16:31 10/20/17


19:40 10/21/17


07:30 Range/Units


 


 


Bedside Glucose 82 161  70-90  mg/dl


 


Hemoglobin   7.6 12.0-16.0  g/dL


 


Hematocrit   24.7 37-47  %


 


Sodium Level   134 136-145  mmol/L


 


Potassium Level   4.0 3.5-5.1  mmol/L


 


Chloride Level   97   mmol/L


 


Carbon Dioxide Level   28 21-32  mmol/L


 


Anion Gap   9.0 3-11  mmol/L


 


Blood Urea Nitrogen   30 7-18  mg/dl


 


Creatinine


  


  


  6.74


  0.60-1.20


mg/dl


 


Est Creatinine Clear Calc


Drug Dose 


  


  12.2


   ml/min


 


 


Estimated GFR (


American) 


  


  7.6


   


 


 


Estimated GFR (Non-


American 


  


  6.5


   


 


 


BUN/Creatinine Ratio   4.5 10-20  


 


Random Glucose   107 70-99  mg/dl


 


Calcium Level   8.9 8.5-10.1  mg/dl


 


Phosphorus Level   6.7 2.5-4.9  mg/dl


 


Magnesium Level   2.5 1.8-2.4  mg/dl

## 2017-10-22 VITALS
OXYGEN SATURATION: 95 % | TEMPERATURE: 97.52 F | DIASTOLIC BLOOD PRESSURE: 68 MMHG | HEART RATE: 58 BPM | SYSTOLIC BLOOD PRESSURE: 124 MMHG

## 2017-10-22 VITALS
HEART RATE: 64 BPM | TEMPERATURE: 97.52 F | OXYGEN SATURATION: 98 % | SYSTOLIC BLOOD PRESSURE: 138 MMHG | DIASTOLIC BLOOD PRESSURE: 63 MMHG

## 2017-10-22 VITALS
HEART RATE: 56 BPM | SYSTOLIC BLOOD PRESSURE: 112 MMHG | TEMPERATURE: 97.88 F | DIASTOLIC BLOOD PRESSURE: 69 MMHG | OXYGEN SATURATION: 92 %

## 2017-10-22 VITALS
DIASTOLIC BLOOD PRESSURE: 72 MMHG | SYSTOLIC BLOOD PRESSURE: 134 MMHG | HEART RATE: 63 BPM | OXYGEN SATURATION: 97 % | TEMPERATURE: 98.06 F

## 2017-10-22 RX ADMIN — LACTOBACILLUS TAB SCH TAB: TAB at 17:25

## 2017-10-22 RX ADMIN — ISOSORBIDE MONONITRATE SCH MG: 60 TABLET ORAL at 08:26

## 2017-10-22 RX ADMIN — Medication SCH MG: at 08:25

## 2017-10-22 RX ADMIN — INSULIN ASPART SCH UNITS: 100 INJECTION, SOLUTION INTRAVENOUS; SUBCUTANEOUS at 08:32

## 2017-10-22 RX ADMIN — OXYCODONE HYDROCHLORIDE AND ACETAMINOPHEN PRN TAB: 5; 325 TABLET ORAL at 08:34

## 2017-10-22 RX ADMIN — CALCIUM ACETATE SCH MG: 667 CAPSULE ORAL at 08:25

## 2017-10-22 RX ADMIN — LACTOBACILLUS TAB SCH TAB: TAB at 08:27

## 2017-10-22 RX ADMIN — METOPROLOL SUCCINATE SCH MG: 50 TABLET, EXTENDED RELEASE ORAL at 08:28

## 2017-10-22 RX ADMIN — CALCIUM ACETATE SCH MG: 667 CAPSULE ORAL at 17:25

## 2017-10-22 RX ADMIN — INSULIN ASPART SCH UNITS: 100 INJECTION, SOLUTION INTRAVENOUS; SUBCUTANEOUS at 16:30

## 2017-10-22 RX ADMIN — CALCIUM ACETATE SCH MG: 667 CAPSULE ORAL at 12:00

## 2017-10-22 RX ADMIN — SERTRALINE HYDROCHLORIDE SCH MG: 100 TABLET, FILM COATED ORAL at 21:08

## 2017-10-22 RX ADMIN — INSULIN ASPART SCH UNITS: 100 INJECTION, SOLUTION INTRAVENOUS; SUBCUTANEOUS at 13:11

## 2017-10-22 RX ADMIN — OXYCODONE HYDROCHLORIDE AND ACETAMINOPHEN PRN TAB: 5; 325 TABLET ORAL at 17:30

## 2017-10-22 RX ADMIN — INSULIN ASPART SCH UNITS: 100 INJECTION, SOLUTION INTRAVENOUS; SUBCUTANEOUS at 21:07

## 2017-10-22 RX ADMIN — CALCIUM ACETATE SCH MG: 667 CAPSULE ORAL at 08:00

## 2017-10-22 RX ADMIN — CALCIUM ACETATE SCH MG: 667 CAPSULE ORAL at 13:07

## 2017-10-22 RX ADMIN — PANTOPRAZOLE SCH MG: 40 TABLET, DELAYED RELEASE ORAL at 08:27

## 2017-10-22 RX ADMIN — ATORVASTATIN CALCIUM SCH MG: 40 TABLET, FILM COATED ORAL at 08:25

## 2017-10-22 RX ADMIN — ISOSORBIDE MONONITRATE SCH MG: 30 TABLET, EXTENDED RELEASE ORAL at 08:26

## 2017-10-22 NOTE — PROGRESS NOTE
Internal Med Progress Note


Date of Service:


Oct 22, 2017.


Provider Documentation:





SUBJECTIVE:





The patient was seen and examined 


Complains of Soreness all over -not any better 


Remains weak and lethargic


getting PT and likely to need in patient rehab








OBJECTIVE:





Vital Signs-as noted below





Exam:


General-NO distress at rest


Eyes-normal


ENT-normal


Neck-supple


Lungs-Clear to ausucltate bilaterally 


Heart-Regular ,no murmur appreciated


Abdomen-Benign,no masses 


Extremities-NO edema 


Neuro-AAOx3


Generally weak and Lethargic,No focal neuro deficit


Lab data as noted below.


ASSESSMENT & PLAN:








HYPERKALEMIA:-resolved 


Secondary to ESRD 


-Probable secondary to pt not receiving hemodialysis. 


-Pt already started dialysis prior to pt being seen. 


-Potassium -normalized


-continue with Dialysis





Generalized Weakness


Deconditioning 


Lacks motivation


Getting PT/OT -likely to need rehab 


Discussed with the patient -wants to go to [facility]


 





ESRD on HD


-Pt on MWF dialysis schedule, follows with Dr Davison


-nephrology aware and pt receiving dialysis today


-vascular surgery consulted as pt is scheduled for thrombectomy of fistula 

thrombosis tomorrow.


-Appreciate Vascular surgery and Nephrology  input


-Dialysis and likely home this afternoon  


-PT/OT evaluation -planning to go for rehab





OA


Generalized


Continue Ultram


Avoid Narcotics


Getting PT  





CAD, PVD


-continue ASA


-continue lipitor


-recommend smoking cessation 


-denies any symptoms





HTN:


-Pt has not been taking home meds and hasn't had HD. Will wait until pt done 

with dialysis and if continues to be hypertensive consider hydralazine


-can continue metoprolol


-BP remains stable 





DM II


-Pt hasn't been taking her insulin past several days. Glucose 138 currently. 

Will hold on Lantus currently and do sliding scale per protocol.


-HgbA1c: 7.7 on 10/9/17


[facility] 





DVT PROPHYLAXIS


-SCD's as will hold on heparin with pt having recent procedure and scheduled 

for procedure in am.





DISPOSITION


-admit tele


- Full Code as per discussion with pt


-Follows with Dr Smith for routine care


-Uncertain -may need placement


Vital Signs:











  Date Time  Temp Pulse Resp B/P (MAP) Pulse Ox O2 Delivery O2 Flow Rate FiO2


 


10/22/17 07:43 36.7 63 16 134/72 (92) 97 Room Air  


 


10/22/17 01:14 36.4 64 18 138/63 (88) 98 Room Air  


 


10/22/17 00:00      Room Air  


 


10/21/17 16:00      Room Air  


 


10/21/17 15:30 36.9 74 18 153/88 (109) 95 Room Air  








Lab Results:





Results Past 24 Hours








Test


  10/21/17


11:41 10/21/17


16:33 10/21/17


20:20 10/22/17


07:31 Range/Units


 


 


Bedside Glucose 123 145 185 180 70-90  mg/dl

## 2017-10-23 VITALS
SYSTOLIC BLOOD PRESSURE: 129 MMHG | DIASTOLIC BLOOD PRESSURE: 73 MMHG | HEART RATE: 61 BPM | TEMPERATURE: 98.24 F | OXYGEN SATURATION: 99 %

## 2017-10-23 VITALS — SYSTOLIC BLOOD PRESSURE: 145 MMHG | HEART RATE: 64 BPM | TEMPERATURE: 98.42 F | DIASTOLIC BLOOD PRESSURE: 71 MMHG

## 2017-10-23 VITALS — SYSTOLIC BLOOD PRESSURE: 104 MMHG | DIASTOLIC BLOOD PRESSURE: 51 MMHG | HEART RATE: 59 BPM

## 2017-10-23 VITALS — HEART RATE: 62 BPM | DIASTOLIC BLOOD PRESSURE: 52 MMHG | SYSTOLIC BLOOD PRESSURE: 103 MMHG

## 2017-10-23 VITALS
SYSTOLIC BLOOD PRESSURE: 119 MMHG | OXYGEN SATURATION: 95 % | DIASTOLIC BLOOD PRESSURE: 68 MMHG | HEART RATE: 62 BPM | TEMPERATURE: 97.88 F

## 2017-10-23 VITALS — SYSTOLIC BLOOD PRESSURE: 114 MMHG | HEART RATE: 60 BPM | DIASTOLIC BLOOD PRESSURE: 57 MMHG

## 2017-10-23 VITALS — DIASTOLIC BLOOD PRESSURE: 62 MMHG | SYSTOLIC BLOOD PRESSURE: 144 MMHG | HEART RATE: 82 BPM

## 2017-10-23 VITALS — HEART RATE: 61 BPM | SYSTOLIC BLOOD PRESSURE: 110 MMHG | DIASTOLIC BLOOD PRESSURE: 54 MMHG

## 2017-10-23 VITALS — HEART RATE: 61 BPM | DIASTOLIC BLOOD PRESSURE: 72 MMHG | SYSTOLIC BLOOD PRESSURE: 140 MMHG

## 2017-10-23 VITALS — DIASTOLIC BLOOD PRESSURE: 55 MMHG | HEART RATE: 60 BPM | SYSTOLIC BLOOD PRESSURE: 106 MMHG

## 2017-10-23 VITALS — HEART RATE: 61 BPM | SYSTOLIC BLOOD PRESSURE: 115 MMHG | DIASTOLIC BLOOD PRESSURE: 44 MMHG

## 2017-10-23 VITALS — HEART RATE: 59 BPM | DIASTOLIC BLOOD PRESSURE: 46 MMHG | SYSTOLIC BLOOD PRESSURE: 92 MMHG

## 2017-10-23 VITALS — HEART RATE: 58 BPM | SYSTOLIC BLOOD PRESSURE: 96 MMHG | DIASTOLIC BLOOD PRESSURE: 41 MMHG

## 2017-10-23 VITALS — HEART RATE: 59 BPM | DIASTOLIC BLOOD PRESSURE: 49 MMHG | SYSTOLIC BLOOD PRESSURE: 100 MMHG

## 2017-10-23 VITALS — DIASTOLIC BLOOD PRESSURE: 52 MMHG | SYSTOLIC BLOOD PRESSURE: 110 MMHG | HEART RATE: 60 BPM

## 2017-10-23 VITALS — DIASTOLIC BLOOD PRESSURE: 55 MMHG | SYSTOLIC BLOOD PRESSURE: 109 MMHG | HEART RATE: 60 BPM

## 2017-10-23 VITALS — DIASTOLIC BLOOD PRESSURE: 50 MMHG | HEART RATE: 60 BPM | SYSTOLIC BLOOD PRESSURE: 100 MMHG

## 2017-10-23 VITALS — TEMPERATURE: 97.88 F | DIASTOLIC BLOOD PRESSURE: 63 MMHG | SYSTOLIC BLOOD PRESSURE: 118 MMHG | HEART RATE: 61 BPM

## 2017-10-23 VITALS — HEART RATE: 61 BPM | DIASTOLIC BLOOD PRESSURE: 51 MMHG | SYSTOLIC BLOOD PRESSURE: 94 MMHG

## 2017-10-23 VITALS — HEART RATE: 59 BPM | SYSTOLIC BLOOD PRESSURE: 109 MMHG | DIASTOLIC BLOOD PRESSURE: 54 MMHG

## 2017-10-23 VITALS — SYSTOLIC BLOOD PRESSURE: 114 MMHG | HEART RATE: 61 BPM | DIASTOLIC BLOOD PRESSURE: 51 MMHG

## 2017-10-23 VITALS — SYSTOLIC BLOOD PRESSURE: 105 MMHG | HEART RATE: 62 BPM | DIASTOLIC BLOOD PRESSURE: 52 MMHG

## 2017-10-23 LAB
ANION GAP SERPL CALC-SCNC: 10 MMOL/L (ref 3–11)
BUN SERPL-MCNC: 49 MG/DL (ref 7–18)
BUN/CREAT SERPL: 4.8 (ref 10–20)
CALCIUM SERPL-MCNC: 8.9 MG/DL (ref 8.5–10.1)
CHLORIDE SERPL-SCNC: 98 MMOL/L (ref 98–107)
CO2 SERPL-SCNC: 27 MMOL/L (ref 21–32)
CREAT CL PREDICTED SERPL C-G-VRATE: 8 ML/MIN
CREAT SERPL-MCNC: 10.2 MG/DL (ref 0.6–1.2)
EOSINOPHIL NFR BLD AUTO: 394 K/UL (ref 130–400)
GLUCOSE SERPL-MCNC: 203 MG/DL (ref 70–99)
HCT VFR BLD CALC: 24.3 % (ref 37–47)
MCH RBC QN AUTO: 29.4 PG (ref 25–34)
MCHC RBC AUTO-ENTMCNC: 30.9 G/DL (ref 32–36)
MCV RBC AUTO: 95.3 FL (ref 80–100)
PMV BLD AUTO: 9.9 FL (ref 7.4–10.4)
POTASSIUM SERPL-SCNC: 4.1 MMOL/L (ref 3.5–5.1)
RBC # BLD AUTO: 2.55 M/UL (ref 4.2–5.4)
SODIUM SERPL-SCNC: 136 MMOL/L (ref 136–145)
TIBC SERPL-MCNC: 197 MCG/DL (ref 250–450)
WBC # BLD AUTO: 10.02 K/UL (ref 4.8–10.8)

## 2017-10-23 RX ADMIN — LACTOBACILLUS TAB SCH TAB: TAB at 17:00

## 2017-10-23 RX ADMIN — INSULIN ASPART SCH UNITS: 100 INJECTION, SOLUTION INTRAVENOUS; SUBCUTANEOUS at 08:29

## 2017-10-23 RX ADMIN — HEPARIN SODIUM SCH UNIT: 1000 INJECTION, SOLUTION INTRAVENOUS; SUBCUTANEOUS at 10:30

## 2017-10-23 RX ADMIN — HEPARIN SODIUM SCH UNIT: 1000 INJECTION, SOLUTION INTRAVENOUS; SUBCUTANEOUS at 11:30

## 2017-10-23 RX ADMIN — ISOSORBIDE MONONITRATE SCH MG: 30 TABLET, EXTENDED RELEASE ORAL at 08:04

## 2017-10-23 RX ADMIN — CALCIUM ACETATE SCH MG: 667 CAPSULE ORAL at 17:00

## 2017-10-23 RX ADMIN — PANTOPRAZOLE SCH MG: 40 TABLET, DELAYED RELEASE ORAL at 08:06

## 2017-10-23 RX ADMIN — INSULIN ASPART SCH UNITS: 100 INJECTION, SOLUTION INTRAVENOUS; SUBCUTANEOUS at 17:38

## 2017-10-23 RX ADMIN — ISOSORBIDE MONONITRATE SCH MG: 60 TABLET ORAL at 08:05

## 2017-10-23 RX ADMIN — CALCIUM ACETATE SCH MG: 667 CAPSULE ORAL at 15:35

## 2017-10-23 RX ADMIN — SERTRALINE HYDROCHLORIDE SCH MG: 100 TABLET, FILM COATED ORAL at 21:58

## 2017-10-23 RX ADMIN — LACTOBACILLUS TAB SCH TAB: TAB at 08:05

## 2017-10-23 RX ADMIN — ATORVASTATIN CALCIUM SCH MG: 40 TABLET, FILM COATED ORAL at 08:03

## 2017-10-23 RX ADMIN — OXYCODONE HYDROCHLORIDE AND ACETAMINOPHEN PRN TAB: 5; 325 TABLET ORAL at 22:04

## 2017-10-23 RX ADMIN — CALCIUM ACETATE SCH MG: 667 CAPSULE ORAL at 08:04

## 2017-10-23 RX ADMIN — OXYCODONE HYDROCHLORIDE AND ACETAMINOPHEN PRN TAB: 5; 325 TABLET ORAL at 08:27

## 2017-10-23 RX ADMIN — Medication SCH MG: at 08:03

## 2017-10-23 RX ADMIN — METOPROLOL SUCCINATE SCH MG: 50 TABLET, EXTENDED RELEASE ORAL at 08:06

## 2017-10-23 RX ADMIN — INSULIN ASPART SCH UNITS: 100 INJECTION, SOLUTION INTRAVENOUS; SUBCUTANEOUS at 22:01

## 2017-10-23 RX ADMIN — LACTOBACILLUS TAB SCH TAB: TAB at 17:35

## 2017-10-23 RX ADMIN — INSULIN ASPART SCH UNITS: 100 INJECTION, SOLUTION INTRAVENOUS; SUBCUTANEOUS at 15:41

## 2017-10-23 NOTE — PROGRESS NOTE
Internal Med Progress Note


Date of Service:


Oct 23, 2017.


Provider Documentation:





SUBJECTIVE:





The patient was seen and examined 


Remains generally weak 


Feels exhausted following Dialysis today 


Wants to go to rehab





OBJECTIVE:





Vital Signs-as noted below





Exam:


General-NO distress at rest


Eyes-normal


ENT-normal


Neck-supple


Lungs-Clear to ausucltate bilaterally 


With decreased breath sound bilaterally 


Heart-Regular ,no murmur appreciated


Abdomen-Benign,no masses 


Extremities-No edema 


Neuro-AAOx3


Generally weak and Lethargic,No focal neuro deficit


Lab data as noted below.


ASSESSMENT & PLAN:








Generalized Weakness


Deconditioning 


Lacks motivation


Getting PT/OT -likely to need rehab 


Discussed with the patient -wants to go to [facility]


Awaiting placement 


 








HYPERKALEMIA:-resolved 


Secondary to ESRD 


-Probable secondary to pt not receiving hemodialysis. 


-Pt already started dialysis prior to pt being seen. 


-Potassium -normalized


-continue with Dialysis as planned 











ESRD on HD


-Pt on MWF dialysis schedule, follows with Dr Davison


-nephrology aware and pt receiving dialysis today


-vascular surgery consulted as pt is scheduled for thrombectomy of fistula 

thrombosis tomorrow.


-Appreciate Vascular surgery and Nephrology  input


-PT/OT evaluation -planning to go for rehab


-continue HD as per Nephrologist








OA


Generalized aches and pain


Continue Ultram


Avoid Narcotics








CAD, PVD


-continue ASA


-continue Lipitor


-recommend smoking cessation 


-denies any symptoms





HTN:


-Pt has not been taking home meds and hasn't had HD. Will wait until pt done 

with dialysis and if continues to be hypertensive consider hydralazine


-can continue metoprolol


-BP remains stable 





DM II


-Pt hasn't been taking her insulin past several days. Glucose 138 currently. 

Will hold on Lantus currently and do sliding scale per protocol.


-HgbA1c: 7.7 on 10/9/17


[facility] 





[facility]


-SCD's as will hold on heparin with pt having recent procedure and scheduled 

for procedure in am.





DISPOSITION


-admit tele


- Full Code as per discussion with pt


-Follows with Dr Smith for routine care


-likely to need Rehabilitation


Vital Signs:











  Date Time  Temp Pulse Resp B/P (MAP) Pulse Ox O2 Delivery O2 Flow Rate FiO2


 


10/23/17 14:36 36.6 61  118/63 (81)    


 


10/23/17 14:00  60  114/57    


 


10/23/17 13:45  59  109/54    


 


10/23/17 13:30  62  109/56    


 


10/23/17 13:15  60  106/55    


 


10/23/17 13:00  60  110/52    


 


10/23/17 12:45  61  115/44    


 


10/23/17 12:30  59  100/49    


 


10/23/17 12:15  59  104/51    


 


10/23/17 12:00  62  103/52    


 


10/23/17 11:45  60  100/50    


 


10/23/17 11:31  59  92/46    


 


10/23/17 11:16  58  96/41    


 


10/23/17 11:00  62  105/52    


 


10/23/17 10:45  61  94/51    


 


10/23/17 10:19  82  144/62    


 


10/23/17 10:00  61  114/51    


 


10/23/17 09:45  61  110/54    


 


10/23/17 09:32  61  140/72    


 


10/23/17 09:20 36.9 64  145/71 (95)    


 


10/23/17 08:00      Room Air  


 


10/23/17 07:44 36.6 62 20 119/68 (85) 95 Room Air  


 


10/23/17 00:00      Room Air  


 


10/22/17 23:39 36.6 56 20 112/69 (83) 92 Room Air  


 


10/22/17 20:00      Room Air  


 


10/22/17 15:31 36.4 58 16 124/68 (86) 95   








Lab Results:





Results Past 24 Hours








Test


  10/22/17


16:35 10/22/17


20:10 10/23/17


05:50 10/23/17


08:02 Range/Units


 


 


Bedside Glucose 103 155  232 70-90  mg/dl


 


White Blood Count   10.02  4.8-10.8  K/uL


 


Red Blood Count   2.55  4.2-5.4  M/uL


 


Hemoglobin   7.5  12.0-16.0  g/dL


 


Hematocrit   24.3  37-47  %


 


Mean Corpuscular Volume   95.3    fL


 


Mean Corpuscular Hemoglobin   29.4  25-34  pg


 


Mean Corpuscular Hemoglobin


Concent 


  


  30.9


  


  32-36  g/dl


 


 


RDW Standard Deviation   51.0  36.4-46.3  fL


 


RDW Coefficient of Variation   15.0  11.5-14.5  %


 


Platelet Count   394  130-400  K/uL


 


Mean Platelet Volume   9.9  7.4-10.4  fL


 


Sodium Level   136  136-145  mmol/L


 


Potassium Level   4.1  3.5-5.1  mmol/L


 


Chloride Level   98    mmol/L


 


Carbon Dioxide Level   27  21-32  mmol/L


 


Anion Gap   10.0  3-11  mmol/L


 


Blood Urea Nitrogen   49  7-18  mg/dl


 


Creatinine


  


  


  10.20


  


  0.60-1.20


mg/dl


 


Est Creatinine Clear Calc


Drug Dose 


  


  8.0


  


   ml/min


 


 


Estimated GFR (


American) 


  


  4.6


  


   


 


 


Estimated GFR (Non-


American 


  


  4.0


  


   


 


 


BUN/Creatinine Ratio   4.8  10-20  


 


Random Glucose   203  70-99  mg/dl


 


Calcium Level   8.9  8.5-10.1  mg/dl


 


Iron Level   36    mcg/dl


 


Total Iron Binding Capacity   197  250-450  mcg/dl


 


Transferrin   156  200-360  mg/dl


 


Transferrin % Saturation   16  15-50  %

## 2017-10-23 NOTE — NEPHROLOGY PROGRESS NOTE
Nephrology Progress Note


Date of Service:


Oct 23, 2017.


Subjective


seen on rounds this am 0715.  no c/o dyspnea.  some N past 48 hrs.  c/o marked 

fatigue, generalized weakness.  d/c to rehab in planning stages





Objective











  Date Time  Temp Pulse Resp B/P (MAP) Pulse Ox O2 Delivery O2 Flow Rate FiO2


 


10/23/17 13:00  60  110/52    


 


10/23/17 12:45  61  115/44    


 


10/23/17 12:30  59  100/49    


 


10/23/17 12:15  59  104/51    


 


10/23/17 12:00  62  103/52    


 


10/23/17 11:45  60  100/50    


 


10/23/17 11:31  59  92/46    


 


10/23/17 11:16  58  96/41    


 


10/23/17 11:00  62  105/52    


 


10/23/17 10:45  61  94/51    


 


10/23/17 10:19  82  144/62    


 


10/23/17 10:00  61  114/51    


 


10/23/17 09:45  61  110/54    


 


10/23/17 09:32  61  140/72    


 


10/23/17 09:20 36.9 64  145/71 (95)    


 


10/23/17 08:00      Room Air  


 


10/23/17 07:44 36.6 62 20 119/68 (85) 95 Room Air  


 


10/23/17 00:00      Room Air  


 


10/22/17 23:39 36.6 56 20 112/69 (83) 92 Room Air  


 


10/22/17 20:00      Room Air  


 


10/22/17 15:31 36.4 58 16 124/68 (86) 95   








Physical Exam:


GENERAL:  Awake, alert, oriented x3, obese. on RA


EYES:  No scleral icterus.


ENT:  Moist mucous membranes.


NECK:  Supple. CEA scar on left 


PULMONARY:  Clear to auscultation w/ diminished air entry


CARDIAC:  Regular rate and rhythm.


ABDOMEN:  Bowel sounds positive, soft, nontender, nondistended.


EXTREMITIES:  trace BL edema. dressing over fem cath site. staples over c/d/i 

incisions RUE. +bruit


NEUROLOGICALLY:  Nonfocal.


DERMATOLOGIC:  No rash or ulcers noted.





Current Inpatient Medications








 Medications


  (Trade)  Dose


 Ordered  Sig/Willy


 Route  Start Time


 Stop Time Status Last Admin


Dose Admin


 


 Acetaminophen


  (Tylenol Tab)  650 mg  Q4H  PRN


 PO  10/17/17 15:45


 11/16/17 15:44  10/21/17 21:32


650 MG


 


 Ondansetron HCl


  (Zofran Inj)  4 mg  Q6H  PRN


 IV  10/17/17 15:45


 11/16/17 15:44  10/21/17 15:05


4 MG


 


 Miscellaneous


  (Iv Fluids


 Completed)  1 ea  PRN  PRN


 N/A  10/17/17 16:00


 10/17/18 15:59   


 


 


 Insulin Aspart


  (novoLOG ASPART)  **SLIDING


 SCALE**


 **If C...  ACHS


 SC  10/17/17 21:00


 11/16/17 20:59  10/23/17 08:29


7 UNITS


 


 Glucose


  (Glucose 40% Gel)  15-30


 GRAMS 15


 GRAMS...  UD  PRN


 PO  10/17/17 16:15


 11/16/17 16:14   


 


 


 Glucose


  (Glucose Chew


 Tab)  4-8


 Tablets 4


 Tabl...  UD  PRN


 PO  10/17/17 16:15


 11/16/17 16:14   


 


 


 Dextrose


  (Dextrose 50%


 50ML Syringe)  25-50ML OF


 50% DW IV


 FOR...  UD  PRN


 IV  10/17/17 16:15


 11/16/17 16:14   


 


 


 Glucagon


  (Glucagon Inj)  1 mg  UD  PRN


 SQ  10/17/17 16:15


 11/16/17 16:14   


 


 


 Aspirin


  (Ecotrin Tab)  81 mg  QAM


 PO  10/18/17 09:00


 11/17/17 08:59  10/23/17 08:03


81 MG


 


 Atorvastatin


 Calcium


  (Lipitor Tab)  40 mg  QAM


 PO  10/18/17 09:00


 11/17/17 08:59  10/23/17 08:03


40 MG


 


 Calcium Acetate


  (Phoslo Cap)  1,334 mg  UD  PRN


 PO  10/17/17 16:30


 11/16/17 16:29   


 


 


 Calcium Acetate


  (Phoslo Cap)  2,001 mg  TIDM


 PO  10/17/17 17:01


 11/16/17 17:59  10/23/17 08:04


2,001 MG


 


 Dicyclomine HCl


  (Bentyl Cap)  10 mg  QID  PRN


 PO  10/17/17 16:30


 11/16/17 16:29  10/18/17 04:37


10 MG


 


 Fluticasone


 Propionate


  (Flonase Nasal


 Spray)  2 sprays  DAILY  PRN


 SUZI  10/17/17 16:30


 11/16/17 16:29  10/21/17 16:57


2 SPRAYS


 


 Isosorbide


 Mononitrate


  (Imdur Ext Rel


 Tab)  30 mg  QAM


 PO  10/18/17 09:00


 11/17/17 08:59  10/23/17 08:04


30 MG


 


 Isosorbide


 Mononitrate


  (Imdur Ext Rel


 Tab)  60 mg  QAM


 PO  10/18/17 09:00


 11/17/17 08:59  10/23/17 08:05


60 MG


 


 Lorazepam


  (Ativan Tab)  0.5 mg  DAILY  PRN


 PO  10/17/17 16:30


 11/16/17 16:29   


 


 


 Metoprolol


 Succinate


  (Toprol Xl Tab)  50 mg  QAM


 PO  10/18/17 09:00


 11/17/17 08:59  10/23/17 08:06


50 MG


 


 Oxycodone/


 Acetaminophen


  (Percocet


 5-325mg Tab)  1 tab  Q4H  PRN


 PO  10/17/17 16:30


 10/31/17 16:29  10/23/17 08:27


1 TAB


 


 Pantoprazole


 Sodium


  (Protonix Tab)  40 mg  DAILY


 PO  10/18/17 09:00


 11/17/17 08:59  10/23/17 08:06


40 MG


 


 Sertraline HCl


  (Zoloft Tab)  100 mg  HS


 PO  10/17/17 21:00


 11/16/17 20:59  10/22/17 21:08


100 MG


 


 Lactobacillus


 Acidophilus


  (Floranex Tab)  4 tab  BIDM


 PO  10/17/17 17:01


 11/16/17 17:59  10/23/17 08:05


4 TAB


 


 Loratadine


  (Claritin Tab)  5 mg  DAILY  PRN


 PO  10/17/17 16:26


 11/16/17 16:25   


 


 


 Promethazine HCl


 12.5 mg/Sodium


 Chloride  50.5 ml @ 


 204 mls/hr  Q6H  PRN


 IV  10/20/17 08:00


 11/19/17 07:59  10/20/17 08:32


204 MLS/HR











Last 24 Hours








Test


  10/22/17


16:35 10/22/17


20:10 10/23/17


05:50 10/23/17


08:02


 


Bedside Glucose 103 mg/dl  155 mg/dl   232 mg/dl 


 


White Blood Count   10.02 K/uL  


 


Red Blood Count   2.55 M/uL  


 


Hemoglobin   7.5 g/dL  


 


Hematocrit   24.3 %  


 


Mean Corpuscular Volume   95.3 fL  


 


Mean Corpuscular Hemoglobin   29.4 pg  


 


Mean Corpuscular Hemoglobin


Concent 


  


  30.9 g/dl 


  


 


 


RDW Standard Deviation   51.0 fL  


 


RDW Coefficient of Variation   15.0 %  


 


Platelet Count   394 K/uL  


 


Mean Platelet Volume   9.9 fL  


 


Sodium Level   136 mmol/L  


 


Potassium Level   4.1 mmol/L  


 


Chloride Level   98 mmol/L  


 


Carbon Dioxide Level   27 mmol/L  


 


Anion Gap   10.0 mmol/L  


 


Blood Urea Nitrogen   49 mg/dl  


 


Creatinine   10.20 mg/dl  


 


Est Creatinine Clear Calc


Drug Dose 


  


  8.0 ml/min 


  


 


 


Estimated GFR (


American) 


  


  4.6 


  


 


 


Estimated GFR (Non-


American 


  


  4.0 


  


 


 


BUN/Creatinine Ratio   4.8  


 


Random Glucose   203 mg/dl  


 


Calcium Level   8.9 mg/dl  


 


Iron Level   36 mcg/dl  


 


Total Iron Binding Capacity   197 mcg/dl  


 


Transferrin   156 mg/dl  


 


Transferrin % Saturation   16 %  











Assessment & Plan


51y/o F ESRD patient admitted for failed access and hyperkalemia. She had a 

temporary femoral cath placed and patient had dialysis on the 17 and 18th after 

thrombectomy. temp cath now removed; used arm on 10/20





End-stage renal disease. Plan for dialysis today. goal 1.5-2 Liter UF  continue 

to monitor potassium. to run on a 2k bath. ok from a renal standpoint to 

discharge after dialysis if access works well. will continue dialysis on m/w/f 

schedule as outpatient.





Hyperkalemia.  resolved; cont renal diet, 2K bath





anemia of ESRD w/ iron deficiency > gave venofer and high dose procrit





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





Appreciate consult; will follow with you.  CAre coordinated w/ Dr. Ramirez

## 2017-10-24 VITALS
HEART RATE: 75 BPM | OXYGEN SATURATION: 98 % | TEMPERATURE: 97.52 F | SYSTOLIC BLOOD PRESSURE: 132 MMHG | DIASTOLIC BLOOD PRESSURE: 72 MMHG

## 2017-10-24 VITALS
DIASTOLIC BLOOD PRESSURE: 73 MMHG | SYSTOLIC BLOOD PRESSURE: 73 MMHG | OXYGEN SATURATION: 97 % | TEMPERATURE: 98.24 F | HEART RATE: 64 BPM

## 2017-10-24 VITALS
SYSTOLIC BLOOD PRESSURE: 130 MMHG | OXYGEN SATURATION: 95 % | HEART RATE: 67 BPM | DIASTOLIC BLOOD PRESSURE: 84 MMHG | TEMPERATURE: 98.42 F

## 2017-10-24 VITALS
TEMPERATURE: 97.88 F | DIASTOLIC BLOOD PRESSURE: 69 MMHG | HEART RATE: 61 BPM | SYSTOLIC BLOOD PRESSURE: 114 MMHG | OXYGEN SATURATION: 94 %

## 2017-10-24 LAB
EOSINOPHIL NFR BLD AUTO: 359 K/UL (ref 130–400)
HCT VFR BLD CALC: 27.1 % (ref 37–47)
MCH RBC QN AUTO: 28.7 PG (ref 25–34)
MCHC RBC AUTO-ENTMCNC: 29.9 G/DL (ref 32–36)
MCV RBC AUTO: 96.1 FL (ref 80–100)
PMV BLD AUTO: 9.7 FL (ref 7.4–10.4)
RBC # BLD AUTO: 2.82 M/UL (ref 4.2–5.4)
WBC # BLD AUTO: 10.86 K/UL (ref 4.8–10.8)

## 2017-10-24 RX ADMIN — CALCIUM ACETATE SCH MG: 667 CAPSULE ORAL at 08:18

## 2017-10-24 RX ADMIN — INSULIN ASPART SCH UNITS: 100 INJECTION, SOLUTION INTRAVENOUS; SUBCUTANEOUS at 21:17

## 2017-10-24 RX ADMIN — INSULIN ASPART SCH UNITS: 100 INJECTION, SOLUTION INTRAVENOUS; SUBCUTANEOUS at 13:07

## 2017-10-24 RX ADMIN — CALCIUM ACETATE SCH MG: 667 CAPSULE ORAL at 17:56

## 2017-10-24 RX ADMIN — CALCIUM ACETATE SCH MG: 667 CAPSULE ORAL at 12:00

## 2017-10-24 RX ADMIN — LACTOBACILLUS TAB SCH TAB: TAB at 17:57

## 2017-10-24 RX ADMIN — FLUTICASONE PROPIONATE PRN SPRAYS: 50 SPRAY, METERED NASAL at 08:19

## 2017-10-24 RX ADMIN — INSULIN ASPART SCH UNITS: 100 INJECTION, SOLUTION INTRAVENOUS; SUBCUTANEOUS at 18:01

## 2017-10-24 RX ADMIN — METOPROLOL SUCCINATE SCH MG: 50 TABLET, EXTENDED RELEASE ORAL at 08:18

## 2017-10-24 RX ADMIN — LACTOBACILLUS TAB SCH TAB: TAB at 08:18

## 2017-10-24 RX ADMIN — ATORVASTATIN CALCIUM SCH MG: 40 TABLET, FILM COATED ORAL at 08:18

## 2017-10-24 RX ADMIN — ISOSORBIDE MONONITRATE SCH MG: 60 TABLET ORAL at 08:18

## 2017-10-24 RX ADMIN — SERTRALINE HYDROCHLORIDE SCH MG: 100 TABLET, FILM COATED ORAL at 21:17

## 2017-10-24 RX ADMIN — Medication SCH MG: at 08:18

## 2017-10-24 RX ADMIN — INSULIN ASPART SCH UNITS: 100 INJECTION, SOLUTION INTRAVENOUS; SUBCUTANEOUS at 08:38

## 2017-10-24 RX ADMIN — ISOSORBIDE MONONITRATE SCH MG: 30 TABLET, EXTENDED RELEASE ORAL at 08:18

## 2017-10-24 RX ADMIN — OXYCODONE HYDROCHLORIDE AND ACETAMINOPHEN PRN TAB: 5; 325 TABLET ORAL at 08:24

## 2017-10-24 RX ADMIN — PANTOPRAZOLE SCH MG: 40 TABLET, DELAYED RELEASE ORAL at 08:18

## 2017-10-24 NOTE — PROGRESS NOTE
Medicine Progress Note


Date & Time of Visit:


Oct 24, 2017 at ~ 16:00


.


Subjective





Tired.


Hemodialysis performed yesterday, not today.


No fever.


Had transient chest discomfort all straining to move her bowels.  


No further chest pain.


No cough or shortness of breath.


Constipated.


No nausea or vomiting.


.





Objective





Last 8 Hrs








  Date Time  Temp Pulse Resp B/P (MAP) Pulse Ox O2 Delivery O2 Flow Rate FiO2


 


10/24/17 18:46      Room Air  


 


10/24/17 16:04 36.6 61 18 114/69 (84) 94 Room Air  








Physical Exam:





General- no distress


Lungs- clear 


Heart- regular


Abdomen- slightly distended, soft, nontender 


Extremities-  AV fistula left upper extremity; trace pretibial edema; no calf 

tenderness


Neuro- alert


.


Laboratory Results:





Last 24 Hours








Test


  10/24/17


07:18 10/24/17


07:47 10/24/17


11:40 10/24/17


16:36


 


White Blood Count 10.86 K/uL    


 


Red Blood Count 2.82 M/uL    


 


Hemoglobin 8.1 g/dL    


 


Hematocrit 27.1 %    


 


Mean Corpuscular Volume 96.1 fL    


 


Mean Corpuscular Hemoglobin 28.7 pg    


 


Mean Corpuscular Hemoglobin


Concent 29.9 g/dl 


  


  


  


 


 


RDW Standard Deviation 51.2 fL    


 


RDW Coefficient of Variation 15.3 %    


 


Platelet Count 359 K/uL    


 


Mean Platelet Volume 9.7 fL    


 


Bedside Glucose  193 mg/dl  120 mg/dl  111 mg/dl 


 


Test


  10/24/17


20:15 


  


  


 


 


Bedside Glucose 132 mg/dl    











Assessment & Plan





CKD V ON HEMODIALYSIS


Missed 2 dialysis treatments secondary to thrombosed AV fistula.


Serum potassium was 5.6 on admission, subsequently improved.


Thrombectomy left upper extremity AV fistula performed by Surgery.


Temporary femoral dialysis catheter removed.





CORONARY ARTERY DISEASE


Chest pain today during strenuous bowel movement.


Check EKG with recurrent chest pain.


Continue aspirin, metoprolol, nitrates, statin.





HYPERTENSION


/98 day of admission.


Blood pressures improved.


Continue metoprolol and nitrates.





DM TYPE II


Glycemic control variable.


Hemoglobin A1c 7.7 on 10/9/17.


Most blood sugars last 48 hours less than 180.


Continue NovoLog per protocol.





VTE PROPHYLAXIS


SCD's.


Ambulate.





DISPOSITION


Poor functional status secondary to deconditioning and multiple medical 

problems.


Anticipated transfer to [facility] for inpatient rehabilitation when medically 

stable.


Family Medicine follow-up with Dr. Smith.


.


Current Inpatient Medications:





Current Inpatient Medications








 Medications


  (Trade)  Dose


 Ordered  Sig/Willy


 Route  Start Time


 Stop Time Status Last Admin


Dose Admin


 


 Acetaminophen


  (Tylenol Tab)  650 mg  Q4H  PRN


 PO  10/17/17 15:45


 11/16/17 15:44  10/21/17 21:32


650 MG


 


 Ondansetron HCl


  (Zofran Inj)  4 mg  Q6H  PRN


 IV  10/17/17 15:45


 11/16/17 15:44  10/21/17 15:05


4 MG


 


 Miscellaneous


  (Iv Fluids


 Completed)  1 ea  PRN  PRN


 N/A  10/17/17 16:00


 10/17/18 15:59   


 


 


 Insulin Aspart


  (novoLOG ASPART)  **SLIDING


 SCALE**


 **If C...  ACHS


 SC  10/17/17 21:00


 11/16/17 20:59  10/24/17 18:01


4 UNITS


 


 Glucose


  (Glucose 40% Gel)  15-30


 GRAMS 15


 GRAMS...  UD  PRN


 PO  10/17/17 16:15


 11/16/17 16:14   


 


 


 Glucose


  (Glucose Chew


 Tab)  4-8


 Tablets 4


 Tabl...  UD  PRN


 PO  10/17/17 16:15


 11/16/17 16:14   


 


 


 Dextrose


  (Dextrose 50%


 50ML Syringe)  25-50ML OF


 50% DW IV


 FOR...  UD  PRN


 IV  10/17/17 16:15


 11/16/17 16:14   


 


 


 Glucagon


  (Glucagon Inj)  1 mg  UD  PRN


 SQ  10/17/17 16:15


 11/16/17 16:14   


 


 


 Aspirin


  (Ecotrin Tab)  81 mg  QAM


 PO  10/18/17 09:00


 11/17/17 08:59  10/24/17 08:18


81 MG


 


 Atorvastatin


 Calcium


  (Lipitor Tab)  40 mg  QAM


 PO  10/18/17 09:00


 11/17/17 08:59  10/24/17 08:18


40 MG


 


 Calcium Acetate


  (Phoslo Cap)  1,334 mg  UD  PRN


 PO  10/17/17 16:30


 11/16/17 16:29   


 


 


 Calcium Acetate


  (Phoslo Cap)  2,001 mg  TIDM


 PO  10/17/17 17:01


 11/16/17 17:59  10/24/17 17:56


2,001 MG


 


 Dicyclomine HCl


  (Bentyl Cap)  10 mg  QID  PRN


 PO  10/17/17 16:30


 11/16/17 16:29  10/18/17 04:37


10 MG


 


 Fluticasone


 Propionate


  (Flonase Nasal


 Spray)  2 sprays  DAILY  PRN


 SUZI  10/17/17 16:30


 11/16/17 16:29  10/24/17 08:19


2 SPRAYS


 


 Isosorbide


 Mononitrate


  (Imdur Ext Rel


 Tab)  30 mg  QAM


 PO  10/18/17 09:00


 11/17/17 08:59  10/24/17 08:18


30 MG


 


 Isosorbide


 Mononitrate


  (Imdur Ext Rel


 Tab)  60 mg  QAM


 PO  10/18/17 09:00


 11/17/17 08:59  10/24/17 08:18


60 MG


 


 Lorazepam


  (Ativan Tab)  0.5 mg  DAILY  PRN


 PO  10/17/17 16:30


 11/16/17 16:29   


 


 


 Metoprolol


 Succinate


  (Toprol Xl Tab)  50 mg  QAM


 PO  10/18/17 09:00


 11/17/17 08:59  10/24/17 08:18


50 MG


 


 Oxycodone/


 Acetaminophen


  (Percocet


 5-325mg Tab)  1 tab  Q4H  PRN


 PO  10/17/17 16:30


 10/31/17 16:29  10/24/17 08:24


1 TAB


 


 Pantoprazole


 Sodium


  (Protonix Tab)  40 mg  DAILY


 PO  10/18/17 09:00


 11/17/17 08:59  10/24/17 08:18


40 MG


 


 Sertraline HCl


  (Zoloft Tab)  100 mg  HS


 PO  10/17/17 21:00


 11/16/17 20:59  10/24/17 21:17


100 MG


 


 Lactobacillus


 Acidophilus


  (Floranex Tab)  4 tab  BIDM


 PO  10/17/17 17:01


 11/16/17 17:59  10/24/17 17:57


4 TAB


 


 Loratadine


  (Claritin Tab)  5 mg  DAILY  PRN


 PO  10/17/17 16:26


 11/16/17 16:25   


 


 


 Promethazine HCl


 12.5 mg/Sodium


 Chloride  50.5 ml @ 


 204 mls/hr  Q6H  PRN


 IV  10/20/17 08:00


 11/19/17 07:59  10/20/17 08:32


204 MLS/HR

## 2017-10-25 VITALS — HEART RATE: 62 BPM | DIASTOLIC BLOOD PRESSURE: 53 MMHG | SYSTOLIC BLOOD PRESSURE: 110 MMHG

## 2017-10-25 VITALS — SYSTOLIC BLOOD PRESSURE: 101 MMHG | DIASTOLIC BLOOD PRESSURE: 52 MMHG | HEART RATE: 60 BPM

## 2017-10-25 VITALS
HEART RATE: 67 BPM | TEMPERATURE: 97.88 F | SYSTOLIC BLOOD PRESSURE: 149 MMHG | OXYGEN SATURATION: 99 % | DIASTOLIC BLOOD PRESSURE: 78 MMHG

## 2017-10-25 VITALS — TEMPERATURE: 98.06 F | SYSTOLIC BLOOD PRESSURE: 122 MMHG | DIASTOLIC BLOOD PRESSURE: 54 MMHG | HEART RATE: 70 BPM

## 2017-10-25 VITALS — SYSTOLIC BLOOD PRESSURE: 110 MMHG | HEART RATE: 62 BPM | DIASTOLIC BLOOD PRESSURE: 50 MMHG

## 2017-10-25 VITALS — SYSTOLIC BLOOD PRESSURE: 126 MMHG | DIASTOLIC BLOOD PRESSURE: 60 MMHG | HEART RATE: 62 BPM

## 2017-10-25 VITALS — HEART RATE: 57 BPM | SYSTOLIC BLOOD PRESSURE: 105 MMHG | DIASTOLIC BLOOD PRESSURE: 48 MMHG

## 2017-10-25 VITALS
OXYGEN SATURATION: 98 % | TEMPERATURE: 98.6 F | DIASTOLIC BLOOD PRESSURE: 68 MMHG | HEART RATE: 66 BPM | SYSTOLIC BLOOD PRESSURE: 119 MMHG

## 2017-10-25 VITALS — SYSTOLIC BLOOD PRESSURE: 152 MMHG | HEART RATE: 66 BPM | DIASTOLIC BLOOD PRESSURE: 79 MMHG

## 2017-10-25 VITALS — DIASTOLIC BLOOD PRESSURE: 57 MMHG | SYSTOLIC BLOOD PRESSURE: 119 MMHG | HEART RATE: 64 BPM

## 2017-10-25 VITALS — DIASTOLIC BLOOD PRESSURE: 58 MMHG | SYSTOLIC BLOOD PRESSURE: 111 MMHG | HEART RATE: 59 BPM

## 2017-10-25 VITALS — DIASTOLIC BLOOD PRESSURE: 47 MMHG | SYSTOLIC BLOOD PRESSURE: 99 MMHG | HEART RATE: 63 BPM

## 2017-10-25 VITALS — DIASTOLIC BLOOD PRESSURE: 53 MMHG | HEART RATE: 62 BPM | SYSTOLIC BLOOD PRESSURE: 104 MMHG

## 2017-10-25 VITALS — SYSTOLIC BLOOD PRESSURE: 114 MMHG | DIASTOLIC BLOOD PRESSURE: 55 MMHG | HEART RATE: 60 BPM

## 2017-10-25 VITALS — DIASTOLIC BLOOD PRESSURE: 61 MMHG | HEART RATE: 65 BPM | TEMPERATURE: 97.7 F | SYSTOLIC BLOOD PRESSURE: 123 MMHG

## 2017-10-25 VITALS — SYSTOLIC BLOOD PRESSURE: 108 MMHG | HEART RATE: 61 BPM | DIASTOLIC BLOOD PRESSURE: 58 MMHG

## 2017-10-25 VITALS — SYSTOLIC BLOOD PRESSURE: 111 MMHG | HEART RATE: 64 BPM | DIASTOLIC BLOOD PRESSURE: 51 MMHG

## 2017-10-25 VITALS — HEART RATE: 62 BPM | SYSTOLIC BLOOD PRESSURE: 108 MMHG | DIASTOLIC BLOOD PRESSURE: 50 MMHG

## 2017-10-25 VITALS — SYSTOLIC BLOOD PRESSURE: 95 MMHG | DIASTOLIC BLOOD PRESSURE: 50 MMHG | HEART RATE: 60 BPM

## 2017-10-25 VITALS
OXYGEN SATURATION: 98 % | SYSTOLIC BLOOD PRESSURE: 138 MMHG | TEMPERATURE: 98.42 F | DIASTOLIC BLOOD PRESSURE: 75 MMHG | HEART RATE: 69 BPM

## 2017-10-25 VITALS — DIASTOLIC BLOOD PRESSURE: 55 MMHG | SYSTOLIC BLOOD PRESSURE: 106 MMHG | HEART RATE: 60 BPM

## 2017-10-25 VITALS — SYSTOLIC BLOOD PRESSURE: 95 MMHG | HEART RATE: 64 BPM | DIASTOLIC BLOOD PRESSURE: 46 MMHG

## 2017-10-25 LAB
ANION GAP SERPL CALC-SCNC: 10 MMOL/L (ref 3–11)
BUN SERPL-MCNC: 35 MG/DL (ref 7–18)
BUN/CREAT SERPL: 4.8 (ref 10–20)
CALCIUM SERPL-MCNC: 9.4 MG/DL (ref 8.5–10.1)
CHLORIDE SERPL-SCNC: 95 MMOL/L (ref 98–107)
CO2 SERPL-SCNC: 28 MMOL/L (ref 21–32)
CREAT CL PREDICTED SERPL C-G-VRATE: 10.5 ML/MIN
CREAT SERPL-MCNC: 7.49 MG/DL (ref 0.6–1.2)
EOSINOPHIL NFR BLD AUTO: 393 K/UL (ref 130–400)
GLUCOSE SERPL-MCNC: 159 MG/DL (ref 70–99)
HCT VFR BLD CALC: 30.3 % (ref 37–47)
MCH RBC QN AUTO: 29 PG (ref 25–34)
MCHC RBC AUTO-ENTMCNC: 30 G/DL (ref 32–36)
MCV RBC AUTO: 96.5 FL (ref 80–100)
PMV BLD AUTO: 9.7 FL (ref 7.4–10.4)
POTASSIUM SERPL-SCNC: 4.3 MMOL/L (ref 3.5–5.1)
RBC # BLD AUTO: 3.14 M/UL (ref 4.2–5.4)
SODIUM SERPL-SCNC: 133 MMOL/L (ref 136–145)
WBC # BLD AUTO: 13.33 K/UL (ref 4.8–10.8)

## 2017-10-25 RX ADMIN — ISOSORBIDE MONONITRATE SCH MG: 30 TABLET, EXTENDED RELEASE ORAL at 14:39

## 2017-10-25 RX ADMIN — LACTOBACILLUS TAB SCH TAB: TAB at 18:35

## 2017-10-25 RX ADMIN — SERTRALINE HYDROCHLORIDE SCH MG: 100 TABLET, FILM COATED ORAL at 21:15

## 2017-10-25 RX ADMIN — CALCIUM ACETATE SCH MG: 667 CAPSULE ORAL at 18:36

## 2017-10-25 RX ADMIN — ONDANSETRON PRN MG: 2 INJECTION INTRAMUSCULAR; INTRAVENOUS at 21:12

## 2017-10-25 RX ADMIN — INSULIN ASPART SCH UNITS: 100 INJECTION, SOLUTION INTRAVENOUS; SUBCUTANEOUS at 14:43

## 2017-10-25 RX ADMIN — CALCIUM ACETATE SCH MG: 667 CAPSULE ORAL at 08:00

## 2017-10-25 RX ADMIN — CALCIUM ACETATE SCH MG: 667 CAPSULE ORAL at 12:00

## 2017-10-25 RX ADMIN — ATORVASTATIN CALCIUM SCH MG: 40 TABLET, FILM COATED ORAL at 14:39

## 2017-10-25 RX ADMIN — STANDARDIZED SENNA CONCENTRATE AND DOCUSATE SODIUM SCH TAB: 8.6; 5 TABLET ORAL at 21:14

## 2017-10-25 RX ADMIN — INSULIN ASPART SCH UNITS: 100 INJECTION, SOLUTION INTRAVENOUS; SUBCUTANEOUS at 08:39

## 2017-10-25 RX ADMIN — INSULIN ASPART SCH UNITS: 100 INJECTION, SOLUTION INTRAVENOUS; SUBCUTANEOUS at 21:17

## 2017-10-25 RX ADMIN — ISOSORBIDE MONONITRATE SCH MG: 60 TABLET ORAL at 14:39

## 2017-10-25 RX ADMIN — PANTOPRAZOLE SCH MG: 40 TABLET, DELAYED RELEASE ORAL at 08:36

## 2017-10-25 RX ADMIN — INSULIN ASPART SCH UNITS: 100 INJECTION, SOLUTION INTRAVENOUS; SUBCUTANEOUS at 18:33

## 2017-10-25 RX ADMIN — LACTOBACILLUS TAB SCH TAB: TAB at 08:36

## 2017-10-25 RX ADMIN — METOPROLOL SUCCINATE SCH MG: 50 TABLET, EXTENDED RELEASE ORAL at 14:39

## 2017-10-25 RX ADMIN — HEPARIN SODIUM SCH UNIT: 1000 INJECTION, SOLUTION INTRAVENOUS; SUBCUTANEOUS at 11:00

## 2017-10-25 RX ADMIN — OXYCODONE HYDROCHLORIDE AND ACETAMINOPHEN PRN TAB: 5; 325 TABLET ORAL at 18:43

## 2017-10-25 RX ADMIN — OXYCODONE HYDROCHLORIDE AND ACETAMINOPHEN PRN TAB: 5; 325 TABLET ORAL at 06:31

## 2017-10-25 RX ADMIN — Medication SCH MG: at 08:35

## 2017-10-25 NOTE — PROGRESS NOTE
Medicine Progress Note


Date & Time of Visit:


Oct 25, 2017 at ~ 19:00


.


Subjective





Underwent hemodialysis today without incident.


No fever.


No chest pain.


No cough or SOB.


No nausea or vomiting.


Constipated.


Voids occasionally; no dysuria.


.





Objective





Last 8 Hrs








  Date Time  Temp Pulse Resp B/P (MAP) Pulse Ox O2 Delivery O2 Flow Rate FiO2


 


10/25/17 16:25      Room Air  


 


10/25/17 15:29 36.6 67 16 149/78 (101) 99 Room Air  








Physical Exam:





General- no distress


Neck- no JVD


Lungs- clear 


Heart- regular


Abdomen- + BS,  soft, nontender 


Extremities-  AV fistula left upper extremity bandaged; trace pretibial edema; 

no calf tenderness


Neuro- alert


.


Laboratory Results:





Last 24 Hours








Test


  10/25/17


07:32 10/25/17


07:41 10/25/17


11:28 10/25/17


16:33


 


Bedside Glucose 151 mg/dl   160 mg/dl  119 mg/dl 


 


White Blood Count  13.33 K/uL   


 


Red Blood Count  3.14 M/uL   


 


Hemoglobin  9.1 g/dL   


 


Hematocrit  30.3 %   


 


Mean Corpuscular Volume  96.5 fL   


 


Mean Corpuscular Hemoglobin  29.0 pg   


 


Mean Corpuscular Hemoglobin


Concent 


  30.0 g/dl 


  


  


 


 


RDW Standard Deviation  51.6 fL   


 


RDW Coefficient of Variation  16.1 %   


 


Platelet Count  393 K/uL   


 


Mean Platelet Volume  9.7 fL   


 


Sodium Level  133 mmol/L   


 


Potassium Level  4.3 mmol/L   


 


Chloride Level  95 mmol/L   


 


Carbon Dioxide Level  28 mmol/L   


 


Anion Gap  10.0 mmol/L   


 


Blood Urea Nitrogen  35 mg/dl   


 


Creatinine  7.49 mg/dl   


 


Est Creatinine Clear Calc


Drug Dose 


  10.5 ml/min 


  


  


 


 


Estimated GFR (


American) 


  6.7 


  


  


 


 


Estimated GFR (Non-


American 


  5.8 


  


  


 


 


BUN/Creatinine Ratio  4.8   


 


Random Glucose  159 mg/dl   


 


Calcium Level  9.4 mg/dl   


 


Test


  10/25/17


20:10 


  


  


 


 


Bedside Glucose 228 mg/dl    











Assessment & Plan





CKD V ON HEMODIALYSIS


Missed 2 dialysis treatments secondary to thrombosed AV fistula.


Serum potassium was 5.6 on admission, subsequently improved.


Thrombectomy left upper extremity AV fistula performed by Surgery.


Temporary femoral dialysis catheter removed.


Continue hemodialysis as directed by Nephrology.





CORONARY ARTERY DISEASE


Continue aspirin, metoprolol, nitrates, statin.





HYPERTENSION


/98 day of admission.


Blood pressures improved.


Continue metoprolol and nitrates.





DM TYPE II


Glycemic control variable.


Hemoglobin A1c 7.7 on 10/9/17.


FBS today 151.


Continue NovoLog per protocol.





LEUKOCYTOSIS


WBC 13,000.


Afebrile.


No signs / symptoms of infection.


Follow.





VTE PROPHYLAXIS


SCD's.


Ambulate.





DISPOSITION


Poor functional status secondary to deconditioning and multiple medical 

problems.


Anticipated transfer to [facility] for inpatient rehabilitation when medically 

stable.


Family Medicine follow-up with Dr. Smith.


.


Current Inpatient Medications:





Current Inpatient Medications








 Medications


  (Trade)  Dose


 Ordered  Sig/Willy


 Route  Start Time


 Stop Time Status Last Admin


Dose Admin


 


 Acetaminophen


  (Tylenol Tab)  650 mg  Q4H  PRN


 PO  10/17/17 15:45


 11/16/17 15:44  10/21/17 21:32


650 MG


 


 Ondansetron HCl


  (Zofran Inj)  4 mg  Q6H  PRN


 IV  10/17/17 15:45


 11/16/17 15:44  10/25/17 21:12


4 MG


 


 Miscellaneous


  (Iv Fluids


 Completed)  1 ea  PRN  PRN


 N/A  10/17/17 16:00


 10/17/18 15:59   


 


 


 Insulin Aspart


  (novoLOG ASPART)  **SLIDING


 SCALE**


 **If C...  ACHS


 SC  10/17/17 21:00


 11/16/17 20:59  10/25/17 21:17


2 UNITS


 


 Glucose


  (Glucose 40% Gel)  15-30


 GRAMS 15


 GRAMS...  UD  PRN


 PO  10/17/17 16:15


 11/16/17 16:14   


 


 


 Glucose


  (Glucose Chew


 Tab)  4-8


 Tablets 4


 Tabl...  UD  PRN


 PO  10/17/17 16:15


 11/16/17 16:14   


 


 


 Dextrose


  (Dextrose 50%


 50ML Syringe)  25-50ML OF


 50% DW IV


 FOR...  UD  PRN


 IV  10/17/17 16:15


 11/16/17 16:14   


 


 


 Glucagon


  (Glucagon Inj)  1 mg  UD  PRN


 SQ  10/17/17 16:15


 11/16/17 16:14   


 


 


 Aspirin


  (Ecotrin Tab)  81 mg  QAM


 PO  10/18/17 09:00


 11/17/17 08:59  10/25/17 08:35


81 MG


 


 Atorvastatin


 Calcium


  (Lipitor Tab)  40 mg  QAM


 PO  10/18/17 09:00


 11/17/17 08:59  10/25/17 14:39


40 MG


 


 Calcium Acetate


  (Phoslo Cap)  1,334 mg  UD  PRN


 PO  10/17/17 16:30


 11/16/17 16:29   


 


 


 Calcium Acetate


  (Phoslo Cap)  2,001 mg  TIDM


 PO  10/17/17 17:01


 11/16/17 17:59  10/25/17 18:36


2,001 MG


 


 Dicyclomine HCl


  (Bentyl Cap)  10 mg  QID  PRN


 PO  10/17/17 16:30


 11/16/17 16:29  10/18/17 04:37


10 MG


 


 Fluticasone


 Propionate


  (Flonase Nasal


 Spray)  2 sprays  DAILY  PRN


 SUZI  10/17/17 16:30


 11/16/17 16:29  10/24/17 08:19


2 SPRAYS


 


 Isosorbide


 Mononitrate


  (Imdur Ext Rel


 Tab)  30 mg  QAM


 PO  10/18/17 09:00


 11/17/17 08:59  10/25/17 14:39


30 MG


 


 Isosorbide


 Mononitrate


  (Imdur Ext Rel


 Tab)  60 mg  QAM


 PO  10/18/17 09:00


 11/17/17 08:59  10/25/17 14:39


60 MG


 


 Lorazepam


  (Ativan Tab)  0.5 mg  DAILY  PRN


 PO  10/17/17 16:30


 11/16/17 16:29   


 


 


 Metoprolol


 Succinate


  (Toprol Xl Tab)  50 mg  QAM


 PO  10/18/17 09:00


 11/17/17 08:59  10/25/17 14:39


50 MG


 


 Oxycodone/


 Acetaminophen


  (Percocet


 5-325mg Tab)  1 tab  Q4H  PRN


 PO  10/17/17 16:30


 10/31/17 16:29  10/25/17 18:43


1 TAB


 


 Pantoprazole


 Sodium


  (Protonix Tab)  40 mg  DAILY


 PO  10/18/17 09:00


 11/17/17 08:59  10/25/17 08:36


40 MG


 


 Sertraline HCl


  (Zoloft Tab)  100 mg  HS


 PO  10/17/17 21:00


 11/16/17 20:59  10/25/17 21:15


100 MG


 


 Lactobacillus


 Acidophilus


  (Floranex Tab)  4 tab  BIDM


 PO  10/17/17 17:01


 11/16/17 17:59  10/25/17 18:35


4 TAB


 


 Loratadine


  (Claritin Tab)  5 mg  DAILY  PRN


 PO  10/17/17 16:26


 11/16/17 16:25   


 


 


 Promethazine HCl


 12.5 mg/Sodium


 Chloride  50.5 ml @ 


 204 mls/hr  Q6H  PRN


 IV  10/20/17 08:00


 11/19/17 07:59  10/20/17 08:32


204 MLS/HR


 


 Senna/Docusate


 Sodium


  (Senokot S Tab)  2 tab  BID


 PO  10/25/17 20:00


 11/24/17 19:59  10/25/17 21:14


2 TAB

## 2017-10-25 NOTE — DIALYSIS PROGRESS NOTE
Nephrology Dialysis Note


Date of Service:


Oct 25, 2017.


Subjective


seen on rounds this am 1010.  no c/o dyspnea.  c/o marked fatigue, generalized 

weakness.  no cough.  d/c to rehab contemplated





Objective











  Date Time  Temp Pulse Resp B/P (MAP) Pulse Ox O2 Delivery O2 Flow Rate FiO2


 


10/25/17 16:25      Room Air  


 


10/25/17 15:29 36.6 67 16 149/78 (101) 99 Room Air  


 


10/25/17 14:38  66 18 152/79 (103)    


 


10/25/17 13:57 36.5 65  123/61 (81)    


 


10/25/17 13:45  63  99/47    


 


10/25/17 13:30  59  111/58    


 


10/25/17 13:15  60  101/52    


 


10/25/17 13:00  60  106/55    


 


10/25/17 12:45  60  114/55    


 


10/25/17 12:30  61  108/58    


 


10/25/17 12:15  57  105/48    


 


10/25/17 12:00  62  108/50    


 


10/25/17 11:45  62  126/60    


 


10/25/17 11:30  62  110/53    


 


10/25/17 11:15  64  119/57    


 


10/25/17 11:00  60  95/50    


 


10/25/17 10:45  62  104/53    


 


10/25/17 10:30  64  95/46    


 


10/25/17 10:15  64  111/51    


 


10/25/17 10:00  62  110/50    


 


10/25/17 09:12 36.7 70  122/54 (76)    


 


10/25/17 08:00      Room Air  


 


10/25/17 07:21 36.9 69 18 138/75 (96) 98 Room Air  


 


10/25/17 03:00      Room Air  


 


10/24/17 23:50 36.9 67 18 130/84 (99) 95 Room Air  


 


10/24/17 18:46      Room Air  








Physical Exam:


GENERAL:  Awake, alert, oriented x3, obese. on RA


EYES:  No scleral icterus.


ENT:  Moist mucous membranes.


NECK:  Supple. CEA scar on left 


PULMONARY:  Clear to auscultation w/ diminished air entry


CARDIAC:  Regular rate and rhythm.


ABDOMEN:  Bowel sounds positive, soft, nontender, nondistended.


EXTREMITIES:  no edema. dressing over fem cath site. no redness over avf; + t/b


NEUROLOGICALLY:  centeno, fluent speech


DERMATOLOGIC:  No rash or ulcers noted.





Current Inpatient Medications








 Medications


  (Trade)  Dose


 Ordered  Sig/Willy


 Route  Start Time


 Stop Time Status Last Admin


Dose Admin


 


 Acetaminophen


  (Tylenol Tab)  650 mg  Q4H  PRN


 PO  10/17/17 15:45


 11/16/17 15:44  10/21/17 21:32


650 MG


 


 Ondansetron HCl


  (Zofran Inj)  4 mg  Q6H  PRN


 IV  10/17/17 15:45


 11/16/17 15:44  10/21/17 15:05


4 MG


 


 Miscellaneous


  (Iv Fluids


 Completed)  1 ea  PRN  PRN


 N/A  10/17/17 16:00


 10/17/18 15:59   


 


 


 Insulin Aspart


  (novoLOG ASPART)  **SLIDING


 SCALE**


 **If C...  ACHS


 SC  10/17/17 21:00


 11/16/17 20:59  10/25/17 14:43


2 UNITS


 


 Glucose


  (Glucose 40% Gel)  15-30


 GRAMS 15


 GRAMS...  UD  PRN


 PO  10/17/17 16:15


 11/16/17 16:14   


 


 


 Glucose


  (Glucose Chew


 Tab)  4-8


 Tablets 4


 Tabl...  UD  PRN


 PO  10/17/17 16:15


 11/16/17 16:14   


 


 


 Dextrose


  (Dextrose 50%


 50ML Syringe)  25-50ML OF


 50% DW IV


 FOR...  UD  PRN


 IV  10/17/17 16:15


 11/16/17 16:14   


 


 


 Glucagon


  (Glucagon Inj)  1 mg  UD  PRN


 SQ  10/17/17 16:15


 11/16/17 16:14   


 


 


 Aspirin


  (Ecotrin Tab)  81 mg  QAM


 PO  10/18/17 09:00


 11/17/17 08:59  10/25/17 08:35


81 MG


 


 Atorvastatin


 Calcium


  (Lipitor Tab)  40 mg  QAM


 PO  10/18/17 09:00


 11/17/17 08:59  10/25/17 14:39


40 MG


 


 Calcium Acetate


  (Phoslo Cap)  1,334 mg  UD  PRN


 PO  10/17/17 16:30


 11/16/17 16:29   


 


 


 Calcium Acetate


  (Phoslo Cap)  2,001 mg  TIDM


 PO  10/17/17 17:01


 11/16/17 17:59  10/24/17 17:56


2,001 MG


 


 Dicyclomine HCl


  (Bentyl Cap)  10 mg  QID  PRN


 PO  10/17/17 16:30


 11/16/17 16:29  10/18/17 04:37


10 MG


 


 Fluticasone


 Propionate


  (Flonase Nasal


 Spray)  2 sprays  DAILY  PRN


 SUZI  10/17/17 16:30


 11/16/17 16:29  10/24/17 08:19


2 SPRAYS


 


 Isosorbide


 Mononitrate


  (Imdur Ext Rel


 Tab)  30 mg  QAM


 PO  10/18/17 09:00


 11/17/17 08:59  10/25/17 14:39


30 MG


 


 Isosorbide


 Mononitrate


  (Imdur Ext Rel


 Tab)  60 mg  QAM


 PO  10/18/17 09:00


 11/17/17 08:59  10/25/17 14:39


60 MG


 


 Lorazepam


  (Ativan Tab)  0.5 mg  DAILY  PRN


 PO  10/17/17 16:30


 11/16/17 16:29   


 


 


 Metoprolol


 Succinate


  (Toprol Xl Tab)  50 mg  QAM


 PO  10/18/17 09:00


 11/17/17 08:59  10/25/17 14:39


50 MG


 


 Oxycodone/


 Acetaminophen


  (Percocet


 5-325mg Tab)  1 tab  Q4H  PRN


 PO  10/17/17 16:30


 10/31/17 16:29  10/25/17 06:31


1 TAB


 


 Pantoprazole


 Sodium


  (Protonix Tab)  40 mg  DAILY


 PO  10/18/17 09:00


 11/17/17 08:59  10/25/17 08:36


40 MG


 


 Sertraline HCl


  (Zoloft Tab)  100 mg  HS


 PO  10/17/17 21:00


 11/16/17 20:59  10/24/17 21:17


100 MG


 


 Lactobacillus


 Acidophilus


  (Floranex Tab)  4 tab  BIDM


 PO  10/17/17 17:01


 11/16/17 17:59  10/25/17 08:36


4 TAB


 


 Loratadine


  (Claritin Tab)  5 mg  DAILY  PRN


 PO  10/17/17 16:26


 11/16/17 16:25   


 


 


 Promethazine HCl


 12.5 mg/Sodium


 Chloride  50.5 ml @ 


 204 mls/hr  Q6H  PRN


 IV  10/20/17 08:00


 11/19/17 07:59  10/20/17 08:32


204 MLS/HR


 


 Senna/Docusate


 Sodium


  (Senokot S Tab)  2 tab  BID


 PO  10/25/17 20:00


 11/24/17 19:59   


 











Last 24 Hours








Test


  10/24/17


20:15 10/25/17


07:32 10/25/17


07:41 10/25/17


11:28


 


Bedside Glucose 132 mg/dl  151 mg/dl   160 mg/dl 


 


White Blood Count   13.33 K/uL  


 


Red Blood Count   3.14 M/uL  


 


Hemoglobin   9.1 g/dL  


 


Hematocrit   30.3 %  


 


Mean Corpuscular Volume   96.5 fL  


 


Mean Corpuscular Hemoglobin   29.0 pg  


 


Mean Corpuscular Hemoglobin


Concent 


  


  30.0 g/dl 


  


 


 


RDW Standard Deviation   51.6 fL  


 


RDW Coefficient of Variation   16.1 %  


 


Platelet Count   393 K/uL  


 


Mean Platelet Volume   9.7 fL  


 


Sodium Level   133 mmol/L  


 


Potassium Level   4.3 mmol/L  


 


Chloride Level   95 mmol/L  


 


Carbon Dioxide Level   28 mmol/L  


 


Anion Gap   10.0 mmol/L  


 


Blood Urea Nitrogen   35 mg/dl  


 


Creatinine   7.49 mg/dl  


 


Est Creatinine Clear Calc


Drug Dose 


  


  10.5 ml/min 


  


 


 


Estimated GFR (


American) 


  


  6.7 


  


 


 


Estimated GFR (Non-


American 


  


  5.8 


  


 


 


BUN/Creatinine Ratio   4.8  


 


Random Glucose   159 mg/dl  


 


Calcium Level   9.4 mg/dl  











Assessment & Plan


51 y/o F ESRD patient admitted for failed access and hyperkalemia. She had a 

temporary femoral cath placed and patient had dialysis on the 17 and 18th after 

thrombectomy. temp cath now removed; first used arm successfully after 

procedure on 10/20





End-stage renal disease. Plan for dialysis today. goal 1.5-2 Liter UF  continue 

to monitor potassium. to run again on 2k bath. ok from renal standpoint to 

discharge after dialysis if access works well. will continue dialysis on m/w/f 

schedule as inpt/outpatient.





anemia of ESRD w/ iron deficiency > gave venofer and high dose procrit





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





Leukocytosis -- progressive, no clear cause; doubt role for AVF intervention to 

explain this





Appreciate consult; will follow with you.  Care coordinated w/ Dr. Reese

## 2017-10-26 VITALS
TEMPERATURE: 98.24 F | SYSTOLIC BLOOD PRESSURE: 126 MMHG | HEART RATE: 66 BPM | OXYGEN SATURATION: 97 % | DIASTOLIC BLOOD PRESSURE: 71 MMHG

## 2017-10-26 VITALS
OXYGEN SATURATION: 97 % | DIASTOLIC BLOOD PRESSURE: 78 MMHG | SYSTOLIC BLOOD PRESSURE: 143 MMHG | HEART RATE: 59 BPM | TEMPERATURE: 98.24 F

## 2017-10-26 VITALS
DIASTOLIC BLOOD PRESSURE: 78 MMHG | TEMPERATURE: 98.24 F | HEART RATE: 59 BPM | SYSTOLIC BLOOD PRESSURE: 143 MMHG | OXYGEN SATURATION: 97 %

## 2017-10-26 LAB
ANISOCYTOSIS BLD QL SMEAR: PRESENT
BASOPHILS # BLD: 0.02 K/UL (ref 0–0.2)
BASOPHILS NFR BLD: 0.2 %
COMPLETE: YES
EOSINOPHIL NFR BLD AUTO: 301 K/UL (ref 130–400)
HCT VFR BLD CALC: 27 % (ref 37–47)
IG%: 0.3 %
IMM GRANULOCYTES NFR BLD AUTO: 18 %
LYMPHOCYTES # BLD: 1.9 K/UL (ref 1.2–3.4)
MCH RBC QN AUTO: 29.5 PG (ref 25–34)
MCHC RBC AUTO-ENTMCNC: 30 G/DL (ref 32–36)
MCV RBC AUTO: 98.2 FL (ref 80–100)
MONOCYTES NFR BLD: 10 %
NEUTROPHILS # BLD AUTO: 3.2 %
NEUTROPHILS NFR BLD AUTO: 68.3 %
PMV BLD AUTO: 10.1 FL (ref 7.4–10.4)
RBC # BLD AUTO: 2.75 M/UL (ref 4.2–5.4)
WBC # BLD AUTO: 10.56 K/UL (ref 4.8–10.8)

## 2017-10-26 RX ADMIN — LACTOBACILLUS TAB SCH TAB: TAB at 08:06

## 2017-10-26 RX ADMIN — FLUTICASONE PROPIONATE PRN SPRAYS: 50 SPRAY, METERED NASAL at 08:11

## 2017-10-26 RX ADMIN — METOPROLOL SUCCINATE SCH MG: 50 TABLET, EXTENDED RELEASE ORAL at 08:06

## 2017-10-26 RX ADMIN — ISOSORBIDE MONONITRATE SCH MG: 60 TABLET ORAL at 08:06

## 2017-10-26 RX ADMIN — INSULIN ASPART SCH UNITS: 100 INJECTION, SOLUTION INTRAVENOUS; SUBCUTANEOUS at 08:10

## 2017-10-26 RX ADMIN — INSULIN ASPART SCH UNITS: 100 INJECTION, SOLUTION INTRAVENOUS; SUBCUTANEOUS at 12:57

## 2017-10-26 RX ADMIN — ATORVASTATIN CALCIUM SCH MG: 40 TABLET, FILM COATED ORAL at 08:06

## 2017-10-26 RX ADMIN — PANTOPRAZOLE SCH MG: 40 TABLET, DELAYED RELEASE ORAL at 08:06

## 2017-10-26 RX ADMIN — OXYCODONE HYDROCHLORIDE AND ACETAMINOPHEN PRN TAB: 5; 325 TABLET ORAL at 06:29

## 2017-10-26 RX ADMIN — Medication SCH MG: at 08:06

## 2017-10-26 RX ADMIN — CALCIUM ACETATE SCH MG: 667 CAPSULE ORAL at 08:06

## 2017-10-26 RX ADMIN — CALCIUM ACETATE SCH MG: 667 CAPSULE ORAL at 12:35

## 2017-10-26 RX ADMIN — STANDARDIZED SENNA CONCENTRATE AND DOCUSATE SODIUM SCH TAB: 8.6; 5 TABLET ORAL at 08:06

## 2017-10-26 RX ADMIN — ISOSORBIDE MONONITRATE SCH MG: 30 TABLET, EXTENDED RELEASE ORAL at 08:06

## 2017-10-26 NOTE — PROGRESS NOTE
Medicine Progress Note


Date & Time of Visit:


Oct 26, 2017 at 14:45


.


Subjective





Feels better.


No chest pain, cough, SOB.


No nausea, vomiting, diarrhea.


.





Objective





Last 8 Hrs








  Date Time  Temp Pulse Resp B/P (MAP) Pulse Ox O2 Delivery O2 Flow Rate FiO2


 


10/26/17 15:01 36.8 59 18 143/78 (99) 97 Room Air  


 


10/26/17 08:00      Room Air  


 


10/26/17 07:36 36.8 66 20 126/71 (89) 97 Room Air  








Physical Exam:





General- no distress


Neck- no JVD


Lungs- clear 


Heart- regular


Abdomen- + BS,  soft, nontender 


Extremities-  AV fistula left upper extremity; trace pretibial edema; no calf 

tenderness


Neuro- alert


.


Laboratory Results:





Last 24 Hours








Test


  10/25/17


16:33 10/25/17


20:10 10/26/17


06:22 10/26/17


07:58


 


Bedside Glucose 119 mg/dl  228 mg/dl   192 mg/dl 


 


White Blood Count   10.56 K/uL  


 


Red Blood Count   2.75 M/uL  


 


Hemoglobin   8.1 g/dL  


 


Hematocrit   27.0 %  


 


Mean Corpuscular Volume   98.2 fL  


 


Mean Corpuscular Hemoglobin   29.5 pg  


 


Mean Corpuscular Hemoglobin


Concent 


  


  30.0 g/dl 


  


 


 


Platelet Count   301 K/uL  


 


Mean Platelet Volume   10.1 fL  


 


Neutrophils (%) (Auto)   68.3 %  


 


Lymphocytes (%) (Auto)   18.0 %  


 


Monocytes (%) (Auto)   10.0 %  


 


Eosinophils (%) (Auto)   3.2 %  


 


Basophils (%) (Auto)   0.2 %  


 


Neutrophils # (Auto)   7.21 K/uL  


 


Lymphocytes # (Auto)   1.90 K/uL  


 


Monocytes # (Auto)   1.06 K/uL  


 


Eosinophils # (Auto)   0.34 K/uL  


 


Basophils # (Auto)   0.02 K/uL  


 


RDW Standard Deviation   53.9 fL  


 


RDW Coefficient of Variation   16.6 %  


 


Immature Granulocyte % (Auto)   0.3 %  


 


Immature Granulocyte # (Auto)   0.03 K/uL  


 


Anisocytosis   PRESENT  


 


Test


  10/26/17


11:24 


  


  


 


 


Bedside Glucose 136 mg/dl    











Assessment & Plan





CKD V ON HEMODIALYSIS / THROMBOSED AV FISTULA LUE


Missed 2 dialysis treatments secondary to thrombosed AV fistula.


Serum potassium was 5.6 on admission, subsequently improved.


Temporary femoral dialysis catheter placed for dialysis prior to surgery.


Thrombectomy left upper extremity AV fistula performed by Vascular Surgery.


Femoral dialysis catheter removed.


Continue hemodialysis as directed by Nephrology.


Vascular Surgery follow-up with Dr. Roberts; ansley LUE incision to be removed 

at that time.





CORONARY ARTERY DISEASE


Continue aspirin, metoprolol, nitrates, statin.





HYPERTENSION


/98 day of admission.


Blood pressures improved.


Continue metoprolol and nitrates.





DM TYPE II


Glycemic control variable.


Hemoglobin A1c 7.7 on 10/9/17.


FBS today 192.


Continue NovoLog per protocol.


Resume Lantus HS.





LEUKOCYTOSIS


WBC 13,000 10/25.


Afebrile.


No signs / symptoms of infection.


WBC improved- 10,560 today.


Follow.





VTE PROPHYLAXIS


SCD's.


Ambulate.





DISPOSITION


Poor functional status secondary to deconditioning and multiple medical 

problems.


Anticipated transfer to [facility] for inpatient rehabilitation when medically 

stable.


Family Medicine follow-up with Dr. Smith.


.


Consultants:


Nephrology


Vascular Surgery


.


Procedures:


placement femoral hemodialysis catheter


hemodialysis


thrombectomy AV fistula LUE


PT


OT


.


Current Inpatient Medications:





Current Inpatient Medications








 Medications


  (Trade)  Dose


 Ordered  Sig/Willy


 Route  Start Time


 Stop Time Status Last Admin


Dose Admin


 


 Acetaminophen


  (Tylenol Tab)  650 mg  Q4H  PRN


 PO  10/17/17 15:45


 11/16/17 15:44  10/21/17 21:32


650 MG


 


 Ondansetron HCl


  (Zofran Inj)  4 mg  Q6H  PRN


 IV  10/17/17 15:45


 11/16/17 15:44  10/25/17 21:12


4 MG


 


 Miscellaneous


  (Iv Fluids


 Completed)  1 ea  PRN  PRN


 N/A  10/17/17 16:00


 10/17/18 15:59   


 


 


 Insulin Aspart


  (novoLOG ASPART)  **SLIDING


 SCALE**


 **If C...  ACHS


 SC  10/17/17 21:00


 11/16/17 20:59  10/26/17 12:57


4 UNITS


 


 Glucose


  (Glucose 40% Gel)  15-30


 GRAMS 15


 GRAMS...  UD  PRN


 PO  10/17/17 16:15


 11/16/17 16:14   


 


 


 Glucose


  (Glucose Chew


 Tab)  4-8


 Tablets 4


 Tabl...  UD  PRN


 PO  10/17/17 16:15


 11/16/17 16:14   


 


 


 Dextrose


  (Dextrose 50%


 50ML Syringe)  25-50ML OF


 50% DW IV


 FOR...  UD  PRN


 IV  10/17/17 16:15


 11/16/17 16:14   


 


 


 Glucagon


  (Glucagon Inj)  1 mg  UD  PRN


 SQ  10/17/17 16:15


 11/16/17 16:14   


 


 


 Aspirin


  (Ecotrin Tab)  81 mg  QAM


 PO  10/18/17 09:00


 11/17/17 08:59  10/26/17 08:06


81 MG


 


 Atorvastatin


 Calcium


  (Lipitor Tab)  40 mg  QAM


 PO  10/18/17 09:00


 11/17/17 08:59  10/26/17 08:06


40 MG


 


 Calcium Acetate


  (Phoslo Cap)  1,334 mg  UD  PRN


 PO  10/17/17 16:30


 11/16/17 16:29   


 


 


 Calcium Acetate


  (Phoslo Cap)  2,001 mg  TIDM


 PO  10/17/17 17:01


 11/16/17 17:59  10/26/17 12:35


2,001 MG


 


 Dicyclomine HCl


  (Bentyl Cap)  10 mg  QID  PRN


 PO  10/17/17 16:30


 11/16/17 16:29  10/18/17 04:37


10 MG


 


 Fluticasone


 Propionate


  (Flonase Nasal


 Spray)  2 sprays  DAILY  PRN


 SUZI  10/17/17 16:30


 11/16/17 16:29  10/26/17 08:11


2 SPRAYS


 


 Isosorbide


 Mononitrate


  (Imdur Ext Rel


 Tab)  30 mg  QAM


 PO  10/18/17 09:00


 11/17/17 08:59  10/26/17 08:06


30 MG


 


 Isosorbide


 Mononitrate


  (Imdur Ext Rel


 Tab)  60 mg  QAM


 PO  10/18/17 09:00


 11/17/17 08:59  10/26/17 08:06


60 MG


 


 Lorazepam


  (Ativan Tab)  0.5 mg  DAILY  PRN


 PO  10/17/17 16:30


 11/16/17 16:29   


 


 


 Metoprolol


 Succinate


  (Toprol Xl Tab)  50 mg  QAM


 PO  10/18/17 09:00


 11/17/17 08:59  10/26/17 08:06


50 MG


 


 Oxycodone/


 Acetaminophen


  (Percocet


 5-325mg Tab)  1 tab  Q4H  PRN


 PO  10/17/17 16:30


 10/31/17 16:29  10/26/17 06:29


1 TAB


 


 Pantoprazole


 Sodium


  (Protonix Tab)  40 mg  DAILY


 PO  10/18/17 09:00


 11/17/17 08:59  10/26/17 08:06


40 MG


 


 Sertraline HCl


  (Zoloft Tab)  100 mg  HS


 PO  10/17/17 21:00


 11/16/17 20:59  10/25/17 21:15


100 MG


 


 Lactobacillus


 Acidophilus


  (Floranex Tab)  4 tab  BIDM


 PO  10/17/17 17:01


 11/16/17 17:59  10/26/17 08:06


4 TAB


 


 Loratadine


  (Claritin Tab)  5 mg  DAILY  PRN


 PO  10/17/17 16:26


 11/16/17 16:25   


 


 


 Promethazine HCl


 12.5 mg/Sodium


 Chloride  50.5 ml @ 


 204 mls/hr  Q6H  PRN


 IV  10/20/17 08:00


 11/19/17 07:59  10/20/17 08:32


204 MLS/HR


 


 Senna/Docusate


 Sodium


  (Senokot S Tab)  2 tab  BID


 PO  10/25/17 20:00


 11/24/17 19:59  10/26/17 08:06


2 TAB

## 2017-10-26 NOTE — DISCHARGE SUMMARY
Discharge Summary


Date of Service


Oct 26, 2017.





Discharge Summary


Admission Date:


Oct 18, 2017 at 10:32


Discharge Date:  Oct 26, 2017


Discharge Disposition:  Rehab


Principal Diagnosis:


CKD V on hemodialysis


thrombosed AV fistula LUE


hyperkalemia


.


Secondary Diagnoses/Problems:





Chronic and Resolved Medical Problems:





(1) Allergic rhinitis


Status: Chronic  





(2) CAD (coronary artery disease)


Permanent Comment: s/p PCI and BMS to left circumflex 8/2016


Status: Chronic  





(3) Carotid stenosis


Permanent Comment: left ICA


Status: Chronic  





(4) DM type 2 (diabetes mellitus, type 2)


Status: Chronic  





(5) Dyslipidemia


Status: Chronic  





(6) ESRD (end stage renal disease) on dialysis


Status: Chronic  





(7) GERD (gastroesophageal reflux disease)


Status: Chronic  





(8) HTN (hypertension)


Status: Chronic  





(10) Ischemic cardiomyopathy


Permanent Comment: echo 10/2016 - EF 40-45%, grade I diastolic dysfunction


Status: Chronic  





(11) Morbid obesity


Status: Chronic  





(12) Tobacco use disorder


Status: Chronic  








Surgical Problems:





(1) Hemodialysis access, AV graft


Permanent Comment: [facility] Dr. Roberts 1/4/13


Status: Chronic  


.





Procedures:


placement femoral hemodialysis catheter


hemodialysis


thrombectomy AV fistula LUE


PT


OT


.


Consultations:


Nephrology


Vascular Surgery


.





Medication Reconciliation


New Medications:  


Insulin Aspart (Novolog) 100 Units/Ml Inj


0 SQ ACHS for 30 Days


(Temporary med while at [facility])


 


 BSG      NovoLog SQ


 < 101       none


 101-150      2 units


 151-200      3 units


 201-250      4 units


 251-300      5 units


 > 300        6 units


Sennosides-Docusate Sodium (Senokot S) 1 Tab Tab


2 TAB PO BID for 30 Days, TAB





 


Continued Medications:  


Aspirin (Aspirin Ec) 81 Mg Tab


81 MG PO QAM





Atorvastatin (Atorvastatin Calcium) 40 Mg Tab


40 MG PO QAM





Calcium Acetate (Phosphate Bin (Phoslo 667 Mg) 667 Mg Cap


2001 MG PO TIDM


TAKE 3 CAPSULES THREE TIMES A DAY WITH MEALS


Calcium Acetate (Phosphate Bin (Phoslo 667 Mg) 667 Mg Cap


2 CAP PO UD PRN for snacks, CAP


Take 2 caps PO with any snacks.


Cephalexin Monohydrate (Keflex) 500 Mg Cap


500 MG PO BID, CAP


course set for 10 days, started 10/13


Dicyclomine Hcl (Dicyclomine Hcl) 10 Mg Cap


10 MG PO QID PRN for Cramping





Fluticasone Propionate (Fluticasone Propionate) 120 Sprays/6000 Mcg Inha


2 SPRAYS SUZI DAILY PRN for Allergy Symptoms





Insulin Glargine (Lantus Solostar) 100 Unit/Ml Inj


12 UNITS SC QPM, PEN





Isosorbide Mononitrate Ext Rel (Imdur Ext Rel) 30 Mg Tabcr


30 MG PO QAM


TAKE ONE 30 MG TABLET ALONG WITH ONE 60 MG TABLET TO EQUAL 90 MG


 DAILY DOSE


Isosorbide Mononitrate Ext Rel (Imdur Ext Rel) 60 Mg Ertab


60 MG PO QAM


TAKE ONE 60 MG TABLET ALONG WITH ONE 30 MG TABLET TO EQUAL 90 MG


 DAILY DOSE


Lactobacillus (Probiotic) 1 Cap Cap


2 CAP PO BID


TAKES WITH ANTIBIOTICS.


Loratadine (Claritin Childrens) 5 Mg Chw


5 MG PO DAILY PRN for Allergy Symptoms





Lorazepam (Lorazepam) 0.5 Mg Tab


0.5 MG PO DAILY PRN for Anxiety





Metoprolol Succinate (Metoprolol Succinate ER) 50 Mg Tabcr


50 MG PO QAM





Ondansetron Hcl (Zofran) 4 Mg Tab


4 MG PO Q6 PRN for Nausea, TAB





Oxycodone/Acetaminophen 5MG/325MG (Oxycodone/Acetaminophen 5MG/325MG) 1 Tab Tab


1-2 TABS PO Q4H PRN for Pain





Pantoprazole Sodium (Protonix) 40 Mg Tab


40 MG PO DAILY, #30 TAB





Sertraline HCl (Sertraline HCl) 100 Mg Tab


100 MG PO HS











Admission Information


HPI (per Admitting provider):


Pt is 51y/o F with hx ESRD on HD, HTN, DM II, CAD, carotid stenosis presents as 

direct admission from Dr Roberts for hyperkalemia, HTN. Pt was in same day 

surgery for thrombectomy for thrombosis of L arm fistula. In same day surgery 

BP was noted to be 200/98. A R femoral dialysis catheter was placed instead of 

thrombectomy. Pt sent for dialysis and is receiving currently. She is scheduled 

for thrombectomy of L arm fistula tomorrow. K: 5.6, BUN: 115, CR: 16.8, glucose

: 138





Pt states typically has dialysis on MWF schedule. States didn't have done 

friday or yesterday (monday) secondary to malfunctioning fistula. She admits to 

not taking her medicines for the past couple of days as she has been having 

nausea which she relates is secondary to not receiving dialysis. She took 

Zofran 4mg po this am and her ASA 81mg, probiotic, otherwise hasn't been taking 

her medications. States hx chronic constipation, hasn't had BM in couple of 

days. Pt with hx chronic low back pain, denies any changes with that. Denies 

fever/chills, diaphoresis, N/V/D, melena, hematochezia, HA, dizziness, syncope, 

vision changes, neck pain, CP, SOB, orthopnea, palpitations, cough, sore throat

, choking, otalgia, rhinorrhea, flank pain, paresthesias, weakness, extremity 

weakness, extremity edema, rashes, or changes with urination (pt doesn't 

urinate much secondary to HD.








Pt was recently admitted to hospital on 10/11/17 for R shoulder pain, elevated 

WBC with concern for possible sepsis, elevated troponin. Unremarkable hospital 

course with decreased WBC and no distinct infection source, suspected was from 

pain/inflammation. Troponin no increase in trend. R shoulder pain improved.


.


Physical Exam (per Admitting):  


   General Appearance:  + obese, + pertinent finding (chronically ill appearing)


   Head:  normocephalic, atraumatic


   Eyes:  normal inspection, PERRL, EOMI


   ENT:  pharynx normal


   Neck:  supple, no adenopathy, trachea midline


   Respiratory/Chest:  chest non-tender, lungs clear, normal breath sounds, no 

respiratory distress, no accessory muscle use


   Cardiovascular:  regular rate, rhythm, no murmur


   Abdomen/GI:  normal bowel sounds, non tender, soft


   Extremities/Musculoskelatal:  non-tender, + pedal edema (trace)


   Neurologic/Psych:  alert, normal mood/affect, oriented x 3


   Skin:  warm/dry, + pertinent finding (ashy skin color)





Hospital Course





CKD V ON HEMODIALYSIS / THROMBOSED AV FISTULA ELLIOTTREVIN


Missed 2 dialysis treatments secondary to thrombosed AV fistula.


Serum potassium was 5.6 on admission, subsequently improved.


Temporary femoral dialysis catheter placed for dialysis prior to surgery.


Thrombectomy left upper extremity AV fistula performed by Vascular Surgery.


Femoral dialysis catheter removed.


Continue hemodialysis as directed by Nephrology.


Vascular Surgery follow-up with Dr. Roberts; ansley PEDERSON incision to be removed 

at that time.





CORONARY ARTERY DISEASE


Continue aspirin, metoprolol, nitrates, statin.





HYPERTENSION


/98 day of admission.


Blood pressures improved.


Continue metoprolol and nitrates.





DM TYPE II


Glycemic control variable.


Hemoglobin A1c 7.7 on 10/9/17.


FBS day of discharge 192.


NovoLog per protocol during hospital stay.


Resume Lantus HS.





LEUKOCYTOSIS


WBC 13,000 10/25.


Afebrile.


No signs / symptoms of infection.


WBC improved- 10,560 day of discharge.


Follow.





VTE PROPHYLAXIS


SCD's.


Ambulate.





DISPOSITION


Poor functional status secondary to deconditioning and multiple medical 

problems.


Anticipated transfer to [facility] for inpatient rehabilitation when medically 

stable.


Family Medicine follow-up with Dr. Smith.


.


Total time spent on discharge = 45 min.


This includes examination of the patient, discharge planning, medication 

reconciliation, and communication with other providers.


.





Discharge Instructions





Date of Service


Oct 26, 2017.





Admission


Reason for Admission:  CKD V, thrombosed AV fistula LUE, hyperkalemia


.





Discharge


Discharge Diagnosis / Problem:   CKD V, thrombosed AV fistula LUE, hyperkalemia





Discharge Goals


Goal(s):  Decrease discomfort, Improve function, Increase independence, Improve 

disease control





Activity Recommendations


Activity Level:  Assistance Required


Therapies:  Physical Therapy, Occupational Therapy





.





Additional Information


Patient informed of condition:  Yes


Advance Directives:  No


DNR:  No


Level of Care:  Acute Rehab


Communicable Disease:  Yes (History MRSA)


Prognosis:  Improving


Oxygen at (LPM):  no


Cowan Catheter:  No





Instructions / Follow-Up


Instructions / Follow-Up





FAMILY MEDICINE


Please arrange for follow-up appointment with Dr. Smith at time of DC from 

[facility].





NEPHROLOGY


[facility].





VASCULAR SURGERY


Dr. Roberts


His office will arrange for appointment.


Staples LUE incision to be removed at that time.








Thank you for receiving this patient in transfer.


Please call if you have any questions.


Alfred Reese


Cell:  [phone]


.





Current Hospital Diet


Patient's current hospital diet: AHA Diet (Heart Healthy), Diabetes Type 2 Diet

, Renal Diet





Discharge Diet


Recommended Diet:  Diabetes Type 1 Diet, Renal Diet





Procedures


Procedures Performed:  


Thrombectomy Left upper arm arteriovenous fistula and revision





Pending Studies


Studies pending at discharge:  no





Physician Orders On Transfer


Dressing Changes:


sterile gauze dressing LUE AV fistula


change daily + PRN


.


Vital Signs:


routine


.


Weigh:


daily


.


Additional Orders:


hemodialysis q MWF as directed by Nephrology





BSG's AC + HS


.


POLST Discussion:  Not Applicable





Laboratory Results





Hemoglobin A1c








Test


  10/9/17


02:08 Range/Units


 


 


Estimated Average Glucose 174   mg/dl


 


Hemoglobin A1c 7.7 H 4.5-5.6  %











Medical Emergencies








.


Who to Call and When:





Medical Emergencies:  If at any time you feel your situation is an emergency, 

please call 911 immediately.





.





Non-Emergent Contact


Non-Emergency issues call your:  Primary Care Provider, Hospital Doctor, 

Nephrologist





.


.








"Provider Documentation" section prepared by Alfred Reese.








.





Core Measure Problem


Core Measures:  None





.





Additional Copies To


Carmen Wing MD; Lurdes Smith D.O.; Phil Davison DO; Jamel Roberts M.D.

## 2017-10-26 NOTE — DISCHARGE INSTRUCTIONS
Discharge Instructions


Date of Service


Oct 26, 2017.





Admission


Reason for Admission:  CKD V, thrombosed AV fistula LUE, hyperkalemia


.





Discharge


Discharge Diagnosis / Problem:   CKD V, thrombosed AV fistula LUE, hyperkalemia





Discharge Goals


Goal(s):  Decrease discomfort, Improve function, Increase independence, Improve 

disease control





Activity Recommendations


Activity Level:  Assistance Required


Therapies:  Physical Therapy, Occupational Therapy





.





Additional Information


Patient informed of condition:  Yes


Advance Directives:  No


DNR:  No


Level of Care:  Acute Rehab


Communicable Disease:  Yes (History MRSA)


Prognosis:  Improving


Oxygen at (LPM):  no


Cowan Catheter:  No





Instructions / Follow-Up


Instructions / Follow-Up





FAMILY MEDICINE


Please arrange for follow-up appointment with Dr. Smith at time of DC from 

[facility].





NEPHROLOGY


[facility]ph[facility].





VASCULAR SURGERY


Dr. Roberts


His office will arrange for appointment.


Staples LUE incision to be removed at that time.








Thank you for receiving this patient in transfer.


Please call if you have any questions.


Alfred Reese


Cell:  [phone]


.





Current Hospital Diet


Patient's current hospital diet: AHA Diet (Heart Healthy), Diabetes Type 2 Diet

, Renal Diet





Discharge Diet


Recommended Diet:  Diabetes Type 1 Diet, Renal Diet





Procedures


Procedures Performed:  


Thrombectomy Left upper arm arteriovenous fistula and revision





Pending Studies


Studies pending at discharge:  no





Physician Orders On Transfer


Dressing Changes:


sterile gauze dressing LUE AV fistula


change daily + PRN


.


Vital Signs:


routine


.


Weigh:


daily


.


Additional Orders:


hemodialysis q MWF as directed by Nephrology





BSG's AC + HS


.


POLST Discussion:  Not Applicable





Laboratory Results





Hemoglobin A1c








Test


  10/9/17


02:08 Range/Units


 


 


Estimated Average Glucose 174   mg/dl


 


Hemoglobin A1c 7.7 H 4.5-5.6  %











Medical Emergencies








.


Who to Call and When:





Medical Emergencies:  If at any time you feel your situation is an emergency, 

please call 911 immediately.





.





Non-Emergent Contact


Non-Emergency issues call your:  Primary Care Provider, Hospital Doctor, 

Nephrologist





.


.








"Provider Documentation" section prepared by Alfred Reese.








.





Core Measure Problem


Core Measures:  None

## 2017-11-28 NOTE — [facility] OPERATIVE REPORT
Operative Report


Operative Date


Oct 18, 2017.





Pre-Operative Diagnosis





Thrombosed Left Upper Arm Fistula





Post-Operative Diagnosis





Thrombosed Left Upper Arm Fistula





Procedure(s) Performed





Thrombectomy Left upper arm arteriovenous fistula and revision





Surgeon


Dr. Roberts





Assistant Surgeon(s)


none





Estimated Blood Loss


150ml





Findings


Stenosis of proximal fistula





Specimens





none per surgeon





Anesthesia


MAC





Complication(s)


None





Disposition


Recovery Room / PACU





Indications


Patient has a left upper arm fistula.  It is thrombosed and has a proximal 

stenosis by previous fistulogram.  Thrombectomy with revision was recommended.  

I have discussed the risks options and benefits of the procedure with the 

patient.  The patient understands the risks options and benefits and agrees to 

the procedure.





Description of Procedure


The patient was taken to the operating room and placed in the supine position.  

After the left arm was prepped and draped in a sterile manner local anesthetic 

was administered.  A longitudinal incision was made over the proximal portion 

of the fistula.  The fistula was then exposed just beyond the area of stenosis.

  The fistula was stenotic for the proximal 2-3 cm from the arterial 

anastomosis.  This area was isolated.  The dissection was carried downward and 

the brachial artery was identified at a higher level than the previous 

anastomosis.  The fistula was then ligated and divided.  The distal end of 

ligated fistula was then mobilized.  It was slung down to the brachial artery.  

The brachial artery was clamped proximally and distally.  A longitudinal 

arteriotomy was then made.  An end to side anastomosis of the fistula to the 

brachial artery was done using 6-0 Prolene suture in the usual vascular 

fashion.  Prior to completing the closure back bleeding and fore bleeding was 

allowed to occur.  The final few sutures were placed and securely tied.  Clamps 

were then removed.  Good flow was seen and a nice thrill was felt in the 

fistula.  After adequate hemostasis was noted the wound was closed in the usual 

fashion using a running 3-0 Vicryl suture for the subcutaneous layer and a 

running 4-0 Vicryl suture in a subcuticular fashion for the skin edges.  

Dermabond was used for dressing.  The patient left the operating room in 

satisfactory condition and tolerated the procedure well.


All needle and sponge counts were correct at the end of the procedure


I attest to the content of the Intraoperative Record and any orders documented 

therein.  Any exceptions are noted below.

## 2017-12-15 ENCOUNTER — HOSPITAL ENCOUNTER (EMERGENCY)
Dept: HOSPITAL 45 - C.EDB | Age: 52
Discharge: HOME | End: 2017-12-15
Payer: COMMERCIAL

## 2017-12-15 VITALS — DIASTOLIC BLOOD PRESSURE: 97 MMHG | HEART RATE: 88 BPM | SYSTOLIC BLOOD PRESSURE: 170 MMHG | OXYGEN SATURATION: 95 %

## 2017-12-15 VITALS — TEMPERATURE: 98.06 F

## 2017-12-15 VITALS
WEIGHT: 235.89 LBS | HEIGHT: 65.98 IN | BODY MASS INDEX: 37.91 KG/M2 | WEIGHT: 235.89 LBS | HEIGHT: 65.98 IN | BODY MASS INDEX: 37.91 KG/M2

## 2017-12-15 DIAGNOSIS — L03.012: Primary | ICD-10-CM

## 2017-12-15 DIAGNOSIS — I25.10: ICD-10-CM

## 2017-12-15 DIAGNOSIS — E11.9: ICD-10-CM

## 2017-12-15 DIAGNOSIS — Z99.2: ICD-10-CM

## 2017-12-15 DIAGNOSIS — N18.6: ICD-10-CM

## 2017-12-15 DIAGNOSIS — Z83.3: ICD-10-CM

## 2017-12-15 DIAGNOSIS — I12.0: ICD-10-CM

## 2017-12-15 DIAGNOSIS — F17.210: ICD-10-CM

## 2017-12-15 DIAGNOSIS — M86.9: ICD-10-CM

## 2017-12-15 DIAGNOSIS — Z79.82: ICD-10-CM

## 2017-12-15 DIAGNOSIS — Z84.1: ICD-10-CM

## 2017-12-15 DIAGNOSIS — E66.01: ICD-10-CM

## 2017-12-15 DIAGNOSIS — Z79.899: ICD-10-CM

## 2017-12-15 DIAGNOSIS — Z79.4: ICD-10-CM

## 2017-12-15 DIAGNOSIS — I65.29: ICD-10-CM

## 2017-12-15 DIAGNOSIS — Z82.49: ICD-10-CM

## 2017-12-15 DIAGNOSIS — E87.5: ICD-10-CM

## 2017-12-15 DIAGNOSIS — K21.9: ICD-10-CM

## 2017-12-15 DIAGNOSIS — M19.90: ICD-10-CM

## 2017-12-15 NOTE — EMERGENCY ROOM VISIT NOTE
History


First contact with patient:  11:55


Chief Complaint:  WOUND INFECTION


Stated Complaint:  MIDDLE FINGER LEFT HAND - INFECTED


Nursing Triage Summary:  


patient states she cut her left middle finger on cardboard on thanksgiving. 





patient c/o left finger pain and "pus" under superficial cut.





History of Present Illness


The patient is a 52 year old female who presents to the Emergency Room with 

complaints of a left fingertip infection.  The patient reports that she cut her 

finger on cardboard on Thanksgiving day.  She reports that she has poor vision 

because of diabetes and cataracts.  She has noticed progressively worsening 

swelling and possible pus under the scab.  The patient has not noticed any pain 

extending into the hand or forearm region.  She denies fevers or pills.  She 

reports a cyst in her left index finger as well that she would like to have 

removed.  The patient has not seen her family doctor or an orthopedic surgeon 

for her condition.  The patient is right-hand-dominant, and rates her 

discomfort a 6 out of 10.





Review of Systems


10 system review was performed and was negative except for pertinent positives 

and negatives as indicated in history of present illness





Past Medical/Surgical History


Medical Problems:


(1) Allergic rhinitis


(2) Arthritis


(3) C. difficile colitis


(4) CAD (coronary artery disease)


(5) Carotid stenosis


(6) Diabetic foot infection


(7) DM type 2 (diabetes mellitus, type 2)


(8) Dyslipidemia


(9) ESRD (end stage renal disease) on dialysis


(10) Fall


(11) GERD (gastroesophageal reflux disease)


(12) HTN (hypertension)


(13) HX OF PAST NONCOMPLIANCE


(14) Hyperkalemia


(15) Ischemic cardiomyopathy


(16) Morbid obesity


(17) Osteomyelitis


(18) Sepsis


(19) Tobacco use disorder


(20) UGIB (upper gastrointestinal bleed)


(21) Weakness


Surgical Problems:


(1) Hemodialysis access, AV graft








Family History





Diabetes mellitus


  FATHER


  MOTHER


Heart disease


  FATHER


  MOTHER


Hypertension


  FATHER


  MOTHER


Kidney disease


  GRANDMOTHER





Social History


Smoking Status:  Current Every Day Smoker


Alcohol Use:  none


Drug Use:  none


Marital Status:  


Housing Status:  lives with family


Occupation Status:  unemployed





Current/Historical Medications


Scheduled


Aspirin (Aspirin Ec), 81 MG PO QAM


Atorvastatin (Atorvastatin Calcium), 40 MG PO QAM


Calcium Acetate (Phosphate Bin (Phoslo 667 Mg), 2,001 MG PO TIDM


Cephalexin Monohydrate (Keflex), 500 MG PO BID


Cephalexin Monohydrate (Keflex), 500 MG PO QID


Insulin Aspart (Novolog), 0 SQ ACHS


Insulin Glargine (Lantus Solostar), 12 UNITS SC QPM


Isosorbide Mononitrate Ext Rel (Imdur Ext Rel), 30 MG PO QAM


Isosorbide Mononitrate Ext Rel (Imdur Ext Rel), 60 MG PO QAM


Lactobacillus (Probiotic), 2 CAP PO BID


Metoprolol Succinate (Metoprolol Succinate ER), 50 MG PO QAM


Pantoprazole Sodium (Protonix), 40 MG PO DAILY


Sennosides-Docusate Sodium (Senokot S), 2 TAB PO BID


Sertraline HCl (Sertraline HCl), 100 MG PO HS





Scheduled PRN


Calcium Acetate (Phosphate Bin (Phoslo 667 Mg), 2 CAP PO UD PRN for snacks


Dicyclomine Hcl (Dicyclomine Hcl), 10 MG PO QID PRN for Cramping


Fluticasone Propionate (Fluticasone Propionate), 2 SPRAYS SUZI DAILY PRN for 

Allergy Symptoms


Loratadine (Claritin Childrens), 5 MG PO DAILY PRN for Allergy Symptoms


Lorazepam (Lorazepam), 0.5 MG PO DAILY PRN for Anxiety


Ondansetron Hcl (Zofran), 4 MG PO Q6 PRN for Nausea


Oxycodone/Acetaminophen 5MG/325MG (Oxycodone/Acetaminophen 5MG/325MG), 1-2 TABS 

PO Q4H PRN for Pain





Physical Exam


Vital Signs











  Date Time  Temp Pulse Resp B/P (MAP) Pulse Ox O2 Delivery O2 Flow Rate FiO2


 


12/15/17 11:50 36.7 87 20 167/86 94 Room Air  











Physical Exam


CONSTITUTIONAL:  Healthy and well nourished.  Alert and oriented X 3 with 

positive affect.


HEENT:  Normocephalic, atraumatic.  Pupils equal, round and reactive.  


NECK:  Full active range of motion without discomfort.


MUSCULOSKELETAL: Examination of the left third finger digital pad shows a 

laceration with generalized erythema of the digital pad.  I do not feel any 

induration or fluctuance.  There is no obvious purulent drainage or collection 

under the wound.  There is no fusiform digital swelling, and the patient has no 

tenderness to palpation through the flexor or extensor tendons.  Capillary 

refill is less than 2 seconds.


INTEGUMENTARY:  No rash or other significant dermatologic conditions noted.


NEUROLOGIC: Left third fingertip is hyperesthetic.





Medical Decision & Procedures


ED Course


Patient history and physical exam were performed.  Nurse's notes were reviewed.

  Vital signs were reviewed, showing an elevated blood pressure.  The patient 

was instructed to have her family doctor recheck her blood pressure on her next 

office visit.  The patient was provided a prescription for Keflex, and 

instructed to follow-up with Dr. Montgomery, hand surgeon for further 

reevaluation and management.  The patient was advised that she would need a 

referral from her PCP given her insurance.  She was encouraged to take 

ibuprofen and Tylenol as needed for pain.  She may return to the emergency 

department over the weekend for any progressively worsening infection.  The 

patient was happy with plan of care, voiced understanding of all discharge 

instructions, rating her discomfort a 3 out of 10 at the conclusion of my exam.





Medical Decision








Medication Reconcilliation


Current Medication List:  was personally reviewed by me





Blood Pressure Screening


Patient's blood pressure:  Elevated blood pressure


Blood pressure disposition:  Referred to PCP





Impression





 Primary Impression:  


 Cellulitis of left middle finger





Departure Information


Dispostion


Home / Self-Care





Prescriptions





Cephalexin Monohydrate (Keflex) 500 Mg Cap


500 MG PO QID for 7 Days, #28 CAP


   Prov: Kendall Brito PA         12/15/17





Referrals


Jesus Montgomery MD





Forms


HOME CARE DOCUMENTATION FORM,                                                 

               IMPORTANT VISIT INFORMATION





Patient Instructions


My [facility]





Additional Instructions





Complete all Keflex antibiotics as prescribed.


Keep fingertip covered with an antibiotic ointment and dressing.


Ibuprofen or Tylenol as needed for pain.


Follow-up with Dr. Montgomery (orthopedic hand surgeon) for further reevaluation 

and management.


You will need a referral from your PCP.


Return to the emergency department for any significantly worsening swelling, 

pain or developing fever.

## 2017-12-31 ENCOUNTER — HOSPITAL ENCOUNTER (EMERGENCY)
Dept: HOSPITAL 45 - C.EDB | Age: 52
Discharge: HOME | End: 2017-12-31
Payer: COMMERCIAL

## 2017-12-31 VITALS — DIASTOLIC BLOOD PRESSURE: 72 MMHG | HEART RATE: 85 BPM | SYSTOLIC BLOOD PRESSURE: 122 MMHG | OXYGEN SATURATION: 97 %

## 2017-12-31 VITALS — TEMPERATURE: 98.24 F

## 2017-12-31 DIAGNOSIS — Z79.82: ICD-10-CM

## 2017-12-31 DIAGNOSIS — F41.9: ICD-10-CM

## 2017-12-31 DIAGNOSIS — E11.621: ICD-10-CM

## 2017-12-31 DIAGNOSIS — I25.2: ICD-10-CM

## 2017-12-31 DIAGNOSIS — I25.10: ICD-10-CM

## 2017-12-31 DIAGNOSIS — M25.521: Primary | ICD-10-CM

## 2017-12-31 DIAGNOSIS — Z82.49: ICD-10-CM

## 2017-12-31 DIAGNOSIS — N18.6: ICD-10-CM

## 2017-12-31 DIAGNOSIS — E11.22: ICD-10-CM

## 2017-12-31 DIAGNOSIS — Z99.2: ICD-10-CM

## 2017-12-31 DIAGNOSIS — Z84.1: ICD-10-CM

## 2017-12-31 DIAGNOSIS — I12.0: ICD-10-CM

## 2017-12-31 DIAGNOSIS — R07.9: ICD-10-CM

## 2017-12-31 DIAGNOSIS — Z83.3: ICD-10-CM

## 2017-12-31 DIAGNOSIS — Z79.4: ICD-10-CM

## 2017-12-31 DIAGNOSIS — R79.89: ICD-10-CM

## 2017-12-31 DIAGNOSIS — I65.29: ICD-10-CM

## 2017-12-31 DIAGNOSIS — M19.90: ICD-10-CM

## 2017-12-31 LAB
BASOPHILS # BLD: 0.04 K/UL (ref 0–0.2)
BASOPHILS NFR BLD: 0.6 %
BUN SERPL-MCNC: 24 MG/DL (ref 7–18)
CALCIUM SERPL-MCNC: 9.5 MG/DL (ref 8.5–10.1)
CO2 SERPL-SCNC: 32 MMOL/L (ref 21–32)
CREAT SERPL-MCNC: 5 MG/DL (ref 0.6–1.2)
EOS ABS #: 0.28 K/UL (ref 0–0.5)
EOSINOPHIL NFR BLD AUTO: 281 K/UL (ref 130–400)
GLUCOSE SERPL-MCNC: 121 MG/DL (ref 70–99)
HCT VFR BLD CALC: 40.2 % (ref 37–47)
HGB BLD-MCNC: 12.7 G/DL (ref 12–16)
IG#: 0.01 K/UL (ref 0–0.02)
IMM GRANULOCYTES NFR BLD AUTO: 16.1 %
LYMPHOCYTES # BLD: 1.14 K/UL (ref 1.2–3.4)
MCH RBC QN AUTO: 29.1 PG (ref 25–34)
MCHC RBC AUTO-ENTMCNC: 31.6 G/DL (ref 32–36)
MCV RBC AUTO: 92.2 FL (ref 80–100)
MONO ABS #: 1.05 K/UL (ref 0.11–0.59)
MONOCYTES NFR BLD: 14.8 %
NEUT ABS #: 4.58 K/UL (ref 1.4–6.5)
NEUTROPHILS # BLD AUTO: 3.9 %
NEUTROPHILS NFR BLD AUTO: 64.5 %
PMV BLD AUTO: 10.3 FL (ref 7.4–10.4)
POTASSIUM SERPL-SCNC: 3.6 MMOL/L (ref 3.5–5.1)
RED CELL DISTRIBUTION WIDTH CV: 15.7 % (ref 11.5–14.5)
RED CELL DISTRIBUTION WIDTH SD: 53.5 FL (ref 36.4–46.3)
SODIUM SERPL-SCNC: 130 MMOL/L (ref 136–145)
WBC # BLD AUTO: 7.1 K/UL (ref 4.8–10.8)

## 2017-12-31 NOTE — DIAGNOSTIC IMAGING REPORT
R ELBOW MIN 3 VIEWS ROUTINE



CLINICAL HISTORY: chest pain pain



COMPARISON: None.



DISCUSSION: The osseous structures show no acute pathology. There is no

significant joint effusion. There are several bursal or synovial calcifications

primarily adjacent to the proximal ulna posteriorly.



No abnormal periosteal reaction. Soft tissue vascular calcification. There is no

evidence for soft tissue swelling.



IMPRESSION: Mild calcific bursitis and/or synovitis. No acute bony abnormality.











The above report was generated using voice recognition software.  It may contain

grammatical, syntax or spelling errors.







Electronically signed by:  Dipak Lopez M.D.

12/31/2017 9:40 AM



Dictated Date/Time:  12/31/2017 9:38 AM

## 2017-12-31 NOTE — EMERGENCY ROOM VISIT NOTE
ED Visit Note


First contact with patient:  09:02


I have personally  seen and evaluated the patient with the physician assistant.

  I agree with the diagnostic/management decisions and have personally been 

involved in these decisions and agree with the  diagnosis.


Of note, the patient does have chronically elevated troponin.  Her symptoms 

today do not appear to be suggestive of acute coronary syndrome and her EKG did 

not show any change from a previous one.

## 2017-12-31 NOTE — DIAGNOSTIC IMAGING REPORT
CHEST ONE VIEW PORTABLE



CLINICAL HISTORY: chest pain dyspnea



COMPARISON STUDY:  10/18/2017



FINDINGS: Moderate stable cardiomegaly. Diaphragms smooth. Lungs are clear. 



IMPRESSION:  Moderate stable cardiomegaly. Otherwise negative study. 











The above report was generated using voice recognition software.  It may contain

grammatical, syntax or spelling errors.









Electronically signed by:  Dipak Lopez M.D.

12/31/2017 9:33 AM



Dictated Date/Time:  12/31/2017 9:32 AM

## 2017-12-31 NOTE — EMERGENCY ROOM VISIT NOTE
History


First contact with patient:  09:02


Chief Complaint:  ELBOW PAIN/INJURY


Stated Complaint:  RT ELBOW





History of Present Illness


The patient is a 52 year old female who presents to the Emergency Room with 

complaints of right elbow pain.  The patient reports that she was helping her 

niece shampoo her carpets yesterday when she injured her right elbow.  She has 

had pain in the right elbow since then.  She states that afterward, she 

developed pain in the center of her chest which she thinks may have been due to 

her anxiety.  She does report she had been using some icy hot with lidocaine on 

the elbow and is concerned this could have caused her chest pain.  She states 

that she performed some breathing exercises which initially helped the pain, 

but it then returned.  She drinks very cold water which seemed to help her 

chest pain.  She does report a history of an MI.  She is a dialysis patient and 

receives dialysis Monday Wednesday Friday.  However, she states that her 

schedule was changed due to the holiday and she received dialysis this morning.





Review of Systems


A complete 10 point review of systems was reviewed with the patient with 

pertinent positives and negatives as per history of present illness. All else 

were negative.





Past Medical/Surgical History


Medical Problems:


(1) Allergic rhinitis


(2) Arthritis


(3) C. difficile colitis


(4) CAD (coronary artery disease)


(5) Carotid stenosis


(6) Diabetic foot infection


(7) DM type 2 (diabetes mellitus, type 2)


(8) Dyslipidemia


(9) ESRD (end stage renal disease) on dialysis


(10) Fall


(11) GERD (gastroesophageal reflux disease)


(12) HTN (hypertension)


(13) HX OF PAST NONCOMPLIANCE


(14) Hyperkalemia


(15) Ischemic cardiomyopathy


(16) Morbid obesity


(17) Osteomyelitis


(18) Sepsis


(19) Tobacco use disorder


(20) UGIB (upper gastrointestinal bleed)


(21) Weakness


Surgical Problems:


(1) Hemodialysis access, AV graft








Family History





Diabetes mellitus


  FATHER


  MOTHER


Heart disease


  FATHER


  MOTHER


Hypertension


  FATHER


  MOTHER


Kidney disease


  GRANDMOTHER





Social History


Smoking Status:  Unknown if Ever Smoked


Alcohol Use:  none


Drug Use:  none


Marital Status:  


Housing Status:  lives with family


Occupation Status:  unemployed





Current/Historical Medications


Scheduled


Aspirin (Aspirin Ec), 81 MG PO QAM


Atorvastatin (Atorvastatin Calcium), 40 MG PO QAM


Calcium Acetate (Phosphate Bin (Phoslo 667 Mg), 2,001 MG PO TIDM


Cephalexin Monohydrate (Keflex), 500 MG PO BID


Insulin Aspart (Novolog), 0 SQ ACHS


Insulin Glargine (Lantus Solostar), 12 UNITS SC QPM


Isosorbide Mononitrate Ext Rel (Imdur Ext Rel), 30 MG PO QAM


Isosorbide Mononitrate Ext Rel (Imdur Ext Rel), 60 MG PO QAM


Lactobacillus (Probiotic), 2 CAP PO BID


Metoprolol Succinate (Metoprolol Succinate ER), 50 MG PO QAM


Pantoprazole Sodium (Protonix), 40 MG PO DAILY


Sennosides-Docusate Sodium (Senokot S), 2 TAB PO BID


Sertraline HCl (Sertraline HCl), 100 MG PO HS





Scheduled PRN


Calcium Acetate (Phosphate Bin (Phoslo 667 Mg), 2 CAP PO UD PRN for snacks


Dicyclomine Hcl (Dicyclomine Hcl), 10 MG PO QID PRN for Cramping


Fluticasone Propionate (Fluticasone Propionate), 2 SPRAYS SUZI DAILY PRN for 

Allergy Symptoms


Loratadine (Claritin Childrens), 5 MG PO DAILY PRN for Allergy Symptoms


Lorazepam (Lorazepam), 0.5 MG PO DAILY PRN for Anxiety


Ondansetron Hcl (Zofran), 4 MG PO Q6 PRN for Nausea


Oxycodone/Acetaminophen 5MG/325MG (Oxycodone/Acetaminophen 5MG/325MG), 1-2 TABS 

PO Q4H PRN for Pain


Oxycodone/Acetaminophen 5MG/325MG (Percocet 5MG/325MG), 1 TAB PO Q4H PRN for 

Pain





Physical Exam


Vital Signs











  Date Time  Temp Pulse Resp B/P (MAP) Pulse Ox O2 Delivery O2 Flow Rate FiO2


 


12/31/17 12:22  85 18 122/72 97   


 


12/31/17 11:30 36.8 83 17 125/81 96 Room Air  


 


12/31/17 11:00 36.8 83 17 165/92 96 Room Air  


 


12/31/17 09:26 36.8 94 17 173/82 96 Room Air  


 


12/31/17 09:25  83      











Physical Exam


VITALS: Vitals are noted on the nurse's note and reviewed by myself.  Vital 

signs stable.


GENERAL: This is a 52-year-old female, in no acute distress, nondiaphoretic, 

well-developed well-nourished.


SKIN: The skin was without rashes, erythema, edema, or bruising.


EARS: External auditory canals clear, tympanic membranes pearly gray without 

erythema or effusion bilaterally.


EYES: Pupils equal round and reactive to light and accommodation. 


MOUTH: Mucous membranes moist.  Tonsils are not enlarged.  Pharynx without 

erythema or exudate. 


NECK: Supple without nuchal rigidity.  No lymphadenopathy. 


HEART: Regular rate and rhythm without murmurs gallops or rubs.


LUNGS: Clear to auscultation bilaterally without wheezes, rales or rhonchi.  


MUSCULOSKELETAL: Tenderness with light touch diffusely over the right elbow.  

Full range of motion of the elbow.


NEURO: Patient was alert and oriented to person place and time.  Normal 

sensation to light and sharp touch.





Medical Decision & Procedures


ER Provider


Diagnostic Interpretation:


R ELBOW MIN 3 VIEWS ROUTINE





DISCUSSION: The osseous structures show no acute pathology. There is no


significant joint effusion. There are several bursal or synovial calcifications


primarily adjacent to the proximal ulna posteriorly.





No abnormal periosteal reaction. Soft tissue vascular calcification. There is no


evidence for soft tissue swelling.





IMPRESSION: Mild calcific bursitis and/or synovitis. No acute bony abnormality.








CHEST ONE VIEW PORTABLE





FINDINGS: Moderate stable cardiomegaly. Diaphragms smooth. Lungs are clear. 





IMPRESSION:  Moderate stable cardiomegaly. Otherwise negative study.





Laboratory Results


12/31/17 09:55








Red Blood Count 4.36, Mean Corpuscular Volume 92.2, Mean Corpuscular Hemoglobin 

29.1, Mean Corpuscular Hemoglobin Concent 31.6, Mean Platelet Volume 10.3, 

Neutrophils (%) (Auto) 64.5, Lymphocytes (%) (Auto) 16.1, Monocytes (%) (Auto) 

14.8, Eosinophils (%) (Auto) 3.9, Basophils (%) (Auto) 0.6, Neutrophils # (Auto

) 4.58, Lymphocytes # (Auto) 1.14, Monocytes # (Auto) 1.05, Eosinophils # (Auto

) 0.28, Basophils # (Auto) 0.04





12/31/17 09:55

















Test


  12/31/17


09:55


 


White Blood Count


  7.10 K/uL


(4.8-10.8)


 


Red Blood Count


  4.36 M/uL


(4.2-5.4)


 


Hemoglobin


  12.7 g/dL


(12.0-16.0)


 


Hematocrit 40.2 % (37-47) 


 


Mean Corpuscular Volume


  92.2 fL


()


 


Mean Corpuscular Hemoglobin


  29.1 pg


(25-34)


 


Mean Corpuscular Hemoglobin


Concent 31.6 g/dl


(32-36)


 


Platelet Count


  281 K/uL


(130-400)


 


Mean Platelet Volume


  10.3 fL


(7.4-10.4)


 


Neutrophils (%) (Auto) 64.5 % 


 


Lymphocytes (%) (Auto) 16.1 % 


 


Monocytes (%) (Auto) 14.8 % 


 


Eosinophils (%) (Auto) 3.9 % 


 


Basophils (%) (Auto) 0.6 % 


 


Neutrophils # (Auto)


  4.58 K/uL


(1.4-6.5)


 


Lymphocytes # (Auto)


  1.14 K/uL


(1.2-3.4)


 


Monocytes # (Auto)


  1.05 K/uL


(0.11-0.59)


 


Eosinophils # (Auto)


  0.28 K/uL


(0-0.5)


 


Basophils # (Auto)


  0.04 K/uL


(0-0.2)


 


RDW Standard Deviation


  53.5 fL


(36.4-46.3)


 


RDW Coefficient of Variation


  15.7 %


(11.5-14.5)


 


Immature Granulocyte % (Auto) 0.1 % 


 


Immature Granulocyte # (Auto)


  0.01 K/uL


(0.00-0.02)


 


Anion Gap


  9.0 mmol/L


(3-11)


 


Estimated GFR (


American) 10.9 


 


 


Estimated GFR (Non-


American 9.4 


 


 


BUN/Creatinine Ratio 4.9 (10-20) 


 


Calcium Level


  9.5 mg/dl


(8.5-10.1)


 


Troponin I


  0.112 ng/ml


(0-0.045)











Medical Decision


Differential diagnosis includes tendinitis, bursitis, ligamentous injury, sprain

, anxiety, ACS, among others.





The patient is a 52-year-old female who presents today with chief complaint of 

right elbow pain.  The patient also reports she had some chest pain last night 

which she attributed to anxiety and has since resolved.  EKG today is 

unchanged.  Troponin is elevated, however patient has chronic elevation of 

troponin due to chronic kidney disease.  Patient's symptoms were atypical and I 

do not suspect ACS.  Chest pain is resolved.  Elbow x-ray does show findings 

consistent with calcific bursitis/ synovitis.  Patient requested something for 

pain and was given a very small rx for pain medication.  She was advised to 

follow up with her primary care provider.





The patient was independently evaluated by Dr. Ferro, ED attending physician, 

who agreed with my assessment and treatment plan.





Based on the patient's presentation and work up, I feel the patient is stable 

for outpatient treatment.  The patient was educated to return to the emergency 

department for any worsening of their current condition or new/concerning 

symptoms.  She will follow up with her PCP.





[place] Drug Monitoring Program


Search Results:  patient reviewed within database, no issues identified





Medication Reconcilliation


Current Medication List:  was personally reviewed by me





Blood Pressure Screening


Patient's blood pressure:  Normal blood pressure





Impression





 Primary Impression:  


 Right elbow pain





Departure Information


Dispostion


Home / Self-Care





Condition


GOOD





Prescriptions





Oxycodone/Acetaminophen 5MG/325MG (PERCOCET 5MG/325MG)  Tab


1 TAB PO Q4H Y for Pain, #8 TAB


   For Initial Treatment


   Prov: Rachel Damico ., SKY         12/31/17





Referrals


Lurdes Smith D.O. (PCP)





Patient Instructions


My [facility]





Additional Instructions





You have been prescribed Percocet to be used for pain control.  Take 1-2 

tablets every 4-6 hours as needed for pain.   This is a narcotic medication. 

You cannot drive or consume alcohol while on this medicine. This medicine 

should only be used for pain that cannot be controlled with over-the-counter 

pain medicines.





Follow-up with her primary care provider this week for a recheck or as needed.





Return to the emergency with any worsening or new/concerning symptoms.

## 2018-01-12 ENCOUNTER — HOSPITAL ENCOUNTER (INPATIENT)
Dept: HOSPITAL 45 - C.EDA | Age: 53
LOS: 12 days | Discharge: NURSING, REHAB OR LONG-TERM CARE | DRG: 291 | End: 2018-01-24
Attending: HOSPITALIST | Admitting: HOSPITALIST
Payer: COMMERCIAL

## 2018-01-12 VITALS
BODY MASS INDEX: 36.78 KG/M2 | WEIGHT: 228.84 LBS | BODY MASS INDEX: 36.78 KG/M2 | WEIGHT: 228.84 LBS | HEIGHT: 66 IN | HEIGHT: 66 IN

## 2018-01-12 DIAGNOSIS — L02.416: ICD-10-CM

## 2018-01-12 DIAGNOSIS — I25.10: ICD-10-CM

## 2018-01-12 DIAGNOSIS — Z87.891: ICD-10-CM

## 2018-01-12 DIAGNOSIS — Z91.130: ICD-10-CM

## 2018-01-12 DIAGNOSIS — E87.5: ICD-10-CM

## 2018-01-12 DIAGNOSIS — R22.42: ICD-10-CM

## 2018-01-12 DIAGNOSIS — D63.1: ICD-10-CM

## 2018-01-12 DIAGNOSIS — Z79.4: ICD-10-CM

## 2018-01-12 DIAGNOSIS — E87.79: ICD-10-CM

## 2018-01-12 DIAGNOSIS — I13.2: Primary | ICD-10-CM

## 2018-01-12 DIAGNOSIS — E11.22: ICD-10-CM

## 2018-01-12 DIAGNOSIS — F32.9: ICD-10-CM

## 2018-01-12 DIAGNOSIS — Z86.19: ICD-10-CM

## 2018-01-12 DIAGNOSIS — L02.612: ICD-10-CM

## 2018-01-12 DIAGNOSIS — Z95.5: ICD-10-CM

## 2018-01-12 DIAGNOSIS — Z91.15: ICD-10-CM

## 2018-01-12 DIAGNOSIS — I50.23: ICD-10-CM

## 2018-01-12 DIAGNOSIS — E11.40: ICD-10-CM

## 2018-01-12 DIAGNOSIS — Z99.2: ICD-10-CM

## 2018-01-12 DIAGNOSIS — N25.0: ICD-10-CM

## 2018-01-12 DIAGNOSIS — E83.52: ICD-10-CM

## 2018-01-12 DIAGNOSIS — M79.644: ICD-10-CM

## 2018-01-12 DIAGNOSIS — N18.6: ICD-10-CM

## 2018-01-12 DIAGNOSIS — E11.628: ICD-10-CM

## 2018-01-12 DIAGNOSIS — Z83.3: ICD-10-CM

## 2018-01-12 DIAGNOSIS — I25.5: ICD-10-CM

## 2018-01-12 DIAGNOSIS — Z84.1: ICD-10-CM

## 2018-01-12 DIAGNOSIS — J18.1: ICD-10-CM

## 2018-01-12 DIAGNOSIS — Z82.49: ICD-10-CM

## 2018-01-12 DIAGNOSIS — Z91.040: ICD-10-CM

## 2018-01-12 DIAGNOSIS — Z86.14: ICD-10-CM

## 2018-01-12 LAB
ALBUMIN SERPL-MCNC: 2.6 GM/DL (ref 3.4–5)
ALP SERPL-CCNC: 89 U/L (ref 45–117)
ALT SERPL-CCNC: 12 U/L (ref 12–78)
AST SERPL-CCNC: 11 U/L (ref 15–37)
BASOPHILS # BLD: 0.03 K/UL (ref 0–0.2)
BASOPHILS NFR BLD: 0.2 %
BUN SERPL-MCNC: 147 MG/DL (ref 7–18)
CALCIUM SERPL-MCNC: 10.7 MG/DL (ref 8.5–10.1)
CO2 SERPL-SCNC: 19 MMOL/L (ref 21–32)
CREAT SERPL-MCNC: 19.2 MG/DL (ref 0.6–1.2)
EOS ABS #: 0.04 K/UL (ref 0–0.5)
EOSINOPHIL NFR BLD AUTO: 397 K/UL (ref 130–400)
GLUCOSE SERPL-MCNC: 126 MG/DL (ref 70–99)
HCT VFR BLD CALC: 37.4 % (ref 37–47)
HGB BLD-MCNC: 11.9 G/DL (ref 12–16)
IG#: 0.09 K/UL (ref 0–0.02)
IMM GRANULOCYTES NFR BLD AUTO: 4.3 %
LYMPHOCYTES # BLD: 0.71 K/UL (ref 1.2–3.4)
MCH RBC QN AUTO: 29 PG (ref 25–34)
MCHC RBC AUTO-ENTMCNC: 31.8 G/DL (ref 32–36)
MCV RBC AUTO: 91 FL (ref 80–100)
MONO ABS #: 1.07 K/UL (ref 0.11–0.59)
MONOCYTES NFR BLD: 6.5 %
NEUT ABS #: 14.44 K/UL (ref 1.4–6.5)
NEUTROPHILS # BLD AUTO: 0.2 %
NEUTROPHILS NFR BLD AUTO: 88.3 %
PHOSPHATE SERPL-MCNC: 13.7 MG/DL (ref 2.5–4.9)
PMV BLD AUTO: 10.5 FL (ref 7.4–10.4)
POTASSIUM SERPL-SCNC: 6.7 MMOL/L (ref 3.5–5.1)
PROT SERPL-MCNC: 9.4 GM/DL (ref 6.4–8.2)
RED CELL DISTRIBUTION WIDTH CV: 16.1 % (ref 11.5–14.5)
RED CELL DISTRIBUTION WIDTH SD: 53.1 FL (ref 36.4–46.3)
SODIUM SERPL-SCNC: 128 MMOL/L (ref 136–145)
WBC # BLD AUTO: 16.38 K/UL (ref 4.8–10.8)

## 2018-01-12 NOTE — EMERGENCY ROOM VISIT NOTE
History


Report prepared by Eda:  Scott Machuca


Under the Supervision of:  Dr. Gaston De Dios M.D.


First contact with patient:  20:55


Chief Complaint:  MENTAL HEALTH EVALUATION


Stated Complaint:  MHID





History of Present Illness


The patient is a 52 year old female who presents to the Emergency Room with a 

persistent need for a mental health evaluation this week. The patient states 

that her niece "302'd" her because she "has no transportation to dialysis". She 

says that she has missed 3 dialysis sessions so far, but states that she "wants 

to go". The patient says that she has not been feeling depressed, but she is 

upset that she does not have transportation. She denies any homicidal or 

suicidal ideations, and states that she has never tried to hurt herself. She 

says that she was short of breath earlier, and adds that she has a cough. Per 

the patient's niece, the patient has been noted to not be eating, taking care 

of herself, or taking her medications. The patient says that she has been on 

dialysis for 5 years.





   Source of History:  patient, family, nursing staff


   Onset:  This week


   Position:  other (global - need for mental health evaluation)


   Symptom Intensity:  missing dialysis


   Quality:  other (not taking care of herself per niece)


   Timing:  other (persistent)


   Associated Symptoms:  + cough, + SOB


Note:


Patient denies suicidal or homicidal ideations.





Review of Systems


See HPI for pertinent positives and negatives.  A total of ten systems were 

reviewed and were otherwise negative.





Past Medical & Surgical


Medical Problems:


(1) Allergic rhinitis


(2) Arthritis


(3) C. difficile colitis


(4) CAD (coronary artery disease)


(5) Carotid stenosis


(6) Diabetic foot infection


(7) DM type 2 (diabetes mellitus, type 2)


(8) Dyslipidemia


(9) ESRD (end stage renal disease) on dialysis


(10) Fall


(11) GERD (gastroesophageal reflux disease)


(12) HTN (hypertension)


(13) HX OF PAST NONCOMPLIANCE


(14) Hyperkalemia


(15) Ischemic cardiomyopathy


(16) Morbid obesity


(17) Osteomyelitis


(18) Sepsis


(19) Tobacco use disorder


(20) UGIB (upper gastrointestinal bleed)


(21) Weakness


Surgical Problems:


(1) Hemodialysis access, AV graft








Family History





Diabetes mellitus


  FATHER


  MOTHER


Heart disease


  FATHER


  MOTHER


Hypertension


  FATHER


  MOTHER


Kidney disease


  GRANDMOTHER





Social History


Smoking Status:  Former Smoker


Alcohol Use:  none


Drug Use:  none


Marital Status:  


Housing Status:  lives with family


Occupation Status:  unemployed





Current/Historical Medications


Scheduled


Insulin Glargine (Lantus Solostar), 12 UNITS SQ HS


Loratadine (Claritin Childrens), 10 MG PO BID


Mupirocin 2% (Bactroban 2%), 1 APPLN EXT TID





Allergies


Coded Allergies:  


     Adhesives (Verified  Allergy, Mild, RASH, SORES, 1/4/18)


     NO KNOWN DRUG ALLERGIES (Verified  Allergy, Mild, ., 1/4/18)


     Latex1 -Allergic Contact Dermititis (Verified  Allergy, Unknown, rash, 1/4/ 18)


     Pollen Extract (Verified  Allergy, Unknown, WATERY EYES, 1/4/18)





Physical Exam


Vital Signs











  Date Time  Temp Pulse Resp B/P (MAP) Pulse Ox O2 Delivery O2 Flow Rate FiO2


 


1/12/18 23:52  95      


 


1/12/18 23:01  88 20 175/106 93 Room Air  


 


1/12/18 22:11  95 22 194/95 98   


 


1/12/18 20:14  97 16 174/85 96 Room Air  


 


1/12/18 19:54  93      


 


1/12/18 19:45 36.6 102 22 231/118 95 Room Air  











Physical Exam


GENERAL: Awake, alert, anxious-appearing, in no acute distress.


HENT: Normocephalic, atraumatic. Dry mucous membranes.


EYES: Normal conjunctiva. Sclera non-icteric.


NECK: Supple. No nuchal rigidity. FROM. No JVD.


RESPIRATORY: Diminished lung sounds but otherwise clear.


CARDIAC: Regular rate, normal rhythm. Extremities warm and well perfused. 

Pulses equal.


ABDOMEN: Soft, non-distended. No tenderness to palpation. No rebound or 

guarding. No masses.


RECTAL: Deferred.


MUSCULOSKELETAL: Chest examination reveals no tenderness. The back is 

symmetrical on inspection without obvious abnormality. There is no CVA 

tenderness to palpation. No joint edema. 


LOWER EXTREMITIES: Calves are equal size bilaterally and non-tender. No edema. 

No discoloration. 


NEURO: Normal sensorium. No sensory or motor deficits noted. 


SKIN: No rash or jaundice noted.


PSYCH: Depressed affect, denies suicidal ideation or homicidal ideation.





Medical Decision & Procedures


ER Provider


Diagnostic Interpretation:


X-ray: Per my interpretation, radiologist review. 








CHEST ONE VIEW PORTABLE





HISTORY:      Short of breath.





COMPARISON: Chest 12/31/2017.





FINDINGS: The heart remains mildly enlarged. Trace left pleural effusion.


Interstitial and vascular thickening has progressed suggestive of mild


congestive change. There is also left upper lobe and left basilar airspace


opacities.


No pneumothorax.


IMPRESSION:





1. Left upper lobe and left basilar airspace opacities which likely represent a


pneumonia. Recommend follow-up to ensure complete resolution.


2. Mild pulmonary vascular congestion and a trace left pleural effusion.





Electronically signed by:  Ismael Pascual M.D.


1/12/2018 10:24 PM





Dictated Date/Time:  1/12/2018 10:22 PM





Laboratory Results


1/12/18 22:07








Red Blood Count 4.11, Mean Corpuscular Volume 91.0, Mean Corpuscular Hemoglobin 

29.0, Mean Corpuscular Hemoglobin Concent 31.8, Mean Platelet Volume 10.5, 

Neutrophils (%) (Auto) 88.3, Lymphocytes (%) (Auto) 4.3, Monocytes (%) (Auto) 

6.5, Eosinophils (%) (Auto) 0.2, Basophils (%) (Auto) 0.2, Neutrophils # (Auto) 

14.44, Lymphocytes # (Auto) 0.71, Monocytes # (Auto) 1.07, Eosinophils # (Auto) 

0.04, Basophils # (Auto) 0.03





1/12/18 22:07

















Test


  1/12/18


22:07 1/12/18


22:23


 


White Blood Count


  16.38 K/uL


(4.8-10.8) 


 


 


Red Blood Count


  4.11 M/uL


(4.2-5.4) 


 


 


Hemoglobin


  11.9 g/dL


(12.0-16.0) 


 


 


Hematocrit 37.4 % (37-47)  


 


Mean Corpuscular Volume


  91.0 fL


() 


 


 


Mean Corpuscular Hemoglobin


  29.0 pg


(25-34) 


 


 


Mean Corpuscular Hemoglobin


Concent 31.8 g/dl


(32-36) 


 


 


Platelet Count


  397 K/uL


(130-400) 


 


 


Mean Platelet Volume


  10.5 fL


(7.4-10.4) 


 


 


Neutrophils (%) (Auto) 88.3 %  


 


Lymphocytes (%) (Auto) 4.3 %  


 


Monocytes (%) (Auto) 6.5 %  


 


Eosinophils (%) (Auto) 0.2 %  


 


Basophils (%) (Auto) 0.2 %  


 


Neutrophils # (Auto)


  14.44 K/uL


(1.4-6.5) 


 


 


Lymphocytes # (Auto)


  0.71 K/uL


(1.2-3.4) 


 


 


Monocytes # (Auto)


  1.07 K/uL


(0.11-0.59) 


 


 


Eosinophils # (Auto)


  0.04 K/uL


(0-0.5) 


 


 


Basophils # (Auto)


  0.03 K/uL


(0-0.2) 


 


 


RDW Standard Deviation


  53.1 fL


(36.4-46.3) 


 


 


RDW Coefficient of Variation


  16.1 %


(11.5-14.5) 


 


 


Immature Granulocyte % (Auto) 0.5 %  


 


Immature Granulocyte # (Auto)


  0.09 K/uL


(0.00-0.02) 


 


 


Anion Gap


  23.0 mmol/L


(3-11) 


 


 


Est Creatinine Clear Calc


Drug Dose 4.2 ml/min 


  


 


 


Estimated GFR (


American) 2.1 


  


 


 


Estimated GFR (Non-


American 1.8 


  


 


 


BUN/Creatinine Ratio 7.6 (10-20)  


 


Calcium Level


  10.7 mg/dl


(8.5-10.1) 


 


 


Phosphorus Level


  13.7 mg/dl


(2.5-4.9) 


 


 


Magnesium Level


  2.9 mg/dl


(1.8-2.4) 


 


 


Total Bilirubin


  0.7 mg/dl


(0.2-1) 


 


 


Direct Bilirubin


  0.3 mg/dl


(0-0.2) 


 


 


Aspartate Amino Transf


(AST/SGOT) 11 U/L (15-37) 


  


 


 


Alanine Aminotransferase


(ALT/SGPT) 12 U/L (12-78) 


  


 


 


Alkaline Phosphatase


  89 U/L


() 


 


 


Total Protein


  9.4 gm/dl


(6.4-8.2) 


 


 


Albumin


  2.6 gm/dl


(3.4-5.0) 


 


 


Globulin


  6.8 gm/dl


(2.5-4.0) 


 


 


Albumin/Globulin Ratio 0.4 (0.9-2)  


 


Thyroid Stimulating Hormone


(TSH) 1.080 uIu/ml


(0.300-4.500) 


 


 


Ethyl Alcohol mg/dL


  < 3.0 mg/dl


(0-3) 


 


 


Urine Color  YELLOW 


 


Urine Appearance  CLOUDY (CLEAR) 


 


Urine pH  5.5 (4.5-7.5) 


 


Urine Specific Gravity


  


  1.018


(1.000-1.030)


 


Urine Protein  3+ (NEG) 


 


Urine Glucose (UA)  NEG (NEG) 


 


Urine Ketones  TRACE (NEG) 


 


Urine Occult Blood  1+ (NEG) 


 


Urine Nitrite  NEG (NEG) 


 


Urine Bilirubin  NEG (NEG) 


 


Urine Urobilinogen  NEG (NEG) 


 


Urine Leukocyte Esterase  TRACE (NEG) 


 


Urine WBC (Auto)


  


  10-30 /hpf


(0-5)


 


Urine RBC (Auto)  0-4 /hpf (0-4) 


 


Urine Hyaline Casts (Auto)  0 /lpf (0-5) 


 


Urine Epithelial Cells (Auto)  >30 /lpf (0-5) 


 


Urine Bacteria (Auto)  NEG (NEG) 


 


Urine Pathogenic Casts   /lpf (0) 


 


Urine Yeast (Auto)


  


  PRESENT (NONE


PRSENT)


 


Urine Opiates Screen  NEG (NEG) 


 


Urine Methadone, Qualitative  NEG (NEG) 


 


Urine Barbiturates  NEG (NEG) 


 


Urine Phencyclidine (PCP)


Level 


  NEG (NEG) 


 


 


Ur


Amphetamine/Methamphetamine 


  NEG (NEG) 


 


 


MDMA (Ecstasy) Screen  NEG (NEG) 


 


Urine Benzodiazepines Screen  NEG (NEG) 


 


Urine Cocaine Metabolite  NEG (NEG) 


 


Urine Marijuana (THC)  NEG (NEG) 





Laboratory results reviewed by me





Medications Administered











 Medications


  (Trade)  Dose


 Ordered  Sig/Willy


 Route  Start Time


 Stop Time Status Last Admin


Dose Admin


 


 Calcium Gluconate


 2000 mg/Sodium


 Chloride  70 ml @ 


 240 mls/hr  NOW  STAT


 IV  1/12/18 23:21


 1/12/18 23:38 DC 1/12/18 23:52


240 MLS/HR


 


 Insulin Human


 Regular 10 units/


 Syringe  10 ml @ 20


 mls/min  NOW  STAT


 IV  1/12/18 23:21


 1/12/18 23:25 DC 1/12/18 23:53


20 MLS/MIN


 


 Dextrose


  (Dextrose 50%


 50ML Syringe)  50 ml  NOW  STAT


 IV  1/12/18 23:21


 1/12/18 23:25 DC 1/12/18 23:21


50 ML


 


 Sodium


 Polystyrene


 Sulfonate


  (Kayexalate Susp)  15 gm  NOW  STAT


 PO  1/12/18 23:21


 1/12/18 23:25 DC 1/12/18 23:21


15 GM


 


 Piperacillin Sod/


 Tazobactam Sod


  (Zosyn Iv)  4.5 gm  NOW  STAT


 IV  1/12/18 23:21


 1/12/18 23:25 DC 1/12/18 23:21


4.5 GM











ECG


Indication:  SOB/dyspnea


Rate (beats per minute):  95


Rhythm:  normal sinus


Findings:  no acute ischemic change, other (normal axis)





ED Course


2129: The patient was evaluated in room A5. A complete history and physical 

exam was performed.





2335: Upon reexamination, the patient was resting. I discussed the test results 

and treatment plan with her. The patient will be evaluated for further 

management.





2337: I discussed the patient with Dr. Flavio Jj internist - he will 

evaluate the patient for further treatment.





Medical Decision


I reviewed the patient's past medical history, medications, and the nursing 

notes as described above.





Differential diagnosis:


Etiologies such as mood disorder, infection, hypoglycemia, electrolyte 

abnormalities, cardiac sources, intracerebral event, toxicologic, neurologic, 

as well as others were entertained.





The patient is a 52-year-old woman who presents emergency Department with 302 

petitioned by her family concerned that she has been developing worsening 

depression and has been intentionally skipping her dialysis as a form of 

passive suicidal ideation per history of present illness.  On arrival the 

patient is in no acute distress, afebrile stable vital signs.  Patient 

acknowledges that she has missed dialysis for the past week however reports 

that it was because "she did not have a ride" however, she denies making any 

other proactive attempts to obtain a ride.  Additionally, per report given to 

case management family endorses that the patient will readily lie to avoid any 

consequences related to her recent behavior.  However upon medical evaluation 

we were unable to clear the patient at this time given her elevated potassium 

to 6.7 in the setting of her noncompliance with her dialysis.  No EKG changes 

however patient was treated with calcium, insulin, Kayexalate as temporizing 

measure until dialysis can be obtained.  Otherwise, WBC was elevated to 16 and 

chest x-ray was suspicious for left upper and basilar infiltrate consistent 

with pneumonia.  This occurs in the setting of the patient reporting dyspnea 

over the past week as well.  Thus will treat with broad-spectrum antibiotics 

given the patient is a dialysis patient.  Case was discussed with Анна Wooten hospitalist, who will admit the patient for further management.





Medication Reconcilliation


Current Medication List:  was personally reviewed by me





Blood Pressure Screening


Patient's blood pressure:  Elevated blood pressure


Referred to hospitalist.





Consults


Time Called:  2330


Consulting Physician:  Dr. Flavio Jj internist


Returned Call:  [phone]


I discussed the patient with Dr. Flavio Jj internist - he will evaluate 

the patient for further treatment.





Impression





 Primary Impression:  


 PNA (pneumonia)


 Additional Impression:  


 Hyperkalemia





Critical Care


I have personally spent greater than 35 minutes of critical care time in the 

direct management of this patient.  This includes bedside care, interpretation 

of diagnostic studies, and testing, discussion with consultants, patient, and 

family members, and other required patient management activities.  This 35 

minutes is in excess of all separately billable procedures.





Scribe Attestation


The scribe's documentation has been prepared under my direction and personally 

reviewed by me in its entirety. I confirm that the note above accurately 

reflects all work, treatment, procedures, and medical decision making performed 

by me.





Departure Information


Dispostion


Being Evaluated By Hospitalist





Referrals


Lurdes Smith D.O. (PCP)





Patient Instructions


My [facility]





Problem Qualifiers

## 2018-01-12 NOTE — DIAGNOSTIC IMAGING REPORT
CHEST ONE VIEW PORTABLE



HISTORY:      Short of breath.



COMPARISON: Chest 12/31/2017.



FINDINGS: The heart remains mildly enlarged. Trace left pleural effusion.

Interstitial and vascular thickening has progressed suggestive of mild

congestive change. There is also left upper lobe and left basilar airspace

opacities.

No pneumothorax.

IMPRESSION:



1. Left upper lobe and left basilar airspace opacities which likely represent a

pneumonia. Recommend follow-up to ensure complete resolution.

2. Mild pulmonary vascular congestion and a trace left pleural effusion.







Electronically signed by:  Ismael Pascual M.D.

1/12/2018 10:24 PM



Dictated Date/Time:  1/12/2018 10:22 PM

## 2018-01-13 VITALS — DIASTOLIC BLOOD PRESSURE: 77 MMHG | SYSTOLIC BLOOD PRESSURE: 124 MMHG | HEART RATE: 102 BPM | OXYGEN SATURATION: 97 %

## 2018-01-13 VITALS
SYSTOLIC BLOOD PRESSURE: 163 MMHG | HEART RATE: 93 BPM | OXYGEN SATURATION: 97 % | TEMPERATURE: 98.06 F | DIASTOLIC BLOOD PRESSURE: 95 MMHG

## 2018-01-13 VITALS
HEART RATE: 85 BPM | SYSTOLIC BLOOD PRESSURE: 133 MMHG | DIASTOLIC BLOOD PRESSURE: 67 MMHG | TEMPERATURE: 98.24 F | OXYGEN SATURATION: 92 %

## 2018-01-13 VITALS — DIASTOLIC BLOOD PRESSURE: 86 MMHG | SYSTOLIC BLOOD PRESSURE: 154 MMHG | HEART RATE: 101 BPM | OXYGEN SATURATION: 88 %

## 2018-01-13 VITALS — DIASTOLIC BLOOD PRESSURE: 81 MMHG | SYSTOLIC BLOOD PRESSURE: 145 MMHG | HEART RATE: 97 BPM | OXYGEN SATURATION: 96 %

## 2018-01-13 VITALS — DIASTOLIC BLOOD PRESSURE: 91 MMHG | HEART RATE: 94 BPM | TEMPERATURE: 97.52 F | SYSTOLIC BLOOD PRESSURE: 169 MMHG

## 2018-01-13 VITALS — SYSTOLIC BLOOD PRESSURE: 142 MMHG | HEART RATE: 100 BPM | OXYGEN SATURATION: 95 % | DIASTOLIC BLOOD PRESSURE: 75 MMHG

## 2018-01-13 VITALS — OXYGEN SATURATION: 98 % | HEART RATE: 97 BPM | DIASTOLIC BLOOD PRESSURE: 80 MMHG | SYSTOLIC BLOOD PRESSURE: 132 MMHG

## 2018-01-13 VITALS — SYSTOLIC BLOOD PRESSURE: 140 MMHG | HEART RATE: 100 BPM | DIASTOLIC BLOOD PRESSURE: 85 MMHG | OXYGEN SATURATION: 92 %

## 2018-01-13 VITALS — DIASTOLIC BLOOD PRESSURE: 81 MMHG | SYSTOLIC BLOOD PRESSURE: 155 MMHG | HEART RATE: 101 BPM

## 2018-01-13 VITALS
HEART RATE: 83 BPM | SYSTOLIC BLOOD PRESSURE: 129 MMHG | OXYGEN SATURATION: 91 % | TEMPERATURE: 98.42 F | DIASTOLIC BLOOD PRESSURE: 65 MMHG

## 2018-01-13 VITALS — DIASTOLIC BLOOD PRESSURE: 75 MMHG | HEART RATE: 97 BPM | SYSTOLIC BLOOD PRESSURE: 128 MMHG | OXYGEN SATURATION: 98 %

## 2018-01-13 VITALS — SYSTOLIC BLOOD PRESSURE: 149 MMHG | OXYGEN SATURATION: 99 % | DIASTOLIC BLOOD PRESSURE: 81 MMHG | HEART RATE: 98 BPM

## 2018-01-13 VITALS
DIASTOLIC BLOOD PRESSURE: 69 MMHG | HEART RATE: 87 BPM | OXYGEN SATURATION: 91 % | SYSTOLIC BLOOD PRESSURE: 122 MMHG | TEMPERATURE: 98.42 F

## 2018-01-13 VITALS — OXYGEN SATURATION: 97 % | DIASTOLIC BLOOD PRESSURE: 74 MMHG | SYSTOLIC BLOOD PRESSURE: 135 MMHG | HEART RATE: 99 BPM

## 2018-01-13 VITALS
DIASTOLIC BLOOD PRESSURE: 84 MMHG | HEART RATE: 104 BPM | TEMPERATURE: 98.24 F | SYSTOLIC BLOOD PRESSURE: 179 MMHG | OXYGEN SATURATION: 94 %

## 2018-01-13 VITALS
HEART RATE: 89 BPM | OXYGEN SATURATION: 94 % | TEMPERATURE: 98.06 F | SYSTOLIC BLOOD PRESSURE: 174 MMHG | DIASTOLIC BLOOD PRESSURE: 94 MMHG

## 2018-01-13 VITALS — HEART RATE: 98 BPM | TEMPERATURE: 98.42 F | SYSTOLIC BLOOD PRESSURE: 140 MMHG | DIASTOLIC BLOOD PRESSURE: 85 MMHG

## 2018-01-13 VITALS — HEART RATE: 95 BPM | DIASTOLIC BLOOD PRESSURE: 90 MMHG | OXYGEN SATURATION: 96 % | SYSTOLIC BLOOD PRESSURE: 151 MMHG

## 2018-01-13 VITALS — DIASTOLIC BLOOD PRESSURE: 81 MMHG | HEART RATE: 96 BPM | SYSTOLIC BLOOD PRESSURE: 139 MMHG | OXYGEN SATURATION: 97 %

## 2018-01-13 VITALS — SYSTOLIC BLOOD PRESSURE: 140 MMHG | OXYGEN SATURATION: 85 % | HEART RATE: 96 BPM | DIASTOLIC BLOOD PRESSURE: 80 MMHG

## 2018-01-13 VITALS — HEART RATE: 97 BPM | SYSTOLIC BLOOD PRESSURE: 140 MMHG | OXYGEN SATURATION: 94 % | DIASTOLIC BLOOD PRESSURE: 80 MMHG

## 2018-01-13 VITALS
SYSTOLIC BLOOD PRESSURE: 131 MMHG | OXYGEN SATURATION: 93 % | DIASTOLIC BLOOD PRESSURE: 71 MMHG | TEMPERATURE: 98.24 F | HEART RATE: 80 BPM

## 2018-01-13 VITALS — SYSTOLIC BLOOD PRESSURE: 167 MMHG | HEART RATE: 93 BPM | DIASTOLIC BLOOD PRESSURE: 99 MMHG | OXYGEN SATURATION: 97 %

## 2018-01-13 VITALS — OXYGEN SATURATION: 98 % | SYSTOLIC BLOOD PRESSURE: 162 MMHG | HEART RATE: 97 BPM | DIASTOLIC BLOOD PRESSURE: 86 MMHG

## 2018-01-13 VITALS — OXYGEN SATURATION: 94 %

## 2018-01-13 LAB — INFLUENZA B ANTIGEN: (no result)

## 2018-01-13 RX ADMIN — INSULIN GLARGINE SCH UNITS: 100 INJECTION, SOLUTION SUBCUTANEOUS at 20:44

## 2018-01-13 RX ADMIN — ISOSORBIDE MONONITRATE SCH MG: 60 TABLET ORAL at 08:56

## 2018-01-13 RX ADMIN — RENAGEL SCH MG: 800 TABLET ORAL at 11:42

## 2018-01-13 RX ADMIN — INSULIN ASPART SCH UNITS: 100 INJECTION, SOLUTION INTRAVENOUS; SUBCUTANEOUS at 20:44

## 2018-01-13 RX ADMIN — NAPHAZOLINE HYDROCHLORIDE AND PHENIRAMINE MALEATE SCH DROPS: .25; 3 SOLUTION/ DROPS OPHTHALMIC at 08:55

## 2018-01-13 RX ADMIN — INSULIN ASPART SCH UNITS: 100 INJECTION, SOLUTION INTRAVENOUS; SUBCUTANEOUS at 11:31

## 2018-01-13 RX ADMIN — METOPROLOL TARTRATE SCH MG: 50 TABLET, FILM COATED ORAL at 08:55

## 2018-01-13 RX ADMIN — METOPROLOL TARTRATE SCH MG: 50 TABLET, FILM COATED ORAL at 20:45

## 2018-01-13 RX ADMIN — INSULIN ASPART SCH UNITS: 100 INJECTION, SOLUTION INTRAVENOUS; SUBCUTANEOUS at 16:00

## 2018-01-13 RX ADMIN — INSULIN ASPART SCH UNITS: 100 INJECTION, SOLUTION INTRAVENOUS; SUBCUTANEOUS at 08:57

## 2018-01-13 RX ADMIN — RENAGEL SCH MG: 800 TABLET ORAL at 16:39

## 2018-01-13 NOTE — PHARMACY PROGRESS NOTE
Pharmacy Abx Dose Short Note


Date of Service


Jan 13, 2018.





Assessment & Plan


Pt is receiving Vanco/Zosyn for pulmonary source. Spoke with nephrology. HD is 

planned for 1/14, 1/15, 1/16. IV access has not been achieved ergo no 

antibiotics have been given during this hospital admission. 





Vanco 2000mg x1 (19mg/kg) planned for 1800 1/13/18


Random lvl ordered for 1/14/18 


Goal trough: 15-20mcg/mL





Zosyn dosed appropriately pursuant to renal fxn and pt clinical status 











Pharmacy will continue to follow and will adjust dose/frequency as necessary.  

Thank you.

## 2018-01-13 NOTE — NEPHROLOGY CONSULTATION
DATE OF CONSULTATION:  01/13/2018

 

ATTENDING OF RECORD:  Dr. Breann Staton.

 

REASON FOR CONSULTATION:  End-stage renal disease.

 

HISTORY OF PRESENT ILLNESS:  This is a 52-year-old female who has been

noncompliant with dialysis, normally dialyzes at the [facility]

Dialysis Unit, missed the past week secondary to transportation issues as

well as social support issues.  The patient presented with abnormal

electrolytes with a potassium level of 6.7, sodium level of 128, BUN of 147

and creatinine of 19.2 as well as a calcium level of 10.7, and a phosphorus

level of 13.7.  The patient underwent emergent dialysis.  I removed 3 liters

on dialysis.  The patient is comfortable at this time with no specific

complaints.

 

PAST MEDICAL HISTORY:  Coronary artery disease with history of PCI to the

left circumflex, carotid stenosis, type 2 diabetes, hyperlipidemia, end-stage

renal disease, hypertension.

 

PAST SURGICAL HISTORY:  Multiple dialysis accesses., stent to heart, cea

 

FAMILY HISTORY:  Significant for diabetes and hypertension.

 

SOCIAL HISTORY:  Former smoker.  No alcohol, no drugs.  Lives alone.

 

REVIEW OF SYSTEMS:  No fevers or chills.  Positive fatigue.  Positive

shortness of breath.  Positive edema.  No nausea or vomiting.  No chest pain.

 No hematuria.  No rash or itching.  All other review of systems otherwise

negative.

 

CURRENT MEDICATIONS:  Lantus 12 units subQ at bedtime, Imdur 60 mg a day,

Lopressor 50 mg p.o. b.i.d., PhosLo 1 p.o. t.i.d. with meals.

 

PHYSICAL EXAMINATION:

VITAL SIGNS:  Temperature 36.9, pulse 98, blood pressure 140/85, pulse ox

99%.

GENERAL:  Awake, alert, oriented x3, obese.

EYES:  No scleral icterus.

ENT:  Moist mucous membranes.

NECK:  Supple.

PULMONARY:  Clear to auscultation.

CARDIAC:  Mildly tachy.

ABDOMEN:  Bowel sounds positive, soft, nontender.

EXTREMITIES:  +1 edema.

NEUROLOGICALLY:  Nonfocal.

DERMATOLOGIC:  No rash or ulcers noted.

 

LABORATORIES:  Pending for this morning.  Other labs were reviewed.

 

ASSESSMENT AND PLAN:

1.  End-stage renal disease.  The patient's electrolytes were grossly

abnormal yesterday after missing 1 week of dialysis.  The patient underwent a

3-hour emergent dialysis treatment on a 2K bath to remove 3 liters UF.  We

would like to plan on dialysis again tomorrow, Monday and Tuesday to optimize

electrolytes and volume status as best as possible and tentatively plan for

discharge at the end of Tuesday.  However, we will discuss further with the

primary hospitalists to see if they are also in agreement.

2.  Anemia of renal failure.  Hemoglobin level is in the 11, so no Procrit

needed at this time.

3.  Renal osteodystrophy.  Phosphorus levels are grossly elevated at 13.9

with a calcium level that is also elevated.  Hopefully with daily dialysis,

phosphorus levels start to improve.  The patient is on PhosLo and we will

switch to Renagel secondary to the elevated calcium levels.

 

I appreciate consultation.

 

 

 

LOUISE

## 2018-01-13 NOTE — HISTORY AND PHYSICAL
History & Physical


Date & Time of Service:


Jan 13, 2018 at 04:13


Chief Complaint:


Esrd On Dialysis, Hyperkalemia


Primary Care Physician:


Lurdes Smith D.O.


History of Present Illness


Source:  patient


This is a 52 year old M who presents to the emergency with respiratory symptoms 

of shortness of breath and cough secondary to missing 3 dialysis sessions vs 

pneumonia. Patient was brought in to the hospital by her family member. Patient 

reports that she usually gets dialysis M/W/F however she could not get to the 

past 3 dialysis sessions since she could not obtain transportation from her 

family members or friends.  Patient reports that she depends upon others also 

for taking medications as she has visual impairments and cannot read the 

prescription bottles. Patient reports being off of her prescribed medications 

for a long while because other people in her life have not helped her with her 

medications and that she has no home health aides. 





When patient came to the emergency room, she was found to have hyperkalemia of 

6.7. Was given Insulin, Calcium gluconate, and Kayexalate by the ED doctor to 

treat the hyperkalemia. CXR with Left upper lobe and left basilar airspace 

opacities which likely represent a pneumonia and mild pulmonary vascular 

congestion and a trace left pleural effusion. Zosyn and Vancomycin was ordered 

by ED doctor to treat for pneumonia. Hospitalist doctor sought nephrology 

consultant Dr. Davison to help with obtaining dialysis session. Patient boarding 

in the ICU bill and completing dialysis with 3 liters removed.





Past Medical/Surgical History


Medical Problems:


(1) Allergic rhinitis


Status: Chronic  





(2) CAD (coronary artery disease)


Permanent Comment: s/p PCI and BMS to left circumflex 8/2016


Status: Chronic  





(3) Carotid stenosis


Permanent Comment: left ICA


Status: Chronic  





(4) DM type 2 (diabetes mellitus, type 2)


Status: Chronic  





(5) Dyslipidemia


Status: Chronic  





(6) ESRD (end stage renal disease) on dialysis


Status: Chronic  





(7) GERD (gastroesophageal reflux disease)


Status: Chronic  





(8) HTN (hypertension)


Status: Chronic  





(9) HX OF PAST NONCOMPLIANCE


Status: Chronic  





(10) Ischemic cardiomyopathy


Permanent Comment: echo 10/2016 - EF 40-45%, grade I diastolic dysfunction


Status: Chronic  





(11) Morbid obesity


Status: Chronic  





(12) Tobacco use disorder


Status: Chronic  





Surgical Problems:


(1) Hemodialysis access, AV graft


Permanent Comment: [facility] Dr. Roberts 1/4/13


Status: Chronic  











Family History





Diabetes mellitus


  FATHER


  MOTHER


Heart disease


  FATHER


  MOTHER


Hypertension


  FATHER


  MOTHER


Kidney disease


  GRANDMOTHER





Social History


Smoking Status:  Former Smoker


Drug Use:  none


Marital Status:  


Housing status:  lives alone


Occupational Status:  unemployed





Immunizations


History of Influenza Vaccine:  Yes


Influenza Vaccine Date:  Sep 1, 2016


History of Tetanus Vaccine?:  Yes


History of Pneumococcal:  Yes


Pneumococcal Date:  Jul 24, 2013


History of Hepatitis B Vaccine:  Yes


Hepatitis Immunization Date:  Dec 11, 2013





Multi-Drug Resistant Organisms


History of MDRO:  Yes


Type of MDRO:  MRSA





Allergies


Coded Allergies:  


     Adhesives (Verified  Allergy, Mild, RASH, SORES, 1/4/18)


     NO KNOWN DRUG ALLERGIES (Verified  Allergy, Mild, ., 1/4/18)


     Latex1 -Allergic Contact Dermititis (Verified  Allergy, Unknown, rash, 1/4/ 18)


     Pollen Extract (Verified  Allergy, Unknown, WATERY EYES, 1/4/18)





Home Medications


Scheduled


Insulin Glargine (Lantus Solostar), 12 UNITS SQ HS


Loratadine (Claritin Childrens), 10 MG PO BID


Mupirocin 2% (Bactroban 2%), 1 APPLN EXT TID





Review of Systems


Constitutional:  No fever


Eyes:  + problem reported (visual impairments)


ENT:  + nasal symptoms, No hearing loss, No sore throat, No trouble swallowing


Respiratory:  + shortness of breath


Cardiovascular:  + edema, No chest pain, No palpitations


Abdomen:  No pain, No nausea, No vomiting


Musculoskeletal:  + swelling, No joint pain, No muscle pain, No calf pain


Genitourinary - Female:  + problem reported (reports that she makes urine 

despite being on dialysis)


Neurologic:  No paralysis, No numbness/tingling


Psychiatric:  No substance abuse


Endocrine:  No fatigue


Hematologic / Lymphatic:  No abnormal bleeding/bruising


Integumentary:  + itch (left posterior back)





Physical Exam


Vital Signs











  Date Time  Temp Pulse Resp B/P (MAP) Pulse Ox O2 Delivery O2 Flow Rate FiO2


 


1/13/18 04:00  97  132/80    


 


1/13/18 03:45  97  128/75    


 


1/13/18 03:30  96  124/77    


 


1/13/18 03:15  99  135/74    


 


1/13/18 03:00  97  142/75    


 


1/13/18 02:45  98  139/81    


 


1/13/18 02:30  96  145/81    


 


1/13/18 02:15  95  154/86    


 


1/13/18 02:00  95  151/90    


 


1/13/18 01:45  93  163/95    


 


1/13/18 01:30  93  167/99    


 


1/13/18 01:08 36.4 94  169/91 (117)    


 


1/13/18 01:00 36.7 89 22 174/94 94 Nasal Cannula 2.0 


 


1/13/18 00:31  94 20 187/97 95 Room Air  


 


1/12/18 23:52  95      


 


1/12/18 23:01  88 20 175/106 93 Room Air  


 


1/12/18 22:11  95 22 194/95 98   


 


1/12/18 20:14  97 16 174/85 96 Room Air  


 


1/12/18 19:54  93      


 


1/12/18 19:45 36.6 102 22 231/118 95 Room Air  








General Appearance:  no apparent distress, + obese


Head:  normocephalic, atraumatic


Eyes:  EOMI, + pertinent finding (redness of conjuctiva in both eyes)


ENT:  normal ENT inspection, hearing grossly normal, pharynx normal


Neck:  supple, thyroid normal, trachea midline


Respiratory/Chest:  chest non-tender, + crackles


Cardiovascular:  regular rate, rhythm, + pertinent finding (bilateral lower 

extremity edema)


Abdomen/GI:  normal bowel sounds, non tender, soft, no organomegaly, no 

pulsatile mass


Back:  normal inspection, no CVA tenderness, no muscle spasm, normal range of 

motion


Extremities/Musculoskelatal:  + pertinent finding (bilateral lower extremity 

edema)


Neurologic/Psych:  no motor/sensory deficits, alert, oriented x 3


Skin:  normal color, warm/dry, no rash





Diagnostics


Laboratory Results





Results Past 24 Hours








Test


  1/12/18


22:07 1/12/18


22:23 1/13/18


00:35 Range/Units


 


 


White Blood Count 16.38   4.8-10.8  K/uL


 


Red Blood Count 4.11   4.2-5.4  M/uL


 


Hemoglobin 11.9   12.0-16.0  g/dL


 


Hematocrit 37.4   37-47  %


 


Mean Corpuscular Volume 91.0     fL


 


Mean Corpuscular Hemoglobin 29.0   25-34  pg


 


Mean Corpuscular Hemoglobin


Concent 31.8


  


  


  32-36  g/dl


 


 


Platelet Count 397   130-400  K/uL


 


Mean Platelet Volume 10.5   7.4-10.4  fL


 


Neutrophils (%) (Auto) 88.3    %


 


Lymphocytes (%) (Auto) 4.3    %


 


Monocytes (%) (Auto) 6.5    %


 


Eosinophils (%) (Auto) 0.2    %


 


Basophils (%) (Auto) 0.2    %


 


Neutrophils # (Auto) 14.44   1.4-6.5  K/uL


 


Lymphocytes # (Auto) 0.71   1.2-3.4  K/uL


 


Monocytes # (Auto) 1.07   0.11-0.59  K/uL


 


Eosinophils # (Auto) 0.04   0-0.5  K/uL


 


Basophils # (Auto) 0.03   0-0.2  K/uL


 


RDW Standard Deviation 53.1   36.4-46.3  fL


 


RDW Coefficient of Variation 16.1   11.5-14.5  %


 


Immature Granulocyte % (Auto) 0.5    %


 


Immature Granulocyte # (Auto) 0.09   0.00-0.02  K/uL


 


Sodium Level 128   136-145  mmol/L


 


Potassium Level 6.7   3.5-5.1  mmol/L


 


Chloride Level 87     mmol/L


 


Carbon Dioxide Level 19   21-32  mmol/L


 


Anion Gap 23.0   3-11  mmol/L


 


Blood Urea Nitrogen 147   7-18  mg/dl


 


Creatinine


  19.20


  


  


  0.60-1.20


mg/dl


 


Est Creatinine Clear Calc


Drug Dose 4.2


  


  


   ml/min


 


 


Estimated GFR (


American) 2.1


  


  


   


 


 


Estimated GFR (Non-


American 1.8


  


  


   


 


 


BUN/Creatinine Ratio 7.6   10-20  


 


Random Glucose 126   70-99  mg/dl


 


Calcium Level 10.7   8.5-10.1  mg/dl


 


Phosphorus Level 13.7   2.5-4.9  mg/dl


 


Magnesium Level 2.9   1.8-2.4  mg/dl


 


Total Bilirubin 0.7   0.2-1  mg/dl


 


Direct Bilirubin 0.3   0-0.2  mg/dl


 


Aspartate Amino Transf


(AST/SGOT) 11


  


  


  15-37  U/L


 


 


Alanine Aminotransferase


(ALT/SGPT) 12


  


  


  12-78  U/L


 


 


Alkaline Phosphatase 89     U/L


 


Total Protein 9.4   6.4-8.2  gm/dl


 


Albumin 2.6   3.4-5.0  gm/dl


 


Globulin 6.8   2.5-4.0  gm/dl


 


Albumin/Globulin Ratio 0.4   0.9-2  


 


Thyroid Stimulating Hormone


(TSH) 1.080


  


  


  0.300-4.500


uIu/ml


 


Ethyl Alcohol mg/dL < 3.0   0-3  mg/dl


 


Urine Color  YELLOW   


 


Urine Appearance  CLOUDY  CLEAR  


 


Urine pH  5.5  4.5-7.5  


 


Urine Specific Gravity  1.018  1.000-1.030  


 


Urine Protein  3+  NEG  


 


Urine Glucose (UA)  NEG  NEG  


 


Urine Ketones  TRACE  NEG  


 


Urine Occult Blood  1+  NEG  


 


Urine Nitrite  NEG  NEG  


 


Urine Bilirubin  NEG  NEG  


 


Urine Urobilinogen  NEG  NEG  


 


Urine Leukocyte Esterase  TRACE  NEG  


 


Urine WBC (Auto)  10-30  0-5  /hpf


 


Urine RBC (Auto)  0-4  0-4  /hpf


 


Urine Hyaline Casts (Auto)  0  0-5  /lpf


 


Urine Epithelial Cells (Auto)  >30  0-5  /lpf


 


Urine Bacteria (Auto)  NEG  NEG  


 


Urine Pathogenic Casts    0  /lpf


 


Urine Yeast (Auto)  PRESENT  NONE PRSENT  


 


Urine Opiates Screen  NEG  NEG  


 


Urine Methadone, Qualitative  NEG  NEG  


 


Urine Barbiturates  NEG  NEG  


 


Urine Phencyclidine (PCP)


Level 


  NEG


  


  NEG  


 


 


Ur


Amphetamine/Methamphetamine 


  NEG


  


  NEG  


 


 


MDMA (Ecstasy) Screen  NEG  NEG  


 


Urine Benzodiazepines Screen  NEG  NEG  


 


Urine Cocaine Metabolite  NEG  NEG  


 


Urine Marijuana (THC)  NEG  NEG  








Microbiology Results


1/13/18 MRSA DNA Surveillance Screen - Final, Complete


          Specimen Negative for MRSA by DNA Probe





Impression


Assessment and Plan


This is a 52 year old M who presents to the emergency with respiratory symptoms 

or shortness of breath and cough secondary to missing 3 dialysis sessions vs 

pneumonia. Patient was brought in to the hospital by her family member. Patient 

reports that she usually gets dialysis M/W/F however she could not get to the 

past 3 dialysis sessions since she could not obtain transportation from her 

family members or friends.  Patient reports that she depends upon others also 

for taking medications as she has visual impairments and cannot read the 

prescription bottles. Patient reports being off of her prescribed medications 

for a long while because other people in her life have not helped her with her 

medications and that she has no home health aides. 





When patient came to the emergency room, she was found to have hyperkalemia of 

6.7. Was given Insulin, Calcium gluconate, and Kayexalate by the ED doctor to 

treat the hyperkalemia. CXR with Left upper lobe and left basilar airspace 

opacities which likely represent a pneumonia and mild pulmonary vascular 

congestion and a trace left pleural effusion. Zosyn and Vancomycin was ordered 

by ED doctor to treat for pneumonia. Hospitalist doctor sought nephrology 

consultant Dr. Davison to help with obtaining dialysis session. Patient boarding 

in the ICU bill and completing dialysis with 3 liters removed 





-Hypertension resolving with dialysis


-repeat labs pending to further re-assess hyperkalemia after dialysis session


-further nephrology recommendations appreciated


-continue dialysis 3 times per week


-continue broad spectrum antibiotics as started by ED physician, if repeat 

imaging shows resolution of Chest X ray infiltrates on admission after dialysis 

session and patient is afebrile then patient may not have pneumonia, blood 

cultures and procalcitonin levels to be sent


-test for influenza


-injected conjunctiva of the eyes: ordered eye drops


-have ordered previously prescribed medications for oral calcium acetate (as 

phosphate binder in a patient on dialysis)


-have ordered previously prescribed medications for blood pressure control - 

Lopressor 25 mg BID and nifedipine 60 mg daily


-patient has been off diabetes medications at home including insulin, 

outpatient records showed Lantus 12 units qhs, will give sliding scale insulin 

and Lantus in the hospital. However, it is uncertain as to how patient will be 

able to continue diabetes medication at home if she is visually impaired and if 

no one assists her


-will need case management involvement in discharge planning


-Diabetes diet with fluid restriction for now


-DVT ppx with SCD





Full Code





Level of Care


Telemetry





Advanced Directives


Existing Living Will:  No


Existing Power of :  No





Resuscitation Status


FULL RESUSCITATION





VTE Prophylaxis


VTE Risk Assessment Done? Y/N:  Yes


Risk Level:  Moderate

## 2018-01-14 VITALS — HEART RATE: 80 BPM | DIASTOLIC BLOOD PRESSURE: 70 MMHG | SYSTOLIC BLOOD PRESSURE: 137 MMHG | TEMPERATURE: 98.06 F

## 2018-01-14 VITALS — HEART RATE: 76 BPM | DIASTOLIC BLOOD PRESSURE: 64 MMHG | SYSTOLIC BLOOD PRESSURE: 134 MMHG

## 2018-01-14 VITALS — SYSTOLIC BLOOD PRESSURE: 146 MMHG | HEART RATE: 73 BPM | DIASTOLIC BLOOD PRESSURE: 69 MMHG

## 2018-01-14 VITALS — DIASTOLIC BLOOD PRESSURE: 49 MMHG | HEART RATE: 73 BPM | SYSTOLIC BLOOD PRESSURE: 103 MMHG

## 2018-01-14 VITALS
TEMPERATURE: 98.42 F | OXYGEN SATURATION: 92 % | SYSTOLIC BLOOD PRESSURE: 107 MMHG | HEART RATE: 66 BPM | DIASTOLIC BLOOD PRESSURE: 62 MMHG

## 2018-01-14 VITALS — DIASTOLIC BLOOD PRESSURE: 54 MMHG | SYSTOLIC BLOOD PRESSURE: 105 MMHG | HEART RATE: 75 BPM

## 2018-01-14 VITALS
TEMPERATURE: 98.06 F | HEART RATE: 80 BPM | SYSTOLIC BLOOD PRESSURE: 132 MMHG | OXYGEN SATURATION: 94 % | DIASTOLIC BLOOD PRESSURE: 60 MMHG

## 2018-01-14 VITALS
DIASTOLIC BLOOD PRESSURE: 64 MMHG | TEMPERATURE: 98.6 F | SYSTOLIC BLOOD PRESSURE: 127 MMHG | HEART RATE: 83 BPM | OXYGEN SATURATION: 93 %

## 2018-01-14 VITALS — DIASTOLIC BLOOD PRESSURE: 60 MMHG | SYSTOLIC BLOOD PRESSURE: 132 MMHG

## 2018-01-14 VITALS — SYSTOLIC BLOOD PRESSURE: 99 MMHG | HEART RATE: 75 BPM | DIASTOLIC BLOOD PRESSURE: 47 MMHG

## 2018-01-14 VITALS — DIASTOLIC BLOOD PRESSURE: 51 MMHG | SYSTOLIC BLOOD PRESSURE: 98 MMHG | HEART RATE: 75 BPM

## 2018-01-14 VITALS
DIASTOLIC BLOOD PRESSURE: 63 MMHG | TEMPERATURE: 97.7 F | SYSTOLIC BLOOD PRESSURE: 108 MMHG | OXYGEN SATURATION: 95 % | HEART RATE: 72 BPM

## 2018-01-14 VITALS
TEMPERATURE: 98.42 F | HEART RATE: 71 BPM | OXYGEN SATURATION: 94 % | SYSTOLIC BLOOD PRESSURE: 103 MMHG | DIASTOLIC BLOOD PRESSURE: 49 MMHG

## 2018-01-14 VITALS — DIASTOLIC BLOOD PRESSURE: 59 MMHG | SYSTOLIC BLOOD PRESSURE: 113 MMHG | HEART RATE: 71 BPM

## 2018-01-14 VITALS
SYSTOLIC BLOOD PRESSURE: 154 MMHG | OXYGEN SATURATION: 90 % | TEMPERATURE: 98.42 F | DIASTOLIC BLOOD PRESSURE: 70 MMHG | HEART RATE: 87 BPM

## 2018-01-14 VITALS — HEART RATE: 83 BPM | SYSTOLIC BLOOD PRESSURE: 123 MMHG | DIASTOLIC BLOOD PRESSURE: 67 MMHG

## 2018-01-14 VITALS — TEMPERATURE: 98.42 F | SYSTOLIC BLOOD PRESSURE: 117 MMHG | HEART RATE: 84 BPM | DIASTOLIC BLOOD PRESSURE: 55 MMHG

## 2018-01-14 VITALS — DIASTOLIC BLOOD PRESSURE: 59 MMHG | SYSTOLIC BLOOD PRESSURE: 116 MMHG | HEART RATE: 70 BPM

## 2018-01-14 VITALS — DIASTOLIC BLOOD PRESSURE: 51 MMHG | HEART RATE: 71 BPM | SYSTOLIC BLOOD PRESSURE: 94 MMHG

## 2018-01-14 VITALS — SYSTOLIC BLOOD PRESSURE: 110 MMHG | DIASTOLIC BLOOD PRESSURE: 57 MMHG | HEART RATE: 80 BPM

## 2018-01-14 VITALS — SYSTOLIC BLOOD PRESSURE: 97 MMHG | HEART RATE: 73 BPM | DIASTOLIC BLOOD PRESSURE: 49 MMHG

## 2018-01-14 LAB
ALBUMIN SERPL-MCNC: 2.1 GM/DL (ref 3.4–5)
ALP SERPL-CCNC: 75 U/L (ref 45–117)
ALT SERPL-CCNC: 9 U/L (ref 12–78)
AST SERPL-CCNC: 9 U/L (ref 15–37)
BASOPHILS # BLD: 0.03 K/UL (ref 0–0.2)
BASOPHILS NFR BLD: 0.2 %
BUN SERPL-MCNC: 105 MG/DL (ref 7–18)
CALCIUM SERPL-MCNC: 9.7 MG/DL (ref 8.5–10.1)
CO2 SERPL-SCNC: 23 MMOL/L (ref 21–32)
CREAT SERPL-MCNC: 14.4 MG/DL (ref 0.6–1.2)
EOS ABS #: 0.2 K/UL (ref 0–0.5)
EOSINOPHIL NFR BLD AUTO: 354 K/UL (ref 130–400)
GLUCOSE SERPL-MCNC: 178 MG/DL (ref 70–99)
HCT VFR BLD CALC: 32.6 % (ref 37–47)
HGB BLD-MCNC: 10.4 G/DL (ref 12–16)
IG#: 0.06 K/UL (ref 0–0.02)
IMM GRANULOCYTES NFR BLD AUTO: 7.7 %
LYMPHOCYTES # BLD: 1.14 K/UL (ref 1.2–3.4)
MCH RBC QN AUTO: 28.9 PG (ref 25–34)
MCHC RBC AUTO-ENTMCNC: 31.9 G/DL (ref 32–36)
MCV RBC AUTO: 90.6 FL (ref 80–100)
MONO ABS #: 1.46 K/UL (ref 0.11–0.59)
MONOCYTES NFR BLD: 9.9 %
NEUT ABS #: 11.93 K/UL (ref 1.4–6.5)
NEUTROPHILS # BLD AUTO: 1.3 %
NEUTROPHILS NFR BLD AUTO: 80.5 %
PMV BLD AUTO: 10.7 FL (ref 7.4–10.4)
POTASSIUM SERPL-SCNC: 4.3 MMOL/L (ref 3.5–5.1)
PROT SERPL-MCNC: 7.2 GM/DL (ref 6.4–8.2)
RED CELL DISTRIBUTION WIDTH CV: 15.8 % (ref 11.5–14.5)
RED CELL DISTRIBUTION WIDTH SD: 52.1 FL (ref 36.4–46.3)
SODIUM SERPL-SCNC: 132 MMOL/L (ref 136–145)
WBC # BLD AUTO: 14.82 K/UL (ref 4.8–10.8)

## 2018-01-14 RX ADMIN — BENZONATATE PRN MG: 100 CAPSULE ORAL at 20:43

## 2018-01-14 RX ADMIN — RENAGEL SCH MG: 800 TABLET ORAL at 17:54

## 2018-01-14 RX ADMIN — METOPROLOL TARTRATE SCH MG: 50 TABLET, FILM COATED ORAL at 14:32

## 2018-01-14 RX ADMIN — INSULIN ASPART SCH UNITS: 100 INJECTION, SOLUTION INTRAVENOUS; SUBCUTANEOUS at 11:46

## 2018-01-14 RX ADMIN — RENAGEL SCH MG: 800 TABLET ORAL at 09:04

## 2018-01-14 RX ADMIN — RENAGEL SCH MG: 800 TABLET ORAL at 11:30

## 2018-01-14 RX ADMIN — INSULIN ASPART SCH UNITS: 100 INJECTION, SOLUTION INTRAVENOUS; SUBCUTANEOUS at 20:44

## 2018-01-14 RX ADMIN — INSULIN ASPART SCH UNITS: 100 INJECTION, SOLUTION INTRAVENOUS; SUBCUTANEOUS at 06:45

## 2018-01-14 RX ADMIN — Medication PRN TAB: at 20:44

## 2018-01-14 RX ADMIN — INSULIN GLARGINE SCH UNITS: 100 INJECTION, SOLUTION SUBCUTANEOUS at 20:48

## 2018-01-14 RX ADMIN — METOPROLOL TARTRATE SCH MG: 50 TABLET, FILM COATED ORAL at 20:39

## 2018-01-14 RX ADMIN — INSULIN ASPART SCH UNITS: 100 INJECTION, SOLUTION INTRAVENOUS; SUBCUTANEOUS at 17:56

## 2018-01-14 RX ADMIN — NAPHAZOLINE HYDROCHLORIDE AND PHENIRAMINE MALEATE SCH DROPS: .25; 3 SOLUTION/ DROPS OPHTHALMIC at 09:04

## 2018-01-14 RX ADMIN — ISOSORBIDE MONONITRATE SCH MG: 60 TABLET ORAL at 14:32

## 2018-01-14 NOTE — PROGRESS NOTE
Internal Med Progress Note


Date of Service:


Jan 14, 2018.


Provider Documentation:





SUBJECTIVE:





continues to cough 


mention of having productive cough with green sputum 


feels feverish 


had dialysis 


having painful bruise on rt foot -mentions her shoe was too tight and caused 

the bruise


initially in was a small pimple .became larger in  past few days ,  painful/

swollen 





Box of candy noted on pt's side table -says she only eats them when she feels 

shaky 


counselled for blood sugar management and diabetic control 











OBJECTIVE:





Vital Signs-as noted below





Exam:


General-no sign of distress,chronically ill appearing  


Eyes-sclera non icteric, PERRLA/EOMI 


ENT-moist oral mucosa 


Neck-no JVD , no thyromegaly , trachea midline 


Lungs-coarse rales , _ wheeze 


Heart-regular S1/S2 


Abdomen-soft, non tender 


Extremities-erythematous cystic swelling on rt dorsal foot , increased warmth /

tender -mentions that her shoe rubs against it 


multiple skin wounds on tip of left index finger , 


bottom of great toe of right foot 





Neuro-AAO x3, no focal neurological deficit 





Lab data as noted below.


ASSESSMENT & PLAN:


This is a 52 year old M who presents to the emergency with respiratory symptoms 

or shortness of breath and cough secondary to missing 3 dialysis sessions vs 

pneumonia. Patient was brought in to the hospital by her family member. Patient 

reports that she usually gets dialysis M/W/F however she could not get to the 

past 3 dialysis sessions since she could not obtain transportation from her 

family members or friends.  Patient reports that she depends upon others also 

for taking medications as she has visual impairments and cannot read the 

prescription bottles. Patient reports being off of her prescribed medications 

for a long while because other people in her life have not helped her with her 

medications and that she has no home health aides. 





SOB /VOL OVERLOAD : 


due to missed dialysis 


appreciate input form Nephrology 


pt had consecutive 2 daily tx 


vol status stable 


SOB has resolved, no orthopnea , no SOUSA 





LEFT LOWER LOBE PNEUMONIA : 


mentions of having productive cough for past few days 


with greening yellow sputum 


initially was ordered for Vancomycin /Zosyn 


Vancomycin D/macarena-MRSA screen negative 


IV Zosyn could not be continued for poor vascular access 


pt will be started on PO Levaquin 


Pharmacy consulted for renal dosing 


ordered for sputum for culture and sensitivity  





MULTIPLE WOUNDS IN FEET/HANDS : 


pt mentions of bumping on things due to neuropathy 


developed swelling wound on rt foot due to ill fitted shoe


counselled for proper fitting /diabetic foot ware to prevent injury 


Wound care consulted 


pt is known to the clinic 


started on Levaquin 


ID eval requested 





HTN : 


due to vol overload /missed dialysis 


improved after HD tx 


cont Lopressor 25 mg BID and nifedipine 60 mg daily





HYPERKALEMIA/HYPERCALCEMIA : 


due to missed dialysis 


electrolytes stabilized after consecutive HD tx 





TYPE 2 DM : 


poorly controlled /non complaint 


ordered for Lantus /Insulin SSI 


pharmacy consulted for glycemic control 





DEPRESSION /REPORTS OF SUICIDAL IDEATION ; 


psych eval appreciated 


pt reports of frustration with poor health /dialysis /quality of life 


denies of suicidal ideation or self harm  


started on Zoloft 25 mg daily 


information  given for put pt Psych /Counselling follow up 





Full Code











DISPOSITION


expected to return home when medically stable 


Social service consulted for discharge planning


Vital Signs:











  Date Time  Temp Pulse Resp B/P (MAP) Pulse Ox O2 Delivery O2 Flow Rate FiO2


 


1/14/18 20:45  73  146/69 (94)    


 


1/14/18 16:00      Nasal Cannula  


 


1/14/18 15:54 36.5 72 18 108/63 (78) 95 Room Air  


 


1/14/18 15:14 36.7 80 20  94  2.0 


 


1/14/18 14:30    132/60 (84)    


 


1/14/18 12:45 36.7 80  137/70 (92)    


 


1/14/18 12:30  76  134/64    


 


1/14/18 12:15  71  113/59    


 


1/14/18 12:00      Nasal Cannula 2.0 


 


1/14/18 12:00  70  116/59    


 


1/14/18 11:45  72  101/49    


 


1/14/18 11:45 36.9 71 20 103/49 (67) 94 Nasal Cannula 2.0 


 


1/14/18 11:30  73  103/49    


 


1/14/18 11:15  71  94/51    


 


1/14/18 11:00  73  97/49    


 


1/14/18 10:45  75  105/54    


 


1/14/18 10:30  75  98/51    


 


1/14/18 10:15  75  99/47    


 


1/14/18 10:00  80  110/57    


 


1/14/18 09:40  83  123/67    


 


1/14/18 09:30 36.9 84  117/55 (75)    


 


1/14/18 08:00      Nasal Cannula 2.0 


 


1/14/18 08:00 36.9 87 19 154/70 (98) 90 Room Air  


 


1/14/18 04:00     93 Nasal Cannula 2.0 


 


1/14/18 04:00 37.0 83 20 127/64 (85) 93 Nasal Cannula 2.0 


 


1/13/18 23:59 36.9 83 17 129/65 (86) 91 Nasal Cannula 2.0 


 


1/13/18 23:59      Nasal Cannula 2.0 








Lab Results:





Results Past 24 Hours








Test


  1/14/18


06:11 1/14/18


08:10 1/14/18


09:49 1/14/18


11:43 Range/Units


 


 


Bedside Glucose 125 136  149 70-90  mg/dl


 


White Blood Count   14.82  4.8-10.8  K/uL


 


Red Blood Count   3.60  4.2-5.4  M/uL


 


Hemoglobin   10.4  12.0-16.0  g/dL


 


Hematocrit   32.6  37-47  %


 


Mean Corpuscular Volume   90.6    fL


 


Mean Corpuscular Hemoglobin   28.9  25-34  pg


 


Mean Corpuscular Hemoglobin


Concent 


  


  31.9


  


  32-36  g/dl


 


 


Platelet Count   354  130-400  K/uL


 


Mean Platelet Volume   10.7  7.4-10.4  fL


 


Neutrophils (%) (Auto)   80.5   %


 


Lymphocytes (%) (Auto)   7.7   %


 


Monocytes (%) (Auto)   9.9   %


 


Eosinophils (%) (Auto)   1.3   %


 


Basophils (%) (Auto)   0.2   %


 


Neutrophils # (Auto)   11.93  1.4-6.5  K/uL


 


Lymphocytes # (Auto)   1.14  1.2-3.4  K/uL


 


Monocytes # (Auto)   1.46  0.11-0.59  K/uL


 


Eosinophils # (Auto)   0.20  0-0.5  K/uL


 


Basophils # (Auto)   0.03  0-0.2  K/uL


 


RDW Standard Deviation   52.1  36.4-46.3  fL


 


RDW Coefficient of Variation   15.8  11.5-14.5  %


 


Immature Granulocyte % (Auto)   0.4   %


 


Immature Granulocyte # (Auto)   0.06  0.00-0.02  K/uL


 


Sodium Level   132  136-145  mmol/L


 


Potassium Level   4.3  3.5-5.1  mmol/L


 


Chloride Level   92    mmol/L


 


Carbon Dioxide Level   23  21-32  mmol/L


 


Anion Gap   17.0  3-11  mmol/L


 


Blood Urea Nitrogen   105  7-18  mg/dl


 


Creatinine


  


  


  14.40


  


  0.60-1.20


mg/dl


 


Est Creatinine Clear Calc


Drug Dose 


  


  5.8


  


   ml/min


 


 


Estimated GFR (


American) 


  


  3.0


  


   


 


 


Estimated GFR (Non-


American 


  


  2.6


  


   


 


 


BUN/Creatinine Ratio   7.3  10-20  


 


Random Glucose   178  70-99  mg/dl


 


Calcium Level   9.7  8.5-10.1  mg/dl


 


Total Bilirubin   0.4  0.2-1  mg/dl


 


Aspartate Amino Transf


(AST/SGOT) 


  


  9


  


  15-37  U/L


 


 


Alanine Aminotransferase


(ALT/SGPT) 


  


  9


  


  12-78  U/L


 


 


Alkaline Phosphatase   75    U/L


 


Total Protein   7.2  6.4-8.2  gm/dl


 


Albumin   2.1  3.4-5.0  gm/dl


 


Globulin   5.1  2.5-4.0  gm/dl


 


Albumin/Globulin Ratio   0.4  0.9-2  


 


Procalcitonin   8.67  0-0.5  ng/ml


 


Hepatitis B Surface Antigen   NEG  NEG  


 


Hepatitis B Surface Antibody   POS   


 


Test


  1/14/18


16:29 1/14/18


20:40 


  


  Range/Units


 


 


Bedside Glucose 222 97   70-90  mg/dl

## 2018-01-14 NOTE — PSYCHIATRIC CONSULTATION
Consultation


Date of Consultation


2018.





Identifying Data


53 yo female from [place], admited 18 for complications of ESRD and 

hyperkalemia.  Consult is by Dr. Staton for ?SI and 302 warrant on chart by 

niece for reported inability to care for self.





Chief Complaint


"I'm wiped out honey".





History of Present Illness


Patient had dialysis this am.  Niece is actually the daughter of her  

who passed away 3 years ago per patient.  Per petition she hasn't been eating 

well or taking medications.  She has been on dialysis for 5 years but relies on 

van then others for transportation.  She reportedly has been wetting herself 

and not changing.  She agrees that she doesn't sleep well and not finding much 

purpose in life since her   as wishes life would be different at 

this point.  PHQ-9=6 and she denies suicidal ideation.  She apologized several 

times for being so tired and "gassy" and asked to end the interview.





She was last seen by consult service by Dr. Eaton in .  At that time she 

was admitted with respiratory failure and reported a reaction (?paranoia) 

following 1 dose of Celexa.  Zoloft was recommended at that time for MDD and 

she states it was helpful for "awhile".  It's not clear when/why discontinued 

but she voiced willingness to take.  She was cooperative with dialysis and 

states that she would go regularly if case management could arrange a van.  She 

states she would also be receptive to home health as helpful previously.





Past Psychiatric History


Current OP Treatment:  no current treatment


Prior Psych Hospitalizations:  none


Access to a Gun:  No


Suicide Attempts:  No


Past Medication Trials


Celexa, Zoloft





Past Medical/Surgical History





(1) Hyperkalemia


(2) PNA (pneumonia)


(3) Arthritis


(4) HX OF PAST NONCOMPLIANCE


(5) Ischemic cardiomyopathy


(6) Carotid stenosis


(7) ESRD (end stage renal disease) on dialysis


(8) DM type 2 (diabetes mellitus, type 2)


(9) GERD (gastroesophageal reflux disease)


(10) Allergic rhinitis


(11) Dyslipidemia


(12) HTN (hypertension)





Allergies


Allergies:  


Coded Allergies:  


     Adhesives (Verified  Allergy, Mild, RASH, SORES, 18)


     NO KNOWN DRUG ALLERGIES (Verified  Allergy, Mild, ., 18)


     Latex1 -Allergic Contact Dermititis (Verified  Allergy, Unknown, rash, )


     Pollen Extract (Verified  Allergy, Unknown, WATERY EYES, 18)





Home Medications


Scheduled


Insulin Glargine (Lantus Solostar), 12 UNITS SQ HS


Loratadine (Claritin Childrens), 10 MG PO BID


Mupirocin 2% (Bactroban 2%), 1 APPLN EXT TID





Family History





Diabetes mellitus


  FATHER


  MOTHER


Heart disease


  FATHER


  MOTHER


Hypertension


  FATHER


  MOTHER


Kidney disease


  GRANDMOTHER


Psychiatric History:  Yes (mother depressed)





Alcohol Use


Alcohol Use In Past 12 Months:  No





Smoking Use


Smoking Status:  Former Smoker





Substance History


denied





Personal History


Education:  graduated from high school


Work History:


disability


Relationship History:  


Children:  daughter (conflictual)


Spiritual Affiliation:  [employer]eg[employer]


Legal History:  none


Psychological Trauma History:  Denies Hx Traumatic Event





Review of Systems


fatigue, denied discomfort, cough with productive





Examination


Vital Signs





Vital Signs Past 12 Hours








  Date Time  Temp Pulse Resp B/P (MAP) Pulse Ox O2 Delivery O2 Flow Rate FiO2


 


18 16:00      Nasal Cannula  


 


18 15:54 36.5 72 18 108/63 (78) 95 Room Air  


 


18 15:14 36.7 80 20  94  2.0 


 


18 14:30    132/60 (84)    


 


18 12:45 36.7 80  137/70 (92)    


 


18 12:30  76  134/64    


 


18 12:15  71  113/59    


 


18 12:00      Nasal Cannula 2.0 


 


18 12:00  70  116/59    


 


18 11:45  72  101/49    


 


18 11:45 36.9 71 20 103/49 (67) 94 Nasal Cannula 2.0 


 


18 11:30  73  103/49    


 


18 11:15  71  94/51    


 


18 11:00  73  97/49    


 


18 10:45  75  105/54    


 


18 10:30  75  98/51    


 


18 10:15  75  99/47    


 


18 10:00  80  110/57    


 


18 09:40  83  123/67    


 


18 09:30 36.9 84  117/55 (75)    


 


18 08:00      Nasal Cannula 2.0 


 


18 08:00 36.9 87 19 154/70 (98) 90 Room Air  











Laboratory Results





Last 24 Hours








Test


  18


19:05 18


20:37 18


06:11 18


08:10


 


Bedside Glucose 174 mg/dl  165 mg/dl  125 mg/dl  136 mg/dl 


 


Test


  18


09:49 18


11:43 18


16:29 


 


 


White Blood Count 14.82 K/uL    


 


Red Blood Count 3.60 M/uL    


 


Hemoglobin 10.4 g/dL    


 


Hematocrit 32.6 %    


 


Mean Corpuscular Volume 90.6 fL    


 


Mean Corpuscular Hemoglobin 28.9 pg    


 


Mean Corpuscular Hemoglobin


Concent 31.9 g/dl 


  


  


  


 


 


Platelet Count 354 K/uL    


 


Mean Platelet Volume 10.7 fL    


 


Neutrophils (%) (Auto) 80.5 %    


 


Lymphocytes (%) (Auto) 7.7 %    


 


Monocytes (%) (Auto) 9.9 %    


 


Eosinophils (%) (Auto) 1.3 %    


 


Basophils (%) (Auto) 0.2 %    


 


Neutrophils # (Auto) 11.93 K/uL    


 


Lymphocytes # (Auto) 1.14 K/uL    


 


Monocytes # (Auto) 1.46 K/uL    


 


Eosinophils # (Auto) 0.20 K/uL    


 


Basophils # (Auto) 0.03 K/uL    


 


RDW Standard Deviation 52.1 fL    


 


RDW Coefficient of Variation 15.8 %    


 


Immature Granulocyte % (Auto) 0.4 %    


 


Immature Granulocyte # (Auto) 0.06 K/uL    


 


Sodium Level 132 mmol/L    


 


Potassium Level 4.3 mmol/L    


 


Chloride Level 92 mmol/L    


 


Carbon Dioxide Level 23 mmol/L    


 


Anion Gap 17.0 mmol/L    


 


Blood Urea Nitrogen 105 mg/dl    


 


Creatinine 14.40 mg/dl    


 


Est Creatinine Clear Calc


Drug Dose 5.8 ml/min 


  


  


  


 


 


Estimated GFR (


American) 3.0 


  


  


  


 


 


Estimated GFR (Non-


American 2.6 


  


  


  


 


 


BUN/Creatinine Ratio 7.3    


 


Random Glucose 178 mg/dl    


 


Calcium Level 9.7 mg/dl    


 


Total Bilirubin 0.4 mg/dl    


 


Aspartate Amino Transf


(AST/SGOT) 9 U/L 


  


  


  


 


 


Alanine Aminotransferase


(ALT/SGPT) 9 U/L 


  


  


  


 


 


Alkaline Phosphatase 75 U/L    


 


Total Protein 7.2 gm/dl    


 


Albumin 2.1 gm/dl    


 


Globulin 5.1 gm/dl    


 


Albumin/Globulin Ratio 0.4    


 


Procalcitonin 8.67 ng/ml    


 


Hepatitis B Surface Antigen NEG    


 


Hepatitis B Surface Antibody POS    


 


Bedside Glucose  149 mg/dl  222 mg/dl  











Mental Examination


During interview pt is:  cooperative


Appearance:  disheveled


Eye contact is:  poor


Motor behavior is:  no abnormal motor movements


Speech:  other (slow)


Affect:  blunted


Mood is:  depressed


Thought process:  clear, coherent


Thought content:  reality based without delusions


Suicidal thought are:  denied


Homicidal thoughts are:  denied


Hallucinations:  denies auditory, denies visual


Cognition:  language grossly intact


Intelligence estimated to be:  average


Insight:  limited


Judgement:  limited





Impression / Recommendations


Impression


53 yo female with ESRD, compliance issues, poor self care at home.  She has a 

history of depression that previously had some response to Zoloft.  She is 

currently on an active 302 warrant but is not medically cleared and has been 

cooperative with necessary care.  She states she is agreeable to continue 

dialysis with transportation and home health.





Recommendations


risks/benefits/alternatives reviewed re: retrial of Zoloft.  She agreed to 25 

mg starting in am, may need titrated/adjusted per prescriber around dialysis, 

hasn't used prn Ativan in past around dialysis with benefit





declines therapy referral


patient is very unlikely to meet criteria for inpatient mental health 

commitment when medically cleared as she is denying SI and is not psychotic and 

cooperative with necessary medical care.  Seems like main intervention needs to 

be home health/transportation via case management.

## 2018-01-14 NOTE — DIAGNOSTIC IMAGING REPORT
CHEST 2 VIEWS ROUTINE



HISTORY:      shortness of breath





Findings: The heart remains mildly enlarged. Trace left pleural effusion.

Interstitial and vascular thickening has progressed suggestive of mild

congestive change. There is also left upper lobe and left basilar airspace

opacities.

No pneumothorax.

IMPRESSION:



1. Left upper lobe and left basilar airspace opacities which likely represent a

pneumonia. Recommend follow-up to ensure complete resolution.

2. Mild pulmonary vascular congestion and a trace left pleural effusion.



No change from CHEST ONE VIEW PORTABLE with report dated 1/12/2018 10:24 PM.







Electronically signed by:  Ismael Pascual M.D.

1/14/2018 9:49 AM



Dictated Date/Time:  1/14/2018 9:48 AM

## 2018-01-14 NOTE — DIALYSIS PROGRESS NOTE
Nephrology Dialysis Note


Date of Service:


Jan 14, 2018.


Subjective


51 yo female seen on dialysis. pt tired and had difficulty sleeping last night 

with a dry cough.  no cramping on dialysis.





Objective











  Date Time  Temp Pulse Resp B/P (MAP) Pulse Ox O2 Delivery O2 Flow Rate FiO2


 


1/14/18 10:30  75  98/51    


 


1/14/18 10:15  75  99/47    


 


1/14/18 10:00  80  110/57    


 


1/14/18 09:40  83  123/67    


 


1/14/18 09:30 36.9 84  117/55 (75)    


 


1/14/18 08:00      Nasal Cannula 2.0 


 


1/14/18 08:00 36.9 87 19 154/70 (98) 90 Room Air  


 


1/14/18 04:00     93 Nasal Cannula 2.0 


 


1/14/18 04:00 37.0 83 20 127/64 (85) 93 Nasal Cannula 2.0 


 


1/13/18 23:59 36.9 83 17 129/65 (86) 91 Nasal Cannula 2.0 


 


1/13/18 23:59      Nasal Cannula 2.0 


 


1/13/18 20:00      Nasal Cannula 2.0 


 


1/13/18 19:08 36.8 85 18 133/67 (89) 92 Humidified Oxygen  


 


1/13/18 16:34     94 Nasal Cannula 2.0 


 


1/13/18 16:00      Nasal Cannula 2.0 


 


1/13/18 15:08 36.8 80 18 131/71 (91) 93 Nasal Cannula 2.0 


 


1/13/18 12:00      Nasal Cannula 2.0 


 


1/13/18 11:15 36.9 87 18 122/69 (86) 91  2.0 








Physical Exam:


General-aaox3, obese


Eyes-no scleral icterus


ENT-mmm


Neck-supple


Lungs-cta


Heart-rrr


Abdomen-bs+ s/nt/nd


Extremities-no edema


Neuro-nonfocal





Current Inpatient Medications








 Medications


  (Trade)  Dose


 Ordered  Sig/Willy


 Route  Start Time


 Stop Time Status Last Admin


Dose Admin


 


 Insulin Aspart


  (novoLOG ASPART)  **SLIDING


 SCALE**


 **G...  ACHS


 SC  1/13/18 06:45


 2/12/18 06:59   


 


 


 Glucose


  (Glucose 40% Gel)  15-30


 GRAMS 15


 GRAMS...  UD  PRN


 PO  1/13/18 00:00


 2/12/18 00:00   


 


 


 Glucose


  (Glucose Chew


 Tab)  4-8


 Tablets 4


 Tabl...  UD  PRN


 PO  1/13/18 00:00


 2/12/18 00:00   


 


 


 Dextrose


  (Dextrose 50%


 50ML Syringe)  25-50ML OF


 50% DW IV


 FOR...  UD  PRN


 IV  1/13/18 00:00


 2/12/18 00:00   


 


 


 Glucagon


  (Glucagon Inj)  1 mg  UD  PRN


 SQ  1/13/18 00:00


 2/12/18 00:00   


 


 


 Naphazoline HCl/


 Pheniramine


 Maleate


  (Visine-A Oph


 Soln)  1 drops  DAILY


 OP  1/13/18 09:00


 2/12/18 08:59  1/14/18 09:04


1 DROPS


 


 Isosorbide


 Mononitrate


  (Imdur Ext Rel


 Tab)  60 mg  QAM


 PO  1/13/18 09:00


 2/12/18 08:59  1/13/18 08:56


60 MG


 


 Metoprolol


 Tartrate


  (Lopressor Tab)  50 mg  BID


 PO  1/13/18 09:00


 2/12/18 08:59  1/13/18 20:45


50 MG


 


 Insulin Glargine


  (Lantus Solostar


 Pen)  12 units  HS


 SC  1/13/18 21:00


 2/12/18 20:59  1/13/18 20:44


12 UNITS


 


 Sevelamer HCl


  (Renagel Tab)  2,400 mg  TIDM


 PO  1/13/18 11:30


 2/12/18 11:29  1/14/18 09:04


2,400 MG


 


 Acetaminophen


  (Tylenol Tab)  325 mg  Q4H  PRN


 PO  1/14/18 02:45


 2/13/18 02:44   


 











Last 24 Hours








Test


  1/13/18


11:08 1/13/18


16:00 1/13/18


19:05 1/13/18


20:37


 


Bedside Glucose 128 mg/dl  171 mg/dl  174 mg/dl  165 mg/dl 


 


Test


  1/14/18


06:11 1/14/18


08:10 1/14/18


09:49 


 


 


Bedside Glucose 125 mg/dl  136 mg/dl   


 


White Blood Count   14.82 K/uL  


 


Red Blood Count   3.60 M/uL  


 


Hemoglobin   10.4 g/dL  


 


Hematocrit   32.6 %  


 


Mean Corpuscular Volume   90.6 fL  


 


Mean Corpuscular Hemoglobin   28.9 pg  


 


Mean Corpuscular Hemoglobin


Concent 


  


  31.9 g/dl 


  


 


 


Platelet Count   354 K/uL  


 


Mean Platelet Volume   10.7 fL  


 


Neutrophils (%) (Auto)   80.5 %  


 


Lymphocytes (%) (Auto)   7.7 %  


 


Monocytes (%) (Auto)   9.9 %  


 


Eosinophils (%) (Auto)   1.3 %  


 


Basophils (%) (Auto)   0.2 %  


 


Neutrophils # (Auto)   11.93 K/uL  


 


Lymphocytes # (Auto)   1.14 K/uL  


 


Monocytes # (Auto)   1.46 K/uL  


 


Eosinophils # (Auto)   0.20 K/uL  


 


Basophils # (Auto)   0.03 K/uL  


 


RDW Standard Deviation   52.1 fL  


 


RDW Coefficient of Variation   15.8 %  


 


Immature Granulocyte % (Auto)   0.4 %  


 


Immature Granulocyte # (Auto)   0.06 K/uL  


 


Sodium Level   132 mmol/L  


 


Potassium Level   4.3 mmol/L  


 


Chloride Level   92 mmol/L  


 


Carbon Dioxide Level   23 mmol/L  


 


Anion Gap   17.0 mmol/L  


 


Blood Urea Nitrogen   105 mg/dl  


 


Creatinine   14.40 mg/dl  


 


Est Creatinine Clear Calc


Drug Dose 


  


  5.8 ml/min 


  


 


 


Estimated GFR (


American) 


  


  3.0 


  


 


 


Estimated GFR (Non-


American 


  


  2.6 


  


 


 


BUN/Creatinine Ratio   7.3  


 


Random Glucose   178 mg/dl  


 


Calcium Level   9.7 mg/dl  


 


Total Bilirubin   0.4 mg/dl  


 


Aspartate Amino Transf


(AST/SGOT) 


  


  9 U/L 


  


 


 


Alanine Aminotransferase


(ALT/SGPT) 


  


  9 U/L 


  


 


 


Alkaline Phosphatase   75 U/L  


 


Total Protein   7.2 gm/dl  


 


Albumin   2.1 gm/dl  


 


Globulin   5.1 gm/dl  


 


Albumin/Globulin Ratio   0.4  











Assessment & Plan


ESRD-seen on dialysis.  pt missed a week of dialysis prior to coming in.  had 

hyperkalemia which has improved.  calcium levels also improving as well.  plan 

on dialysis again tomorrow. 





anemia of renal failure-hg levels are in the 10 to 11 range. holding on procrit 

at this time 





IMANI-switched phoslo to renvela secondary to the high calcium levels. trying to 

control phos levels better with binders and daily dialysis.

## 2018-01-15 VITALS — DIASTOLIC BLOOD PRESSURE: 70 MMHG | HEART RATE: 79 BPM | SYSTOLIC BLOOD PRESSURE: 134 MMHG

## 2018-01-15 VITALS — HEART RATE: 77 BPM | TEMPERATURE: 97.52 F | SYSTOLIC BLOOD PRESSURE: 129 MMHG | DIASTOLIC BLOOD PRESSURE: 61 MMHG

## 2018-01-15 VITALS — HEART RATE: 75 BPM | DIASTOLIC BLOOD PRESSURE: 69 MMHG | SYSTOLIC BLOOD PRESSURE: 129 MMHG

## 2018-01-15 VITALS — TEMPERATURE: 97.88 F | DIASTOLIC BLOOD PRESSURE: 63 MMHG | HEART RATE: 76 BPM | SYSTOLIC BLOOD PRESSURE: 126 MMHG

## 2018-01-15 VITALS — HEART RATE: 73 BPM | DIASTOLIC BLOOD PRESSURE: 59 MMHG | SYSTOLIC BLOOD PRESSURE: 110 MMHG

## 2018-01-15 VITALS — OXYGEN SATURATION: 94 % | DIASTOLIC BLOOD PRESSURE: 79 MMHG | SYSTOLIC BLOOD PRESSURE: 137 MMHG | HEART RATE: 76 BPM

## 2018-01-15 VITALS — SYSTOLIC BLOOD PRESSURE: 101 MMHG | DIASTOLIC BLOOD PRESSURE: 64 MMHG | HEART RATE: 77 BPM

## 2018-01-15 VITALS — DIASTOLIC BLOOD PRESSURE: 50 MMHG | SYSTOLIC BLOOD PRESSURE: 99 MMHG | HEART RATE: 69 BPM

## 2018-01-15 VITALS — SYSTOLIC BLOOD PRESSURE: 121 MMHG | HEART RATE: 72 BPM | DIASTOLIC BLOOD PRESSURE: 64 MMHG

## 2018-01-15 VITALS
SYSTOLIC BLOOD PRESSURE: 148 MMHG | HEART RATE: 70 BPM | TEMPERATURE: 97.7 F | DIASTOLIC BLOOD PRESSURE: 67 MMHG | OXYGEN SATURATION: 94 %

## 2018-01-15 VITALS — SYSTOLIC BLOOD PRESSURE: 114 MMHG | DIASTOLIC BLOOD PRESSURE: 62 MMHG | HEART RATE: 71 BPM

## 2018-01-15 VITALS — OXYGEN SATURATION: 94 % | HEART RATE: 82 BPM

## 2018-01-15 VITALS — DIASTOLIC BLOOD PRESSURE: 65 MMHG | HEART RATE: 79 BPM | SYSTOLIC BLOOD PRESSURE: 122 MMHG

## 2018-01-15 VITALS — DIASTOLIC BLOOD PRESSURE: 42 MMHG | HEART RATE: 72 BPM | SYSTOLIC BLOOD PRESSURE: 115 MMHG

## 2018-01-15 VITALS — HEART RATE: 79 BPM | DIASTOLIC BLOOD PRESSURE: 68 MMHG | SYSTOLIC BLOOD PRESSURE: 115 MMHG

## 2018-01-15 VITALS — DIASTOLIC BLOOD PRESSURE: 59 MMHG | SYSTOLIC BLOOD PRESSURE: 100 MMHG | HEART RATE: 70 BPM

## 2018-01-15 VITALS — HEART RATE: 72 BPM | SYSTOLIC BLOOD PRESSURE: 136 MMHG | DIASTOLIC BLOOD PRESSURE: 66 MMHG

## 2018-01-15 LAB
BUN SERPL-MCNC: 61 MG/DL (ref 7–18)
CALCIUM SERPL-MCNC: 9.4 MG/DL (ref 8.5–10.1)
CO2 SERPL-SCNC: 26 MMOL/L (ref 21–32)
CREAT SERPL-MCNC: 9.99 MG/DL (ref 0.6–1.2)
GLUCOSE SERPL-MCNC: 143 MG/DL (ref 70–99)
POTASSIUM SERPL-SCNC: 4 MMOL/L (ref 3.5–5.1)
SODIUM SERPL-SCNC: 133 MMOL/L (ref 136–145)

## 2018-01-15 RX ADMIN — INSULIN ASPART SCH UNITS: 100 INJECTION, SOLUTION INTRAVENOUS; SUBCUTANEOUS at 17:14

## 2018-01-15 RX ADMIN — NAPHAZOLINE HYDROCHLORIDE AND PHENIRAMINE MALEATE SCH DROPS: .25; 3 SOLUTION/ DROPS OPHTHALMIC at 08:14

## 2018-01-15 RX ADMIN — BENZONATATE PRN MG: 100 CAPSULE ORAL at 16:29

## 2018-01-15 RX ADMIN — IPRATROPIUM BROMIDE AND ALBUTEROL SULFATE SCH ML: .5; 3 SOLUTION RESPIRATORY (INHALATION) at 19:23

## 2018-01-15 RX ADMIN — ISOSORBIDE MONONITRATE SCH MG: 60 TABLET ORAL at 16:30

## 2018-01-15 RX ADMIN — RENAGEL SCH MG: 800 TABLET ORAL at 16:32

## 2018-01-15 RX ADMIN — LACTOBACILLUS TAB SCH TAB: TAB at 12:18

## 2018-01-15 RX ADMIN — INSULIN ASPART SCH UNITS: 100 INJECTION, SOLUTION INTRAVENOUS; SUBCUTANEOUS at 11:00

## 2018-01-15 RX ADMIN — LEVOFLOXACIN SCH MG: 500 TABLET, FILM COATED ORAL at 16:29

## 2018-01-15 RX ADMIN — INSULIN ASPART SCH UNITS: 100 INJECTION, SOLUTION INTRAVENOUS; SUBCUTANEOUS at 06:30

## 2018-01-15 RX ADMIN — RENAGEL SCH MG: 800 TABLET ORAL at 12:17

## 2018-01-15 RX ADMIN — INSULIN GLARGINE SCH UNITS: 100 INJECTION, SOLUTION SUBCUTANEOUS at 21:02

## 2018-01-15 RX ADMIN — METOPROLOL TARTRATE SCH MG: 50 TABLET, FILM COATED ORAL at 19:59

## 2018-01-15 RX ADMIN — METOPROLOL TARTRATE SCH MG: 50 TABLET, FILM COATED ORAL at 16:30

## 2018-01-15 RX ADMIN — LACTOBACILLUS TAB SCH TAB: TAB at 16:31

## 2018-01-15 RX ADMIN — RENAGEL SCH MG: 800 TABLET ORAL at 08:15

## 2018-01-15 RX ADMIN — LACTOBACILLUS TAB SCH TAB: TAB at 08:12

## 2018-01-15 RX ADMIN — INSULIN ASPART SCH UNITS: 100 INJECTION, SOLUTION INTRAVENOUS; SUBCUTANEOUS at 21:01

## 2018-01-15 NOTE — PROGRESS NOTE
Progress Note


Date of Service


Marty 15, 2018.





Progress Note


ID Consult Dictated #[id number]





A/P:





1. Foot wound





-Will need I&D with culture, abx based on culture results


-will follow, thank you

## 2018-01-15 NOTE — NEPHROLOGY PROGRESS NOTE
Nephrology Progress Note


Date of Service:


Marty 15, 2018.


Subjective


51 yo female who missed a week of dialysis secondary to no transportation.  had 

dialysis saturday and sunday.  also with cough and on antibiotic.  very tired.  

tolerating dialysis well.





Objective











  Date Time  Temp Pulse Resp B/P (MAP) Pulse Ox O2 Delivery O2 Flow Rate FiO2


 


1/15/18 00:30      Room Air  


 


1/14/18 23:42 36.9 66 18 107/62 (77) 92 Room Air  


 


1/14/18 20:45  73  146/69 (94)    


 


1/14/18 20:00      Room Air  


 


1/14/18 16:00      Nasal Cannula  


 


1/14/18 15:54 36.5 72 18 108/63 (78) 95 Room Air  


 


1/14/18 15:14 36.7 80 20  94  2.0 


 


1/14/18 14:30    132/60 (84)    


 


1/14/18 12:45 36.7 80  137/70 (92)    


 


1/14/18 12:30  76  134/64    


 


1/14/18 12:15  71  113/59    


 


1/14/18 12:00      Nasal Cannula 2.0 


 


1/14/18 12:00  70  116/59    


 


1/14/18 11:45  72  101/49    


 


1/14/18 11:45 36.9 71 20 103/49 (67) 94 Nasal Cannula 2.0 


 


1/14/18 11:30  73  103/49    


 


1/14/18 11:15  71  94/51    


 


1/14/18 11:00  73  97/49    


 


1/14/18 10:45  75  105/54    


 


1/14/18 10:30  75  98/51    


 


1/14/18 10:15  75  99/47    


 


1/14/18 10:00  80  110/57    


 


1/14/18 09:40  83  123/67    


 


1/14/18 09:30 36.9 84  117/55 (75)    


 


1/14/18 08:00      Nasal Cannula 2.0 


 


1/14/18 08:00 36.9 87 19 154/70 (98) 90 Room Air  








Physical Exam:


General-aaox3, obese


Eyes-no scleral icterus


ENT-mmm


Neck-supple


Lungs-clear


Heart-regular


Abdomen-bs+ s/nt/nd


Extremities-no edema


Neuro-nonfocal





Current Inpatient Medications








 Medications


  (Trade)  Dose


 Ordered  Sig/Willy


 Route  Start Time


 Stop Time Status Last Admin


Dose Admin


 


 Insulin Aspart


  (novoLOG ASPART)  **SLIDING


 SCALE**


 **G...  ACHS


 SC  1/13/18 06:45


 2/12/18 06:59  1/14/18 17:56


3 UNITS


 


 Glucose


  (Glucose 40% Gel)  15-30


 GRAMS 15


 GRAMS...  UD  PRN


 PO  1/13/18 00:00


 2/12/18 00:00   


 


 


 Glucose


  (Glucose Chew


 Tab)  4-8


 Tablets 4


 Tabl...  UD  PRN


 PO  1/13/18 00:00


 2/12/18 00:00   


 


 


 Dextrose


  (Dextrose 50%


 50ML Syringe)  25-50ML OF


 50% DW IV


 FOR...  UD  PRN


 IV  1/13/18 00:00


 2/12/18 00:00   


 


 


 Glucagon


  (Glucagon Inj)  1 mg  UD  PRN


 SQ  1/13/18 00:00


 2/12/18 00:00   


 


 


 Naphazoline HCl/


 Pheniramine


 Maleate


  (Visine-A Oph


 Soln)  1 drops  DAILY


 OP  1/13/18 09:00


 2/12/18 08:59  1/14/18 09:04


1 DROPS


 


 Isosorbide


 Mononitrate


  (Imdur Ext Rel


 Tab)  60 mg  QAM


 PO  1/13/18 09:00


 2/12/18 08:59  1/14/18 14:32


60 MG


 


 Metoprolol


 Tartrate


  (Lopressor Tab)  50 mg  BID


 PO  1/13/18 09:00


 2/12/18 08:59  1/14/18 20:39


50 MG


 


 Insulin Glargine


  (Lantus Solostar


 Pen)  12 units  HS


 SC  1/13/18 21:00


 2/12/18 20:59  1/14/18 20:48


12 UNITS


 


 Sevelamer HCl


  (Renagel Tab)  2,400 mg  TIDM


 PO  1/13/18 11:30


 2/12/18 11:29  1/14/18 17:54


2,400 MG


 


 Acetaminophen


  (Tylenol Tab)  325 mg  Q4H  PRN


 PO  1/14/18 02:45


 2/13/18 02:44   


 


 


 Benzonatate


  (Tessalon Perles


 Cap)  100 mg  TID  PRN


 PO  1/14/18 18:15


 2/13/18 18:14  1/14/18 20:43


100 MG


 


 Sertraline HCl


  (Zoloft Tab)  25 mg  QAM


 PO  1/15/18 08:00


 2/14/18 07:59   


 


 


 Al Hydroxide/Mg


 Trisilicate


  (Gaviscon Chew


 Tab)  1 tab  Q6  PRN


 PO  1/14/18 19:15


 2/13/18 19:14  1/14/18 20:44


1 TAB











Last 24 Hours








Test


  1/14/18


08:10 1/14/18


09:49 1/14/18


11:43 1/14/18


16:29


 


Bedside Glucose 136 mg/dl   149 mg/dl  222 mg/dl 


 


White Blood Count  14.82 K/uL   


 


Red Blood Count  3.60 M/uL   


 


Hemoglobin  10.4 g/dL   


 


Hematocrit  32.6 %   


 


Mean Corpuscular Volume  90.6 fL   


 


Mean Corpuscular Hemoglobin  28.9 pg   


 


Mean Corpuscular Hemoglobin


Concent 


  31.9 g/dl 


  


  


 


 


Platelet Count  354 K/uL   


 


Mean Platelet Volume  10.7 fL   


 


Neutrophils (%) (Auto)  80.5 %   


 


Lymphocytes (%) (Auto)  7.7 %   


 


Monocytes (%) (Auto)  9.9 %   


 


Eosinophils (%) (Auto)  1.3 %   


 


Basophils (%) (Auto)  0.2 %   


 


Neutrophils # (Auto)  11.93 K/uL   


 


Lymphocytes # (Auto)  1.14 K/uL   


 


Monocytes # (Auto)  1.46 K/uL   


 


Eosinophils # (Auto)  0.20 K/uL   


 


Basophils # (Auto)  0.03 K/uL   


 


RDW Standard Deviation  52.1 fL   


 


RDW Coefficient of Variation  15.8 %   


 


Immature Granulocyte % (Auto)  0.4 %   


 


Immature Granulocyte # (Auto)  0.06 K/uL   


 


Sodium Level  132 mmol/L   


 


Potassium Level  4.3 mmol/L   


 


Chloride Level  92 mmol/L   


 


Carbon Dioxide Level  23 mmol/L   


 


Anion Gap  17.0 mmol/L   


 


Blood Urea Nitrogen  105 mg/dl   


 


Creatinine  14.40 mg/dl   


 


Est Creatinine Clear Calc


Drug Dose 


  5.8 ml/min 


  


  


 


 


Estimated GFR (


American) 


  3.0 


  


  


 


 


Estimated GFR (Non-


American 


  2.6 


  


  


 


 


BUN/Creatinine Ratio  7.3   


 


Random Glucose  178 mg/dl   


 


Calcium Level  9.7 mg/dl   


 


Total Bilirubin  0.4 mg/dl   


 


Aspartate Amino Transf


(AST/SGOT) 


  9 U/L 


  


  


 


 


Alanine Aminotransferase


(ALT/SGPT) 


  9 U/L 


  


  


 


 


Alkaline Phosphatase  75 U/L   


 


Total Protein  7.2 gm/dl   


 


Albumin  2.1 gm/dl   


 


Globulin  5.1 gm/dl   


 


Albumin/Globulin Ratio  0.4   


 


Procalcitonin  8.67 ng/ml   


 


Hepatitis B Surface Antigen  NEG   


 


Hepatitis B Surface Antibody  POS   


 


Test


  1/14/18


20:40 1/15/18


05:36 


  


 


 


Bedside Glucose 97 mg/dl    











Assessment & Plan


ESRD-for dialysis again today.   attempting to improve labs.  would recommend 

 referral since need to establish good transportation for the 

patient otherwise will not be able to go to outpt dialysis.

## 2018-01-15 NOTE — INFECT. DISEASE CONSULTATION
DATE OF CONSULTATION:  01/15/2018

 

HISTORY OF PRESENT ILLNESS:  This is a 52-year-old female who was admitted to

the hospital with respiratory symptoms.  Additionally, she had missed

multiple days of dialysis prior to admission.  She was found to have a

creatinine of 20 and a potassium of 6.7.  She has been undergoing dialysis

with improvement in her electrolytes as well as her creatinine.  Her

creatinine today is 9.9.  She also had a leukocytosis of 16 on admission

which has improved to 14.  She was most recently seen in the wound care

center by infectious diseases in June 2017.  She was being treated for a toe

ulceration.  A culture had grown MRSA and she was given doxycycline. 

Unfortunately, she did not have any additional follow up with either the

wound center or infectious diseases.  She states that she completed these

antibiotics without difficulty and has been off of antibiotics for some time

now.  She recently noticed an inflamed, indurated area over her ankle.  She

describes this as a cyst.  She denies any bleeding or drainage from the area,

but she does feel that it is fluid filled and painful.  She has had some

subjective fevers and chills over the past few days as well.  Her chest x-ray

showed questionable left upper lobe airspace opacity and she was treated

initially with vancomycin and Zosyn in the ER and then transitioned to

Levaquin.  Infectious disease was consulted for diabetic foot wound.  She is

also reevaluated by the wound center as well.  She currently denies any chest

pain, cough, shortness of breath, nausea, vomiting, diarrhea or abdominal

pain.  She continues with her dialysis and is tolerating this well.  She also

has a wound over her third digit, which appears necrotic.  She has been

following up patient with a specialist who is not planning to do any surgical

intervention at this time.  She denies any drainage or bleeding from that

area either.  Her remaining review of systems is unremarkable.

 

PAST MEDICAL HISTORY:  Significant for coronary artery disease, carotid

stenosis, type 2 diabetes, high cholesterol, end-stage renal disease on

dialysis, GERD, hypertension, history of noncompliance, ischemic

cardiomyopathy, morbid obesity.

 

PAST SURGICAL HISTORY:  Significant for AV graft and multiple diabetic wound

debridements.

 

FAMILY HISTORY:  Noncontributory.

 

SOCIAL HISTORY:  Significant for tobacco use.  She denies any alcohol or drug

use.

 

ALLERGIES:  SHE IS ALLERGIC TO ADHESIVE TAPE AND LATEX.

 

CURRENT MEDICATIONS:  Include Levaquin, Zoloft, Floranex, Tylenol, Lantus,

Renagel, Imdur, Lopressor, insulin.

 

PHYSICAL EXAMINATION:

VITAL SIGNS:  She is afebrile, pulse 70, respiratory rate is 20, blood

pressure is 148/67, oxygen saturation is 94% on room air.

GENERAL:  She is awake, alert and oriented x3.  She is in no acute distress.

HEENT:  Mucous membranes are moist.  Extraocular muscles are intact.

HEART:  Regular.

LUNGS:  Clear.

ABDOMEN:  Soft, nontender, nondistended.

EXTREMITIES:  There is no lower extremity edema bilaterally.

SKIN:  Without rash.  Evaluation of the foot does reveal an erythematous cyst

versus abscess over the ankle.  There is no bleeding or drainage.  There is

tenderness to palpation.  There is minimal erythema.

 

LABORATORY STUDIES:  CBC reveals a white blood cell count of 14.8, hemoglobin

10.4 and platelets 354.  Chemistry panel reveals a sodium of 133, potassium

4.0, chloride 96, bicarbonate 26, BUN 61, creatinine 9.1, glucose is 107.  UA

is negative.  UDS was negative.  Flu swab was negative.  Blood cultures are

with no growth to date x2 sets.  A chest x-ray done on the 14th shows left

upper lobe airspace opacity consistent with pneumonia.

 

ASSESSMENT AND PLAN:  Diabetic foot wound.  She will continue on antibiotics

which are currently prescribed for pneumonia.  She likely will need an I&D of

this abscess and cultures.  Additional antibiotic recommendations will be

made based on the result.

## 2018-01-16 VITALS
DIASTOLIC BLOOD PRESSURE: 69 MMHG | SYSTOLIC BLOOD PRESSURE: 154 MMHG | HEART RATE: 74 BPM | OXYGEN SATURATION: 96 % | TEMPERATURE: 97.7 F

## 2018-01-16 VITALS
SYSTOLIC BLOOD PRESSURE: 109 MMHG | TEMPERATURE: 98.6 F | DIASTOLIC BLOOD PRESSURE: 67 MMHG | OXYGEN SATURATION: 96 % | HEART RATE: 69 BPM

## 2018-01-16 VITALS
TEMPERATURE: 97.52 F | DIASTOLIC BLOOD PRESSURE: 73 MMHG | HEART RATE: 77 BPM | SYSTOLIC BLOOD PRESSURE: 153 MMHG | OXYGEN SATURATION: 98 %

## 2018-01-16 VITALS — OXYGEN SATURATION: 98 % | HEART RATE: 70 BPM

## 2018-01-16 LAB
BUN SERPL-MCNC: 42 MG/DL (ref 7–18)
CALCIUM SERPL-MCNC: 9.7 MG/DL (ref 8.5–10.1)
CO2 SERPL-SCNC: 24 MMOL/L (ref 21–32)
CREAT SERPL-MCNC: 7.45 MG/DL (ref 0.6–1.2)
EOSINOPHIL NFR BLD AUTO: 333 K/UL (ref 130–400)
GLUCOSE SERPL-MCNC: 187 MG/DL (ref 70–99)
HCT VFR BLD CALC: 38.4 % (ref 37–47)
HGB BLD-MCNC: 11.7 G/DL (ref 12–16)
MCH RBC QN AUTO: 28.8 PG (ref 25–34)
MCHC RBC AUTO-ENTMCNC: 30.5 G/DL (ref 32–36)
MCV RBC AUTO: 94.6 FL (ref 80–100)
PMV BLD AUTO: 10.8 FL (ref 7.4–10.4)
POTASSIUM SERPL-SCNC: 3.7 MMOL/L (ref 3.5–5.1)
RED CELL DISTRIBUTION WIDTH CV: 15.7 % (ref 11.5–14.5)
RED CELL DISTRIBUTION WIDTH SD: 54 FL (ref 36.4–46.3)
SODIUM SERPL-SCNC: 134 MMOL/L (ref 136–145)
WBC # BLD AUTO: 11.35 K/UL (ref 4.8–10.8)

## 2018-01-16 RX ADMIN — BENZONATATE PRN MG: 100 CAPSULE ORAL at 08:24

## 2018-01-16 RX ADMIN — INSULIN ASPART SCH UNITS: 100 INJECTION, SOLUTION INTRAVENOUS; SUBCUTANEOUS at 06:30

## 2018-01-16 RX ADMIN — RENAGEL SCH MG: 800 TABLET ORAL at 18:26

## 2018-01-16 RX ADMIN — INSULIN ASPART SCH UNITS: 100 INJECTION, SOLUTION INTRAVENOUS; SUBCUTANEOUS at 18:25

## 2018-01-16 RX ADMIN — LACTOBACILLUS TAB SCH TAB: TAB at 18:26

## 2018-01-16 RX ADMIN — INSULIN ASPART SCH UNITS: 100 INJECTION, SOLUTION INTRAVENOUS; SUBCUTANEOUS at 20:24

## 2018-01-16 RX ADMIN — RENAGEL SCH MG: 800 TABLET ORAL at 12:57

## 2018-01-16 RX ADMIN — INSULIN ASPART SCH UNITS: 100 INJECTION, SOLUTION INTRAVENOUS; SUBCUTANEOUS at 12:19

## 2018-01-16 RX ADMIN — INSULIN GLARGINE SCH UNITS: 100 INJECTION, SOLUTION SUBCUTANEOUS at 20:26

## 2018-01-16 RX ADMIN — ISOSORBIDE MONONITRATE SCH MG: 60 TABLET ORAL at 11:17

## 2018-01-16 RX ADMIN — IPRATROPIUM BROMIDE AND ALBUTEROL SULFATE SCH ML: .5; 3 SOLUTION RESPIRATORY (INHALATION) at 07:24

## 2018-01-16 RX ADMIN — NAPHAZOLINE HYDROCHLORIDE AND PHENIRAMINE MALEATE SCH DROPS: .25; 3 SOLUTION/ DROPS OPHTHALMIC at 08:24

## 2018-01-16 RX ADMIN — METOPROLOL TARTRATE SCH MG: 50 TABLET, FILM COATED ORAL at 11:17

## 2018-01-16 RX ADMIN — LACTOBACILLUS TAB SCH TAB: TAB at 08:22

## 2018-01-16 RX ADMIN — RENAGEL SCH MG: 800 TABLET ORAL at 08:24

## 2018-01-16 RX ADMIN — METOPROLOL TARTRATE SCH MG: 50 TABLET, FILM COATED ORAL at 20:19

## 2018-01-16 RX ADMIN — LACTOBACILLUS TAB SCH TAB: TAB at 12:56

## 2018-01-16 RX ADMIN — SERTRALINE HYDROCHLORIDE SCH MG: 50 TABLET, FILM COATED ORAL at 20:20

## 2018-01-16 NOTE — PROGRESS NOTE
Subjective


Date of Service:


Jan 16, 2018.


Subjective


Pt evaluation today including:  conversation w/ patient, physical exam, chart 

review, lab review, review of studies


on levaquin for pna, sputum pending. blood cultures negative. was eval by wound 

nursing, awaiting wound eval for potential bedside debridement. tolerating abx. 

one loose stool this afternoon. no abd pain, eating well. no f/c. wbc improved. 

all remaining ros reviewed and are negative.





Problem List


Medical Problems:


(1) Abdominal pain


Status: Acute  





(2) Acute electrocardiogram changes


Status: Acute  





(3) Back pain


Status: Acute  





(4) Cellulitis


Status: Acute  





(5) Cellulitis of left middle finger


Status: Acute  





(6) Chest pain


Status: Acute  





(7) Chronic kidney disease (CKD)


Status: Acute  





(8) Dialysis AV fistula malfunction


Status: Acute  





(9) Elevated troponin


Status: Acute  





(10) Elevated troponin


Status: Acute  





(11) Elevated troponin


Status: Acute  





(12) End stage renal disease


Status: Acute  





(13) GI bleed


Status: Acute  





(14) Hyperkalemia


Status: Acute  





(15) Hyperkalemia


Status: Acute  





(16) Hypertension


Status: Acute  





(17) Joint effusion


Status: Acute  





(18) Left elbow pain


Status: Acute  





(19) Left sided chest pain


Status: Acute  





(20) Left sided chest pain


Status: Acute  





(21) Limb ischemia


Status: Acute  





(22) Lumbar back pain


Status: Acute  





(23) Medical non-compliance


Status: Acute  





(24) Mid back pain on right side


Status: Acute  





(25) Musculoskeletal strain


Status: Acute  





(26) Obesity


Status: Acute  





(27) PNA (pneumonia)


Status: Acute  





(28) Pneumonia


Status: Acute  





(29) Pulmonary edema


Status: Acute  





(30) Renal failure


Status: Acute  





(31) Sinusitis


Status: Acute  





(32) Strain of lumbar region


Status: Acute  





(33) Substernal chest pain


Status: Acute  





(34) Tendinopathy of right shoulder


Status: Acute  





(35) Trochanteric bursitis, right hip


Status: Acute  





(36) Upper abdominal pain


Status: Acute  





(37) Upper GI bleed


Status: Acute  





(38) Uremia


Status: Acute  





(39) Urinary tract infection


Status: Acute  





(40) Vomiting


Status: Acute  





(41) Vomiting


Status: Acute  





(42) Vomiting


Status: Acute  





(43) Vomiting


Status: Acute  











Objective


Vital Signs











  Date Time  Temp Pulse Resp B/P (MAP) Pulse Ox O2 Delivery O2 Flow Rate FiO2


 


1/16/18 08:00      Room Air  


 


1/16/18 07:33 36.5 74 18 154/69 (97) 96 Room Air  


 


1/16/18 07:25  70 16  98 Room Air  


 


1/16/18 00:00 37.0 69 16 109/67 (81) 96 Room Air  


 


1/16/18 00:00      Room Air  


 


1/15/18 20:00      Room Air  


 


1/15/18 19:54  76  137/79 (98) 94 Room Air  


 


1/15/18 19:25  82 18  94 Room Air  


 


1/15/18 16:22 36.6 76  126/63 (84)    


 


1/15/18 16:00      Room Air  


 


1/15/18 16:00  79  134/70    


 


1/15/18 15:45  79  122/65    


 


1/15/18 15:30  79  115/68    


 


1/15/18 15:15  73  110/59    


 


1/15/18 15:00  72  121/64    


 


1/15/18 14:45  75  129/69    


 


1/15/18 14:30  72  136/66    











Physical Exam


General Appearance:  WD/WN, no apparent distress


Eyes:  normal inspection, EOMI


Neck:  supple


Respiratory/Chest:  lungs clear, normal breath sounds, no respiratory distress


Cardiovascular:  regular rate, rhythm, no edema


Abdomen:  non tender, soft


Extremities:  non-tender, no pedal edema


Neurologic/Psychiatric:  alert, oriented x 3


Skin:  normal color


Comments:


left foot dressing intact. no erythema.





Laboratory Results











Item Value  Date Time


 


Gram Stain - Final Complete 6/15/17 0000





Ulcer Toe Left 1  


 


Blood Culture - Preliminary Resulted 1/13/18 0743





Blood NO GROWTH TO DATE. 


 


Blood Culture - Preliminary Resulted 1/13/18 0604





Blood NO GROWTH TO DATE. 











Last 24 Hours








Test


  1/15/18


16:45 1/15/18


20:29 1/16/18


05:19 1/16/18


08:01


 


Bedside Glucose 185 mg/dl  211 mg/dl   151 mg/dl 


 


White Blood Count   11.35 K/uL  


 


Red Blood Count   4.06 M/uL  


 


Hemoglobin   11.7 g/dL  


 


Hematocrit   38.4 %  


 


Mean Corpuscular Volume   94.6 fL  


 


Mean Corpuscular Hemoglobin   28.8 pg  


 


Mean Corpuscular Hemoglobin


Concent 


  


  30.5 g/dl 


  


 


 


RDW Standard Deviation   54.0 fL  


 


RDW Coefficient of Variation   15.7 %  


 


Platelet Count   333 K/uL  


 


Mean Platelet Volume   10.8 fL  


 


Sodium Level   134 mmol/L  


 


Potassium Level   3.7 mmol/L  


 


Chloride Level   100 mmol/L  


 


Carbon Dioxide Level   24 mmol/L  


 


Anion Gap   10.0 mmol/L  


 


Blood Urea Nitrogen   42 mg/dl  


 


Creatinine   7.45 mg/dl  


 


Est Creatinine Clear Calc


Drug Dose 


  


  10.8 ml/min 


  


 


 


Estimated GFR (


American) 


  


  6.7 


  


 


 


Estimated GFR (Non-


American 


  


  5.8 


  


 


 


BUN/Creatinine Ratio   5.5  


 


Random Glucose   187 mg/dl  


 


Calcium Level   9.7 mg/dl  


 


Test


  1/16/18


11:42 


  


  


 


 


Bedside Glucose 157 mg/dl    











Assessment and Plan





(1) Diabetic foot infection


Assessment & Plan:  await debridement and culture.





h/o c diff, consider change to augmentin to complete course and adding po vanco 

while on abx. 





will follow.

## 2018-01-16 NOTE — PROGRESS NOTE
Subjective


Date of Service:


Jan 16, 2018.


Subjective


Pt evaluation today including:  conversation w/ patient, physical exam, lab 

review, review of studies, review of inpatient medication list


Saw/examined the patient in room 403


+pain in the R middle finger is worsening


there is also pain in the R foot; cysts are still present


breathing and cough are improving





Problem List


Medical Problems:


(1) Abdominal pain


Status: Acute  





(2) Acute electrocardiogram changes


Status: Acute  





(3) Back pain


Status: Acute  





(4) Cellulitis


Status: Acute  





(5) Cellulitis of left middle finger


Status: Acute  





(6) Chest pain


Status: Acute  





(7) Chronic kidney disease (CKD)


Status: Acute  





(8) Dialysis AV fistula malfunction


Status: Acute  





(9) Elevated troponin


Status: Acute  





(10) Elevated troponin


Status: Acute  





(11) Elevated troponin


Status: Acute  





(12) End stage renal disease


Status: Acute  





(13) GI bleed


Status: Acute  





(14) Hyperkalemia


Status: Acute  





(15) Hyperkalemia


Status: Acute  





(16) Hypertension


Status: Acute  





(17) Joint effusion


Status: Acute  





(18) Left elbow pain


Status: Acute  





(19) Left sided chest pain


Status: Acute  





(20) Left sided chest pain


Status: Acute  





(21) Limb ischemia


Status: Acute  





(22) Lumbar back pain


Status: Acute  





(23) Medical non-compliance


Status: Acute  





(24) Mid back pain on right side


Status: Acute  





(25) Musculoskeletal strain


Status: Acute  





(26) Obesity


Status: Acute  





(27) PNA (pneumonia)


Status: Acute  





(28) Pneumonia


Status: Acute  





(29) Pulmonary edema


Status: Acute  





(30) Renal failure


Status: Acute  





(31) Sinusitis


Status: Acute  





(32) Strain of lumbar region


Status: Acute  





(33) Substernal chest pain


Status: Acute  





(34) Tendinopathy of right shoulder


Status: Acute  





(35) Trochanteric bursitis, right hip


Status: Acute  





(36) Upper abdominal pain


Status: Acute  





(37) Upper GI bleed


Status: Acute  





(38) Uremia


Status: Acute  





(39) Urinary tract infection


Status: Acute  





(40) Vomiting


Status: Acute  





(41) Vomiting


Status: Acute  





(42) Vomiting


Status: Acute  





(43) Vomiting


Status: Acute  











Review of Systems


Respiratory:  + cough (improving), + shortness of breath (improving), No sputum

, No wheezing, No dyspnea on exertion, No dyspnea at rest, No hemoptysis


Cardiac:  No chest pain


Musculoskeletal:  + see HPI, + joint pain, + muscle pain





Medications





Current Inpatient Medications








 Medications


  (Trade)  Dose


 Ordered  Sig/Willy


 Route  Start Time


 Stop Time Status Last Admin


Dose Admin


 


 Insulin Aspart


  (novoLOG ASPART)  **SLIDING


 SCALE**


 **G...  ACHS


 SC  1/13/18 06:45


 2/12/18 06:59  1/15/18 21:01


2 UNITS


 


 Glucose


  (Glucose 40% Gel)  15-30


 GRAMS 15


 GRAMS...  UD  PRN


 PO  1/13/18 00:00


 2/12/18 00:00   


 


 


 Glucose


  (Glucose Chew


 Tab)  4-8


 Tablets 4


 Tabl...  UD  PRN


 PO  1/13/18 00:00


 2/12/18 00:00   


 


 


 Dextrose


  (Dextrose 50%


 50ML Syringe)  25-50ML OF


 50% DW IV


 FOR...  UD  PRN


 IV  1/13/18 00:00


 2/12/18 00:00   


 


 


 Glucagon


  (Glucagon Inj)  1 mg  UD  PRN


 SQ  1/13/18 00:00


 2/12/18 00:00   


 


 


 Naphazoline HCl/


 Pheniramine


 Maleate


  (Visine-A Oph


 Soln)  1 drops  DAILY


 OP  1/13/18 09:00


 2/12/18 08:59  1/16/18 08:24


1 DROPS


 


 Isosorbide


 Mononitrate


  (Imdur Ext Rel


 Tab)  60 mg  QAM


 PO  1/13/18 09:00


 2/12/18 08:59  1/16/18 11:17


60 MG


 


 Metoprolol


 Tartrate


  (Lopressor Tab)  50 mg  BID


 PO  1/13/18 09:00


 2/12/18 08:59  1/16/18 11:17


50 MG


 


 Insulin Glargine


  (Lantus Solostar


 Pen)  12 units  HS


 SC  1/13/18 21:00


 2/12/18 20:59  1/15/18 21:02


12 UNITS


 


 Sevelamer HCl


  (Renagel Tab)  2,400 mg  TIDM


 PO  1/13/18 11:30


 2/12/18 11:29  1/16/18 12:57


2,400 MG


 


 Acetaminophen


  (Tylenol Tab)  325 mg  Q4H  PRN


 PO  1/14/18 02:45


 2/13/18 02:44  1/16/18 02:22


325 MG


 


 Benzonatate


  (Tessalon Perles


 Cap)  100 mg  TID  PRN


 PO  1/14/18 18:15


 2/13/18 18:14  1/16/18 08:24


100 MG


 


 Al Hydroxide/Mg


 Trisilicate


  (Gaviscon Chew


 Tab)  1 tab  Q6  PRN


 PO  1/14/18 19:15


 2/13/18 19:14  1/14/18 20:44


1 TAB


 


 Lactobacillus


 Acidophilus


  (Floranex Tab)  4 tab  TIDM


 PO  1/15/18 08:00


 2/14/18 07:59  1/16/18 12:56


4 TAB


 


 Levofloxacin


  (Consult)  1 ea  UD  PRN


 N/A  1/15/18 08:30


 2/14/18 08:29   


 


 


 Levofloxacin


  (Levaquin Tab)  500 mg  Q48H


 PO  1/15/18 11:00


 1/19/18 10:59  1/15/18 16:29


500 MG


 


 Albuterol/


 Ipratropium


  (Duoneb)  3 ml  BIDR


 INH  1/15/18 20:00


 2/14/18 19:59  1/16/18 07:24


3 ML


 


 Ibuprofen


  (Advil Tab)  200 mg  Q6H  PRN


 PO  1/16/18 02:45


 2/15/18 02:44   


 


 


 Sertraline HCl


  (Zoloft Tab)  25 mg  HS


 PO  1/16/18 21:00


 2/15/18 20:59   


 


 


 Oxycodone/


 Acetaminophen


  (Percocet


 5-325mg Tab)  1 tab  Q6  PRN


 PO  1/16/18 12:00


 1/30/18 11:59   


 











Objective


Vital Signs











  Date Time  Temp Pulse Resp B/P (MAP) Pulse Ox O2 Delivery O2 Flow Rate FiO2


 


1/16/18 16:11 36.4 77 18 153/73 (99) 98 Room Air  


 


1/16/18 16:00      Room Air  


 


1/16/18 08:00      Room Air  


 


1/16/18 07:33 36.5 74 18 154/69 (97) 96 Room Air  


 


1/16/18 07:25  70 16  98 Room Air  


 


1/16/18 00:00 37.0 69 16 109/67 (81) 96 Room Air  


 


1/16/18 00:00      Room Air  


 


1/15/18 20:00      Room Air  


 


1/15/18 19:54  76  137/79 (98) 94 Room Air  


 


1/15/18 19:25  82 18  94 Room Air  











Physical Exam


General Appearance:  no apparent distress, + obese


Respiratory/Chest:  no respiratory distress, no accessory muscle use, + 

decreased breath sounds


Cardiovascular:  regular rate, rhythm, no edema, no murmur


Extremities:  normal inspection, no pedal edema, + pertinent finding (cysts on 

the R dorsal foot, and R lateral heel)





Laboratory Results





Last 24 Hours








Test


  1/15/18


20:29 1/16/18


05:19 1/16/18


08:01 1/16/18


11:42


 


Bedside Glucose 211 mg/dl   151 mg/dl  157 mg/dl 


 


White Blood Count  11.35 K/uL   


 


Red Blood Count  4.06 M/uL   


 


Hemoglobin  11.7 g/dL   


 


Hematocrit  38.4 %   


 


Mean Corpuscular Volume  94.6 fL   


 


Mean Corpuscular Hemoglobin  28.8 pg   


 


Mean Corpuscular Hemoglobin


Concent 


  30.5 g/dl 


  


  


 


 


RDW Standard Deviation  54.0 fL   


 


RDW Coefficient of Variation  15.7 %   


 


Platelet Count  333 K/uL   


 


Mean Platelet Volume  10.8 fL   


 


Sodium Level  134 mmol/L   


 


Potassium Level  3.7 mmol/L   


 


Chloride Level  100 mmol/L   


 


Carbon Dioxide Level  24 mmol/L   


 


Anion Gap  10.0 mmol/L   


 


Blood Urea Nitrogen  42 mg/dl   


 


Creatinine  7.45 mg/dl   


 


Est Creatinine Clear Calc


Drug Dose 


  10.8 ml/min 


  


  


 


 


Estimated GFR (


American) 


  6.7 


  


  


 


 


Estimated GFR (Non-


American 


  5.8 


  


  


 


 


BUN/Creatinine Ratio  5.5   


 


Random Glucose  187 mg/dl   


 


Calcium Level  9.7 mg/dl   


 


Test


  1/16/18


16:42 


  


  


 


 


Bedside Glucose 189 mg/dl    











Assessment and Plan


This is a 52 year old female with a PMH of ESRD on HD, insulin dependent DM2, 

CAD s/p stents, ischemic cardiomyopathy, HLD, PVD, carotid artery stenosis s/p 

endarterectomy presents secondary to missed dialysis, shortness of breath, 

fluid overload





ESRD on HD


Ischemic Cardiomyopathy


Acute on Chronic Systolic CHF


1/16


plan to continue dialysis as per nephrology





1/15


patient missed three dialysis sessions


supposed to have dialysis M-W-F


 consulted for transportation to dialysis help


continued fluid removal with dialysis





L Lower Lobe Pneumonia


+cough, shortness of breath


CXR suggests LLL pneumonia


for now, she is on Levaquin, we can continue for 5-7 days


cultures pending





L Foot Cyst vs. Abscess


1/16


wound care provider consulted for possible drainage





1/15


for now, continue quinolone


appreciate ID input, we will continue Levaquin for now


wound care consulted, may need this drained


monitor; PT/OT





HTN


secondary to missed dialysis


cont. Lopressor





Hyperkalemia - resolved


due to missed dialysis 


electrolytes stabilized after consecutive HD tx 





Insulin Dependent DM2


appreciate pharmacy glycemic control


Lantus and sliding scale





Depression


appreciate psych evaluation


started on Zoloft, which we can continue


outpatient PCP follow-up





DVT ppx


SCDs





FULL CODE

## 2018-01-17 VITALS — HEART RATE: 113 BPM | OXYGEN SATURATION: 96 %

## 2018-01-17 VITALS
SYSTOLIC BLOOD PRESSURE: 159 MMHG | TEMPERATURE: 97.52 F | DIASTOLIC BLOOD PRESSURE: 90 MMHG | OXYGEN SATURATION: 95 % | HEART RATE: 74 BPM

## 2018-01-17 VITALS — DIASTOLIC BLOOD PRESSURE: 72 MMHG | TEMPERATURE: 98.42 F | SYSTOLIC BLOOD PRESSURE: 132 MMHG | HEART RATE: 73 BPM

## 2018-01-17 VITALS — HEART RATE: 66 BPM | DIASTOLIC BLOOD PRESSURE: 66 MMHG | SYSTOLIC BLOOD PRESSURE: 123 MMHG

## 2018-01-17 VITALS
OXYGEN SATURATION: 98 % | TEMPERATURE: 98.06 F | SYSTOLIC BLOOD PRESSURE: 149 MMHG | HEART RATE: 71 BPM | DIASTOLIC BLOOD PRESSURE: 84 MMHG

## 2018-01-17 VITALS — SYSTOLIC BLOOD PRESSURE: 117 MMHG | HEART RATE: 63 BPM | DIASTOLIC BLOOD PRESSURE: 59 MMHG

## 2018-01-17 VITALS — DIASTOLIC BLOOD PRESSURE: 70 MMHG | HEART RATE: 69 BPM | SYSTOLIC BLOOD PRESSURE: 134 MMHG | TEMPERATURE: 97.88 F

## 2018-01-17 VITALS
DIASTOLIC BLOOD PRESSURE: 70 MMHG | TEMPERATURE: 98.06 F | HEART RATE: 63 BPM | OXYGEN SATURATION: 95 % | SYSTOLIC BLOOD PRESSURE: 111 MMHG

## 2018-01-17 VITALS
HEART RATE: 65 BPM | SYSTOLIC BLOOD PRESSURE: 134 MMHG | OXYGEN SATURATION: 95 % | TEMPERATURE: 97.7 F | DIASTOLIC BLOOD PRESSURE: 78 MMHG

## 2018-01-17 VITALS — SYSTOLIC BLOOD PRESSURE: 118 MMHG | DIASTOLIC BLOOD PRESSURE: 66 MMHG | HEART RATE: 66 BPM

## 2018-01-17 VITALS — SYSTOLIC BLOOD PRESSURE: 126 MMHG | DIASTOLIC BLOOD PRESSURE: 68 MMHG | HEART RATE: 66 BPM

## 2018-01-17 VITALS — SYSTOLIC BLOOD PRESSURE: 120 MMHG | DIASTOLIC BLOOD PRESSURE: 64 MMHG | HEART RATE: 66 BPM

## 2018-01-17 VITALS — DIASTOLIC BLOOD PRESSURE: 69 MMHG | HEART RATE: 65 BPM | SYSTOLIC BLOOD PRESSURE: 112 MMHG

## 2018-01-17 VITALS — HEART RATE: 71 BPM | DIASTOLIC BLOOD PRESSURE: 66 MMHG | SYSTOLIC BLOOD PRESSURE: 122 MMHG

## 2018-01-17 VITALS — DIASTOLIC BLOOD PRESSURE: 56 MMHG | HEART RATE: 66 BPM | SYSTOLIC BLOOD PRESSURE: 112 MMHG

## 2018-01-17 VITALS — HEART RATE: 64 BPM | SYSTOLIC BLOOD PRESSURE: 114 MMHG | DIASTOLIC BLOOD PRESSURE: 62 MMHG

## 2018-01-17 VITALS — DIASTOLIC BLOOD PRESSURE: 80 MMHG | HEART RATE: 73 BPM | SYSTOLIC BLOOD PRESSURE: 145 MMHG

## 2018-01-17 VITALS — OXYGEN SATURATION: 95 %

## 2018-01-17 VITALS — SYSTOLIC BLOOD PRESSURE: 133 MMHG | DIASTOLIC BLOOD PRESSURE: 97 MMHG | HEART RATE: 73 BPM

## 2018-01-17 LAB
BUN SERPL-MCNC: 57 MG/DL (ref 7–18)
CALCIUM SERPL-MCNC: 9.9 MG/DL (ref 8.5–10.1)
CO2 SERPL-SCNC: 22 MMOL/L (ref 21–32)
CREAT SERPL-MCNC: 8.87 MG/DL (ref 0.6–1.2)
EOSINOPHIL NFR BLD AUTO: 311 K/UL (ref 130–400)
GLUCOSE SERPL-MCNC: 102 MG/DL (ref 70–99)
HCT VFR BLD CALC: 35 % (ref 37–47)
HGB BLD-MCNC: 10.7 G/DL (ref 12–16)
MCH RBC QN AUTO: 28.7 PG (ref 25–34)
MCHC RBC AUTO-ENTMCNC: 30.6 G/DL (ref 32–36)
MCV RBC AUTO: 93.8 FL (ref 80–100)
PMV BLD AUTO: 10.4 FL (ref 7.4–10.4)
POTASSIUM SERPL-SCNC: 4.2 MMOL/L (ref 3.5–5.1)
RED CELL DISTRIBUTION WIDTH CV: 15.9 % (ref 11.5–14.5)
RED CELL DISTRIBUTION WIDTH SD: 54 FL (ref 36.4–46.3)
SODIUM SERPL-SCNC: 133 MMOL/L (ref 136–145)
WBC # BLD AUTO: 10.93 K/UL (ref 4.8–10.8)

## 2018-01-17 PROCEDURE — 0J9P0ZX DRAINAGE OF LEFT LOWER LEG SUBCUTANEOUS TISSUE AND FASCIA, OPEN APPROACH, DIAGNOSTIC: ICD-10-PCS | Performed by: EMERGENCY MEDICINE

## 2018-01-17 RX ADMIN — INSULIN GLARGINE SCH UNITS: 100 INJECTION, SOLUTION SUBCUTANEOUS at 22:26

## 2018-01-17 RX ADMIN — RENAGEL SCH MG: 800 TABLET ORAL at 12:06

## 2018-01-17 RX ADMIN — Medication PRN TAB: at 00:38

## 2018-01-17 RX ADMIN — INSULIN ASPART SCH UNITS: 100 INJECTION, SOLUTION INTRAVENOUS; SUBCUTANEOUS at 20:52

## 2018-01-17 RX ADMIN — LEVOFLOXACIN SCH MG: 500 TABLET, FILM COATED ORAL at 12:04

## 2018-01-17 RX ADMIN — INSULIN ASPART SCH UNITS: 100 INJECTION, SOLUTION INTRAVENOUS; SUBCUTANEOUS at 16:30

## 2018-01-17 RX ADMIN — OXYCODONE HYDROCHLORIDE AND ACETAMINOPHEN PRN TAB: 5; 325 TABLET ORAL at 22:23

## 2018-01-17 RX ADMIN — INSULIN ASPART SCH UNITS: 100 INJECTION, SOLUTION INTRAVENOUS; SUBCUTANEOUS at 20:53

## 2018-01-17 RX ADMIN — OXYCODONE HYDROCHLORIDE AND ACETAMINOPHEN PRN TAB: 5; 325 TABLET ORAL at 12:45

## 2018-01-17 RX ADMIN — METOPROLOL TARTRATE SCH MG: 50 TABLET, FILM COATED ORAL at 08:40

## 2018-01-17 RX ADMIN — OXYCODONE HYDROCHLORIDE AND ACETAMINOPHEN PRN TAB: 5; 325 TABLET ORAL at 00:37

## 2018-01-17 RX ADMIN — LACTOBACILLUS TAB SCH TAB: TAB at 12:05

## 2018-01-17 RX ADMIN — LACTOBACILLUS TAB SCH TAB: TAB at 17:42

## 2018-01-17 RX ADMIN — SERTRALINE HYDROCHLORIDE SCH MG: 50 TABLET, FILM COATED ORAL at 22:24

## 2018-01-17 RX ADMIN — INSULIN ASPART SCH UNITS: 100 INJECTION, SOLUTION INTRAVENOUS; SUBCUTANEOUS at 08:40

## 2018-01-17 RX ADMIN — ISOSORBIDE MONONITRATE SCH MG: 60 TABLET ORAL at 08:00

## 2018-01-17 RX ADMIN — INSULIN ASPART SCH UNITS: 100 INJECTION, SOLUTION INTRAVENOUS; SUBCUTANEOUS at 12:49

## 2018-01-17 RX ADMIN — RENAGEL SCH MG: 800 TABLET ORAL at 17:41

## 2018-01-17 RX ADMIN — METOPROLOL TARTRATE SCH MG: 50 TABLET, FILM COATED ORAL at 22:25

## 2018-01-17 RX ADMIN — RENAGEL SCH MG: 800 TABLET ORAL at 08:34

## 2018-01-17 RX ADMIN — NAPHAZOLINE HYDROCHLORIDE AND PHENIRAMINE MALEATE SCH DROPS: .25; 3 SOLUTION/ DROPS OPHTHALMIC at 08:35

## 2018-01-17 RX ADMIN — LACTOBACILLUS TAB SCH TAB: TAB at 08:34

## 2018-01-17 RX ADMIN — IPRATROPIUM BROMIDE AND ALBUTEROL SULFATE SCH ML: .5; 3 SOLUTION RESPIRATORY (INHALATION) at 07:10

## 2018-01-17 NOTE — PROGRESS NOTE
Subjective


Date of Service:


Jan 17, 2018.


Subjective


afebrile, blood cultures  negative, no overnight events. awaiting debridement 

of foot, c diff negative, remains on levaquin for pna.





Problem List


Medical Problems:


(1) Abdominal pain


Status: Acute  





(2) Acute electrocardiogram changes


Status: Acute  





(3) Back pain


Status: Acute  





(4) Cellulitis


Status: Acute  





(5) Cellulitis of left middle finger


Status: Acute  





(6) Chest pain


Status: Acute  





(7) Chronic kidney disease (CKD)


Status: Acute  





(8) Dialysis AV fistula malfunction


Status: Acute  





(9) Elevated troponin


Status: Acute  





(10) Elevated troponin


Status: Acute  





(11) Elevated troponin


Status: Acute  





(12) End stage renal disease


Status: Acute  





(13) GI bleed


Status: Acute  





(14) Hyperkalemia


Status: Acute  





(15) Hyperkalemia


Status: Acute  





(16) Hypertension


Status: Acute  





(17) Joint effusion


Status: Acute  





(18) Left elbow pain


Status: Acute  





(19) Left sided chest pain


Status: Acute  





(20) Left sided chest pain


Status: Acute  





(21) Limb ischemia


Status: Acute  





(22) Lumbar back pain


Status: Acute  





(23) Medical non-compliance


Status: Acute  





(24) Mid back pain on right side


Status: Acute  





(25) Musculoskeletal strain


Status: Acute  





(26) Obesity


Status: Acute  





(27) PNA (pneumonia)


Status: Acute  





(28) Pneumonia


Status: Acute  





(29) Pulmonary edema


Status: Acute  





(30) Renal failure


Status: Acute  





(31) Sinusitis


Status: Acute  





(32) Strain of lumbar region


Status: Acute  





(33) Substernal chest pain


Status: Acute  





(34) Tendinopathy of right shoulder


Status: Acute  





(35) Trochanteric bursitis, right hip


Status: Acute  





(36) Upper abdominal pain


Status: Acute  





(37) Upper GI bleed


Status: Acute  





(38) Uremia


Status: Acute  





(39) Urinary tract infection


Status: Acute  





(40) Vomiting


Status: Acute  





(41) Vomiting


Status: Acute  





(42) Vomiting


Status: Acute  





(43) Vomiting


Status: Acute  











Objective


Vital Signs











  Date Time  Temp Pulse Resp B/P (MAP) Pulse Ox O2 Delivery O2 Flow Rate FiO2


 


1/17/18 07:30 36.5 65 16 134/78 (96) 95   


 


1/17/18 07:10  113 16  96 Room Air  


 


1/17/18 01:08      Room Air  


 


1/17/18 00:00 36.7 71 20 149/84 (105) 98 Room Air  


 


1/16/18 20:30      Room Air  


 


1/16/18 16:11 36.4 77 18 153/73 (99) 98 Room Air  


 


1/16/18 16:00      Room Air  











Laboratory Results











Item Value  Date Time


 


C.difficile Toxin B Gene (PCR) - Final Complete 1/16/18 2255





Stool No C. difficile toxin B gene detected 


 


Blood Culture - Preliminary Resulted 1/13/18 0743





Blood NO GROWTH TO DATE. 


 


Blood Culture - Preliminary Resulted 1/13/18 0604





Blood NO GROWTH TO DATE. 











Last 24 Hours








Test


  1/16/18


11:42 1/16/18


16:42 1/16/18


20:12 1/17/18


05:26


 


Bedside Glucose 157 mg/dl  189 mg/dl  167 mg/dl  


 


White Blood Count    10.93 K/uL 


 


Red Blood Count    3.73 M/uL 


 


Hemoglobin    10.7 g/dL 


 


Hematocrit    35.0 % 


 


Mean Corpuscular Volume    93.8 fL 


 


Mean Corpuscular Hemoglobin    28.7 pg 


 


Mean Corpuscular Hemoglobin


Concent 


  


  


  30.6 g/dl 


 


 


RDW Standard Deviation    54.0 fL 


 


RDW Coefficient of Variation    15.9 % 


 


Platelet Count    311 K/uL 


 


Mean Platelet Volume    10.4 fL 


 


Sodium Level    133 mmol/L 


 


Potassium Level    4.2 mmol/L 


 


Chloride Level    101 mmol/L 


 


Carbon Dioxide Level    22 mmol/L 


 


Anion Gap    10.0 mmol/L 


 


Blood Urea Nitrogen    57 mg/dl 


 


Creatinine    8.87 mg/dl 


 


Est Creatinine Clear Calc


Drug Dose 


  


  


  9.1 ml/min 


 


 


Estimated GFR (


American) 


  


  


  5.4 


 


 


Estimated GFR (Non-


American 


  


  


  4.7 


 


 


BUN/Creatinine Ratio    6.5 


 


Random Glucose    102 mg/dl 


 


Calcium Level    9.9 mg/dl 


 


Test


  1/17/18


07:51 


  


  


 


 


Bedside Glucose 100 mg/dl    











Assessment and Plan





(1) Diabetic foot infection


Assessment & Plan:  await debridement and culture.


complete 5 days levaquin for pna





will follow.

## 2018-01-17 NOTE — NEPHROLOGY PROGRESS NOTE
Nephrology Progress Note


Date of Service:


Jan 17, 2018.


Subjective


51 yo female who missed a week of dialysis secondary to no transportation.  

doing dialysis now m-w-f. continues to be very tired. continues to have a cough.





Objective











  Date Time  Temp Pulse Resp B/P (MAP) Pulse Ox O2 Delivery O2 Flow Rate FiO2


 


1/17/18 08:45     95 Room Air  


 


1/17/18 07:30 36.5 65 16 134/78 (96) 95   


 


1/17/18 07:10  113 16  96 Room Air  


 


1/17/18 01:08      Room Air  


 


1/17/18 00:00 36.7 71 20 149/84 (105) 98 Room Air  


 


1/16/18 20:30      Room Air  


 


1/16/18 16:11 36.4 77 18 153/73 (99) 98 Room Air  


 


1/16/18 16:00      Room Air  








Physical Exam:


General-aaox3, obese


Eyes-no scleral icterus


ENT-mmm


Neck-supple


Lungs-rales at right base


Heart-regular


Abdomen-bs+ s/nt/nd


Extremities-no edema, +closed blister on left foot, +necrotic finger


Neuro-nonfocal





Current Inpatient Medications








 Medications


  (Trade)  Dose


 Ordered  Sig/Willy


 Route  Start Time


 Stop Time Status Last Admin


Dose Admin


 


 Insulin Aspart


  (novoLOG ASPART)  **SLIDING


 SCALE**


 **G...  ACHS


 SC  1/13/18 06:45


 2/12/18 06:59  1/17/18 12:49


2 UNITS


 


 Glucose


  (Glucose 40% Gel)  15-30


 GRAMS 15


 GRAMS...  UD  PRN


 PO  1/13/18 00:00


 2/12/18 00:00   


 


 


 Glucose


  (Glucose Chew


 Tab)  4-8


 Tablets 4


 Tabl...  UD  PRN


 PO  1/13/18 00:00


 2/12/18 00:00   


 


 


 Dextrose


  (Dextrose 50%


 50ML Syringe)  25-50ML OF


 50% DW IV


 FOR...  UD  PRN


 IV  1/13/18 00:00


 2/12/18 00:00   


 


 


 Glucagon


  (Glucagon Inj)  1 mg  UD  PRN


 SQ  1/13/18 00:00


 2/12/18 00:00   


 


 


 Naphazoline HCl/


 Pheniramine


 Maleate


  (Visine-A Oph


 Soln)  1 drops  DAILY


 OP  1/13/18 09:00


 2/12/18 08:59  1/17/18 08:35


1 DROPS


 


 Isosorbide


 Mononitrate


  (Imdur Ext Rel


 Tab)  60 mg  QAM


 PO  1/13/18 09:00


 2/12/18 08:59  1/16/18 11:17


60 MG


 


 Metoprolol


 Tartrate


  (Lopressor Tab)  50 mg  BID


 PO  1/13/18 09:00


 2/12/18 08:59  1/16/18 20:19


50 MG


 


 Insulin Glargine


  (Lantus Solostar


 Pen)  12 units  HS


 SC  1/13/18 21:00


 2/12/18 20:59  1/16/18 20:26


12 UNITS


 


 Sevelamer HCl


  (Renagel Tab)  2,400 mg  TIDM


 PO  1/13/18 11:30


 2/12/18 11:29  1/17/18 12:06


2,400 MG


 


 Acetaminophen


  (Tylenol Tab)  325 mg  Q4H  PRN


 PO  1/14/18 02:45


 2/13/18 02:44  1/16/18 02:22


325 MG


 


 Benzonatate


  (Tessalon Perles


 Cap)  100 mg  TID  PRN


 PO  1/14/18 18:15


 2/13/18 18:14  1/16/18 08:24


100 MG


 


 Al Hydroxide/Mg


 Trisilicate


  (Gaviscon Chew


 Tab)  1 tab  Q6  PRN


 PO  1/14/18 19:15


 2/13/18 19:14  1/17/18 00:38


1 TAB


 


 Lactobacillus


 Acidophilus


  (Floranex Tab)  4 tab  TIDM


 PO  1/15/18 08:00


 2/14/18 07:59  1/17/18 12:05


4 TAB


 


 Levofloxacin


  (Consult)  1 ea  UD  PRN


 N/A  1/15/18 08:30


 2/14/18 08:29   


 


 


 Levofloxacin


  (Levaquin Tab)  500 mg  Q48H


 PO  1/15/18 11:00


 1/19/18 10:59  1/17/18 12:04


500 MG


 


 Albuterol/


 Ipratropium


  (Duoneb)  3 ml  BIDR


 INH  1/15/18 20:00


 2/14/18 19:59  1/17/18 07:10


3 ML


 


 Ibuprofen


  (Advil Tab)  200 mg  Q6H  PRN


 PO  1/16/18 02:45


 2/15/18 02:44   


 


 


 Sertraline HCl


  (Zoloft Tab)  25 mg  HS


 PO  1/16/18 21:00


 2/15/18 20:59  1/16/18 20:20


25 MG


 


 Oxycodone/


 Acetaminophen


  (Percocet


 5-325mg Tab)  1 tab  Q6  PRN


 PO  1/16/18 12:00


 1/30/18 11:59  1/17/18 12:45


1 TAB











Last 24 Hours








Test


  1/16/18


16:42 1/16/18


20:12 1/17/18


05:26 1/17/18


07:51


 


Bedside Glucose 189 mg/dl  167 mg/dl   100 mg/dl 


 


White Blood Count   10.93 K/uL  


 


Red Blood Count   3.73 M/uL  


 


Hemoglobin   10.7 g/dL  


 


Hematocrit   35.0 %  


 


Mean Corpuscular Volume   93.8 fL  


 


Mean Corpuscular Hemoglobin   28.7 pg  


 


Mean Corpuscular Hemoglobin


Concent 


  


  30.6 g/dl 


  


 


 


RDW Standard Deviation   54.0 fL  


 


RDW Coefficient of Variation   15.9 %  


 


Platelet Count   311 K/uL  


 


Mean Platelet Volume   10.4 fL  


 


Sodium Level   133 mmol/L  


 


Potassium Level   4.2 mmol/L  


 


Chloride Level   101 mmol/L  


 


Carbon Dioxide Level   22 mmol/L  


 


Anion Gap   10.0 mmol/L  


 


Blood Urea Nitrogen   57 mg/dl  


 


Creatinine   8.87 mg/dl  


 


Est Creatinine Clear Calc


Drug Dose 


  


  9.1 ml/min 


  


 


 


Estimated GFR (


American) 


  


  5.4 


  


 


 


Estimated GFR (Non-


American 


  


  4.7 


  


 


 


BUN/Creatinine Ratio   6.5  


 


Random Glucose   102 mg/dl  


 


Calcium Level   9.9 mg/dl  


 


Test


  1/17/18


11:45 


  


  


 


 


Bedside Glucose 159 mg/dl    














 Date/Time


Source Procedure


Growth Status


 


 


 1/16/18 22:55


Stool C.difficile Toxin B Gene (PCR) - Final


No C. difficile toxin B gene detected Complete





 1/17/18 11:30


Drainage-Deep Ankle Left Gram Stain


Pending Received


 


 1/17/18 11:30


Drainage-Deep Ankle Left Wound Culture


Pending Received











Assessment & Plan


ESRD-for dialysis  today.  labs much improved compared to admission. 





Anemia of Renal Failure-hg goal of 10 to 11. on procrit prn.  





IMANI: on renvela 3 with meals and non-compliant with medications. phos was 

elevated on admission.

## 2018-01-17 NOTE — PROGRESS NOTE
Subjective


Date of Service:


Jan 17, 2018.


Subjective


Pt evaluation today including:  conversation w/ patient, physical exam, lab 

review, review of studies, review of inpatient medication list


Saw/examined the patient in room 403


c/o foot pain after debridement


cough and shortness of breath improving





Problem List


Medical Problems:


(1) Abdominal pain


Status: Acute  





(2) Acute electrocardiogram changes


Status: Acute  





(3) Back pain


Status: Acute  





(4) Cellulitis


Status: Acute  





(5) Cellulitis of left middle finger


Status: Acute  





(6) Chest pain


Status: Acute  





(7) Chronic kidney disease (CKD)


Status: Acute  





(8) Dialysis AV fistula malfunction


Status: Acute  





(9) Elevated troponin


Status: Acute  





(10) Elevated troponin


Status: Acute  





(11) Elevated troponin


Status: Acute  





(12) End stage renal disease


Status: Acute  





(13) GI bleed


Status: Acute  





(14) Hyperkalemia


Status: Acute  





(15) Hyperkalemia


Status: Acute  





(16) Hypertension


Status: Acute  





(17) Joint effusion


Status: Acute  





(18) Left elbow pain


Status: Acute  





(19) Left sided chest pain


Status: Acute  





(20) Left sided chest pain


Status: Acute  





(21) Limb ischemia


Status: Acute  





(22) Lumbar back pain


Status: Acute  





(23) Medical non-compliance


Status: Acute  





(24) Mid back pain on right side


Status: Acute  





(25) Musculoskeletal strain


Status: Acute  





(26) Obesity


Status: Acute  





(27) PNA (pneumonia)


Status: Acute  





(28) Pneumonia


Status: Acute  





(29) Pulmonary edema


Status: Acute  





(30) Renal failure


Status: Acute  





(31) Sinusitis


Status: Acute  





(32) Strain of lumbar region


Status: Acute  





(33) Substernal chest pain


Status: Acute  





(34) Tendinopathy of right shoulder


Status: Acute  





(35) Trochanteric bursitis, right hip


Status: Acute  





(36) Upper abdominal pain


Status: Acute  





(37) Upper GI bleed


Status: Acute  





(38) Uremia


Status: Acute  





(39) Urinary tract infection


Status: Acute  





(40) Vomiting


Status: Acute  





(41) Vomiting


Status: Acute  





(42) Vomiting


Status: Acute  





(43) Vomiting


Status: Acute  











Review of Systems


Constitutional:  No fever, No chills


Respiratory:  + cough, + shortness of breath (improving)


Musculoskeletal:  + joint pain (L foot)





Medications





Current Inpatient Medications








 Medications


  (Trade)  Dose


 Ordered  Sig/Willy


 Route  Start Time


 Stop Time Status Last Admin


Dose Admin


 


 Insulin Aspart


  (novoLOG ASPART)  **SLIDING


 SCALE**


 **G...  ACHS


 SC  1/13/18 06:45


 2/12/18 06:59  1/17/18 12:49


2 UNITS


 


 Glucose


  (Glucose 40% Gel)  15-30


 GRAMS 15


 GRAMS...  UD  PRN


 PO  1/13/18 00:00


 2/12/18 00:00   


 


 


 Glucose


  (Glucose Chew


 Tab)  4-8


 Tablets 4


 Tabl...  UD  PRN


 PO  1/13/18 00:00


 2/12/18 00:00   


 


 


 Dextrose


  (Dextrose 50%


 50ML Syringe)  25-50ML OF


 50% DW IV


 FOR...  UD  PRN


 IV  1/13/18 00:00


 2/12/18 00:00   


 


 


 Glucagon


  (Glucagon Inj)  1 mg  UD  PRN


 SQ  1/13/18 00:00


 2/12/18 00:00   


 


 


 Naphazoline HCl/


 Pheniramine


 Maleate


  (Visine-A Oph


 Soln)  1 drops  DAILY


 OP  1/13/18 09:00


 2/12/18 08:59  1/17/18 08:35


1 DROPS


 


 Isosorbide


 Mononitrate


  (Imdur Ext Rel


 Tab)  60 mg  QAM


 PO  1/13/18 09:00


 2/12/18 08:59  1/16/18 11:17


60 MG


 


 Metoprolol


 Tartrate


  (Lopressor Tab)  50 mg  BID


 PO  1/13/18 09:00


 2/12/18 08:59  1/16/18 20:19


50 MG


 


 Insulin Glargine


  (Lantus Solostar


 Pen)  12 units  HS


 SC  1/13/18 21:00


 2/12/18 20:59  1/16/18 20:26


12 UNITS


 


 Sevelamer HCl


  (Renagel Tab)  2,400 mg  TIDM


 PO  1/13/18 11:30


 2/12/18 11:29  1/17/18 17:41


2,400 MG


 


 Acetaminophen


  (Tylenol Tab)  325 mg  Q4H  PRN


 PO  1/14/18 02:45


 2/13/18 02:44  1/16/18 02:22


325 MG


 


 Benzonatate


  (Tessalon Perles


 Cap)  100 mg  TID  PRN


 PO  1/14/18 18:15


 2/13/18 18:14  1/16/18 08:24


100 MG


 


 Al Hydroxide/Mg


 Trisilicate


  (Gaviscon Chew


 Tab)  1 tab  Q6  PRN


 PO  1/14/18 19:15


 2/13/18 19:14  1/17/18 00:38


1 TAB


 


 Lactobacillus


 Acidophilus


  (Floranex Tab)  4 tab  TIDM


 PO  1/15/18 08:00


 2/14/18 07:59  1/17/18 17:42


4 TAB


 


 Levofloxacin


  (Consult)  1 ea  UD  PRN


 N/A  1/15/18 08:30


 2/14/18 08:29   


 


 


 Levofloxacin


  (Levaquin Tab)  500 mg  Q48H


 PO  1/15/18 11:00


 1/19/18 10:59  1/17/18 12:04


500 MG


 


 Albuterol/


 Ipratropium


  (Duoneb)  3 ml  BIDR


 INH  1/15/18 20:00


 2/14/18 19:59  1/17/18 07:10


3 ML


 


 Ibuprofen


  (Advil Tab)  200 mg  Q6H  PRN


 PO  1/16/18 02:45


 2/15/18 02:44   


 


 


 Sertraline HCl


  (Zoloft Tab)  25 mg  HS


 PO  1/16/18 21:00


 2/15/18 20:59  1/16/18 20:20


25 MG


 


 Oxycodone/


 Acetaminophen


  (Percocet


 5-325mg Tab)  1 tab  Q6  PRN


 PO  1/16/18 12:00


 1/30/18 11:59  1/17/18 12:45


1 TAB











Objective


Vital Signs











  Date Time  Temp Pulse Resp B/P (MAP) Pulse Ox O2 Delivery O2 Flow Rate FiO2


 


1/17/18 16:00     95 Room Air  


 


1/17/18 15:40 36.7 63 18 111/70 (84) 95 Room Air  


 


1/17/18 08:45     95 Room Air  


 


1/17/18 07:30 36.5 65 16 134/78 (96) 95   


 


1/17/18 07:10  113 16  96 Room Air  


 


1/17/18 01:08      Room Air  


 


1/17/18 00:00 36.7 71 20 149/84 (105) 98 Room Air  


 


1/16/18 20:30      Room Air  











Physical Exam


General Appearance:  no apparent distress, + obese


Respiratory/Chest:  no respiratory distress, no accessory muscle use


Cardiovascular:  regular rate, rhythm


Extremities:  normal inspection, no pedal edema, + pertinent finding (L foot; )





Laboratory Results





Last 24 Hours








Test


  1/16/18


20:12 1/17/18


05:26 1/17/18


07:51 1/17/18


11:45


 


Bedside Glucose 167 mg/dl   100 mg/dl  159 mg/dl 


 


White Blood Count  10.93 K/uL   


 


Red Blood Count  3.73 M/uL   


 


Hemoglobin  10.7 g/dL   


 


Hematocrit  35.0 %   


 


Mean Corpuscular Volume  93.8 fL   


 


Mean Corpuscular Hemoglobin  28.7 pg   


 


Mean Corpuscular Hemoglobin


Concent 


  30.6 g/dl 


  


  


 


 


RDW Standard Deviation  54.0 fL   


 


RDW Coefficient of Variation  15.9 %   


 


Platelet Count  311 K/uL   


 


Mean Platelet Volume  10.4 fL   


 


Sodium Level  133 mmol/L   


 


Potassium Level  4.2 mmol/L   


 


Chloride Level  101 mmol/L   


 


Carbon Dioxide Level  22 mmol/L   


 


Anion Gap  10.0 mmol/L   


 


Blood Urea Nitrogen  57 mg/dl   


 


Creatinine  8.87 mg/dl   


 


Est Creatinine Clear Calc


Drug Dose 


  9.1 ml/min 


  


  


 


 


Estimated GFR (


American) 


  5.4 


  


  


 


 


Estimated GFR (Non-


American 


  4.7 


  


  


 


 


BUN/Creatinine Ratio  6.5   


 


Random Glucose  102 mg/dl   


 


Calcium Level  9.9 mg/dl   


 


Test


  1/17/18


16:35 


  


  


 


 


Bedside Glucose 110 mg/dl    











Assessment and Plan


This is a 52 year old female with a PMH of ESRD on HD, insulin dependent DM2, 

CAD s/p stents, ischemic cardiomyopathy, HLD, PVD, carotid artery stenosis s/p 

endarterectomy presents secondary to missed dialysis, shortness of breath, 

fluid overload





ESRD on HD


Ischemic Cardiomyopathy


Acute on Chronic Systolic CHF


1/17


plan today is for HD


had an I&D - cultures pending


Levaquin for pneumonia


nebs for SOB





1/16


plan to continue dialysis as per nephrology





1/15


patient missed three dialysis sessions


supposed to have dialysis M-W-F


 consulted for transportation to dialysis help


continued fluid removal with dialysis





L Lower Lobe Pneumonia


+cough, shortness of breath


CXR suggests LLL pneumonia


for now, she is on Levaquin, we can continue for 5-7 days


cultures pending





L Foot Cyst vs. Abscess


1/16


wound care provider consulted for possible drainage





1/15


for now, continue quinolone


appreciate ID input, we will continue Levaquin for now


wound care consulted, may need this drained


monitor; PT/OT





HTN


secondary to missed dialysis


cont. Lopressor





Hyperkalemia - resolved


due to missed dialysis 


electrolytes stabilized after consecutive HD tx 





Insulin Dependent DM2


appreciate pharmacy glycemic control


Lantus and sliding scale





Depression


appreciate psych evaluation


started on Zoloft, which we can continue


outpatient PCP follow-up





DVT ppx


SCDs





FULL CODE

## 2018-01-18 VITALS — SYSTOLIC BLOOD PRESSURE: 144 MMHG | HEART RATE: 66 BPM | DIASTOLIC BLOOD PRESSURE: 78 MMHG

## 2018-01-18 VITALS
HEART RATE: 65 BPM | SYSTOLIC BLOOD PRESSURE: 128 MMHG | TEMPERATURE: 97.52 F | OXYGEN SATURATION: 94 % | DIASTOLIC BLOOD PRESSURE: 86 MMHG

## 2018-01-18 VITALS
TEMPERATURE: 97.7 F | HEART RATE: 71 BPM | SYSTOLIC BLOOD PRESSURE: 127 MMHG | DIASTOLIC BLOOD PRESSURE: 77 MMHG | OXYGEN SATURATION: 93 %

## 2018-01-18 VITALS
HEART RATE: 64 BPM | TEMPERATURE: 97.7 F | OXYGEN SATURATION: 92 % | DIASTOLIC BLOOD PRESSURE: 68 MMHG | SYSTOLIC BLOOD PRESSURE: 127 MMHG

## 2018-01-18 VITALS — OXYGEN SATURATION: 97 %

## 2018-01-18 VITALS — OXYGEN SATURATION: 92 %

## 2018-01-18 VITALS
TEMPERATURE: 98.06 F | SYSTOLIC BLOOD PRESSURE: 169 MMHG | OXYGEN SATURATION: 96 % | HEART RATE: 69 BPM | DIASTOLIC BLOOD PRESSURE: 74 MMHG

## 2018-01-18 VITALS — OXYGEN SATURATION: 95 % | HEART RATE: 78 BPM

## 2018-01-18 VITALS — HEART RATE: 85 BPM | OXYGEN SATURATION: 94 %

## 2018-01-18 LAB
BUN SERPL-MCNC: 40 MG/DL (ref 7–18)
CALCIUM SERPL-MCNC: 8.8 MG/DL (ref 8.5–10.1)
CO2 SERPL-SCNC: 25 MMOL/L (ref 21–32)
CREAT SERPL-MCNC: 6.75 MG/DL (ref 0.6–1.2)
EOSINOPHIL NFR BLD AUTO: 296 K/UL (ref 130–400)
GLUCOSE SERPL-MCNC: 92 MG/DL (ref 70–99)
HCT VFR BLD CALC: 33.2 % (ref 37–47)
HGB BLD-MCNC: 10.2 G/DL (ref 12–16)
MCH RBC QN AUTO: 28.9 PG (ref 25–34)
MCHC RBC AUTO-ENTMCNC: 30.7 G/DL (ref 32–36)
MCV RBC AUTO: 94.1 FL (ref 80–100)
PHOSPHATE SERPL-MCNC: 4.9 MG/DL (ref 2.5–4.9)
PMV BLD AUTO: 10.5 FL (ref 7.4–10.4)
POTASSIUM SERPL-SCNC: 4.1 MMOL/L (ref 3.5–5.1)
RED CELL DISTRIBUTION WIDTH CV: 15.8 % (ref 11.5–14.5)
RED CELL DISTRIBUTION WIDTH SD: 54.1 FL (ref 36.4–46.3)
SODIUM SERPL-SCNC: 135 MMOL/L (ref 136–145)
WBC # BLD AUTO: 8.05 K/UL (ref 4.8–10.8)

## 2018-01-18 RX ADMIN — IPRATROPIUM BROMIDE AND ALBUTEROL SULFATE SCH ML: .5; 3 SOLUTION RESPIRATORY (INHALATION) at 19:08

## 2018-01-18 RX ADMIN — SERTRALINE HYDROCHLORIDE SCH MG: 50 TABLET, FILM COATED ORAL at 21:19

## 2018-01-18 RX ADMIN — LACTOBACILLUS TAB SCH TAB: TAB at 12:00

## 2018-01-18 RX ADMIN — IPRATROPIUM BROMIDE AND ALBUTEROL SULFATE SCH ML: .5; 3 SOLUTION RESPIRATORY (INHALATION) at 07:09

## 2018-01-18 RX ADMIN — INSULIN ASPART SCH UNITS: 100 INJECTION, SOLUTION INTRAVENOUS; SUBCUTANEOUS at 08:41

## 2018-01-18 RX ADMIN — LACTOBACILLUS TAB SCH TAB: TAB at 16:50

## 2018-01-18 RX ADMIN — METOPROLOL TARTRATE SCH MG: 50 TABLET, FILM COATED ORAL at 21:20

## 2018-01-18 RX ADMIN — RENAGEL SCH MG: 800 TABLET ORAL at 17:00

## 2018-01-18 RX ADMIN — INSULIN ASPART SCH UNITS: 100 INJECTION, SOLUTION INTRAVENOUS; SUBCUTANEOUS at 13:34

## 2018-01-18 RX ADMIN — LACTOBACILLUS TAB SCH TAB: TAB at 09:04

## 2018-01-18 RX ADMIN — RENAGEL SCH MG: 800 TABLET ORAL at 09:04

## 2018-01-18 RX ADMIN — OXYCODONE HYDROCHLORIDE AND ACETAMINOPHEN PRN TAB: 5; 325 TABLET ORAL at 04:28

## 2018-01-18 RX ADMIN — ISOSORBIDE MONONITRATE SCH MG: 60 TABLET ORAL at 09:04

## 2018-01-18 RX ADMIN — METOPROLOL TARTRATE SCH MG: 50 TABLET, FILM COATED ORAL at 09:04

## 2018-01-18 RX ADMIN — RENAGEL SCH MG: 800 TABLET ORAL at 12:04

## 2018-01-18 RX ADMIN — NAPHAZOLINE HYDROCHLORIDE AND PHENIRAMINE MALEATE SCH DROPS: .25; 3 SOLUTION/ DROPS OPHTHALMIC at 09:08

## 2018-01-18 RX ADMIN — OXYCODONE HYDROCHLORIDE AND ACETAMINOPHEN PRN TAB: 5; 325 TABLET ORAL at 10:32

## 2018-01-18 RX ADMIN — RENAGEL SCH MG: 800 TABLET ORAL at 16:49

## 2018-01-18 RX ADMIN — INSULIN ASPART SCH UNITS: 100 INJECTION, SOLUTION INTRAVENOUS; SUBCUTANEOUS at 16:30

## 2018-01-18 RX ADMIN — INSULIN GLARGINE SCH UNITS: 100 INJECTION, SOLUTION SUBCUTANEOUS at 21:22

## 2018-01-18 RX ADMIN — BENZONATATE PRN MG: 100 CAPSULE ORAL at 09:19

## 2018-01-18 RX ADMIN — INSULIN ASPART SCH UNITS: 100 INJECTION, SOLUTION INTRAVENOUS; SUBCUTANEOUS at 21:00

## 2018-01-18 NOTE — PROGRESS NOTE
Subjective


Date of Service:


Jan 18, 2018.


Subjective


Pt evaluation today including:  conversation w/ patient, physical exam, lab 

review, review of studies, conversation w/ consultant, review of inpatient 

medication list


Saw/examined the patient in room 403


+pain in the LLE persistent


refusing to allow nursing to change her dressing


received morphine prior to this and kept telling nursing she was not ready


cough improving, less sputum





Problem List


Medical Problems:


(1) Abdominal pain


Status: Acute  





(2) Acute electrocardiogram changes


Status: Acute  





(3) Back pain


Status: Acute  





(4) Cellulitis


Status: Acute  





(5) Cellulitis of left middle finger


Status: Acute  





(6) Chest pain


Status: Acute  





(7) Chronic kidney disease (CKD)


Status: Acute  





(8) Dialysis AV fistula malfunction


Status: Acute  





(9) Elevated troponin


Status: Acute  





(10) Elevated troponin


Status: Acute  





(11) Elevated troponin


Status: Acute  





(12) End stage renal disease


Status: Acute  





(13) GI bleed


Status: Acute  





(14) Hyperkalemia


Status: Acute  





(15) Hyperkalemia


Status: Acute  





(16) Hypertension


Status: Acute  





(17) Joint effusion


Status: Acute  





(18) Left elbow pain


Status: Acute  





(19) Left sided chest pain


Status: Acute  





(20) Left sided chest pain


Status: Acute  





(21) Limb ischemia


Status: Acute  





(22) Lumbar back pain


Status: Acute  





(23) Medical non-compliance


Status: Acute  





(24) Mid back pain on right side


Status: Acute  





(25) Musculoskeletal strain


Status: Acute  





(26) Obesity


Status: Acute  





(27) PNA (pneumonia)


Status: Acute  





(28) Pneumonia


Status: Acute  





(29) Pulmonary edema


Status: Acute  





(30) Renal failure


Status: Acute  





(31) Sinusitis


Status: Acute  





(32) Strain of lumbar region


Status: Acute  





(33) Substernal chest pain


Status: Acute  





(34) Tendinopathy of right shoulder


Status: Acute  





(35) Trochanteric bursitis, right hip


Status: Acute  





(36) Upper abdominal pain


Status: Acute  





(37) Upper GI bleed


Status: Acute  





(38) Uremia


Status: Acute  





(39) Urinary tract infection


Status: Acute  





(40) Vomiting


Status: Acute  





(41) Vomiting


Status: Acute  





(42) Vomiting


Status: Acute  





(43) Vomiting


Status: Acute  











Review of Systems


Constitutional:  No fever, No chills, No weakness


Respiratory:  + cough, + sputum, + shortness of breath, No wheezing, No dyspnea 

on exertion, No dyspnea at rest, No hemoptysis


Cardiac:  No chest pain, No edema, No palpitations


Musculoskeletal:  + joint pain





Medications





Current Inpatient Medications








 Medications


  (Trade)  Dose


 Ordered  Sig/Willy


 Route  Start Time


 Stop Time Status Last Admin


Dose Admin


 


 Insulin Aspart


  (novoLOG ASPART)  **SLIDING


 SCALE**


 **G...  ACHS


 SC  1/13/18 06:45


 2/12/18 06:59  1/17/18 12:49


2 UNITS


 


 Glucose


  (Glucose 40% Gel)  15-30


 GRAMS 15


 GRAMS...  UD  PRN


 PO  1/13/18 00:00


 2/12/18 00:00   


 


 


 Glucose


  (Glucose Chew


 Tab)  4-8


 Tablets 4


 Tabl...  UD  PRN


 PO  1/13/18 00:00


 2/12/18 00:00   


 


 


 Dextrose


  (Dextrose 50%


 50ML Syringe)  25-50ML OF


 50% DW IV


 FOR...  UD  PRN


 IV  1/13/18 00:00


 2/12/18 00:00   


 


 


 Glucagon


  (Glucagon Inj)  1 mg  UD  PRN


 SQ  1/13/18 00:00


 2/12/18 00:00   


 


 


 Naphazoline HCl/


 Pheniramine


 Maleate


  (Visine-A Oph


 Soln)  1 drops  DAILY


 OP  1/13/18 09:00


 2/12/18 08:59  1/18/18 09:08


1 DROPS


 


 Isosorbide


 Mononitrate


  (Imdur Ext Rel


 Tab)  60 mg  QAM


 PO  1/13/18 09:00


 2/12/18 08:59  1/18/18 09:04


60 MG


 


 Metoprolol


 Tartrate


  (Lopressor Tab)  50 mg  BID


 PO  1/13/18 09:00


 2/12/18 08:59  1/18/18 09:04


50 MG


 


 Insulin Glargine


  (Lantus Solostar


 Pen)  12 units  HS


 SC  1/13/18 21:00


 2/12/18 20:59  1/17/18 22:26


12 UNITS


 


 Sevelamer HCl


  (Renagel Tab)  2,400 mg  TIDM


 PO  1/13/18 11:30


 2/12/18 11:29  1/18/18 12:04


2,400 MG


 


 Acetaminophen


  (Tylenol Tab)  325 mg  Q4H  PRN


 PO  1/14/18 02:45


 2/13/18 02:44  1/16/18 02:22


325 MG


 


 Benzonatate


  (Tessalon Perles


 Cap)  100 mg  TID  PRN


 PO  1/14/18 18:15


 2/13/18 18:14  1/18/18 09:19


100 MG


 


 Al Hydroxide/Mg


 Trisilicate


  (Gaviscon Chew


 Tab)  1 tab  Q6  PRN


 PO  1/14/18 19:15


 2/13/18 19:14  1/17/18 00:38


1 TAB


 


 Lactobacillus


 Acidophilus


  (Floranex Tab)  4 tab  TIDM


 PO  1/15/18 08:00


 2/14/18 07:59  1/18/18 09:04


4 TAB


 


 Levofloxacin


  (Consult)  1 ea  UD  PRN


 N/A  1/15/18 08:30


 2/14/18 08:29   


 


 


 Levofloxacin


  (Levaquin Tab)  500 mg  Q48H


 PO  1/15/18 11:00


 1/19/18 10:59  1/17/18 12:04


500 MG


 


 Albuterol/


 Ipratropium


  (Duoneb)  3 ml  BIDR


 INH  1/15/18 20:00


 2/14/18 19:59  1/18/18 07:09


3 ML


 


 Ibuprofen


  (Advil Tab)  200 mg  Q6H  PRN


 PO  1/16/18 02:45


 2/15/18 02:44   


 


 


 Sertraline HCl


  (Zoloft Tab)  25 mg  HS


 PO  1/16/18 21:00


 2/15/18 20:59  1/17/18 22:24


25 MG


 


 Oxycodone/


 Acetaminophen


  (Percocet


 5-325mg Tab)  1 tab  Q6  PRN


 PO  1/16/18 12:00


 1/30/18 11:59  1/18/18 10:32


1 TAB











Objective


Vital Signs











  Date Time  Temp Pulse Resp B/P (MAP) Pulse Ox O2 Delivery O2 Flow Rate FiO2


 


1/18/18 15:45     92 Room Air  


 


1/18/18 15:34 36.5 64 16 127/68 (87) 92 Room Air  


 


1/18/18 12:02 36.4 65 18 128/86 (100) 94 Room Air  


 


1/18/18 09:52     97 Room Air  


 


1/18/18 09:00      Room Air  


 


1/18/18 07:31     97 Room Air  


 


1/18/18 07:21 36.5 71 18 127/77 (94) 93 Room Air  


 


1/18/18 07:09  78 16  95 Room Air  


 


1/18/18 00:01      Room Air  


 


1/17/18 23:23 36.4 74 18 159/90 (113) 95 Room Air  


 


1/17/18 22:10 36.6 69  134/70 (91)    


 


1/17/18 22:00  66  126/68    


 


1/17/18 21:45  71  122/66    


 


1/17/18 21:30  63  117/59    


 


1/17/18 21:15  64  114/62    


 


1/17/18 21:00  66  123/66    


 


1/17/18 20:45  73  133/97    


 


1/17/18 20:30  65  112/69    


 


1/17/18 20:15  66  112/56    


 


1/17/18 20:00  66  118/66    


 


1/17/18 19:45  66  120/64    


 


1/17/18 19:30  73  145/80    


 


1/17/18 19:05 36.9 73  132/72 (92)    











Physical Exam


General Appearance:  no apparent distress


Respiratory/Chest:  no respiratory distress, no accessory muscle use, + 

decreased breath sounds


Cardiovascular:  regular rate, rhythm, no murmur


Extremities:  + swelling, + pertinent finding (abscesses on L foot)





Laboratory Results





Last 24 Hours








Test


  1/17/18


16:35 1/17/18


19:36 1/18/18


05:31 1/18/18


07:44


 


Bedside Glucose 110 mg/dl  123 mg/dl   99 mg/dl 


 


White Blood Count   8.05 K/uL  


 


Red Blood Count   3.53 M/uL  


 


Hemoglobin   10.2 g/dL  


 


Hematocrit   33.2 %  


 


Mean Corpuscular Volume   94.1 fL  


 


Mean Corpuscular Hemoglobin   28.9 pg  


 


Mean Corpuscular Hemoglobin


Concent 


  


  30.7 g/dl 


  


 


 


RDW Standard Deviation   54.1 fL  


 


RDW Coefficient of Variation   15.8 %  


 


Platelet Count   296 K/uL  


 


Mean Platelet Volume   10.5 fL  


 


Sodium Level   135 mmol/L  


 


Potassium Level   4.1 mmol/L  


 


Chloride Level   103 mmol/L  


 


Carbon Dioxide Level   25 mmol/L  


 


Anion Gap   7.0 mmol/L  


 


Blood Urea Nitrogen   40 mg/dl  


 


Creatinine   6.75 mg/dl  


 


Est Creatinine Clear Calc


Drug Dose 


  


  12.0 ml/min 


  


 


 


Estimated GFR (


American) 


  


  7.6 


  


 


 


Estimated GFR (Non-


American 


  


  6.5 


  


 


 


BUN/Creatinine Ratio   6.0  


 


Random Glucose   92 mg/dl  


 


Calcium Level   8.8 mg/dl  


 


Phosphorus Level   4.9 mg/dl  


 


Magnesium Level   2.2 mg/dl  


 


Test


  1/18/18


11:41 


  


  


 


 


Bedside Glucose 125 mg/dl    











Assessment and Plan


This is a 52 year old female with a PMH of ESRD on HD, insulin dependent DM2, 

CAD s/p stents, ischemic cardiomyopathy, HLD, PVD, carotid artery stenosis s/p 

endarterectomy presents secondary to missed dialysis, shortness of breath, 

fluid overload





ESRD on HD


Ischemic Cardiomyopathy


Acute on Chronic Systolic CHF


1/18


continue HD M-W-F





1/17


plan today is for HD


had an I&D - cultures pending


Levaquin for pneumonia


nebs for SOB





1/16


plan to continue dialysis as per nephrology





1/15


patient missed three dialysis sessions


supposed to have dialysis M-W-F


 consulted for transportation to dialysis help


continued fluid removal with dialysis





L Lower Lobe Pneumonia


1/18


Levaquin for one more day





1/17


+cough, shortness of breath


CXR suggests LLL pneumonia


for now, she is on Levaquin, we can continue for 5-7 days


cultures pending





L Foot Cyst vs. Abscess


1/18


debridement as per wound care - to have another debridement in AM





1/16


wound care provider consulted for possible drainage





1/15


for now, continue quinolone


appreciate ID input, we will continue Levaquin for now


wound care consulted, may need this drained


monitor; PT/OT





HTN


secondary to missed dialysis


cont. Lopressor





Hyperkalemia - resolved


due to missed dialysis


electrolytes stabilized after consecutive HD tx





Insulin Dependent DM2


appreciate pharmacy glycemic control


Lantus and sliding scale





Depression


appreciate psych evaluation


started on Zoloft, which we can continue


outpatient PCP follow-up





DVT ppx


SCDs





FULL CODE

## 2018-01-18 NOTE — PROGRESS NOTE
Subjective


Date of Service:


Jan 18, 2018.


Subjective


wound culture negative to date, remains on levaquin for pna. sputum and blood 

cultures negative as well. c. diff negative. afebrile.





Problem List


Medical Problems:


(1) Abdominal pain


Status: Acute  





(2) Acute electrocardiogram changes


Status: Acute  





(3) Back pain


Status: Acute  





(4) Cellulitis


Status: Acute  





(5) Cellulitis of left middle finger


Status: Acute  





(6) Chest pain


Status: Acute  





(7) Chronic kidney disease (CKD)


Status: Acute  





(8) Dialysis AV fistula malfunction


Status: Acute  





(9) Elevated troponin


Status: Acute  





(10) Elevated troponin


Status: Acute  





(11) Elevated troponin


Status: Acute  





(12) End stage renal disease


Status: Acute  





(13) GI bleed


Status: Acute  





(14) Hyperkalemia


Status: Acute  





(15) Hyperkalemia


Status: Acute  





(16) Hypertension


Status: Acute  





(17) Joint effusion


Status: Acute  





(18) Left elbow pain


Status: Acute  





(19) Left sided chest pain


Status: Acute  





(20) Left sided chest pain


Status: Acute  





(21) Limb ischemia


Status: Acute  





(22) Lumbar back pain


Status: Acute  





(23) Medical non-compliance


Status: Acute  





(24) Mid back pain on right side


Status: Acute  





(25) Musculoskeletal strain


Status: Acute  





(26) Obesity


Status: Acute  





(27) PNA (pneumonia)


Status: Acute  





(28) Pneumonia


Status: Acute  





(29) Pulmonary edema


Status: Acute  





(30) Renal failure


Status: Acute  





(31) Sinusitis


Status: Acute  





(32) Strain of lumbar region


Status: Acute  





(33) Substernal chest pain


Status: Acute  





(34) Tendinopathy of right shoulder


Status: Acute  





(35) Trochanteric bursitis, right hip


Status: Acute  





(36) Upper abdominal pain


Status: Acute  





(37) Upper GI bleed


Status: Acute  





(38) Uremia


Status: Acute  





(39) Urinary tract infection


Status: Acute  





(40) Vomiting


Status: Acute  





(41) Vomiting


Status: Acute  





(42) Vomiting


Status: Acute  





(43) Vomiting


Status: Acute  











Objective


Vital Signs











  Date Time  Temp Pulse Resp B/P (MAP) Pulse Ox O2 Delivery O2 Flow Rate FiO2


 


1/18/18 12:02 36.4 65 18 128/86 (100) 94 Room Air  


 


1/18/18 09:52     97 Room Air  


 


1/18/18 09:00      Room Air  


 


1/18/18 07:31     97 Room Air  


 


1/18/18 07:21 36.5 71 18 127/77 (94) 93 Room Air  


 


1/18/18 07:09  78 16  95 Room Air  


 


1/18/18 00:01      Room Air  


 


1/17/18 23:23 36.4 74 18 159/90 (113) 95 Room Air  


 


1/17/18 22:10 36.6 69  134/70 (91)    


 


1/17/18 22:00  66  126/68    


 


1/17/18 21:45  71  122/66    


 


1/17/18 21:30  63  117/59    


 


1/17/18 21:15  64  114/62    


 


1/17/18 21:00  66  123/66    


 


1/17/18 20:45  73  133/97    


 


1/17/18 20:30  65  112/69    


 


1/17/18 20:15  66  112/56    


 


1/17/18 20:00  66  118/66    


 


1/17/18 19:45  66  120/64    


 


1/17/18 19:30  73  145/80    


 


1/17/18 19:05 36.9 73  132/72 (92)    


 


1/17/18 16:00     95 Room Air  


 


1/17/18 15:40 36.7 63 18 111/70 (84) 95 Room Air  











Laboratory Results





Last 24 Hours








Test


  1/17/18


16:35 1/17/18


19:36 1/18/18


05:31 1/18/18


07:44


 


Bedside Glucose 110 mg/dl  123 mg/dl   99 mg/dl 


 


White Blood Count   8.05 K/uL  


 


Red Blood Count   3.53 M/uL  


 


Hemoglobin   10.2 g/dL  


 


Hematocrit   33.2 %  


 


Mean Corpuscular Volume   94.1 fL  


 


Mean Corpuscular Hemoglobin   28.9 pg  


 


Mean Corpuscular Hemoglobin


Concent 


  


  30.7 g/dl 


  


 


 


RDW Standard Deviation   54.1 fL  


 


RDW Coefficient of Variation   15.8 %  


 


Platelet Count   296 K/uL  


 


Mean Platelet Volume   10.5 fL  


 


Sodium Level   135 mmol/L  


 


Potassium Level   4.1 mmol/L  


 


Chloride Level   103 mmol/L  


 


Carbon Dioxide Level   25 mmol/L  


 


Anion Gap   7.0 mmol/L  


 


Blood Urea Nitrogen   40 mg/dl  


 


Creatinine   6.75 mg/dl  


 


Est Creatinine Clear Calc


Drug Dose 


  


  12.0 ml/min 


  


 


 


Estimated GFR (


American) 


  


  7.6 


  


 


 


Estimated GFR (Non-


American 


  


  6.5 


  


 


 


BUN/Creatinine Ratio   6.0  


 


Random Glucose   92 mg/dl  


 


Calcium Level   8.8 mg/dl  


 


Phosphorus Level   4.9 mg/dl  


 


Magnesium Level   2.2 mg/dl  


 


Test


  1/18/18


11:41 


  


  


 


 


Bedside Glucose 125 mg/dl    











Assessment and Plan





(1) Diabetic foot infection


Assessment & Plan:  no additional abx for now, follow culture results.

## 2018-01-18 NOTE — NEPHROLOGY PROGRESS NOTE
Nephrology Progress Note


Date of Service:


Jan 18, 2018.


Subjective


51 yo female who missed a week of dialysis secondary to no transportation.  

doing dialysis now m-w-f. continues to be very tired. continues to have a 

cough. recently had I&D of foot lesion and getting packed routinely by nursing 

staff when patient allows. Still with cough but patient believes that symptoms 

are improving. plan for dialysis tomorrow. no chest pain, nausea.





Objective











  Date Time  Temp Pulse Resp B/P (MAP) Pulse Ox O2 Delivery O2 Flow Rate FiO2


 


1/18/18 12:02 36.4 65 18 128/86 (100) 94 Room Air  


 


1/18/18 09:52     97 Room Air  


 


1/18/18 09:00      Room Air  


 


1/18/18 07:31     97 Room Air  


 


1/18/18 07:21 36.5 71 18 127/77 (94) 93 Room Air  


 


1/18/18 07:09  78 16  95 Room Air  


 


1/18/18 00:01      Room Air  


 


1/17/18 23:23 36.4 74 18 159/90 (113) 95 Room Air  


 


1/17/18 22:10 36.6 69  134/70 (91)    


 


1/17/18 22:00  66  126/68    


 


1/17/18 21:45  71  122/66    


 


1/17/18 21:30  63  117/59    


 


1/17/18 21:15  64  114/62    


 


1/17/18 21:00  66  123/66    


 


1/17/18 20:45  73  133/97    


 


1/17/18 20:30  65  112/69    


 


1/17/18 20:15  66  112/56    


 


1/17/18 20:00  66  118/66    


 


1/17/18 19:45  66  120/64    


 


1/17/18 19:30  73  145/80    


 


1/17/18 19:05 36.9 73  132/72 (92)    


 


1/17/18 16:00     95 Room Air  


 


1/17/18 15:40 36.7 63 18 111/70 (84) 95 Room Air  








Physical Exam:


General-aaox3, obese


Eyes-no scleral icterus


ENT-mmm


Neck-supple


Lungs-rales at right base


Heart-regular


Abdomen-bs+ s/nt/nd


Extremities-no edema, +small mass dorsal left foot, I&D wound with clean and 

dry dressing on plantar aspect, +necrotic finger


Neuro-nonfocal





Current Inpatient Medications








 Medications


  (Trade)  Dose


 Ordered  Sig/Willy


 Route  Start Time


 Stop Time Status Last Admin


Dose Admin


 


 Insulin Aspart


  (novoLOG ASPART)  **SLIDING


 SCALE**


 **G...  ACHS


 SC  1/13/18 06:45


 2/12/18 06:59  1/17/18 12:49


2 UNITS


 


 Glucose


  (Glucose 40% Gel)  15-30


 GRAMS 15


 GRAMS...  UD  PRN


 PO  1/13/18 00:00


 2/12/18 00:00   


 


 


 Glucose


  (Glucose Chew


 Tab)  4-8


 Tablets 4


 Tabl...  UD  PRN


 PO  1/13/18 00:00


 2/12/18 00:00   


 


 


 Dextrose


  (Dextrose 50%


 50ML Syringe)  25-50ML OF


 50% DW IV


 FOR...  UD  PRN


 IV  1/13/18 00:00


 2/12/18 00:00   


 


 


 Glucagon


  (Glucagon Inj)  1 mg  UD  PRN


 SQ  1/13/18 00:00


 2/12/18 00:00   


 


 


 Naphazoline HCl/


 Pheniramine


 Maleate


  (Visine-A Oph


 Soln)  1 drops  DAILY


 OP  1/13/18 09:00


 2/12/18 08:59  1/18/18 09:08


1 DROPS


 


 Isosorbide


 Mononitrate


  (Imdur Ext Rel


 Tab)  60 mg  QAM


 PO  1/13/18 09:00


 2/12/18 08:59  1/18/18 09:04


60 MG


 


 Metoprolol


 Tartrate


  (Lopressor Tab)  50 mg  BID


 PO  1/13/18 09:00


 2/12/18 08:59  1/18/18 09:04


50 MG


 


 Insulin Glargine


  (Lantus Solostar


 Pen)  12 units  HS


 SC  1/13/18 21:00


 2/12/18 20:59  1/17/18 22:26


12 UNITS


 


 Sevelamer HCl


  (Renagel Tab)  2,400 mg  TIDM


 PO  1/13/18 11:30


 2/12/18 11:29  1/18/18 12:04


2,400 MG


 


 Acetaminophen


  (Tylenol Tab)  325 mg  Q4H  PRN


 PO  1/14/18 02:45


 2/13/18 02:44  1/16/18 02:22


325 MG


 


 Benzonatate


  (Tessalon Perles


 Cap)  100 mg  TID  PRN


 PO  1/14/18 18:15


 2/13/18 18:14  1/18/18 09:19


100 MG


 


 Al Hydroxide/Mg


 Trisilicate


  (Gaviscon Chew


 Tab)  1 tab  Q6  PRN


 PO  1/14/18 19:15


 2/13/18 19:14  1/17/18 00:38


1 TAB


 


 Lactobacillus


 Acidophilus


  (Floranex Tab)  4 tab  TIDM


 PO  1/15/18 08:00


 2/14/18 07:59  1/18/18 09:04


4 TAB


 


 Levofloxacin


  (Consult)  1 ea  UD  PRN


 N/A  1/15/18 08:30


 2/14/18 08:29   


 


 


 Levofloxacin


  (Levaquin Tab)  500 mg  Q48H


 PO  1/15/18 11:00


 1/19/18 10:59  1/17/18 12:04


500 MG


 


 Albuterol/


 Ipratropium


  (Duoneb)  3 ml  BIDR


 INH  1/15/18 20:00


 2/14/18 19:59  1/18/18 07:09


3 ML


 


 Ibuprofen


  (Advil Tab)  200 mg  Q6H  PRN


 PO  1/16/18 02:45


 2/15/18 02:44   


 


 


 Sertraline HCl


  (Zoloft Tab)  25 mg  HS


 PO  1/16/18 21:00


 2/15/18 20:59  1/17/18 22:24


25 MG


 


 Oxycodone/


 Acetaminophen


  (Percocet


 5-325mg Tab)  1 tab  Q6  PRN


 PO  1/16/18 12:00


 1/30/18 11:59  1/18/18 10:32


1 TAB











Last 24 Hours








Test


  1/17/18


16:35 1/17/18


19:36 1/18/18


05:31 1/18/18


07:44


 


Bedside Glucose 110 mg/dl  123 mg/dl   99 mg/dl 


 


White Blood Count   8.05 K/uL  


 


Red Blood Count   3.53 M/uL  


 


Hemoglobin   10.2 g/dL  


 


Hematocrit   33.2 %  


 


Mean Corpuscular Volume   94.1 fL  


 


Mean Corpuscular Hemoglobin   28.9 pg  


 


Mean Corpuscular Hemoglobin


Concent 


  


  30.7 g/dl 


  


 


 


RDW Standard Deviation   54.1 fL  


 


RDW Coefficient of Variation   15.8 %  


 


Platelet Count   296 K/uL  


 


Mean Platelet Volume   10.5 fL  


 


Sodium Level   135 mmol/L  


 


Potassium Level   4.1 mmol/L  


 


Chloride Level   103 mmol/L  


 


Carbon Dioxide Level   25 mmol/L  


 


Anion Gap   7.0 mmol/L  


 


Blood Urea Nitrogen   40 mg/dl  


 


Creatinine   6.75 mg/dl  


 


Est Creatinine Clear Calc


Drug Dose 


  


  12.0 ml/min 


  


 


 


Estimated GFR (


American) 


  


  7.6 


  


 


 


Estimated GFR (Non-


American 


  


  6.5 


  


 


 


BUN/Creatinine Ratio   6.0  


 


Random Glucose   92 mg/dl  


 


Calcium Level   8.8 mg/dl  


 


Phosphorus Level   4.9 mg/dl  


 


Magnesium Level   2.2 mg/dl  


 


Test


  1/18/18


11:41 


  


  


 


 


Bedside Glucose 125 mg/dl    











Assessment & Plan


ESRD-for dialysis  tomorrow and on m/w/f schedule as clinical condition 

dictates. volume status good. potassium improved at 4.1. 





Anemia of Renal Failure- last level 10.2. hg goal of 10 to 11. on procrit prn.  





IMANI: on renvela 3 with meals and non-compliant with medications. phos was 

elevated on admission. encouraged compliance.





This patient was seen and treated with direct collaboration with Dr. Davison. 

Thank you for the opportunity to participate in this patient's care. Appreciate 

the Consult.





ATTENDING NOTE:


I performed a history and physical examination of the patient, including 

specifically on history- continues to be tired,  on physical exam-has a lesion 

on the left foot, and my impression and plan are ESRD-continue dialysis m-w-f. 

I have discussed the patient's management with Halina Diana PA-C, Please 

refer to above note for the documented findings and plan of care. 





Phil Davison DO

## 2018-01-19 VITALS
DIASTOLIC BLOOD PRESSURE: 89 MMHG | TEMPERATURE: 97.88 F | OXYGEN SATURATION: 94 % | SYSTOLIC BLOOD PRESSURE: 144 MMHG | HEART RATE: 72 BPM

## 2018-01-19 VITALS
HEART RATE: 60 BPM | SYSTOLIC BLOOD PRESSURE: 136 MMHG | TEMPERATURE: 97.88 F | OXYGEN SATURATION: 94 % | DIASTOLIC BLOOD PRESSURE: 71 MMHG

## 2018-01-19 VITALS
HEART RATE: 75 BPM | OXYGEN SATURATION: 95 % | TEMPERATURE: 98.78 F | DIASTOLIC BLOOD PRESSURE: 68 MMHG | SYSTOLIC BLOOD PRESSURE: 138 MMHG

## 2018-01-19 VITALS — HEART RATE: 72 BPM | OXYGEN SATURATION: 96 %

## 2018-01-19 VITALS — OXYGEN SATURATION: 90 % | HEART RATE: 75 BPM

## 2018-01-19 LAB
BUN SERPL-MCNC: 60 MG/DL (ref 7–18)
CALCIUM SERPL-MCNC: 9.8 MG/DL (ref 8.5–10.1)
CO2 SERPL-SCNC: 23 MMOL/L (ref 21–32)
CREAT SERPL-MCNC: 8.36 MG/DL (ref 0.6–1.2)
EOSINOPHIL NFR BLD AUTO: 339 K/UL (ref 130–400)
GLUCOSE SERPL-MCNC: 83 MG/DL (ref 70–99)
HCT VFR BLD CALC: 34.6 % (ref 37–47)
HGB BLD-MCNC: 10.3 G/DL (ref 12–16)
MCH RBC QN AUTO: 28.5 PG (ref 25–34)
MCHC RBC AUTO-ENTMCNC: 29.8 G/DL (ref 32–36)
MCV RBC AUTO: 95.6 FL (ref 80–100)
PMV BLD AUTO: 10.3 FL (ref 7.4–10.4)
POTASSIUM SERPL-SCNC: 4.4 MMOL/L (ref 3.5–5.1)
RED CELL DISTRIBUTION WIDTH CV: 16 % (ref 11.5–14.5)
RED CELL DISTRIBUTION WIDTH SD: 55.2 FL (ref 36.4–46.3)
SODIUM SERPL-SCNC: 137 MMOL/L (ref 136–145)
WBC # BLD AUTO: 9.44 K/UL (ref 4.8–10.8)

## 2018-01-19 RX ADMIN — IPRATROPIUM BROMIDE AND ALBUTEROL SULFATE SCH ML: .5; 3 SOLUTION RESPIRATORY (INHALATION) at 19:20

## 2018-01-19 RX ADMIN — OXYCODONE HYDROCHLORIDE AND ACETAMINOPHEN PRN TAB: 5; 325 TABLET ORAL at 05:33

## 2018-01-19 RX ADMIN — IPRATROPIUM BROMIDE AND ALBUTEROL SULFATE SCH ML: .5; 3 SOLUTION RESPIRATORY (INHALATION) at 07:44

## 2018-01-19 RX ADMIN — ISOSORBIDE MONONITRATE SCH MG: 60 TABLET ORAL at 07:51

## 2018-01-19 RX ADMIN — LACTOBACILLUS TAB SCH TAB: TAB at 07:58

## 2018-01-19 RX ADMIN — SERTRALINE HYDROCHLORIDE SCH MG: 50 TABLET, FILM COATED ORAL at 20:43

## 2018-01-19 RX ADMIN — INSULIN ASPART SCH UNITS: 100 INJECTION, SOLUTION INTRAVENOUS; SUBCUTANEOUS at 17:32

## 2018-01-19 RX ADMIN — RENAGEL SCH MG: 800 TABLET ORAL at 13:05

## 2018-01-19 RX ADMIN — NAPHAZOLINE HYDROCHLORIDE AND PHENIRAMINE MALEATE SCH DROPS: .25; 3 SOLUTION/ DROPS OPHTHALMIC at 07:58

## 2018-01-19 RX ADMIN — METOPROLOL TARTRATE SCH MG: 50 TABLET, FILM COATED ORAL at 20:44

## 2018-01-19 RX ADMIN — BENZONATATE PRN MG: 100 CAPSULE ORAL at 08:01

## 2018-01-19 RX ADMIN — INSULIN ASPART SCH UNITS: 100 INJECTION, SOLUTION INTRAVENOUS; SUBCUTANEOUS at 20:43

## 2018-01-19 RX ADMIN — METOPROLOL TARTRATE SCH MG: 50 TABLET, FILM COATED ORAL at 07:51

## 2018-01-19 RX ADMIN — RENAGEL SCH MG: 800 TABLET ORAL at 07:59

## 2018-01-19 RX ADMIN — LACTOBACILLUS TAB SCH TAB: TAB at 18:18

## 2018-01-19 RX ADMIN — INSULIN ASPART SCH UNITS: 100 INJECTION, SOLUTION INTRAVENOUS; SUBCUTANEOUS at 07:57

## 2018-01-19 RX ADMIN — RENAGEL SCH MG: 800 TABLET ORAL at 18:21

## 2018-01-19 RX ADMIN — INSULIN ASPART SCH UNITS: 100 INJECTION, SOLUTION INTRAVENOUS; SUBCUTANEOUS at 13:04

## 2018-01-19 RX ADMIN — LACTOBACILLUS TAB SCH TAB: TAB at 13:05

## 2018-01-19 RX ADMIN — INSULIN GLARGINE SCH UNITS: 100 INJECTION, SOLUTION SUBCUTANEOUS at 20:42

## 2018-01-19 RX ADMIN — INSULIN ASPART SCH UNITS: 100 INJECTION, SOLUTION INTRAVENOUS; SUBCUTANEOUS at 20:49

## 2018-01-19 NOTE — PROGRESS NOTE
Subjective


Date of Service:


Jan 19, 2018.


Subjective


day 5/5 levaquin for pna, sputum culture and blood cultures negative. afebrile. 

tolerating abx, c diff negative. wound culture negative to date. wbc nml.





Problem List


Medical Problems:


(1) Abdominal pain


Status: Acute  





(2) Acute electrocardiogram changes


Status: Acute  





(3) Back pain


Status: Acute  





(4) Cellulitis


Status: Acute  





(5) Cellulitis of left middle finger


Status: Acute  





(6) Chest pain


Status: Acute  





(7) Chronic kidney disease (CKD)


Status: Acute  





(8) Dialysis AV fistula malfunction


Status: Acute  





(9) Elevated troponin


Status: Acute  





(10) Elevated troponin


Status: Acute  





(11) Elevated troponin


Status: Acute  





(12) End stage renal disease


Status: Acute  





(13) GI bleed


Status: Acute  





(14) Hyperkalemia


Status: Acute  





(15) Hyperkalemia


Status: Acute  





(16) Hypertension


Status: Acute  





(17) Joint effusion


Status: Acute  





(18) Left elbow pain


Status: Acute  





(19) Left sided chest pain


Status: Acute  





(20) Left sided chest pain


Status: Acute  





(21) Limb ischemia


Status: Acute  





(22) Lumbar back pain


Status: Acute  





(23) Medical non-compliance


Status: Acute  





(24) Mid back pain on right side


Status: Acute  





(25) Musculoskeletal strain


Status: Acute  





(26) Obesity


Status: Acute  





(27) PNA (pneumonia)


Status: Acute  





(28) Pneumonia


Status: Acute  





(29) Pulmonary edema


Status: Acute  





(30) Renal failure


Status: Acute  





(31) Sinusitis


Status: Acute  





(32) Strain of lumbar region


Status: Acute  





(33) Substernal chest pain


Status: Acute  





(34) Tendinopathy of right shoulder


Status: Acute  





(35) Trochanteric bursitis, right hip


Status: Acute  





(36) Upper abdominal pain


Status: Acute  





(37) Upper GI bleed


Status: Acute  





(38) Uremia


Status: Acute  





(39) Urinary tract infection


Status: Acute  





(40) Vomiting


Status: Acute  





(41) Vomiting


Status: Acute  





(42) Vomiting


Status: Acute  





(43) Vomiting


Status: Acute  











Objective


Vital Signs











  Date Time  Temp Pulse Resp B/P (MAP) Pulse Ox O2 Delivery O2 Flow Rate FiO2


 


1/19/18 08:00      Room Air  


 


1/19/18 07:52  75 16  90 Room Air  


 


1/19/18 07:49 36.6 72 16 144/89 (107) 94   


 


1/19/18 00:00      Room Air  


 


1/18/18 22:32 36.7 69 18 169/74 (105) 96 Room Air  


 


1/18/18 21:18  66  144/78 (100)    


 


1/18/18 19:08  85 16  94 Room Air  


 


1/18/18 16:00      Room Air  


 


1/18/18 15:45     92 Room Air  


 


1/18/18 15:34 36.5 64 16 127/68 (87) 92 Room Air  


 


1/18/18 12:02 36.4 65 18 128/86 (100) 94 Room Air  











Laboratory Results











Item Value  Date Time


 


Gram Stain - Final Resulted 1/17/18 1130





Drainage-Deep Ankle Left  











Last 24 Hours








Test


  1/18/18


11:41 1/18/18


16:36 1/18/18


19:27 1/19/18


06:09


 


Bedside Glucose 125 mg/dl  109 mg/dl  155 mg/dl  


 


White Blood Count    9.44 K/uL 


 


Red Blood Count    3.62 M/uL 


 


Hemoglobin    10.3 g/dL 


 


Hematocrit    34.6 % 


 


Mean Corpuscular Volume    95.6 fL 


 


Mean Corpuscular Hemoglobin    28.5 pg 


 


Mean Corpuscular Hemoglobin


Concent 


  


  


  29.8 g/dl 


 


 


RDW Standard Deviation    55.2 fL 


 


RDW Coefficient of Variation    16.0 % 


 


Platelet Count    339 K/uL 


 


Mean Platelet Volume    10.3 fL 


 


Sodium Level    137 mmol/L 


 


Potassium Level    4.4 mmol/L 


 


Chloride Level    102 mmol/L 


 


Carbon Dioxide Level    23 mmol/L 


 


Anion Gap    12.0 mmol/L 


 


Blood Urea Nitrogen    60 mg/dl 


 


Creatinine    8.36 mg/dl 


 


Est Creatinine Clear Calc


Drug Dose 


  


  


  9.8 ml/min 


 


 


Estimated GFR (


American) 


  


  


  5.8 


 


 


Estimated GFR (Non-


American 


  


  


  5.0 


 


 


BUN/Creatinine Ratio    7.3 


 


Random Glucose    83 mg/dl 


 


Calcium Level    9.8 mg/dl 


 


Test


  1/19/18


07:21 


  


  


 


 


Bedside Glucose 95 mg/dl    











Assessment and Plan





(1) Diabetic foot infection


Assessment & Plan:  no additional abx for now, follow culture results.  will 

finish levaquin today. ok for d/c from ID standpoint off of abx.

## 2018-01-19 NOTE — WOUND PROGRESS NOTE: INPATIENT
Wound Progress Note


Date of Service


Jan 19, 2018.





Subjective


Pt evaluation today including:  conversation w/ patient, physical exam, chart 

review


Patient seen today for follow-up evaluation of abscess formation to the left 

foot and ankle region.  Patient's complains of pain in this area but denies any 

fever chills or night sweats.





Objective


Vital Signs











  Date Time  Temp Pulse Resp B/P (MAP) Pulse Ox O2 Delivery O2 Flow Rate FiO2


 


1/19/18 08:00      Room Air  


 


1/19/18 07:52  75 16  90 Room Air  


 


1/19/18 07:49 36.6 72 16 144/89 (107) 94   


 


1/19/18 00:00      Room Air  


 


1/18/18 22:32 36.7 69 18 169/74 (105) 96 Room Air  


 


1/18/18 21:18  66  144/78 (100)    


 


1/18/18 19:08  85 16  94 Room Air  


 


1/18/18 16:00      Room Air  


 


1/18/18 15:45     92 Room Air  


 


1/18/18 15:34 36.5 64 16 127/68 (87) 92 Room Air  











Physical Exam


Notes:


The patients vital signs were reviewed and found to be unremarkable patient is 

afebrile the left ankle incision and drainage site shows decreased erythema 

minimal tenderness to palpation no concurrent fluctuance noted.  The abscess 

site on the anterior dorsal aspect of the foot today measures the same as 2 

days prior there is no erythema or tenderness.  No open ulceration or active 

drainage noted.





Laboratory Results





Last 24 Hours








Test


  1/18/18


16:36 1/18/18


19:27 1/19/18


06:09 1/19/18


07:21


 


Bedside Glucose 109 mg/dl  155 mg/dl   95 mg/dl 


 


White Blood Count   9.44 K/uL  


 


Red Blood Count   3.62 M/uL  


 


Hemoglobin   10.3 g/dL  


 


Hematocrit   34.6 %  


 


Mean Corpuscular Volume   95.6 fL  


 


Mean Corpuscular Hemoglobin   28.5 pg  


 


Mean Corpuscular Hemoglobin


Concent 


  


  29.8 g/dl 


  


 


 


RDW Standard Deviation   55.2 fL  


 


RDW Coefficient of Variation   16.0 %  


 


Platelet Count   339 K/uL  


 


Mean Platelet Volume   10.3 fL  


 


Sodium Level   137 mmol/L  


 


Potassium Level   4.4 mmol/L  


 


Chloride Level   102 mmol/L  


 


Carbon Dioxide Level   23 mmol/L  


 


Anion Gap   12.0 mmol/L  


 


Blood Urea Nitrogen   60 mg/dl  


 


Creatinine   8.36 mg/dl  


 


Est Creatinine Clear Calc


Drug Dose 


  


  9.8 ml/min 


  


 


 


Estimated GFR (


American) 


  


  5.8 


  


 


 


Estimated GFR (Non-


American 


  


  5.0 


  


 


 


BUN/Creatinine Ratio   7.3  


 


Random Glucose   83 mg/dl  


 


Calcium Level   9.8 mg/dl  


 


Test


  1/19/18


11:40 


  


  


 


 


Bedside Glucose 114 mg/dl    











Assessment and Plan


Assessment:   Abscess left ankle foot region


      Eschar third finger left hand





Plan:   Patient requests no further incision and drainage of abscesses of the 

foot region.  The ankle area will be dressed with Aquacel an operative from 

change daily patient continue to be monitored during her hospital course 

culture obtained 2 days prior showed no evidence of any significant bacteria 

formation.  Patient will be followed up in the outpatient clinic upon discharge.

## 2018-01-19 NOTE — NEPHROLOGY PROGRESS NOTE
Nephrology Progress Note


Date of Service:


Jan 19, 2018.


Subjective


53 yo female who missed a week of dialysis secondary to no transportation.  

doing dialysis m-w-f. plan for dialysis later today. patient refusing to let 

staff pack let foot wound. she believes that it is too painful and would like 

numbing solution for her dressings before she will allow packing. patient aware 

of risk of losing foot with improper care. patient continues to have productive 

cough and reports improved breathing. denies any CP or nausea.





Objective











  Date Time  Temp Pulse Resp B/P (MAP) Pulse Ox O2 Delivery O2 Flow Rate FiO2


 


1/19/18 08:00      Room Air  


 


1/19/18 07:52  75 16  90 Room Air  


 


1/19/18 07:49 36.6 72 16 144/89 (107) 94   


 


1/19/18 00:00      Room Air  


 


1/18/18 22:32 36.7 69 18 169/74 (105) 96 Room Air  


 


1/18/18 21:18  66  144/78 (100)    


 


1/18/18 19:08  85 16  94 Room Air  


 


1/18/18 16:00      Room Air  


 


1/18/18 15:45     92 Room Air  


 


1/18/18 15:34 36.5 64 16 127/68 (87) 92 Room Air  


 


1/18/18 12:02 36.4 65 18 128/86 (100) 94 Room Air  








Physical Exam:


General-aaox3, obese


Eyes-no scleral icterus


ENT-mmm


Neck-supple


Lungs-rales at right base


Heart-regular


Abdomen-bs+ s/nt/nd


Extremities-no edema, +small mass dorsal left foot, I&D wound with clean and 

dry dressing on plantar aspect, +necrotic finger


Neuro-nonfocal





Current Inpatient Medications








 Medications


  (Trade)  Dose


 Ordered  Sig/Willy


 Route  Start Time


 Stop Time Status Last Admin


Dose Admin


 


 Insulin Aspart


  (novoLOG ASPART)  **SLIDING


 SCALE**


 **G...  ACHS


 SC  1/13/18 06:45


 2/12/18 06:59  1/17/18 12:49


2 UNITS


 


 Glucose


  (Glucose 40% Gel)  15-30


 GRAMS 15


 GRAMS...  UD  PRN


 PO  1/13/18 00:00


 2/12/18 00:00   


 


 


 Glucose


  (Glucose Chew


 Tab)  4-8


 Tablets 4


 Tabl...  UD  PRN


 PO  1/13/18 00:00


 2/12/18 00:00   


 


 


 Dextrose


  (Dextrose 50%


 50ML Syringe)  25-50ML OF


 50% DW IV


 FOR...  UD  PRN


 IV  1/13/18 00:00


 2/12/18 00:00   


 


 


 Glucagon


  (Glucagon Inj)  1 mg  UD  PRN


 SQ  1/13/18 00:00


 2/12/18 00:00   


 


 


 Naphazoline HCl/


 Pheniramine


 Maleate


  (Visine-A Oph


 Soln)  1 drops  DAILY


 OP  1/13/18 09:00


 2/12/18 08:59  1/19/18 07:58


1 DROPS


 


 Isosorbide


 Mononitrate


  (Imdur Ext Rel


 Tab)  60 mg  QAM


 PO  1/13/18 09:00


 2/12/18 08:59  1/18/18 09:04


60 MG


 


 Metoprolol


 Tartrate


  (Lopressor Tab)  50 mg  BID


 PO  1/13/18 09:00


 2/12/18 08:59  1/18/18 21:20


50 MG


 


 Insulin Glargine


  (Lantus Solostar


 Pen)  12 units  HS


 SC  1/13/18 21:00


 2/12/18 20:59  1/18/18 21:22


12 UNITS


 


 Sevelamer HCl


  (Renagel Tab)  2,400 mg  TIDM


 PO  1/13/18 11:30


 2/12/18 11:29  1/19/18 07:59


2,400 MG


 


 Acetaminophen


  (Tylenol Tab)  325 mg  Q4H  PRN


 PO  1/14/18 02:45


 2/13/18 02:44  1/16/18 02:22


325 MG


 


 Benzonatate


  (Tessalon Perles


 Cap)  100 mg  TID  PRN


 PO  1/14/18 18:15


 2/13/18 18:14  1/19/18 08:01


100 MG


 


 Al Hydroxide/Mg


 Trisilicate


  (Gaviscon Chew


 Tab)  1 tab  Q6  PRN


 PO  1/14/18 19:15


 2/13/18 19:14  1/17/18 00:38


1 TAB


 


 Lactobacillus


 Acidophilus


  (Floranex Tab)  4 tab  TIDM


 PO  1/15/18 08:00


 2/14/18 07:59  1/19/18 07:58


4 TAB


 


 Levofloxacin


  (Consult)  1 ea  UD  PRN


 N/A  1/15/18 08:30


 2/14/18 08:29   


 


 


 Levofloxacin


  (Levaquin Tab)  500 mg  Q48H


 PO  1/15/18 11:00


 1/19/18 10:59  1/17/18 12:04


500 MG


 


 Albuterol/


 Ipratropium


  (Duoneb)  3 ml  BIDR


 INH  1/15/18 20:00


 2/14/18 19:59  1/19/18 07:44


3 ML


 


 Ibuprofen


  (Advil Tab)  200 mg  Q6H  PRN


 PO  1/16/18 02:45


 2/15/18 02:44   


 


 


 Sertraline HCl


  (Zoloft Tab)  25 mg  HS


 PO  1/16/18 21:00


 2/15/18 20:59  1/18/18 21:19


25 MG


 


 Oxycodone/


 Acetaminophen


  (Percocet


 5-325mg Tab)  1 tab  Q6  PRN


 PO  1/16/18 12:00


 1/30/18 11:59  1/19/18 05:33


1 TAB











Last 24 Hours








Test


  1/18/18


11:41 1/18/18


16:36 1/18/18


19:27 1/19/18


06:09


 


Bedside Glucose 125 mg/dl  109 mg/dl  155 mg/dl  


 


White Blood Count    9.44 K/uL 


 


Red Blood Count    3.62 M/uL 


 


Hemoglobin    10.3 g/dL 


 


Hematocrit    34.6 % 


 


Mean Corpuscular Volume    95.6 fL 


 


Mean Corpuscular Hemoglobin    28.5 pg 


 


Mean Corpuscular Hemoglobin


Concent 


  


  


  29.8 g/dl 


 


 


RDW Standard Deviation    55.2 fL 


 


RDW Coefficient of Variation    16.0 % 


 


Platelet Count    339 K/uL 


 


Mean Platelet Volume    10.3 fL 


 


Sodium Level    137 mmol/L 


 


Potassium Level    4.4 mmol/L 


 


Chloride Level    102 mmol/L 


 


Carbon Dioxide Level    23 mmol/L 


 


Anion Gap    12.0 mmol/L 


 


Blood Urea Nitrogen    60 mg/dl 


 


Creatinine    8.36 mg/dl 


 


Est Creatinine Clear Calc


Drug Dose 


  


  


  9.8 ml/min 


 


 


Estimated GFR (


American) 


  


  


  5.8 


 


 


Estimated GFR (Non-


American 


  


  


  5.0 


 


 


BUN/Creatinine Ratio    7.3 


 


Random Glucose    83 mg/dl 


 


Calcium Level    9.8 mg/dl 


 


Test


  1/19/18


07:21 


  


  


 


 


Bedside Glucose 95 mg/dl    











Assessment & Plan


ESRD-for dialysis later today and on m/w/f schedule as clinical condition 

dictates. volume status good. pre-dialysis potassium is 4.4. volume status ok. 





Anemia of Renal Failure- last level 10.3. hg goal of 10 to 11. on procrit prn-

currently on hold.  





IMANI: on renvela 3 with meals and non-compliant with medications. phos was 

elevated on admission. encouraged compliance.





Foot Wound: spoke to patient about excision of mass on top of foot. patient 

expressed concerns about pain. Patient expressed understanding about risk of 

losing foot if site were to become infected. current cultures negative. also 

always concerned for risk of calciphylaxis in these patients. Defer to wound 

care team. with patient's known issues with compliance I am concerned that 

patient will not be able to maintain good wound care as an outpt. 





This patient was seen and treated with direct collaboration with Dr. Davison. 

Thank you for the opportunity to participate in this patient's care. Appreciate 

the Consult.





ATTENDING NOTE:


I performed a history and physical examination of the patient, including 

specifically on history- pt continues to have pain from the debridement of her 

foot,  on physical exam-decreased breath sounds at base, and my impression and 

plan are ESRD-for dialysis today, did get medical assistance approved so 

transportation should not be an issue. I have discussed the patient's 

management with Halina Diana PA-C, Please refer to above note for the 

documented findings and plan of care. 





Phil Davison DO

## 2018-01-19 NOTE — WOUND CONSULTATION: INPATIENT
Wound Consultation


Date of Consultation:


Jan 17, 2018.


Attending Physician:


Suzette Martinez DO


Reason for Consultation:


Lumps on the left foot and ankle


History of Present Illness


Patient states she's had swelling in both the left foot and left ankle for many 

weeks.  Patient states the ankle specifically is increased in size pain and 

redness.  Patient states has been no open ulcerations or active drainage from 

the sites.  Patient also stated she noticed a blackened area with a scab at the 

tip of her left middle finger over the past week.  Patient denies any drainage 

swelling or redness at this site.  Patient denies any current fever or chills.  

Patient was recently admitted for further evaluation and treatment of 

pneumonia.  Patient currently denies any chest pain or increased shortness of 

breath.  Patient denies any other systemic complaints at this time.  Patient is 

well-known to the wound center and myself having been seen on prior occasions 

for diabetic foot ulcerations.





Family History





Diabetes mellitus


  FATHER


  MOTHER


Heart disease


  FATHER


  MOTHER


Hypertension


  FATHER


  MOTHER


Kidney disease


  GRANDMOTHER





Social History


Smoking Status:  Former Smoker


Drug Use:  none


Marital Status:  


Housing Status:  lives with family


Occupation Status:  unemployed





Allergies


Coded Allergies:  


     Adhesives (Verified  Allergy, Mild, RASH, SORES, 1/4/18)


     NO KNOWN DRUG ALLERGIES (Verified  Allergy, Mild, ., 1/4/18)


     Latex1 -Allergic Contact Dermititis (Verified  Allergy, Unknown, rash, 1/4/ 18)


     Pollen Extract (Verified  Allergy, Unknown, WATERY EYES, 1/4/18)





Home Medications


Scheduled


Insulin Glargine (Lantus Solostar), 12 UNITS SQ HS


Loratadine (Claritin Childrens), 10 MG PO BID


Mupirocin 2% (Bactroban 2%), 1 APPLN EXT TID





Inpatient Medications





Current Inpatient Medications








 Medications


  (Trade)  Dose


 Ordered  Sig/Willy


 Route  Start Time


 Stop Time Status Last Admin


Dose Admin


 


 Insulin Aspart


  (novoLOG ASPART)  **SLIDING


 SCALE**


 **G...  ACHS


 SC  1/13/18 06:45


 2/12/18 06:59  1/19/18 13:04


1 UNITS


 


 Glucose


  (Glucose 40% Gel)  15-30


 GRAMS 15


 GRAMS...  UD  PRN


 PO  1/13/18 00:00


 2/12/18 00:00   


 


 


 Glucose


  (Glucose Chew


 Tab)  4-8


 Tablets 4


 Tabl...  UD  PRN


 PO  1/13/18 00:00


 2/12/18 00:00   


 


 


 Dextrose


  (Dextrose 50%


 50ML Syringe)  25-50ML OF


 50% DW IV


 FOR...  UD  PRN


 IV  1/13/18 00:00


 2/12/18 00:00   


 


 


 Glucagon


  (Glucagon Inj)  1 mg  UD  PRN


 SQ  1/13/18 00:00


 2/12/18 00:00   


 


 


 Naphazoline HCl/


 Pheniramine


 Maleate


  (Visine-A Oph


 Soln)  1 drops  DAILY


 OP  1/13/18 09:00


 2/12/18 08:59  1/19/18 07:58


1 DROPS


 


 Isosorbide


 Mononitrate


  (Imdur Ext Rel


 Tab)  60 mg  QAM


 PO  1/13/18 09:00


 2/12/18 08:59  1/18/18 09:04


60 MG


 


 Metoprolol


 Tartrate


  (Lopressor Tab)  50 mg  BID


 PO  1/13/18 09:00


 2/12/18 08:59  1/18/18 21:20


50 MG


 


 Insulin Glargine


  (Lantus Solostar


 Pen)  12 units  HS


 SC  1/13/18 21:00


 2/12/18 20:59  1/18/18 21:22


12 UNITS


 


 Sevelamer HCl


  (Renagel Tab)  2,400 mg  TIDM


 PO  1/13/18 11:30


 2/12/18 11:29  1/19/18 13:05


2,400 MG


 


 Acetaminophen


  (Tylenol Tab)  325 mg  Q4H  PRN


 PO  1/14/18 02:45


 2/13/18 02:44  1/16/18 02:22


325 MG


 


 Benzonatate


  (Tessalon Perles


 Cap)  100 mg  TID  PRN


 PO  1/14/18 18:15


 2/13/18 18:14  1/19/18 08:01


100 MG


 


 Al Hydroxide/Mg


 Trisilicate


  (Gaviscon Chew


 Tab)  1 tab  Q6  PRN


 PO  1/14/18 19:15


 2/13/18 19:14  1/17/18 00:38


1 TAB


 


 Lactobacillus


 Acidophilus


  (Floranex Tab)  4 tab  TIDM


 PO  1/15/18 08:00


 2/14/18 07:59  1/19/18 13:05


4 TAB


 


 Albuterol/


 Ipratropium


  (Duoneb)  3 ml  BIDR


 INH  1/15/18 20:00


 2/14/18 19:59  1/19/18 07:44


3 ML


 


 Ibuprofen


  (Advil Tab)  200 mg  Q6H  PRN


 PO  1/16/18 02:45


 2/15/18 02:44   


 


 


 Sertraline HCl


  (Zoloft Tab)  25 mg  HS


 PO  1/16/18 21:00


 2/15/18 20:59  1/18/18 21:19


25 MG


 


 Oxycodone/


 Acetaminophen


  (Percocet


 5-325mg Tab)  1 tab  Q6  PRN


 PO  1/16/18 12:00


 1/30/18 11:59  1/19/18 05:33


1 TAB











Physical Exam











  Date Time  Temp Pulse Resp B/P (MAP) Pulse Ox O2 Delivery O2 Flow Rate FiO2


 


1/19/18 08:00      Room Air  


 


1/19/18 07:52  75 16  90 Room Air  


 


1/19/18 07:49 36.6 72 16 144/89 (107) 94   


 


1/19/18 00:00      Room Air  


 


1/18/18 22:32 36.7 69 18 169/74 (105) 96 Room Air  


 


1/18/18 21:18  66  144/78 (100)    


 


1/18/18 19:08  85 16  94 Room Air  


 


1/18/18 16:00      Room Air  


 


1/18/18 15:45     92 Room Air  


 


1/18/18 15:34 36.5 64 16 127/68 (87) 92 Room Air  








General: The patient is lying in a hospital bed cooperative and appropriate to 

all questions.





HEENT: Pupils equal and reactive to light. Sclera clear, EOM intact. 





Neck:  Supple, No JVD noted





Chest:  CTA in all fields.  No deformity





Heart:  RRR without murmurs, S3, S4, thrills, rubs or heaves





Extremities:  A small eschar is noted at the tip of the left middle finger.  

There is no slough active drainage or swelling noted.  No erythema present.  

Range of motion is intact.  There is tenderness to palpation noted however.  

There are 2 masses noted on the left foot and ankle region 1 in the anterior 

dorsal aspect of the foot measuring approximately 2 cm in diameter.  No 

erythema is noted no significant pain present fluctuance is noted.  In the 

posterior ankle heel region there is an additional mass measuring approximately 

3 cm in diameter which is erythematous fluctuant and painful to palpation.  No 

ulceration or active drainage is noted at this site.  Pulses are intact 

bilaterally.





Neurological:  Alert and oriented x3.  No focal deficits. 





Skin: No rashes, papules, vesicles, excoriations





Laboratory Results





Last 24 Hours








Test


  1/18/18


16:36 1/18/18


19:27 1/19/18


06:09 1/19/18


07:21


 


Bedside Glucose 109 mg/dl  155 mg/dl   95 mg/dl 


 


White Blood Count   9.44 K/uL  


 


Red Blood Count   3.62 M/uL  


 


Hemoglobin   10.3 g/dL  


 


Hematocrit   34.6 %  


 


Mean Corpuscular Volume   95.6 fL  


 


Mean Corpuscular Hemoglobin   28.5 pg  


 


Mean Corpuscular Hemoglobin


Concent 


  


  29.8 g/dl 


  


 


 


RDW Standard Deviation   55.2 fL  


 


RDW Coefficient of Variation   16.0 %  


 


Platelet Count   339 K/uL  


 


Mean Platelet Volume   10.3 fL  


 


Sodium Level   137 mmol/L  


 


Potassium Level   4.4 mmol/L  


 


Chloride Level   102 mmol/L  


 


Carbon Dioxide Level   23 mmol/L  


 


Anion Gap   12.0 mmol/L  


 


Blood Urea Nitrogen   60 mg/dl  


 


Creatinine   8.36 mg/dl  


 


Est Creatinine Clear Calc


Drug Dose 


  


  9.8 ml/min 


  


 


 


Estimated GFR (


American) 


  


  5.8 


  


 


 


Estimated GFR (Non-


American 


  


  5.0 


  


 


 


BUN/Creatinine Ratio   7.3  


 


Random Glucose   83 mg/dl  


 


Calcium Level   9.8 mg/dl  


 


Test


  1/19/18


11:40 


  


  


 


 


Bedside Glucose 114 mg/dl    











Assessment & Plan


Assessment:   Abscess left ankle foot region


      Eschar third finger left hand





Plan:   At this time the abscess site on the left ankle did require incision 

and drainage.  With the patient's permission and the area was prepped with 

Betadine a infiltration of lidocaine 2% without epinephrine was made and a 

incision was placed with a #11 blade.  Approximately 20 mL of a yellowish-green 

purulent nonodorous material was expressed.  The site was subsequent packed 

with iodoform gauze outer dry dressing of gauze.  Patient did not wish the 

abscess on the foot area to be open today.  Patient be reevaluated in 48 hours 

for consideration of further incision and drainage of the foot abscess.  This 

represented a simple incision and drainage with incision of the left ankle.

## 2018-01-19 NOTE — PROGRESS NOTE
Subjective


Date of Service:


Jan 19, 2018.


Subjective


Pt evaluation today including:  conversation w/ patient, physical exam, lab 

review, review of studies, review of inpatient medication list


Saw/examined the patient in room 403


She is doing okay, refusing L foot debridement





Problem List


Medical Problems:


(1) Abdominal pain


Status: Acute  





(2) Acute electrocardiogram changes


Status: Acute  





(3) Back pain


Status: Acute  





(4) Cellulitis


Status: Acute  





(5) Cellulitis of left middle finger


Status: Acute  





(6) Chest pain


Status: Acute  





(7) Chronic kidney disease (CKD)


Status: Acute  





(8) Dialysis AV fistula malfunction


Status: Acute  





(9) Elevated troponin


Status: Acute  





(10) Elevated troponin


Status: Acute  





(11) Elevated troponin


Status: Acute  





(12) End stage renal disease


Status: Acute  





(13) GI bleed


Status: Acute  





(14) Hyperkalemia


Status: Acute  





(15) Hyperkalemia


Status: Acute  





(16) Hypertension


Status: Acute  





(17) Joint effusion


Status: Acute  





(18) Left elbow pain


Status: Acute  





(19) Left sided chest pain


Status: Acute  





(20) Left sided chest pain


Status: Acute  





(21) Limb ischemia


Status: Acute  





(22) Lumbar back pain


Status: Acute  





(23) Medical non-compliance


Status: Acute  





(24) Mid back pain on right side


Status: Acute  





(25) Musculoskeletal strain


Status: Acute  





(26) Obesity


Status: Acute  





(27) PNA (pneumonia)


Status: Acute  





(28) Pneumonia


Status: Acute  





(29) Pulmonary edema


Status: Acute  





(30) Renal failure


Status: Acute  





(31) Sinusitis


Status: Acute  





(32) Strain of lumbar region


Status: Acute  





(33) Substernal chest pain


Status: Acute  





(34) Tendinopathy of right shoulder


Status: Acute  





(35) Trochanteric bursitis, right hip


Status: Acute  





(36) Upper abdominal pain


Status: Acute  





(37) Upper GI bleed


Status: Acute  





(38) Uremia


Status: Acute  





(39) Urinary tract infection


Status: Acute  





(40) Vomiting


Status: Acute  





(41) Vomiting


Status: Acute  





(42) Vomiting


Status: Acute  





(43) Vomiting


Status: Acute  











Review of Systems


Constitutional:  + weakness


Respiratory:  No shortness of breath


Cardiac:  No chest pain


Musculoskeletal:  + joint pain


Heme:  No abnormal bleeding/bruising





Medications





Current Inpatient Medications








 Medications


  (Trade)  Dose


 Ordered  Sig/Wilyl


 Route  Start Time


 Stop Time Status Last Admin


Dose Admin


 


 Insulin Aspart


  (novoLOG ASPART)  **SLIDING


 SCALE**


 **G...  ACHS


 SC  1/13/18 06:45


 2/12/18 06:59  1/19/18 13:04


1 UNITS


 


 Glucose


  (Glucose 40% Gel)  15-30


 GRAMS 15


 GRAMS...  UD  PRN


 PO  1/13/18 00:00


 2/12/18 00:00   


 


 


 Glucose


  (Glucose Chew


 Tab)  4-8


 Tablets 4


 Tabl...  UD  PRN


 PO  1/13/18 00:00


 2/12/18 00:00   


 


 


 Dextrose


  (Dextrose 50%


 50ML Syringe)  25-50ML OF


 50% DW IV


 FOR...  UD  PRN


 IV  1/13/18 00:00


 2/12/18 00:00   


 


 


 Glucagon


  (Glucagon Inj)  1 mg  UD  PRN


 SQ  1/13/18 00:00


 2/12/18 00:00   


 


 


 Naphazoline HCl/


 Pheniramine


 Maleate


  (Visine-A Oph


 Soln)  1 drops  DAILY


 OP  1/13/18 09:00


 2/12/18 08:59  1/19/18 07:58


1 DROPS


 


 Isosorbide


 Mononitrate


  (Imdur Ext Rel


 Tab)  60 mg  QAM


 PO  1/13/18 09:00


 2/12/18 08:59  1/18/18 09:04


60 MG


 


 Metoprolol


 Tartrate


  (Lopressor Tab)  50 mg  BID


 PO  1/13/18 09:00


 2/12/18 08:59  1/18/18 21:20


50 MG


 


 Insulin Glargine


  (Lantus Solostar


 Pen)  12 units  HS


 SC  1/13/18 21:00


 2/12/18 20:59  1/18/18 21:22


12 UNITS


 


 Sevelamer HCl


  (Renagel Tab)  2,400 mg  TIDM


 PO  1/13/18 11:30


 2/12/18 11:29  1/19/18 18:21


2,400 MG


 


 Acetaminophen


  (Tylenol Tab)  325 mg  Q4H  PRN


 PO  1/14/18 02:45


 2/13/18 02:44  1/16/18 02:22


325 MG


 


 Benzonatate


  (Tessalon Perles


 Cap)  100 mg  TID  PRN


 PO  1/14/18 18:15


 2/13/18 18:14  1/19/18 08:01


100 MG


 


 Al Hydroxide/Mg


 Trisilicate


  (Gaviscon Chew


 Tab)  1 tab  Q6  PRN


 PO  1/14/18 19:15


 2/13/18 19:14  1/17/18 00:38


1 TAB


 


 Lactobacillus


 Acidophilus


  (Floranex Tab)  4 tab  TIDM


 PO  1/15/18 08:00


 2/14/18 07:59  1/19/18 18:18


4 TAB


 


 Albuterol/


 Ipratropium


  (Duoneb)  3 ml  BIDR


 INH  1/15/18 20:00


 2/14/18 19:59  1/19/18 07:44


3 ML


 


 Ibuprofen


  (Advil Tab)  200 mg  Q6H  PRN


 PO  1/16/18 02:45


 2/15/18 02:44   


 


 


 Sertraline HCl


  (Zoloft Tab)  25 mg  HS


 PO  1/16/18 21:00


 2/15/18 20:59  1/18/18 21:19


25 MG


 


 Oxycodone/


 Acetaminophen


  (Percocet


 5-325mg Tab)  1 tab  Q6  PRN


 PO  1/16/18 12:00


 1/30/18 11:59  1/19/18 05:33


1 TAB











Objective


Vital Signs











  Date Time  Temp Pulse Resp B/P (MAP) Pulse Ox O2 Delivery O2 Flow Rate FiO2


 


1/19/18 16:00      Room Air  


 


1/19/18 15:45 36.6 60 16 136/71 (92) 94   


 


1/19/18 08:00      Room Air  


 


1/19/18 07:52  75 16  90 Room Air  


 


1/19/18 07:49 36.6 72 16 144/89 (107) 94   


 


1/19/18 00:00      Room Air  


 


1/18/18 22:32 36.7 69 18 169/74 (105) 96 Room Air  


 


1/18/18 21:18  66  144/78 (100)    


 


1/18/18 19:08  85 16  94 Room Air  











Physical Exam


General Appearance:  no apparent distress


Extremities:  normal inspection, no pedal edema


Neurologic/Psychiatric:  no motor/sensory deficits, alert, normal mood/affect





Laboratory Results





Last 24 Hours








Test


  1/18/18


19:27 1/19/18


06:09 1/19/18


07:21 1/19/18


11:40


 


Bedside Glucose 155 mg/dl   95 mg/dl  114 mg/dl 


 


White Blood Count  9.44 K/uL   


 


Red Blood Count  3.62 M/uL   


 


Hemoglobin  10.3 g/dL   


 


Hematocrit  34.6 %   


 


Mean Corpuscular Volume  95.6 fL   


 


Mean Corpuscular Hemoglobin  28.5 pg   


 


Mean Corpuscular Hemoglobin


Concent 


  29.8 g/dl 


  


  


 


 


RDW Standard Deviation  55.2 fL   


 


RDW Coefficient of Variation  16.0 %   


 


Platelet Count  339 K/uL   


 


Mean Platelet Volume  10.3 fL   


 


Sodium Level  137 mmol/L   


 


Potassium Level  4.4 mmol/L   


 


Chloride Level  102 mmol/L   


 


Carbon Dioxide Level  23 mmol/L   


 


Anion Gap  12.0 mmol/L   


 


Blood Urea Nitrogen  60 mg/dl   


 


Creatinine  8.36 mg/dl   


 


Est Creatinine Clear Calc


Drug Dose 


  9.8 ml/min 


  


  


 


 


Estimated GFR (


American) 


  5.8 


  


  


 


 


Estimated GFR (Non-


American 


  5.0 


  


  


 


 


BUN/Creatinine Ratio  7.3   


 


Random Glucose  83 mg/dl   


 


Calcium Level  9.8 mg/dl   


 


Test


  1/19/18


16:36 


  


  


 


 


Bedside Glucose 103 mg/dl    











Assessment and Plan


This is a 52 year old female with a PMH of ESRD on HD, insulin dependent DM2, 

CAD s/p stents, ischemic cardiomyopathy, HLD, PVD, carotid artery stenosis s/p 

endarterectomy presents secondary to missed dialysis, shortness of breath, 

fluid overload





ESRD on HD


Ischemic Cardiomyopathy


Acute on Chronic Systolic CHF


1/19


clinically stable


she is off of her Levaquin for pneumonia now


no further debridement


cultures from wound are negative


plan for d/c in AM to home





1/18


continue HD M-W-F





1/17


plan today is for HD


had an I&D - cultures pending


Levaquin for pneumonia


nebs for SOB





1/16


plan to continue dialysis as per nephrology





1/15


patient missed three dialysis sessions


supposed to have dialysis M-W-F


 consulted for transportation to dialysis help


continued fluid removal with dialysis





L Lower Lobe Pneumonia


1/18


Levaquin for one more day





1/17


+cough, shortness of breath


CXR suggests LLL pneumonia


for now, she is on Levaquin, we can continue for 5-7 days


cultures pending





L Foot Cyst vs. Abscess


1/18


debridement as per wound care - to have another debridement in AM





1/16


wound care provider consulted for possible drainage





1/15


for now, continue quinolone


appreciate ID input, we will continue Levaquin for now


wound care consulted, may need this drained


monitor; PT/OT





HTN


secondary to missed dialysis


cont. Lopressor





Hyperkalemia - resolved


due to missed dialysis


electrolytes stabilized after consecutive HD tx





Insulin Dependent DM2


appreciate pharmacy glycemic control


Lantus and sliding scale





Depression


appreciate psych evaluation


started on Zoloft, which we can continue


outpatient PCP follow-up





DVT ppx


SCDs





FULL CODE

## 2018-01-19 NOTE — PSYCHIATRIC PROGRESS NOTES
Psychiatric Progress Note


Date of Service


Jan 19, 2018.





Notes


ID: Patient reviewed with liaison nurse. initial consult completed on 1/14/17. 

Started Zoloft


CC: "that zoloft helps"


HPI: patient has been cooperating with most care/procedures but refused 

dressing change per notes.  


ROS: denied SI/HI/huff   


MSE: alert, cooperative, affect more spontaneous, thoughts organized


Imp: as per initial consult


Plan: patient declines titration of Zoloft at this time.

## 2018-01-20 VITALS — DIASTOLIC BLOOD PRESSURE: 60 MMHG | SYSTOLIC BLOOD PRESSURE: 119 MMHG | HEART RATE: 70 BPM

## 2018-01-20 VITALS
HEART RATE: 80 BPM | DIASTOLIC BLOOD PRESSURE: 72 MMHG | SYSTOLIC BLOOD PRESSURE: 147 MMHG | OXYGEN SATURATION: 92 % | TEMPERATURE: 99.5 F

## 2018-01-20 VITALS — HEART RATE: 71 BPM | DIASTOLIC BLOOD PRESSURE: 76 MMHG | SYSTOLIC BLOOD PRESSURE: 150 MMHG | TEMPERATURE: 98.42 F

## 2018-01-20 VITALS — DIASTOLIC BLOOD PRESSURE: 76 MMHG | HEART RATE: 66 BPM | SYSTOLIC BLOOD PRESSURE: 141 MMHG

## 2018-01-20 VITALS — DIASTOLIC BLOOD PRESSURE: 57 MMHG | TEMPERATURE: 98.42 F | SYSTOLIC BLOOD PRESSURE: 110 MMHG | HEART RATE: 69 BPM

## 2018-01-20 VITALS — SYSTOLIC BLOOD PRESSURE: 112 MMHG | HEART RATE: 65 BPM | DIASTOLIC BLOOD PRESSURE: 61 MMHG

## 2018-01-20 VITALS — OXYGEN SATURATION: 94 % | HEART RATE: 73 BPM

## 2018-01-20 VITALS — HEART RATE: 63 BPM | DIASTOLIC BLOOD PRESSURE: 66 MMHG | SYSTOLIC BLOOD PRESSURE: 119 MMHG

## 2018-01-20 VITALS
TEMPERATURE: 98.78 F | HEART RATE: 70 BPM | OXYGEN SATURATION: 95 % | SYSTOLIC BLOOD PRESSURE: 122 MMHG | DIASTOLIC BLOOD PRESSURE: 75 MMHG

## 2018-01-20 VITALS
DIASTOLIC BLOOD PRESSURE: 69 MMHG | TEMPERATURE: 98.06 F | OXYGEN SATURATION: 90 % | HEART RATE: 75 BPM | SYSTOLIC BLOOD PRESSURE: 119 MMHG

## 2018-01-20 VITALS — DIASTOLIC BLOOD PRESSURE: 55 MMHG | SYSTOLIC BLOOD PRESSURE: 110 MMHG | HEART RATE: 68 BPM

## 2018-01-20 VITALS — DIASTOLIC BLOOD PRESSURE: 71 MMHG | SYSTOLIC BLOOD PRESSURE: 131 MMHG | HEART RATE: 66 BPM

## 2018-01-20 VITALS — HEART RATE: 69 BPM | OXYGEN SATURATION: 94 %

## 2018-01-20 VITALS — HEART RATE: 67 BPM | DIASTOLIC BLOOD PRESSURE: 66 MMHG | SYSTOLIC BLOOD PRESSURE: 123 MMHG

## 2018-01-20 VITALS — SYSTOLIC BLOOD PRESSURE: 134 MMHG | HEART RATE: 68 BPM | DIASTOLIC BLOOD PRESSURE: 75 MMHG

## 2018-01-20 VITALS — HEART RATE: 65 BPM | DIASTOLIC BLOOD PRESSURE: 65 MMHG | SYSTOLIC BLOOD PRESSURE: 117 MMHG

## 2018-01-20 VITALS — SYSTOLIC BLOOD PRESSURE: 123 MMHG | DIASTOLIC BLOOD PRESSURE: 65 MMHG | HEART RATE: 67 BPM

## 2018-01-20 VITALS — HEART RATE: 66 BPM | DIASTOLIC BLOOD PRESSURE: 65 MMHG | SYSTOLIC BLOOD PRESSURE: 111 MMHG

## 2018-01-20 VITALS — HEART RATE: 66 BPM | DIASTOLIC BLOOD PRESSURE: 66 MMHG | SYSTOLIC BLOOD PRESSURE: 115 MMHG

## 2018-01-20 LAB
BUN SERPL-MCNC: 83 MG/DL (ref 7–18)
CALCIUM SERPL-MCNC: 9.3 MG/DL (ref 8.5–10.1)
CO2 SERPL-SCNC: 21 MMOL/L (ref 21–32)
CREAT SERPL-MCNC: 9.61 MG/DL (ref 0.6–1.2)
EOSINOPHIL NFR BLD AUTO: 307 K/UL (ref 130–400)
GLUCOSE SERPL-MCNC: 110 MG/DL (ref 70–99)
HCT VFR BLD CALC: 31.4 % (ref 37–47)
HGB BLD-MCNC: 9.6 G/DL (ref 12–16)
MCH RBC QN AUTO: 28.7 PG (ref 25–34)
MCHC RBC AUTO-ENTMCNC: 30.6 G/DL (ref 32–36)
MCV RBC AUTO: 93.7 FL (ref 80–100)
PMV BLD AUTO: 10.2 FL (ref 7.4–10.4)
POTASSIUM SERPL-SCNC: 4.8 MMOL/L (ref 3.5–5.1)
RED CELL DISTRIBUTION WIDTH CV: 16.2 % (ref 11.5–14.5)
RED CELL DISTRIBUTION WIDTH SD: 55.6 FL (ref 36.4–46.3)
SODIUM SERPL-SCNC: 136 MMOL/L (ref 136–145)
WBC # BLD AUTO: 8.86 K/UL (ref 4.8–10.8)

## 2018-01-20 RX ADMIN — LACTOBACILLUS TAB SCH TAB: TAB at 17:58

## 2018-01-20 RX ADMIN — RENAGEL SCH MG: 800 TABLET ORAL at 08:12

## 2018-01-20 RX ADMIN — INSULIN ASPART SCH UNITS: 100 INJECTION, SOLUTION INTRAVENOUS; SUBCUTANEOUS at 17:58

## 2018-01-20 RX ADMIN — Medication PRN TAB: at 00:30

## 2018-01-20 RX ADMIN — METOPROLOL TARTRATE SCH MG: 50 TABLET, FILM COATED ORAL at 21:17

## 2018-01-20 RX ADMIN — NAPHAZOLINE HYDROCHLORIDE AND PHENIRAMINE MALEATE SCH DROPS: .25; 3 SOLUTION/ DROPS OPHTHALMIC at 08:12

## 2018-01-20 RX ADMIN — SERTRALINE HYDROCHLORIDE SCH MG: 50 TABLET, FILM COATED ORAL at 21:16

## 2018-01-20 RX ADMIN — METOPROLOL TARTRATE SCH MG: 50 TABLET, FILM COATED ORAL at 12:57

## 2018-01-20 RX ADMIN — BENZONATATE PRN MG: 100 CAPSULE ORAL at 08:11

## 2018-01-20 RX ADMIN — IPRATROPIUM BROMIDE AND ALBUTEROL SULFATE SCH ML: .5; 3 SOLUTION RESPIRATORY (INHALATION) at 19:36

## 2018-01-20 RX ADMIN — ISOSORBIDE MONONITRATE SCH MG: 60 TABLET ORAL at 12:57

## 2018-01-20 RX ADMIN — INSULIN ASPART SCH UNITS: 100 INJECTION, SOLUTION INTRAVENOUS; SUBCUTANEOUS at 21:00

## 2018-01-20 RX ADMIN — RENAGEL SCH MG: 800 TABLET ORAL at 17:58

## 2018-01-20 RX ADMIN — RENAGEL SCH MG: 800 TABLET ORAL at 12:56

## 2018-01-20 RX ADMIN — LACTOBACILLUS TAB SCH TAB: TAB at 08:10

## 2018-01-20 RX ADMIN — INSULIN GLARGINE SCH UNITS: 100 INJECTION, SOLUTION SUBCUTANEOUS at 21:22

## 2018-01-20 RX ADMIN — INSULIN ASPART SCH UNITS: 100 INJECTION, SOLUTION INTRAVENOUS; SUBCUTANEOUS at 08:14

## 2018-01-20 RX ADMIN — INSULIN ASPART SCH UNITS: 100 INJECTION, SOLUTION INTRAVENOUS; SUBCUTANEOUS at 14:09

## 2018-01-20 RX ADMIN — LACTOBACILLUS TAB SCH TAB: TAB at 12:57

## 2018-01-20 NOTE — PROGRESS NOTE
Subjective


Date of Service:


Jan 20, 2018.


Subjective


Pt evaluation today including:  conversation w/ patient, physical exam, lab 

review, review of studies, review of inpatient medication list


Saw/examined the patient in room 403


Very tired today


had dialysis earlier today


other than weakness/tired, she is doing better





Problem List


Medical Problems:


(1) Abdominal pain


Status: Acute  





(2) Acute electrocardiogram changes


Status: Acute  





(3) Back pain


Status: Acute  





(4) Cellulitis


Status: Acute  





(5) Cellulitis of left middle finger


Status: Acute  





(6) Chest pain


Status: Acute  





(7) Chronic kidney disease (CKD)


Status: Acute  





(8) Dialysis AV fistula malfunction


Status: Acute  





(9) Elevated troponin


Status: Acute  





(10) Elevated troponin


Status: Acute  





(11) Elevated troponin


Status: Acute  





(12) End stage renal disease


Status: Acute  





(13) GI bleed


Status: Acute  





(14) Hyperkalemia


Status: Acute  





(15) Hyperkalemia


Status: Acute  





(16) Hypertension


Status: Acute  





(17) Joint effusion


Status: Acute  





(18) Left elbow pain


Status: Acute  





(19) Left sided chest pain


Status: Acute  





(20) Left sided chest pain


Status: Acute  





(21) Limb ischemia


Status: Acute  





(22) Lumbar back pain


Status: Acute  





(23) Medical non-compliance


Status: Acute  





(24) Mid back pain on right side


Status: Acute  





(25) Musculoskeletal strain


Status: Acute  





(26) Obesity


Status: Acute  





(27) PNA (pneumonia)


Status: Acute  





(28) Pneumonia


Status: Acute  





(29) Pulmonary edema


Status: Acute  





(30) Renal failure


Status: Acute  





(31) Sinusitis


Status: Acute  





(32) Strain of lumbar region


Status: Acute  





(33) Substernal chest pain


Status: Acute  





(34) Tendinopathy of right shoulder


Status: Acute  





(35) Trochanteric bursitis, right hip


Status: Acute  





(36) Upper abdominal pain


Status: Acute  





(37) Upper GI bleed


Status: Acute  





(38) Uremia


Status: Acute  





(39) Urinary tract infection


Status: Acute  





(40) Vomiting


Status: Acute  





(41) Vomiting


Status: Acute  





(42) Vomiting


Status: Acute  





(43) Vomiting


Status: Acute  











Review of Systems


Constitutional:  + weakness, + fatigue, No fever, No chills


Respiratory:  No cough, No sputum, No shortness of breath


Cardiac:  No chest pain


Musculoskeletal:  + joint pain (L foot)





Medications





Current Inpatient Medications








 Medications


  (Trade)  Dose


 Ordered  Sig/Willy


 Route  Start Time


 Stop Time Status Last Admin


Dose Admin


 


 Insulin Aspart


  (novoLOG ASPART)  **SLIDING


 SCALE**


 **G...  ACHS


 SC  1/13/18 06:45


 2/12/18 06:59  1/19/18 20:49


3 UNITS


 


 Glucose


  (Glucose 40% Gel)  15-30


 GRAMS 15


 GRAMS...  UD  PRN


 PO  1/13/18 00:00


 2/12/18 00:00   


 


 


 Glucose


  (Glucose Chew


 Tab)  4-8


 Tablets 4


 Tabl...  UD  PRN


 PO  1/13/18 00:00


 2/12/18 00:00   


 


 


 Dextrose


  (Dextrose 50%


 50ML Syringe)  25-50ML OF


 50% DW IV


 FOR...  UD  PRN


 IV  1/13/18 00:00


 2/12/18 00:00   


 


 


 Glucagon


  (Glucagon Inj)  1 mg  UD  PRN


 SQ  1/13/18 00:00


 2/12/18 00:00   


 


 


 Naphazoline HCl/


 Pheniramine


 Maleate


  (Visine-A Oph


 Soln)  1 drops  DAILY


 OP  1/13/18 09:00


 2/12/18 08:59  1/20/18 08:12


1 DROPS


 


 Isosorbide


 Mononitrate


  (Imdur Ext Rel


 Tab)  60 mg  QAM


 PO  1/13/18 09:00


 2/12/18 08:59  1/18/18 09:04


60 MG


 


 Metoprolol


 Tartrate


  (Lopressor Tab)  50 mg  BID


 PO  1/13/18 09:00


 2/12/18 08:59  1/19/18 20:44


50 MG


 


 Insulin Glargine


  (Lantus Solostar


 Pen)  12 units  HS


 SC  1/13/18 21:00


 2/12/18 20:59  1/19/18 20:42


12 UNITS


 


 Sevelamer HCl


  (Renagel Tab)  2,400 mg  TIDM


 PO  1/13/18 11:30


 2/12/18 11:29  1/20/18 12:56


2,400 MG


 


 Acetaminophen


  (Tylenol Tab)  325 mg  Q4H  PRN


 PO  1/14/18 02:45


 2/13/18 02:44  1/16/18 02:22


325 MG


 


 Benzonatate


  (Tessalon Perles


 Cap)  100 mg  TID  PRN


 PO  1/14/18 18:15


 2/13/18 18:14  1/20/18 08:11


100 MG


 


 Al Hydroxide/Mg


 Trisilicate


  (Gaviscon Chew


 Tab)  1 tab  Q6  PRN


 PO  1/14/18 19:15


 2/13/18 19:14  1/20/18 00:30


1 TAB


 


 Lactobacillus


 Acidophilus


  (Floranex Tab)  4 tab  TIDM


 PO  1/15/18 08:00


 2/14/18 07:59  1/20/18 12:57


4 TAB


 


 Albuterol/


 Ipratropium


  (Duoneb)  3 ml  BIDR


 INH  1/15/18 20:00


 2/14/18 19:59  1/19/18 19:20


3 ML


 


 Ibuprofen


  (Advil Tab)  200 mg  Q6H  PRN


 PO  1/16/18 02:45


 2/15/18 02:44   


 


 


 Sertraline HCl


  (Zoloft Tab)  25 mg  HS


 PO  1/16/18 21:00


 2/15/18 20:59  1/19/18 20:43


25 MG


 


 Oxycodone/


 Acetaminophen


  (Percocet


 5-325mg Tab)  1 tab  Q6  PRN


 PO  1/16/18 12:00


 1/30/18 11:59  1/19/18 05:33


1 TAB











Objective


Vital Signs











  Date Time  Temp Pulse Resp B/P (MAP) Pulse Ox O2 Delivery O2 Flow Rate FiO2


 


1/20/18 12:32 36.9 69  110/57 (74)    


 


1/20/18 12:15  66  115/66    


 


1/20/18 12:00  70  119/60    


 


1/20/18 11:45  66  141/76    


 


1/20/18 11:30  63  119/66    


 


1/20/18 11:15  65  117/65    


 


1/20/18 11:07      Room Air  


 


1/20/18 11:00  67  123/65    


 


1/20/18 10:45  65  112/61    


 


1/20/18 10:30  67  123/66    


 


1/20/18 10:15  66  111/65    


 


1/20/18 10:00  68  110/55    


 


1/20/18 09:45  66  131/71    


 


1/20/18 09:30  68  134/75    


 


1/20/18 09:20 36.9 71  150/76 (100)    


 


1/20/18 07:48  69 16  94 Room Air  


 


1/20/18 07:46  69 16  94 Room Air  


 


1/20/18 07:03 37.1 70 17 122/75 (91) 95 Room Air  


 


1/20/18 00:00      Room Air  


 


1/19/18 23:29 37.1 75 20 138/68 (91) 95 Room Air  


 


1/19/18 20:00      Room Air  


 


1/19/18 19:20  72 16  96 Room Air  


 


1/19/18 16:00      Room Air  


 


1/19/18 15:45 36.6 60 16 136/71 (92) 94   











Physical Exam


General Appearance:  no apparent distress, + pertinent finding (lethargic/tired)


Respiratory/Chest:  lungs clear, normal breath sounds, no respiratory distress, 

no accessory muscle use


Cardiovascular:  regular rate, rhythm, no edema, no murmur


Extremities:  normal inspection, no pedal edema, + pertinent finding (+cyst on 

the L dorsal aspect of foot)





Laboratory Results





Last 24 Hours








Test


  1/19/18


16:36 1/19/18


20:23 1/20/18


06:19 1/20/18


07:45


 


Bedside Glucose 103 mg/dl  140 mg/dl   95 mg/dl 


 


White Blood Count   8.86 K/uL  


 


Red Blood Count   3.35 M/uL  


 


Hemoglobin   9.6 g/dL  


 


Hematocrit   31.4 %  


 


Mean Corpuscular Volume   93.7 fL  


 


Mean Corpuscular Hemoglobin   28.7 pg  


 


Mean Corpuscular Hemoglobin


Concent 


  


  30.6 g/dl 


  


 


 


RDW Standard Deviation   55.6 fL  


 


RDW Coefficient of Variation   16.2 %  


 


Platelet Count   307 K/uL  


 


Mean Platelet Volume   10.2 fL  


 


Sodium Level   136 mmol/L  


 


Potassium Level   4.8 mmol/L  


 


Chloride Level   101 mmol/L  


 


Carbon Dioxide Level   21 mmol/L  


 


Anion Gap   14.0 mmol/L  


 


Blood Urea Nitrogen   83 mg/dl  


 


Creatinine   9.61 mg/dl  


 


Est Creatinine Clear Calc


Drug Dose 


  


  8.8 ml/min 


  


 


 


Estimated GFR (


American) 


  


  4.9 


  


 


 


Estimated GFR (Non-


American 


  


  4.3 


  


 


 


BUN/Creatinine Ratio   8.6  


 


Random Glucose   110 mg/dl  


 


Calcium Level   9.3 mg/dl  


 


Test


  1/20/18


11:55 


  


  


 


 


Bedside Glucose 140 mg/dl    











Assessment and Plan


This is a 52 year old female with a PMH of ESRD on HD, insulin dependent DM2, 

CAD s/p stents, ischemic cardiomyopathy, HLD, PVD, carotid artery stenosis s/p 

endarterectomy presents secondary to missed dialysis, shortness of breath, 

fluid overload





ESRD on HD


Ischemic Cardiomyopathy


Acute on Chronic Systolic CHF


1/20


she received dialysis today


feeling tired/weak


she is now off of Levaquin and all antibiotics at this time


no abx. on discharge


appreciate case management input - she can receive free van rides to dialysis 

now


cont. Zoloft on discharge


d/c in AM (1/21)





1/19


clinically stable


she is off of her Levaquin for pneumonia now


no further debridement


cultures from wound are negative


plan for d/c in AM to home





1/18


continue HD M-W-F





1/17


plan today is for HD


had an I&D - cultures pending


Levaquin for pneumonia


nebs for SOB





1/16


plan to continue dialysis as per nephrology





1/15


patient missed three dialysis sessions


supposed to have dialysis M-W-F


 consulted for transportation to dialysis help


continued fluid removal with dialysis





L Lower Lobe Pneumonia


1/18


Levaquin for one more day





1/17


+cough, shortness of breath


CXR suggests LLL pneumonia


for now, she is on Levaquin, we can continue for 5-7 days


cultures pending





L Foot Cyst vs. Abscess


1/18


debridement as per wound care - to have another debridement in AM





1/16


wound care provider consulted for possible drainage





1/15


for now, continue quinolone


appreciate ID input, we will continue Levaquin for now


wound care consulted, may need this drained


monitor; PT/OT





HTN


secondary to missed dialysis


cont. Lopressor





Hyperkalemia - resolved


due to missed dialysis


electrolytes stabilized after consecutive HD tx





Insulin Dependent DM2


appreciate pharmacy glycemic control


Lantus and sliding scale





Depression


appreciate psych evaluation


started on Zoloft, which we can continue


outpatient PCP follow-up





DVT ppx


SCDs





FULL CODE

## 2018-01-20 NOTE — NEPHROLOGY PROGRESS NOTE
Nephrology Progress Note


Date of Service:


Jan 20, 2018.


Subjective


seen on rounds at 1400; c/o marked fatigue.  tolerated HD earlier today w/ 1L 

UF.  has outpt rides arranged to hd and for outpt further debridement





Objective











  Date Time  Temp Pulse Resp B/P (MAP) Pulse Ox O2 Delivery O2 Flow Rate FiO2


 


1/20/18 12:32 36.9 69  110/57 (74)    


 


1/20/18 12:15  66  115/66    


 


1/20/18 12:00  70  119/60    


 


1/20/18 11:45  66  141/76    


 


1/20/18 11:30  63  119/66    


 


1/20/18 11:15  65  117/65    


 


1/20/18 11:07      Room Air  


 


1/20/18 11:00  67  123/65    


 


1/20/18 10:45  65  112/61    


 


1/20/18 10:30  67  123/66    


 


1/20/18 10:15  66  111/65    


 


1/20/18 10:00  68  110/55    


 


1/20/18 09:45  66  131/71    


 


1/20/18 09:30  68  134/75    


 


1/20/18 09:20 36.9 71  150/76 (100)    


 


1/20/18 07:48  69 16  94 Room Air  


 


1/20/18 07:46  69 16  94 Room Air  


 


1/20/18 07:03 37.1 70 17 122/75 (91) 95 Room Air  


 


1/20/18 00:00      Room Air  


 


1/19/18 23:29 37.1 75 20 138/68 (91) 95 Room Air  


 


1/19/18 20:00      Room Air  


 


1/19/18 19:20  72 16  96 Room Air  


 


1/19/18 16:00      Room Air  


 


1/19/18 15:45 36.6 60 16 136/71 (92) 94   








Physical Exam:


General-aaox3, obese, lying flat on RA


Eyes-no scleral icterus


ENT-mmm


Neck-supple


Lungs-diminished throughout


Heart-regular r/r


Abdomen-bs+ s/nt/nd


Extremities-no edema, L foot wound +necrotic finger


Neuro-centeno, fluent speech





Current Inpatient Medications








 Medications


  (Trade)  Dose


 Ordered  Sig/Willy


 Route  Start Time


 Stop Time Status Last Admin


Dose Admin


 


 Insulin Aspart


  (novoLOG ASPART)  **SLIDING


 SCALE**


 **G...  ACHS


 SC  1/13/18 06:45


 2/12/18 06:59  1/19/18 20:49


3 UNITS


 


 Glucose


  (Glucose 40% Gel)  15-30


 GRAMS 15


 GRAMS...  UD  PRN


 PO  1/13/18 00:00


 2/12/18 00:00   


 


 


 Glucose


  (Glucose Chew


 Tab)  4-8


 Tablets 4


 Tabl...  UD  PRN


 PO  1/13/18 00:00


 2/12/18 00:00   


 


 


 Dextrose


  (Dextrose 50%


 50ML Syringe)  25-50ML OF


 50% DW IV


 FOR...  UD  PRN


 IV  1/13/18 00:00


 2/12/18 00:00   


 


 


 Glucagon


  (Glucagon Inj)  1 mg  UD  PRN


 SQ  1/13/18 00:00


 2/12/18 00:00   


 


 


 Naphazoline HCl/


 Pheniramine


 Maleate


  (Visine-A Oph


 Soln)  1 drops  DAILY


 OP  1/13/18 09:00


 2/12/18 08:59  1/20/18 08:12


1 DROPS


 


 Isosorbide


 Mononitrate


  (Imdur Ext Rel


 Tab)  60 mg  QAM


 PO  1/13/18 09:00


 2/12/18 08:59  1/18/18 09:04


60 MG


 


 Metoprolol


 Tartrate


  (Lopressor Tab)  50 mg  BID


 PO  1/13/18 09:00


 2/12/18 08:59  1/19/18 20:44


50 MG


 


 Insulin Glargine


  (Lantus Solostar


 Pen)  12 units  HS


 SC  1/13/18 21:00


 2/12/18 20:59  1/19/18 20:42


12 UNITS


 


 Sevelamer HCl


  (Renagel Tab)  2,400 mg  TIDM


 PO  1/13/18 11:30


 2/12/18 11:29  1/20/18 12:56


2,400 MG


 


 Acetaminophen


  (Tylenol Tab)  325 mg  Q4H  PRN


 PO  1/14/18 02:45


 2/13/18 02:44  1/16/18 02:22


325 MG


 


 Benzonatate


  (Tessalon Perles


 Cap)  100 mg  TID  PRN


 PO  1/14/18 18:15


 2/13/18 18:14  1/20/18 08:11


100 MG


 


 Al Hydroxide/Mg


 Trisilicate


  (Gaviscon Chew


 Tab)  1 tab  Q6  PRN


 PO  1/14/18 19:15


 2/13/18 19:14  1/20/18 00:30


1 TAB


 


 Lactobacillus


 Acidophilus


  (Floranex Tab)  4 tab  TIDM


 PO  1/15/18 08:00


 2/14/18 07:59  1/20/18 12:57


4 TAB


 


 Albuterol/


 Ipratropium


  (Duoneb)  3 ml  BIDR


 INH  1/15/18 20:00


 2/14/18 19:59  1/19/18 19:20


3 ML


 


 Sertraline HCl


  (Zoloft Tab)  25 mg  HS


 PO  1/16/18 21:00


 2/15/18 20:59  1/19/18 20:43


25 MG


 


 Oxycodone/


 Acetaminophen


  (Percocet


 5-325mg Tab)  1 tab  Q6  PRN


 PO  1/16/18 12:00


 1/30/18 11:59  1/19/18 05:33


1 TAB











Last 24 Hours








Test


  1/19/18


16:36 1/19/18


20:23 1/20/18


06:19 1/20/18


07:45


 


Bedside Glucose 103 mg/dl  140 mg/dl   95 mg/dl 


 


White Blood Count   8.86 K/uL  


 


Red Blood Count   3.35 M/uL  


 


Hemoglobin   9.6 g/dL  


 


Hematocrit   31.4 %  


 


Mean Corpuscular Volume   93.7 fL  


 


Mean Corpuscular Hemoglobin   28.7 pg  


 


Mean Corpuscular Hemoglobin


Concent 


  


  30.6 g/dl 


  


 


 


RDW Standard Deviation   55.6 fL  


 


RDW Coefficient of Variation   16.2 %  


 


Platelet Count   307 K/uL  


 


Mean Platelet Volume   10.2 fL  


 


Sodium Level   136 mmol/L  


 


Potassium Level   4.8 mmol/L  


 


Chloride Level   101 mmol/L  


 


Carbon Dioxide Level   21 mmol/L  


 


Anion Gap   14.0 mmol/L  


 


Blood Urea Nitrogen   83 mg/dl  


 


Creatinine   9.61 mg/dl  


 


Est Creatinine Clear Calc


Drug Dose 


  


  8.8 ml/min 


  


 


 


Estimated GFR (


American) 


  


  4.9 


  


 


 


Estimated GFR (Non-


American 


  


  4.3 


  


 


 


BUN/Creatinine Ratio   8.6  


 


Random Glucose   110 mg/dl  


 


Calcium Level   9.3 mg/dl  


 


Test


  1/20/18


11:55 


  


  


 


 


Bedside Glucose 140 mg/dl    











Assessment & Plan


51 y/o F w/ ESRD on MWF HD and loss of health care access prior to admission 

and with chronic wounds





ESRD


-next HD on 1/22 as outpt or inpt, likelier the former; has transportation; 

then MWF per routine





Anemia of Renal Failure- hgb <10 today >> to resume epo next tx





IMANI: on renvela 3 with meals; cont to educate importance of adherence 





Foot Wound/mass for which she is resisting plan for excision: not directly 

discussed today; very important for close f/u plan w/ wound care after d/c d/t 

high risk of infection and past nonadherence.  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 

calciphylaxis

## 2018-01-21 VITALS — HEART RATE: 73 BPM | DIASTOLIC BLOOD PRESSURE: 67 MMHG | SYSTOLIC BLOOD PRESSURE: 136 MMHG

## 2018-01-21 VITALS — HEART RATE: 72 BPM | OXYGEN SATURATION: 94 %

## 2018-01-21 VITALS — OXYGEN SATURATION: 90 % | HEART RATE: 70 BPM

## 2018-01-21 VITALS
SYSTOLIC BLOOD PRESSURE: 135 MMHG | OXYGEN SATURATION: 95 % | DIASTOLIC BLOOD PRESSURE: 76 MMHG | TEMPERATURE: 98.6 F | HEART RATE: 78 BPM

## 2018-01-21 VITALS
OXYGEN SATURATION: 93 % | SYSTOLIC BLOOD PRESSURE: 130 MMHG | HEART RATE: 69 BPM | TEMPERATURE: 98.42 F | DIASTOLIC BLOOD PRESSURE: 66 MMHG

## 2018-01-21 VITALS
DIASTOLIC BLOOD PRESSURE: 82 MMHG | HEART RATE: 74 BPM | OXYGEN SATURATION: 93 % | TEMPERATURE: 98.24 F | SYSTOLIC BLOOD PRESSURE: 151 MMHG

## 2018-01-21 RX ADMIN — RENAGEL SCH MG: 800 TABLET ORAL at 13:00

## 2018-01-21 RX ADMIN — IPRATROPIUM BROMIDE AND ALBUTEROL SULFATE SCH ML: .5; 3 SOLUTION RESPIRATORY (INHALATION) at 07:27

## 2018-01-21 RX ADMIN — LACTOBACILLUS TAB SCH TAB: TAB at 13:00

## 2018-01-21 RX ADMIN — INSULIN ASPART SCH UNITS: 100 INJECTION, SOLUTION INTRAVENOUS; SUBCUTANEOUS at 08:15

## 2018-01-21 RX ADMIN — METOPROLOL TARTRATE SCH MG: 50 TABLET, FILM COATED ORAL at 22:21

## 2018-01-21 RX ADMIN — BENZONATATE PRN MG: 100 CAPSULE ORAL at 08:14

## 2018-01-21 RX ADMIN — LACTOBACILLUS TAB SCH TAB: TAB at 16:51

## 2018-01-21 RX ADMIN — NAPHAZOLINE HYDROCHLORIDE AND PHENIRAMINE MALEATE SCH DROPS: .25; 3 SOLUTION/ DROPS OPHTHALMIC at 08:14

## 2018-01-21 RX ADMIN — SERTRALINE HYDROCHLORIDE SCH MG: 50 TABLET, FILM COATED ORAL at 22:21

## 2018-01-21 RX ADMIN — METOPROLOL TARTRATE SCH MG: 50 TABLET, FILM COATED ORAL at 08:14

## 2018-01-21 RX ADMIN — INSULIN ASPART SCH UNITS: 100 INJECTION, SOLUTION INTRAVENOUS; SUBCUTANEOUS at 12:59

## 2018-01-21 RX ADMIN — LACTOBACILLUS TAB SCH TAB: TAB at 08:14

## 2018-01-21 RX ADMIN — RENAGEL SCH MG: 800 TABLET ORAL at 16:51

## 2018-01-21 RX ADMIN — RENAGEL SCH MG: 800 TABLET ORAL at 08:14

## 2018-01-21 RX ADMIN — INSULIN ASPART SCH UNITS: 100 INJECTION, SOLUTION INTRAVENOUS; SUBCUTANEOUS at 21:00

## 2018-01-21 RX ADMIN — INSULIN ASPART SCH UNITS: 100 INJECTION, SOLUTION INTRAVENOUS; SUBCUTANEOUS at 17:41

## 2018-01-21 RX ADMIN — IPRATROPIUM BROMIDE AND ALBUTEROL SULFATE SCH ML: .5; 3 SOLUTION RESPIRATORY (INHALATION) at 20:30

## 2018-01-21 RX ADMIN — INSULIN GLARGINE SCH UNITS: 100 INJECTION, SOLUTION SUBCUTANEOUS at 22:23

## 2018-01-21 RX ADMIN — ISOSORBIDE MONONITRATE SCH MG: 60 TABLET ORAL at 08:15

## 2018-01-21 NOTE — PROGRESS NOTE
Subjective


Date of Service:


Jan 21, 2018.


Subjective


Pt evaluation today including:  conversation w/ patient, physical exam, lab 

review, review of studies, review of inpatient medication list


Saw/examined the patient in room 403


She is doing well today


had dialysis yesterday


still tired, but doing better; tolerating PO diet, no other issues at this time.





Problem List


Medical Problems:


(1) Abdominal pain


Status: Acute  





(2) Acute electrocardiogram changes


Status: Acute  





(3) Back pain


Status: Acute  





(4) Cellulitis


Status: Acute  





(5) Cellulitis of left middle finger


Status: Acute  





(6) Chest pain


Status: Acute  





(7) Chronic kidney disease (CKD)


Status: Acute  





(8) Dialysis AV fistula malfunction


Status: Acute  





(9) Elevated troponin


Status: Acute  





(10) Elevated troponin


Status: Acute  





(11) Elevated troponin


Status: Acute  





(12) End stage renal disease


Status: Acute  





(13) GI bleed


Status: Acute  





(14) Hyperkalemia


Status: Acute  





(15) Hyperkalemia


Status: Acute  





(16) Hypertension


Status: Acute  





(17) Joint effusion


Status: Acute  





(18) Left elbow pain


Status: Acute  





(19) Left sided chest pain


Status: Acute  





(20) Left sided chest pain


Status: Acute  





(21) Limb ischemia


Status: Acute  





(22) Lumbar back pain


Status: Acute  





(23) Medical non-compliance


Status: Acute  





(24) Mid back pain on right side


Status: Acute  





(25) Musculoskeletal strain


Status: Acute  





(26) Obesity


Status: Acute  





(27) PNA (pneumonia)


Status: Acute  





(28) Pneumonia


Status: Acute  





(29) Pulmonary edema


Status: Acute  





(30) Renal failure


Status: Acute  





(31) Sinusitis


Status: Acute  





(32) Strain of lumbar region


Status: Acute  





(33) Substernal chest pain


Status: Acute  





(34) Tendinopathy of right shoulder


Status: Acute  





(35) Trochanteric bursitis, right hip


Status: Acute  





(36) Upper abdominal pain


Status: Acute  





(37) Upper GI bleed


Status: Acute  





(38) Uremia


Status: Acute  





(39) Urinary tract infection


Status: Acute  





(40) Vomiting


Status: Acute  





(41) Vomiting


Status: Acute  





(42) Vomiting


Status: Acute  





(43) Vomiting


Status: Acute  











Review of Systems


Constitutional:  + weakness, No fever, No chills


Respiratory:  No cough, No sputum, No shortness of breath


Cardiac:  No chest pain, No edema, No palpitations


Female :  No dysuria, No urinary frequency





Medications





Current Inpatient Medications








 Medications


  (Trade)  Dose


 Ordered  Sig/Willy


 Route  Start Time


 Stop Time Status Last Admin


Dose Admin


 


 Insulin Aspart


  (novoLOG ASPART)  **SLIDING


 SCALE**


 **G...  ACHS


 SC  1/13/18 06:45


 2/12/18 06:59  1/19/18 20:49


3 UNITS


 


 Glucose


  (Glucose 40% Gel)  15-30


 GRAMS 15


 GRAMS...  UD  PRN


 PO  1/13/18 00:00


 2/12/18 00:00   


 


 


 Glucose


  (Glucose Chew


 Tab)  4-8


 Tablets 4


 Tabl...  UD  PRN


 PO  1/13/18 00:00


 2/12/18 00:00   


 


 


 Dextrose


  (Dextrose 50%


 50ML Syringe)  25-50ML OF


 50% DW IV


 FOR...  UD  PRN


 IV  1/13/18 00:00


 2/12/18 00:00   


 


 


 Glucagon


  (Glucagon Inj)  1 mg  UD  PRN


 SQ  1/13/18 00:00


 2/12/18 00:00   


 


 


 Naphazoline HCl/


 Pheniramine


 Maleate


  (Visine-A Oph


 Soln)  1 drops  DAILY


 OP  1/13/18 09:00


 2/12/18 08:59  1/21/18 08:14


1 DROPS


 


 Isosorbide


 Mononitrate


  (Imdur Ext Rel


 Tab)  60 mg  QAM


 PO  1/13/18 09:00


 2/12/18 08:59  1/21/18 08:15


60 MG


 


 Metoprolol


 Tartrate


  (Lopressor Tab)  50 mg  BID


 PO  1/13/18 09:00


 2/12/18 08:59  1/21/18 08:14


50 MG


 


 Insulin Glargine


  (Lantus Solostar


 Pen)  12 units  HS


 SC  1/13/18 21:00


 2/12/18 20:59  1/20/18 21:22


12 UNITS


 


 Sevelamer HCl


  (Renagel Tab)  2,400 mg  TIDM


 PO  1/13/18 11:30


 2/12/18 11:29  1/21/18 08:14


2,400 MG


 


 Acetaminophen


  (Tylenol Tab)  325 mg  Q4H  PRN


 PO  1/14/18 02:45


 2/13/18 02:44  1/16/18 02:22


325 MG


 


 Benzonatate


  (Tessalon Perles


 Cap)  100 mg  TID  PRN


 PO  1/14/18 18:15


 2/13/18 18:14  1/21/18 08:14


100 MG


 


 Al Hydroxide/Mg


 Trisilicate


  (Gaviscon Chew


 Tab)  1 tab  Q6  PRN


 PO  1/14/18 19:15


 2/13/18 19:14  1/20/18 00:30


1 TAB


 


 Lactobacillus


 Acidophilus


  (Floranex Tab)  4 tab  TIDM


 PO  1/15/18 08:00


 2/14/18 07:59  1/21/18 08:14


4 TAB


 


 Albuterol/


 Ipratropium


  (Duoneb)  3 ml  BIDR


 INH  1/15/18 20:00


 2/14/18 19:59  1/21/18 07:27


3 ML


 


 Sertraline HCl


  (Zoloft Tab)  25 mg  HS


 PO  1/16/18 21:00


 2/15/18 20:59  1/20/18 21:16


25 MG


 


 Oxycodone/


 Acetaminophen


  (Percocet


 5-325mg Tab)  1 tab  Q6  PRN


 PO  1/16/18 12:00


 1/30/18 11:59  1/19/18 05:33


1 TAB











Objective


Vital Signs











  Date Time  Temp Pulse Resp B/P (MAP) Pulse Ox O2 Delivery O2 Flow Rate FiO2


 


1/21/18 09:16      Room Air  


 


1/21/18 07:39 36.8 74 18 151/82 (105) 93 Room Air  


 


1/21/18 07:27  72 16  94 Room Air  


 


1/21/18 01:00      Room Air  


 


1/20/18 23:10 37.5 80 20 147/72 (97) 92 Room Air  


 


1/20/18 21:00      Room Air  


 


1/20/18 19:37  73 16  94 Room Air  


 


1/20/18 15:37 36.7 75 18 119/69 (86) 90 Room Air  


 


1/20/18 15:35      Room Air  











Physical Exam


General Appearance:  no apparent distress


Respiratory/Chest:  no respiratory distress, no accessory muscle use


Cardiovascular:  regular rate, rhythm, no edema, no murmur


Extremities:  normal inspection, no pedal edema


Neurologic/Psychiatric:  no motor/sensory deficits, alert, normal mood/affect





Laboratory Results





Last 24 Hours








Test


  1/20/18


16:31 1/20/18


20:19 1/21/18


07:42 1/21/18


11:38


 


Bedside Glucose 142 mg/dl  107 mg/dl  78 mg/dl  158 mg/dl 











Assessment and Plan


This is a 52 year old female with a PMH of ESRD on HD, insulin dependent DM2, 

CAD s/p stents, ischemic cardiomyopathy, HLD, PVD, carotid artery stenosis s/p 

endarterectomy presents secondary to missed dialysis, shortness of breath, 

fluid overload





ESRD on HD


Ischemic Cardiomyopathy


Acute on Chronic Systolic CHF


1/21


patient is doing well


she will continue M-W-F dialysis as outpatient


as per  she will get van rides for free to and from dialysis





1/20


she received dialysis today


feeling tired/weak


she is now off of Levaquin and all antibiotics at this time


no abx. on discharge


appreciate case management input - she can receive free van rides to dialysis 

now


cont. Zoloft on discharge


d/c in AM (1/21)





1/19


clinically stable


she is off of her Levaquin for pneumonia now


no further debridement


cultures from wound are negative


plan for d/c in AM to home





1/18


continue HD M-W-F





1/17


plan today is for HD


had an I&D - cultures pending


Levaquin for pneumonia


nebs for SOB





1/16


plan to continue dialysis as per nephrology





1/15


patient missed three dialysis sessions


supposed to have dialysis M-W-F


 consulted for transportation to dialysis help


continued fluid removal with dialysis





L Lower Lobe Pneumonia


1/21


all abx. have been stopped at this time





1/18


Levaquin for one more day





1/17


+cough, shortness of breath


CXR suggests LLL pneumonia


for now, she is on Levaquin, we can continue for 5-7 days


cultures pending





L Foot Cyst vs. Abscess


1/21


outpatient wound care follow-up


no need for further abx. - cultures have been negative





1/18


debridement as per wound care - to have another debridement in AM





1/16


wound care provider consulted for possible drainage





1/15


for now, continue quinolone


appreciate ID input, we will continue Levaquin for now


wound care consulted, may need this drained


monitor; PT/OT





HTN


secondary to missed dialysis


cont. Lopressor





Hyperkalemia - resolved


due to missed dialysis


electrolytes stabilized after consecutive HD tx





Insulin Dependent DM2


appreciate pharmacy glycemic control


Lantus and sliding scale





Depression


appreciate psych evaluation


started on Zoloft, which we can continue


outpatient PCP follow-up





DVT ppx


SCDs





FULL CODE

## 2018-01-22 VITALS — HEART RATE: 59 BPM | SYSTOLIC BLOOD PRESSURE: 125 MMHG | DIASTOLIC BLOOD PRESSURE: 65 MMHG

## 2018-01-22 VITALS — SYSTOLIC BLOOD PRESSURE: 130 MMHG | HEART RATE: 60 BPM | DIASTOLIC BLOOD PRESSURE: 66 MMHG

## 2018-01-22 VITALS — SYSTOLIC BLOOD PRESSURE: 137 MMHG | HEART RATE: 63 BPM | DIASTOLIC BLOOD PRESSURE: 70 MMHG

## 2018-01-22 VITALS
OXYGEN SATURATION: 92 % | TEMPERATURE: 98.6 F | SYSTOLIC BLOOD PRESSURE: 128 MMHG | HEART RATE: 64 BPM | DIASTOLIC BLOOD PRESSURE: 71 MMHG

## 2018-01-22 VITALS — SYSTOLIC BLOOD PRESSURE: 138 MMHG | DIASTOLIC BLOOD PRESSURE: 78 MMHG | HEART RATE: 66 BPM

## 2018-01-22 VITALS — HEART RATE: 65 BPM | DIASTOLIC BLOOD PRESSURE: 60 MMHG | SYSTOLIC BLOOD PRESSURE: 121 MMHG

## 2018-01-22 VITALS — HEART RATE: 68 BPM | OXYGEN SATURATION: 96 %

## 2018-01-22 VITALS — OXYGEN SATURATION: 96 % | HEART RATE: 66 BPM

## 2018-01-22 VITALS — HEART RATE: 57 BPM | SYSTOLIC BLOOD PRESSURE: 123 MMHG | DIASTOLIC BLOOD PRESSURE: 61 MMHG

## 2018-01-22 VITALS — SYSTOLIC BLOOD PRESSURE: 115 MMHG | HEART RATE: 57 BPM | DIASTOLIC BLOOD PRESSURE: 61 MMHG

## 2018-01-22 VITALS — HEART RATE: 66 BPM | DIASTOLIC BLOOD PRESSURE: 78 MMHG | SYSTOLIC BLOOD PRESSURE: 139 MMHG | TEMPERATURE: 98.6 F

## 2018-01-22 VITALS
OXYGEN SATURATION: 94 % | DIASTOLIC BLOOD PRESSURE: 70 MMHG | TEMPERATURE: 98.6 F | SYSTOLIC BLOOD PRESSURE: 130 MMHG | HEART RATE: 64 BPM

## 2018-01-22 VITALS — HEART RATE: 60 BPM | SYSTOLIC BLOOD PRESSURE: 128 MMHG | DIASTOLIC BLOOD PRESSURE: 65 MMHG

## 2018-01-22 VITALS — DIASTOLIC BLOOD PRESSURE: 57 MMHG | SYSTOLIC BLOOD PRESSURE: 103 MMHG | HEART RATE: 56 BPM

## 2018-01-22 VITALS — DIASTOLIC BLOOD PRESSURE: 69 MMHG | SYSTOLIC BLOOD PRESSURE: 130 MMHG | HEART RATE: 60 BPM

## 2018-01-22 VITALS — SYSTOLIC BLOOD PRESSURE: 133 MMHG | HEART RATE: 65 BPM | DIASTOLIC BLOOD PRESSURE: 67 MMHG | TEMPERATURE: 98.78 F

## 2018-01-22 VITALS — SYSTOLIC BLOOD PRESSURE: 130 MMHG | DIASTOLIC BLOOD PRESSURE: 69 MMHG | HEART RATE: 60 BPM

## 2018-01-22 VITALS — HEART RATE: 60 BPM | DIASTOLIC BLOOD PRESSURE: 70 MMHG | SYSTOLIC BLOOD PRESSURE: 124 MMHG

## 2018-01-22 VITALS — DIASTOLIC BLOOD PRESSURE: 62 MMHG | SYSTOLIC BLOOD PRESSURE: 115 MMHG | HEART RATE: 59 BPM

## 2018-01-22 VITALS — SYSTOLIC BLOOD PRESSURE: 124 MMHG | HEART RATE: 61 BPM | DIASTOLIC BLOOD PRESSURE: 67 MMHG

## 2018-01-22 VITALS
TEMPERATURE: 98.42 F | OXYGEN SATURATION: 96 % | DIASTOLIC BLOOD PRESSURE: 84 MMHG | SYSTOLIC BLOOD PRESSURE: 145 MMHG | HEART RATE: 66 BPM

## 2018-01-22 VITALS — SYSTOLIC BLOOD PRESSURE: 123 MMHG | DIASTOLIC BLOOD PRESSURE: 66 MMHG | HEART RATE: 60 BPM

## 2018-01-22 VITALS — OXYGEN SATURATION: 94 %

## 2018-01-22 LAB — TRANSFERRIN SERPL-MCNC: 150 MG/DL (ref 200–360)

## 2018-01-22 RX ADMIN — ISOSORBIDE MONONITRATE SCH MG: 60 TABLET ORAL at 09:00

## 2018-01-22 RX ADMIN — NAPHAZOLINE HYDROCHLORIDE AND PHENIRAMINE MALEATE SCH DROPS: .25; 3 SOLUTION/ DROPS OPHTHALMIC at 09:00

## 2018-01-22 RX ADMIN — SERTRALINE HYDROCHLORIDE SCH MG: 50 TABLET, FILM COATED ORAL at 20:46

## 2018-01-22 RX ADMIN — IPRATROPIUM BROMIDE AND ALBUTEROL SULFATE SCH ML: .5; 3 SOLUTION RESPIRATORY (INHALATION) at 19:07

## 2018-01-22 RX ADMIN — METOPROLOL TARTRATE SCH MG: 50 TABLET, FILM COATED ORAL at 20:45

## 2018-01-22 RX ADMIN — RENAGEL SCH MG: 800 TABLET ORAL at 18:35

## 2018-01-22 RX ADMIN — LACTOBACILLUS TAB SCH TAB: TAB at 12:38

## 2018-01-22 RX ADMIN — BENZONATATE PRN MG: 100 CAPSULE ORAL at 18:35

## 2018-01-22 RX ADMIN — RENAGEL SCH MG: 800 TABLET ORAL at 12:38

## 2018-01-22 RX ADMIN — LACTOBACILLUS TAB SCH TAB: TAB at 08:58

## 2018-01-22 RX ADMIN — RENAGEL SCH MG: 800 TABLET ORAL at 08:59

## 2018-01-22 RX ADMIN — INSULIN ASPART SCH UNITS: 100 INJECTION, SOLUTION INTRAVENOUS; SUBCUTANEOUS at 20:47

## 2018-01-22 RX ADMIN — Medication PRN TAB: at 00:55

## 2018-01-22 RX ADMIN — IPRATROPIUM BROMIDE AND ALBUTEROL SULFATE SCH ML: .5; 3 SOLUTION RESPIRATORY (INHALATION) at 07:40

## 2018-01-22 RX ADMIN — LACTOBACILLUS TAB SCH TAB: TAB at 18:36

## 2018-01-22 RX ADMIN — INSULIN ASPART SCH UNITS: 100 INJECTION, SOLUTION INTRAVENOUS; SUBCUTANEOUS at 16:30

## 2018-01-22 RX ADMIN — INSULIN GLARGINE SCH UNITS: 100 INJECTION, SOLUTION SUBCUTANEOUS at 20:49

## 2018-01-22 RX ADMIN — INSULIN ASPART SCH UNITS: 100 INJECTION, SOLUTION INTRAVENOUS; SUBCUTANEOUS at 11:00

## 2018-01-22 RX ADMIN — METOPROLOL TARTRATE SCH MG: 50 TABLET, FILM COATED ORAL at 08:58

## 2018-01-22 RX ADMIN — INSULIN ASPART SCH UNITS: 100 INJECTION, SOLUTION INTRAVENOUS; SUBCUTANEOUS at 06:30

## 2018-01-22 RX ADMIN — BENZONATATE PRN MG: 100 CAPSULE ORAL at 09:29

## 2018-01-22 NOTE — PROGRESS NOTE
Subjective


Date of Service:


Jan 22, 2018.


Subjective


Pt evaluation today including:  conversation w/ patient, physical exam, lab 

review, review of studies, review of inpatient medication list


Saw/examined the patient in room 403


receiving OT evaluation


+weak, +L foot pain





Problem List


Medical Problems:


(1) Abdominal pain


Status: Acute  





(2) Acute electrocardiogram changes


Status: Acute  





(3) Back pain


Status: Acute  





(4) Cellulitis


Status: Acute  





(5) Cellulitis of left middle finger


Status: Acute  





(6) Chest pain


Status: Acute  





(7) Chronic kidney disease (CKD)


Status: Acute  





(8) Dialysis AV fistula malfunction


Status: Acute  





(9) Elevated troponin


Status: Acute  





(10) Elevated troponin


Status: Acute  





(11) Elevated troponin


Status: Acute  





(12) End stage renal disease


Status: Acute  





(13) GI bleed


Status: Acute  





(14) Hyperkalemia


Status: Acute  





(15) Hyperkalemia


Status: Acute  





(16) Hypertension


Status: Acute  





(17) Joint effusion


Status: Acute  





(18) Left elbow pain


Status: Acute  





(19) Left sided chest pain


Status: Acute  





(20) Left sided chest pain


Status: Acute  





(21) Limb ischemia


Status: Acute  





(22) Lumbar back pain


Status: Acute  





(23) Medical non-compliance


Status: Acute  





(24) Mid back pain on right side


Status: Acute  





(25) Musculoskeletal strain


Status: Acute  





(26) Obesity


Status: Acute  





(27) PNA (pneumonia)


Status: Acute  





(28) Pneumonia


Status: Acute  





(29) Pulmonary edema


Status: Acute  





(30) Renal failure


Status: Acute  





(31) Sinusitis


Status: Acute  





(32) Strain of lumbar region


Status: Acute  





(33) Substernal chest pain


Status: Acute  





(34) Tendinopathy of right shoulder


Status: Acute  





(35) Trochanteric bursitis, right hip


Status: Acute  





(36) Upper abdominal pain


Status: Acute  





(37) Upper GI bleed


Status: Acute  





(38) Uremia


Status: Acute  





(39) Urinary tract infection


Status: Acute  





(40) Vomiting


Status: Acute  





(41) Vomiting


Status: Acute  





(42) Vomiting


Status: Acute  





(43) Vomiting


Status: Acute  











Review of Systems


Constitutional:  + weakness, No fever, No chills


Respiratory:  No cough, No sputum, No shortness of breath


Cardiac:  No chest pain


Musculoskeletal:  + joint pain





Medications





Current Inpatient Medications








 Medications


  (Trade)  Dose


 Ordered  Sig/Willy


 Route  Start Time


 Stop Time Status Last Admin


Dose Admin


 


 Insulin Aspart


  (novoLOG ASPART)  **SLIDING


 SCALE**


 **G...  ACHS


 SC  1/13/18 06:45


 2/12/18 06:59  1/19/18 20:49


3 UNITS


 


 Glucose


  (Glucose 40% Gel)  15-30


 GRAMS 15


 GRAMS...  UD  PRN


 PO  1/13/18 00:00


 2/12/18 00:00   


 


 


 Glucose


  (Glucose Chew


 Tab)  4-8


 Tablets 4


 Tabl...  UD  PRN


 PO  1/13/18 00:00


 2/12/18 00:00   


 


 


 Dextrose


  (Dextrose 50%


 50ML Syringe)  25-50ML OF


 50% DW IV


 FOR...  UD  PRN


 IV  1/13/18 00:00


 2/12/18 00:00   


 


 


 Glucagon


  (Glucagon Inj)  1 mg  UD  PRN


 SQ  1/13/18 00:00


 2/12/18 00:00   


 


 


 Naphazoline HCl/


 Pheniramine


 Maleate


  (Visine-A Oph


 Soln)  1 drops  DAILY


 OP  1/13/18 09:00


 2/12/18 08:59  1/22/18 09:00


1 DROPS


 


 Isosorbide


 Mononitrate


  (Imdur Ext Rel


 Tab)  60 mg  QAM


 PO  1/13/18 09:00


 2/12/18 08:59  1/22/18 09:00


60 MG


 


 Metoprolol


 Tartrate


  (Lopressor Tab)  50 mg  BID


 PO  1/13/18 09:00


 2/12/18 08:59  1/22/18 08:58


50 MG


 


 Insulin Glargine


  (Lantus Solostar


 Pen)  12 units  HS


 SC  1/13/18 21:00


 2/12/18 20:59  1/21/18 22:23


12 UNITS


 


 Sevelamer HCl


  (Renagel Tab)  2,400 mg  TIDM


 PO  1/13/18 11:30


 2/12/18 11:29  1/22/18 12:38


2,400 MG


 


 Acetaminophen


  (Tylenol Tab)  325 mg  Q4H  PRN


 PO  1/14/18 02:45


 2/13/18 02:44  1/16/18 02:22


325 MG


 


 Benzonatate


  (Tessalon Perles


 Cap)  100 mg  TID  PRN


 PO  1/14/18 18:15


 2/13/18 18:14  1/22/18 09:29


100 MG


 


 Al Hydroxide/Mg


 Trisilicate


  (Gaviscon Chew


 Tab)  1 tab  Q6  PRN


 PO  1/14/18 19:15


 2/13/18 19:14  1/22/18 00:55


1 TAB


 


 Lactobacillus


 Acidophilus


  (Floranex Tab)  4 tab  TIDM


 PO  1/15/18 08:00


 2/14/18 07:59  1/22/18 12:38


4 TAB


 


 Albuterol/


 Ipratropium


  (Duoneb)  3 ml  BIDR


 INH  1/15/18 20:00


 2/14/18 19:59  1/22/18 07:40


3 ML


 


 Sertraline HCl


  (Zoloft Tab)  25 mg  HS


 PO  1/16/18 21:00


 2/15/18 20:59  1/21/18 22:21


25 MG


 


 Oxycodone/


 Acetaminophen


  (Percocet


 5-325mg Tab)  1 tab  Q6  PRN


 PO  1/16/18 12:00


 1/30/18 11:59  1/19/18 05:33


1 TAB


 


 Epoetin Narayan


  (Procrit Inj)  10,000 units  TODAY@0800


 IV.  1/22/18 08:00


 1/22/18 18:00  1/22/18 15:41


10,000 UNITS


 


 Iron Sucrose 100


 mg/Syringe  5 ml @ 1


 mls/min  TODAY@1000


 IV  1/22/18 10:00


 1/22/18 22:00  1/22/18 15:36


1 MLS/MIN











Objective


Vital Signs











  Date Time  Temp Pulse Resp B/P (MAP) Pulse Ox O2 Delivery O2 Flow Rate FiO2


 


1/22/18 17:30  60  124/70    


 


1/22/18 17:15  56  103/57    


 


1/22/18 17:00  59  115/62    


 


1/22/18 16:45  60  130/66    


 


1/22/18 16:30  60  130/69    


 


1/22/18 16:15  57  123/61    


 


1/22/18 16:00      Room Air  


 


1/22/18 16:00  60  128/65    


 


1/22/18 15:45  60  123/66    


 


1/22/18 15:30  65  121/60    


 


1/22/18 15:15  57  115/61    


 


1/22/18 15:00  59  125/65    


 


1/22/18 14:45  61  124/67    


 


1/22/18 14:30  60  130/69    


 


1/22/18 14:15  63  137/70    


 


1/22/18 14:09  63  138/74    


 


1/22/18 08:00     94 Room Air  


 


1/22/18 07:52 37.0 64 18 130/70 (90) 94 Room Air  


 


1/22/18 07:40  68 16  96 Room Air  


 


1/22/18 00:00      Room Air  


 


1/21/18 23:10 37.0 78 20 135/76 (95) 95 Room Air  


 


1/21/18 22:20  73  136/67 (90)    


 


1/21/18 20:30  70 16  90 Room Air  


 


1/21/18 20:00      Room Air  











Physical Exam


General Appearance:  no apparent distress


Respiratory/Chest:  no respiratory distress, no accessory muscle use


Cardiovascular:  regular rate, rhythm, no edema, no murmur


Extremities:  normal inspection, no pedal edema





Laboratory Results





Last 24 Hours








Test


  1/21/18


20:28 1/22/18


05:22 1/22/18


07:36 1/22/18


11:58


 


Bedside Glucose 125 mg/dl   102 mg/dl  94 mg/dl 


 


Iron Level  28 mcg/dl   


 


Total Iron Binding Capacity  192 mcg/dl   


 


Transferrin  150 mg/dl   


 


Transferrin % Saturation  13 %   











Assessment and Plan


This is a 52 year old female with a PMH of ESRD on HD, insulin dependent DM2, 

CAD s/p stents, ischemic cardiomyopathy, HLD, PVD, carotid artery stenosis s/p 

endarterectomy presents secondary to missed dialysis, shortness of breath, 

fluid overload





1/22


patient is doing okay


plan for [facility] in AM


HD on M-W-F


given IV Venofer as per nephrology today





ESRD on HD


Ischemic Cardiomyopathy


Acute on Chronic Systolic CHF


1/21


patient is doing well


she will continue M-W-F dialysis as outpatient


as per  she will get van rides for free to and from dialysis





1/20


she received dialysis today


feeling tired/weak


she is now off of Levaquin and all antibiotics at this time


no abx. on discharge


appreciate case management input - she can receive free van rides to dialysis 

now


cont. Zoloft on discharge


d/c in AM (1/21)





1/19


clinically stable


she is off of her Levaquin for pneumonia now


no further debridement


cultures from wound are negative


plan for d/c in AM to home





1/18


continue HD M-W-F





1/17


plan today is for HD


had an I&D - cultures pending


Levaquin for pneumonia


nebs for SOB





1/16


plan to continue dialysis as per nephrology





1/15


patient missed three dialysis sessions


supposed to have dialysis M-W-F


 consulted for transportation to dialysis help


continued fluid removal with dialysis





L Lower Lobe Pneumonia


1/21


all abx. have been stopped at this time





1/18


Levaquin for one more day





1/17


+cough, shortness of breath


CXR suggests LLL pneumonia


for now, she is on Levaquin, we can continue for 5-7 days


cultures pending





L Foot Cyst vs. Abscess


1/21


outpatient wound care follow-up


no need for further abx. - cultures have been negative





1/18


debridement as per wound care - to have another debridement in AM





1/16


wound care provider consulted for possible drainage





1/15


for now, continue quinolone


appreciate ID input, we will continue Levaquin for now


wound care consulted, may need this drained


monitor; PT/OT





HTN


secondary to missed dialysis


cont. Lopressor





Hyperkalemia - resolved


due to missed dialysis


electrolytes stabilized after consecutive HD tx





Insulin Dependent DM2


appreciate pharmacy glycemic control


Lantus and sliding scale





Depression


appreciate psych evaluation


started on Zoloft, which we can continue


outpatient PCP follow-up





DVT ppx


SCDs





FULL CODE

## 2018-01-22 NOTE — NEPHROLOGY PROGRESS NOTE
Nephrology Progress Note


Date of Service:


Jan 22, 2018.


Subjective


53 yo female with ESRD with pain in her foot after debridement.  continues to 

have a post nasal drip with some nausea.   pt does not feel safe and was hoping 

to go to rehab.





Objective











  Date Time  Temp Pulse Resp B/P (MAP) Pulse Ox O2 Delivery O2 Flow Rate FiO2


 


1/22/18 07:52 37.0 64 18 130/70 (90) 94 Room Air  


 


1/22/18 07:40  68 16  96 Room Air  


 


1/22/18 00:00      Room Air  


 


1/21/18 23:10 37.0 78 20 135/76 (95) 95 Room Air  


 


1/21/18 22:20  73  136/67 (90)    


 


1/21/18 20:30  70 16  90 Room Air  


 


1/21/18 20:00      Room Air  


 


1/21/18 15:40 36.9 69 18 130/66 (87) 93 Room Air  


 


1/21/18 15:07      Room Air  








Physical Exam:


General-aaox3, obese


Eyes-no scleral icterus


ENT-mmm


Neck-supple


Lungs-cta


Heart-rrr


Abdomen-bs+ s/nt/nd


Extremities-no edema, +blisters on left foot


Neuro-nonfocal





Current Inpatient Medications








 Medications


  (Trade)  Dose


 Ordered  Sig/Willy


 Route  Start Time


 Stop Time Status Last Admin


Dose Admin


 


 Insulin Aspart


  (novoLOG ASPART)  **SLIDING


 SCALE**


 **G...  ACHS


 SC  1/13/18 06:45


 2/12/18 06:59  1/19/18 20:49


3 UNITS


 


 Glucose


  (Glucose 40% Gel)  15-30


 GRAMS 15


 GRAMS...  UD  PRN


 PO  1/13/18 00:00


 2/12/18 00:00   


 


 


 Glucose


  (Glucose Chew


 Tab)  4-8


 Tablets 4


 Tabl...  UD  PRN


 PO  1/13/18 00:00


 2/12/18 00:00   


 


 


 Dextrose


  (Dextrose 50%


 50ML Syringe)  25-50ML OF


 50% DW IV


 FOR...  UD  PRN


 IV  1/13/18 00:00


 2/12/18 00:00   


 


 


 Glucagon


  (Glucagon Inj)  1 mg  UD  PRN


 SQ  1/13/18 00:00


 2/12/18 00:00   


 


 


 Naphazoline HCl/


 Pheniramine


 Maleate


  (Visine-A Oph


 Soln)  1 drops  DAILY


 OP  1/13/18 09:00


 2/12/18 08:59  1/22/18 09:00


1 DROPS


 


 Isosorbide


 Mononitrate


  (Imdur Ext Rel


 Tab)  60 mg  QAM


 PO  1/13/18 09:00


 2/12/18 08:59  1/22/18 09:00


60 MG


 


 Metoprolol


 Tartrate


  (Lopressor Tab)  50 mg  BID


 PO  1/13/18 09:00


 2/12/18 08:59  1/22/18 08:58


50 MG


 


 Insulin Glargine


  (Lantus Solostar


 Pen)  12 units  HS


 SC  1/13/18 21:00


 2/12/18 20:59  1/21/18 22:23


12 UNITS


 


 Sevelamer HCl


  (Renagel Tab)  2,400 mg  TIDM


 PO  1/13/18 11:30


 2/12/18 11:29  1/22/18 08:59


2,400 MG


 


 Acetaminophen


  (Tylenol Tab)  325 mg  Q4H  PRN


 PO  1/14/18 02:45


 2/13/18 02:44  1/16/18 02:22


325 MG


 


 Benzonatate


  (Tessalon Perles


 Cap)  100 mg  TID  PRN


 PO  1/14/18 18:15


 2/13/18 18:14  1/22/18 09:29


100 MG


 


 Al Hydroxide/Mg


 Trisilicate


  (Gaviscon Chew


 Tab)  1 tab  Q6  PRN


 PO  1/14/18 19:15


 2/13/18 19:14  1/22/18 00:55


1 TAB


 


 Lactobacillus


 Acidophilus


  (Floranex Tab)  4 tab  TIDM


 PO  1/15/18 08:00


 2/14/18 07:59  1/22/18 08:58


4 TAB


 


 Albuterol/


 Ipratropium


  (Duoneb)  3 ml  BIDR


 INH  1/15/18 20:00


 2/14/18 19:59  1/22/18 07:40


3 ML


 


 Sertraline HCl


  (Zoloft Tab)  25 mg  HS


 PO  1/16/18 21:00


 2/15/18 20:59  1/21/18 22:21


25 MG


 


 Oxycodone/


 Acetaminophen


  (Percocet


 5-325mg Tab)  1 tab  Q6  PRN


 PO  1/16/18 12:00


 1/30/18 11:59  1/19/18 05:33


1 TAB


 


 Heparin Sodium


  (Porcine)


  (Heparin Iv


 Bolus)  400 unit  Q1H


 IV  1/22/18 08:00


 1/22/18 10:01   


 


 


 Epoetin Narayan


  (Procrit Inj)  10,000 units  TODAY@0800


 IV.  1/22/18 08:00


 1/22/18 18:00   


 











Last 24 Hours








Test


  1/21/18


11:38 1/21/18


16:46 1/21/18


20:28 1/22/18


05:22


 


Bedside Glucose 158 mg/dl  129 mg/dl  125 mg/dl  


 


Iron Level    28 mcg/dl 


 


Total Iron Binding Capacity    192 mcg/dl 


 


Transferrin    150 mg/dl 


 


Transferrin % Saturation    13 % 


 


Test


  1/22/18


07:36 


  


  


 


 


Bedside Glucose 102 mg/dl    











Assessment & Plan


ESRD-for dialysis today. volume status is acceptable. 





Anemia of Renal Failure-. hg goal of 9.6 at last check. on procrit. will give 

venofer as well with low tsat.

## 2018-01-23 VITALS
TEMPERATURE: 98.6 F | OXYGEN SATURATION: 90 % | SYSTOLIC BLOOD PRESSURE: 129 MMHG | HEART RATE: 67 BPM | DIASTOLIC BLOOD PRESSURE: 65 MMHG

## 2018-01-23 VITALS
DIASTOLIC BLOOD PRESSURE: 89 MMHG | SYSTOLIC BLOOD PRESSURE: 152 MMHG | HEART RATE: 72 BPM | OXYGEN SATURATION: 94 % | TEMPERATURE: 98.06 F

## 2018-01-23 VITALS
HEART RATE: 69 BPM | OXYGEN SATURATION: 93 % | TEMPERATURE: 98.24 F | SYSTOLIC BLOOD PRESSURE: 118 MMHG | DIASTOLIC BLOOD PRESSURE: 69 MMHG

## 2018-01-23 VITALS — DIASTOLIC BLOOD PRESSURE: 71 MMHG | HEART RATE: 70 BPM | SYSTOLIC BLOOD PRESSURE: 142 MMHG

## 2018-01-23 VITALS — OXYGEN SATURATION: 96 % | HEART RATE: 77 BPM

## 2018-01-23 VITALS — OXYGEN SATURATION: 95 % | HEART RATE: 68 BPM

## 2018-01-23 RX ADMIN — NAPHAZOLINE HYDROCHLORIDE AND PHENIRAMINE MALEATE SCH DROPS: .25; 3 SOLUTION/ DROPS OPHTHALMIC at 09:51

## 2018-01-23 RX ADMIN — ISOSORBIDE MONONITRATE SCH MG: 60 TABLET ORAL at 09:53

## 2018-01-23 RX ADMIN — LACTOBACILLUS TAB SCH TAB: TAB at 18:07

## 2018-01-23 RX ADMIN — INSULIN ASPART SCH UNITS: 100 INJECTION, SOLUTION INTRAVENOUS; SUBCUTANEOUS at 13:05

## 2018-01-23 RX ADMIN — ONDANSETRON PRN MG: 4 TABLET, ORALLY DISINTEGRATING ORAL at 18:08

## 2018-01-23 RX ADMIN — INSULIN ASPART SCH UNITS: 100 INJECTION, SOLUTION INTRAVENOUS; SUBCUTANEOUS at 06:30

## 2018-01-23 RX ADMIN — RENAGEL SCH MG: 800 TABLET ORAL at 18:08

## 2018-01-23 RX ADMIN — METOPROLOL TARTRATE SCH MG: 50 TABLET, FILM COATED ORAL at 09:54

## 2018-01-23 RX ADMIN — LACTOBACILLUS TAB SCH TAB: TAB at 13:06

## 2018-01-23 RX ADMIN — INSULIN GLARGINE SCH UNITS: 100 INJECTION, SOLUTION SUBCUTANEOUS at 20:59

## 2018-01-23 RX ADMIN — SERTRALINE HYDROCHLORIDE SCH MG: 50 TABLET, FILM COATED ORAL at 20:56

## 2018-01-23 RX ADMIN — LACTOBACILLUS TAB SCH TAB: TAB at 09:52

## 2018-01-23 RX ADMIN — INSULIN ASPART SCH UNITS: 100 INJECTION, SOLUTION INTRAVENOUS; SUBCUTANEOUS at 20:57

## 2018-01-23 RX ADMIN — IPRATROPIUM BROMIDE AND ALBUTEROL SULFATE SCH ML: .5; 3 SOLUTION RESPIRATORY (INHALATION) at 07:12

## 2018-01-23 RX ADMIN — INSULIN ASPART SCH UNITS: 100 INJECTION, SOLUTION INTRAVENOUS; SUBCUTANEOUS at 18:05

## 2018-01-23 RX ADMIN — IPRATROPIUM BROMIDE AND ALBUTEROL SULFATE SCH ML: .5; 3 SOLUTION RESPIRATORY (INHALATION) at 19:12

## 2018-01-23 RX ADMIN — METOPROLOL TARTRATE SCH MG: 50 TABLET, FILM COATED ORAL at 20:56

## 2018-01-23 RX ADMIN — RENAGEL SCH MG: 800 TABLET ORAL at 09:55

## 2018-01-23 RX ADMIN — RENAGEL SCH MG: 800 TABLET ORAL at 13:06

## 2018-01-23 RX ADMIN — ONDANSETRON PRN MG: 4 TABLET, ORALLY DISINTEGRATING ORAL at 14:07

## 2018-01-23 NOTE — PROGRESS NOTE
Internal Med Progress Note


Date of Service:


Jan 23, 2018.


Provider Documentation:


SUBJECTIVE:


No acute distress. Patient denies acute pain. Discussed with patient about 

disposition to rehab center





OBJECTIVE:


Exam:


General Appearance:  no apparent distress, + obese


Head:  normocephalic, atraumatic


Eyes:  EOMI


ENT:  normal ENT inspection, hearing grossly normal, pharynx normal


Neck:  supple, trachea midline


Respiratory/Chest:  clear to auscultation bilaterally


Cardiovascular:  regular rate, rhythm


Abdomen/GI:  normal bowel sounds, non tender, soft, no organomegaly, no 

pulsatile mass


Back:  normal inspection, no CVA tenderness, no muscle spasm, normal range of 

motion


Extremities/Musculoskelatal: no edema of lower extremities


Neurologic/Psych: alert, oriented x 3


ASSESSMENT & PLAN:


This is a 52 year old female with a PMH of ESRD on HD, insulin dependent DM2, 

CAD s/p stents, ischemic cardiomyopathy, HLD, PVD, carotid artery stenosis with 

endarterectomy presents secondary to missed dialysis with shortness of breath 

and fluid overload and hyperkalemia. Patient also treated for left lower lobe 

pneumonia, diabetes, left foot cyst





Patient is to be discharged [facility] when a [facility] bed is available 

for physical rehab. Patient is to continue dialysis every Monday, Wednesday and 

Friday. Patient should continue following with nephrology service as outpatient 

while on dialysis. She has also received IV Venofer when inpatient





Discharge Diagnosis and plans includes: 


ESRD on HD - continue dialysis every Monday, Wednesday and Friday. Patient 

should continue following with nephrology service as outpatient while on 

dialysis. Continue Sevelamer


Hyperkalemia - resolved due to missed dialysis at home


HTN secondary to missed dialysis at home - continue with Lopressor


Ischemic Cardiomyopathy


Acute on Chronic Systolic CHF


Left Lower Lobe Pneumonia (was treated with Levaquin as inpatient)


Depression - Patient was evaluated by psychiatry as inpatient and should 

continue sertraline 25 mg daily for depression  


Insulin Dependent DM2 - continue Lantus 12 units qhs


Left Foot Cyst with debridement in the hospital and cultures have been negative


Vital Signs:











  Date Time  Temp Pulse Resp B/P (MAP) Pulse Ox O2 Delivery O2 Flow Rate FiO2


 


1/23/18 08:24 36.7 72 15 152/89 (110) 94   


 


1/23/18 08:00      Room Air  


 


1/23/18 07:13  77 16  96 Room Air  


 


1/23/18 00:00      Room Air  


 


1/22/18 23:36 37.0 64 16 128/71 (90) 92 Room Air  


 


1/22/18 19:07  66 16  96 Room Air  


 


1/22/18 18:30 36.9 66 16 145/84 (104) 96 Room Air  


 


1/22/18 18:14 37.1 65  133/67 (89)    


 


1/22/18 17:30  60  124/70    


 


1/22/18 17:15  56  103/57    


 


1/22/18 17:00  59  115/62    


 


1/22/18 16:45  60  130/66    


 


1/22/18 16:30  60  130/69    


 


1/22/18 16:15  57  123/61    


 


1/22/18 16:00      Room Air  


 


1/22/18 16:00  60  128/65    


 


1/22/18 15:45  60  123/66    


 


1/22/18 15:30  65  121/60    


 


1/22/18 15:15  57  115/61    


 


1/22/18 15:00  59  125/65    


 


1/22/18 14:45  61  124/67    


 


1/22/18 14:30  60  130/69    


 


1/22/18 14:15  63  137/70    


 


1/22/18 14:09  63  138/74    


 


1/22/18 14:00 37.0 66  139/78 (98)    








Lab Results:





Results Past 24 Hours








Test


  1/22/18


18:29 1/22/18


20:19 1/23/18


07:52 1/23/18


11:45 Range/Units


 


 


Bedside Glucose 78 158 80 99 70-90  mg/dl

## 2018-01-23 NOTE — DISCHARGE INSTRUCTIONS
Discharge Instructions


Date of Service


Jan 24, 2018.





Admission


Reason for Admission:  Esrd On Dialysis, Hyperkalemia





Discharge


Discharge Diagnosis / Problem:  ESRD on dialysis,Hyperkalemia,Left Lower Lobe 

Pneumonia,Left foot cyst, DM





Discharge Goals


Goal(s):  Improve function, Increase independence, Improve disease control





Activity Recommendations


Activity Limitations:  per Instructions/Follow-up section


Shower/Bathe:  no limitations





.





Instructions / Follow-Up


Instructions / Follow-Up


This is a 52 year old female with a PMH of ESRD on HD, insulin dependent DM2, 

CAD s/p stents, ischemic cardiomyopathy, HLD, PVD, carotid artery stenosis with 

endarterectomy presents secondary to missed dialysis with shortness of breath 

and fluid overload and hyperkalemia. Patient also treated for left lower lobe 

pneumonia, diabetes, left foot cyst





Patient is to be discharged [facility] when a [facility] bed is available 

for physical rehab. Patient is to continue dialysis every Monday, Wednesday and 

Friday. Patient should continue following with nephrology service as outpatient 

while on dialysis. She has also received IV Venofer when inpatient





Discharge Diagnosis and plans includes: 


ESRD on HD - continue dialysis every Monday, Wednesday and Friday. Patient 

should continue following with nephrology service as outpatient while on 

dialysis. Continue Sevelamer


Hyperkalemia - resolved due to missed dialysis at home


HTN secondary to missed dialysis at home - continue with Lopressor


Ischemic Cardiomyopathy


Acute on Chronic Systolic CHF


Left Lower Lobe Pneumonia (was treated with Levaquin as inpatient)


Depression - Patient was evaluated by psychiatry as inpatient and should 

continue sertraline 25 mg daily for depression  


Insulin Dependent DM2 - continue Lantus 12 units qhs


Left Foot Cyst with debridement in the hospital and cultures have been negative





Current Hospital Diet


Patient's current hospital diet: Diabetes Type 2 Diet, Renal Diet





Discharge Diet


Recommended Diet:  Diabetes Type 2 Diet, Renal Diet





Pending Studies


Studies pending at discharge:  no





Laboratory Results


1/20/18 06:19








1/20/18 06:19

















Test


  1/12/18


22:07 1/12/18


22:23 1/13/18


06:40 1/14/18


09:49


 


Direct Bilirubin


  0.3 mg/dl


(0-0.2) 


  


  


 


 


Thyroid Stimulating Hormone


(TSH) 1.080 uIu/ml


(0.300-4.500) 


  


  


 


 


Ethyl Alcohol mg/dL


  < 3.0 mg/dl


(0-3) 


  


  


 


 


Urine Color  YELLOW   


 


Urine Appearance  CLOUDY (CLEAR)   


 


Urine pH  5.5 (4.5-7.5)   


 


Urine Specific Gravity


  


  1.018


(1.000-1.030) 


  


 


 


Urine Protein  3+ (NEG)   


 


Urine Glucose (UA)  NEG (NEG)   


 


Urine Ketones  TRACE (NEG)   


 


Urine Occult Blood  1+ (NEG)   


 


Urine Nitrite  NEG (NEG)   


 


Urine Bilirubin  NEG (NEG)   


 


Urine Urobilinogen  NEG (NEG)   


 


Urine Leukocyte Esterase  TRACE (NEG)   


 


Urine WBC (Auto)


  


  10-30 /hpf


(0-5) 


  


 


 


Urine RBC (Auto)  0-4 /hpf (0-4)   


 


Urine Hyaline Casts (Auto)  0 /lpf (0-5)   


 


Urine Epithelial Cells (Auto)  >30 /lpf (0-5)   


 


Urine Bacteria (Auto)  NEG (NEG)   


 


Urine Pathogenic Casts   /lpf (0)   


 


Urine Yeast (Auto)


  


  PRESENT (NONE


PRSENT) 


  


 


 


Urine Opiates Screen  NEG (NEG)   


 


Urine Methadone, Qualitative  NEG (NEG)   


 


Urine Barbiturates  NEG (NEG)   


 


Urine Phencyclidine (PCP)


Level 


  NEG (NEG) 


  


  


 


 


Ur


Amphetamine/Methamphetamine 


  NEG (NEG) 


  


  


 


 


MDMA (Ecstasy) Screen  NEG (NEG)   


 


Urine Benzodiazepines Screen  NEG (NEG)   


 


Urine Cocaine Metabolite  NEG (NEG)   


 


Urine Marijuana (THC)  NEG (NEG)   


 


Influenza Type A Antigen


  


  


  Neg for Influ


A (NEG) 


 


 


Influenza Type B Antigen


  


  


  Neg for Influ


B (NEG) 


 


 


Immature Granulocyte % (Auto)    0.4 % 


 


White Blood Count


  


  


  


  14.82 K/uL


(4.8-10.8)


 


Red Blood Count


  


  


  


  3.60 M/uL


(4.2-5.4)


 


Hemoglobin


  


  


  


  10.4 g/dL


(12.0-16.0)


 


Hematocrit    32.6 % (37-47) 


 


Mean Corpuscular Volume


  


  


  


  90.6 fL


()


 


Mean Corpuscular Hemoglobin


  


  


  


  28.9 pg


(25-34)


 


Mean Corpuscular Hemoglobin


Concent 


  


  


  31.9 g/dl


(32-36)


 


Platelet Count


  


  


  


  354 K/uL


(130-400)


 


Mean Platelet Volume


  


  


  


  10.7 fL


(7.4-10.4)


 


Neutrophils (%) (Auto)    80.5 % 


 


Lymphocytes (%) (Auto)    7.7 % 


 


Monocytes (%) (Auto)    9.9 % 


 


Eosinophils (%) (Auto)    1.3 % 


 


Basophils (%) (Auto)    0.2 % 


 


Neutrophils # (Auto)


  


  


  


  11.93 K/uL


(1.4-6.5)


 


Lymphocytes # (Auto)


  


  


  


  1.14 K/uL


(1.2-3.4)


 


Monocytes # (Auto)


  


  


  


  1.46 K/uL


(0.11-0.59)


 


Eosinophils # (Auto)


  


  


  


  0.20 K/uL


(0-0.5)


 


Basophils # (Auto)


  


  


  


  0.03 K/uL


(0-0.2)


 


Immature Granulocyte # (Auto)


  


  


  


  0.06 K/uL


(0.00-0.02)


 


Total Bilirubin


  


  


  


  0.4 mg/dl


(0.2-1)


 


Aspartate Amino Transf


(AST/SGOT) 


  


  


  9 U/L (15-37) 


 


 


Alanine Aminotransferase


(ALT/SGPT) 


  


  


  9 U/L (12-78) 


 


 


Alkaline Phosphatase


  


  


  


  75 U/L


()


 


Total Protein


  


  


  


  7.2 gm/dl


(6.4-8.2)


 


Albumin


  


  


  


  2.1 gm/dl


(3.4-5.0)


 


Globulin


  


  


  


  5.1 gm/dl


(2.5-4.0)


 


Albumin/Globulin Ratio    0.4 (0.9-2) 


 


Procalcitonin


  


  


  


  8.67 ng/ml


(0-0.5)


 


Hepatitis B Surface Antigen    NEG (NEG) 


 


Hepatitis B Surface Antibody    POS 


 


Test


  1/18/18


05:31 1/20/18


06:19 1/22/18


05:22 1/23/18


11:45


 


Phosphorus Level


  4.9 mg/dl


(2.5-4.9) 


  


  


 


 


Magnesium Level


  2.2 mg/dl


(1.8-2.4) 


  


  


 


 


Red Blood Count


  


  3.35 M/uL


(4.2-5.4) 


  


 


 


Mean Corpuscular Volume


  


  93.7 fL


() 


  


 


 


Mean Corpuscular Hemoglobin


  


  28.7 pg


(25-34) 


  


 


 


Mean Corpuscular Hemoglobin


Concent 


  30.6 g/dl


(32-36) 


  


 


 


RDW Standard Deviation


  


  55.6 fL


(36.4-46.3) 


  


 


 


RDW Coefficient of Variation


  


  16.2 %


(11.5-14.5) 


  


 


 


Mean Platelet Volume


  


  10.2 fL


(7.4-10.4) 


  


 


 


Anion Gap


  


  14.0 mmol/L


(3-11) 


  


 


 


Est Creatinine Clear Calc


Drug Dose 


  8.8 ml/min 


  


  


 


 


Estimated GFR (


American) 


  4.9 


  


  


 


 


Estimated GFR (Non-


American 


  4.3 


  


  


 


 


BUN/Creatinine Ratio  8.6 (10-20)   


 


Calcium Level


  


  9.3 mg/dl


(8.5-10.1) 


  


 


 


Iron Level


  


  


  28 mcg/dl


() 


 


 


Total Iron Binding Capacity


  


  


  192 mcg/dl


(250-450) 


 


 


Transferrin


  


  


  150 mg/dl


(200-360) 


 


 


Transferrin % Saturation   13 % (15-50)  


 


Bedside Glucose


  


  


  


  99 mg/dl


(70-90)














 Date/Time


Source Procedure


Growth Status


 


 


 1/13/18 07:43


Blood Blood Culture - Final


NO GROWTH Complete


 


 1/13/18 01:15


Nasal MRSA DNA Surveillance Screen - Final


Specimen Negative for MRSA by DNA Probe Complete


 


 1/16/18 22:55


Stool C.difficile Toxin B Gene (PCR) - Final


No C. difficile toxin B gene detected Complete





 1/16/18 08:35


Sputum Expectorated Sputum Gram Stain - Final Complete


 


 1/16/18 08:35


Sputum Expectorated Sputum Sputum Culture - Final


NO GROWTH Complete





 1/17/18 11:30


Drainage-Deep Ankle Left Gram Stain - Final Complete


 


 1/17/18 11:30


Drainage-Deep Ankle Left Wound Culture - Final


NO GROWTH Complete











Medical Emergencies








.


Who to Call and When:





Medical Emergencies:  If at any time you feel your situation is an emergency, 

please call 911 immediately.





.





Non-Emergent Contact


Non-Emergency issues call your:  Primary Care Provider, Nephrologist





.


.








"Provider Documentation" section prepared by Zachariah Muniz.








.





VTE Core Measure


Inpt VTE Proph given/why not?:  SCD's

## 2018-01-23 NOTE — DISCHARGE SUMMARY
Discharge Summary


Date of Service


Jan 23, 2018.





Discharge Summary


Admission Date:


Jan 12, 2018 at 23:55


Discharge Date:  Jan 24, 2018


Discharge Disposition:  Rehab ([facility])


Principal Diagnosis:


ESRD on dialysis,Hyperkalemia,Left Lower Lobe Pneumonia,Left foot cyst, DM, 

Depression


Secondary Diagnoses/Problems:


Ischemic Cardiomyopathy, Acute on Chronic Systolic CHF


Consultations:


Nephrology - Dr. Davison ; Psychiatry





Medication Reconciliation


New Medications:  


Mupirocin (Mupirocin) 66 Appln/22 Gm Oint


1 APPLN EXT BID for 5 Days, #1 UNIT


apply to scab on nose


Ondansetron (Ondansetron Odt) 1 Homepack Ea


4 MG PO Q4H PRN for Nausea for 5 Days, #1 EA





Sertraline HCl (Sertraline HCl) 50 Mg Tab


25 MG PO HS for 30 Days, #30 TAB





 


Continued Medications:  


Aspirin (Aspirin EC Low Dose) 81 Mg Ectab


81 MG PO DAILY





Atorvastatin (Lipitor) 40 Mg Tab


40 MG PO DAILY





Insulin Glargine (Lantus Solostar) 100 Unit/Ml Inj


12 UNITS SQ HS





Isosorbide Mononitrate (Imdur Ext Rel) 60 Mg Tab


1 TAB PO DAILY for 30 Days, #30 TAB 5 Refills





Loratadine (Claritin Childrens) 5 Mg Chw


10 MG PO BID





Metoprolol Tartrate (Lopressor) (Lopressor) 50 Mg Tab


50 MG PO BID, TAB





Sevelamer Carbonate (Renvela) 800 Mg Tab


3 TAB PO TID for 90 Days, #810 TAB 3 Refills





 


Discontinued Medications:  


Isosorbide Mononitrate Ext Rel (Imdur Ext Rel) 30 Mg Ertab


1 TAB PO DAILY for 30 Days, #30 TAB 5 Refills





Mupirocin 2% (Bactroban 2%) 30 Gm Cr


1 APPLN EXT TID





Pantoprazole Sodium (Protonix) 40 Mg Tab


40 MG PO DAILY, #30 TAB











Admission Information


HPI (per Admitting provider):


This is a 52 year old M who presents to the emergency with respiratory symptoms 

of shortness of breath and cough secondary to missing 3 dialysis sessions vs 

pneumonia. Patient was brought in to the hospital by her family member. Patient 

reports that she usually gets dialysis M/W/F however she could not get to the 

past 3 dialysis sessions since she could not obtain transportation from her 

family members or friends.  Patient reports that she depends upon others also 

for taking medications as she has visual impairments and cannot read the 

prescription bottles. Patient reports being off of her prescribed medications 

for a long while because other people in her life have not helped her with her 

medications and that she has no home health aides. 





When patient came to the emergency room, she was found to have hyperkalemia of 

6.7. Was given Insulin, Calcium gluconate, and Kayexalate by the ED doctor to 

treat the hyperkalemia. CXR with Left upper lobe and left basilar airspace 

opacities which likely represent a pneumonia and mild pulmonary vascular 

congestion and a trace left pleural effusion. Zosyn and Vancomycin was ordered 

by ED doctor to treat for pneumonia. Hospitalist doctor sought nephrology 

consultant Dr. Davison to help with obtaining dialysis session. Patient boarding 

in the ICU bill and completing dialysis with 3 liters removed.


Physical Exam (per Admitting):  


   General Appearance:  no apparent distress, + obese


   Head:  normocephalic, atraumatic


   Eyes:  EOMI, + pertinent finding (redness of conjuctiva in both eyes)


   ENT:  normal ENT inspection, hearing grossly normal, pharynx normal


   Neck:  supple, thyroid normal, trachea midline


   Respiratory/Chest:  chest non-tender, + crackles


   Cardiovascular:  regular rate, rhythm, + pertinent finding (bilateral lower 

extremity edema)


   Abdomen/GI:  normal bowel sounds, non tender, soft, no organomegaly, no 

pulsatile mass


   Back:  normal inspection, no CVA tenderness, no muscle spasm, normal range 

of motion


   Extremities/Musculoskelatal:  + pertinent finding (bilateral lower extremity 

edema)


   Neurologic/Psych:  no motor/sensory deficits, alert, oriented x 3


   Skin:  normal color, warm/dry, no rash





Hospital Course


This is a 52 year old female with a PMH of ESRD on HD, insulin dependent DM2, 

CAD s/p stents, ischemic cardiomyopathy, HLD, PVD, carotid artery stenosis with 

endarterectomy presents secondary to missed dialysis with shortness of breath 

and fluid overload and hyperkalemia. Patient also treated for left lower lobe 

pneumonia, diabetes, left foot cyst





Patient is to be discharged [facility] when a [facility] bed is available 

for physical rehab. Patient is to continue dialysis every Monday, Wednesday and 

Friday. Patient should continue following with nephrology service as outpatient 

while on dialysis. She has also received IV Venofer when inpatient





Discharge Diagnosis and plans includes: 


ESRD on HD - continue dialysis every Monday, Wednesday and Friday. Patient 

should continue following with nephrology service as outpatient while on 

dialysis. Continue Sevelamer


Hyperkalemia - resolved due to missed dialysis at home


HTN secondary to missed dialysis at home - continue with Lopressor


Ischemic Cardiomyopathy


Acute on Chronic Systolic CHF


Left Lower Lobe Pneumonia (was treated with Levaquin as inpatient)


Depression - Patient was evaluated by psychiatry as inpatient and should 

continue sertraline 25 mg daily for depression  


Insulin Dependent DM2 - continue Lantus 12 units qhs


Left Foot Cyst with debridement in the hospital and cultures have been negative


Total time spent on discharge = 60 minutes


This includes examination of the patient, discharge planning, medication 

reconciliation, and communication with other providers.





Discharge Instructions


see above

## 2018-01-24 VITALS — DIASTOLIC BLOOD PRESSURE: 54 MMHG | SYSTOLIC BLOOD PRESSURE: 109 MMHG | HEART RATE: 60 BPM

## 2018-01-24 VITALS — TEMPERATURE: 98.6 F | DIASTOLIC BLOOD PRESSURE: 58 MMHG | HEART RATE: 63 BPM | SYSTOLIC BLOOD PRESSURE: 115 MMHG

## 2018-01-24 VITALS — DIASTOLIC BLOOD PRESSURE: 53 MMHG | SYSTOLIC BLOOD PRESSURE: 94 MMHG | HEART RATE: 60 BPM

## 2018-01-24 VITALS — HEART RATE: 58 BPM | SYSTOLIC BLOOD PRESSURE: 104 MMHG | DIASTOLIC BLOOD PRESSURE: 52 MMHG

## 2018-01-24 VITALS — TEMPERATURE: 97.88 F | SYSTOLIC BLOOD PRESSURE: 140 MMHG | HEART RATE: 63 BPM | DIASTOLIC BLOOD PRESSURE: 71 MMHG

## 2018-01-24 VITALS
OXYGEN SATURATION: 94 % | TEMPERATURE: 98.24 F | SYSTOLIC BLOOD PRESSURE: 110 MMHG | DIASTOLIC BLOOD PRESSURE: 54 MMHG | HEART RATE: 60 BPM

## 2018-01-24 VITALS — HEART RATE: 60 BPM | DIASTOLIC BLOOD PRESSURE: 66 MMHG | SYSTOLIC BLOOD PRESSURE: 122 MMHG

## 2018-01-24 VITALS — SYSTOLIC BLOOD PRESSURE: 111 MMHG | HEART RATE: 62 BPM | DIASTOLIC BLOOD PRESSURE: 67 MMHG

## 2018-01-24 VITALS — DIASTOLIC BLOOD PRESSURE: 60 MMHG | SYSTOLIC BLOOD PRESSURE: 121 MMHG | HEART RATE: 70 BPM

## 2018-01-24 VITALS — SYSTOLIC BLOOD PRESSURE: 107 MMHG | DIASTOLIC BLOOD PRESSURE: 59 MMHG | HEART RATE: 62 BPM

## 2018-01-24 VITALS — DIASTOLIC BLOOD PRESSURE: 54 MMHG | SYSTOLIC BLOOD PRESSURE: 110 MMHG | HEART RATE: 60 BPM

## 2018-01-24 VITALS
HEART RATE: 63 BPM | OXYGEN SATURATION: 94 % | DIASTOLIC BLOOD PRESSURE: 76 MMHG | TEMPERATURE: 98.24 F | SYSTOLIC BLOOD PRESSURE: 131 MMHG

## 2018-01-24 VITALS — DIASTOLIC BLOOD PRESSURE: 55 MMHG | SYSTOLIC BLOOD PRESSURE: 103 MMHG | HEART RATE: 60 BPM

## 2018-01-24 VITALS — SYSTOLIC BLOOD PRESSURE: 115 MMHG | DIASTOLIC BLOOD PRESSURE: 59 MMHG | HEART RATE: 59 BPM

## 2018-01-24 VITALS — OXYGEN SATURATION: 90 % | HEART RATE: 63 BPM

## 2018-01-24 VITALS — DIASTOLIC BLOOD PRESSURE: 56 MMHG | HEART RATE: 59 BPM | SYSTOLIC BLOOD PRESSURE: 103 MMHG

## 2018-01-24 RX ADMIN — LACTOBACILLUS TAB SCH TAB: TAB at 09:04

## 2018-01-24 RX ADMIN — INSULIN ASPART SCH UNITS: 100 INJECTION, SOLUTION INTRAVENOUS; SUBCUTANEOUS at 16:30

## 2018-01-24 RX ADMIN — ONDANSETRON PRN MG: 4 TABLET, ORALLY DISINTEGRATING ORAL at 09:07

## 2018-01-24 RX ADMIN — BENZONATATE PRN MG: 100 CAPSULE ORAL at 09:12

## 2018-01-24 RX ADMIN — INSULIN ASPART SCH UNITS: 100 INJECTION, SOLUTION INTRAVENOUS; SUBCUTANEOUS at 12:33

## 2018-01-24 RX ADMIN — LACTOBACILLUS TAB SCH TAB: TAB at 17:16

## 2018-01-24 RX ADMIN — INSULIN ASPART SCH UNITS: 100 INJECTION, SOLUTION INTRAVENOUS; SUBCUTANEOUS at 09:09

## 2018-01-24 RX ADMIN — ONDANSETRON PRN MG: 4 TABLET, ORALLY DISINTEGRATING ORAL at 12:32

## 2018-01-24 RX ADMIN — RENAGEL SCH MG: 800 TABLET ORAL at 12:34

## 2018-01-24 RX ADMIN — RENAGEL SCH MG: 800 TABLET ORAL at 15:13

## 2018-01-24 RX ADMIN — RENAGEL SCH MG: 800 TABLET ORAL at 17:18

## 2018-01-24 RX ADMIN — IPRATROPIUM BROMIDE AND ALBUTEROL SULFATE SCH ML: .5; 3 SOLUTION RESPIRATORY (INHALATION) at 06:59

## 2018-01-24 RX ADMIN — NAPHAZOLINE HYDROCHLORIDE AND PHENIRAMINE MALEATE SCH DROPS: .25; 3 SOLUTION/ DROPS OPHTHALMIC at 09:04

## 2018-01-24 RX ADMIN — ONDANSETRON PRN MG: 4 TABLET, ORALLY DISINTEGRATING ORAL at 17:59

## 2018-01-24 RX ADMIN — LACTOBACILLUS TAB SCH TAB: TAB at 12:35

## 2018-01-24 RX ADMIN — RENAGEL SCH MG: 800 TABLET ORAL at 09:03

## 2018-01-24 RX ADMIN — ISOSORBIDE MONONITRATE SCH MG: 60 TABLET ORAL at 09:02

## 2018-01-24 RX ADMIN — ONDANSETRON PRN MG: 4 TABLET, ORALLY DISINTEGRATING ORAL at 03:09

## 2018-01-24 RX ADMIN — METOPROLOL TARTRATE SCH MG: 50 TABLET, FILM COATED ORAL at 09:02

## 2018-01-24 NOTE — PROGRESS NOTE
Internal Med Progress Note


Date of Service:


Jan 24, 2018.


Provider Documentation:


SUBJECTIVE:


No acute distress. Patient denies acute pain. Discussed with patient about 

disposition to rehab center today after dialysis





OBJECTIVE:


Exam:


General Appearance:  no apparent distress, + obese


Head:  normocephalic, atraumatic


Eyes:  EOMI


ENT:  normal ENT inspection, hearing grossly normal, pharynx normal


Neck:  supple, trachea midline


Respiratory/Chest:  clear to auscultation bilaterally


Cardiovascular:  regular rate, rhythm


Abdomen/GI:  normal bowel sounds, non tender, soft, no organomegaly, no 

pulsatile mass


Back:  normal inspection, no CVA tenderness, no muscle spasm, normal range of 

motion


Extremities/Musculoskelatal: no edema of lower extremities


Neurologic/Psych: alert, oriented x 3


ASSESSMENT & PLAN:


This is a 52 year old female with a PMH of ESRD on HD, insulin dependent DM2, 

CAD s/p stents, ischemic cardiomyopathy, HLD, PVD, carotid artery stenosis with 

endarterectomy presents secondary to missed dialysis with shortness of breath 

and fluid overload and hyperkalemia. Patient also treated for left lower lobe 

pneumonia, diabetes, left foot cyst





Patient is to be discharged [facility] when a [facility] bed is available 

for physical rehab. Patient is to continue dialysis every Monday, Wednesday and 

Friday. Patient should continue following with nephrology service as outpatient 

while on dialysis. She has also received IV Venofer when inpatient





Discharge Diagnosis and plans includes: 


ESRD on HD - continue dialysis every Monday, Wednesday and Friday. Patient 

should continue following with nephrology service as outpatient while on 

dialysis. Continue Sevelamer


Hyperkalemia - resolved due to missed dialysis at home


HTN secondary to missed dialysis at home - continue with Lopressor


Ischemic Cardiomyopathy


Acute on Chronic Systolic CHF


Left Lower Lobe Pneumonia (was treated with Levaquin as inpatient)


Depression - Patient was evaluated by psychiatry as inpatient and should 

continue sertraline 25 mg daily for depression  


Insulin Dependent DM2 - continue Lantus 12 units qhs


Left Foot Cyst with debridement in the hospital and cultures have been negative





Discharge to [facility] with bed confirmed on 1/24/18


Patient will go to [facility] after dialysis session completed on 1/24/18


Vital Signs:











  Date Time  Temp Pulse Resp B/P (MAP) Pulse Ox O2 Delivery O2 Flow Rate FiO2


 


1/24/18 14:00  62  111/67    


 


1/24/18 13:45  59  115/59    


 


1/24/18 13:29  60  122/66    


 


1/24/18 09:00      Room Air  


 


1/24/18 08:03 36.8 63 16 131/76 (94) 94 Room Air  


 


1/24/18 07:00  63 14  90 Room Air  


 


1/24/18 01:00      Room Air  


 


1/23/18 23:26 37.0 67 20 129/65 (86) 90 Room Air  


 


1/23/18 20:53  70  142/71 (94)    


 


1/23/18 20:00      Room Air  


 


1/23/18 19:12  68 16  95 Room Air  


 


1/23/18 16:00      Room Air  


 


1/23/18 15:14 36.8 69 16 118/69 (85) 93   








Lab Results:





Results Past 24 Hours








Test


  1/23/18


16:31 1/23/18


19:51 1/24/18


07:39 1/24/18


07:41 Range/Units


 


 


Bedside Glucose 131 134 78  70-90  mg/dl


 


Test


  1/24/18


11:43 


  


  


  Range/Units


 


 


Bedside Glucose 104    70-90  mg/dl

## 2018-01-24 NOTE — NEPHROLOGY PROGRESS NOTE
Nephrology Progress Note


Date of Service:


Jan 24, 2018.


Subjective


51 yo female with ESRD with pain in her foot after debridement and has some 

drainage.  pt awaiting placement in rehab.  pt eats well but then develops 

nausea.  pt inquiring about having the other wound on the top of her foot 

debrided.





Objective











  Date Time  Temp Pulse Resp B/P (MAP) Pulse Ox O2 Delivery O2 Flow Rate FiO2


 


1/24/18 08:03 36.8 63 16 131/76 (94) 94 Room Air  


 


1/24/18 07:00  63 14  90 Room Air  


 


1/24/18 01:00      Room Air  


 


1/23/18 23:26 37.0 67 20 129/65 (86) 90 Room Air  


 


1/23/18 20:53  70  142/71 (94)    


 


1/23/18 20:00      Room Air  


 


1/23/18 19:12  68 16  95 Room Air  


 


1/23/18 16:00      Room Air  


 


1/23/18 15:14 36.8 69 16 118/69 (85) 93   








Physical Exam:


General-aaox3, obese


Eyes-no scleral icterus


ENT-mmm


Neck-supple


Lungs-clear


Heart-regular


Abdomen-bs+ s/nt/nd


Extremities-no edema, +blisters on left foot


Neuro-nonfocal





Current Inpatient Medications








 Medications


  (Trade)  Dose


 Ordered  Sig/Willy


 Route  Start Time


 Stop Time Status Last Admin


Dose Admin


 


 Insulin Aspart


  (novoLOG ASPART)  **SLIDING


 SCALE**


 **G...  ACHS


 SC  1/13/18 06:45


 2/12/18 06:59  1/19/18 20:49


3 UNITS


 


 Glucose


  (Glucose 40% Gel)  15-30


 GRAMS 15


 GRAMS...  UD  PRN


 PO  1/13/18 00:00


 2/12/18 00:00   


 


 


 Glucose


  (Glucose Chew


 Tab)  4-8


 Tablets 4


 Tabl...  UD  PRN


 PO  1/13/18 00:00


 2/12/18 00:00   


 


 


 Dextrose


  (Dextrose 50%


 50ML Syringe)  25-50ML OF


 50% DW IV


 FOR...  UD  PRN


 IV  1/13/18 00:00


 2/12/18 00:00   


 


 


 Glucagon


  (Glucagon Inj)  1 mg  UD  PRN


 SQ  1/13/18 00:00


 2/12/18 00:00   


 


 


 Naphazoline HCl/


 Pheniramine


 Maleate


  (Visine-A Oph


 Soln)  1 drops  DAILY


 OP  1/13/18 09:00


 2/12/18 08:59  1/24/18 09:04


1 DROPS


 


 Isosorbide


 Mononitrate


  (Imdur Ext Rel


 Tab)  60 mg  QAM


 PO  1/13/18 09:00


 2/12/18 08:59  1/24/18 09:02


60 MG


 


 Metoprolol


 Tartrate


  (Lopressor Tab)  50 mg  BID


 PO  1/13/18 09:00


 2/12/18 08:59  1/24/18 09:02


50 MG


 


 Insulin Glargine


  (Lantus Solostar


 Pen)  12 units  HS


 SC  1/13/18 21:00


 2/12/18 20:59  1/23/18 20:59


12 UNITS


 


 Sevelamer HCl


  (Renagel Tab)  2,400 mg  TIDM


 PO  1/13/18 11:30


 2/12/18 11:29  1/24/18 09:03


2,400 MG


 


 Acetaminophen


  (Tylenol Tab)  325 mg  Q4H  PRN


 PO  1/14/18 02:45


 2/13/18 02:44  1/16/18 02:22


325 MG


 


 Benzonatate


  (Tessalon Perles


 Cap)  100 mg  TID  PRN


 PO  1/14/18 18:15


 2/13/18 18:14  1/24/18 09:12


100 MG


 


 Al Hydroxide/Mg


 Trisilicate


  (Gaviscon Chew


 Tab)  1 tab  Q6  PRN


 PO  1/14/18 19:15


 2/13/18 19:14  1/22/18 00:55


1 TAB


 


 Lactobacillus


 Acidophilus


  (Floranex Tab)  4 tab  TIDM


 PO  1/15/18 08:00


 2/14/18 07:59  1/24/18 09:04


4 TAB


 


 Albuterol/


 Ipratropium


  (Duoneb)  3 ml  BIDR


 INH  1/15/18 20:00


 2/14/18 19:59  1/24/18 06:59


3 ML


 


 Sertraline HCl


  (Zoloft Tab)  25 mg  HS


 PO  1/16/18 21:00


 2/15/18 20:59  1/23/18 20:56


25 MG


 


 Oxycodone/


 Acetaminophen


  (Percocet


 5-325mg Tab)  1 tab  Q6  PRN


 PO  1/16/18 12:00


 1/30/18 11:59  1/19/18 05:33


1 TAB


 


 Ondansetron HCl


  (Zofran Odt)  4 mg  Q4H  PRN


 PO  1/23/18 13:30


 2/22/18 13:29  1/24/18 09:07


4 MG











Last 24 Hours








Test


  1/23/18


11:45 1/23/18


16:31 1/23/18


19:51 1/24/18


07:39


 


Bedside Glucose 99 mg/dl  131 mg/dl  134 mg/dl  78 mg/dl 


 


Test


  1/24/18


07:41 


  


  


 











Assessment & Plan


ESRD-for dialysis today. volume status is acceptable. labs pending.





Anemia of Renal Failure-continue venofer and procrit and will give dose of 

vitamin d analogue with hx of high pth levels.

## 2018-03-20 ENCOUNTER — HOSPITAL ENCOUNTER (OUTPATIENT)
Dept: HOSPITAL 45 - X.SURG | Age: 53
Discharge: HOME | End: 2018-03-20
Attending: OPHTHALMOLOGY
Payer: COMMERCIAL

## 2018-03-20 VITALS
BODY MASS INDEX: 37.56 KG/M2 | HEIGHT: 65.98 IN | BODY MASS INDEX: 37.56 KG/M2 | WEIGHT: 233.69 LBS | WEIGHT: 233.69 LBS | HEIGHT: 65.98 IN

## 2018-03-20 VITALS — TEMPERATURE: 97.7 F

## 2018-03-20 VITALS — HEART RATE: 84 BPM | OXYGEN SATURATION: 95 % | SYSTOLIC BLOOD PRESSURE: 136 MMHG | DIASTOLIC BLOOD PRESSURE: 81 MMHG

## 2018-03-20 DIAGNOSIS — Z79.4: ICD-10-CM

## 2018-03-20 DIAGNOSIS — Z79.899: ICD-10-CM

## 2018-03-20 DIAGNOSIS — E66.01: ICD-10-CM

## 2018-03-20 DIAGNOSIS — N18.6: ICD-10-CM

## 2018-03-20 DIAGNOSIS — Z81.8: ICD-10-CM

## 2018-03-20 DIAGNOSIS — Z95.5: ICD-10-CM

## 2018-03-20 DIAGNOSIS — F32.9: ICD-10-CM

## 2018-03-20 DIAGNOSIS — E11.36: Primary | ICD-10-CM

## 2018-03-20 DIAGNOSIS — I25.2: ICD-10-CM

## 2018-03-20 DIAGNOSIS — Z84.1: ICD-10-CM

## 2018-03-20 DIAGNOSIS — Z79.82: ICD-10-CM

## 2018-03-20 DIAGNOSIS — Z80.9: ICD-10-CM

## 2018-03-20 DIAGNOSIS — I12.0: ICD-10-CM

## 2018-03-20 DIAGNOSIS — F17.200: ICD-10-CM

## 2018-03-20 DIAGNOSIS — H26.9: ICD-10-CM

## 2018-03-20 DIAGNOSIS — Z83.3: ICD-10-CM

## 2018-03-20 DIAGNOSIS — Z82.49: ICD-10-CM

## 2018-03-20 DIAGNOSIS — Z91.040: ICD-10-CM

## 2018-03-20 DIAGNOSIS — F41.9: ICD-10-CM

## 2018-03-20 DIAGNOSIS — Z99.2: ICD-10-CM

## 2018-03-20 RX ADMIN — KETOROLAC TROMETHAMINE SCH DROP: 5 SOLUTION OPHTHALMIC at 06:56

## 2018-03-20 RX ADMIN — PHENYLEPHRINE HYDROCHLORIDE SCH DROP: 25 SOLUTION/ DROPS OPHTHALMIC at 06:53

## 2018-03-20 RX ADMIN — KETOROLAC TROMETHAMINE SCH DROP: 5 SOLUTION OPHTHALMIC at 07:02

## 2018-03-20 RX ADMIN — CYCLOPENTOLATE HYDROCHLORIDE SCH DROP: 10 SOLUTION/ DROPS OPHTHALMIC at 07:01

## 2018-03-20 RX ADMIN — GATIFLOXACIN SCH DROP: 5 SOLUTION/ DROPS OPHTHALMIC at 06:57

## 2018-03-20 RX ADMIN — TROPICAMIDE SCH DROP: 10 SOLUTION/ DROPS OPHTHALMIC at 06:59

## 2018-03-20 RX ADMIN — CYCLOPENTOLATE HYDROCHLORIDE SCH DROP: 10 SOLUTION/ DROPS OPHTHALMIC at 06:55

## 2018-03-20 RX ADMIN — PHENYLEPHRINE HYDROCHLORIDE SCH DROP: 25 SOLUTION/ DROPS OPHTHALMIC at 06:58

## 2018-03-20 RX ADMIN — TROPICAMIDE SCH DROP: 10 SOLUTION/ DROPS OPHTHALMIC at 06:54

## 2018-03-20 RX ADMIN — GATIFLOXACIN SCH DROP: 5 SOLUTION/ DROPS OPHTHALMIC at 07:02

## 2018-03-20 NOTE — [facility] OPERATIVE REPORT
Operative Report


Date of Service


Mar 20, 2018.





Operative Report


1.  PREOPERATIVE DIAGNOSIS:  Cataract of the right eye.





2.  POSTOPERATIVE DIAGNOSIS:  Same.





3.  PROCEDURE:  Phacoemulsification with intraocular lens implantation of the 

right eye.  





SURGEON:  Dr. Ike Avalos.  ANESTHESIA:  Topical Lidocaine gel, 1% Non-

Preserved intracameral Lidocaine, and monitored intravenous sedation.  





INDICATIONS FOR THE PROCEDURE:  The patient is a 52 - year-old female with a 

history of cataract of the right eye causing significant visual impairment.  

The details of the proposed procedure were explained to the patient who asked 

appropriate questions and following discussion of all risks, benefits and 

alternatives agreed to have the procedure done.  





4.  OPERATION AND FINDINGS:  





DESCRIPTION OF PROCEDURE:  After informed consent was obtained, the patient was 

brought to the Operating Room at the [facility].  The 

patient was placed in a supine position and then the right eye was prepped and 

draped in the usual sterile fashion for intraocular surgery.  A drop of topical 

Lidocaine gel was placed in the operative eye.  A wire lid speculum was then 

placed in the fornices.  A corneal paracentesis was then created temporally.  

The Non-Preserved Lidocaine was then instilled into the anterior chamber.  The 

anterior chamber was then pressurized with viscoelastic.  A 2.0 mm clear 

corneal incision was then created temporally.  A cystotome was inserted into 

the anterior chamber and used to create a tear in the anterior lens capsule.  

This capsular tear was then used to create a small flap and the flap was 

dragged in a counterclockwise direction in order to create a continuous 

curvilinear capsulorrhexis.  Hydrodissection was accomplished with balanced 

salt solution.  Phacoemulsification of the lens nucleus was then performed in a 

standard divide-and-conquer technique.  The phaco time was 1  minute 3 seconds 

with an average power of 20 %.  The remaining cortical material was removed 

using irrigation aspiration.  The capsular bag was then filled with 

viscoelastic.  A B[employer]ch [employer] Lomb MI60L +19.0 diopters lens was then loaded into 

the injector and injected into the capsular bag.  The remaining viscoelastic 

was removed with the irrigation aspiration handpiece.  The wound was hydrated 

and then checked and found to be watertight.  The intraocular pressure was 

checked and found to be adequate.  The wire lid speculum was removed and the 

patient's face was cleaned and dried.  TobraDex ointment was placed in the 

inferior fornix.  The patient was discharged to the Recovery Room having 

tolerated the procedure well.  There were no complications.  The patient will 

be seen tomorrow in the office for follow-up.


I attest to the content of the Intraoperative Record and any orders documented 

therein.  Any exceptions are noted below.

## 2018-03-20 NOTE — DISCHARGE INSTRUCTIONS-SURGCTR
Discharge Instructions


Date of Service


Mar 20, 2018.





Visit


Reason for Visit:  Cataract Right Eye





Discharge


Discharge Diagnosis / Problem:  cataract





Discharge Goals


Goal(s):  Improve function





Medications


Stopped Medications Name(s):  


Only took 3 meds this morning.





Activity Recommendations


Activity Limitations:  per Instructions/Follow-up section





Anesthesia


.





Post Anesthesia Instructions:





If you have had General Anesthesia or IV Sedation:





*  Do not drive today.


*  Resume driving when surgeon permits.


*  Do not make important decisions or sign legal documents today.


*  Call surgeon for:





   1.  Temperature elevations greater than 101 degrees F.


   2.  Uncontrollable pain.


   3.  Excessive bleeding.


   4.  Persistent nausea and vomiting.


   5.  Medication intolerance (nausea, vomiting or rash).





*  For nausea and vomiting use only clear liquids such as: tea, soda, bouillon 

until nausea subsides, then gradually increase diet as tolerated.





*  If you have any concerns or questions, call your surgeon's office.  If 

physician is unavailable and it is an emergency, call 911 or go to the nearest 

emergency room.





.





Diet Recommendations


Home Diet:  resume previous diet





Procedures


Procedures Performed:  


Right Cataract Phacoemulsification With Intraocular Lens Implant





Pending Studies


Studies pending at discharge:  no





Medical Emergencies








.


Who to Call and When:





Medical Emergencies:  If at any time you feel your situation is an emergency, 

please call 911 immediately.





.





Non-Emergent Contact


Non-Emergency issues call your:  Ophthalmologist





.


.








"Provider Documentation" section prepared by Ike Avalos.








.

## 2018-03-20 NOTE — ANESTHESIA PROGRESS NT - [facility]
Anesthesia Post Op Note


Date & Time


Mar 20, 2018 at 07:58





Vital Signs


Pain Intensity:  0





Vital Signs Past 12 Hours








  Date Time  Temp Pulse Resp B/P (MAP) Pulse Ox O2 Delivery O2 Flow Rate FiO2


 


3/20/18 07:49  84 16 136/81 (99) 95 Room Air  


 


3/20/18 07:30 36.5 81 12 122/77 (92) 94 Room Air  


 


3/20/18 06:37 36.8 84 20 125/79 (94) 94 Room Air  











Notes


Mental Status:  alert / awake / arousable, participated in evaluation


Pt Amnestic to Procedure:  Yes


Nausea / Vomiting:  adequately controlled


Pain:  adequately controlled


Airway Patency, RR, SpO2:  stable & adequate


BP & HR:  stable & adequate


Hydration State:  stable & adequate


Anesthetic Complications:  no major complications apparent

## 2018-04-03 ENCOUNTER — HOSPITAL ENCOUNTER (OUTPATIENT)
Dept: HOSPITAL 45 - X.SURG | Age: 53
Discharge: HOME | End: 2018-04-03
Attending: OPHTHALMOLOGY
Payer: COMMERCIAL

## 2018-04-03 VITALS — TEMPERATURE: 97.88 F

## 2018-04-03 VITALS — OXYGEN SATURATION: 97 % | DIASTOLIC BLOOD PRESSURE: 78 MMHG | HEART RATE: 85 BPM | SYSTOLIC BLOOD PRESSURE: 132 MMHG

## 2018-04-03 VITALS
BODY MASS INDEX: 37.52 KG/M2 | WEIGHT: 233.49 LBS | BODY MASS INDEX: 37.52 KG/M2 | HEIGHT: 65.98 IN | WEIGHT: 233.49 LBS | HEIGHT: 65.98 IN

## 2018-04-03 DIAGNOSIS — F32.9: ICD-10-CM

## 2018-04-03 DIAGNOSIS — Z82.49: ICD-10-CM

## 2018-04-03 DIAGNOSIS — Z81.8: ICD-10-CM

## 2018-04-03 DIAGNOSIS — E11.9: ICD-10-CM

## 2018-04-03 DIAGNOSIS — Z91.040: ICD-10-CM

## 2018-04-03 DIAGNOSIS — H26.8: Primary | ICD-10-CM

## 2018-04-03 DIAGNOSIS — K21.9: ICD-10-CM

## 2018-04-03 DIAGNOSIS — Z91.048: ICD-10-CM

## 2018-04-03 DIAGNOSIS — E78.5: ICD-10-CM

## 2018-04-03 DIAGNOSIS — Z79.4: ICD-10-CM

## 2018-04-03 DIAGNOSIS — N25.81: ICD-10-CM

## 2018-04-03 DIAGNOSIS — Z79.82: ICD-10-CM

## 2018-04-03 DIAGNOSIS — F41.9: ICD-10-CM

## 2018-04-03 DIAGNOSIS — I25.5: ICD-10-CM

## 2018-04-03 DIAGNOSIS — I73.9: ICD-10-CM

## 2018-04-03 DIAGNOSIS — Z99.2: ICD-10-CM

## 2018-04-03 DIAGNOSIS — I25.2: ICD-10-CM

## 2018-04-03 DIAGNOSIS — Z84.1: ICD-10-CM

## 2018-04-03 DIAGNOSIS — Z98.890: ICD-10-CM

## 2018-04-03 DIAGNOSIS — R11.0: ICD-10-CM

## 2018-04-03 DIAGNOSIS — N18.6: ICD-10-CM

## 2018-04-03 DIAGNOSIS — Z95.5: ICD-10-CM

## 2018-04-03 DIAGNOSIS — I12.0: ICD-10-CM

## 2018-04-03 DIAGNOSIS — Z83.3: ICD-10-CM

## 2018-04-03 DIAGNOSIS — Z82.3: ICD-10-CM

## 2018-04-03 DIAGNOSIS — E66.01: ICD-10-CM

## 2018-04-03 DIAGNOSIS — Z87.891: ICD-10-CM

## 2018-04-03 LAB
BUN SERPL-MCNC: 52 MG/DL (ref 7–18)
CALCIUM SERPL-MCNC: 10.2 MG/DL (ref 8.5–10.1)
CO2 SERPL-SCNC: 31 MMOL/L (ref 21–32)
CREAT SERPL-MCNC: 6.36 MG/DL (ref 0.6–1.2)
GLUCOSE SERPL-MCNC: 144 MG/DL (ref 70–99)
POTASSIUM SERPL-SCNC: 4.1 MMOL/L (ref 3.5–5.1)
SODIUM SERPL-SCNC: 133 MMOL/L (ref 136–145)

## 2018-04-03 RX ADMIN — KETOROLAC TROMETHAMINE SCH DROP: 5 SOLUTION OPHTHALMIC at 09:48

## 2018-04-03 RX ADMIN — CYCLOPENTOLATE HYDROCHLORIDE SCH DROP: 10 SOLUTION/ DROPS OPHTHALMIC at 09:52

## 2018-04-03 RX ADMIN — PHENYLEPHRINE HYDROCHLORIDE SCH DROP: 25 SOLUTION/ DROPS OPHTHALMIC at 09:45

## 2018-04-03 RX ADMIN — GATIFLOXACIN SCH DROP: 5 SOLUTION/ DROPS OPHTHALMIC at 09:59

## 2018-04-03 RX ADMIN — PHENYLEPHRINE HYDROCHLORIDE SCH DROP: 25 SOLUTION/ DROPS OPHTHALMIC at 09:50

## 2018-04-03 RX ADMIN — CYCLOPENTOLATE HYDROCHLORIDE SCH DROP: 10 SOLUTION/ DROPS OPHTHALMIC at 09:47

## 2018-04-03 RX ADMIN — TROPICAMIDE SCH DROP: 10 SOLUTION/ DROPS OPHTHALMIC at 09:51

## 2018-04-03 RX ADMIN — KETOROLAC TROMETHAMINE SCH DROP: 5 SOLUTION OPHTHALMIC at 09:53

## 2018-04-03 RX ADMIN — TROPICAMIDE SCH DROP: 10 SOLUTION/ DROPS OPHTHALMIC at 09:46

## 2018-04-03 RX ADMIN — GATIFLOXACIN SCH DROP: 5 SOLUTION/ DROPS OPHTHALMIC at 09:49

## 2018-04-03 NOTE — ANESTHESIA PROGRESS NT - [facility]
Anesthesia Post Op Note


Date & Time


Apr 3, 2018 at 11:51





Vital Signs


Pain Intensity:  0





Vital Signs Past 12 Hours








  Date Time  Temp Pulse Resp B/P (MAP) Pulse Ox O2 Delivery O2 Flow Rate FiO2


 


4/3/18 11:18 36.6 85 18 115/74 (88) 95 Room Air  


 


4/3/18 09:50 36.8 87 18 126/58 (80) 96 Room Air  











Notes


Mental Status:  alert / awake / arousable, participated in evaluation


Pt Amnestic to Procedure:  Yes


Nausea / Vomiting:  adequately controlled


Pain:  adequately controlled


Airway Patency, RR, SpO2:  stable & adequate


BP & HR:  stable & adequate


Hydration State:  stable & adequate


Anesthetic Complications:  no major complications apparent

## 2018-04-03 NOTE — DISCHARGE INSTRUCTIONS-SURGCTR
Discharge Instructions


Date of Service


Apr 3, 2018.





Visit


Reason for Visit:  Cataract Left Eye





Discharge


Discharge Diagnosis / Problem:  cataract





Discharge Goals


Goal(s):  Improve function





Activity Recommendations


Activity Limitations:  per Instructions/Follow-up section





Anesthesia


.





Post Anesthesia Instructions:





If you have had General Anesthesia or IV Sedation:





*  Do not drive today.


*  Resume driving when surgeon permits.


*  Do not make important decisions or sign legal documents today.


*  Call surgeon for:





   1.  Temperature elevations greater than 101 degrees F.


   2.  Uncontrollable pain.


   3.  Excessive bleeding.


   4.  Persistent nausea and vomiting.


   5.  Medication intolerance (nausea, vomiting or rash).





*  For nausea and vomiting use only clear liquids such as: tea, soda, bouillon 

until nausea subsides, then gradually increase diet as tolerated.





*  If you have any concerns or questions, call your surgeon's office.  If 

physician is unavailable and it is an emergency, call 911 or go to the nearest 

emergency room.





.





Diet Recommendations


Home Diet:  resume previous diet





Procedures


Procedures Performed:  


Left Cataract Phacoemulsification With Intraocular Lens Implant





Pending Studies


Studies pending at discharge:  no





Medical Emergencies








.


Who to Call and When:





Medical Emergencies:  If at any time you feel your situation is an emergency, 

please call 911 immediately.





.





Non-Emergent Contact


Non-Emergency issues call your:  Ophthalmologist





.


.








"Provider Documentation" section prepared by Ike Avalos.








.

## 2018-05-16 ENCOUNTER — HOSPITAL ENCOUNTER (EMERGENCY)
Dept: HOSPITAL 45 - C.EDB | Age: 53
Discharge: HOME | End: 2018-05-16
Payer: COMMERCIAL

## 2018-05-16 VITALS
HEIGHT: 67.01 IN | WEIGHT: 246.7 LBS | HEIGHT: 67.01 IN | BODY MASS INDEX: 38.72 KG/M2 | BODY MASS INDEX: 38.72 KG/M2 | WEIGHT: 246.7 LBS

## 2018-05-16 VITALS — SYSTOLIC BLOOD PRESSURE: 193 MMHG | OXYGEN SATURATION: 95 % | HEART RATE: 84 BPM | DIASTOLIC BLOOD PRESSURE: 108 MMHG

## 2018-05-16 VITALS — TEMPERATURE: 98.06 F

## 2018-05-16 DIAGNOSIS — Z91.048: ICD-10-CM

## 2018-05-16 DIAGNOSIS — Z79.82: ICD-10-CM

## 2018-05-16 DIAGNOSIS — E78.5: ICD-10-CM

## 2018-05-16 DIAGNOSIS — I12.0: ICD-10-CM

## 2018-05-16 DIAGNOSIS — Z87.891: ICD-10-CM

## 2018-05-16 DIAGNOSIS — R05: ICD-10-CM

## 2018-05-16 DIAGNOSIS — I25.2: ICD-10-CM

## 2018-05-16 DIAGNOSIS — K21.9: ICD-10-CM

## 2018-05-16 DIAGNOSIS — I25.10: ICD-10-CM

## 2018-05-16 DIAGNOSIS — Z79.899: ICD-10-CM

## 2018-05-16 DIAGNOSIS — Z91.040: ICD-10-CM

## 2018-05-16 DIAGNOSIS — E11.9: ICD-10-CM

## 2018-05-16 DIAGNOSIS — R07.89: Primary | ICD-10-CM

## 2018-05-16 LAB
BASOPHILS # BLD: 0.03 K/UL (ref 0–0.2)
BASOPHILS NFR BLD: 0.4 %
BUN SERPL-MCNC: 81 MG/DL (ref 7–18)
CALCIUM SERPL-MCNC: 10.3 MG/DL (ref 8.5–10.1)
CO2 SERPL-SCNC: 32 MMOL/L (ref 21–32)
CREAT SERPL-MCNC: 7.49 MG/DL (ref 0.6–1.2)
EOS ABS #: 0.26 K/UL (ref 0–0.5)
EOSINOPHIL NFR BLD AUTO: 310 K/UL (ref 130–400)
GLUCOSE SERPL-MCNC: 184 MG/DL (ref 70–99)
HCT VFR BLD CALC: 37.2 % (ref 37–47)
HGB BLD-MCNC: 11.8 G/DL (ref 12–16)
IG#: 0.01 K/UL (ref 0–0.02)
IMM GRANULOCYTES NFR BLD AUTO: 19.3 %
LYMPHOCYTES # BLD: 1.41 K/UL (ref 1.2–3.4)
MCH RBC QN AUTO: 29.3 PG (ref 25–34)
MCHC RBC AUTO-ENTMCNC: 31.7 G/DL (ref 32–36)
MCV RBC AUTO: 92.3 FL (ref 80–100)
MONO ABS #: 0.71 K/UL (ref 0.11–0.59)
MONOCYTES NFR BLD: 9.7 %
NEUT ABS #: 4.87 K/UL (ref 1.4–6.5)
NEUTROPHILS # BLD AUTO: 3.6 %
NEUTROPHILS NFR BLD AUTO: 66.9 %
PMV BLD AUTO: 10 FL (ref 7.4–10.4)
POTASSIUM SERPL-SCNC: 4 MMOL/L (ref 3.5–5.1)
RED CELL DISTRIBUTION WIDTH CV: 14.7 % (ref 11.5–14.5)
RED CELL DISTRIBUTION WIDTH SD: 49.7 FL (ref 36.4–46.3)
SODIUM SERPL-SCNC: 135 MMOL/L (ref 136–145)
WBC # BLD AUTO: 7.29 K/UL (ref 4.8–10.8)

## 2018-05-16 NOTE — DIAGNOSTIC IMAGING REPORT
CHEST ONE VIEW PORTABLE



CLINICAL HISTORY: 53 years-old Female presenting with Chest Pain. 



TECHNIQUE: Portable upright AP view of the chest was obtained.



COMPARISON: 1/14/2018.



FINDINGS:

Atherosclerosis of aortic arch. Cardiac silhouette enlarged. Enlarged main

pulmonary artery. Pulmonary vascular prominence. No focal opacity. Previously

noted left upper and left lower lung opacities have resolved. No large effusion

or pneumothorax. Osseous structures normal. Upper abdomen normal.



IMPRESSION:

1.  Cardiomegaly with volume overload and evidence of pulmonary hypertension. No

jaye pulmonary edema.



2.  Interval resolution of left upper and left lower lung infiltrates.







Electronically signed by:  Amando Thompson M.D.

5/16/2018 8:19 AM



Dictated Date/Time:  5/16/2018 6:52 AM

## 2018-05-16 NOTE — EMERGENCY ROOM VISIT NOTE
History


Report prepared by Eda:  Albert Wheat


Under the Supervision of:  Dr. Johnny Jarrett M.D.


First contact with patient:  01:22


Chief Complaint:  CHEST PAIN


Stated Complaint:  CHEST PAIN


Nursing Triage Summary:  


chest pain upon inspiration





History of Present Illness


The patient is a 53 year old female who presents to the Emergency Room with 

complaints of constant, sharp, left sided chest pain beginning this evening. 

The patient states she has a history of a stent and an MI a few years ago. She 

reports her pain was in her back when she had her MI, and this time it is in 

the front. The patient notes coughing and deep breathing increases her 

discomfort. She states she did not have chest pain or shortness of breath 

yesterday. She notes she was lying on her back when her symptoms developed. The 

patient reports she does have a history of a chronic cough that has flared up 

with her sinuses. She notes she was given four baby aspirin in the ambulance in 

route. The patient states she has kidney failure because she did not take care 

of her diabetes. She reports she has not checked her blood sugar recently. The 

patient denies falling, recent injury, taking blood thinners, rash, lying on 

her side, taking daily pain medication, problems with dialysis, and a history 

of blood clots in the legs or lungs.





   Source of History:  patient


   Onset:  this evening


   Position:  chest (left)


   Quality:  sharp


   Timing:  constant


   Modifying Factors (Worsening):  breathing (deep), other (coughing)


   Associated Symptoms:  + cough, No rash


Note:


Denies: falling, recent injury, lying on her side





Review of Systems


See HPI for pertinent positives & negatives. A total of 10 systems reviewed and 

were otherwise negative.





Past Medical & Surgical


Medical Problems:


(1) Allergic rhinitis


(2) Arthritis


(3) C. difficile colitis


(4) CAD (coronary artery disease)


(5) Carotid stenosis


(6) Diabetic foot infection


(7) DM type 2 (diabetes mellitus, type 2)


(8) Dyslipidemia


(9) ESRD (end stage renal disease) on dialysis


(10) Fall


(11) GERD (gastroesophageal reflux disease)


(12) HTN (hypertension)


(13) HX OF PAST NONCOMPLIANCE


(14) Hyperkalemia


(15) Ischemic cardiomyopathy


(16) Morbid obesity


(17) Osteomyelitis


(18) Sepsis


(19) Tobacco use disorder


(20) UGIB (upper gastrointestinal bleed)


(21) Weakness


Surgical Problems:


(1) Hemodialysis access, AV graft








Family History





Diabetes mellitus


  FATHER


  MOTHER


Heart disease


  FATHER


  MOTHER


Hypertension


  FATHER


  MOTHER


Kidney disease


  GRANDMOTHER





Social History


Smoking Status:  Former Smoker


Alcohol Use:  none


Drug Use:  none


Marital Status:  


Housing Status:  lives with family


Occupation Status:  unemployed





Current/Historical Medications


Scheduled


Acetaminophen (Tylenol), 500 MG PO Q4


Aspirin (Aspirin EC Low Dose), 81 MG PO QAM


Atorvastatin (Lipitor), 40 MG PO QAM


Azithromycin (Zithromax Z-Neil), 1 PKT PO UD


Docusate Sodium (Docusate Sodium), 200 MG PO BID


Doxycycline Monohydrate (Monodox), 100 MG PO BID


Insulin Glargine (Lantus Solostar), 12 UNITS SQ HS


Isosorbide Mononitrate (Isosorbide Mononitrate ER), 90 MG PO QAM


Metoprolol Succinate (Metoprolol Succinate ER), 50 MG PO HS


Pantoprazole (Protonix), 40 MG PO QAM


Sertraline (Zoloft), 100 MG PO HS


Sevelamer Carbonate (Renvela), 3 TAB PO TIDM





Scheduled PRN


Dextromethorphan-Guaifenesin (Mucinex Dm), 1 TAB PO Q12 PRN for CONGESTION


Dicyclomine Hcl (Bentyl), 10 MG PO QID PRN for GI CRAMPS


Loratadine (Claritin Childrens), 10 MG PO BID PRN for Seasonal Allergies


Lorazepam (Lorazepam), 0.5 MG PO BID PRN for Anxiety


Sevelamer Carbonate (Renvela), 2 TAB PO AS DIRECTED PRN for WITH SNACKS





Allergies


Coded Allergies:  


     Adhesives (Verified  Allergy, Mild, RASH, SORES, 5/16/18)


     NO KNOWN DRUG ALLERGIES (Verified  Allergy, Mild, ., 5/16/18)


     Latex1 -Allergic Contact Dermititis (Verified  Allergy, Unknown, rash, 5/16 /18)


     Pollen Extract (Verified  Allergy, Unknown, WATERY EYES, 5/16/18)





Physical Exam


Vital Signs











  Date Time  Temp Pulse Resp B/P (MAP) Pulse Ox O2 Delivery O2 Flow Rate FiO2


 


5/16/18 05:07  84 16 193/108 95   


 


5/16/18 03:44  83 16 163/93 96 Room Air  


 


5/16/18 01:22  91      


 


5/16/18 01:16 36.7 92 16 176/100 98 Room Air  











Physical Exam


GENERAL: Patient is uncomfortable appearing and in moderate distress.


EYES: No scleral icterus, unremarkable pupils.


ENT: Mucous membranes moist, no nasal congestion.


NECK: No masses appreciated, no meningismus, trachea is midline.


RESPIRATORY: No dyspnea. Clear to auscultation and equal bilaterally. No wheeze

, no rhonchi.


CHEST: Point tenderness to palpation over the left anterior chest wall. No 

rash. Equal chest rise.


CARDIOVASCULAR: Regular rate and rhythm.  No murmurs, rubs, gallops appreciated.


GASTROINTESTINAL: Abdomen soft, nontender, no peritonitis.  Bowel sounds 

positive.  No masses appreciated.


BACK: No midline tenderness, no CVA tenderness


EXTREMITIES: Normal motion all extremities, no cyanosis, no edema.


NEUROLOGIC: Alert and oriented, no acute motor or sensory deficits, no focal 

weakness, cranial nerves grossly intact.


SKIN: No jaundice, no diaphoresis. Skin picking over the left shoulder and 

forearm.





Medical Decision & Procedures


ER Provider


Diagnostic Interpretation:


X ray results are stated below per my interpretation:


Chest: 1 view: No infiltrate, no effusion, mild congestion findings. Mildly 

enlarged heart. Interval resolution of left lower lobe infiltrate from x-ray on 

1/14/18





Laboratory Results


5/16/18 01:50








Red Blood Count 4.03, Mean Corpuscular Volume 92.3, Mean Corpuscular Hemoglobin 

29.3, Mean Corpuscular Hemoglobin Concent 31.7, Mean Platelet Volume 10.0, 

Neutrophils (%) (Auto) 66.9, Lymphocytes (%) (Auto) 19.3, Monocytes (%) (Auto) 

9.7, Eosinophils (%) (Auto) 3.6, Basophils (%) (Auto) 0.4, Neutrophils # (Auto) 

4.87, Lymphocytes # (Auto) 1.41, Monocytes # (Auto) 0.71, Eosinophils # (Auto) 

0.26, Basophils # (Auto) 0.03





5/16/18 01:50

















Test


  5/16/18


01:50 5/16/18


03:36


 


White Blood Count


  7.29 K/uL


(4.8-10.8) 


 


 


Red Blood Count


  4.03 M/uL


(4.2-5.4) 


 


 


Hemoglobin


  11.8 g/dL


(12.0-16.0) 


 


 


Hematocrit 37.2 % (37-47)  


 


Mean Corpuscular Volume


  92.3 fL


() 


 


 


Mean Corpuscular Hemoglobin


  29.3 pg


(25-34) 


 


 


Mean Corpuscular Hemoglobin


Concent 31.7 g/dl


(32-36) 


 


 


Platelet Count


  310 K/uL


(130-400) 


 


 


Mean Platelet Volume


  10.0 fL


(7.4-10.4) 


 


 


Neutrophils (%) (Auto) 66.9 %  


 


Lymphocytes (%) (Auto) 19.3 %  


 


Monocytes (%) (Auto) 9.7 %  


 


Eosinophils (%) (Auto) 3.6 %  


 


Basophils (%) (Auto) 0.4 %  


 


Neutrophils # (Auto)


  4.87 K/uL


(1.4-6.5) 


 


 


Lymphocytes # (Auto)


  1.41 K/uL


(1.2-3.4) 


 


 


Monocytes # (Auto)


  0.71 K/uL


(0.11-0.59) 


 


 


Eosinophils # (Auto)


  0.26 K/uL


(0-0.5) 


 


 


Basophils # (Auto)


  0.03 K/uL


(0-0.2) 


 


 


RDW Standard Deviation


  49.7 fL


(36.4-46.3) 


 


 


RDW Coefficient of Variation


  14.7 %


(11.5-14.5) 


 


 


Immature Granulocyte % (Auto) 0.1 %  


 


Immature Granulocyte # (Auto)


  0.01 K/uL


(0.00-0.02) 


 


 


Anion Gap


  10.0 mmol/L


(3-11) 


 


 


Est Creatinine Clear Calc


Drug Dose 11.5 ml/min 


  


 


 


Estimated GFR (


American) 6.6 


  


 


 


Estimated GFR (Non-


American 5.7 


  


 


 


BUN/Creatinine Ratio 10.9 (10-20)  


 


Calcium Level


  10.3 mg/dl


(8.5-10.1) 


 


 


Troponin I


  


  0.089 ng/ml


(0-0.045)





Laboratory results as reviewed by me.





Medications Administered











 Medications


  (Trade)  Dose


 Ordered  Sig/Willy


 Route  Start Time


 Stop Time Status Last Admin


Dose Admin


 


 Fentanyl Citrate


  (Fentanyl Inj)  75 mcg  NOW  STAT


 IV  5/16/18 01:31


 5/16/18 01:33 DC 5/16/18 01:42


75 MCG


 


 Oxycodone HCl


  (Roxicodone


 Immediate Rel 5MG


 Home Pack)  1 homepack  UD  ONCE


 PO  5/16/18 05:00


 5/16/18 05:01 DC 5/16/18 05:06


1 HOMEPACK


 


 Azithromycin


  (Zithromax Tab)  500 mg  NOW  STAT


 PO  5/16/18 04:50


 5/16/18 04:51 DC 5/16/18 04:50


500 MG











ECG Per My Interpretation


Indication:  chest pain


Rate (beats per minute):  89


Rhythm:  sinus rhythm


Findings:  no acute ischemic change, no ectopy, other (QTc of 469)





ED Course


0125: The patient was evaluated in room B05. A complete history and physical 

exam was performed.





0246: I reevaluated the patient. She is not having further pain and is feeling 

well.





0334: I reevaluated the patient and discussed current test results. She is 

still pain free.





0447: Reevaluated the patient. She is feeling better. She asked for pain 

medication for when she receives dialysis. The patient reports her discomfort 

is only present with movement and palpation. She also asked for a z-pack for a 

cough she has had for two weeks. She thinks it is related to her previous 

episodes of bronchitis. Discussed results and discharge instructions: she 

verbalized understanding and agreement.   The patient is ready for discharge.





Medical Decision


Differential: Cardiac Ischemia (STEMI, NSTEMI, Unstable Angina, etc), Aortic 

Dissection, Arrhythmia, Pulmonary Embolism, Pneumonia, Pneumothorax, MSK, 

Infectious, Pericarditis/Myocarditis, Esophageal Rupture, Gastrointestinal, 

amongst other pathologies entertained.





53 yr old female with left chest pain this evening.  Notes it worsened tonight 

but admits started after harsh episode of coughing.  Patient notes bronchitis 

like cough over last few weeks.  No wheeze on exam.  She notes history of 

smoking and states Zpack helped in past.  Reviewed cough could be allergen or 

viral but given persistent will try zpack.  She is not SHOB and only notes that 

she has increased pain with deep inspiration.  She is not hypoxic nor 

tachycardic, has no history of PE/DVT, has no leg swelling and denies other 

recent PE/DVT risk factors.  Without other findings I do not feel that CT PE 

study would outweigh risks of worsening her renal function.  CP is clearly 

reproducible and with this TTP I feel symptoms likely MSK related.  I do not 

feel this is ACS and with two similar Trops and normal EKG I do not feel 

further cardiac work-up required at this time.  Trop is likely elevated 

secondary to her chronic renal failure.  Given to go oxy IR only as may be 

uncomfortable while in dialysis.  Discussed at length symptoms requiring RTED 

and I advised follow up with PCP for further evaluation and treatment.





[place] Drug Monitoring [place]


Search Results:  patient reviewed within database, see additional documentation


Drug Monitoring Findings:


recent #6 Percocet by local provider and sporadic narcotic prescriptions over 

last year.





Medication Reconcilliation


Current Medication List:  was personally reviewed by me





Blood Pressure Screening


Patient's blood pressure:  Elevated blood pressure


Blood pressure disposition:  Referred to PCP





Impression





 Primary Impression:  


 Left-sided chest wall pain


 Additional Impression:  


 Persistent cough





Scribe Attestation


The scribe's documentation has been prepared under my direction and personally 

reviewed by me in its entirety. I confirm that the note above accurately 

reflects all work, treatment, procedures, and medical decision making performed 

by me.





Departure Information


Dispostion


Home / Self-Care





Prescriptions





Azithromycin (ZITHROMAX Z-NEIL) 250 Mg Tab


1 PKT PO UD, #1 PKT


   Prov: Johnny Jarrett M.D.         5/16/18





Referrals


Lurdes Smith D.O. (PCP)





Forms


Call Back Authorization, HOME CARE DOCUMENTATION FORM,                         

                                       IMPORTANT VISIT INFORMATION





Patient Instructions


ED Chest Pain Atypical Unkn Cause, My [facility]





Additional Instructions





Please follow up with your primary care provider for further evaluation of 

this.  Return immediately if shortness of breath, passing out, worsening pain 

or other concerns.





You have received a narcotic pain medication.  These medications may cause 

drowsiness and should not be used with other sedative medications.  Do not drive

, drink alcohol, perform dangerous activities, nor make important decisions 

after taking these medications.  Long term use or inappropriate use may lead to 

addiction.





Problem Qualifiers

## 2018-07-05 NOTE — PROGRESS NOTE
Internal Med Progress Note


Date of Service:


Jan 27, 2017.


Provider Documentation:





SUBJECTIVE:





resting comfortably


denies any chest pain or sob


afebrile


ok to go home








OBJECTIVE:





Vital Signs-as noted below





Exam:


General-alert and oriented


ENT-normal hearing


Neck-no neck masses


Lungs-cta b/l no wheezing or crackles


Heart-s1 and s2 heard regular rate and rhythm no murmurs


Abdomen-soft bowel sounds present non tender no distension 


Extremities-no erythema no edema


Neuro-alert and awake


moves extremities





Lab data as noted below.


ASSESSMENT & PLAN:


HYPERKALEMIA  with ESRD 


Missed Dialysis since 16th Jan


Generally weak and lethargic


EKG changes-resolved  


Received Insulin and Dextrose


had dialysis


resolved


f/u with nephrology








SHORTNESS OF BREATH 


Most likely secondary to volume overload in setting of ESRD with noncompliance 

with dialysis 


s/p dialysis


improved





Left Internal Carotid artery stenosis


vascular recommends surgery


f/u with vascular surgery in 2 weeks





CHEST PAIN- history of CAD 


Has had bare metal stent placed August 2016 


ELEVATED TROPONIN -renal failure contributing 


EKG changes secondary to Increased K


Currently chest pain free


continuing home meds


stable





HYPERTENSIVE URGENCY 


-SBP >190s


Has been noncompliant with home BP meds for weeks to months


Improved BP with nitro paste,and home meds


BP is controlled 


discharged on home meds


followup with PCP.








RIGHT 2ND FINGER INFECTION 


[facility]


no spreading cellulitis 


to continue current antibiotic


appreciate Vascular surgery input


has appointment with orthopedics





DIARRHEA


c diff negative





IDDM


recent A1c 7.2 


d/c on home meds











GERD


on  Zantac 





HLD


 Statin 





DEPRESSION


on Zoloft 








discharged home


Vital Signs:











  Date Time  Temp Pulse Resp B/P Pulse Ox O2 Delivery O2 Flow Rate FiO2


 


1/27/17 12:27 36.8 68 18  95 Room Air  


 


1/27/17 12:00      Room Air  


 


1/27/17 11:50 36.8 68 18 93/60 95   


 


1/27/17 09:47 36.7 81  124/72    


 


1/27/17 09:15  74  116/71    


 


1/27/17 09:05  69  106/61    


 


1/27/17 09:00  68  82/40    


 


1/27/17 08:45  68  112/59    


 


1/27/17 08:30  72  131/84    


 


1/27/17 08:15  67  124/61    


 


1/27/17 08:00  76  119/60    


 


1/27/17 08:00      Room Air  


 


1/27/17 07:45  71  116/62    


 


1/27/17 07:43 36.8 76 18 94/58 98 Room Air  


 


1/27/17 07:30  68  110/61    


 


1/27/17 07:15  69  121/79    


 


1/27/17 07:00  69  119/68    


 


1/27/17 06:45  82  124/71    


 


1/27/17 06:30  80  141/59    


 


1/27/17 06:15 36.5 69  152/68    


 


1/27/17 04:00      Room Air  


 


1/27/17 03:55 36.9 55 18 97/39 96 Room Air  


 


1/27/17 00:04 36.9 56 18 93/53 94 Room Air  


 


1/26/17 23:59      Room Air  


 


1/26/17 20:00      Room Air  








Lab Results:





Results Past 24 Hours








Test


  1/26/17


20:05 1/27/17


05:12 1/27/17


06:34 1/27/17


09:56 Range/Units


 


 


Bedside Glucose 114  126 180 70-90  mg/dl


 


White Blood Count  8.21   4.8-10.8  K/uL


 


Red Blood Count  3.90   4.2-5.4  M/uL


 


Hemoglobin  11.7   12.0-16.0  g/dL


 


Hematocrit  35.9   37-47  %


 


Mean Corpuscular Volume  92.1     fL


 


Mean Corpuscular Hemoglobin  30.0   25-34  pg


 


Mean Corpuscular Hemoglobin


Concent 


  32.6


  


  


  32-36  g/dl


 


 


RDW Standard Deviation  48.1   36.4-46.3  fL


 


RDW Coefficient of Variation  14.4   11.5-14.5  %


 


Platelet Count  329   130-400  K/uL


 


Mean Platelet Volume  10.9   7.4-10.4  fL


 


Sodium Level  134   136-145  mmol/L


 


Potassium Level  4.5   3.5-5.1  mmol/L


 


Chloride Level  98     mmol/L


 


Carbon Dioxide Level  25   21-32  mmol/L


 


Anion Gap  11.0   3-11  mmol/L


 


Blood Urea Nitrogen  50   7-18  mg/dl


 


Creatinine


  


  9.20


  


  


  0.60-1.20


mg/dl


 


Est Creatinine Clear Calc


Drug Dose 


  8.9


  


  


   ml/min


 


 


Estimated GFR (


American) 


  5.2


  


  


   


 


 


Estimated GFR (Non-


American 


  4.5


  


  


   


 


 


BUN/Creatinine Ratio  5.4   10-20  


 


Random Glucose  178   70-99  mg/dl


 


Calcium Level  9.1   8.5-10.1  mg/dl














Test


  1/27/17


11:22 


  


  


  Range/Units


 


 


Bedside Glucose 205    70-90  mg/dl Partial/Upper/Lower

## 2018-08-08 ENCOUNTER — HOSPITAL ENCOUNTER (EMERGENCY)
Dept: HOSPITAL 45 - C.EDB | Age: 53
Discharge: HOME | End: 2018-08-08
Payer: COMMERCIAL

## 2018-08-08 VITALS
WEIGHT: 235.89 LBS | WEIGHT: 235.89 LBS | HEIGHT: 65.98 IN | BODY MASS INDEX: 37.91 KG/M2 | HEIGHT: 65.98 IN | BODY MASS INDEX: 37.91 KG/M2

## 2018-08-08 VITALS — TEMPERATURE: 98.42 F

## 2018-08-08 VITALS — OXYGEN SATURATION: 95 % | SYSTOLIC BLOOD PRESSURE: 139 MMHG | DIASTOLIC BLOOD PRESSURE: 66 MMHG | HEART RATE: 90 BPM

## 2018-08-08 DIAGNOSIS — Z87.891: ICD-10-CM

## 2018-08-08 DIAGNOSIS — D64.9: ICD-10-CM

## 2018-08-08 DIAGNOSIS — Z79.4: ICD-10-CM

## 2018-08-08 DIAGNOSIS — M19.90: ICD-10-CM

## 2018-08-08 DIAGNOSIS — M25.561: ICD-10-CM

## 2018-08-08 DIAGNOSIS — Z91.048: ICD-10-CM

## 2018-08-08 DIAGNOSIS — Z79.899: ICD-10-CM

## 2018-08-08 DIAGNOSIS — N18.5: ICD-10-CM

## 2018-08-08 DIAGNOSIS — M54.5: ICD-10-CM

## 2018-08-08 DIAGNOSIS — R05: ICD-10-CM

## 2018-08-08 DIAGNOSIS — Z91.040: ICD-10-CM

## 2018-08-08 DIAGNOSIS — M77.9: Primary | ICD-10-CM

## 2018-08-08 DIAGNOSIS — Z99.2: ICD-10-CM

## 2018-08-08 DIAGNOSIS — E87.1: ICD-10-CM

## 2018-08-08 DIAGNOSIS — I25.2: ICD-10-CM

## 2018-08-08 DIAGNOSIS — I12.0: ICD-10-CM

## 2018-08-08 DIAGNOSIS — E66.9: ICD-10-CM

## 2018-08-08 DIAGNOSIS — Z79.82: ICD-10-CM

## 2018-08-08 DIAGNOSIS — M25.551: ICD-10-CM

## 2018-08-08 DIAGNOSIS — E11.9: ICD-10-CM

## 2018-08-08 LAB
BASOPHILS # BLD: 0.03 K/UL (ref 0–0.2)
BASOPHILS NFR BLD: 0.3 %
BUN SERPL-MCNC: 26 MG/DL (ref 7–18)
CALCIUM SERPL-MCNC: 9.7 MG/DL (ref 8.5–10.1)
CO2 SERPL-SCNC: 36 MMOL/L (ref 21–32)
CREAT SERPL-MCNC: 4.69 MG/DL (ref 0.6–1.2)
EOS ABS #: 0.06 K/UL (ref 0–0.5)
EOSINOPHIL NFR BLD AUTO: 350 K/UL (ref 130–400)
GLUCOSE SERPL-MCNC: 201 MG/DL (ref 70–99)
HCT VFR BLD CALC: 34.4 % (ref 37–47)
HGB BLD-MCNC: 10.9 G/DL (ref 12–16)
IG#: 0.02 K/UL (ref 0–0.02)
IMM GRANULOCYTES NFR BLD AUTO: 15.3 %
LYMPHOCYTES # BLD: 1.4 K/UL (ref 1.2–3.4)
MCH RBC QN AUTO: 30.3 PG (ref 25–34)
MCHC RBC AUTO-ENTMCNC: 31.7 G/DL (ref 32–36)
MCV RBC AUTO: 95.6 FL (ref 80–100)
MONO ABS #: 1.08 K/UL (ref 0.11–0.59)
MONOCYTES NFR BLD: 11.8 %
NEUT ABS #: 6.56 K/UL (ref 1.4–6.5)
NEUTROPHILS # BLD AUTO: 0.7 %
NEUTROPHILS NFR BLD AUTO: 71.7 %
PMV BLD AUTO: 10.1 FL (ref 7.4–10.4)
POTASSIUM SERPL-SCNC: 3.4 MMOL/L (ref 3.5–5.1)
RED CELL DISTRIBUTION WIDTH CV: 15.9 % (ref 11.5–14.5)
RED CELL DISTRIBUTION WIDTH SD: 56 FL (ref 36.4–46.3)
SODIUM SERPL-SCNC: 129 MMOL/L (ref 136–145)
WBC # BLD AUTO: 9.15 K/UL (ref 4.8–10.8)

## 2018-08-08 NOTE — DIAGNOSTIC IMAGING REPORT
R SHOULDER MIN 2 VIEWS ROUTINE



CLINICAL HISTORY: R anterior shoulder pain pain



COMPARISON: None.



DISCUSSION: Minimal degenerative change glenohumeral as well as

acromioclavicular joints. Minimal calcific supraspinatus tendinitis. No evidence

for fracture or dislocation. There is no evidence for soft tissue swelling.



IMPRESSION: Minimal degenerative change. Mild calcific supraspinatus tendinitis.











The above report was generated using voice recognition software.  It may contain

grammatical, syntax or spelling errors.







Electronically signed by:  Dipak Lopez M.D.

8/8/2018 7:53 PM



Dictated Date/Time:  8/8/2018 7:52 PM

## 2018-08-08 NOTE — DIAGNOSTIC IMAGING REPORT
LUMBAR SPINE 2 OR 3 VIEWS



CLINICAL HISTORY: midline and b/l paraspinal LBP pain



COMPARISON STUDY:  No previous studies for comparison.



FINDINGS: No evidence for compression deformity. Disc spaces are well-preserved.

Mild scoliosis. Vascular calcification of the abdomen or in



IMPRESSION:  No acute process. Mild degenerative change. Mild scoliosis. 











The above report was generated using voice recognition software.  It may contain

grammatical, syntax or spelling errors.







Electronically signed by:  Dipak Lopez M.D.

8/8/2018 7:54 PM



Dictated Date/Time:  8/8/2018 7:53 PM

## 2018-08-08 NOTE — EMERGENCY ROOM VISIT NOTE
History


Report prepared by Eda:  Valerie Boudreaux


Under the Supervision of:  Dr. Buck Garcia M.D.


First contact with patient:  18:20


Chief Complaint:  PAIN (GENERALIZED)


Stated Complaint:  GENERALIZED PAIN,KNEE,BACK,HIP,SHOULDER





History of Present Illness


The patient is a 53 year old white female with a past medical history of 

arthritis, CAD, C. difficile colitis, DM type II, ESRD, GERD, HTN, hyperkalemia

, osteomyelitis, and ischemic cardiomyopathy  who presents to the ED with a cc 

of worsening generalized pain beginning earlier today. Positive wet cough with 

yellow phlegm. Negative urinary symptoms, recent trauma, recent falls, recent 

camping, recent hunting, tick bites. She notes she has mild lower back pain 

which began today as well as right shoulder pain, right hip pain, and right 

knee pain. The patient is unsure of what caused her increased pain and notes 

that usually Aspercreme alleviates her pain, but it did not modify her symptoms 

today. She notes she has a bulging disk in her back and is a kidney patient on 

MWF dialysis with her last dialysis being today. She is a former smoker and 

quit 5 years ago.





   Source of History:  patient


   Onset:  earlier today


   Position:  shoulder (right), back (lower), knee (right), other (right hip)


   Quality:  other (increased pain)


   Timing:  worsening


   Associated Symptoms:  + cough (wet with yellow phlegm), No urinary symptoms


Note:


Positive right hip pain, right knee pain, right hip pain. Negative recent trauma

, recent falls, recent camping, recent hunting, tick bites





Review of Systems


See HPI for pertinent positives and negatives.  A total of ten systems were 

reviewed and were otherwise negative.





Past Medical & Surgical


Medical Problems:


(1) Allergic rhinitis


(2) Arthritis


(3) C. difficile colitis


(4) CAD (coronary artery disease)


(5) Carotid stenosis


(6) Chest pain


(7) Diabetic foot infection


(8) DM type 2 (diabetes mellitus, type 2)


(9) Dyslipidemia


(10) Elevated troponin I level


(11) ESRD (end stage renal disease) on dialysis


(12) Fall


(13) GERD (gastroesophageal reflux disease)


(14) HTN (hypertension)


(15) HX OF PAST NONCOMPLIANCE


(16) Hyperkalemia


(17) Ischemic cardiomyopathy


(18) Morbid obesity


(19) Osteomyelitis


(20) Sepsis


(21) Tobacco use disorder


(22) UGIB (upper gastrointestinal bleed)


(23) Weakness


Surgical Problems:


(1) Hemodialysis access, AV graft








Family History





Diabetes mellitus


  FATHER


  MOTHER


Heart disease


  FATHER


  MOTHER


Hypertension


  FATHER


  MOTHER


Kidney disease


  GRANDMOTHER





Social History


Smoking Status:  Former Smoker


Alcohol Use:  none


Drug Use:  none


Marital Status:  


Housing Status:  lives with family


Occupation Status:  unemployed





Current/Historical Medications


Scheduled


Aspirin (Aspirin EC Low Dose), 81 MG PO QAM


Atorvastatin (Lipitor), 40 MG PO QAM


Benzonatate (Benzonatate), 100 MG PO TID


Clopidogrel (Plavix), 75 MG PO DAILY


Insulin Glargine (Lantus Solostar), 12 UNITS SQ HS


Isosorbide Mononitrate (Isosorbide Mononitrate ER), 90 MG PO QAM


Metoprolol Succinate (Metoprolol Succinate ER), 50 MG PO HS


Nitroglycerin (Nitrostat), 0.4 MG UT PRN


Pantoprazole (Protonix), 40 MG PO BID


Sertraline (Zoloft), 100 MG PO HS


Sevelamer Carbonate (Renvela), 3 TAB PO TIDM





Scheduled PRN


Loratadine (Claritin Childrens), 10 MG PO BID PRN for Seasonal Allergies


Lorazepam (Lorazepam), 0.5 MG PO BID PRN for Anxiety


Sevelamer Carbonate (Renvela), 2 TAB PO AS DIRECTED PRN for WITH SNACKS





Allergies


Coded Allergies:  


     Adhesives (Verified  Allergy, Mild, RASH, SORES, 7/8/18)


     NO KNOWN DRUG ALLERGIES (Verified  Allergy, Mild, ., 5/16/18)


     Latex1 -Allergic Contact Dermititis (Verified  Allergy, Unknown, rash, 7/8/ 18)


     Pollen Extract (Verified  Allergy, Unknown, WATERY EYES, 5/16/18)





Physical Exam


Vital Signs











  Date Time  Temp Pulse Resp B/P (MAP) Pulse Ox O2 Delivery O2 Flow Rate FiO2


 


8/8/18 20:23  90 18 139/66 95 Room Air  


 


8/8/18 19:51  92      


 


8/8/18 18:17 36.9 102 20  96 Room Air  











Physical Exam


GENERAL: Awake, alert, chronically ill-appearing, NAD


HENT: Normocephalic, atraumatic.


EYES: Normal conjunctiva. Sclera non-icteric. PERRL. No anisocoria.


NECK: Supple. No nuchal rigidity. FROM.


RESPIRATORY: CTAB, no rhonchi, wheezing, crackles


CARDIAC: RRR, Systolic ejection murmur 


ABDOMEN: Soft, NTND, BS+


MSK: No chest wall TTP, no LE edema. LUE AVF with palpable thrill. Mild pain to 

right anterior shoulder. Right knee no evidence of any overlying skin changes, 

erythema, or effusion. Mild low midline and paraspinal LBP negative SLR B/L


NEURO: GCS 15, CN 2-12 intact, moves all 4s on command. No saddle anesthesia. 


SKIN: No rash or jaundice noted.





Medical Decision & Procedures


ER Provider


Diagnostic Interpretation:


Radiology results as stated below per my review and radiologist interpretation: 





R SHOULDER MIN 2 VIEWS ROUTINE





CLINICAL HISTORY: R anterior shoulder pain pain





COMPARISON: None.





DISCUSSION: Minimal degenerative change glenohumeral as well as


acromioclavicular joints. Minimal calcific supraspinatus tendinitis. No evidence


for fracture or dislocation. There is no evidence for soft tissue swelling.





IMPRESSION: Minimal degenerative change. Mild calcific supraspinatus tendinitis.








The above report was generated using voice recognition software.  It may contain


grammatical, syntax or spelling errors.








Electronically signed by:  Dipak Lopez M.D.


8/8/2018 7:53 PM











LUMBAR SPINE 2 OR 3 VIEWS





CLINICAL HISTORY: midline and b/l paraspinal LBP pain





COMPARISON STUDY:  No previous studies for comparison.





FINDINGS: No evidence for compression deformity. Disc spaces are well-preserved.


Mild scoliosis. Vascular calcification of the abdomen or in





IMPRESSION:  No acute process. Mild degenerative change. Mild scoliosis. 








The above report was generated using voice recognition software.  It may contain


grammatical, syntax or spelling errors.








Electronically signed by:  Dipak Lopez M.D.


8/8/2018 7:54 PM





Laboratory Results


8/8/18 18:50








Red Blood Count 3.60, Mean Corpuscular Volume 95.6, Mean Corpuscular Hemoglobin 

30.3, Mean Corpuscular Hemoglobin Concent 31.7, Mean Platelet Volume 10.1, 

Neutrophils (%) (Auto) 71.7, Lymphocytes (%) (Auto) 15.3, Monocytes (%) (Auto) 

11.8, Eosinophils (%) (Auto) 0.7, Basophils (%) (Auto) 0.3, Neutrophils # (Auto

) 6.56, Lymphocytes # (Auto) 1.40, Monocytes # (Auto) 1.08, Eosinophils # (Auto

) 0.06, Basophils # (Auto) 0.03





8/8/18 18:50

















Test


  8/8/18


18:50


 


White Blood Count


  9.15 K/uL


(4.8-10.8)


 


Red Blood Count


  3.60 M/uL


(4.2-5.4)


 


Hemoglobin


  10.9 g/dL


(12.0-16.0)


 


Hematocrit 34.4 % (37-47) 


 


Mean Corpuscular Volume


  95.6 fL


()


 


Mean Corpuscular Hemoglobin


  30.3 pg


(25-34)


 


Mean Corpuscular Hemoglobin


Concent 31.7 g/dl


(32-36)


 


Platelet Count


  350 K/uL


(130-400)


 


Mean Platelet Volume


  10.1 fL


(7.4-10.4)


 


Neutrophils (%) (Auto) 71.7 % 


 


Lymphocytes (%) (Auto) 15.3 % 


 


Monocytes (%) (Auto) 11.8 % 


 


Eosinophils (%) (Auto) 0.7 % 


 


Basophils (%) (Auto) 0.3 % 


 


Neutrophils # (Auto)


  6.56 K/uL


(1.4-6.5)


 


Lymphocytes # (Auto)


  1.40 K/uL


(1.2-3.4)


 


Monocytes # (Auto)


  1.08 K/uL


(0.11-0.59)


 


Eosinophils # (Auto)


  0.06 K/uL


(0-0.5)


 


Basophils # (Auto)


  0.03 K/uL


(0-0.2)


 


RDW Standard Deviation


  56.0 fL


(36.4-46.3)


 


RDW Coefficient of Variation


  15.9 %


(11.5-14.5)


 


Immature Granulocyte % (Auto) 0.2 % 


 


Immature Granulocyte # (Auto)


  0.02 K/uL


(0.00-0.02)


 


Anion Gap


  6.0 mmol/L


(3-11)


 


Est Creatinine Clear Calc


Drug Dose 17.6 ml/min 


 


 


Estimated GFR (


American) 11.7 


 


 


Estimated GFR (Non-


American 10.1 


 


 


BUN/Creatinine Ratio 5.6 (10-20) 


 


Calcium Level


  9.7 mg/dl


(8.5-10.1)


 


Troponin I


  0.162 ng/ml


(0-0.045)





Laboratory results reviewed by me





Medications Administered











 Medications


  (Trade)  Dose


 Ordered  Sig/Willy


 Route  Start Time


 Stop Time Status Last Admin


Dose Admin


 


 Acetaminophen


  (Tylenol Tab)  650 mg  NOW  STAT


 PO  8/8/18 18:35


 8/8/18 18:38 DC 8/8/18 18:50


650 MG


 


 Oxycodone HCl


  (Roxicodone


 Immediate Rel Tab)  5 mg  NOW  STAT


 PO  8/8/18 18:35


 8/8/18 18:38 DC 8/8/18 18:50


5 MG


 


 Fentanyl Citrate


  (Fentanyl Inj)  25 mcg  NOW  ONCE


 IV  8/8/18 20:15


 8/8/18 20:16 DC 8/8/18 20:21


25 MCG











ECG Per My Interpretation


Indication:  back/shoulder pain


Rate (beats per minute):  91


Rhythm:  normal sinus


Findings:  other (normal intervals, normal axis, no STS changes or TWI)


Comparison ECG Date:  7/25/2018


Change:


Compared to prior, her T-waves have improved in the inferior leads





ED Course


1823: The patient was evaluated in room A10. A complete history and physical 

exam was performed.





2040: I reevaluated the patient. Discussed results and discharge instructions: 

She verbalized understanding and agreement. The patient is ready for discharge.





Medical Decision


Nursing notes reviewed. Ancillary studies and prior records reviewed. 


Differential diagnosis:


Etiologies such as fracture, dislocation, neurovascular compromise, compartment 

syndrome, soft tissue injury, as well as others were entertained. 








Patient was seen and evaluated the bedside.  The patient was complaining some 

generalized discomfort primarily in her back shoulder and left hip and knee.  

The patient has had a previous knee film which did not show any acute injury.  

The patient does have a known history of a recent end STEMI.  The patient 

denies any chest pains.  No exertional symptoms.  Patient did have dialysis 

today and does have a left upper extremity fistula which has a good palpable 

thrill.  The patient denies infectious symptoms with the exception of an 

occasional productive cough.  The patient did have some plain films completed 

along with some blood work.  The patient does have known CKD.  Patient's 

potassium is not abnormally high that requires treatment.  The patient does 

have an elevated troponin.  Given that the patient actually has improvement in 

her EKG denies chest pain and has a history of chronic kidney disease along 

with chronic elevations in troponin that are improved compared to her most 

recent NSTEMI I do not believe that she requires further treatment.  Patient 

does have some chronic hyponatremia and anemia.  Patient was feeling improved 

after medication.  Patient was deemed suitable for outpatient follow-up and 

treatment at this time.  Patient was given strict follow-up, discharge, and 

return precautions.  All questions were answered.  Patient was deemed suitable 

for outpatient follow-up at this time.  Patient agreed with the plan of care 

and was safely discharged home.





Medication Reconcilliation


Current Medication List:  was personally reviewed by me





Blood Pressure Screening


Patient's blood pressure:  Normal blood pressure


Blood pressure disposition:  Did not require urgent referral





Impression





 Primary Impression:  


 Tendinitis


 Additional Impressions:  


 Arthritis


 Obesity


 Hyponatremia


 Anemia





Scribe Attestation


The scribe's documentation has been prepared under my direction and personally 

reviewed by me in its entirety. I confirm that the note above accurately 

reflects all work, treatment, procedures, and medical decision making performed 

by me.





Departure Information


Dispostion


Home / Self-Care





Referrals


Lurdes Smith D.O. (PCP)





Forms


HOME CARE DOCUMENTATION FORM,                                                 

               IMPORTANT VISIT INFORMATION, WORK / SCHOOL INSTRUCTIONS





Patient Instructions


ED RICE, ED Tendinitis Rotator Cuff, [facility]





Additional Instructions





Please return to the emergency department if you have worsening or recurrent 

symptoms not amenable to at-home treatment.  Please call for a follow-up 

appointment with her primary care physician.  Please take your medications as 

prescribed.  If you have other concerns and/or complaints please feel free to 

also call your primary care physician's office or return the ED for further 

evaluation, management, and treatment.





You were found to have an elevated blood pressure today (>120 sytolic or >90 

diastolic). Per medicare guidelines, you need to follow up with this blood 

pressure screening with your Primary Care Physician (PCP). For a new PCP call 

[phone].





You received narcotic or benzodiazepene medication while in the emergency room 

today. This is an addictive medication that may cause drowziness as well as 

constipation. Do not drive, operate heavy machinery, or drink alcohol under the 

influence of this medication.





You may take tylenol 650 mg every 6 hours as needed for pain/fever unless told 

by your physician to not take it or have liver problems. You may take motrin 

and tylenol separately or at the same time. 





Take your medications as prescribed. 





You have been examined and treated today on an emergency basis only. This is 

not a substitute for, or an effort to provide, complete comprehensive medical 

care. It is impossible to recognize and treat all injuries or illnesses in a 

single emergency department visit. It is therefore important that you follow up 

closely with [facility], your PCP, and/or your specialist(s). 

Call as soon as possible for an appointment.





Thank you for your time and consideration. I look forward to speaking with you 

again soon. Please don't hesitate to call us if you have any questions.





Problem Qualifiers








 Additional Impressions:  


 Obesity


 Obesity type:  unspecified obesity type  Obesity classification:  adult class 

2 (BMI 35 - 39.9)  Serious obesity comorbidity presence:  with serious 

comorbidity  Body mass index:  BMI 38.0-38.9  Qualified Codes:  E66.01 - Morbid 

(severe) obesity due to excess calories; Z68.38 - Body mass index (bmi) 38.0-

38.9, adult


 Anemia


 Anemia type:  unspecified type  Qualified Codes:  D64.9 - Anemia, unspecified

## 2019-02-10 NOTE — HISTORY & PHYSICAL EXAMINATION
DATE OF ADMISSION:  02/14/2017

 

PRIMARY CARE PHYSICIAN:  Dr. Smith.

 

CHIEF COMPLAINT:  Hematemesis.

 

HISTORY OF PRESENT ILLNESS:  

Medical history significant for chronic diastolic heart failure, EF 65%, 

CAD status post stenting, past tobacco abuse, DM2 insulin requiring, 

end-stage renal disease on hemodialysis, chronic anemia, baseline hemoglobin 10-
11,

GERD as per records.  



Recent confinement last week for hematemesis.  

As per records, patient refused EGD.  No endoscopy done as hemoglobin stable 
and 

GI bleed resolved.

Patient subsequently discharged.  

Patient also discharged on prednisone taper for right hand pain, possible gout.

  

Yesterday, patient had epigastric discomfort, achy going to her chest.  

with some shortness of breath.  

Patient subsequently had bloody emesis.

Denies black/bloody stools. 



At the Emergency Room, the patient received Protonix bolus for GI bleed.

 

MEDICAL HISTORY:  As above.

Hemodialysis Monday, Wednesday, Friday.  



SURGERIES:  Vascular procedures.

 

HOME MEDICATIONS:  Include aspirin, Lipitor, Plavix, PhosLo, Lantus, Imdur

ER, isosorbide mononitrate, Ativan, Lopressor, Zantac, sertraline, tramadol.

 

ALLERGIES:  ADHESIVE, POLLEN EXTRACT.

 

FAMILY HISTORY:  There is a family history of heart disease, diabetes.

 

PERSONAL AND SOCIAL HISTORY:  Past tobacco abuse.  No chronic ETOH intake. 

Disabled.

 

REVIEW OF SYSTEMS:  As per HPI. all other ROS negative.

 

PHYSICAL EXAMINATION:

VITAL SIGNS:  Blood pressure was noted to be initially SBP 220s later 178/106, 
pulse

rate 79, RR 19, sats 102 liters.

GENERAL:  Noted to obese, chronically ill.  No respiratory distress.

SKIN:  Sallow.

HEENT: Pale palpebral conjunctivae. dry buccal mucosa

NECK:  Short neck.

LUNGS:  Decreased breath sounds.

HEART:  Regular rate and rhythm.

ABDOMEN:  Epigastric tenderness.

EXTREMITIES:  Minimal LE edema. minimal R hand edema w/ dressing

NEUROLOGIC:  No gross focality.

 

LABS:  Hemoglobin was noted to be 12.4, hematocrit 37.1, white cell count was

noted to be 12, platelets 389.  Sodium 135, potassium 5.1, chloride 96, CO2

22, BUN 48, creatinine 7.8, glucose was noted to be 231.  Troponin 0.444. 



EKG rate 105, sinus tachycardia, nonspecific T-wave abnormalities in the 
inferior leads.



Chest x-ray showed stable cardiomegaly.  



Gallbladder ultrasound initial read cholelithiasis, no jaye

cholecystitis. 



Hemoglobin A1c was 7.4 last November 2016. 



ASSESSMENT:

1.  Recurrent upper gastrointestinal bleeding.  

possible etiologies include ; esophagitis, gastritis, MWT, PUD, tumor

hemodynamically stable.

2.  hx chronic diastolic HF, px on the dry side

3.  hx CAD sp stenting

4.  troponinemia secondary to hypertensive urgency secondary to illness, CKD

5.  DM2 insulin requiring, reasonable control of recent HgA1c.  

elevated likely 2 to stress and outpx steroid course.

6.  ESRD on HD

7.  Rt hand pain (possible gout as per records of recent confinement), improved 
on outpx steroid treatment.

8.  Past tobacco abuse 

 

PLAN: 

Observation 

PCU.  

ff HH, transfuse PRBC if less than 8. (hx CAD)

appropriate to hold home ASA, Plavix for now

IV PPI.  

GI consult RE recurrent UGIB.  

Patient amenable to endoscopy if necessary



Facilitate home blood pressure meds and analgesia.

ff trops



Nephrology consult RE dialysis management



basal insulin, ISS BG goal 140-180.  



DVT prophylaxis, SCDs.  RE GI bleed. 



Full code. 

 

 

 

MTDD
hematuria

## 2019-05-17 NOTE — PROGRESS NOTE
Progress Note


Date of Service


Mar 8, 2017.





Progress Note


Patient for left leg arteriogram with intervention.


I have discussed the risks options and benefits of the procedure with the 

patient.  The patient understands the risks options and benefits and agrees to 

the procedure.


I have examined the patient, reviewed the History & Physical and in the 

interval since the performance of the History & Physical I have noted the 

following changes of clinical significance: No changes noted decreased ability to consume sufficient energy

## 2020-01-17 NOTE — HISTORY & PHYSICAL BRIDGE NOTE
H&P Re-Evaluation


Bridge Note:


I have examined the patient, reviewed the History & Physical and in the 

interval since the performance of the History & Physical I have noted the 

following changes of clinical significance: No changes noted Yes
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
## 2021-12-14 NOTE — [facility] OPERATIVE REPORT
How Severe Is Your Skin Lesion?: mild
Operative Report


Date of Service


Apr 3, 2018.





Operative Report


1.  PREOPERATIVE DIAGNOSIS:  Mature ataract of the left eye.





2.  POSTOPERATIVE DIAGNOSIS:  Same.





3.  PROCEDURE:  Phacoemulsification with intraocular lens implantation of the 

left eye.  





SURGEON:  Dr. Ike Avalos.  ANESTHESIA:  Topical Lidocaine gel, 1% Non-

Preserved intracameral Lidocaine, and monitored intravenous sedation.  





INDICATIONS FOR THE PROCEDURE:  The patient is a 52 - year-old female with a 

history of cataract of the left eye causing significant visual impairment.  The 

details of the proposed procedure were explained to the patient who asked 

appropriate questions and following discussion of all risks, benefits and 

alternatives agreed to have the procedure done.  The patient had a mature 

cataract with no red reflex and therefore plans were made preoperatively to use 

Vision Blue dye.





4.  OPERATION AND FINDINGS:  





DESCRIPTION OF PROCEDURE:  After informed consent was obtained, the patient was 

brought to the Operating Room at the [facility].  The 

patient was placed in a supine position and then the left eye was prepped and 

draped in the usual sterile fashion for intraocular surgery.  A drop of topical 

Lidocaine gel was placed in the operative eye.  A wire lid speculum was then 

placed in the fornices.  A corneal paracentesis was then created temporally.  

The Non-Preserved Lidocaine was then instilled into the anterior chamber. Under 

an air bubble the anterior lens capsule was painted with Vision Blue dye.  The 

excess dye was then irrigated from the eye using balanced salt solution. The 

anterior chamber was then pressurized with viscoelastic.  A 2.0 mm clear 

corneal incision was then created temporally.  A cystotome was inserted into 

the anterior chamber and used to create a tear in the anterior lens capsule.  

This capsular tear was then used to create a small flap and the flap was 

dragged in a counterclockwise direction in order to create a continuous 

curvilinear capsulorrhexis.  Hydrodissection was accomplished with balanced 

salt solution.  Phacoemulsification of the lens nucleus was then performed in a 

standard divide-and-conquer technique.  The phaco time was 2 minutes and 23 

seconds with an average power of 28 %.  During phacoemulsification ocucoat was 

repeatedly instilled into the anterior chamber.  The remaining cortical 

material was removed using irrigation aspiration.  The capsular bag was then 

filled with viscoelastic.  A B[employer]ch [employer] Lomb MI60L +18.0 diopters lens was then 

loaded into the injector and injected into the capsular bag.  The remaining 

viscoelastic was removed with the irrigation aspiration handpiece.  The wound 

was hydrated and then checked and found to be watertight.  The intraocular 

pressure was checked and found to be adequate.  The wire lid speculum was 

removed and the patient's face was cleaned and dried.  TobraDex ointment was 

placed in the inferior fornix.  The patient was discharged to the Recovery Room 

having tolerated the procedure well.  There were no complications.  The patient 

will be seen tomorrow in the office for follow-up.


I attest to the content of the Intraoperative Record and any orders documented 

therein.  Any exceptions are noted below.
Have Your Skin Lesions Been Treated?: not been treated
Is This A New Presentation, Or A Follow-Up?: Skin Lesions
Which Family Member (Optional)?: grandfather
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
## 2023-01-31 NOTE — ANESTHESIOLOGY PROGRESS NOTE
Anesthesia Post Op Note


Date & Time


Aug 15, 2017 at 11:54





Vital Signs


Pain Intensity:  0





Vital Signs Past 12 Hours








  Date Time  Temp Pulse Resp B/P (MAP) Pulse Ox O2 Delivery O2 Flow Rate FiO2


 


8/15/17 11:40  57 20 111/60 97 Nasal Cannula 2 


 


8/15/17 11:30  57 20 123/80 96 Nasal Cannula 2 


 


8/15/17 11:20  57 20 131/64 100 Oxymask 10 


 


8/15/17 11:10  55 16 144/75 100 Oxymask 10 


 


8/15/17 11:00 36 61 16 153/68 100 Oxymask 10 


 


8/15/17 07:06 36.7 73 18 176/95 (122) 100 Room Air  


 


8/15/17 06:36 36.8 68 16 157/87 (110) 96 Room Air  


 


8/15/17 00:07 36.5 63 18 145/76 (99) 93 Room Air  


 


8/15/17 00:00      Room Air  











Notes


Mental Status:  alert / awake / arousable, participated in evaluation


Pt Amnestic to Procedure:  Yes


Nausea / Vomiting:  adequately controlled


Pain:  adequately controlled


Airway Patency, RR, SpO2:  stable & adequate


BP & HR:  stable & adequate


Hydration State:  stable & adequate


Anesthetic Complications:  no major complications apparent
[id number]

## 2023-10-04 NOTE — PROGRESS NOTE
Internal Med Progress Note


Date of Service:


Jan 25, 2017.


Provider Documentation:





SUBJECTIVE:





The patient was sen and examined 


Did not receive any dialysis since Monday the 16th 


Weak and SOB 











OBJECTIVE:





Vital Signs-as noted below





Exam:


General-Generally weak and lethargic


Eyes-Normal


ENT-normal


Neck-Supple


Lungs-Decreased breath sound bilaterally 


Crackles bilaterally at the bases


Heart-Regular,no murmur


Abdomen-Benign,no masses 


Extremities-Trace edema bilaterally 


very small dry gangrene like lesion right index tip and left big toe tip


No cellulitis


Neuro-AAox3





Lab data as noted below.


ASSESSMENT & PLAN:





HYPERKALEMIA  with ESRD 


Missed Dialysis since 16th Jan


Generally weak and lethargic


EKG changes 


Received Insulin and Dextrose


Will get Dialysis 


Appreciate Nephrology input 








SHORTNESS OF BREATH 


-likely secondary to volume overload in setting of ESRD with noncompliance with 

dialysis 


-Will need dialysis 


-resume home meds


-Nephrology consulted for fluid management 





CHEST PAIN- history of CAD 


Has had bare metal stent placed August 2016 


ELEVATED TROPONIN -renal failure contributing 


EKG changes secondary to Increased K


Currently chest pain free


Continue Nitropaste, Aspirin


Resume, BB, Statin, Plavix 


May need Cardiology in increasing Troponin and or pain





HYPERTENSIVE URGENCY 


-SBP >190s


-Has been noncompliant with home BP meds for weeks to months


-Improved BP with nitropaste, will continue 








RIGHT 2ND FINGER INFECTION 


-[facility]


-no spreading cellulitis 


-continue current antibiotic





DIARRHEA


-has history of c.diff and recently on Abx 


-check C. diff toxin 





IDDM


-recent A1c 7.2 


-has been noncompliant with insulin however random serum glucose tonight 112


-SSI coverage  











GERD


-continue Zantac 





HLD


-continue Statin 





DEPRESSION


-continue Zoloft 








DVT PROPHYLAXIS: Sq heparin 





CODE STATUS: FULL CODE





DISPO:


Awaited


Vital Signs:











  Date Time  Temp Pulse Resp B/P Pulse Ox O2 Delivery O2 Flow Rate FiO2


 


1/25/17 15:34  82      


 


1/25/17 15:13 37.1 85 20 165/94 95 Room Air  


 


1/25/17 13:10 36.8 82  135/102    


 


1/25/17 12:30  79  147/74    


 


1/25/17 12:15  79  145/76    


 


1/25/17 12:11      Room Air  


 


1/25/17 12:00  80  161/96    


 


1/25/17 11:45  77  150/77    


 


1/25/17 11:30  80  167/98    


 


1/25/17 11:15  78  150/96    


 


1/25/17 11:00 36.4 79 20 163/97 98 Room Air  


 


1/25/17 11:00  79  163/97    


 


1/25/17 10:45  76  159/96    


 


1/25/17 10:37      Room Air  


 


1/25/17 10:30  80  139/82    


 


1/25/17 10:15  81  148/87    


 


1/25/17 10:00  80  147/85    


 


1/25/17 09:45  79  159/100    


 


1/25/17 09:30  78  159/94    


 


1/25/17 09:15  78  159/101    


 


1/25/17 08:59  75  154/100    


 


1/25/17 08:44 37.1 73  163/97    


 


1/25/17 08:00      Room Air  


 


1/25/17 07:39 36.4 73 18 176/109 97 Room Air  


 


1/25/17 04:00      Room Air  


 


1/25/17 03:31 36.9 73 18 137/89 97 Room Air  


 


1/25/17 01:02    142/96    


 


1/24/17 23:59      Room Air  


 


1/24/17 23:26 36.8 84 20 149/80 93 Room Air  


 


1/24/17 22:55  93      


 


1/24/17 22:13    196/119    


 


1/24/17 21:49 36.4 96 18 219/128 98 Room Air  


 


1/24/17 20:48  97 22 149/102 92 Room Air  


 


1/24/17 19:12  93 22 181/109 95 Room Air  


 


1/24/17 18:54  97      


 


1/24/17 18:53     93 Room Air  


 


1/24/17 18:44     93 Room Air  


 


1/24/17 18:44     93 Room Air  


 


1/24/17 18:13 36.5 98 20 192/113 93 Room Air  








Lab Results:





Results Past 24 Hours








Test


  1/24/17


19:08 1/24/17


21:45 1/24/17


23:03 1/25/17


00:01 Range/Units


 


 


White Blood Count 11.11    4.8-10.8  K/uL


 


Red Blood Count 3.28    4.2-5.4  M/uL


 


Hemoglobin 10.0    12.0-16.0  g/dL


 


Hematocrit 29.3    37-47  %


 


Mean Corpuscular Volume 89.3      fL


 


Mean Corpuscular Hemoglobin 30.5    25-34  pg


 


Mean Corpuscular Hemoglobin


Concent 34.1


  


  


  


  32-36  g/dl


 


 


Platelet Count 338    130-400  K/uL


 


Mean Platelet Volume 10.4    7.4-10.4  fL


 


Neutrophils (%) (Auto) 72.3     %


 


Lymphocytes (%) (Auto) 17.0     %


 


Monocytes (%) (Auto) 7.8     %


 


Eosinophils (%) (Auto) 2.3     %


 


Basophils (%) (Auto) 0.3     %


 


Neutrophils # (Auto) 8.03    1.4-6.5  K/uL


 


Lymphocytes # (Auto) 1.89    1.2-3.4  K/uL


 


Monocytes # (Auto) 0.87    0.11-0.59  K/uL


 


Eosinophils # (Auto) 0.26    0-0.5  K/uL


 


Basophils # (Auto) 0.03    0-0.2  K/uL


 


RDW Standard Deviation 46.9    36.4-46.3  fL


 


RDW Coefficient of Variation 14.3    11.5-14.5  %


 


Immature Granulocyte % (Auto) 0.3     %


 


Immature Granulocyte # (Auto) 0.03    0.00-0.02  K/uL


 


Prothrombin Time


  11.3


  


  


  


  9.0-12.0


SECONDS


 


Prothromb Time International


Ratio 1.1


  


  


  


  0.9-1.1  


 


 


Activated Partial


Thromboplast Time 30.3


  


  


  


  21.0-31.0


SECONDS


 


Partial Thromboplastin Ratio 1.2     


 


Sodium Level 135    136-145  mmol/L


 


Potassium Level 5.9    3.5-5.1  mmol/L


 


Chloride Level 99      mmol/L


 


Carbon Dioxide Level 16    21-32  mmol/L


 


Anion Gap 20.0    3-11  mmol/L


 


Blood Urea Nitrogen 137    7-18  mg/dl


 


Creatinine


  17.00


  


  


  


  0.60-1.20


mg/dl


 


Est Creatinine Clear Calc


Drug Dose 5.0


  


  


  


   ml/min


 


 


Estimated GFR (


American) 2.5


  


  


  


   


 


 


Estimated GFR (Non-


American 2.2


  


  


  


   


 


 


BUN/Creatinine Ratio 8.1    10-20  


 


Random Glucose 117    70-99  mg/dl


 


Calcium Level 9.0    8.5-10.1  mg/dl


 


Total Bilirubin 0.3    0.2-1  mg/dl


 


Aspartate Amino Transf


(AST/SGOT) 6


  


  


  


  15-37  U/L


 


 


Alanine Aminotransferase


(ALT/SGPT) 14


  


  


  


  12-78  U/L


 


 


Alkaline Phosphatase 75      U/L


 


Total Creatine Kinase 88      U/L


 


Creatine Kinase MB 6.3    0.5-3.6  ng/ml


 


Creatine Kinase MB Ratio 7.2    0-3.0  


 


Troponin I 0.236    0-0.045  ng/ml


 


Pro-B-Type Natriuretic Peptide > 72028    0-450  pg/ml


 


Total Protein 7.9    6.4-8.2  gm/dl


 


Albumin 3.0    3.4-5.0  gm/dl


 


Globulin 4.9    2.5-4.0  gm/dl


 


Albumin/Globulin Ratio 0.6    0.9-2  


 


Bedside Glucose  112 215 63 70-90  mg/dl














Test


  1/25/17


00:28 1/25/17


00:36 1/25/17


01:00 1/25/17


01:30 Range/Units


 


 


Bedside Glucose 72  90  70-90  mg/dl


 


Total Creatine Kinase  85     U/L


 


Creatine Kinase MB  6.2   0.5-3.6  ng/ml


 


Creatine Kinase MB Ratio  7.3   0-3.0  


 


Troponin I  0.262   0-0.045  ng/ml


 


Urine Color    YELLOW  


 


Urine Appearance    CLEAR CLEAR  


 


Urine pH    5.5 4.5-7.5  


 


Urine Specific Gravity    1.010 1.000-1.030  


 


Urine Protein    3+ NEG  


 


Urine Glucose (UA)    2+ NEG  


 


Urine Ketones    NEG NEG  


 


Urine Occult Blood    1+ NEG  


 


Urine Nitrite    NEG NEG  


 


Urine Bilirubin    NEG NEG  


 


Urine Urobilinogen    NEG NEG  


 


Urine Leukocyte Esterase    TRACE NEG  


 


Urine WBC (Auto)    10-30 0-5  /hpf


 


Urine RBC (Auto)    0-4 0-4  /hpf


 


Urine Hyaline Casts (Auto)    1-5 0-5  /lpf


 


Urine Epithelial Cells (Auto)    >30 0-5  /lpf


 


Urine Bacteria (Auto)    2+ NEG  














Test


  1/25/17


01:57 1/25/17


03:02 1/25/17


04:09 1/25/17


06:43 Range/Units


 


 


Bedside Glucose 113 169 186  70-90  mg/dl


 


White Blood Count    9.61 4.8-10.8  K/uL


 


Red Blood Count    3.27 4.2-5.4  M/uL


 


Hemoglobin    9.8 12.0-16.0  g/dL


 


Hematocrit    29.3 37-47  %


 


Mean Corpuscular Volume    89.6   fL


 


Mean Corpuscular Hemoglobin    30.0 25-34  pg


 


Mean Corpuscular Hemoglobin


Concent 


  


  


  33.4


  32-36  g/dl


 


 


RDW Standard Deviation    47.0 36.4-46.3  fL


 


RDW Coefficient of Variation    14.3 11.5-14.5  %


 


Platelet Count    316 130-400  K/uL


 


Mean Platelet Volume    10.6 7.4-10.4  fL


 


Sodium Level    134 136-145  mmol/L


 


Potassium Level    6.9 3.5-5.1  mmol/L


 


Chloride Level    97   mmol/L


 


Carbon Dioxide Level    17 21-32  mmol/L


 


Anion Gap    20.0 3-11  mmol/L


 


Blood Urea Nitrogen    146 7-18  mg/dl


 


Creatinine


  


  


  


  17.00


  0.60-1.20


mg/dl


 


Est Creatinine Clear Calc


Drug Dose 


  


  


  5.0


   ml/min


 


 


Estimated GFR (


American) 


  


  


  2.5


   


 


 


Estimated GFR (Non-


American 


  


  


  2.2


   


 


 


BUN/Creatinine Ratio    8.6 10-20  


 


Random Glucose    131 70-99  mg/dl


 


Calcium Level    9.1 8.5-10.1  mg/dl


 


Magnesium Level    3.0 1.8-2.4  mg/dl


 


Total Creatine Kinase    76   U/L


 


Creatine Kinase MB    5.8 0.5-3.6  ng/ml


 


Creatine Kinase MB Ratio    7.6 0-3.0  


 


Troponin I    0.283 0-0.045  ng/ml














Test


  1/25/17


06:51 1/25/17


07:52 1/25/17


11:04 1/25/17


15:15 Range/Units


 


 


Bedside Glucose 130  97  70-90  mg/dl


 


Hepatitis B Surface Antigen  NEG   NEG  


 


Hepatitis B Surface Antibody  POS    


 


Sodium Level    135 136-145  mmol/L


 


Chloride Level    94   mmol/L


 


Carbon Dioxide Level    26 21-32  mmol/L


 


Anion Gap    15.0 3-11  mmol/L


 


Creatinine


  


  


  


  9.70


  0.60-1.20


mg/dl


 


Est Creatinine Clear Calc


Drug Dose 


  


  


  8.7


   ml/min


 


 


Estimated GFR (


American) 


  


  


  4.9


   


 


 


Estimated GFR (Non-


American 


  


  


  4.2


   


 


 


BUN/Creatinine Ratio    6.5 10-20  


 


Random Glucose    191 70-99  mg/dl


 


Calcium Level    8.5 8.5-10.1  mg/dl


 


Magnesium Level    2.2 1.8-2.4  mg/dl








 Microbiology Results


1/25/17 C.difficile Toxin B Gene (PCR) - Final, Complete


          No C. difficile toxin B gene detected Home Suture Removal Text: Patient was provided instructions on removing sutures and will remove their sutures at home.  If they have any questions or difficulties they will call the office.

## 2024-04-02 NOTE — PROGRESS NOTE
Subjective


Date of Service:


Marty 15, 2018.


Subjective


Pt evaluation today including:  conversation w/ patient, physical exam, lab 

review, review of studies, review of inpatient medication list


Saw/examined the patient in room 403


She is getting dialysis currently, doing well with it


breathing is improving, but she is requesting breathing treatments


L middle finger is painful, tender


L foot, fluid filled cyst noted





Problem List


Medical Problems:


(1) Abdominal pain


Status: Acute  





(2) Acute electrocardiogram changes


Status: Acute  





(3) Back pain


Status: Acute  





(4) Cellulitis


Status: Acute  





(5) Cellulitis of left middle finger


Status: Acute  





(6) Chest pain


Status: Acute  





(7) Chronic kidney disease (CKD)


Status: Acute  





(8) Dialysis AV fistula malfunction


Status: Acute  





(9) Elevated troponin


Status: Acute  





(10) Elevated troponin


Status: Acute  





(11) Elevated troponin


Status: Acute  





(12) End stage renal disease


Status: Acute  





(13) GI bleed


Status: Acute  





(14) Hyperkalemia


Status: Acute  





(15) Hyperkalemia


Status: Acute  





(16) Hypertension


Status: Acute  





(17) Joint effusion


Status: Acute  





(18) Left elbow pain


Status: Acute  





(19) Left sided chest pain


Status: Acute  





(20) Left sided chest pain


Status: Acute  





(21) Limb ischemia


Status: Acute  





(22) Lumbar back pain


Status: Acute  





(23) Medical non-compliance


Status: Acute  





(24) Mid back pain on right side


Status: Acute  





(25) Musculoskeletal strain


Status: Acute  





(26) Obesity


Status: Acute  





(27) PNA (pneumonia)


Status: Acute  





(28) Pneumonia


Status: Acute  





(29) Pulmonary edema


Status: Acute  





(30) Renal failure


Status: Acute  





(31) Sinusitis


Status: Acute  





(32) Strain of lumbar region


Status: Acute  





(33) Substernal chest pain


Status: Acute  





(34) Tendinopathy of right shoulder


Status: Acute  





(35) Trochanteric bursitis, right hip


Status: Acute  





(36) Upper abdominal pain


Status: Acute  





(37) Upper GI bleed


Status: Acute  





(38) Uremia


Status: Acute  





(39) Urinary tract infection


Status: Acute  





(40) Vomiting


Status: Acute  





(41) Vomiting


Status: Acute  





(42) Vomiting


Status: Acute  





(43) Vomiting


Status: Acute  











Review of Systems


Constitutional:  No fever, No chills, No weakness


Respiratory:  + shortness of breath (improving), No cough, No sputum, No 

wheezing, No dyspnea on exertion


Cardiac:  No chest pain


Abdomen:  No pain, No nausea, No vomiting, No diarrhea


Musculoskeletal:  + see HPI, + joint pain, + muscle pain, + swelling


Heme:  No abnormal bleeding/bruising





Medications





Current Inpatient Medications








 Medications


  (Trade)  Dose


 Ordered  Sig/Willy


 Route  Start Time


 Stop Time Status Last Admin


Dose Admin


 


 Insulin Aspart


  (novoLOG ASPART)  **SLIDING


 SCALE**


 **G...  ACHS


 SC  1/13/18 06:45


 2/12/18 06:59  1/15/18 17:14


1 UNITS


 


 Glucose


  (Glucose 40% Gel)  15-30


 GRAMS 15


 GRAMS...  UD  PRN


 PO  1/13/18 00:00


 2/12/18 00:00   


 


 


 Glucose


  (Glucose Chew


 Tab)  4-8


 Tablets 4


 Tabl...  UD  PRN


 PO  1/13/18 00:00


 2/12/18 00:00   


 


 


 Dextrose


  (Dextrose 50%


 50ML Syringe)  25-50ML OF


 50% DW IV


 FOR...  UD  PRN


 IV  1/13/18 00:00


 2/12/18 00:00   


 


 


 Glucagon


  (Glucagon Inj)  1 mg  UD  PRN


 SQ  1/13/18 00:00


 2/12/18 00:00   


 


 


 Naphazoline HCl/


 Pheniramine


 Maleate


  (Visine-A Oph


 Soln)  1 drops  DAILY


 OP  1/13/18 09:00


 2/12/18 08:59  1/15/18 08:14


1 DROPS


 


 Isosorbide


 Mononitrate


  (Imdur Ext Rel


 Tab)  60 mg  QAM


 PO  1/13/18 09:00


 2/12/18 08:59  1/15/18 16:30


60 MG


 


 Metoprolol


 Tartrate


  (Lopressor Tab)  50 mg  BID


 PO  1/13/18 09:00


 2/12/18 08:59  1/15/18 16:30


50 MG


 


 Insulin Glargine


  (Lantus Solostar


 Pen)  12 units  HS


 SC  1/13/18 21:00


 2/12/18 20:59  1/14/18 20:48


12 UNITS


 


 Sevelamer HCl


  (Renagel Tab)  2,400 mg  TIDM


 PO  1/13/18 11:30


 2/12/18 11:29  1/15/18 16:32


2,400 MG


 


 Acetaminophen


  (Tylenol Tab)  325 mg  Q4H  PRN


 PO  1/14/18 02:45


 2/13/18 02:44   


 


 


 Benzonatate


  (Tessalon Perles


 Cap)  100 mg  TID  PRN


 PO  1/14/18 18:15


 2/13/18 18:14  1/15/18 16:29


100 MG


 


 Sertraline HCl


  (Zoloft Tab)  25 mg  QAM


 PO  1/15/18 08:00


 2/14/18 07:59  1/15/18 08:14


25 MG


 


 Al Hydroxide/Mg


 Trisilicate


  (Gaviscon Chew


 Tab)  1 tab  Q6  PRN


 PO  1/14/18 19:15


 2/13/18 19:14  1/14/18 20:44


1 TAB


 


 Lactobacillus


 Acidophilus


  (Floranex Tab)  4 tab  TIDM


 PO  1/15/18 08:00


 2/14/18 07:59  1/15/18 16:31


4 TAB


 


 Levofloxacin


  (Consult)  1 ea  UD  PRN


 N/A  1/15/18 08:30


 2/14/18 08:29   


 


 


 Levofloxacin


  (Levaquin Tab)  500 mg  Q48H


 PO  1/15/18 11:00


 1/19/18 10:59  1/15/18 16:29


500 MG


 


 Albuterol/


 Ipratropium


  (Duoneb)  3 ml  BIDR


 INH  1/15/18 20:00


 2/14/18 19:59   


 











Objective


Vital Signs











  Date Time  Temp Pulse Resp B/P (MAP) Pulse Ox O2 Delivery O2 Flow Rate FiO2


 


1/15/18 16:22 36.6 76  126/63 (84)    


 


1/15/18 16:00      Room Air  


 


1/15/18 16:00  79  134/70    


 


1/15/18 15:45  79  122/65    


 


1/15/18 15:30  79  115/68    


 


1/15/18 15:15  73  110/59    


 


1/15/18 15:00  72  121/64    


 


1/15/18 14:45  75  129/69    


 


1/15/18 14:30  72  136/66    


 


1/15/18 14:15  70  100/59    


 


1/15/18 14:00  77  101/64    


 


1/15/18 13:45  69  99/50    


 


1/15/18 13:30  71  114/62    


 


1/15/18 13:15  72  115/42    


 


1/15/18 13:04  77  129/74    


 


1/15/18 13:04 36.4 77  124/61 (82)    


 


1/15/18 08:00      Room Air  


 


1/15/18 07:24 36.5 70 20 148/67 (94) 94 Room Air  


 


1/15/18 00:30      Room Air  


 


1/14/18 23:42 36.9 66 18 107/62 (77) 92 Room Air  


 


1/14/18 20:45  73  146/69 (94)    


 


1/14/18 20:00      Room Air  











Physical Exam


General Appearance:  no apparent distress


Respiratory/Chest:  no respiratory distress, no accessory muscle use, + 

decreased breath sounds


Cardiovascular:  regular rate, rhythm, no edema, no murmur


Extremities:  + pertinent finding (L middle finger, black, tender to palpation; 

L foot cyst vs. abscess - )


Neurologic/Psychiatric:  no motor/sensory deficits, alert, normal mood/affect





Laboratory Results





Last 24 Hours








Test


  1/14/18


20:40 1/15/18


05:36 1/15/18


08:00 1/15/18


11:53


 


Bedside Glucose 97 mg/dl   107 mg/dl  152 mg/dl 


 


Sodium Level  133 mmol/L   


 


Potassium Level  4.0 mmol/L   


 


Chloride Level  96 mmol/L   


 


Carbon Dioxide Level  26 mmol/L   


 


Anion Gap  11.0 mmol/L   


 


Blood Urea Nitrogen  61 mg/dl   


 


Creatinine  9.99 mg/dl   


 


Est Creatinine Clear Calc


Drug Dose 


  8.2 ml/min 


  


  


 


 


Estimated GFR (


American) 


  4.7 


  


  


 


 


Estimated GFR (Non-


American 


  4.1 


  


  


 


 


BUN/Creatinine Ratio  6.0   


 


Random Glucose  143 mg/dl   


 


Calcium Level  9.4 mg/dl   


 


Test


  1/15/18


16:45 


  


  


 


 


Bedside Glucose 185 mg/dl    











Assessment and Plan


This is a 52 year old female with a PMH of ESRD on HD, insulin dependent DM2, 

CAD s/p stents, ischemic cardiomyopathy, HLD, PVD, carotid artery stenosis s/p 

endarterectomy presents secondary to missed dialysis, shortness of breath, 

fluid overload





ESRD on HD


Ischemic Cardiomyopathy


Acute on Chronic Systolic CHF


patient missed three dialysis sessions


supposed to have dialysis M-W-F


 consulted for transportation to dialysis help


continued fluid removal with dialysis





L Lower Lobe Pneumonia


+cough, shortness of breath


CXR suggests LLL pneumonia


for now, she is on Levaquin, we can continue for 5-7 days


cultures pending





L Foot Cyst vs. Abscess


for now, continue quinolone


appreciate ID input, we will continue Levaquin for now


wound care consulted, may need this drained


monitor; PT/OT





HTN


secondary to missed dialysis


cont. Lopressor





Hyperkalemia - resolved


due to missed dialysis 


electrolytes stabilized after consecutive HD tx 





Insulin Dependent DM2


appreciate pharmacy glycemic control


Lantus and sliding scale





Depression


appreciate psych evaluation


started on Zoloft, which we can continue


outpatient PCP follow-up





DVT ppx


SCDs





FULL CODE 90